# Patient Record
Sex: FEMALE | Race: BLACK OR AFRICAN AMERICAN | Employment: OTHER | URBAN - METROPOLITAN AREA
[De-identification: names, ages, dates, MRNs, and addresses within clinical notes are randomized per-mention and may not be internally consistent; named-entity substitution may affect disease eponyms.]

---

## 2017-06-16 ENCOUNTER — APPOINTMENT (OUTPATIENT)
Dept: GENERAL RADIOLOGY | Age: 77
End: 2017-06-16
Attending: EMERGENCY MEDICINE
Payer: MEDICARE

## 2017-06-16 ENCOUNTER — HOSPITAL ENCOUNTER (EMERGENCY)
Age: 77
Discharge: HOME | End: 2017-06-16
Attending: EMERGENCY MEDICINE
Payer: MEDICARE

## 2017-06-16 VITALS
HEART RATE: 90 BPM | SYSTOLIC BLOOD PRESSURE: 158 MMHG | DIASTOLIC BLOOD PRESSURE: 93 MMHG | RESPIRATION RATE: 20 BRPM | WEIGHT: 174 LBS | OXYGEN SATURATION: 99 % | BODY MASS INDEX: 34.16 KG/M2 | TEMPERATURE: 97.9 F | HEIGHT: 60 IN

## 2017-06-16 DIAGNOSIS — M25.561 CHRONIC PAIN OF RIGHT KNEE: ICD-10-CM

## 2017-06-16 DIAGNOSIS — G89.29 CHRONIC PAIN OF RIGHT KNEE: ICD-10-CM

## 2017-06-16 DIAGNOSIS — R07.9 ACUTE CHEST PAIN: Primary | ICD-10-CM

## 2017-06-16 LAB
ALBUMIN SERPL BCP-MCNC: 3.2 G/DL (ref 3.5–5)
ALBUMIN/GLOB SERPL: 0.9 {RATIO} (ref 1.1–2.2)
ALP SERPL-CCNC: 135 U/L (ref 45–117)
ALT SERPL-CCNC: 61 U/L (ref 12–78)
ANION GAP BLD CALC-SCNC: 6 MMOL/L (ref 5–15)
AST SERPL W P-5'-P-CCNC: 13 U/L (ref 15–37)
BASOPHILS # BLD AUTO: 0 K/UL (ref 0–0.1)
BASOPHILS # BLD: 0 % (ref 0–1)
BILIRUB SERPL-MCNC: 0.2 MG/DL (ref 0.2–1)
BNP SERPL-MCNC: 45 PG/ML (ref 0–450)
BUN SERPL-MCNC: 10 MG/DL (ref 6–20)
BUN/CREAT SERPL: 11 (ref 12–20)
CALCIUM SERPL-MCNC: 8.3 MG/DL (ref 8.5–10.1)
CHLORIDE SERPL-SCNC: 107 MMOL/L (ref 97–108)
CK MB CFR SERPL CALC: 1.3 % (ref 0–2.5)
CK MB SERPL-MCNC: 1.5 NG/ML (ref 5–25)
CK SERPL-CCNC: 118 U/L (ref 26–192)
CO2 SERPL-SCNC: 29 MMOL/L (ref 21–32)
CREAT SERPL-MCNC: 0.87 MG/DL (ref 0.55–1.02)
D DIMER PPP FEU-MCNC: 0.48 MG/L FEU (ref 0–0.65)
EOSINOPHIL # BLD: 0.1 K/UL (ref 0–0.4)
EOSINOPHIL NFR BLD: 1 % (ref 0–7)
ERYTHROCYTE [DISTWIDTH] IN BLOOD BY AUTOMATED COUNT: 15.7 % (ref 11.5–14.5)
GLOBULIN SER CALC-MCNC: 3.5 G/DL (ref 2–4)
GLUCOSE SERPL-MCNC: 142 MG/DL (ref 65–100)
HCT VFR BLD AUTO: 37.7 % (ref 35–47)
HGB BLD-MCNC: 12.2 G/DL (ref 11.5–16)
LYMPHOCYTES # BLD AUTO: 27 % (ref 12–49)
LYMPHOCYTES # BLD: 3.9 K/UL (ref 0.8–3.5)
MCH RBC QN AUTO: 27.9 PG (ref 26–34)
MCHC RBC AUTO-ENTMCNC: 32.4 G/DL (ref 30–36.5)
MCV RBC AUTO: 86.3 FL (ref 80–99)
MONOCYTES # BLD: 1 K/UL (ref 0–1)
MONOCYTES NFR BLD AUTO: 7 % (ref 5–13)
NEUTS SEG # BLD: 9.7 K/UL (ref 1.8–8)
NEUTS SEG NFR BLD AUTO: 65 % (ref 32–75)
PLATELET # BLD AUTO: 345 K/UL (ref 150–400)
POTASSIUM SERPL-SCNC: 3.9 MMOL/L (ref 3.5–5.1)
PROT SERPL-MCNC: 6.7 G/DL (ref 6.4–8.2)
RBC # BLD AUTO: 4.37 M/UL (ref 3.8–5.2)
SODIUM SERPL-SCNC: 142 MMOL/L (ref 136–145)
TROPONIN I SERPL-MCNC: <0.04 NG/ML
WBC # BLD AUTO: 14.7 K/UL (ref 3.6–11)

## 2017-06-16 PROCEDURE — 71010 XR CHEST PORT: CPT

## 2017-06-16 PROCEDURE — 93005 ELECTROCARDIOGRAM TRACING: CPT

## 2017-06-16 PROCEDURE — 85379 FIBRIN DEGRADATION QUANT: CPT | Performed by: EMERGENCY MEDICINE

## 2017-06-16 PROCEDURE — 85025 COMPLETE CBC W/AUTO DIFF WBC: CPT | Performed by: EMERGENCY MEDICINE

## 2017-06-16 PROCEDURE — 84484 ASSAY OF TROPONIN QUANT: CPT | Performed by: EMERGENCY MEDICINE

## 2017-06-16 PROCEDURE — 99285 EMERGENCY DEPT VISIT HI MDM: CPT

## 2017-06-16 PROCEDURE — 80053 COMPREHEN METABOLIC PANEL: CPT | Performed by: EMERGENCY MEDICINE

## 2017-06-16 PROCEDURE — 36415 COLL VENOUS BLD VENIPUNCTURE: CPT | Performed by: EMERGENCY MEDICINE

## 2017-06-16 PROCEDURE — 83880 ASSAY OF NATRIURETIC PEPTIDE: CPT | Performed by: EMERGENCY MEDICINE

## 2017-06-16 PROCEDURE — 82550 ASSAY OF CK (CPK): CPT | Performed by: EMERGENCY MEDICINE

## 2017-06-16 RX ORDER — LIDOCAINE 4 G/100G
PATCH TOPICAL
Qty: 10 PATCH | Refills: 0 | Status: ON HOLD | OUTPATIENT
Start: 2017-06-16 | End: 2017-09-08

## 2017-06-16 RX ORDER — SODIUM CHLORIDE 0.9 % (FLUSH) 0.9 %
5-10 SYRINGE (ML) INJECTION EVERY 8 HOURS
Status: DISCONTINUED | OUTPATIENT
Start: 2017-06-16 | End: 2017-06-16 | Stop reason: HOSPADM

## 2017-06-16 RX ORDER — SODIUM CHLORIDE 0.9 % (FLUSH) 0.9 %
5-10 SYRINGE (ML) INJECTION AS NEEDED
Status: DISCONTINUED | OUTPATIENT
Start: 2017-06-16 | End: 2017-06-16 | Stop reason: HOSPADM

## 2017-06-16 RX ORDER — CAPSAICIN 0.03 G/100G
CREAM TOPICAL 3 TIMES DAILY
Qty: 60 G | Refills: 0 | Status: ON HOLD | OUTPATIENT
Start: 2017-06-16 | End: 2017-09-08

## 2017-06-16 NOTE — DISCHARGE INSTRUCTIONS
Chest Pain: Care Instructions  Your Care Instructions  There are many things that can cause chest pain. Some are not serious and will get better on their own in a few days. But some kinds of chest pain need more testing and treatment. Your doctor may have recommended a follow-up visit in the next 8 to 12 hours. If you are not getting better, you may need more tests or treatment. Even though your doctor has released you, you still need to watch for any problems. The doctor carefully checked you, but sometimes problems can develop later. If you have new symptoms or if your symptoms do not get better, get medical care right away. If you have worse or different chest pain or pressure that lasts more than 5 minutes or you passed out (lost consciousness), call 911 or seek other emergency help right away. A medical visit is only one step in your treatment. Even if you feel better, you still need to do what your doctor recommends, such as going to all suggested follow-up appointments and taking medicines exactly as directed. This will help you recover and help prevent future problems. How can you care for yourself at home? · Rest until you feel better. · Take your medicine exactly as prescribed. Call your doctor if you think you are having a problem with your medicine. · Do not drive after taking a prescription pain medicine. When should you call for help? Call 911 if:  · You passed out (lost consciousness). · You have severe difficulty breathing. · You have symptoms of a heart attack. These may include:  ¨ Chest pain or pressure, or a strange feeling in your chest.  ¨ Sweating. ¨ Shortness of breath. ¨ Nausea or vomiting. ¨ Pain, pressure, or a strange feeling in your back, neck, jaw, or upper belly or in one or both shoulders or arms. ¨ Lightheadedness or sudden weakness. ¨ A fast or irregular heartbeat.   After you call 911, the  may tell you to chew 1 adult-strength or 2 to 4 low-dose aspirin. Wait for an ambulance. Do not try to drive yourself. Call your doctor today if:  · You have any trouble breathing. · Your chest pain gets worse. · You are dizzy or lightheaded, or you feel like you may faint. · You are not getting better as expected. · You are having new or different chest pain. Where can you learn more? Go to http://arnaldo-mariano.info/. Enter A120 in the search box to learn more about \"Chest Pain: Care Instructions. \"  Current as of: May 27, 2016  Content Version: 11.2  © 1494-0027 [employer]. Care instructions adapted under license by [employer] (which disclaims liability or warranty for this information). If you have questions about a medical condition or this instruction, always ask your healthcare professional. [address] any warranty or liability for your use of this information. Joint Pain: Care Instructions  Your Care Instructions  Many people have small aches and pains from overuse or injury to muscles and joints. Joint injuries often happen during sports or recreation, work tasks, or projects around the home. An overuse injury can happen when you put too much stress on a joint or when you do an activity that stresses the joint over and over, such as using the computer or rowing a boat. You can take action at home to help your muscles and joints get better. You should feel better in 1 to 2 weeks, but it can take 3 months or more to heal completely. Follow-up care is a key part of your treatment and safety. Be sure to make and go to all appointments, and call your doctor if you are having problems. It's also a good idea to know your test results and keep a list of the medicines you take. How can you care for yourself at home? · Do not put weight on the injured joint for at least a day or two. · For the first day or two after an injury, do not take hot showers or baths, and do not use hot packs. The heat could make swelling worse. · Put ice or a cold pack on the sore joint for 10 to 20 minutes at a time. Try to do this every 1 to 2 hours for the next 3 days (when you are awake) or until the swelling goes down. Put a thin cloth between the ice and your skin. · Wrap the injury in an elastic bandage. Do not wrap it too tightly because this can cause more swelling. · Prop up the sore joint on a pillow when you ice it or anytime you sit or lie down during the next 3 days. Try to keep it above the level of your heart. This will help reduce swelling. · Take an over-the-counter pain medicine, such as acetaminophen (Tylenol), ibuprofen (Advil, Motrin), or naproxen (Aleve). Read and follow all instructions on the label. · After 1 or 2 days of rest, begin moving the joint gently. While the joint is still healing, you can begin to exercise using activities that do not strain or hurt the painful joint. When should you call for help? Call your doctor now or seek immediate medical care if:  · You have signs of infection, such as:  ¨ Increased pain, swelling, warmth, and redness. ¨ Red streaks leading from the joint. ¨ A fever. Watch closely for changes in your health, and be sure to contact your doctor if:  · Your movement or symptoms are not getting better after 1 to 2 weeks of home treatment. Where can you learn more? Go to http://arnaldo-mariano.info/. Enter P205 in the search box to learn more about \"Joint Pain: Care Instructions. \"  Current as of: May 23, 2016  Content Version: 11.2  © 2060-8025 [employer]. Care instructions adapted under license by [employer] (which disclaims liability or warranty for this information). If you have questions about a medical condition or this instruction, always ask your healthcare professional. [place] [address] any warranty or liability for your use of this information.

## 2017-06-16 NOTE — ED PROVIDER NOTES
Patient is a 77 y.o. female presenting with chest pain and shortness of breath. The history is provided by the patient and medical records. No  was used. Chest Pain (Angina)    This is a new problem. The current episode started more than 1 week ago. The problem has not changed since onset. The problem occurs daily. The pain is associated with exertion. The pain is present in the substernal region. The pain is mild. The quality of the pain is described as pressure-like. The symptoms are aggravated by exertion. Associated symptoms include exertional chest pressure, lower extremity edema and shortness of breath. Pertinent negatives include no palpitations. She has tried nothing for the symptoms. Risk factors include cardiac disease, obesity, diabetes mellitus and hypertension. Her past medical history is significant for DM and HTN. Her past medical history does not include DVT or PE. Procedural history includes stress echo. Shortness of Breath   Associated symptoms include chest pain and leg swelling. Associated medical issues do not include PE or DVT. Pt with intermittent CP/SOB for the past week.      Past Medical History:   Diagnosis Date    Anal polyp 6/13/2013    Arthritis     Asthma     Cataract     Chronic pain     knee - right/back    Diabetes ([address]     GERD (gastroesophageal reflux disease)     Hemorrhoids 6/13/2013    History of kidney stones     Hypertension     Ill-defined condition     abdominal pain and burning    Other ill-defined conditions     high cholesterol       Past Surgical History:   Procedure Laterality Date    COLONOSCOPY  2/28/2013         EGD  2/28/2013         HX CATARACT REMOVAL Bilateral     HX CHOLECYSTECTOMY  6-2015    HX GI  6/27/13    anal polyps removed    HX HEENT      laser surgery for blood clot in right eye    HX KNEE REPLACEMENT      right    HX OTHER SURGICAL  4-2-14    lap gastrotomy and removal of polyp -  - not cancerous per pt    HX ALAN AND BSO      HX TONSILLECTOMY      HX UROLOGICAL      \"laser\" surgery for kidney stone    NEUROLOGICAL PROCEDURE UNLISTED  1990    tumor at back of neck, benign         Family History:   Problem Relation Age of Onset    Cancer Mother      colon    Diabetes Brother     Other Sister      GI BLEED    Heart Disease Brother     Heart Attack Brother     Heart Disease Brother     Heart Disease Brother     Anesth Problems Neg Hx        Social History     Social History    Marital status:      Spouse name: N/A    Number of children: N/A    Years of education: N/A     Occupational History    Not on file. Social History Main Topics    Smoking status: Former Smoker    Smokeless tobacco: Never Used      Comment: age 13    Alcohol use No    Drug use: No    Sexual activity: No     Other Topics Concern    Not on file     Social History Narrative         ALLERGIES: Seafood [shellfish containing products]    Review of Systems   Respiratory: Positive for shortness of breath. Cardiovascular: Positive for chest pain and leg swelling. Negative for palpitations. All other systems reviewed and are negative. Vitals:    06/16/17 1738   BP: 158/78   Pulse: 83   Resp: 19   Temp: 98.7 °F (37.1 °C)   SpO2: 99%   Weight: 78.9 kg (174 lb)   Height: 5' (1.524 m)            Physical Exam   Constitutional: She is oriented to person, place, and time. She appears well-developed and well-nourished. No distress. Obese Black female NAD   HENT:   Head: Normocephalic and atraumatic. Nose: Nose normal.   Mouth/Throat: Oropharynx is clear and moist. No oropharyngeal exudate. Eyes: Conjunctivae and EOM are normal. Pupils are equal, round, and reactive to light. Right eye exhibits no discharge. Left eye exhibits no discharge. No scleral icterus. Neck: Normal range of motion. Neck supple. Cardiovascular: Normal rate, regular rhythm and normal heart sounds.     No murmur heard.  Pulmonary/Chest: Effort normal and breath sounds normal. No respiratory distress. She has no wheezes. She has no rales. Abdominal: Soft. She exhibits no distension. There is no tenderness. Musculoskeletal: Normal range of motion. She exhibits edema. She exhibits no tenderness. Trace pitting edema b/l LE   Neurological: She is alert and oriented to person, place, and time. Skin: Skin is warm and dry. No rash noted. She is not diaphoretic. No erythema. No pallor. Psychiatric: She has a normal mood and affect. Her behavior is normal.   Nursing note and vitals reviewed. [facility]  ED Course       Procedures      7:46 PM    Reviewed evaluation with pt. She states SOB/chest tightness seems worse 2nd to knee pain. Only took 1 percocet today. States her pain management has talked with her about \"pain patches\" for her knee but hasn't tried them yet. Has appt with cardiology f/u end of July.   Trinity Lamas MD

## 2017-06-18 LAB
ATRIAL RATE: 81 BPM
CALCULATED P AXIS, ECG09: 60 DEGREES
CALCULATED R AXIS, ECG10: 33 DEGREES
CALCULATED T AXIS, ECG11: 37 DEGREES
DIAGNOSIS, 93000: NORMAL
P-R INTERVAL, ECG05: 118 MS
Q-T INTERVAL, ECG07: 388 MS
QRS DURATION, ECG06: 72 MS
QTC CALCULATION (BEZET), ECG08: 450 MS
VENTRICULAR RATE, ECG03: 81 BPM

## 2017-08-21 ENCOUNTER — HOSPITAL ENCOUNTER (OUTPATIENT)
Dept: CT IMAGING | Age: 77
Discharge: HOME | End: 2017-08-21
Attending: ORTHOPAEDIC SURGERY
Payer: MEDICARE

## 2017-08-21 DIAGNOSIS — Z96.659 PAIN DUE TO TOTAL KNEE REPLACEMENT, SUBSEQUENT ENCOUNTER: ICD-10-CM

## 2017-08-21 DIAGNOSIS — T84.84XD PAIN DUE TO TOTAL KNEE REPLACEMENT, SUBSEQUENT ENCOUNTER: ICD-10-CM

## 2017-08-21 PROCEDURE — 73700 CT LOWER EXTREMITY W/O DYE: CPT

## 2017-09-07 ENCOUNTER — HOSPITAL ENCOUNTER (INPATIENT)
Age: 77
LOS: 2 days | Discharge: HOME | DRG: 383 | End: 2017-09-10
Attending: EMERGENCY MEDICINE | Admitting: INTERNAL MEDICINE
Payer: MEDICARE

## 2017-09-07 ENCOUNTER — APPOINTMENT (OUTPATIENT)
Dept: CT IMAGING | Age: 77
DRG: 383 | End: 2017-09-07
Attending: EMERGENCY MEDICINE
Payer: MEDICARE

## 2017-09-07 DIAGNOSIS — K85.00 IDIOPATHIC ACUTE PANCREATITIS WITHOUT INFECTION OR NECROSIS: Primary | ICD-10-CM

## 2017-09-07 LAB
ALBUMIN SERPL-MCNC: 3.3 G/DL (ref 3.5–5)
ALBUMIN/GLOB SERPL: 0.9 {RATIO} (ref 1.1–2.2)
ALP SERPL-CCNC: 99 U/L (ref 45–117)
ALT SERPL-CCNC: 20 U/L (ref 12–78)
ANION GAP SERPL CALC-SCNC: 9 MMOL/L (ref 5–15)
AST SERPL-CCNC: 8 U/L (ref 15–37)
BASOPHILS # BLD: 0 K/UL (ref 0–0.1)
BASOPHILS NFR BLD: 0 % (ref 0–1)
BILIRUB SERPL-MCNC: 0.1 MG/DL (ref 0.2–1)
BUN SERPL-MCNC: 12 MG/DL (ref 6–20)
BUN/CREAT SERPL: 13 (ref 12–20)
CALCIUM SERPL-MCNC: 8.4 MG/DL (ref 8.5–10.1)
CHLORIDE SERPL-SCNC: 109 MMOL/L (ref 97–108)
CO2 SERPL-SCNC: 28 MMOL/L (ref 21–32)
CREAT SERPL-MCNC: 0.9 MG/DL (ref 0.55–1.02)
EOSINOPHIL # BLD: 0.1 K/UL (ref 0–0.4)
EOSINOPHIL NFR BLD: 1 % (ref 0–7)
ERYTHROCYTE [DISTWIDTH] IN BLOOD BY AUTOMATED COUNT: 15.2 % (ref 11.5–14.5)
GLOBULIN SER CALC-MCNC: 3.5 G/DL (ref 2–4)
GLUCOSE SERPL-MCNC: 165 MG/DL (ref 65–100)
HCT VFR BLD AUTO: 37 % (ref 35–47)
HGB BLD-MCNC: 12 G/DL (ref 11.5–16)
LIPASE SERPL-CCNC: 538 U/L (ref 73–393)
LYMPHOCYTES # BLD: 4 K/UL (ref 0.8–3.5)
LYMPHOCYTES NFR BLD: 25 % (ref 12–49)
MCH RBC QN AUTO: 28.1 PG (ref 26–34)
MCHC RBC AUTO-ENTMCNC: 32.4 G/DL (ref 30–36.5)
MCV RBC AUTO: 86.7 FL (ref 80–99)
MONOCYTES # BLD: 1 K/UL (ref 0–1)
MONOCYTES NFR BLD: 6 % (ref 5–13)
NEUTS SEG # BLD: 11.2 K/UL (ref 1.8–8)
NEUTS SEG NFR BLD: 68 % (ref 32–75)
PLATELET # BLD AUTO: 343 K/UL (ref 150–400)
POTASSIUM SERPL-SCNC: 4 MMOL/L (ref 3.5–5.1)
PROT SERPL-MCNC: 6.8 G/DL (ref 6.4–8.2)
RBC # BLD AUTO: 4.27 M/UL (ref 3.8–5.2)
SODIUM SERPL-SCNC: 146 MMOL/L (ref 136–145)
WBC # BLD AUTO: 16.4 K/UL (ref 3.6–11)

## 2017-09-07 PROCEDURE — 74011250637 HC RX REV CODE- 250/637: Performed by: EMERGENCY MEDICINE

## 2017-09-07 PROCEDURE — 99283 EMERGENCY DEPT VISIT LOW MDM: CPT

## 2017-09-07 PROCEDURE — 80053 COMPREHEN METABOLIC PANEL: CPT | Performed by: EMERGENCY MEDICINE

## 2017-09-07 PROCEDURE — 74011250636 HC RX REV CODE- 250/636: Performed by: EMERGENCY MEDICINE

## 2017-09-07 PROCEDURE — 96374 THER/PROPH/DIAG INJ IV PUSH: CPT

## 2017-09-07 PROCEDURE — 85025 COMPLETE CBC W/AUTO DIFF WBC: CPT | Performed by: EMERGENCY MEDICINE

## 2017-09-07 PROCEDURE — 96361 HYDRATE IV INFUSION ADD-ON: CPT

## 2017-09-07 PROCEDURE — 74150 CT ABDOMEN W/O CONTRAST: CPT

## 2017-09-07 PROCEDURE — 36415 COLL VENOUS BLD VENIPUNCTURE: CPT | Performed by: EMERGENCY MEDICINE

## 2017-09-07 PROCEDURE — 83690 ASSAY OF LIPASE: CPT | Performed by: EMERGENCY MEDICINE

## 2017-09-07 RX ORDER — HYDROCODONE BITARTRATE AND ACETAMINOPHEN 5; 325 MG/1; MG/1
1 TABLET ORAL
Status: COMPLETED | OUTPATIENT
Start: 2017-09-07 | End: 2017-09-07

## 2017-09-07 RX ORDER — MORPHINE SULFATE 4 MG/ML
2 INJECTION, SOLUTION INTRAMUSCULAR; INTRAVENOUS
Status: COMPLETED | OUTPATIENT
Start: 2017-09-07 | End: 2017-09-07

## 2017-09-07 RX ORDER — FAMOTIDINE 20 MG/1
20 TABLET, FILM COATED ORAL
Status: COMPLETED | OUTPATIENT
Start: 2017-09-07 | End: 2017-09-07

## 2017-09-07 RX ADMIN — FAMOTIDINE 20 MG: 20 TABLET, FILM COATED ORAL at 20:57

## 2017-09-07 RX ADMIN — SODIUM CHLORIDE 1000 ML: 900 INJECTION, SOLUTION INTRAVENOUS at 21:58

## 2017-09-07 RX ADMIN — ALUMINUM HYDROXIDE AND MAGNESIUM HYDROXIDE 30 ML: 200; 200 SUSPENSION ORAL at 20:57

## 2017-09-07 RX ADMIN — MORPHINE SULFATE 2 MG: 4 INJECTION, SOLUTION INTRAMUSCULAR; INTRAVENOUS at 22:09

## 2017-09-07 RX ADMIN — HYDROCODONE BITARTRATE AND ACETAMINOPHEN 1 TABLET: 5; 325 TABLET ORAL at 20:57

## 2017-09-07 NOTE — IP AVS SNAPSHOT
[facility] 
 
 
 [address] Patient: Donaldo West MRN: [id number] :1940 You are allergic to the following Allergen Reactions Seafood (Shellfish Containing Products) Swelling Recent Documentation Height Weight BMI OB Status Smoking Status 1.524 m 83.5 kg 35.94 kg/m2 Hysterectomy Former Smoker Emergency Contacts Name Discharge Info Relation Home Work Mobile Ellen Rice DISCHARGE CAREGIVER [3] Other Relative [6] [phone] About your hospitalization You were admitted on:  2017 You last received care in the:  [address] You were discharged on:  September 10, 2017 Unit phone number:  [phone] Why you were hospitalized Your primary diagnosis was:  Pancreatitis, Acute Your diagnoses also included:  Epigastric Abdominal Pain Providers Seen During Your Hospitalizations Provider Role Specialty Primary office phone Juma Carias MD Attending Provider Emergency Medicine [phone] Iraida Morejon MD Attending Provider Internal Medicine [phone] Deshawn Graham MD Attending Provider Internal Medicine [phone] Your Primary Care Physician (PCP) Primary Care Physician Office Phone Office Fax Colleen DUDLEY [phone] Follow-up Information Follow up With Details Comments Contact Info Shantelle Keith MD Go on 2017 Your appointment is scheduled for 17 at 2:45pm. Beka ONEILL. 97. 
 
[facility] [id number] 
[phone] Hiral Odom MD Go on 10/4/2017 Your outpatient EGD is scheduled for 10/4/17. [address] [facility] [address] 
[phone] [facility] Office on [address] [facility]. will be contacted by a health  to schedule a home visit. [address] [phone] Current Discharge Medication List  
  
CONTINUE these medications which have NOT CHANGED Dose & Instructions Dispensing Information Comments Morning Noon Evening Bedtime  
 acetaminophen 500 mg tablet Commonly known as:  TYLENOL Your last dose was: Your next dose is:    
   
   
 Dose:  1000 mg Take 1,000 mg by mouth every six (6) hours as needed for Pain. Refills:  0 ADVAIR DISKUS 100-50 mcg/dose diskus inhaler Generic drug:  fluticasone-salmeterol Your last dose was: Your next dose is:    
   
   
 Dose:  1 Puff 1 puff two (2) times a day. Refills:  0  
     
   
   
   
  
 albuterol 90 mcg/actuation inhaler Commonly known as:  PROVENTIL HFA, VENTOLIN HFA, PROAIR HFA Your last dose was: Your next dose is:    
   
   
 Dose:  2 Puff Take 2 Puffs by inhalation every six (6) hours as needed for Wheezing. Refills:  0  
     
   
   
   
  
 amLODIPine 10 mg tablet Commonly known as:  [place] Shield Your last dose was: Your next dose is:    
   
   
 Dose:  10 mg Take 10 mg by mouth daily. Refills:  0  
     
   
   
   
  
 COMBIGAN 0.2-0.5 % Drop ophthalmic solution Generic drug:  brimonidine-timolol Your last dose was: Your next dose is:    
   
   
 Dose:  1 Drop Administer 1 Drop to right eye nightly. Refills:  0 DEXILANT 60 mg Cpdb Generic drug:  Dexlansoprazole Your last dose was: Your next dose is:    
   
   
 Dose:  60 mg Take 60 mg by mouth Daily (before breakfast). Refills:  0  
     
   
   
   
  
 famotidine 20 mg tablet Commonly known as:  PEPCID Your last dose was: Your next dose is:    
   
   
 Dose:  20 mg Take 20 mg by mouth two (2) times a day. Refills:  0  
     
   
   
   
  
 fosinopril 20 mg tablet Commonly known as:  MONOPRIL Your last dose was: Your next dose is:    
   
   
 Dose:  20 mg Take 20 mg by mouth daily. Refills:  0  
     
   
   
   
  
 LANTUS SOLOSTAR 100 unit/mL (3 mL) Inpn Generic drug:  insulin glargine Your last dose was: Your next dose is:    
   
   
 Dose:  20 Units 20 Units by SubCUTAneous route two (2) times a day. Indications: DIABETES MELLITUS Refills:  0  
     
   
   
   
  
 linaclotide 145 mcg Cap capsule Commonly known as:  Purvi Smith Your last dose was: Your next dose is:    
   
   
 Dose:  145 mcg Take 145 mcg by mouth Daily (before breakfast). Refills:  0 NARCAN 4 mg/actuation nasal spray Generic drug:  naloxone Your last dose was: Your next dose is:    
   
   
 Dose:  1 Spray 1 [place] by IntraNASal route once as needed. Use 1 spray intranasally into 1 nostril. Use a new Narcan nasal spray for subsequent doses and administer into alternating nostrils. May repeat every 2 to 3 minutes as needed. Refills:  0  
     
   
   
   
  
 nitroglycerin 0.4 mg SL tablet Commonly known as:  NITROSTAT Your last dose was: Your next dose is:    
   
   
 Dose:  0.4 mg  
0.4 mg by SubLINGual route every five (5) minutes as needed for Chest Pain. Refills:  0  
     
   
   
   
  
 oxyCODONE IR 20 mg immediate release tablet Commonly known as:  Buzz Sirena Your last dose was: Your next dose is:    
   
   
 Dose:  20 mg Take 20 mg by mouth every six (6) hours as needed. Refills:  0  
     
   
   
   
  
 sucralfate 100 mg/mL suspension Commonly known as:  Reidenisa Patrickch Your last dose was: Your next dose is:    
   
   
 Dose:  1 g Take 1 g by mouth three (3) times daily. Refills:  0  
     
   
   
   
  
 TRAVATAN Z OP Your last dose was: Your next dose is:    
   
   
 Dose:  1 Drop Administer 1 Drop to both eyes nightly. Refills:  0 Discharge Instructions Upper GI Endoscopy: Before Your Procedure What is an upper GI endoscopy? An upper gastrointestinal (or GI) endoscopy is a test that allows your doctor to look at the inside of your esophagus, stomach, and the first part of your small intestine, called the duodenum. The esophagus is the tube that carries food to your stomach. The doctor uses a thin, lighted tube that bends. It is called an endoscope, or scope. The doctor puts the tip of the scope in your mouth and gently moves it down your throat. The scope is a flexible video camera. The doctor looks at a monitor (like a TV set or a computer screen) as he or she moves the scope. A doctor may do this test, which is also called a procedure, to look for ulcers, tumors, infection, or bleeding. It also can be used to look for signs of acid backing up into your esophagus. This is called gastroesophageal reflux disease, or GERD. The doctor can use the scope to take a sample of tissue for study (a biopsy). The doctor also can use the scope to take out growths or stop bleeding. Follow-up care is a key part of your treatment and safety. Be sure to make and go to all appointments, and call your doctor if you are having problems. It's also a good idea to know your test results and keep a list of the medicines you take. What happens before the procedure? Procedures can be stressful. This information will help you understand what you can expect. And it will help you safely prepare for your procedure. Preparing for the procedure · Understand exactly what procedure is planned, along with the risks, benefits, and other options. · Tell your doctors ALL the medicines, vitamins, supplements, and herbal remedies you take. Some of these can increase the risk of bleeding or interact with anesthesia. · If you take blood thinners, such as warfarin (Coumadin), clopidogrel (Plavix), or aspirin, be sure to talk to your doctor. He or she will tell you if you should stop taking these medicines before your procedure. Make sure that you understand exactly what your doctor wants you to do. · Your doctor will tell you which medicines to take or stop before your procedure. You may need to stop taking certain medicines a week or more before the procedure. So talk to your doctor as soon as you can. · If you have an advance directive, let your doctor know. It may include a living will and a durable power of  for health care. Bring a copy to the hospital. If you don't have one, you may want to prepare one. It lets your doctor and loved ones know your health care wishes. Doctors advise that everyone prepare these papers before any type of surgery or procedure. What happens on the day of the procedure? · Follow the instructions exactly about when to stop eating and drinking. If you don't, your procedure may be canceled. If your doctor told you to take your medicines on the day of the procedure, take them with only a sip of water. · Take a bath or shower before you come in for your procedure. Do not apply lotions, perfumes, deodorants, or nail polish. · Take off all jewelry and piercings. And take out contact lenses, if you wear them. At the hospital or surgery center · Bring a picture ID. · The test may take 15 to 30 minutes. · The doctor may spray medicine on the back of your throat to numb it. You also will get medicine to prevent pain and to relax you. · You will lie on your left side. The doctor will put the scope in your mouth and toward the back of your throat. The doctor will tell you when to swallow. This helps the scope move down your throat. You will be able to breathe normally. The doctor will move the scope down your esophagus into your stomach. The doctor also may look at the duodenum.  
· If your doctor wants to take a sample of tissue for a biopsy, he or she may use small surgical tools, which are put into the scope, to cut off some tissue. You will not feel a biopsy, if one is taken. The doctor also can use the tools to stop bleeding or to do other treatments, if needed. · You will stay at the hospital or surgery center for 1 to 2 hours until the medicine you were given wears off. Going home · Be sure you have someone to drive you home. Anesthesia and pain medicine make it unsafe for you to drive. · You will be given more specific instructions about recovering from your procedure. They will cover things like diet, wound care, follow-up care, driving, and getting back to your normal routine. When should you call your doctor? · You have questions or concerns. · You don't understand how to prepare for your procedure. · You become ill before the procedure (such as fever, flu, or a cold). · You need to reschedule or have changed your mind about having the procedure. Where can you learn more? Go to http://arnaldo-mariano.info/. Enter P790 in the search box to learn more about \"Upper GI Endoscopy: Before Your Procedure. \" Current as of: August 9, 2016 Content Version: 11.3 © 6287-1028 [employer]. Care instructions adapted under license by [employer] (which disclaims liability or warranty for this information). If you have questions about a medical condition or this instruction, always ask your healthcare professional. [place] [address] any warranty or liability for your use of this information. Upper GI Endoscopy: What to Expect at [facility] Your Recovery After you have an endoscopy, you will stay at the hospital or clinic for 1 to 2 hours. This will allow the medicine to wear off. You will be able to go home after your doctor or nurse checks to make sure you are not having any problems.  
You may have to stay overnight if you had treatment during the test. You may have a sore throat for a day or two after the test. 
This care sheet gives you a general idea about what to expect after the test. 
How can you care for yourself at home? Activity · Rest as much as you need to after you go home. · You should be able to go back to your usual activities the day after the test. 
Diet · Follow your doctor's directions for eating after the test. 
· Drink plenty of fluids (unless your doctor has told you not to). Medications · If you have a sore throat the day after the test, use an over-the-counter spray to numb your throat. Follow-up care is a key part of your treatment and safety. Be sure to make and go to all appointments, and call your doctor if you are having problems. It's also a good idea to know your test results and keep a list of the medicines you take. When should you call for help? Call 911 anytime you think you may need emergency care. For example, call if: 
· You passed out (lost consciousness). · You cough up blood. · You vomit blood or what looks like coffee grounds. · You pass maroon or very bloody stools. Call your doctor now or seek immediate medical care if: 
· You have trouble swallowing. · You have belly pain. · Your stools are black and tarlike or have streaks of blood. · You are sick to your stomach or cannot keep fluids down. Watch closely for changes in your health, and be sure to contact your doctor if: 
· Your throat still hurts after a day or two. · You do not get better as expected. Where can you learn more? Go to http://arnaldo-mariano.info/. Enter [phone] [phone] in the search box to learn more about \"Upper GI Endoscopy: What to Expect at Home. \" Current as of: August 9, 2016 Content Version: 11.3 © 4711-8696 [employer], [employer]. Care instructions adapted under license by [employer] (which disclaims liability or warranty for this information).  If you have questions about a medical condition or this instruction, always ask your healthcare professional. [place] [address] any warranty or liability for your use of this information. Discharge Orders None Sparkbrowser Announcement We are excited to announce that we are making your provider's discharge notes available to you in Sparkbrowser. You will see these notes when they are completed and signed by the physician that discharged you from your recent hospital stay. If you have any questions or concerns about any information you see in [employer], please call the Health Information Department where you were seen or reach out to your Primary Care Provider for more information about your plan of care. Introducing [facility] HE[facility]! [facility] introduces Sparkbrowser patient portal. Now you can access parts of your medical record, email your doctor's office, and request medication refills online. 1. In your internet browser, go to [email or web address] 2. Click on the First Time User? Click Here link in the Sign In box. You will see the New Member Sign Up page. 3. Enter your Sparkbrowser Access Code exactly as it appears below. You will not need to use this code after youve completed the sign-up process. If you do not sign up before the expiration date, you must request a new code. · Sparkbrowser Access Code: [email or web address] Expires: 9/14/2017  7:52 PM 
 
4. Enter the last four digits of your Social Security Number (xxxx) and Date of Birth (mm/dd/yyyy) as indicated and click Submit. You will be taken to the next sign-up page. 5. Create a Sparkbrowser ID. This will be your Sparkbrowser login ID and cannot be changed, so think of one that is secure and easy to remember. 6. Create a Sparkbrowser password. You can change your password at any time. 7. Enter your Password Reset Question and Answer. This can be used at a later time if you forget your password. 8. Enter your e-mail address.  You will receive e-mail notification when new information is available in Securust. 9. Click Sign Up. You can now view and download portions of your medical record. 10. Click the Download Summary menu link to download a portable copy of your medical information. If you have questions, please visit the Frequently Asked Questions section of the [employer] website. Remember, [employer] is NOT to be used for urgent needs. For medical emergencies, dial 911. Now available from your iPhone and Android! General Information Please provide this summary of care documentation to your next provider. Patient Signature:  ____________________________________________________________ Date:  ____________________________________________________________  
  
[facility] Provider Signature:  ____________________________________________________________ Date:  ____________________________________________________________

## 2017-09-07 NOTE — IP AVS SNAPSHOT
Filomena L[facility]pez 
 
 
 [address] Patient: Yovana Smiley MRN: [id number] :1940 Current Discharge Medication List  
  
CONTINUE these medications which have NOT CHANGED Dose & Instructions Dispensing Information Comments Morning Noon Evening Bedtime  
 acetaminophen 500 mg tablet Commonly known as:  TYLENOL Your last dose was: Your next dose is:    
   
   
 Dose:  1000 mg Take 1,000 mg by mouth every six (6) hours as needed for Pain. Refills:  0 ADVAIR DISKUS 100-50 mcg/dose diskus inhaler Generic drug:  fluticasone-salmeterol Your last dose was: Your next dose is:    
   
   
 Dose:  1 Puff 1 puff two (2) times a day. Refills:  0  
     
   
   
   
  
 albuterol 90 mcg/actuation inhaler Commonly known as:  PROVENTIL HFA, VENTOLIN HFA, PROAIR HFA Your last dose was: Your next dose is:    
   
   
 Dose:  2 Puff Take 2 Puffs by inhalation every six (6) hours as needed for Wheezing. Refills:  0  
     
   
   
   
  
 amLODIPine 10 mg tablet Commonly known as:  Jose Ramon Kaushik Your last dose was: Your next dose is:    
   
   
 Dose:  10 mg Take 10 mg by mouth daily. Refills:  0  
     
   
   
   
  
 COMBIGAN 0.2-0.5 % Drop ophthalmic solution Generic drug:  brimonidine-timolol Your last dose was: Your next dose is:    
   
   
 Dose:  1 Drop Administer 1 Drop to right eye nightly. Refills:  0 DEXILANT 60 mg Cpdb Generic drug:  Dexlansoprazole Your last dose was: Your next dose is:    
   
   
 Dose:  60 mg Take 60 mg by mouth Daily (before breakfast). Refills:  0  
     
   
   
   
  
 famotidine 20 mg tablet Commonly known as:  PEPCID Your last dose was: Your next dose is:    
   
   
 Dose:  20 mg Take 20 mg by mouth two (2) times a day. Refills:  0  
     
   
   
   
  
 fosinopril 20 mg tablet Commonly known as:  MONOPRIL Your last dose was: Your next dose is:    
   
   
 Dose:  20 mg Take 20 mg by mouth daily. Refills:  0  
     
   
   
   
  
 LANTUS SOLOSTAR 100 unit/mL (3 mL) Inpn Generic drug:  insulin glargine Your last dose was: Your next dose is:    
   
   
 Dose:  20 Units 20 Units by SubCUTAneous route two (2) times a day. Indications: DIABETES MELLITUS Refills:  0  
     
   
   
   
  
 linaclotide 145 mcg Cap capsule Commonly known as:  [place] Your last dose was: Your next dose is:    
   
   
 Dose:  145 mcg Take 145 mcg by mouth Daily (before breakfast). Refills:  0 NARCAN 4 mg/actuation nasal spray Generic drug:  naloxone Your last dose was: Your next dose is:    
   
   
 Dose:  1 Spray 1 [place] by IntraNASal route once as needed. Use 1 spray intranasally into 1 nostril. Use a new Narcan nasal spray for subsequent doses and administer into alternating nostrils. May repeat every 2 to 3 minutes as needed. Refills:  0  
     
   
   
   
  
 nitroglycerin 0.4 mg SL tablet Commonly known as:  NITROSTAT Your last dose was: Your next dose is:    
   
   
 Dose:  0.4 mg  
0.4 mg by SubLINGual route every five (5) minutes as needed for Chest Pain. Refills:  0  
     
   
   
   
  
 oxyCODONE IR 20 mg immediate release tablet Commonly known as:  [place] Laser Your last dose was: Your next dose is:    
   
   
 Dose:  20 mg Take 20 mg by mouth every six (6) hours as needed. Refills:  0  
     
   
   
   
  
 sucralfate 100 mg/mL suspension Commonly known as:  [place] Your last dose was: Your next dose is:    
   
   
 Dose:  1 g Take 1 g by mouth three (3) times daily. Refills:  0  
     
   
   
   
  
 TRAVATAN Z OP Your last dose was: Your next dose is:    
   
   
 Dose:  1 Drop Administer 1 Drop to both eyes nightly. Refills:  0

## 2017-09-08 ENCOUNTER — APPOINTMENT (OUTPATIENT)
Dept: ULTRASOUND IMAGING | Age: 77
DRG: 383 | End: 2017-09-08
Attending: INTERNAL MEDICINE
Payer: MEDICARE

## 2017-09-08 ENCOUNTER — APPOINTMENT (OUTPATIENT)
Dept: MRI IMAGING | Age: 77
DRG: 383 | End: 2017-09-08
Attending: INTERNAL MEDICINE
Payer: MEDICARE

## 2017-09-08 PROBLEM — K85.90 PANCREATITIS, ACUTE: Status: ACTIVE | Noted: 2017-09-08

## 2017-09-08 PROBLEM — R10.13 EPIGASTRIC ABDOMINAL PAIN: Status: ACTIVE | Noted: 2017-09-08

## 2017-09-08 LAB
AMPHET UR QL SCN: NEGATIVE
BARBITURATES UR QL SCN: NEGATIVE
BASOPHILS # BLD: 0.1 K/UL (ref 0–0.1)
BASOPHILS NFR BLD: 0 % (ref 0–1)
BENZODIAZ UR QL: POSITIVE
CANNABINOIDS UR QL SCN: NEGATIVE
CHOLEST SERPL-MCNC: 149 MG/DL
COCAINE UR QL SCN: NEGATIVE
DRUG SCRN COMMENT,DRGCM: ABNORMAL
EOSINOPHIL # BLD: 0.2 K/UL (ref 0–0.4)
EOSINOPHIL NFR BLD: 1 % (ref 0–7)
ERYTHROCYTE [DISTWIDTH] IN BLOOD BY AUTOMATED COUNT: 15.1 % (ref 11.5–14.5)
GLUCOSE BLD STRIP.AUTO-MCNC: 149 MG/DL (ref 65–100)
GLUCOSE BLD STRIP.AUTO-MCNC: 154 MG/DL (ref 65–100)
GLUCOSE BLD STRIP.AUTO-MCNC: 78 MG/DL (ref 65–100)
GLUCOSE BLD STRIP.AUTO-MCNC: 87 MG/DL (ref 65–100)
GLUCOSE BLD STRIP.AUTO-MCNC: 93 MG/DL (ref 65–100)
HCT VFR BLD AUTO: 36.8 % (ref 35–47)
HDLC SERPL-MCNC: 70 MG/DL
HDLC SERPL: 2.1 {RATIO} (ref 0–5)
HGB BLD-MCNC: 12 G/DL (ref 11.5–16)
LDLC SERPL CALC-MCNC: 57 MG/DL (ref 0–100)
LIPASE SERPL-CCNC: 343 U/L (ref 73–393)
LIPID PROFILE,FLP: NORMAL
LYMPHOCYTES # BLD: 4.1 K/UL (ref 0.8–3.5)
LYMPHOCYTES NFR BLD: 28 % (ref 12–49)
MCH RBC QN AUTO: 28.1 PG (ref 26–34)
MCHC RBC AUTO-ENTMCNC: 32.6 G/DL (ref 30–36.5)
MCV RBC AUTO: 86.2 FL (ref 80–99)
METHADONE UR QL: NEGATIVE
MONOCYTES # BLD: 1.1 K/UL (ref 0–1)
MONOCYTES NFR BLD: 7 % (ref 5–13)
NEUTS SEG # BLD: 9.2 K/UL (ref 1.8–8)
NEUTS SEG NFR BLD: 64 % (ref 32–75)
OPIATES UR QL: POSITIVE
PCP UR QL: NEGATIVE
PLATELET # BLD AUTO: 325 K/UL (ref 150–400)
RBC # BLD AUTO: 4.27 M/UL (ref 3.8–5.2)
SERVICE CMNT-IMP: ABNORMAL
SERVICE CMNT-IMP: ABNORMAL
SERVICE CMNT-IMP: NORMAL
TRIGL SERPL-MCNC: 110 MG/DL (ref ?–150)
VLDLC SERPL CALC-MCNC: 22 MG/DL
WBC # BLD AUTO: 14.6 K/UL (ref 3.6–11)

## 2017-09-08 PROCEDURE — 74011250637 HC RX REV CODE- 250/637: Performed by: HOSPITALIST

## 2017-09-08 PROCEDURE — 74011000258 HC RX REV CODE- 258: Performed by: HOSPITALIST

## 2017-09-08 PROCEDURE — 74011000250 HC RX REV CODE- 250: Performed by: HOSPITALIST

## 2017-09-08 PROCEDURE — 74011000258 HC RX REV CODE- 258: Performed by: RADIOLOGY

## 2017-09-08 PROCEDURE — 74011250636 HC RX REV CODE- 250/636: Performed by: INTERNAL MEDICINE

## 2017-09-08 PROCEDURE — 80061 LIPID PANEL: CPT | Performed by: INTERNAL MEDICINE

## 2017-09-08 PROCEDURE — 80307 DRUG TEST PRSMV CHEM ANLYZR: CPT | Performed by: HOSPITALIST

## 2017-09-08 PROCEDURE — A9585 GADOBUTROL INJECTION: HCPCS | Performed by: RADIOLOGY

## 2017-09-08 PROCEDURE — 85025 COMPLETE CBC W/AUTO DIFF WBC: CPT | Performed by: INTERNAL MEDICINE

## 2017-09-08 PROCEDURE — 36415 COLL VENOUS BLD VENIPUNCTURE: CPT | Performed by: INTERNAL MEDICINE

## 2017-09-08 PROCEDURE — 74011250636 HC RX REV CODE- 250/636: Performed by: RADIOLOGY

## 2017-09-08 PROCEDURE — 65270000029 HC RM PRIVATE

## 2017-09-08 PROCEDURE — 82962 GLUCOSE BLOOD TEST: CPT

## 2017-09-08 PROCEDURE — 74011000250 HC RX REV CODE- 250: Performed by: INTERNAL MEDICINE

## 2017-09-08 PROCEDURE — 76700 US EXAM ABDOM COMPLETE: CPT

## 2017-09-08 PROCEDURE — 74011250636 HC RX REV CODE- 250/636: Performed by: HOSPITALIST

## 2017-09-08 PROCEDURE — 83690 ASSAY OF LIPASE: CPT | Performed by: INTERNAL MEDICINE

## 2017-09-08 PROCEDURE — 74183 MRI ABD W/O CNTR FLWD CNTR: CPT

## 2017-09-08 PROCEDURE — 99218 HC RM OBSERVATION: CPT

## 2017-09-08 RX ORDER — NITROGLYCERIN 0.4 MG/1
0.4 TABLET SUBLINGUAL
COMMUNITY
End: 2019-03-08

## 2017-09-08 RX ORDER — OXYCODONE AND ACETAMINOPHEN 5; 325 MG/1; MG/1
1 TABLET ORAL
Status: ON HOLD | COMMUNITY
End: 2017-09-08

## 2017-09-08 RX ORDER — SODIUM CHLORIDE 0.9 % (FLUSH) 0.9 %
5-10 SYRINGE (ML) INJECTION AS NEEDED
Status: DISCONTINUED | OUTPATIENT
Start: 2017-09-08 | End: 2017-09-10 | Stop reason: HOSPADM

## 2017-09-08 RX ORDER — DEXLANSOPRAZOLE 60 MG/1
60 CAPSULE, DELAYED RELEASE ORAL
COMMUNITY

## 2017-09-08 RX ORDER — MAGNESIUM SULFATE 100 %
4 CRYSTALS MISCELLANEOUS AS NEEDED
Status: DISCONTINUED | OUTPATIENT
Start: 2017-09-08 | End: 2017-09-10 | Stop reason: HOSPADM

## 2017-09-08 RX ORDER — SODIUM CHLORIDE 9 MG/ML
100 INJECTION, SOLUTION INTRAVENOUS CONTINUOUS
Status: DISCONTINUED | OUTPATIENT
Start: 2017-09-08 | End: 2017-09-10 | Stop reason: HOSPADM

## 2017-09-08 RX ORDER — FOSINOPIRL SODIUM 10 MG/1
20 TABLET ORAL DAILY
Status: DISCONTINUED | OUTPATIENT
Start: 2017-09-08 | End: 2017-09-10 | Stop reason: HOSPADM

## 2017-09-08 RX ORDER — DICLOFENAC SODIUM 10 MG/G
4 GEL TOPICAL 4 TIMES DAILY
Status: ON HOLD | COMMUNITY
End: 2017-09-08

## 2017-09-08 RX ORDER — AMLODIPINE BESYLATE 5 MG/1
10 TABLET ORAL DAILY
Status: DISCONTINUED | OUTPATIENT
Start: 2017-09-08 | End: 2017-09-10 | Stop reason: HOSPADM

## 2017-09-08 RX ORDER — MAGNESIUM SULFATE 100 %
4 CRYSTALS MISCELLANEOUS AS NEEDED
Status: DISCONTINUED | OUTPATIENT
Start: 2017-09-08 | End: 2017-09-08 | Stop reason: SDUPTHER

## 2017-09-08 RX ORDER — PROMETHAZINE HYDROCHLORIDE 25 MG/1
25 TABLET ORAL
Status: ON HOLD | COMMUNITY
End: 2017-09-08

## 2017-09-08 RX ORDER — ACETAMINOPHEN 500 MG
1000 TABLET ORAL
COMMUNITY
End: 2017-11-17

## 2017-09-08 RX ORDER — DEXTROSE MONOHYDRATE AND SODIUM CHLORIDE 5; .45 G/100ML; G/100ML
75 INJECTION, SOLUTION INTRAVENOUS CONTINUOUS
Status: DISCONTINUED | OUTPATIENT
Start: 2017-09-08 | End: 2017-09-09

## 2017-09-08 RX ORDER — HYDRALAZINE HYDROCHLORIDE 20 MG/ML
10 INJECTION INTRAMUSCULAR; INTRAVENOUS
Status: DISCONTINUED | OUTPATIENT
Start: 2017-09-08 | End: 2017-09-10 | Stop reason: HOSPADM

## 2017-09-08 RX ORDER — ASPIRIN 81 MG/1
81 TABLET ORAL DAILY
Status: ON HOLD | COMMUNITY
End: 2017-09-08

## 2017-09-08 RX ORDER — NALOXONE HYDROCHLORIDE 4 MG/.1ML
1 SPRAY NASAL
COMMUNITY
End: 2019-03-08

## 2017-09-08 RX ORDER — FAMOTIDINE 20 MG/1
20 TABLET, FILM COATED ORAL 2 TIMES DAILY
COMMUNITY
Start: 2017-09-02 | End: 2017-09-16

## 2017-09-08 RX ORDER — OXYCODONE HYDROCHLORIDE 20 MG/1
20 TABLET ORAL
COMMUNITY
End: 2017-11-17

## 2017-09-08 RX ORDER — CODEINE PHOSPHATE AND GUAIFENESIN 10; 100 MG/5ML; MG/5ML
5 SOLUTION ORAL
Status: ON HOLD | COMMUNITY
End: 2017-09-08

## 2017-09-08 RX ORDER — ZOLPIDEM TARTRATE 5 MG/1
5 TABLET ORAL ONCE
Status: COMPLETED | OUTPATIENT
Start: 2017-09-08 | End: 2017-09-08

## 2017-09-08 RX ORDER — ONDANSETRON 2 MG/ML
4 INJECTION INTRAMUSCULAR; INTRAVENOUS
Status: DISCONTINUED | OUTPATIENT
Start: 2017-09-08 | End: 2017-09-10 | Stop reason: HOSPADM

## 2017-09-08 RX ORDER — HYDROCODONE BITARTRATE AND ACETAMINOPHEN 5; 325 MG/1; MG/1
1 TABLET ORAL
Status: ON HOLD | COMMUNITY
End: 2017-09-08

## 2017-09-08 RX ORDER — ENOXAPARIN SODIUM 100 MG/ML
40 INJECTION SUBCUTANEOUS EVERY 24 HOURS
Status: DISCONTINUED | OUTPATIENT
Start: 2017-09-08 | End: 2017-09-10 | Stop reason: HOSPADM

## 2017-09-08 RX ORDER — SODIUM CHLORIDE 9 MG/ML
50 INJECTION, SOLUTION INTRAVENOUS
Status: COMPLETED | OUTPATIENT
Start: 2017-09-08 | End: 2017-09-08

## 2017-09-08 RX ORDER — DIPHENHYDRAMINE HYDROCHLORIDE 50 MG/ML
12.5 INJECTION, SOLUTION INTRAMUSCULAR; INTRAVENOUS
Status: DISCONTINUED | OUTPATIENT
Start: 2017-09-08 | End: 2017-09-10 | Stop reason: HOSPADM

## 2017-09-08 RX ORDER — LATANOPROST 50 UG/ML
1 SOLUTION/ DROPS OPHTHALMIC
Status: DISCONTINUED | OUTPATIENT
Start: 2017-09-08 | End: 2017-09-10 | Stop reason: HOSPADM

## 2017-09-08 RX ORDER — BRIMONIDINE TARTRATE 2 MG/ML
1 SOLUTION/ DROPS OPHTHALMIC
Status: DISCONTINUED | OUTPATIENT
Start: 2017-09-08 | End: 2017-09-10 | Stop reason: HOSPADM

## 2017-09-08 RX ORDER — INSULIN LISPRO 100 [IU]/ML
INJECTION, SOLUTION INTRAVENOUS; SUBCUTANEOUS
Status: DISCONTINUED | OUTPATIENT
Start: 2017-09-08 | End: 2017-09-10 | Stop reason: HOSPADM

## 2017-09-08 RX ORDER — DEXTROSE 50 % IN WATER (D50W) INTRAVENOUS SYRINGE
12.5-25 AS NEEDED
Status: DISCONTINUED | OUTPATIENT
Start: 2017-09-08 | End: 2017-09-10 | Stop reason: HOSPADM

## 2017-09-08 RX ORDER — MORPHINE SULFATE 2 MG/ML
1 INJECTION, SOLUTION INTRAMUSCULAR; INTRAVENOUS
Status: DISCONTINUED | OUTPATIENT
Start: 2017-09-08 | End: 2017-09-10 | Stop reason: HOSPADM

## 2017-09-08 RX ORDER — SUCRALFATE 1 G/10ML
1 SUSPENSION ORAL 3 TIMES DAILY
COMMUNITY
Start: 2017-09-02 | End: 2017-09-16

## 2017-09-08 RX ORDER — DEXTROSE 50 % IN WATER (D50W) INTRAVENOUS SYRINGE
12.5-25 AS NEEDED
Status: DISCONTINUED | OUTPATIENT
Start: 2017-09-08 | End: 2017-09-08 | Stop reason: SDUPTHER

## 2017-09-08 RX ORDER — BRIMONIDINE TARTRATE, TIMOLOL MALEATE 2; 5 MG/ML; MG/ML
1 SOLUTION/ DROPS OPHTHALMIC
COMMUNITY
End: 2019-03-08

## 2017-09-08 RX ORDER — KETOROLAC TROMETHAMINE 10 MG/1
10 TABLET, FILM COATED ORAL
Status: ON HOLD | COMMUNITY
End: 2017-09-08

## 2017-09-08 RX ORDER — ACETAMINOPHEN 325 MG/1
650 TABLET ORAL
Status: DISCONTINUED | OUTPATIENT
Start: 2017-09-08 | End: 2017-09-10 | Stop reason: HOSPADM

## 2017-09-08 RX ORDER — ALBUTEROL SULFATE 90 UG/1
2 AEROSOL, METERED RESPIRATORY (INHALATION)
COMMUNITY
End: 2018-09-29

## 2017-09-08 RX ORDER — SODIUM CHLORIDE 0.9 % (FLUSH) 0.9 %
5-10 SYRINGE (ML) INJECTION EVERY 8 HOURS
Status: DISCONTINUED | OUTPATIENT
Start: 2017-09-08 | End: 2017-09-10 | Stop reason: HOSPADM

## 2017-09-08 RX ORDER — SUCRALFATE 1 G/10ML
1 SUSPENSION ORAL 3 TIMES DAILY
Status: DISCONTINUED | OUTPATIENT
Start: 2017-09-08 | End: 2017-09-10 | Stop reason: HOSPADM

## 2017-09-08 RX ORDER — TIMOLOL MALEATE 5 MG/ML
1 SOLUTION/ DROPS OPHTHALMIC
Status: DISCONTINUED | OUTPATIENT
Start: 2017-09-08 | End: 2017-09-10 | Stop reason: HOSPADM

## 2017-09-08 RX ORDER — FLUCONAZOLE 100 MG/1
200 TABLET ORAL DAILY
Status: DISCONTINUED | OUTPATIENT
Start: 2017-09-09 | End: 2017-09-08

## 2017-09-08 RX ADMIN — TIMOLOL MALEATE 1 DROP: 5 SOLUTION OPHTHALMIC at 22:08

## 2017-09-08 RX ADMIN — SUCRALFATE 1 G: 1 SUSPENSION ORAL at 22:10

## 2017-09-08 RX ADMIN — DIPHENHYDRAMINE HYDROCHLORIDE 12.5 MG: 50 INJECTION INTRAMUSCULAR; INTRAVENOUS at 02:26

## 2017-09-08 RX ADMIN — GADOBUTROL 10 ML: 604.72 INJECTION INTRAVENOUS at 14:14

## 2017-09-08 RX ADMIN — DEXTROSE MONOHYDRATE 12.5 G: 25 INJECTION, SOLUTION INTRAVENOUS at 16:39

## 2017-09-08 RX ADMIN — ACETAMINOPHEN 650 MG: 325 TABLET, FILM COATED ORAL at 19:27

## 2017-09-08 RX ADMIN — LATANOPROST 1 DROP: 50 SOLUTION OPHTHALMIC at 22:08

## 2017-09-08 RX ADMIN — Medication 1 MG: at 11:39

## 2017-09-08 RX ADMIN — Medication 1 MG: at 02:27

## 2017-09-08 RX ADMIN — BRIMONIDINE TARTRATE OPHTHALMIC SOLUTION, 0.2% 1 DROP: 2 SOLUTION/ DROPS OPHTHALMIC at 22:08

## 2017-09-08 RX ADMIN — ENOXAPARIN SODIUM 40 MG: 100 INJECTION SUBCUTANEOUS at 07:34

## 2017-09-08 RX ADMIN — FAMOTIDINE 20 MG: 10 INJECTION INTRAVENOUS at 19:08

## 2017-09-08 RX ADMIN — DEXTROSE MONOHYDRATE AND SODIUM CHLORIDE 75 ML/HR: 5; .45 INJECTION, SOLUTION INTRAVENOUS at 19:28

## 2017-09-08 RX ADMIN — FAMOTIDINE 20 MG: 10 INJECTION INTRAVENOUS at 11:41

## 2017-09-08 RX ADMIN — Medication 10 ML: at 22:09

## 2017-09-08 RX ADMIN — SODIUM CHLORIDE 125 ML/HR: 900 INJECTION, SOLUTION INTRAVENOUS at 02:28

## 2017-09-08 RX ADMIN — Medication 1 MG: at 16:32

## 2017-09-08 RX ADMIN — SUCRALFATE 1 G: 1 SUSPENSION ORAL at 19:30

## 2017-09-08 RX ADMIN — Medication 10 ML: at 16:34

## 2017-09-08 RX ADMIN — HYDRALAZINE HYDROCHLORIDE 10 MG: 20 INJECTION, SOLUTION INTRAMUSCULAR; INTRAVENOUS at 02:27

## 2017-09-08 RX ADMIN — HYDRALAZINE HYDROCHLORIDE 10 MG: 20 INJECTION, SOLUTION INTRAMUSCULAR; INTRAVENOUS at 07:28

## 2017-09-08 RX ADMIN — Medication 1 MG: at 07:17

## 2017-09-08 RX ADMIN — ZOLPIDEM TARTRATE 5 MG: 5 TABLET, FILM COATED ORAL at 22:09

## 2017-09-08 RX ADMIN — HYDRALAZINE HYDROCHLORIDE 10 MG: 20 INJECTION, SOLUTION INTRAMUSCULAR; INTRAVENOUS at 19:28

## 2017-09-08 RX ADMIN — ONDANSETRON 4 MG: 2 SOLUTION INTRAMUSCULAR; INTRAVENOUS at 21:00

## 2017-09-08 RX ADMIN — SODIUM CHLORIDE 50 ML: 900 INJECTION, SOLUTION INTRAVENOUS at 13:00

## 2017-09-08 NOTE — INTERDISCIPLINARY ROUNDS
IDR with Dr. Mary Coffman (MD), Aicha Mtz (), Clearance Scale (pharmacy), [facility] (pharmacy intern), Donnita Frankel (nurse manager), and Maria Fernanda Cunha (RN) to discuss plan of care including consult to Dr. Klaus Heart, [facility]

## 2017-09-08 NOTE — H&P
History & Physical Dictation Template      NAME: Johnna Villanueva   Date of Service  9/8/2017   MRN  [id number]   Date of Birth 1940   AGE: 77 y.o. ROOM: 208/01     [unfilled]    [unfilled]     [unfilled]     [facility]  The patient Johnna Villanueva is a 77 y.o. female that is admitted with acute on chronic epigastric pain, diaphoresis. 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 She was supposed to undergo an outpatient EGD on Oct 4th with dr Saarh Cooper, yet she felt like the pain intensified and couldn't wait til then to be seen. Her lipase level is 538 and wbc ct 16k in ER . CT abd was unremarkable. She was given 2mg morphine, IVF and made NPO in ER. Since then she is comfortable and without any current abdominal pains, no N/V, no chest pains no SOA. THE PATIENT WAS SEEN VIA REMOTE PRESENCE WITH RN PRESENT. ASSESSMENT/ PLAN   1. Acute on chronic pancreatitis of unclear etiology. No hx of alcoholism. No gallstones noted on CT this evening. Continue IVF hydration, NPO status. Consult GI in am although it appears dr Sarah Cooper may be out of town by report of nurse. PRN morphine. Check FLP in am.  Recheck wbc ct in am.  Holding patient's home medications  2. Hypernatremia. Likely hypovolemia. Continue IVF overnight. 3. Hx DM. Holding lantus given that she is npo. Continue SSI and accuchecks. 4. Hx GERD. 5. Hx HTN. Patient is ambulatory for dvt prophylaxis. Principal Problem:    Pancreatitis, acute (9/8/2017)           Chart reviewed   Pt seen and examined   Please see full dictation for additional        BMI: Body mass index is 33.98 kg/(m^2). Visit Vitals    /57 (BP 1 Location: Left arm, BP Patient Position: Lying right side; At rest)    Pulse 84    Temp 98.1 °F (36.7 °C)    Resp 18    Ht 5' (1.524 m)    Wt 78.9 kg (174 lb)    SpO2 100%    BMI 33.98 kg/m2       Pertinent Physical Exam Findings: none  ________________________________________________________________________  family history includes Cancer in her mother; Diabetes in her brother; Heart Attack in her brother; Heart Disease in her brother, brother, and brother; Other in her sister. There is no history of Anesth Problems. ________________________________________________________________________  Present on Admission:   Pancreatitis, acute    ________________________________________________________________________  ________________________________________________________________________  Recent Results (from the past 24 hour(s))   CBC WITH AUTOMATED DIFF    Collection Time: 09/07/17  8:59 PM   Result Value Ref Range    WBC 16.4 (H) 3.6 - 11.0 K/uL    RBC 4.27 3.80 - 5.20 M/uL    HGB 12.0 11.5 - 16.0 g/dL    HCT 37.0 35.0 - 47.0 %    MCV 86.7 80.0 - 99.0 FL    MCH 28.1 26.0 - 34.0 PG    MCHC 32.4 30.0 - 36.5 g/dL    RDW 15.2 (H) 11.5 - 14.5 %    PLATELET 615 170 - 003 K/uL    NEUTROPHILS 68 32 - 75 %    LYMPHOCYTES 25 12 - 49 %    MONOCYTES 6 5 - 13 %    EOSINOPHILS 1 0 - 7 %    BASOPHILS 0 0 - 1 %    ABS. NEUTROPHILS 11.2 (H) 1.8 - 8.0 K/UL    ABS. LYMPHOCYTES 4.0 (H) 0.8 - 3.5 K/UL    ABS. MONOCYTES 1.0 0.0 - 1.0 K/UL    ABS. EOSINOPHILS 0.1 0.0 - 0.4 K/UL    ABS. BASOPHILS 0.0 0.0 - 0.1 K/UL   METABOLIC PANEL, COMPREHENSIVE    Collection Time: 09/07/17  8:59 PM   Result Value Ref Range    Sodium 146 (H) 136 - 145 mmol/L    Potassium 4.0 3.5 - 5.1 mmol/L    Chloride 109 (H) 97 - 108 mmol/L    CO2 28 21 - 32 mmol/L    Anion gap 9 5 - 15 mmol/L    Glucose 165 (H) 65 - 100 mg/dL    BUN 12 6 - 20 MG/DL    Creatinine 0.90 0.55 - 1.02 MG/DL    BUN/Creatinine ratio 13 12 - 20      GFR est AA >60 >60 ml/min/1.73m2    GFR est non-AA >60 >60 ml/min/1.73m2    Calcium 8.4 (L) 8.5 - 10.1 MG/DL    Bilirubin, total 0.1 (L) 0.2 - 1.0 MG/DL    ALT (SGPT) 20 12 - 78 U/L    AST (SGOT) 8 (L) 15 - 37 U/L    Alk.  phosphatase 99 45 - 117 U/L    Protein, total 6.8 6.4 - 8.2 g/dL    Albumin 3.3 (L) 3.5 - 5.0 g/dL    Globulin 3.5 2.0 - 4.0 g/dL    A-G Ratio 0.9 (L) 1.1 - 2.2     LIPASE    Collection Time: 09/07/17  8:59 PM   Result Value Ref Range    Lipase 538 (H) 73 - 393 U/L     ________________________________________________________________________  Medications reviewed  Current Facility-Administered Medications   Medication Dose Route Frequency    sodium chloride (NS) flush 5-10 mL  5-10 mL IntraVENous Q8H    sodium chloride (NS) flush 5-10 mL  5-10 mL IntraVENous PRN    morphine injection 1 mg  1 mg IntraVENous Q4H PRN    enoxaparin (LOVENOX) injection 40 mg  40 mg SubCUTAneous Q24H    0.9% sodium chloride infusion  125 mL/hr IntraVENous CONTINUOUS       Jareth Reynolds MD  9/8/2017  1:17 AM

## 2017-09-08 NOTE — ED NOTES
Pt arrived to ED with c/o abdominal pain X 3 months. Pt states she was diagnosed with acid reflux, but has not been taking her Nexium because insurance would not cover the Rx. Pt denies v/d, but c/o nausea. Pt is alert and orientated X 4; skin is intact; lungs are clear; pt breaths well on room air; Pt is in no acute distress. Will continue to monitor. See nursing assessment. Safety precautions in place; call light within reach. Emergency Department Nursing Plan of Care       The Nursing Plan of Care is developed from the Nursing assessment and Emergency Department Attending provider initial evaluation. The plan of care may be reviewed in the ED Provider note.     The Plan of Care was developed with the following considerations:   Patient / Family readiness to learn indicated by:verbalized understanding  Persons(s) to be included in education: patient  Barriers to Learning/Limitations:Concetta Jeffrey, RN    9/7/2017   9:06 PM

## 2017-09-08 NOTE — ED NOTES
Patient has been instructed that they have been given Verlee Shack which contains opioids, benzodiazepines, or other sedating drugs. Patient is aware that they  will need to refrain from driving or operating heavy machinery after taking this medication. Patient also instructed that they need to avoid drinking alcohol and using other products containing opioids, benzodiazepines, or other sedating drugs. Patient verbalized understanding.

## 2017-09-08 NOTE — PROGRESS NOTES
RRAT Score: 17  Initial Assessment: CM reviewed chart and met with patient for discharge planning. CM verified patients address and contact number as correct on the facesheet. Pt presented to ED with acute pancreatitis. Patient is currently living alone at home. She has three adult children. Patient reported that she does not have anyone at home to assist her. She reported having had personal care services in the past but did not feel that it was beneficial to her and discontinued the service. Patient is not interested in resuming personal care services but is interested in home health if she is eligible. Patient is retired. Patient consented for CM to make appointment arrangements. Patients PCP is Dr. Chayo Boone. Patient's appointment is scheduled for 9/14/17 at 2:45pm. She is also seen by Dr. Bill Coyne for GI. Patient has an EGD scheduled for 10/4/17 with Dr. Yair Dye, however, it will likely be completed during her current hospitalization. Patient will not need assistance with obtaining medications. Patient voiced that she does have medications at home. Medications refills will likely not be needed on discharge. Patient uses [facility] [place] to obtain medications. CM reviewed [facility] letter with patient and she provided verbal consent due to nursing staff attempting to insert an IV. CM has completed a [facility] referral for patient. Emergency Contact:   Sandra Andrew [phone]  Pertinent Medical Hx: see H&P     Transition Plan: Home with outpatient services. Involve patient/caregiver in assessment, planning, education and implement of intervention. Yes. CM will continue to follow case for discharge planning. CM daily patient care huddles/interdisciplinary rounds. Rounded with IDT. CM will handoff to [address] [place] [address] or PCP practice. CM evaluated for [facility] or [address] care coordination of resources. CM will further assess if needed.      Care Management Interventions  PCP Verified by CM: Yes  Palliative Care Consult (Criteria: CHF and RRAT>21): No  Mode of Transport at Discharge: Other (see comment) (Patient will arrange)  Transition of Care Consult (CM Consult): Discharge Planning  Discharge Durable Medical Equipment: No  Physical Therapy Consult: No  Occupational Therapy Consult: No  Speech Therapy Consult: No  Current Support Network: Lives Alone  Confirm Follow Up Transport: Self  Discharge Location  Discharge Placement: Home with outpatient services    James MCDONALD  [facility]

## 2017-09-08 NOTE — PROGRESS NOTES
Problem: Falls - Risk of  Goal: *Absence of Falls  Document Glo Fall Risk and appropriate interventions in the flowsheet.    Outcome: Progressing Towards Goal  Fall Risk Interventions:

## 2017-09-08 NOTE — ED NOTES
TRANSFER - OUT REPORT:    Verbal report given to Sandra Calabrese RN (name) on Lavinia Hoffman  being transferred to med-surg (unit) for routine progression of care       Report consisted of patients Situation, Background, Assessment and   Recommendations(SBAR). Information from the following report(s) SBAR, Kardex, ED Summary, [facility] and Recent Results was reviewed with the receiving nurse. Lines:   Peripheral IV 09/07/17 Right Antecubital (Active)   Site Assessment Clean, dry, & intact 9/7/2017 10:03 PM   Phlebitis Assessment 0 9/7/2017 10:03 PM   Infiltration Assessment 0 9/7/2017 10:03 PM   Dressing Status Clean, dry, & intact 9/7/2017 10:03 PM   Dressing Type Transparent 9/7/2017 10:03 PM   Hub Color/Line Status Pink 9/7/2017 10:03 PM        Opportunity for questions and clarification was provided.       Patient transported with:  Pt mitchell

## 2017-09-08 NOTE — PROGRESS NOTES
Spiritual Care Partner Volunteer visited patient in Med Surg/Tele unit on 9/8/17.   Documented by:  [facility]her [id number] [id number]

## 2017-09-08 NOTE — ED PROVIDER NOTES
HPI Comments: Sofia Aleman, 77 y.o. female with pmhx of DM, HTN, HLD, GERD, chronic pain, presents ambulatory to [facility] ED with cc of waxing and waning epigastric \"burning\" pain x 2-3 months c/w GERD. The patient describes her pain as moderate to severe, worse at night, and not improved with home use of Esomeprazole. She also c/o associated generalized headache and anxiety. Patient states her last BM occurred today and was reported nml. She specifically denies any stool changes or fevers. Patient notes that she is scheduled for an endoscopy on 10/4/2017. PCP: Steve Calixto MD    PMHx significant for: DM, HTN, HLD, GERD, chronic pain  PSHx significant for: EGD, colonoscopy, cholecystectomy, removal of anal polyps  Social history significant for: - Tobacco, - EtOH, - Illicit Drug Use    There are no other complaints, changes, or physical findings at this time. Written by Judith Miller ED Scribe, as dictated by Phil Yoo MD.      The history is provided by the patient. No  was used.         Past Medical History:   Diagnosis Date    Anal polyp 6/13/2013    Arthritis     Asthma     Cataract     Chronic pain     knee - right/back    Diabetes ([address]     GERD (gastroesophageal reflux disease)     Hemorrhoids 6/13/2013    History of kidney stones     Hypertension     Ill-defined condition     abdominal pain and burning    Other ill-defined conditions     high cholesterol       Past Surgical History:   Procedure Laterality Date    COLONOSCOPY  2/28/2013         EGD  2/28/2013         HX CATARACT REMOVAL Bilateral     HX CHOLECYSTECTOMY  6-2015    HX GI  6/27/13    anal polyps removed    HX HEENT      laser surgery for blood clot in right eye    HX KNEE REPLACEMENT      right    HX OTHER SURGICAL  4-2-14    lap gastrotomy and removal of polyp -  - not cancerous per pt    HX ALAN AND BSO      HX TONSILLECTOMY      HX UROLOGICAL      \"laser\" surgery for kidney stone    NEUROLOGICAL PROCEDURE UNLISTED  1990    tumor at back of neck, benign         Family History:   Problem Relation Age of Onset    Cancer Mother      colon    Diabetes Brother     Other Sister      GI BLEED    Heart Disease Brother     Heart Attack Brother     Heart Disease Brother     Heart Disease Brother     Anesth Problems Neg Hx        Social History     Social History    Marital status:      Spouse name: N/A    Number of children: N/A    Years of education: N/A     Occupational History    Not on file. Social History Main Topics    Smoking status: Former Smoker    Smokeless tobacco: Never Used      Comment: age 13    Alcohol use No    Drug use: No    Sexual activity: No     Other Topics Concern    Not on file     Social History Narrative         ALLERGIES: Seafood [shellfish containing products]    Review of Systems   Constitutional: Negative for chills and fever. HENT: Negative for congestion and rhinorrhea. Eyes: Negative for photophobia and discharge. Respiratory: Negative for cough and shortness of breath. Cardiovascular: Negative for chest pain and palpitations. Gastrointestinal: Positive for abdominal pain. Negative for blood in stool, constipation, diarrhea, nausea and vomiting. Genitourinary: Negative for dysuria and hematuria. Musculoskeletal: Negative for back pain and neck pain. Skin: Negative for rash and wound. Allergic/Immunologic: Negative for immunocompromised state. Neurological: Negative for dizziness and light-headedness. Psychiatric/Behavioral: Negative for suicidal ideas. Vitals:    09/07/17 2034   BP: 184/78   Pulse: 82   Resp: 18   Temp: 98.4 °F (36.9 °C)   SpO2: 96%   Weight: 78.9 kg (174 lb)   Height: 5' (1.524 m)            Physical Exam   General: WDWN, no diaphoresis  Neurological: A O x 3, NAD, 5/5 strength all extremities  HEENT: PERRL. EOMI. Posterior pharynx clear. No buccal lesions.    Cardiovasculary: RRR, no M/R/G   Respiratory: Lungs CTAB, no W/R/R  Abdomen: soft, NT, non-distended, bowel sounds nml. epigastric pain. Back: No point tenderness or bony tenderness. No discoloration or swelling. No CVA tenderness. Extremities: Warm. No edema or atrophy  Skin: No rash or notable lesions  Heme: No bruises or petechia   Written by Freda Allison ED Scribe, as dictated by Yoshi Watson MD.       MDM  Number of Diagnoses or Management Options  Diagnosis management comments:   DDx: GERD, gastritis, duodenal ulcer, gastric ulcer, hiatal hernia       Amount and/or Complexity of Data Reviewed  Clinical lab tests: ordered and reviewed  Tests in the radiology section of CPT®: ordered and reviewed  Review and summarize past medical records: yes  Discuss the patient with other providers: yes (hospitalist)    Patient Progress  Patient progress: stable    ED Course       Procedures    Progress Note:  9:44 PM  Updated and counseled on results, discussed need for CT A/P, addressed questions. Patient expresses understanding and agrees with plan. Written by Freda Allison ED Scribe, as dictated by Yoshi Watson MD.     Progress Note:  9:47 PM  Patient ambulating in department without any difficulty. Written by Freda Allison ED Scribe, as dictated by Yoshi Watson MD.     Progress Note:  10:35 PM  Patient ambulating in department without any difficulty. Written by Freda Allison ED Scribe, as dictated by Yoshi Watson MD.     Consult Note:  11:40 PM  Yoshi Watson MD spoke with Dr. Peggy Lea,  Specialty: hospitalist  Discussed pt's hx, disposition, and available diagnostic and imaging results. Reviewed care plans. Consultant agrees with plans as outlined. Dr. Radha Esquivel accepts the patient for admission.   Written by Freda Allison ED Scribe, as dictated by Yoshi Watson MD.     LABORATORY TESTS:  Recent Results (from the past 12 hour(s))   CBC WITH AUTOMATED DIFF    Collection Time: 09/07/17  8:59 PM Result Value Ref Range    WBC 16.4 (H) 3.6 - 11.0 K/uL    RBC 4.27 3.80 - 5.20 M/uL    HGB 12.0 11.5 - 16.0 g/dL    HCT 37.0 35.0 - 47.0 %    MCV 86.7 80.0 - 99.0 FL    MCH 28.1 26.0 - 34.0 PG    MCHC 32.4 30.0 - 36.5 g/dL    RDW 15.2 (H) 11.5 - 14.5 %    PLATELET 782 503 - 893 K/uL    NEUTROPHILS 68 32 - 75 %    LYMPHOCYTES 25 12 - 49 %    MONOCYTES 6 5 - 13 %    EOSINOPHILS 1 0 - 7 %    BASOPHILS 0 0 - 1 %    ABS. NEUTROPHILS 11.2 (H) 1.8 - 8.0 K/UL    ABS. LYMPHOCYTES 4.0 (H) 0.8 - 3.5 K/UL    ABS. MONOCYTES 1.0 0.0 - 1.0 K/UL    ABS. EOSINOPHILS 0.1 0.0 - 0.4 K/UL    ABS. BASOPHILS 0.0 0.0 - 0.1 K/UL   METABOLIC PANEL, COMPREHENSIVE    Collection Time: 09/07/17  8:59 PM   Result Value Ref Range    Sodium 146 (H) 136 - 145 mmol/L    Potassium 4.0 3.5 - 5.1 mmol/L    Chloride 109 (H) 97 - 108 mmol/L    CO2 28 21 - 32 mmol/L    Anion gap 9 5 - 15 mmol/L    Glucose 165 (H) 65 - 100 mg/dL    BUN 12 6 - 20 MG/DL    Creatinine 0.90 0.55 - 1.02 MG/DL    BUN/Creatinine ratio 13 12 - 20      GFR est AA >60 >60 ml/min/1.73m2    GFR est non-AA >60 >60 ml/min/1.73m2    Calcium 8.4 (L) 8.5 - 10.1 MG/DL    Bilirubin, total 0.1 (L) 0.2 - 1.0 MG/DL    ALT (SGPT) 20 12 - 78 U/L    AST (SGOT) 8 (L) 15 - 37 U/L    Alk. phosphatase 99 45 - 117 U/L    Protein, total 6.8 6.4 - 8.2 g/dL    Albumin 3.3 (L) 3.5 - 5.0 g/dL    Globulin 3.5 2.0 - 4.0 g/dL    A-G Ratio 0.9 (L) 1.1 - 2.2     LIPASE    Collection Time: 09/07/17  8:59 PM   Result Value Ref Range    Lipase 538 (H) 73 - 393 U/L       IMAGING RESULTS:  CT Results  (Last 48 hours)               09/07/17 2236  CT ABD WO CONT Final result    Impression:  IMPRESSION:  No acute findings or complications of pancreatitis evident. Incidental findings as above. Narrative:  INDICATION: Pancreatitis       COMPARISON: CT a thorax 5/7/2016 and CT abdomen pelvis 6/10/2014. TECHNIQUE:  5 mm axial images were obtained through the abdomen and pelvis.  Oral   and IV contrast were not administered. Coronal and sagittal reconstructions   were generated. CT dose reduction was achieved through use of a standardized   protocol tailored for this examination and automatic exposure control for dose   modulation. FINDINGS:       LOWER LUNGS: No nodule, mass, or airspace disease. VISUALIZED HEART AND MEDIASTINUM: Heart is normal in size without pericardial   effusion. Coronary artery calcifications are noted. LIVER: Subcentimeter subcapsular hypodensities within segment 7 and 8 appears   stable. No new focal lesions or other abnormalities. GALLBLADDER: Surgically absent. SPLEEN: Normal.   PANCREAS: No mass or ductal dilation. ADRENALS: Unremarkable. KIDNEYS: No mass, calculus, or hydronephrosis. STOMACH:   SMALL BOWEL: No dilatation or wall thickening. COLON: Noninflamed appearing left and sigmoid colon diverticula. No dilation or   wall thickening. APPENDIX: Not definitively seen. No inflammatory change evident. PERITONEUM: No ascites or pneumoperitoneum. RETROPERITONEUM: Others chronic calcination without aneurysm. No enlarged   lymphadenopathy. REPRODUCTIVE ORGANS: Uterus and ovaries appear surgically absent. URINARY BLADDER: No mass or calculus. BONES: Degenerative spine change with no acute fracture or aggressive lesion. ADDITIONAL COMMENTS: Calcified injection granulomata overlying the gluteus   musculature. MEDICATIONS GIVEN:  Medications   sodium chloride 0.9 % bolus infusion 1,000 mL (0 mL IntraVENous IV Completed 9/7/17 2329)   famotidine (PEPCID) tablet 20 mg (20 mg Oral Given 9/7/17 2057)   HYDROcodone-acetaminophen (NORCO) 5-325 mg per tablet 1 Tab (1 Tab Oral Given 9/7/17 2057)   aluminum-magnesium hydroxide (MAALOX) oral suspension 30 mL (30 mL Oral Given 9/7/17 2057)   morphine injection 2 mg (2 mg IntraVENous Given 9/7/17 2209)       IMPRESSION:  1. Idiopathic acute pancreatitis without infection or necrosis        PLAN:  1. Admit to hospitalist.    Admit Note:  11:38 PM  Pt is being admitted by Dr. Melia Morrell, hospitalist. The results of their tests and reason(s) for their admission have been discussed with pt and/or available family. They convey agreement and understanding for the need to be admitted and for admission diagnosis. This note is prepared by Ximena Witt, acting as a Scribe for Nuha Lisa MD.    Nuha Lisa MD: The scribe's documentation has been prepared under my direction and personally reviewed by me in its entirety. I confirm that the notes above accurately reflects all work, treatment, procedures, and medical decision making performed by me.

## 2017-09-08 NOTE — H&P
Hospitalist Admission Note    NAME: Lena Chi   :  1940   MRN:  [id number]     Date/Time:  2017 6:36 AM    Patient PCP: Elias Morley MD  ________________________________________________________________________    My assessment of this patient's clinical condition and my plan of care is as follows. Assessment / Plan:    Abdominal pain, epigastric: POA  Differential idiopathic Pancreatitis, acute on chronic vs. PUD  Leukocytosis, ? Reactive, no obvious source of infection  -npo, IV fluids, prn pain medications  -IV protonix  -GI consult    HTN, accelerated: POA  -will resume home medications    Diabetes mellitus  -Hba1c, iss, poc, diabetic diet    H/O Asthma  -PRN duo nebs    Chronic pain syndrome  -on chronic narcotics, benzodiazepine  - reviewed, narcotics prescriptions from multiple providers in last month     Code Status: full  Surrogate Decision Maker:    DVT Prophylaxis: lovenox  GI Prophylaxis: not indicated    Baseline: independent        Subjective:   CHIEF COMPLAINT: epigastric pain    HISTORY OF PRESENT ILLNESS:  Pt admitted by tele hospitalist, seen by me this morning. Mana Doss is a 77 y.o. female with h/o HTN, DM, asthma, idiopathic chronic pancreatitis follows with Dr. Fracisco Lewis presented to ER with c/o epigastric pain. Pt has chronic pain from idiopathic pancreatitis for approx 8 yrs ago and is followed by dr Rigoberto Dodd, she is planned to undergo an outpatient EGD on Oct 4th with Dr Fracisco Lewis, but pain has become progressively worse so she decided to come to ER. In ER on initial eval she is afebrile  lipase  538 and wbc 16k. CT abd was unremarkable. We were asked to admit for work up and evaluation of the above problems.      Past Medical History:   Diagnosis Date    Anal polyp 2013    Arthritis     Asthma     Cataract     Chronic pain     knee - right/back    Diabetes ([address]     GERD (gastroesophageal reflux disease)     Hemorrhoids 6/13/2013    History of kidney stones     Hypertension     Ill-defined condition     abdominal pain and burning    Other ill-defined conditions     high cholesterol        Past Surgical History:   Procedure Laterality Date    COLONOSCOPY  2/28/2013         EGD  2/28/2013         HX CATARACT REMOVAL Bilateral     HX CHOLECYSTECTOMY  6-2015    HX GI  6/27/13    anal polyps removed    HX HEENT      laser surgery for blood clot in right eye    HX KNEE REPLACEMENT      right    HX OTHER SURGICAL  4-2-14    lap gastrotomy and removal of polyp -  - not cancerous per pt    HX ALAN AND BSO      HX TONSILLECTOMY      HX UROLOGICAL      \"laser\" surgery for kidney stone    NEUROLOGICAL PROCEDURE UNLISTED  1990    tumor at back of neck, benign       Social History   Substance Use Topics    Smoking status: Former Smoker    Smokeless tobacco: Never Used      Comment: age 13    Alcohol use No        Family History   Problem Relation Age of Onset    Cancer Mother      colon    Diabetes Brother     Other Sister      GI BLEED    Heart Disease Brother     Heart Attack Brother     Heart Disease Brother     Heart Disease Brother     Anesth Problems Neg Hx      Allergies   Allergen Reactions    Seafood [Shellfish Containing Products] Swelling        Prior to Admission medications    Medication Sig Start Date End Date Taking? Authorizing Provider   OXYCODONE HCL (OXYCODONE PO) Take 20 mg by mouth every six (6) hours as needed. Yes Historical Provider   sodium chloride (SALINE NASAL) 0.65 % nasal spray 1 [place] by Both Nostrils route as needed for Congestion. 8/4/16  Yes Siobhan Fuller MD   amLODIPine (NORVASC) 10 mg tablet Take 10 mg by mouth daily. 2/12/15  Yes Historical Provider   fosinopril (MONOPRIL) 20 mg tablet Take 20 mg by mouth daily. 2/12/15  Yes Historical Provider   ADVAIR DISKUS 100-50 mcg/dose diskus inhaler 1 puff two (2) times a day.  10/31/13  Yes Historical Provider   Insulin Glargine (LANTUS SOLOSTAR) 100 unit/mL (3 mL) flexpen 20 Units by SubCUTAneous route two (2) times a day. Indications: DIABETES MELLITUS   Yes Historical Provider   lidocaine (ASPERCREME, LIDOCAINE,) 4 % ptmd Apply to knee every day prn 6/16/17   Jeff Leo MD   capsicum oleoresin 0.025 % topical cream Apply  to affected area three (3) times daily. 6/16/17   Jeff Leo MD   lidocaine (LIDOCAINE VISCOUS) 2 % solution Take 15 mL by mouth as needed for Pain. 10/9/16   Vanessa Velasco MD   linaclotide Haleigh Seth) 290 mcg cap capsule Take 290 mcg by mouth daily. Historical Provider   TRAVOPROST (TRAVATAN Z OP) Administer 1 Drop to right eye nightly. Historical Provider   ASPIRIN PO Take 81 mg by mouth daily. Historical Provider   metFORMIN (GLUCOPHAGE) 500 mg tablet Take 500 mg by mouth as needed. Historical Provider   OTHER Uses an inhaler as needed for wheezing. Will bring in. Unable to read name. Historical Provider   [place] TEST strip  2/23/15   Historical Provider   diazepam (VALIUM) 5 mg tablet 5 mg three (3) times daily as needed. 3/26/15   Historical Provider   NOVOFINE 30 30 x 1/3 \"  3/23/15   Historical Provider   zolpidem (AMBIEN) 10 mg tablet Take 10 mg by mouth nightly as needed. 3/10/15   Historical Provider   esomeprazole (NEXIUM) 40 mg capsule Take 40 mg by mouth daily. Historical Provider       REVIEW OF SYSTEMS:     I am not able to complete the review of systems because:    The patient is intubated and sedated    The patient has altered mental status due to his acute medical problems    The patient has baseline aphasia from prior stroke(s)    The patient has baseline dementia and is not reliable historian    The patient is in acute medical distress and unable to provide information           Total of 12 systems reviewed as follows:       POSITIVE= underlined text  Negative = text not underlined  General:  fever, chills, sweats, generalized weakness, weight loss/gain,      loss of appetite   Eyes:    blurred vision, eye pain, loss of vision, double vision  ENT:    rhinorrhea, pharyngitis   Respiratory:   cough, sputum production, SOB, HOWARD, wheezing, pleuritic pain   Cardiology:   chest pain, palpitations, orthopnea, PND, edema, syncope   Gastrointestinal:  abdominal pain , N/V, diarrhea, dysphagia, constipation, bleeding   Genitourinary:  frequency, urgency, dysuria, hematuria, incontinence   Muskuloskeletal :  arthralgia, myalgia, back pain  Hematology:  easy bruising, nose or gum bleeding, lymphadenopathy   Dermatological: rash, ulceration, pruritis, color change / jaundice  Endocrine:   hot flashes or polydipsia   Neurological:  headache, dizziness, confusion, focal weakness, paresthesia,     Speech difficulties, memory loss, gait difficulty  Psychological: Feelings of anxiety, depression, agitation    Objective:   VITALS:    Visit Vitals    BP (!) 172/91 (BP 1 Location: Left arm)    Pulse 96    Temp 97.8 °F (36.6 °C)    Resp 18    Ht 5' (1.524 m)    Wt 83.4 kg (183 lb 12.8 oz)    SpO2 100%    BMI 35.9 kg/m2       PHYSICAL EXAM:    General:    Alert, cooperative, no distress, appears stated age. HEENT: Atraumatic, anicteric sclerae, pink conjunctivae     No oral ulcers, mucosa moist, throat clear, dentition fair  Neck:  Supple, symmetrical,  thyroid: non tender  Lungs:   Clear to auscultation bilaterally. No Wheezing or Rhonchi. No rales. Chest wall:  No tenderness  No Accessory muscle use. Heart:   Regular  rhythm,  No  murmur   No edema  Abdomen:   Epigastric tenderness  Extremities: No cyanosis. No clubbing,      Skin turgor normal, Capillary refill normal, Radial dial pulse 2+  Skin:     Not pale. Not Jaundiced  No rashes   Psych:  Good insight. Not depressed. Not anxious or agitated. Neurologic: EOMs intact. No facial asymmetry. No aphasia or slurred speech. Symmetrical strength, Sensation grossly intact. Alert and oriented X 4. _______________________________________________________________________  Care Plan discussed with:    Comments   Patient x    Family      RN     Care Manager                    Consultant:      _______________________________________________________________________  Expected  Disposition:   Home with Family x   HH/PT/OT/RN    SNF/LTC    CHERIE    ________________________________________________________________________  TOTAL TIME:  61 Minutes    Critical Care Provided     Minutes non procedure based      Comments     Reviewed previous records   >50% of visit spent in counseling and coordination of care  Discussion with patient and/or family and questions answered       ________________________________________________________________________  Signed: Paloma Chavez MD    Procedures: see electronic medical records for all procedures/Xrays and details which were not copied into this note but were reviewed prior to creation of Plan. LAB DATA REVIEWED:    Recent Results (from the past 24 hour(s))   CBC WITH AUTOMATED DIFF    Collection Time: 09/07/17  8:59 PM   Result Value Ref Range    WBC 16.4 (H) 3.6 - 11.0 K/uL    RBC 4.27 3.80 - 5.20 M/uL    HGB 12.0 11.5 - 16.0 g/dL    HCT 37.0 35.0 - 47.0 %    MCV 86.7 80.0 - 99.0 FL    MCH 28.1 26.0 - 34.0 PG    MCHC 32.4 30.0 - 36.5 g/dL    RDW 15.2 (H) 11.5 - 14.5 %    PLATELET 636 638 - 756 K/uL    NEUTROPHILS 68 32 - 75 %    LYMPHOCYTES 25 12 - 49 %    MONOCYTES 6 5 - 13 %    EOSINOPHILS 1 0 - 7 %    BASOPHILS 0 0 - 1 %    ABS. NEUTROPHILS 11.2 (H) 1.8 - 8.0 K/UL    ABS. LYMPHOCYTES 4.0 (H) 0.8 - 3.5 K/UL    ABS. MONOCYTES 1.0 0.0 - 1.0 K/UL    ABS. EOSINOPHILS 0.1 0.0 - 0.4 K/UL    ABS.  BASOPHILS 0.0 0.0 - 0.1 K/UL   METABOLIC PANEL, COMPREHENSIVE    Collection Time: 09/07/17  8:59 PM   Result Value Ref Range    Sodium 146 (H) 136 - 145 mmol/L    Potassium 4.0 3.5 - 5.1 mmol/L    Chloride 109 (H) 97 - 108 mmol/L    CO2 28 21 - 32 mmol/L    Anion gap 9 5 - 15 mmol/L Glucose 165 (H) 65 - 100 mg/dL    BUN 12 6 - 20 MG/DL    Creatinine 0.90 0.55 - 1.02 MG/DL    BUN/Creatinine ratio 13 12 - 20      GFR est AA >60 >60 ml/min/1.73m2    GFR est non-AA >60 >60 ml/min/1.73m2    Calcium 8.4 (L) 8.5 - 10.1 MG/DL    Bilirubin, total 0.1 (L) 0.2 - 1.0 MG/DL    ALT (SGPT) 20 12 - 78 U/L    AST (SGOT) 8 (L) 15 - 37 U/L    Alk. phosphatase 99 45 - 117 U/L    Protein, total 6.8 6.4 - 8.2 g/dL    Albumin 3.3 (L) 3.5 - 5.0 g/dL    Globulin 3.5 2.0 - 4.0 g/dL    A-G Ratio 0.9 (L) 1.1 - 2.2     LIPASE    Collection Time: 09/07/17  8:59 PM   Result Value Ref Range    Lipase 538 (H) 73 - 393 U/L   LIPASE    Collection Time: 09/08/17  1:31 AM   Result Value Ref Range    Lipase 343 73 - 393 U/L   CBC WITH AUTOMATED DIFF    Collection Time: 09/08/17  1:31 AM   Result Value Ref Range    WBC 14.6 (H) 3.6 - 11.0 K/uL    RBC 4.27 3.80 - 5.20 M/uL    HGB 12.0 11.5 - 16.0 g/dL    HCT 36.8 35.0 - 47.0 %    MCV 86.2 80.0 - 99.0 FL    MCH 28.1 26.0 - 34.0 PG    MCHC 32.6 30.0 - 36.5 g/dL    RDW 15.1 (H) 11.5 - 14.5 %    PLATELET 138 119 - 312 K/uL    NEUTROPHILS 64 32 - 75 %    LYMPHOCYTES 28 12 - 49 %    MONOCYTES 7 5 - 13 %    EOSINOPHILS 1 0 - 7 %    BASOPHILS 0 0 - 1 %    ABS. NEUTROPHILS 9.2 (H) 1.8 - 8.0 K/UL    ABS. LYMPHOCYTES 4.1 (H) 0.8 - 3.5 K/UL    ABS. MONOCYTES 1.1 (H) 0.0 - 1.0 K/UL    ABS. EOSINOPHILS 0.2 0.0 - 0.4 K/UL    ABS.  BASOPHILS 0.1 0.0 - 0.1 K/UL

## 2017-09-08 NOTE — PROGRESS NOTES
[facility] Admission Pharmacy Medication Reconciliation     Recommendations/Findings:   1) Discussed medications with patient. She states that as a result of pain management she no longer takes benzodiazepines, zolpidem, or opiates besides oxycodone. 2) Patient states that last month she presented to [facility] with the same symptoms but that she was prescribed azithromycin and cough syrup. 3) Removed ASA (2/2 nose bleeds), capsicum cream, diazepam, esomeprazole (dexlansoprazole was filled more recently), lidocaine, metformin, saline nasal spray, & zolpidem. 4) Changed linaclotide & travoprost.  5) Added Combigan, dexlansoprazole, famotidine, Narcan, nitroglycerin, sucralfate, & APAP. 6) Discussed concern with patient that she does not take her eye drop medications as directed (likely should be travoprost 1 drop in right eye at bedtime & Combigan 1 drop in both eyes BID). Total Time Spent: 40 minutes    Information obtained from:patient & RxQuery    Past Medical History/Disease States:  Past Medical History:   Diagnosis Date    Anal polyp 2013    Arthritis     Asthma     Cataract     Chronic pain     knee - right/back    Diabetes ([address]     GERD (gastroesophageal reflux disease)     Hemorrhoids 2013    History of kidney stones     Hypertension     Ill-defined condition     abdominal pain and burning    Other ill-defined conditions     high cholesterol         Patient allergies: Allergies as of 2017 - Review Complete 2017   Allergen Reaction Noted    Seafood [shellfish containing products] Swelling 2010         Prior to Admission Medications   Prescriptions Last Dose Informant Patient Reported? Taking? ADVAIR DISKUS 100-50 mcg/dose diskus inhaler  Self Yes Yes   Si puff two (2) times a day. Dexlansoprazole (DEXILANT) 60 mg CpDB  Self Yes Yes   Sig: Take 60 mg by mouth Daily (before breakfast).    Insulin Glargine (LANTUS SOLOSTAR) 100 unit/mL (3 mL) flexpen Self Yes Yes   Si Units by SubCUTAneous route two (2) times a day. Indications: DIABETES MELLITUS   TRAVOPROST (TRAVATAN Z OP)  Self Yes Yes   Sig: Administer 1 Drop to both eyes nightly. acetaminophen (TYLENOL) 500 mg tablet  Self Yes Yes   Sig: Take 1,000 mg by mouth every six (6) hours as needed for Pain. albuterol (PROVENTIL HFA, VENTOLIN HFA, PROAIR HFA) 90 mcg/actuation inhaler  Self Yes Yes   Sig: Take 2 Puffs by inhalation every six (6) hours as needed for Wheezing. amLODIPine (NORVASC) 10 mg tablet  Self Yes Yes   Sig: Take 10 mg by mouth daily. brimonidine-timolol (COMBIGAN) 0.2-0.5 % drop ophthalmic solution  Self Yes Yes   Sig: Administer 1 Drop to right eye nightly. famotidine (PEPCID) 20 mg tablet  Self Yes Yes   Sig: Take 20 mg by mouth two (2) times a day. fosinopril (MONOPRIL) 20 mg tablet  Self Yes Yes   Sig: Take 20 mg by mouth daily. linaclotide (LINZESS) 145 mcg cap capsule  Self Yes Yes   Sig: Take 145 mcg by mouth Daily (before breakfast). naloxone (NARCAN) 4 mg/actuation nasal spray  Self Yes Yes   Si [place] by IntraNASal route once as needed. Use 1 spray intranasally into 1 nostril. Use a new Narcan nasal spray for subsequent doses and administer into alternating nostrils. May repeat every 2 to 3 minutes as needed. nitroglycerin (NITROSTAT) 0.4 mg SL tablet  Self Yes Yes   Si.4 mg by SubLINGual route every five (5) minutes as needed for Chest Pain. oxyCODONE IR (ROXICODONE) 20 mg immediate release tablet  Self Yes Yes   Sig: Take 20 mg by mouth every six (6) hours as needed. sucralfate (CARAFATE) 100 mg/mL suspension  Self Yes Yes   Sig: Take 1 g by mouth three (3) times daily.       Facility-Administered Medications: None            Thank you,  Santo Davis, PHARMD, BCPS  Contact: [phone]

## 2017-09-08 NOTE — PROGRESS NOTES
0700) Bedside shift change report given to Trinity Ch RN (oncoming nurse) by Nan Arzola RN (offgoing nurse). Report included the following information SBAR, Kardex, MAR, Accordion and Recent Results. 0737) 10 mg hydralazine given for /91  0810) /82  0930) Pt allow 1 attempt at IV start. 80) Consult Dr. Rubin Ramirez if pt able to have pills or ice with NPO status. Prefer strict NPO until after MRI.  1330) Pt downstairs for MRI and ULT.  1636) BG 78.  12.5 g  D50 given IV  1657) Repeat   1726) Consult Dr. Shanique Tam BG 78, order for D51/2NS @75  1918) consult Dr. Chris Larios for headache. Order for tylenol. 2000) Bedside shift change report given to Allison Cooper RN (oncoming nurse) by Trinity Ch RN (offgoing nurse). Report included the following information SBAR, Kardex, MAR, Accordion and Recent Results.

## 2017-09-09 LAB
ALBUMIN SERPL-MCNC: 3.2 G/DL (ref 3.5–5)
ALBUMIN/GLOB SERPL: 0.9 {RATIO} (ref 1.1–2.2)
ALP SERPL-CCNC: 93 U/L (ref 45–117)
ALT SERPL-CCNC: 19 U/L (ref 12–78)
ANION GAP SERPL CALC-SCNC: 9 MMOL/L (ref 5–15)
AST SERPL-CCNC: 9 U/L (ref 15–37)
BASOPHILS # BLD: 0 K/UL (ref 0–0.1)
BASOPHILS NFR BLD: 0 % (ref 0–1)
BILIRUB SERPL-MCNC: 0.5 MG/DL (ref 0.2–1)
BUN SERPL-MCNC: 8 MG/DL (ref 6–20)
BUN/CREAT SERPL: 10 (ref 12–20)
CALCIUM SERPL-MCNC: 8.5 MG/DL (ref 8.5–10.1)
CHLORIDE SERPL-SCNC: 105 MMOL/L (ref 97–108)
CO2 SERPL-SCNC: 27 MMOL/L (ref 21–32)
CREAT SERPL-MCNC: 0.78 MG/DL (ref 0.55–1.02)
EOSINOPHIL # BLD: 0.1 K/UL (ref 0–0.4)
EOSINOPHIL NFR BLD: 1 % (ref 0–7)
ERYTHROCYTE [DISTWIDTH] IN BLOOD BY AUTOMATED COUNT: 15.3 % (ref 11.5–14.5)
GLOBULIN SER CALC-MCNC: 3.5 G/DL (ref 2–4)
GLUCOSE BLD STRIP.AUTO-MCNC: 112 MG/DL (ref 65–100)
GLUCOSE BLD STRIP.AUTO-MCNC: 123 MG/DL (ref 65–100)
GLUCOSE BLD STRIP.AUTO-MCNC: 162 MG/DL (ref 65–100)
GLUCOSE BLD STRIP.AUTO-MCNC: 184 MG/DL (ref 65–100)
GLUCOSE SERPL-MCNC: 147 MG/DL (ref 65–100)
HCT VFR BLD AUTO: 39.5 % (ref 35–47)
HGB BLD-MCNC: 13 G/DL (ref 11.5–16)
LYMPHOCYTES # BLD: 3.4 K/UL (ref 0.8–3.5)
LYMPHOCYTES NFR BLD: 24 % (ref 12–49)
MCH RBC QN AUTO: 28 PG (ref 26–34)
MCHC RBC AUTO-ENTMCNC: 32.9 G/DL (ref 30–36.5)
MCV RBC AUTO: 85.1 FL (ref 80–99)
MONOCYTES # BLD: 1.2 K/UL (ref 0–1)
MONOCYTES NFR BLD: 9 % (ref 5–13)
NEUTS SEG # BLD: 9.5 K/UL (ref 1.8–8)
NEUTS SEG NFR BLD: 66 % (ref 32–75)
PLATELET # BLD AUTO: 347 K/UL (ref 150–400)
POTASSIUM SERPL-SCNC: 3.3 MMOL/L (ref 3.5–5.1)
PROT SERPL-MCNC: 6.7 G/DL (ref 6.4–8.2)
RBC # BLD AUTO: 4.64 M/UL (ref 3.8–5.2)
SERVICE CMNT-IMP: ABNORMAL
SODIUM SERPL-SCNC: 141 MMOL/L (ref 136–145)
WBC # BLD AUTO: 14.2 K/UL (ref 3.6–11)

## 2017-09-09 PROCEDURE — 36415 COLL VENOUS BLD VENIPUNCTURE: CPT | Performed by: HOSPITALIST

## 2017-09-09 PROCEDURE — 74011636637 HC RX REV CODE- 636/637: Performed by: INTERNAL MEDICINE

## 2017-09-09 PROCEDURE — 74011250637 HC RX REV CODE- 250/637: Performed by: HOSPITALIST

## 2017-09-09 PROCEDURE — 80053 COMPREHEN METABOLIC PANEL: CPT | Performed by: HOSPITALIST

## 2017-09-09 PROCEDURE — 74011250636 HC RX REV CODE- 250/636: Performed by: HOSPITALIST

## 2017-09-09 PROCEDURE — 65270000029 HC RM PRIVATE

## 2017-09-09 PROCEDURE — 74011000250 HC RX REV CODE- 250: Performed by: HOSPITALIST

## 2017-09-09 PROCEDURE — 82962 GLUCOSE BLOOD TEST: CPT

## 2017-09-09 PROCEDURE — 85025 COMPLETE CBC W/AUTO DIFF WBC: CPT | Performed by: HOSPITALIST

## 2017-09-09 PROCEDURE — 74011250636 HC RX REV CODE- 250/636: Performed by: INTERNAL MEDICINE

## 2017-09-09 PROCEDURE — 74011250637 HC RX REV CODE- 250/637: Performed by: INTERNAL MEDICINE

## 2017-09-09 RX ORDER — POTASSIUM CHLORIDE 7.45 MG/ML
10 INJECTION INTRAVENOUS
Status: COMPLETED | OUTPATIENT
Start: 2017-09-09 | End: 2017-09-09

## 2017-09-09 RX ORDER — ZOLPIDEM TARTRATE 5 MG/1
5 TABLET ORAL
Status: DISCONTINUED | OUTPATIENT
Start: 2017-09-09 | End: 2017-09-10 | Stop reason: HOSPADM

## 2017-09-09 RX ADMIN — ONDANSETRON 4 MG: 2 SOLUTION INTRAMUSCULAR; INTRAVENOUS at 06:25

## 2017-09-09 RX ADMIN — POTASSIUM CHLORIDE 10 MEQ: 10 INJECTION, SOLUTION INTRAVENOUS at 07:20

## 2017-09-09 RX ADMIN — AMLODIPINE BESYLATE 10 MG: 5 TABLET ORAL at 08:44

## 2017-09-09 RX ADMIN — SODIUM CHLORIDE 100 ML/HR: 900 INJECTION, SOLUTION INTRAVENOUS at 19:26

## 2017-09-09 RX ADMIN — SUCRALFATE 1 G: 1 SUSPENSION ORAL at 22:42

## 2017-09-09 RX ADMIN — Medication 10 ML: at 17:09

## 2017-09-09 RX ADMIN — ACETAMINOPHEN 650 MG: 325 TABLET, FILM COATED ORAL at 18:36

## 2017-09-09 RX ADMIN — FOSINOPRIL 20 MG: 10 TABLET ORAL at 08:44

## 2017-09-09 RX ADMIN — POTASSIUM CHLORIDE 10 MEQ: 10 INJECTION, SOLUTION INTRAVENOUS at 05:28

## 2017-09-09 RX ADMIN — SODIUM CHLORIDE 100 ML/HR: 900 INJECTION, SOLUTION INTRAVENOUS at 08:56

## 2017-09-09 RX ADMIN — SUCRALFATE 1 G: 1 SUSPENSION ORAL at 08:43

## 2017-09-09 RX ADMIN — LATANOPROST 1 DROP: 50 SOLUTION OPHTHALMIC at 23:23

## 2017-09-09 RX ADMIN — TIMOLOL MALEATE 1 DROP: 5 SOLUTION OPHTHALMIC at 23:23

## 2017-09-09 RX ADMIN — Medication 10 ML: at 06:25

## 2017-09-09 RX ADMIN — Medication 10 ML: at 22:43

## 2017-09-09 RX ADMIN — ZOLPIDEM TARTRATE 5 MG: 5 TABLET, FILM COATED ORAL at 23:23

## 2017-09-09 RX ADMIN — ENOXAPARIN SODIUM 40 MG: 100 INJECTION SUBCUTANEOUS at 08:43

## 2017-09-09 RX ADMIN — BRIMONIDINE TARTRATE OPHTHALMIC SOLUTION, 0.2% 1 DROP: 2 SOLUTION/ DROPS OPHTHALMIC at 23:23

## 2017-09-09 RX ADMIN — Medication 1 MG: at 10:28

## 2017-09-09 RX ADMIN — INSULIN LISPRO 2 UNITS: 100 INJECTION, SOLUTION INTRAVENOUS; SUBCUTANEOUS at 12:30

## 2017-09-09 RX ADMIN — SUCRALFATE 1 G: 1 SUSPENSION ORAL at 17:10

## 2017-09-09 RX ADMIN — ACETAMINOPHEN 650 MG: 325 TABLET, FILM COATED ORAL at 23:22

## 2017-09-09 RX ADMIN — INSULIN LISPRO 2 UNITS: 100 INJECTION, SOLUTION INTRAVENOUS; SUBCUTANEOUS at 08:43

## 2017-09-09 RX ADMIN — FAMOTIDINE 20 MG: 10 INJECTION INTRAVENOUS at 08:44

## 2017-09-09 RX ADMIN — FAMOTIDINE 20 MG: 10 INJECTION INTRAVENOUS at 17:09

## 2017-09-09 NOTE — PROGRESS NOTES
Pt tolerate full liquid well. no nausea/vomiting noticed. 1915. Bedside and Verbal shift change report given to Allison Cooper rn (oncoming nurse) by Lauren Al rn (offgoing nurse). Report included the following information SBAR, Kardex, Intake/Output, MAR, Recent Results and Med Rec Status.

## 2017-09-09 NOTE — CONSULTS
GI Consult    Daniela Jarvis [id number]                         [id number]       1940  Date/Time:                    9/8/2017 9:07 PM   female  77 y.o. Assessment: complex medical problem set with severity index requiring assessment of all medical problems with discussion with consultants and attending physician :    Patient Active Problem List    Diagnosis Date Noted    Pancreatitis, acute  mild pancreatitis idiopathic neg us and mrcp  Will advance diet am feels better no n/v     Epigastric abdominal pain     Leukocytosis, unspecified Chronic workup negative in past     DM (diabetes mellitus) ([facility] [address]     HTN (hypertension)     Arthritis     Gastric polyp     Chest pain, unspecified     Hemorrhoids     Anal polyp                                                          Plan:  1.advance diet am  2.                                      Risk of deterioration:   [x]       Low     [x]      Moderate     []     High                                               Time spent with patient:  []     15 mins                       []         25 mins                                                [x]      60 minutes                             []      Critical Care Provided                                       []      >50% of visit spent in                                          counseling                             and coordination of  care        Communication with   [x]        Patient                     []         Family                                           []   Care Manager         [x]        Nursing                   []   Specialist /PCP                                                       ___________________________________________________    Physician: Shaina Arnold MD               Subjective:      INFORMANT  [x]  Patient   []  Family    CHIEF COMPLAINT:       HISTORY OF PRESENT ILLNESS:     Abdominal  pain      The patient is  a 77 y.o. [address] female who was admitted on 9/7/2017 with Pancreatitis, acute  Epigastric abdominal pain. The patient presented and I have been consulted for the evaluation and management of possible pancreatitis. Onset of symptoms was gradual with gradually worsening course since that time. The pain is located in the epigastrium and in the lower abdomen with radiation to R back and L back. Patient describes the pain as aching and burning, intermittently severe but seems to be there all the time. She related it to gerd as she seems to get relief with ppi  rated as severe. Aggravating factors include movement and eating. Alleviating factors include none. Associated symptoms include constipation, nausea and vomiting. Additional biliary/liver/pancreatic  symptoms denied include pruritis, RUQ Pain, jaundice, diarrhea    pancreatic trauma. She has always had burning despite having no endoscopic evidence. She had a large gastric flat polyp which was surgically removed . Her most recent egd was by dr morejon at [facility] 2016 with gastric nodularity. Biopsy    Chronic superficial gastritis with reactive foveolar hyperplasia   She is pain sensitive and has used opiates for joint and abd pain is the past . No recent use. She has persistent leukocytosis the workup was negative for cml.                    Allergies   Allergen Reactions    Seafood [Shellfish Containing Products] Swelling      Past Medical History:   Diagnosis Date    Anal polyp 6/13/2013    Arthritis     Asthma     Cataract     Chronic pain     knee - right/back    Diabetes ([address]     GERD (gastroesophageal reflux disease)     Hemorrhoids 6/13/2013    History of kidney stones     Hypertension     Ill-defined condition     abdominal pain and burning    Other ill-defined conditions     high cholesterol       Past Surgical History:   Procedure Laterality Date    COLONOSCOPY  2/28/2013         EGD  2/28/2013         HX CATARACT REMOVAL Bilateral     HX CHOLECYSTECTOMY  6-2015    HX GI  6/27/13    anal polyps removed    HX HEENT      laser surgery for blood clot in right eye    HX KNEE REPLACEMENT      right    HX OTHER SURGICAL  4-2-14    lap gastrotomy and removal of polyp -  - not cancerous per pt    HX ALAN AND BSO      HX TONSILLECTOMY      HX UROLOGICAL      \"laser\" surgery for kidney stone    NEUROLOGICAL PROCEDURE UNLISTED  1990    tumor at back of neck, benign       Social History   Substance Use Topics    Smoking status: Former Smoker    Smokeless tobacco: Never Used      Comment: age 13    Alcohol use No       Family History   Problem Relation Age of Onset    Cancer Mother      colon    Diabetes Brother     Other Sister      GI BLEED    Heart Disease Brother     Heart Attack Brother     Heart Disease Brother     Heart Disease Brother     Anesth Problems Neg Hx        Prior to Admission medications    Medication Sig Start Date End Date Taking? Authorizing Provider   linaclotide denisa Garcia) 145 mcg cap capsule Take 145 mcg by mouth Daily (before breakfast). Yes Historical Provider   albuterol (PROVENTIL HFA, VENTOLIN HFA, PROAIR HFA) 90 mcg/actuation inhaler Take 2 Puffs by inhalation every six (6) hours as needed for Wheezing. Yes Historical Provider   oxyCODONE IR (ROXICODONE) 20 mg immediate release tablet Take 20 mg by mouth every six (6) hours as needed. Yes Historical Provider   brimonidine-timolol (COMBIGAN) 0.2-0.5 % drop ophthalmic solution Administer 1 Drop to right eye nightly. Yes Historical Provider   Dexlansoprazole (DEXILANT) 60 mg CpDB Take 60 mg by mouth Daily (before breakfast). Yes Historical Provider   famotidine (PEPCID) 20 mg tablet Take 20 mg by mouth two (2) times a day. 9/2/17 9/16/17 Yes Historical Provider   naloxone [facility]) 4 mg/actuation nasal spray 1 [place] by IntraNASal route once as needed.  Use 1 spray intranasally into 1 nostril. Use a new Narcan nasal spray for subsequent doses and administer into alternating nostrils. May repeat every 2 to 3 minutes as needed. Yes Historical Provider   nitroglycerin (NITROSTAT) 0.4 mg SL tablet 0.4 mg by SubLINGual route every five (5) minutes as needed for Chest Pain. Yes Historical Provider   sucralfate (CARAFATE) 100 mg/mL suspension Take 1 g by mouth three (3) times daily. 9/2/17 9/16/17 Yes Historical Provider   acetaminophen (TYLENOL) 500 mg tablet Take 1,000 mg by mouth every six (6) hours as needed for Pain. Yes Historical Provider   TRAVOPROST (TRAVATAN Z OP) Administer 1 Drop to both eyes nightly. Yes Historical Provider   amLODIPine (NORVASC) 10 mg tablet Take 10 mg by mouth daily. 2/12/15  Yes Historical Provider   fosinopril (MONOPRIL) 20 mg tablet Take 20 mg by mouth daily. 2/12/15  Yes Historical Provider   ADVAIR DISKUS 100-50 mcg/dose diskus inhaler 1 puff two (2) times a day. 10/31/13  Yes Historical Provider   Insulin Glargine (LANTUS SOLOSTAR) 100 unit/mL (3 mL) flexpen 20 Units by SubCUTAneous route two (2) times a day.  Indications: DIABETES MELLITUS   Yes Historical Provider                                       REVIEW OF SYSTEMS                                           Total of 13 systems reviewed as follows                                                                                        SYSTEM RESPONSE ADDITIONAL COMMENTS             Constitutional: feels ill, fatigued and generally weak                                      Eyes:  denies, blurry vision, eye pain, spots before eyes, blind areas  []  GLUCOMA   Nose: Denies:   nasal congestion, nasal discharge    EARS:  Denies:   Discharge, tinnitus,  Decrease hearing     Throat Denies: sore throat, history of soreness, discoloration of the tongue    Respiratory:   no cough, shortness of breath, or wheezing  [x]  ASTHMA   []  COPD       Cardiovascular:    positive for fatigue, negative for chest pain, chest pressure/discomfort  [x]   HYPERTENSION   []  CAD   GI:   See h&p     Genitourinary:   Denies: dysuria,  frequency/urgency,  hematuria    []  RENAL FAILURE     []  CHRONIC KIDNEY DISEASE        Integument:   Denies: rash, itching, hives    Hematologic:   Denies: swollen lymph  nodes, bleeding, bruising    Musculoskel:   joint pain [x]    OSTEOARTHRITIS  []    RHEUMATOID ARTHRITIS [x]  Back pain   Neurological:  Denies: focal weakness, speech problems, loss of consciousness [x]   Neuropathy  []    CVA     Endocrine:            Denies: palpitations, skin changes, temperature intolerance                                                       [x]  DM type 2       [x]  dyslipidemia      Psychiatric:      Negative except for     [x]  Depression    [x]  Anxiety   []  Bipolar                                                     Objective:     VITALS:    Visit Vitals    /64    Pulse (!) 112    Temp 98.1 °F (36.7 °C)    Resp 24    Ht 5' (1.524 m)    Wt 83.4 kg (183 lb 12.8 oz)    SpO2 100%    BMI 35.9 kg/m2      Temp (24hrs), Av.6 °F (36.4 °C), Min:96.1 °F (35.6 °C), Max:98.1 °F (36.7 °C)       O2 Device: Room air                                                  PHYSICAL EXAM:      General:  Alert, cooperative, no distress, appears stated age. Head:  Normocephalic, without obvious abnormality, atraumatic. Ears:  Canals clear TM normal appearence   Eyes:  Conjunctivae           Normal []  Pallor [x]     corneas clear and pupils equal,   Nose:   Nares normal. No drainage or sinus tenderness.    Mouth:    Throat:   Mouth:-   [x]  No Loose teeth   [x] missing teeth        [] Loose teeth   Finger opening: []1  []1.5 [] 2 [] 2.5 [x] 3 [] 3.5   [] 4    Mallampati:[] Class 1  [] Class 2 [x] Class 3   [] Class 4      Neck - supple,      [] Full ROM [] Decreased ROM  [] Short Neck no significant adenopathy    Lips and tongue normal.  No Thrush  mucus membranes  Normal []     Dry [x]    Neck: Supple, symmetrical,  no adenopathy, thyroid: non tender no carotid bruit and no JVD. Lymph nodes: Cervical, supraclavicular normal.   Lungs:   Clear to auscultation and percussion  Normal [x]     Rales             []      Rhonchi    []     Wheezing    [] . Chest wall:  No tenderness or deformity. No Accessory muscle use. Heart:  Regular rate and rhythm,  no murmur, rub or gallop. Pulses equal 3+ carotids,brachials,radials,femorals,dp    Abdomen: Bowel sounds are normal, liver is not enlarged, spleen is not enlargedabnormal findings:  tenderness mild and moderate in the epigastrium   Extremities:  Extremities normal, atraumatic, No cyanosis. No edema. No clubbing   Skin:  Texture, turgor normal. No rashes or lesions. Not Jaundiced    Neurologic: EOMs intact. No facial asymmetry. No aphasia or slurred speech. Normal strength, alert and oriented ,time, place, person    Psych: Adequate  insight. Not depressed  anxious or agitated. Lab Data Reviewed:                                                                      CBC:      Recent Labs      09/08/17 0131  09/07/17 2059   WBC  14.6*  16.4*   RBC  4.27  4.27   HGB  12.0  12.0   HCT  36.8  37.0   PLT  325  343   GRANS  64  68   LYMPH  28  25   EOS  1  1                                                                    CHEMISTRY:    Recent Labs      09/07/17 2059   GLU  165*   NA  146*   K  4.0   CL  109*   CO2  28   BUN  12   CREA  0.90   CA  8.4*   AGAP  9   BUCR  13   AP  99   TP  6.8   ALB  3.3*   GLOB  3.5   AGRAT  0.9*                                                            LIVER ENZYMES:                                 Recent Labs      09/07/17 2059   TP  6.8   ALB  3.3*   AP  99   SGOT  8*                                                           Coagulation Profile                                               No results for input(s): INR in the last 72 hours.     No lab exists for component: PT, PTT, INREXT                                                    INFLAMMATION STUDIES:                                                  Lab Results   Component Value Date/Time    Sed rate (ESR) 20 06/15/2015 12:00 AM    Sed rate (ESR) 10 05/20/2009 03:00 AM    C-Reactive protein 0.89 04/06/2015 05:34 PM    C-Reactive protein 1.11 05/20/2009 03:00 AM                                                                                                       Physician: Precious Cornejo MD

## 2017-09-09 NOTE — ROUTINE PROCESS
Bedside shift change report given to WENDY Taylor (oncoming nurse) by Silvina Arce (offgoing nurse). Report included the following information SBAR, Kardex, Intake/Output, MAR and Recent Results.

## 2017-09-09 NOTE — PROGRESS NOTES
Problem: Falls - Risk of  Goal: *Absence of Falls  Document Glo Fall Risk and appropriate interventions in the flowsheet.    Outcome: Progressing Towards Goal  Fall Risk Interventions:              Medication Interventions: Teach patient to arise slowly           History of Falls Interventions: Door open when patient unattended

## 2017-09-09 NOTE — PROGRESS NOTES
Hospitalist Progress Note    NAME: Danilo Sow   :  1940   MRN:  [id number]       Assessment / Plan:    Abdominal pain, epigastric: POA  Differential idiopathic Pancreatitis, acute on chronic vs. PUD  Leukocytosis, ? Reactive, no obvious source of infection  Lipase trended down  -appreciate GI consult, will advance diet to full liquids diet  -IV fluids, prn pain medications  -cont carafate and IV famotidine     Hypokalemia:   -Replete    Hypernatremia: POA  Resolved    HTN, accelerated: POA  -esumed home medications     Diabetes mellitus  -Hba1c, iss, poc, diabetic diet     H/O Asthma  -PRN duo nebs     Chronic pain syndrome  -on chronic narcotics, benzodiazepine  - reviewed, narcotics prescriptions from multiple providers in last month      Obesity  Body mass index is 35.94 kg/(m^2). Code Status: full  Surrogate Decision Maker:     DVT Prophylaxis: lovenox  GI Prophylaxis: not indicated     Baseline: independent         Subjective:     Chief Complaint / Reason for Physician Visit  \"abd pain better, wants to eat\". Discussed with RN events overnight. Review of Systems:  Symptom Y/N Comments  Symptom Y/N Comments   Fever/Chills n   Chest Pain n    Poor Appetite y   Edema n    Cough n   Abdominal Pain y    Sputum n   Joint Pain n    SOB/HOWARD n   Pruritis/Rash n    Nausea/vomit n   Tolerating PT/OT y    Diarrhea n   Tolerating Diet     Constipation    Other       Could NOT obtain due to:      Objective:     VITALS:   Last 24hrs VS reviewed since prior progress note.  Most recent are:  Patient Vitals for the past 24 hrs:   Temp Pulse Resp BP SpO2   17 0730 98.3 °F (36.8 °C) 93 20 166/80 100 %   17 0246 98.3 °F (36.8 °C) 88 20 156/63 100 %   17 2101 - (!) 112 - 158/64 -   17 1911 98.1 °F (36.7 °C) 96 24 (!) 182/98 100 %   17 1506 97.3 °F (36.3 °C) 83 24 165/82 100 %       Intake/Output Summary (Last 24 hours) at 17 1109  Last data filed at 17 0810   Gross per 24 hour   Intake             1090 ml   Output                0 ml   Net             1090 ml        PHYSICAL EXAM:  General: WD, WN. Alert, cooperative, no acute distress    EENT:  EOMI. Anicteric sclerae. MMM  Resp:  CTA bilaterally, no wheezing or rales. No accessory muscle use  CV:  Regular  rhythm,  No edema  GI:  Epigastric pain.  +Bowel sounds  Neurologic:  Alert and oriented X 3, normal speech,   Psych:   Good insight. Not anxious nor agitated  Skin:  No rashes. No jaundice    Reviewed most current lab test results and cultures  YES  Reviewed most current radiology test results   YES  Review and summation of old records today    NO  Reviewed patient's current orders and MAR    YES  PMH/SH reviewed - no change compared to H&P  ________________________________________________________________________  Care Plan discussed with:    Comments   Patient x    Family      RN     Care Manager     Consultant                        Multidiciplinary team rounds were held today with , nursing, pharmacist and clinical coordinator. Patient's plan of care was discussed; medications were reviewed and discharge planning was addressed. ________________________________________________________________________  Total NON critical care TIME:  35   Minutes    Total CRITICAL CARE TIME Spent:   Minutes non procedure based      Comments   >50% of visit spent in counseling and coordination of care     ________________________________________________________________________  Endy Noland MD     Procedures: see electronic medical records for all procedures/Xrays and details which were not copied into this note but were reviewed prior to creation of Plan. LABS:  I reviewed today's most current labs and imaging studies.   Pertinent labs include:  Recent Labs      09/09/17   0255  09/08/17   0131  09/07/17   2059   WBC  14.2*  14.6*  16.4*   HGB  13.0  12.0  12.0   HCT  39.5  36.8  37.0   PLT  347  325  343 Recent Labs      09/09/17   0255  09/07/17 2059   NA  141  146*   K  3.3*  4.0   CL  105  109*   CO2  27  28   GLU  147*  165*   BUN  8  12   CREA  0.78  0.90   CA  8.5  8.4*   ALB  3.2*  3.3*   TBILI  0.5  0.1*   SGOT  9*  8*   ALT  19  20       Signed: Stanford Rose MD

## 2017-09-10 VITALS
TEMPERATURE: 98.4 F | WEIGHT: 184 LBS | RESPIRATION RATE: 16 BRPM | OXYGEN SATURATION: 96 % | BODY MASS INDEX: 36.12 KG/M2 | HEART RATE: 93 BPM | DIASTOLIC BLOOD PRESSURE: 100 MMHG | SYSTOLIC BLOOD PRESSURE: 196 MMHG | HEIGHT: 60 IN

## 2017-09-10 LAB
ANION GAP SERPL CALC-SCNC: 6 MMOL/L (ref 5–15)
BASOPHILS # BLD: 0 K/UL (ref 0–0.1)
BASOPHILS NFR BLD: 0 % (ref 0–1)
BUN SERPL-MCNC: 7 MG/DL (ref 6–20)
BUN/CREAT SERPL: 9 (ref 12–20)
CALCIUM SERPL-MCNC: 8.4 MG/DL (ref 8.5–10.1)
CHLORIDE SERPL-SCNC: 110 MMOL/L (ref 97–108)
CO2 SERPL-SCNC: 28 MMOL/L (ref 21–32)
CREAT SERPL-MCNC: 0.76 MG/DL (ref 0.55–1.02)
EOSINOPHIL # BLD: 0.1 K/UL (ref 0–0.4)
EOSINOPHIL NFR BLD: 1 % (ref 0–7)
ERYTHROCYTE [DISTWIDTH] IN BLOOD BY AUTOMATED COUNT: 15.2 % (ref 11.5–14.5)
EST. AVERAGE GLUCOSE BLD GHB EST-MCNC: 169 MG/DL
GLUCOSE BLD STRIP.AUTO-MCNC: 138 MG/DL (ref 65–100)
GLUCOSE BLD STRIP.AUTO-MCNC: 174 MG/DL (ref 65–100)
GLUCOSE SERPL-MCNC: 164 MG/DL (ref 65–100)
HBA1C MFR BLD: 7.5 % (ref 4.2–6.3)
HCT VFR BLD AUTO: 36.6 % (ref 35–47)
HGB BLD-MCNC: 11.7 G/DL (ref 11.5–16)
LYMPHOCYTES # BLD: 3.5 K/UL (ref 0.8–3.5)
LYMPHOCYTES NFR BLD: 28 % (ref 12–49)
MCH RBC QN AUTO: 27.7 PG (ref 26–34)
MCHC RBC AUTO-ENTMCNC: 32 G/DL (ref 30–36.5)
MCV RBC AUTO: 86.5 FL (ref 80–99)
MONOCYTES # BLD: 1.1 K/UL (ref 0–1)
MONOCYTES NFR BLD: 9 % (ref 5–13)
NEUTS SEG # BLD: 7.8 K/UL (ref 1.8–8)
NEUTS SEG NFR BLD: 62 % (ref 32–75)
PLATELET # BLD AUTO: 305 K/UL (ref 150–400)
POTASSIUM SERPL-SCNC: 3.7 MMOL/L (ref 3.5–5.1)
RBC # BLD AUTO: 4.23 M/UL (ref 3.8–5.2)
SERVICE CMNT-IMP: ABNORMAL
SERVICE CMNT-IMP: ABNORMAL
SODIUM SERPL-SCNC: 144 MMOL/L (ref 136–145)
WBC # BLD AUTO: 12.5 K/UL (ref 3.6–11)

## 2017-09-10 PROCEDURE — 74011250636 HC RX REV CODE- 250/636: Performed by: HOSPITALIST

## 2017-09-10 PROCEDURE — 85025 COMPLETE CBC W/AUTO DIFF WBC: CPT | Performed by: HOSPITALIST

## 2017-09-10 PROCEDURE — 83036 HEMOGLOBIN GLYCOSYLATED A1C: CPT | Performed by: HOSPITALIST

## 2017-09-10 PROCEDURE — 74011000250 HC RX REV CODE- 250: Performed by: HOSPITALIST

## 2017-09-10 PROCEDURE — 74011250637 HC RX REV CODE- 250/637: Performed by: HOSPITALIST

## 2017-09-10 PROCEDURE — 74011250636 HC RX REV CODE- 250/636: Performed by: INTERNAL MEDICINE

## 2017-09-10 PROCEDURE — 80048 BASIC METABOLIC PNL TOTAL CA: CPT | Performed by: HOSPITALIST

## 2017-09-10 PROCEDURE — 74011636637 HC RX REV CODE- 636/637: Performed by: INTERNAL MEDICINE

## 2017-09-10 PROCEDURE — 82962 GLUCOSE BLOOD TEST: CPT

## 2017-09-10 PROCEDURE — 36415 COLL VENOUS BLD VENIPUNCTURE: CPT | Performed by: HOSPITALIST

## 2017-09-10 RX ADMIN — ENOXAPARIN SODIUM 40 MG: 100 INJECTION SUBCUTANEOUS at 08:48

## 2017-09-10 RX ADMIN — FAMOTIDINE 20 MG: 10 INJECTION INTRAVENOUS at 08:49

## 2017-09-10 RX ADMIN — AMLODIPINE BESYLATE 10 MG: 5 TABLET ORAL at 08:49

## 2017-09-10 RX ADMIN — SODIUM CHLORIDE 100 ML/HR: 900 INJECTION, SOLUTION INTRAVENOUS at 05:25

## 2017-09-10 RX ADMIN — INSULIN LISPRO 2 UNITS: 100 INJECTION, SOLUTION INTRAVENOUS; SUBCUTANEOUS at 11:56

## 2017-09-10 RX ADMIN — ONDANSETRON 4 MG: 2 SOLUTION INTRAMUSCULAR; INTRAVENOUS at 08:49

## 2017-09-10 RX ADMIN — Medication 1 MG: at 08:48

## 2017-09-10 RX ADMIN — Medication 10 ML: at 05:24

## 2017-09-10 RX ADMIN — FOSINOPRIL 20 MG: 10 TABLET ORAL at 08:48

## 2017-09-10 RX ADMIN — HYDRALAZINE HYDROCHLORIDE 10 MG: 20 INJECTION, SOLUTION INTRAMUSCULAR; INTRAVENOUS at 08:49

## 2017-09-10 RX ADMIN — SUCRALFATE 1 G: 1 SUSPENSION ORAL at 08:48

## 2017-09-10 NOTE — PROGRESS NOTES
Received bedside/verbal report from off going nurse Wen Guzmán. 0900- Administered to pt 2 mg IV morphine pain. MD, nursing supervisor, and nursing manager notified. No adverse effects to pt; pt stable with respiratory rate of 16.     1400- Removed pt's IV catheter with tip intact and reviewed discharge instructions while providing opportunity for questions. Pt being discharged on no new medications or prescriptions. Pt waiting on daughter to pick her up and requested a wheelchair.

## 2017-09-10 NOTE — DISCHARGE INSTRUCTIONS
Upper GI Endoscopy: Before Your Procedure  What is an upper GI endoscopy? An upper gastrointestinal (or GI) endoscopy is a test that allows your doctor to look at the inside of your esophagus, stomach, and the first part of your small intestine, called the duodenum. The esophagus is the tube that carries food to your stomach. The doctor uses a thin, lighted tube that bends. It is called an endoscope, or scope. The doctor puts the tip of the scope in your mouth and gently moves it down your throat. The scope is a flexible video camera. The doctor looks at a monitor (like a TV set or a computer screen) as he or she moves the scope. A doctor may do this test, which is also called a procedure, to look for ulcers, tumors, infection, or bleeding. It also can be used to look for signs of acid backing up into your esophagus. This is called gastroesophageal reflux disease, or GERD. The doctor can use the scope to take a sample of tissue for study (a biopsy). The doctor also can use the scope to take out growths or stop bleeding. Follow-up care is a key part of your treatment and safety. Be sure to make and go to all appointments, and call your doctor if you are having problems. It's also a good idea to know your test results and keep a list of the medicines you take. What happens before the procedure? Procedures can be stressful. This information will help you understand what you can expect. And it will help you safely prepare for your procedure. Preparing for the procedure  · Understand exactly what procedure is planned, along with the risks, benefits, and other options. · Tell your doctors ALL the medicines, vitamins, supplements, and herbal remedies you take. Some of these can increase the risk of bleeding or interact with anesthesia. · If you take blood thinners, such as warfarin (Coumadin), clopidogrel (Plavix), or aspirin, be sure to talk to your doctor.  He or she will tell you if you should stop taking these medicines before your procedure. Make sure that you understand exactly what your doctor wants you to do. · Your doctor will tell you which medicines to take or stop before your procedure. You may need to stop taking certain medicines a week or more before the procedure. So talk to your doctor as soon as you can. · If you have an advance directive, let your doctor know. It may include a living will and a durable power of  for health care. Bring a copy to the hospital. If you don't have one, you may want to prepare one. It lets your doctor and loved ones know your health care wishes. Doctors advise that everyone prepare these papers before any type of surgery or procedure. What happens on the day of the procedure? · Follow the instructions exactly about when to stop eating and drinking. If you don't, your procedure may be canceled. If your doctor told you to take your medicines on the day of the procedure, take them with only a sip of water. · Take a bath or shower before you come in for your procedure. Do not apply lotions, perfumes, deodorants, or nail polish. · Take off all jewelry and piercings. And take out contact lenses, if you wear them. At the hospital or surgery center  · Bring a picture ID. · The test may take 15 to 30 minutes. · The doctor may spray medicine on the back of your throat to numb it. You also will get medicine to prevent pain and to relax you. · You will lie on your left side. The doctor will put the scope in your mouth and toward the back of your throat. The doctor will tell you when to swallow. This helps the scope move down your throat. You will be able to breathe normally. The doctor will move the scope down your esophagus into your stomach. The doctor also may look at the duodenum. · If your doctor wants to take a sample of tissue for a biopsy, he or she may use small surgical tools, which are put into the scope, to cut off some tissue.  You will not feel a biopsy, if one is taken. The doctor also can use the tools to stop bleeding or to do other treatments, if needed. · You will stay at the hospital or surgery center for 1 to 2 hours until the medicine you were given wears off. Going home  · Be sure you have someone to drive you home. Anesthesia and pain medicine make it unsafe for you to drive. · You will be given more specific instructions about recovering from your procedure. They will cover things like diet, wound care, follow-up care, driving, and getting back to your normal routine. When should you call your doctor? · You have questions or concerns. · You don't understand how to prepare for your procedure. · You become ill before the procedure (such as fever, flu, or a cold). · You need to reschedule or have changed your mind about having the procedure. Where can you learn more? Go to http://arnaldoStockbet.commariano.info/. Enter P790 in the search box to learn more about \"Upper GI Endoscopy: Before Your Procedure. \"  Current as of: August 9, 2016  Content Version: 11.3  © 9310-3045 [employer]. Care instructions adapted under license by [employer] (which disclaims liability or warranty for this information). If you have questions about a medical condition or this instruction, always ask your healthcare professional. [address] any warranty or liability for your use of this information. Upper GI Endoscopy: What to Expect at [address] [place] [address]  After you have an endoscopy, you will stay at the hospital or clinic for 1 to 2 hours. This will allow the medicine to wear off. You will be able to go home after your doctor or nurse checks to make sure you are not having any problems.   You may have to stay overnight if you had treatment during the test. You may have a sore throat for a day or two after the test.  This care sheet gives you a general idea about what to expect after the test.  How can you care for yourself at home? Activity  · Rest as much as you need to after you go home. · You should be able to go back to your usual activities the day after the test.  Diet  · Follow your doctor's directions for eating after the test.  · Drink plenty of fluids (unless your doctor has told you not to). Medications  · If you have a sore throat the day after the test, use an over-the-counter spray to numb your throat. Follow-up care is a key part of your treatment and safety. Be sure to make and go to all appointments, and call your doctor if you are having problems. It's also a good idea to know your test results and keep a list of the medicines you take. When should you call for help? Call 911 anytime you think you may need emergency care. For example, call if:  · You passed out (lost consciousness). · You cough up blood. · You vomit blood or what looks like coffee grounds. · You pass maroon or very bloody stools. Call your doctor now or seek immediate medical care if:  · You have trouble swallowing. · You have belly pain. · Your stools are black and tarlike or have streaks of blood. · You are sick to your stomach or cannot keep fluids down. Watch closely for changes in your health, and be sure to contact your doctor if:  · Your throat still hurts after a day or two. · You do not get better as expected. Where can you learn more? Go to http://arnaldo-mariano.info/. Enter (75) 455-967 in the search box to learn more about \"Upper GI Endoscopy: What to Expect at Home. \"  Current as of: August 9, 2016  Content Version: 11.3  © 3685-7646 [employer]. Care instructions adapted under license by [employer] (which disclaims liability or warranty for this information). If you have questions about a medical condition or this instruction, always ask your healthcare professional. [address] any warranty or liability for your use of this information.

## 2017-09-10 NOTE — ROUTINE PROCESS
Bedside shift change report given to WENDY Styles (oncoming nurse) by George Mendez (offgoing nurse). Report included the following information SBAR, Kardex, Intake/Output, MAR and Recent Results.

## 2017-09-10 NOTE — DISCHARGE SUMMARY
Hospitalist Discharge Summary     Patient ID:  Albert De La Torre  [id number]  54 y.o.  1940    PCP on record: Claudia Castro MD    Admit date: 9/7/2017  Discharge date and time: 9/10/2017      DISCHARGE DIAGNOSIS:    Abdominal pain, epigastric  Leukocytosis, ? Reactive, no obvious source of infection  Hypokalemia  Hypernatremia  HTN, accelerated  Diabetes mellitus  H/O Asthma  Chronic pain syndrome  Obesity  Body mass index is 35.94 kg/(m^2). CONSULTATIONS:  IP CONSULT TO HOSPITALIST  IP CONSULT TO GASTROENTEROLOGY    Excerpted HPI from H&P of Owen Guevara MD:  Pt admitted by tele hospitalist, seen by me this morning. Michael Griffith is a 77 y.o. female with h/o HTN, DM, asthma, idiopathic chronic pancreatitis follows with Dr. Chioma Frost presented to ER with c/o epigastric pain. Pt has chronic pain from idiopathic pancreatitis for approx 8 yrs ago and is followed by dr Montserrat Costa, she is planned to undergo an outpatient EGD on Oct 4th with Dr Chioma Frost, but pain has become progressively worse so she decided to come to ER. In ER on initial eval she is afebrile  lipase  538 and wbc 16k.  CT abd was unremarkable.      ______________________________________________________________________  DISCHARGE SUMMARY/HOSPITAL COURSE:  for full details see H&P, daily progress notes, labs, consult notes. Abdominal pain, epigastric: POA  Differential idiopathic Pancreatitis, acute on chronic vs. PUD  Leukocytosis, ? Reactive, no obvious source of infection, trended down  Lipase trended down  -appreciate GI consult, advance diet, tolerating GI lite diet  -treated by making NPO, IV fluids, prn pain medications  -cont carafate and famotidine  -case discussed with GI on phone, Dr. Chioma Frost is not planning EGD during this hospitalization and will be followed outpt.  Pt has scheduled EGD in first week of October.       Hypokalemia:   -Repleted     Hypernatremia: POA  -Resolved     HTN, accelerated: POA  -resume home medications      Diabetes mellitus  -resume home medications      H/O Asthma  -PRN duo nebs      Chronic pain syndrome  -on chronic narcotics, benzodiazepine  - reviewed, narcotics prescriptions from multiple providers in last month       Obesity  Body mass index is 35.94 kg/(m^2). Baseline: independent      _______________________________________________________________________  Patient seen and examined by me on discharge day. Pertinent Findings:  Gen:    Not in distress  Chest: Clear lungs  CVS:   Regular rhythm. No edema  Abd:  Soft, not distended, not tender  Neuro:  Alert, cn 2-12 grossly intact  _______________________________________________________________________  DISCHARGE MEDICATIONS:   Current Discharge Medication List      CONTINUE these medications which have NOT CHANGED    Details   linaclotide (LINZESS) 145 mcg cap capsule Take 145 mcg by mouth Daily (before breakfast). albuterol (PROVENTIL HFA, VENTOLIN HFA, PROAIR HFA) 90 mcg/actuation inhaler Take 2 Puffs by inhalation every six (6) hours as needed for Wheezing. oxyCODONE IR (ROXICODONE) 20 mg immediate release tablet Take 20 mg by mouth every six (6) hours as needed. brimonidine-timolol (COMBIGAN) 0.2-0.5 % drop ophthalmic solution Administer 1 Drop to right eye nightly. Dexlansoprazole (DEXILANT) 60 mg CpDB Take 60 mg by mouth Daily (before breakfast). famotidine (PEPCID) 20 mg tablet Take 20 mg by mouth two (2) times a day.      naloxone (NARCAN) 4 mg/actuation nasal spray 1 [place] by IntraNASal route once as needed. Use 1 spray intranasally into 1 nostril. Use a new Narcan nasal spray for subsequent doses and administer into alternating nostrils. May repeat every 2 to 3 minutes as needed. nitroglycerin (NITROSTAT) 0.4 mg SL tablet 0.4 mg by SubLINGual route every five (5) minutes as needed for Chest Pain.       sucralfate (CARAFATE) 100 mg/mL suspension Take 1 g by mouth three (3) times daily.      acetaminophen (TYLENOL) 500 mg tablet Take 1,000 mg by mouth every six (6) hours as needed for Pain. TRAVOPROST (TRAVATAN Z OP) Administer 1 Drop to both eyes nightly. amLODIPine (NORVASC) 10 mg tablet Take 10 mg by mouth daily. Refills: 0      fosinopril (MONOPRIL) 20 mg tablet Take 20 mg by mouth daily. Refills: 0      ADVAIR DISKUS 100-50 mcg/dose diskus inhaler 1 puff two (2) times a day. Associated Diagnoses: Gastric polyp      Insulin Glargine (LANTUS SOLOSTAR) 100 unit/mL (3 mL) flexpen 20 Units by SubCUTAneous route two (2) times a day. Indications: DIABETES MELLITUS             My Recommended Diet, Activity, Wound Care, and follow-up labs are listed in the patient's Discharge Insturctions which I have personally completed and reviewed. _______________________________________________________________________  DISPOSITION:    Home with Family: x   Home with HH/PT/OT/RN:    SNF/LTC:    CHERIE:    OTHER:        Condition at Discharge:  Stable  _______________________________________________________________________  Follow up with:   PCP : Jagjit Mcneal MD  Follow-up Information     Follow up With Details Comments Melia Smith MD Go on 9/14/2017 Your appointment is scheduled for 9/14/17 at 2:45pm. [address]    [address] [facility]  [phone]      Chayo Blackburn MD Go on 10/4/2017 Your outpatient EGD is scheduled for 10/4/17. [address] [facility]  [address]  [place] [id number]      Senior [address] [place] [address] on [address] will be contacted by a health  to schedule a home visit.  [address] [facility]  [phone]              Total time in minutes spent coordinating this discharge (includes going over instructions, follow-up, prescriptions, and preparing report for sign off to her PCP) :  35 minutes    Signed:  Antonieta Goldberg MD

## 2017-09-11 ENCOUNTER — TELEPHONE (OUTPATIENT)
Dept: CASE MANAGEMENT | Age: 77
End: 2017-09-11

## 2017-09-11 NOTE — TELEPHONE ENCOUNTER
CM contacted patient and completed follow-up phone call. Patient reported that she was currently at her pain management appointment and would follow up with her PCP on Thursday. James MCDONALD  [address] [place] [address]

## 2017-09-12 ENCOUNTER — HOSPITAL ENCOUNTER (EMERGENCY)
Age: 77
Discharge: HOME | End: 2017-09-12
Attending: EMERGENCY MEDICINE
Payer: MEDICARE

## 2017-09-12 VITALS
HEART RATE: 104 BPM | TEMPERATURE: 98.3 F | DIASTOLIC BLOOD PRESSURE: 70 MMHG | RESPIRATION RATE: 24 BRPM | OXYGEN SATURATION: 98 % | SYSTOLIC BLOOD PRESSURE: 145 MMHG

## 2017-09-12 DIAGNOSIS — K29.90 GASTRITIS AND DUODENITIS: Primary | ICD-10-CM

## 2017-09-12 DIAGNOSIS — R10.13 EPIGASTRIC BURNING SENSATION: ICD-10-CM

## 2017-09-12 LAB
ALBUMIN SERPL-MCNC: 2.8 G/DL (ref 3.5–5)
ALBUMIN/GLOB SERPL: 0.9 {RATIO} (ref 1.1–2.2)
ALP SERPL-CCNC: 78 U/L (ref 45–117)
ALT SERPL-CCNC: 26 U/L (ref 12–78)
ANION GAP SERPL CALC-SCNC: 8 MMOL/L (ref 5–15)
AST SERPL-CCNC: 21 U/L (ref 15–37)
BASOPHILS # BLD: 0 K/UL (ref 0–0.1)
BASOPHILS NFR BLD: 0 % (ref 0–1)
BILIRUB SERPL-MCNC: 0.1 MG/DL (ref 0.2–1)
BUN SERPL-MCNC: 11 MG/DL (ref 6–20)
BUN/CREAT SERPL: 17 (ref 12–20)
CALCIUM SERPL-MCNC: 7.7 MG/DL (ref 8.5–10.1)
CHLORIDE SERPL-SCNC: 109 MMOL/L (ref 97–108)
CO2 SERPL-SCNC: 28 MMOL/L (ref 21–32)
CREAT SERPL-MCNC: 0.66 MG/DL (ref 0.55–1.02)
EOSINOPHIL # BLD: 0.2 K/UL (ref 0–0.4)
EOSINOPHIL NFR BLD: 1 % (ref 0–7)
ERYTHROCYTE [DISTWIDTH] IN BLOOD BY AUTOMATED COUNT: 15 % (ref 11.5–14.5)
GLOBULIN SER CALC-MCNC: 3.1 G/DL (ref 2–4)
GLUCOSE SERPL-MCNC: 108 MG/DL (ref 65–100)
HCT VFR BLD AUTO: 36.6 % (ref 35–47)
HGB BLD-MCNC: 11.9 G/DL (ref 11.5–16)
LIPASE SERPL-CCNC: 245 U/L (ref 73–393)
LYMPHOCYTES # BLD: 4 K/UL (ref 0.8–3.5)
LYMPHOCYTES NFR BLD: 24 % (ref 12–49)
MCH RBC QN AUTO: 27.8 PG (ref 26–34)
MCHC RBC AUTO-ENTMCNC: 32.5 G/DL (ref 30–36.5)
MCV RBC AUTO: 85.5 FL (ref 80–99)
MONOCYTES # BLD: 1.4 K/UL (ref 0–1)
MONOCYTES NFR BLD: 8 % (ref 5–13)
NEUTS SEG # BLD: 11 K/UL (ref 1.8–8)
NEUTS SEG NFR BLD: 67 % (ref 32–75)
PLATELET # BLD AUTO: 343 K/UL (ref 150–400)
POTASSIUM SERPL-SCNC: 3.1 MMOL/L (ref 3.5–5.1)
PROT SERPL-MCNC: 5.9 G/DL (ref 6.4–8.2)
RBC # BLD AUTO: 4.28 M/UL (ref 3.8–5.2)
SODIUM SERPL-SCNC: 145 MMOL/L (ref 136–145)
WBC # BLD AUTO: 16.6 K/UL (ref 3.6–11)

## 2017-09-12 PROCEDURE — 99284 EMERGENCY DEPT VISIT MOD MDM: CPT

## 2017-09-12 PROCEDURE — 83690 ASSAY OF LIPASE: CPT | Performed by: EMERGENCY MEDICINE

## 2017-09-12 PROCEDURE — 74011000250 HC RX REV CODE- 250: Performed by: EMERGENCY MEDICINE

## 2017-09-12 PROCEDURE — 96361 HYDRATE IV INFUSION ADD-ON: CPT

## 2017-09-12 PROCEDURE — 96375 TX/PRO/DX INJ NEW DRUG ADDON: CPT

## 2017-09-12 PROCEDURE — 85025 COMPLETE CBC W/AUTO DIFF WBC: CPT | Performed by: EMERGENCY MEDICINE

## 2017-09-12 PROCEDURE — 80053 COMPREHEN METABOLIC PANEL: CPT | Performed by: EMERGENCY MEDICINE

## 2017-09-12 PROCEDURE — 74011250637 HC RX REV CODE- 250/637: Performed by: EMERGENCY MEDICINE

## 2017-09-12 PROCEDURE — 74011250636 HC RX REV CODE- 250/636: Performed by: EMERGENCY MEDICINE

## 2017-09-12 PROCEDURE — 36415 COLL VENOUS BLD VENIPUNCTURE: CPT | Performed by: EMERGENCY MEDICINE

## 2017-09-12 PROCEDURE — 96374 THER/PROPH/DIAG INJ IV PUSH: CPT

## 2017-09-12 PROCEDURE — C9113 INJ PANTOPRAZOLE SODIUM, VIA: HCPCS | Performed by: EMERGENCY MEDICINE

## 2017-09-12 RX ORDER — PHENOL/SODIUM PHENOLATE
20 AEROSOL, SPRAY (ML) MUCOUS MEMBRANE DAILY
Qty: 90 TAB | Refills: 0 | Status: SHIPPED | OUTPATIENT
Start: 2017-09-12 | End: 2017-10-27

## 2017-09-12 RX ORDER — POTASSIUM CHLORIDE 750 MG/1
40 TABLET, FILM COATED, EXTENDED RELEASE ORAL
Status: COMPLETED | OUTPATIENT
Start: 2017-09-12 | End: 2017-09-12

## 2017-09-12 RX ORDER — SUCRALFATE 1 G/1
1 TABLET ORAL 4 TIMES DAILY
Qty: 30 TAB | Refills: 0 | Status: SHIPPED | OUTPATIENT
Start: 2017-09-12 | End: 2019-03-08

## 2017-09-12 RX ORDER — PANTOPRAZOLE SODIUM 40 MG/10ML
40 INJECTION, POWDER, LYOPHILIZED, FOR SOLUTION INTRAVENOUS
Status: COMPLETED | OUTPATIENT
Start: 2017-09-12 | End: 2017-09-12

## 2017-09-12 RX ORDER — FENTANYL CITRATE 50 UG/ML
50 INJECTION, SOLUTION INTRAMUSCULAR; INTRAVENOUS
Status: COMPLETED | OUTPATIENT
Start: 2017-09-12 | End: 2017-09-12

## 2017-09-12 RX ORDER — ONDANSETRON 2 MG/ML
4 INJECTION INTRAMUSCULAR; INTRAVENOUS
Status: COMPLETED | OUTPATIENT
Start: 2017-09-12 | End: 2017-09-12

## 2017-09-12 RX ADMIN — FENTANYL CITRATE 50 MCG: 50 INJECTION, SOLUTION INTRAMUSCULAR; INTRAVENOUS at 03:03

## 2017-09-12 RX ADMIN — ONDANSETRON 4 MG: 2 INJECTION, SOLUTION INTRAMUSCULAR; INTRAVENOUS at 03:03

## 2017-09-12 RX ADMIN — PHENOBARBITAL ELIXIR 50 ML: 16.2; .1037; .0065; .0194 ELIXIR ORAL at 03:29

## 2017-09-12 RX ADMIN — PANTOPRAZOLE SODIUM 40 MG: 40 INJECTION, POWDER, FOR SOLUTION INTRAVENOUS at 03:29

## 2017-09-12 RX ADMIN — POTASSIUM CHLORIDE 40 MEQ: 750 TABLET, FILM COATED, EXTENDED RELEASE ORAL at 04:28

## 2017-09-12 RX ADMIN — SODIUM CHLORIDE 1000 ML: 900 INJECTION, SOLUTION INTRAVENOUS at 03:02

## 2017-09-12 NOTE — ED NOTES
Patient given copy of dc instructions and script(s). Patient verbalized understanding of instructions and script (s). Patient given a current medication reconciliation form and verbalized understanding of their medications. Patient verbalized understanding of the importance of discussing medications with  his or her physician or clinic when they follow up. Patient alert and oriented and in no acute distress. Pt verbalizes pain scale of 4 out of 10. Patient discharged home ambulatory without assistance. Wheelchair declined.

## 2017-09-12 NOTE — ED PROVIDER NOTES
HPI Comments: Shemar Hammer is a 77 y.o. female with PMhx significant for pancreatitis, DM, HTN, asthma, GERD, HLD, kidney stones who presents ambulatory to the ED with cc of sudden onset of burning abdominal pain that worse in the epigastric region that radiates up and down that woke her out of her sleep today. Pt reports associated nausea and coffee ground like vomiting. She denies use of medication to modify her symptoms. Per chart review pt was admitted to [facility] on 09/07/2017 for Idiopathic acute pancreatitis. She denies any constipation, diarrhea, fevers, or chills. Social history significant for: - Tobacco, - EtOH, - Illicit drug use    PCP: Crissy Goodman MD    There are no other complaints, changes or physical findings at this time. Written by AINTA Mcfarlaneibsandy, as dictated by Orion David M.D. The history is provided by the patient. No  was used.         Past Medical History:   Diagnosis Date    Anal polyp 6/13/2013    Arthritis     Asthma     Cataract     Chronic pain     knee - right/back    Diabetes ([address]     GERD (gastroesophageal reflux disease)     Hemorrhoids 6/13/2013    History of kidney stones     Hypertension     Ill-defined condition     abdominal pain and burning    Other ill-defined conditions     high cholesterol       Past Surgical History:   Procedure Laterality Date    COLONOSCOPY  2/28/2013         EGD  2/28/2013         HX CATARACT REMOVAL Bilateral     HX CHOLECYSTECTOMY  6-2015    HX GI  6/27/13    anal polyps removed    HX HEENT      laser surgery for blood clot in right eye    HX KNEE REPLACEMENT      right    HX OTHER SURGICAL  4-2-14    lap gastrotomy and removal of polyp -  - not cancerous per pt    HX ALAN AND BSO      HX TONSILLECTOMY      HX UROLOGICAL      \"laser\" surgery for kidney stone    NEUROLOGICAL PROCEDURE UNLISTED  1990    tumor at back of neck, benign         Family History:   Problem Relation Age of Onset    Cancer Mother      colon    Diabetes Brother     Other Sister      GI BLEED    Heart Disease Brother     Heart Attack Brother     Heart Disease Brother     Heart Disease Brother     Anesth Problems Neg Hx        Social History     Social History    Marital status:      Spouse name: N/A    Number of children: N/A    Years of education: N/A     Occupational History    Not on file. Social History Main Topics    Smoking status: Former Smoker    Smokeless tobacco: Never Used      Comment: age 13    Alcohol use No    Drug use: No    Sexual activity: No     Other Topics Concern    Not on file     Social History Narrative         ALLERGIES: Seafood [shellfish containing products]    Review of Systems   Constitutional: Negative for chills and fever. Respiratory: Negative for cough and shortness of breath. Cardiovascular: Negative for chest pain. Gastrointestinal: Positive for abdominal pain (worse in epigastric) and nausea. Negative for constipation, diarrhea and vomiting. Neurological: Negative for weakness and numbness. All other systems reviewed and are negative. Patient Vitals for the past 12 hrs:   Temp Pulse Resp BP SpO2   09/12/17 0400 - - - 145/55 -   09/12/17 0334 - - - 166/81 -   09/12/17 0247 98.3 °F (36.8 °C) (!) 104 24 (!) 143/92 98 %     Physical Exam   Constitutional: She is oriented to person, place, and time. She appears well-developed and well-nourished. Very tearful and uncomfortable. Riving in bed. HENT:   Head: Normocephalic and atraumatic. Eyes: Conjunctivae and EOM are normal.   Neck: Normal range of motion. Neck supple. Cardiovascular: Normal rate and regular rhythm. Pulmonary/Chest: Effort normal and breath sounds normal. No respiratory distress. Abdominal: Soft. She exhibits no distension. There is no tenderness.    Exquisitely tender and the epigastric area, moderate diffuse abdominal tenderness   Musculoskeletal: Normal range of motion. Neurological: She is alert and oriented to person, place, and time. Skin: Skin is warm and dry. Psychiatric: She has a normal mood and affect. Nursing note and vitals reviewed. MDM  Number of Diagnoses or Management Options  Epigastric burning sensation:   Gastritis and duodenitis:   Diagnosis management comments:   Patient presents with epigastric abdominal pain. Differential includes gastritis, pancreatitis cholelithiasis, cholecystitis, hepatitis, muscular strain, renal pathology, gastroenteritis, opioid withdrawal.  Less likely ACS. Will obtain labs and possibly US. Will give fluids, analgesics and antiemetics PRN. Amount and/or Complexity of Data Reviewed  Clinical lab tests: ordered and reviewed  Review and summarize past medical records: yes    Patient Progress  Patient progress: stable       Procedures    PROGRESS NOTE:  4:24 AM  Pt has been re-evaluated. Pt states that she is feeling better. She denies any vomiting while in the ED. Pt reports that one of her insurances is not filling her anti-acids. Will rx'd carafate and omeprazole. Written by Elsa Tobar ED scribe, as dictated by Vinayak Felton M.D    LABORATORY TESTS:  Recent Results (from the past 12 hour(s))   CBC WITH AUTOMATED DIFF    Collection Time: 09/12/17  2:58 AM   Result Value Ref Range    WBC 16.6 (H) 3.6 - 11.0 K/uL    RBC 4.28 3.80 - 5.20 M/uL    HGB 11.9 11.5 - 16.0 g/dL    HCT 36.6 35.0 - 47.0 %    MCV 85.5 80.0 - 99.0 FL    MCH 27.8 26.0 - 34.0 PG    MCHC 32.5 30.0 - 36.5 g/dL    RDW 15.0 (H) 11.5 - 14.5 %    PLATELET 405 796 - 914 K/uL    NEUTROPHILS 67 32 - 75 %    LYMPHOCYTES 24 12 - 49 %    MONOCYTES 8 5 - 13 %    EOSINOPHILS 1 0 - 7 %    BASOPHILS 0 0 - 1 %    ABS. NEUTROPHILS 11.0 (H) 1.8 - 8.0 K/UL    ABS. LYMPHOCYTES 4.0 (H) 0.8 - 3.5 K/UL    ABS. MONOCYTES 1.4 (H) 0.0 - 1.0 K/UL    ABS. EOSINOPHILS 0.2 0.0 - 0.4 K/UL    ABS.  BASOPHILS 0.0 0.0 - 0.1 K/UL   METABOLIC PANEL, COMPREHENSIVE Collection Time: 09/12/17  3:42 AM   Result Value Ref Range    Sodium 145 136 - 145 mmol/L    Potassium 3.1 (L) 3.5 - 5.1 mmol/L    Chloride 109 (H) 97 - 108 mmol/L    CO2 28 21 - 32 mmol/L    Anion gap 8 5 - 15 mmol/L    Glucose 108 (H) 65 - 100 mg/dL    BUN 11 6 - 20 MG/DL    Creatinine 0.66 0.55 - 1.02 MG/DL    BUN/Creatinine ratio 17 12 - 20      GFR est AA >60 >60 ml/min/1.73m2    GFR est non-AA >60 >60 ml/min/1.73m2    Calcium 7.7 (L) 8.5 - 10.1 MG/DL    Bilirubin, total 0.1 (L) 0.2 - 1.0 MG/DL    ALT (SGPT) 26 12 - 78 U/L    AST (SGOT) 21 15 - 37 U/L    Alk. phosphatase 78 45 - 117 U/L    Protein, total 5.9 (L) 6.4 - 8.2 g/dL    Albumin 2.8 (L) 3.5 - 5.0 g/dL    Globulin 3.1 2.0 - 4.0 g/dL    A-G Ratio 0.9 (L) 1.1 - 2.2     LIPASE    Collection Time: 09/12/17  3:42 AM   Result Value Ref Range    Lipase 245 73 - 393 U/L       IMAGING RESULTS:  No orders to display       MEDICATIONS GIVEN:  Medications   potassium chloride SR (KLOR-CON 10) tablet 40 mEq (not administered)   fentaNYL citrate (PF) injection 50 mcg (50 mcg IntraVENous Given 9/12/17 0303)   sodium chloride 0.9 % bolus infusion 1,000 mL (0 mL IntraVENous IV Completed 9/12/17 0355)   ondansetron (ZOFRAN) injection 4 mg (4 mg IntraVENous Given 9/12/17 0303)   MAALOX/DONNATAL/viscous lidocaine (GI COCKTAIL) (50 mL Oral Given 9/12/17 0329)   pantoprazole (PROTONIX) injection 40 mg (40 mg IntraVENous Given 9/12/17 0329)       IMPRESSION:  1. Gastritis and duodenitis    2. Epigastric burning sensation        PLAN:  1. Current Discharge Medication List      START taking these medications    Details   Omeprazole delayed release (PRILOSEC D/R) 20 mg tablet Take 1 Tab by mouth daily. Qty: 90 Tab, Refills: 0      sucralfate (CARAFATE) 1 gram tablet Take 1 Tab by mouth four (4) times daily.   Qty: 30 Tab, Refills: 0         CONTINUE these medications which have NOT CHANGED    Details   sucralfate (CARAFATE) 100 mg/mL suspension Take 1 g by mouth three (3) times daily. 2.   Follow-up Information     Follow up With Details Comments Contact Info    Your GI doctor  Schedule an appointment as soon as possible for a visit          Return to ED if worse     DISCHARGE NOTE  4:25 AM  The patient has been re-evaluated and is ready for discharge. Reviewed available results with patient. Counseled patient on diagnosis and care plan. Patient has expressed understanding, and all questions have been answered. Patient agrees with plan and agrees to follow up as recommended, or return to the ED if their symptoms worsen. Discharge instructions have been provided and explained to the patient, along with reasons to return to the ED. This note is prepared by Payam Tyler, acting as Scribe for Edilma Love M.D. Edilma Love M.D: The scribe's documentation has been prepared under my direction and personally reviewed by me in its entirety. I confirm that the note above accurately reflects all work, treatment, procedures, and medical decision making performed by me.

## 2017-09-12 NOTE — DISCHARGE INSTRUCTIONS
Indigestion (Dyspepsia or Heartburn): Care Instructions  Your Care Instructions  Sometimes it can be hard to pinpoint the cause of indigestion (dyspepsia or heartburn). Most cases of an upset stomach with bloating, burning, burping, and nausea are minor and go away within several hours. Home treatment and over-the-counter medicine often are able to control symptoms. But if you take medicine to relieve your indigestion without making diet and lifestyle changes, your symptoms are likely to return again and again. If you get indigestion often, it may be a sign of a more serious medical problem. Be sure to follow up with your doctor, who may want to do tests to be sure of the cause of your indigestion. Follow-up care is a key part of your treatment and safety. Be sure to make and go to all appointments, and call your doctor if you are having problems. It's also a good idea to know your test results and keep a list of the medicines you take. How can you care for yourself at home? · Your doctor may recommend over-the-counter medicine. For mild or occasional indigestion, antacids such as Tums, Gaviscon, Mylanta, or Maalox may help. Be careful when you take over-the-counter antacid medicines. Many of these medicines have aspirin in them. Read the label to make sure that you are not taking more than the recommended dose. Too much aspirin can be harmful. · Your doctor also may recommend over-the-counter acid reducers, such as Pepcid AC, Tagamet HB, Zantac 75, or Prilosec. Read and follow all instructions on the label. If you use these medicines often, talk with your doctor. · Change your eating habits. ¨ It's best to eat several small meals instead of two or three large meals. ¨ After you eat, wait 2 to 3 hours before you lie down. ¨ Chocolate, mint, and alcohol can make GERD worse. ¨ Spicy foods, foods that have a lot of acid (like tomatoes and oranges), and coffee can make GERD symptoms worse in some people. If your symptoms are worse after you eat a certain food, you may want to stop eating that food to see if your symptoms get better. · Do not smoke or chew tobacco. Smoking can make GERD worse. If you need help quitting, talk to your doctor about stop-smoking programs and medicines. These can increase your chances of quitting for good. · If you have GERD symptoms at night, raise the head of your bed 6 to 8 inches by putting the frame on blocks or placing a foam wedge under the head of your mattress. (Adding extra pillows does not work.)  · Do not wear tight clothing around your middle. · Lose weight if you need to. Losing just 5 to 10 pounds can help. · Do not take anti-inflammatory medicines, such as aspirin, ibuprofen (Advil, Motrin), or naproxen (Aleve). These can irritate the stomach. If you need a pain medicine, try acetaminophen (Tylenol), which does not cause stomach upset. When should you call for help? Call 911 anytime you think you may need emergency care. For example, call if:  · You passed out (lost consciousness). · You vomit blood or what looks like coffee grounds. · You pass maroon or very bloody stools. · You have chest pain or pressure. This may occur with:  ¨ Sweating. ¨ Shortness of breath. ¨ Nausea or vomiting. ¨ Pain that spreads from the chest to the neck, jaw, or one or both shoulders or arms. ¨ Feeling dizzy or lightheaded. ¨ A fast or uneven pulse. After calling 911, chew 1 adult-strength aspirin. Wait for an ambulance. Do not try to drive yourself. Call your doctor now or seek immediate medical care if:  · You have severe belly pain. · Your stools are black and tarlike or have streaks of blood. · You have trouble swallowing. · You are losing weight and do not know why. Watch closely for changes in your health, and be sure to contact your doctor if:  · You do not get better as expected. Where can you learn more? Go to http://suzette.info/.   Enter S788 in the search box to learn more about \"Indigestion (Dyspepsia or Heartburn): Care Instructions. \"  Current as of: November 16, 2016  Content Version: 11.3  © 4284-8182 [employer]. Care instructions adapted under license by [employer] (which disclaims liability or warranty for this information). If you have questions about a medical condition or this instruction, always ask your healthcare professional. [place] [address] any warranty or liability for your use of this information.

## 2017-09-24 ENCOUNTER — ANESTHESIA EVENT (OUTPATIENT)
Dept: ENDOSCOPY | Age: 77
End: 2017-09-24
Payer: MEDICARE

## 2017-09-24 NOTE — ANESTHESIA PREPROCEDURE EVALUATION
Anesthetic History   No history of anesthetic complications            Review of Systems / Medical History  Patient summary reviewed, nursing notes reviewed and pertinent labs reviewed    Pulmonary            Asthma     Comments: Former smoker   Neuro/Psych   Within defined limits           Cardiovascular    Hypertension          Hyperlipidemia         GI/Hepatic/Renal     GERD          Comments: Abdominal Pain  Pancreatitis Endo/Other    Diabetes: poorly controlled, type 2, using insulin    Obesity and arthritis     Other Findings                   Anesthetic Plan    ASA: 3  Anesthesia type: general and total IV anesthesia          Induction: Intravenous  Anesthetic plan and risks discussed with: Patient

## 2017-09-25 ENCOUNTER — ANESTHESIA (OUTPATIENT)
Dept: ENDOSCOPY | Age: 77
End: 2017-09-25
Payer: MEDICARE

## 2017-09-25 ENCOUNTER — HOSPITAL ENCOUNTER (OUTPATIENT)
Age: 77
Setting detail: OUTPATIENT SURGERY
Discharge: HOME | End: 2017-09-25
Attending: INTERNAL MEDICINE | Admitting: INTERNAL MEDICINE
Payer: MEDICARE

## 2017-09-25 VITALS
WEIGHT: 182 LBS | TEMPERATURE: 97.8 F | RESPIRATION RATE: 8 BRPM | BODY MASS INDEX: 35.73 KG/M2 | HEIGHT: 60 IN | DIASTOLIC BLOOD PRESSURE: 74 MMHG | SYSTOLIC BLOOD PRESSURE: 156 MMHG | OXYGEN SATURATION: 97 % | HEART RATE: 80 BPM

## 2017-09-25 LAB
GLUCOSE BLD STRIP.AUTO-MCNC: 118 MG/DL (ref 65–100)
H PYLORI FROM TISSUE: POSITIVE
KIT LOT NO., HCLOLOT: ABNORMAL
NEGATIVE CONTROL: NEGATIVE
POSITIVE CONTROL: POSITIVE
SERVICE CMNT-IMP: ABNORMAL

## 2017-09-25 PROCEDURE — 76060000031 HC ANESTHESIA FIRST 0.5 HR: Performed by: INTERNAL MEDICINE

## 2017-09-25 PROCEDURE — 76040000019: Performed by: INTERNAL MEDICINE

## 2017-09-25 PROCEDURE — 74011250636 HC RX REV CODE- 250/636: Performed by: INTERNAL MEDICINE

## 2017-09-25 PROCEDURE — 77030019988 HC FCPS ENDOSC DISP BSC -B: Performed by: INTERNAL MEDICINE

## 2017-09-25 PROCEDURE — 88342 IMHCHEM/IMCYTCHM 1ST ANTB: CPT | Performed by: INTERNAL MEDICINE

## 2017-09-25 PROCEDURE — 88305 TISSUE EXAM BY PATHOLOGIST: CPT | Performed by: INTERNAL MEDICINE

## 2017-09-25 PROCEDURE — 87077 CULTURE AEROBIC IDENTIFY: CPT | Performed by: INTERNAL MEDICINE

## 2017-09-25 PROCEDURE — 74011000250 HC RX REV CODE- 250

## 2017-09-25 PROCEDURE — 82962 GLUCOSE BLOOD TEST: CPT

## 2017-09-25 PROCEDURE — 74011250636 HC RX REV CODE- 250/636

## 2017-09-25 RX ORDER — PROPOFOL 10 MG/ML
INJECTION, EMULSION INTRAVENOUS AS NEEDED
Status: DISCONTINUED | OUTPATIENT
Start: 2017-09-25 | End: 2017-09-25 | Stop reason: HOSPADM

## 2017-09-25 RX ORDER — MIDAZOLAM HYDROCHLORIDE 1 MG/ML
5 INJECTION, SOLUTION INTRAMUSCULAR; INTRAVENOUS
Status: DISCONTINUED | OUTPATIENT
Start: 2017-09-25 | End: 2017-09-25 | Stop reason: HOSPADM

## 2017-09-25 RX ORDER — NALOXONE HYDROCHLORIDE 0.4 MG/ML
0.4 INJECTION, SOLUTION INTRAMUSCULAR; INTRAVENOUS; SUBCUTANEOUS
Status: DISCONTINUED | OUTPATIENT
Start: 2017-09-25 | End: 2017-09-25 | Stop reason: HOSPADM

## 2017-09-25 RX ORDER — SODIUM CHLORIDE 0.9 % (FLUSH) 0.9 %
5-10 SYRINGE (ML) INJECTION AS NEEDED
Status: DISCONTINUED | OUTPATIENT
Start: 2017-09-25 | End: 2017-09-25 | Stop reason: HOSPADM

## 2017-09-25 RX ORDER — DEXTROSE MONOHYDRATE AND SODIUM CHLORIDE 5; .9 G/100ML; G/100ML
100 INJECTION, SOLUTION INTRAVENOUS CONTINUOUS
Status: DISCONTINUED | OUTPATIENT
Start: 2017-09-25 | End: 2017-09-25 | Stop reason: HOSPADM

## 2017-09-25 RX ORDER — SODIUM CHLORIDE 0.9 % (FLUSH) 0.9 %
5-10 SYRINGE (ML) INJECTION EVERY 8 HOURS
Status: DISCONTINUED | OUTPATIENT
Start: 2017-09-25 | End: 2017-09-25 | Stop reason: HOSPADM

## 2017-09-25 RX ORDER — MIDAZOLAM HYDROCHLORIDE 1 MG/ML
1-3 INJECTION, SOLUTION INTRAMUSCULAR; INTRAVENOUS
Status: DISCONTINUED | OUTPATIENT
Start: 2017-09-25 | End: 2017-09-25 | Stop reason: HOSPADM

## 2017-09-25 RX ORDER — LIDOCAINE HYDROCHLORIDE 20 MG/ML
INJECTION, SOLUTION EPIDURAL; INFILTRATION; INTRACAUDAL; PERINEURAL AS NEEDED
Status: DISCONTINUED | OUTPATIENT
Start: 2017-09-25 | End: 2017-09-25 | Stop reason: HOSPADM

## 2017-09-25 RX ORDER — LIDOCAINE HYDROCHLORIDE 20 MG/ML
5 SOLUTION OROPHARYNGEAL AS NEEDED
Status: DISCONTINUED | OUTPATIENT
Start: 2017-09-25 | End: 2017-09-25 | Stop reason: HOSPADM

## 2017-09-25 RX ORDER — DEXTROMETHORPHAN/PSEUDOEPHED 2.5-7.5/.8
1.2 DROPS ORAL
Status: DISCONTINUED | OUTPATIENT
Start: 2017-09-25 | End: 2017-09-25 | Stop reason: HOSPADM

## 2017-09-25 RX ORDER — ATROPINE SULFATE 0.1 MG/ML
0.5 INJECTION INTRAVENOUS
Status: DISCONTINUED | OUTPATIENT
Start: 2017-09-25 | End: 2017-09-25 | Stop reason: HOSPADM

## 2017-09-25 RX ORDER — FENTANYL CITRATE 50 UG/ML
100 INJECTION, SOLUTION INTRAMUSCULAR; INTRAVENOUS ONCE
Status: DISCONTINUED | OUTPATIENT
Start: 2017-09-25 | End: 2017-09-25 | Stop reason: HOSPADM

## 2017-09-25 RX ORDER — SODIUM CHLORIDE 9 MG/ML
100 INJECTION, SOLUTION INTRAVENOUS CONTINUOUS
Status: DISCONTINUED | OUTPATIENT
Start: 2017-09-25 | End: 2017-09-25 | Stop reason: HOSPADM

## 2017-09-25 RX ORDER — DIPHENHYDRAMINE HYDROCHLORIDE 50 MG/ML
50 INJECTION, SOLUTION INTRAMUSCULAR; INTRAVENOUS ONCE
Status: DISCONTINUED | OUTPATIENT
Start: 2017-09-25 | End: 2017-09-25 | Stop reason: HOSPADM

## 2017-09-25 RX ORDER — LORAZEPAM 2 MG/ML
2 INJECTION INTRAMUSCULAR AS NEEDED
Status: DISCONTINUED | OUTPATIENT
Start: 2017-09-25 | End: 2017-09-25 | Stop reason: HOSPADM

## 2017-09-25 RX ORDER — FLUTICASONE PROPIONATE AND SALMETEROL 100; 50 UG/1; UG/1
1 POWDER RESPIRATORY (INHALATION) EVERY 12 HOURS
COMMUNITY
End: 2019-03-08

## 2017-09-25 RX ORDER — FLUMAZENIL 0.1 MG/ML
0.2 INJECTION INTRAVENOUS
Status: DISCONTINUED | OUTPATIENT
Start: 2017-09-25 | End: 2017-09-25 | Stop reason: HOSPADM

## 2017-09-25 RX ORDER — SUCRALFATE 1 G/10ML
2 SUSPENSION ORAL 4 TIMES DAILY
Qty: 1200 ML | Refills: 3 | Status: SHIPPED | OUTPATIENT
Start: 2017-09-25 | End: 2017-10-03

## 2017-09-25 RX ORDER — EPINEPHRINE 0.1 MG/ML
1 INJECTION INTRACARDIAC; INTRAVENOUS
Status: DISCONTINUED | OUTPATIENT
Start: 2017-09-25 | End: 2017-09-25 | Stop reason: HOSPADM

## 2017-09-25 RX ADMIN — PROPOFOL 160 MG: 10 INJECTION, EMULSION INTRAVENOUS at 10:36

## 2017-09-25 RX ADMIN — LIDOCAINE HYDROCHLORIDE 100 MG: 20 INJECTION, SOLUTION EPIDURAL; INFILTRATION; INTRACAUDAL; PERINEURAL at 10:22

## 2017-09-25 RX ADMIN — SODIUM CHLORIDE 100 ML/HR: 900 INJECTION, SOLUTION INTRAVENOUS at 09:29

## 2017-09-25 NOTE — PERIOP NOTES
Anesthesia reports 160 mg Propofol, 100 mg Lidocaine and 350 mL NS given during procedure. Received report from anesthesia staff on vital signs and status of patient. Glasses returned to pt.

## 2017-09-25 NOTE — IP AVS SNAPSHOT
[address] [facility] 
[phone] Patient: Yovana Smiley MRN: [id number] :1940 You are allergic to the following Allergen Reactions Seafood (Shellfish Containing Products) Swelling Recent Documentation Height Weight Breastfeeding? BMI OB Status Smoking Status 1.524 m 82.6 kg No 35.54 kg/m2 Hysterectomy Former Smoker Emergency Contacts Name Discharge Info Relation Home Work Mobile Ellen Rice DISCHARGE CAREGIVER [3] Other Relative [6] [phone] About your hospitalization You were admitted on:  2017 You last received care in the:  [facility] ENDOSCOPY You were discharged on:  2017 Unit phone number:  [phone] Why you were hospitalized Your primary diagnosis was:  Not on File Providers Seen During Your Hospitalizations Provider Role Specialty Primary office phone Kit Witt MD Attending Provider Internal Medicine [phone] Your Primary Care Physician (PCP) Primary Care Physician Office Phone Office Fax [place] Lance OCTAVIA [phone] Follow-up Information Follow up With Details Comments Contact Info MD [address] 
 
[facility] [address] [phone] 
[phone] Current Discharge Medication List  
  
CONTINUE these medications which have CHANGED Dose & Instructions Dispensing Information Comments Morning Noon Evening Bedtime * sucralfate 1 gram tablet Commonly known as:  Luke Hill What changed:  Another medication with the same name was added. Make sure you understand how and when to take each. Your last dose was: Your next dose is:    
   
   
 Dose:  1 g Take 1 Tab by mouth four (4) times daily. Quantity:  30 Tab Refills:  0 * sucralfate 100 mg/mL suspension Commonly known as:  Olesya Hughes What changed: You were already taking a medication with the same name, and this prescription was added. Make sure you understand how and when to take each. Your last dose was: Your next dose is:    
   
   
 Dose:  2 tsp Take 10 mL by mouth four (4) times daily. Quantity:  1200 mL Refills:  3  
     
   
   
   
  
 * Notice: This list has 2 medication(s) that are the same as other medications prescribed for you. Read the directions carefully, and ask your doctor or other care provider to review them with you. CONTINUE these medications which have NOT CHANGED Dose & Instructions Dispensing Information Comments Morning Noon Evening Bedtime  
 acetaminophen 500 mg tablet Commonly known as:  TYLENOL Your last dose was: Your next dose is:    
   
   
 Dose:  1000 mg Take 1,000 mg by mouth every six (6) hours as needed for Pain. Refills:  0 ADVAIR DISKUS 100-50 mcg/dose diskus inhaler Generic drug:  fluticasone-salmeterol Your last dose was: Your next dose is:    
   
   
 Dose:  1 Puff Take 1 Puff by inhalation every twelve (12) hours. Refills:  0  
     
   
   
   
  
 albuterol 90 mcg/actuation inhaler Commonly known as:  PROVENTIL HFA, VENTOLIN HFA, PROAIR HFA Your last dose was: Your next dose is:    
   
   
 Dose:  2 Puff Take 2 Puffs by inhalation every six (6) hours as needed for Wheezing. Refills:  0  
     
   
   
   
  
 amLODIPine 10 mg tablet Commonly known as:  Marissa Schuster Your last dose was: Your next dose is:    
   
   
 Dose:  10 mg Take 10 mg by mouth daily. Refills:  0  
     
   
   
   
  
 COMBIGAN 0.2-0.5 % Drop ophthalmic solution Generic drug:  brimonidine-timolol Your last dose was: Your next dose is:    
   
   
 Dose:  1 Drop Administer 1 Drop to right eye nightly. Refills:  0 DEXILANT 60 mg Cpdb Generic drug:  Dexlansoprazole Your last dose was: Your next dose is:    
   
   
 Dose:  60 mg Take 60 mg by mouth Daily (before breakfast). Refills:  0  
     
   
   
   
  
 fosinopril 20 mg tablet Commonly known as:  MONOPRIL Your last dose was: Your next dose is:    
   
   
 Dose:  20 mg Take 20 mg by mouth daily. Refills:  0  
     
   
   
   
  
 LANTUS SOLOSTAR 100 unit/mL (3 mL) Inpn Generic drug:  insulin glargine Your last dose was: Your next dose is:    
   
   
 Dose:  20 Units 20 Units by SubCUTAneous route two (2) times a day. Indications: DIABETES MELLITUS Refills:  0  
     
   
   
   
  
 linaclotide 145 mcg Cap capsule Commonly known as:  Yesikakeyla Sones Your last dose was: Your next dose is:    
   
   
 Dose:  145 mcg Take 145 mcg by mouth Daily (before breakfast). Refills:  0 NARCAN 4 mg/actuation nasal spray Generic drug:  naloxone Your last dose was: Your next dose is:    
   
   
 Dose:  1 Spray 1 [place] by IntraNASal route once as needed. Use 1 spray intranasally into 1 nostril. Use a new Narcan nasal spray for subsequent doses and administer into alternating nostrils. May repeat every 2 to 3 minutes as needed. Refills:  0  
     
   
   
   
  
 nitroglycerin 0.4 mg SL tablet Commonly known as:  NITROSTAT Your last dose was: Your next dose is:    
   
   
 Dose:  0.4 mg  
0.4 mg by SubLINGual route every five (5) minutes as needed for Chest Pain. Refills:  0 Omeprazole delayed release 20 mg tablet Commonly known as:  PRILOSEC D/R Your last dose was: Your next dose is:    
   
   
 Dose:  20 mg Take 1 Tab by mouth daily. Quantity:  90 Tab Refills:  0 oxyCODONE IR 20 mg immediate release tablet Commonly known as:  Nobie Ferdinand Your last dose was: Your next dose is:    
   
   
 Dose:  20 mg Take 20 mg by mouth every six (6) hours as needed. Refills:  0  
     
   
   
   
  
 TRAVATAN Z OP Your last dose was: Your next dose is:    
   
   
 Dose:  1 Drop Administer 1 Drop to both eyes nightly. Refills:  0 Where to Get Your Medications These medications were sent to [facility]/pharmacy #[id number] Raul ValarieLester, [address] [place] [address] Phone:  [phone]  
  sucralfate 100 mg/mL suspension Discharge Instructions Endoscopy Discharge Instructions Dr. Batsheva Velazco [place] office [phone] [address] Office    [phone] NAME: Blake [place] RECORD OPBRLE:[id number] PATIENT SS#[id number] YOB: 1940 SEX:  female AGE:  77 y.o. FINAL Discharge Procedure and Diagnosis:   
  
Procedure(s): ESOPHAGOGASTRODUODENOSCOPY (EGD) ESOPHAGOGASTRODUODENAL (EGD) BIOPSY FINDINGS:    
Gastric polyp biopsy pending followup in 2 weeks MEDICATIONS CONTINUE CURRENT MEDICATIONS 
 
  NEW MEDICATIONS 1. Change of carafate to liquid 2.  
 3.  
 
 
 
  
  
 
  
 
Testing Schedule Colonoscopy Screening                                   Recommendations Repeat colonoscopy in 3 years Repeat colonoscopy in 5 years Repeat colonoscopy in 10 years New additional 
Tests Call the office  
([phone]) for the appointment time YOUR NEXT APPOINTMENT WITH  [address] [place] [address]: in  2 week(s).                Erin Jj MD  
 If you had a colonoscopy the \"C\" indicates specific instructions    
  
x  Ordinarily you may resume your previous diet but your initial diet should be   light. Large meals can cause abdominal discomfort after these procedures. Specific Diet Recommendations:  
    
      High fiber diet.,  
 
     GERD diet: avoid fried and fatty foods, peppermint, chocolate, alcohol,    
          coffee, citrus fruits and juices, and tomato products. Avoid lying down for 2           to 3  hours after eating.       
            
__x__  You may feel quite tired and need to rest and recuperate for several hours    following these procedures. __x__  Due to the fact that sedation was administered for this procedure, do not drive,   operate machinery or sign legal documents for the next 24 hours. __x__  Mild abdominal pain may be experienced after your procedure, but is should   disappear after several hours. Notify your physician if you have persistent pain,   tenderness or abdominal distension. __x__  C Many patients for the first few hours following the exam may experience        belching or passing gas through the rectum. Walking may help to relieve      
 distention and gas pains. A warm bath or shower will often help with abdominal  cramping.                                                                                        
  
__x__   Alma Holstein may return to your normal routine tomorrow, according to how you feel        and depending on your doctors instructions. Be sure to call your doctor to make  an appointment for a post-surgery check-up on the date your doctor has   requested. __x__ C  Rectal bleeding or spotting in small amounts may occur with the first bowel   movement following a colonoscopy or sigmoidoscopy. __x__  Orlena Catching may experience a numbness or lack of sensation in throat. If present, do not   
 eat or drink. Before eating, test your ability to drink with small sips of water. Y     You may try clear liquids or soups. If you tolerate these, you may then eat solid     food which is not greasy or spicy. __x__ C   
 IF POLYPS REMOVED: Avoid any blood thinning medication such as plavix,   aspirin or coumadin  NSAIDS (like advil or alleve) for 7 days. __x__  Notify your physician if you cough or vomit blood or experience chest pain. Your biopsy or testing result should be available in 7-10 days Prescription will be electronically sent to your pharmacy you must  
  let your nurse know your pharmacy:  
                                                                                                                               
 
 
     [address] TO HELP ENSURE A SMOOTH RECOVERY,  
    IT IS IMPORTANT TO FOLLOW THEM. _x___Pamphlet /Educational Information provided for diagnostic findings Additional education information can assessed at the sites below: 
 Destiny.kerry 
 http://www.digestive. niddk.nih.gov/ddiseases/a-z.asp Web MD patient information Signature of individual giving instruction : 
 Date: 9/25/2017 [employer] Activation Thank you for requesting access to [employer].  Please follow the instructions below to securely access and download your online medical record. [employer] allows you to send messages to your doctor, view your test results, renew your prescriptions, schedule appointments, and more. How Do I Sign Up? 1. In your internet browser, go to [email or web address] 
2. Click on the First Time User? Click Here link in the Sign In box. You will be redirect to the New Member Sign Up page. 3. Enter your [employer] Access Code exactly as it appears below. You will not need to use this code after youve completed the sign-up process. If you do not sign up before the expiration date, you must request a new code. [employer] Access Code: [email or web address] Expires: 2017  8:36 AM (This is the date your [employer] access code will ) 4. Enter the last four digits of your Social Security Number (xxxx) and Date of Birth (mm/dd/yyyy) as indicated and click Submit. You will be taken to the next sign-up page. 5. Create a [employer] ID. This will be your [employer] login ID and cannot be changed, so think of one that is secure and easy to remember. 6. Create a [employer] password. You can change your password at any time. 7. Enter your Password Reset Question and Answer. This can be used at a later time if you forget your password. 8. Enter your e-mail address. You will receive e-mail notification when new information is available in [address] 9. Click Sign Up. You can now view and download portions of your medical record. 10. Click the Download Summary menu link to download a portable copy of your medical information. Additional Information If you have questions, please visit the Frequently Asked Questions section of the [employer] website at [email or web address] Remember, [employer] is NOT to be used for urgent needs. For medical emergencies, dial 911. Discharge Orders None Introducing [facility]! [facility] introduces [employer]ho patient portal. Now you can access parts of your medical record, email your doctor's office, and request medication refills online. 1. In your internet browser, go to [email or web address] 2. Click on the First Time User? Click Here link in the Sign In box. You will see the New Member Sign Up page. 3. Enter your [employer] Access Code exactly as it appears below. You will not need to use this code after youve completed the sign-up process. If you do not sign up before the expiration date, you must request a new code. · [employer] Access Code: [email or web address] Expires: 12/24/2017  8:36 AM 
 
4. Enter the last four digits of your Social Security Number (xxxx) and Date of Birth (mm/dd/yyyy) as indicated and click Submit. You will be taken to the next sign-up page. 5. Create a D[employer]ho ID. This will be your [employer] login ID and cannot be changed, so think of one that is secure and easy to remember. 6. Create a [employer] password. You can change your password at any time. 7. Enter your Password Reset Question and Answer. This can be used at a later time if you forget your password. 8. Enter your e-mail address. You will receive e-mail notification when new information is available in [address] 9. Click Sign Up. You can now view and download portions of your medical record. 10. Click the Download Summary menu link to download a portable copy of your medical information. If you have questions, please visit the Frequently Asked Questions section of the [employer] website. Remember, [employer] is NOT to be used for urgent needs. For medical emergencies, dial 911. Now available from your iPhone and Android! General Information Please provide this summary of care documentation to your next provider. Patient Signature:  ____________________________________________________________ Date:  ____________________________________________________________  
  
Rosa Javed Provider Signature:  ____________________________________________________________ Date:  ____________________________________________________________

## 2017-09-25 NOTE — PROGRESS NOTES
Rajat Krystina  1940  [id number]    Situation:  Verbal report received from:  MARLI Curtis, RN  Procedure: Procedure(s):  ESOPHAGOGASTRODUODENOSCOPY (EGD)  ESOPHAGOGASTRODUODENAL (EGD) BIOPSY    Background:    Preoperative diagnosis: ABD PAIN  Postoperative diagnosis: gastric polyps, polypoid lesion     prepylorus,    :  Dr. Racquel Barton  Assistant(s): Endoscopy Technician-1: Natalie Haskins  Endoscopy RN-1: Neli Wick    Specimens:   ID Type Source Tests Collected by Time Destination   1 : bx Preservative Gastric  Primus MD Deep 9/25/2017 1032 Pathology     H. Pylori  yes    Assessment:  Intra-procedure medications   Anesthesia gave intra-procedure sedation and medications, see anesthesia flow sheet   yes    Intravenous fluids: NS@ KVO     Vital signs stable   yes    Abdominal assessment: round and soft   yes    Recommendation:  Discharge patient per MD order  yes  Return to floor  NA  Family or Friend  yes  Permission to share finding with family or friend  yes

## 2017-09-25 NOTE — PROCEDURES
G I Procedure Note                         EGD    Dr. Sakina Swann office   [facility] - [address]                              [id number]                                 [id number]   1940                       77 y.o.                female        Procedure Date: 9/25/2017   Procedure  EGD  with biopsy. Pre Op Diagnosis:                     1. ABD PAIN                                                                                                                                                                          Post Op Diagnosis:                    1.   gastric polyps,          polypoid lesion prepyloric, 3cm                                                        2.  Hiatal hernia    3. H&p completed  Yes    Anesthesia Assessment Performed prior to procedure:No change   Medications Medication Record            Description of Procedure:  Parul Dennis  was seen in the endoscopy suite and the pre procedure evaluation was completed. The patient was identified as Parul Dennis  and the procedure verified as EGD with biopsy. A Time Out was held and the above information confirmed. The risks, benefits, complications, treatment options and expected outcomes were discussed with the patient. The possibilities of reaction to medication, pulmonary aspiration, perforation of a viscus, bleeding, failure to diagnose a condition and creating a complication requiring transfusion or operation were discussed with the patient who  Permit obtained and risks explained ( [address])     Procedure Note:  With the patient in the left lateral position and after appropriate conscious anesthesia the Olympus Video endoscope was passed under direct vision into the oropharynx.   The oropharynx appeared Normal   The instrument was advanced into the esophagus and the findings were 4 cm hiatal hernia otherwise a normal appearing esophageal mucosa without other anatomic abnormalities. The instrument was advanced into the stomach through the esophagogastric junction. The gastroscope was advanced progressively through the stomach visualizing the body and antral areas. The findings were a gastric polyps x 2-3 and polypoid mass 2-3 cm from the pylorus on the lesser curvature . A biopsy was obtained. The instrument was further advanced through the pylorus into the duodenum. The bulb of the duodenum and the second portion of the duodenum were examined and the findings were a smooth appearing duodenal mucosa with mild erythema. A biopsy was obtained   The endoscope was withdrawn back into the stomach and retroflexed with examination of the fundus and cardia and the findings were no additional abnormalities . The instrument was withdrawn back into the esophagus and the the finding were no additional abnormalities    Biopsies were obtained for helicobacter testing and pathologic analysis from the representative areas. The endoscope was completely withdrawn and the patient tolerated the procedure well. 1.  Blood loss was nominal.  2.  For biopsy  Specimen verification by physician and nurse two sources name,           social security numbers     Suggestions  Plan 1. - Acid suppression with a proton pump inhibitor.  - Await pathology. - Await HELIO test result and treat for Helicobacter pylori if positive. - burning chest /abd pain not acid related trial of carafate       Migdalia Castro MD

## 2017-09-25 NOTE — DISCHARGE INSTRUCTIONS
Endoscopy Discharge Instructions     Dr. Elliott Anguiano office   [facility]    [phone]                                        NAME: Parul Dennis RECORD CRNYAM:[id number]   PATIENT SS#[id number] YOB: 1940   SEX:  female AGE:  77 y.o. FINAL Discharge Procedure and Diagnosis:       Procedure(s):  ESOPHAGOGASTRODUODENOSCOPY (EGD)  ESOPHAGOGASTRODUODENAL (EGD) BIOPSY       FINDINGS:     Gastric polyp biopsy pending followup in 2 weeks                                            MEDICATIONS    [x] CONTINUE CURRENT MEDICATIONS     [x] NEW MEDICATIONS           1. Change of carafate to liquid    2.    3.                      Testing   Schedule              Colonoscopy Screening                                   Recommendations   []  Repeat colonoscopy in 3 years   []  Repeat colonoscopy in 5 years   []  Repeat colonoscopy in 10 years      New additional  Tests  Call the office   [phone]) for the appointment time      []      []      []                [address]: in  2 week(s). Db Teran MD                                                                                        If you had a colonoscopy the \"C\" indicates specific instructions        x  Ordinarily you may resume your previous diet but your initial diet should be   light. Large meals can cause abdominal discomfort after these procedures. Specific Diet Recommendations:             [x] High fiber diet.,         [x] GERD diet: avoid fried and fatty foods, peppermint, chocolate, alcohol,               coffee, citrus fruits and juices, and tomato products.  Avoid lying down for 2           to 3  hours after eating.                     __x__  You may feel quite tired and need to rest and recuperate for several hours    following these procedures. __x__  Due to the fact that sedation was administered for this procedure, do not drive,   operate machinery or sign legal documents for the next 24 hours. __x__  Mild abdominal pain may be experienced after your procedure, but is should   disappear after several hours. Notify your physician if you have persistent pain,   tenderness or abdominal distension. __x__  C    Many patients for the first few hours following the exam may experience        belching or passing gas through the rectum. Walking may help to relieve        distention and gas pains. A warm bath or shower will often help with abdominal  cramping.                                                                                            __x__   Shara Han may return to your normal routine tomorrow, according to how you feel        and depending on your doctors instructions. Be sure to call your doctor to make  an appointment for a post-surgery check-up on the date your doctor has   requested. __x__ C     Rectal bleeding or spotting in small amounts may occur with the first bowel   movement following a colonoscopy or sigmoidoscopy. __x__  Shara Han may experience a numbness or lack of sensation in throat. If present, do not     eat or drink. Before eating, test your ability to drink with small sips of water. Y     You may try clear liquids or soups. If you tolerate these, you may then eat solid     food which is not greasy or spicy. __x__ C     IF POLYPS REMOVED: Avoid any blood thinning medication such as plavix,   aspirin or coumadin  NSAIDS (like advil or alleve) for 7 days. __x__  Notify your physician if you cough or vomit blood or experience chest pain.            Your biopsy or testing result should be available in 7-10 days Prescription will be electronically sent to your pharmacy you must     let your nurse know your pharmacy:                                                                                                                                            [id number] [place] [address]. TO HELP ENSURE A SMOOTH RECOVERY,       IT IS IMPORTANT TO FOLLOW THEM. _x___Pamphlet /Educational Information provided for diagnostic findings     Additional education information can assessed at the sites below:   [employer]   http://www.digestive. niddk.nih.gov/ddiseases/a-z.asp      Web MD patient information                                                                                                Signature of individual giving instruction :   Date: 2017                                                                                                                                [employer] Activation    Thank you for requesting access to [employer]. Please follow the instructions below to securely access and download your online medical record. [employer] allows you to send messages to your doctor, view your test results, renew your prescriptions, schedule appointments, and more. How Do I Sign Up? 1. In your internet browser, go to [email or web address]  2. Click on the First Time User? Click Here link in the Sign In box. You will be redirect to the New Member Sign Up page. 3. Enter your [employer] Access Code exactly as it appears below. You will not need to use this code after youve completed the sign-up process. If you do not sign up before the expiration date, you must request a new code. [employer] Access Code: [email or web address]  Expires: 2017  8:36 AM (This is the date your [employer] access code will )    4.  Enter the last four digits of your Social Security Number (xxxx) and Date of Birth (mm/dd/yyyy) as indicated and click Submit. You will be taken to the next sign-up page. 5. Create a [employer] ID. This will be your [employer] login ID and cannot be changed, so think of one that is secure and easy to remember. 6. Create a [employer] password. You can change your password at any time. 7. Enter your Password Reset Question and Answer. This can be used at a later time if you forget your password. 8. Enter your e-mail address. You will receive e-mail notification when new information is available in [address] 9. Click Sign Up. You can now view and download portions of your medical record. 10. Click the Download Summary menu link to download a portable copy of your medical information. Additional Information    If you have questions, please visit the Frequently Asked Questions section of the [employer] website at [email or web address] Remember, [employer] is NOT to be used for urgent needs. For medical emergencies, dial 911.

## 2017-09-25 NOTE — ANESTHESIA POSTPROCEDURE EVALUATION
Post-Anesthesia Evaluation and Assessment    Patient: Aneesh Abel MRN: [id number]  SSN: [id number]    YOB: 1940  Age: 77 y.o. Sex: female       Cardiovascular Function/Vital Signs  Visit Vitals    /74    Pulse 80    Temp 36.6 °C (97.8 °F)    Resp 8    Ht 5' (1.524 m)    Wt 82.6 kg (182 lb)    SpO2 97%    Breastfeeding No    BMI 35.54 kg/m2       Patient is status post general, total IV anesthesia anesthesia for Procedure(s):  ESOPHAGOGASTRODUODENOSCOPY (EGD)  ESOPHAGOGASTRODUODENAL (EGD) BIOPSY. Nausea/Vomiting: None    Postoperative hydration reviewed and adequate. Pain:  Pain Scale 1: Numeric (0 - 10) (09/25/17 1110)  Pain Intensity 1: 0 (09/25/17 1110)   Managed    Neurological Status: At baseline    Mental Status and Level of Consciousness: Arousable    Pulmonary Status:   O2 Device: Room air (09/25/17 1110)   Adequate oxygenation and airway patent    Complications related to anesthesia: None    Post-anesthesia assessment completed.  No concerns    Signed By: Sailaja Caldwell MD     September 25, 2017

## 2017-09-25 NOTE — H&P
G I Procedure Note           Endoscopy History and Physical               Dr. Good [address]  [facility] [phone]  [id number]    1940  77 y.o.  female      Date of Procedure:   Preoperative Diagnosis:       Procedure:    9/25/2017           ABD PAIN                              Procedure(s):  ESOPHAGOGASTRODUODENOSCOPY (EGD)      Gastroenterologist:  Anesthesia:           Lakeshia Jasso MD                               MAC            History and procedure indication:  Shemar Hammer is a 77 y.o. BLACK OR  female who presents with: ABD PAIN   including the additional history of Altered Bowel Habits and Abdominal Pain ,,Abdominal pain, epigastric,  Abdominal pain, generalized      Past Medical History:   Diagnosis Date    Anal polyp 6/13/2013    Arthritis     Asthma     Cataract     Chronic pain     knee - right/back    Diabetes ([address]     GERD (gastroesophageal reflux disease)     Hemorrhoids 6/13/2013    History of kidney stones     Hypertension     Ill-defined condition     abdominal pain and burning    Other ill-defined conditions     high cholesterol      Prior to Admission medications    Medication Sig Start Date End Date Taking? Authorizing Provider   Omeprazole delayed release (PRILOSEC D/R) 20 mg tablet Take 1 Tab by mouth daily. 9/12/17   Orion David MD   sucralfate (CARAFATE) 1 gram tablet Take 1 Tab by mouth four (4) times daily. 9/12/17   Orion David MD   linaclotide Leane Drummer) 145 mcg cap capsule Take 145 mcg by mouth Daily (before breakfast). Historical Provider   albuterol (PROVENTIL HFA, VENTOLIN HFA, PROAIR HFA) 90 mcg/actuation inhaler Take 2 Puffs by inhalation every six (6) hours as needed for Wheezing.     Historical Provider   oxyCODONE IR (ROXICODONE) 20 mg immediate release tablet Take 20 mg by mouth every six (6) hours as needed. Historical Provider   brimonidine-timolol (COMBIGAN) 0.2-0.5 % drop ophthalmic solution Administer 1 Drop to right eye nightly. Historical Provider   Dexlansoprazole (DEXILANT) 60 mg CpDB Take 60 mg by mouth Daily (before breakfast). Historical Provider   naloxone [facility]) 4 mg/actuation nasal spray 1 [place] by IntraNASal route once as needed. Use 1 spray intranasally into 1 nostril. Use a new Narcan nasal spray for subsequent doses and administer into alternating nostrils. May repeat every 2 to 3 minutes as needed. Historical Provider   nitroglycerin (NITROSTAT) 0.4 mg SL tablet 0.4 mg by SubLINGual route every five (5) minutes as needed for Chest Pain. Historical Provider   acetaminophen (TYLENOL) 500 mg tablet Take 1,000 mg by mouth every six (6) hours as needed for Pain. Historical Provider   TRAVOPROST (TRAVATAN Z OP) Administer 1 Drop to both eyes nightly. Historical Provider   amLODIPine (NORVASC) 10 mg tablet Take 10 mg by mouth daily. 2/12/15   Historical Provider   fosinopril (MONOPRIL) 20 mg tablet Take 20 mg by mouth daily. 2/12/15   Historical Provider   ADVAIR DISKUS 100-50 mcg/dose diskus inhaler 1 puff two (2) times a day. 10/31/13   Historical Provider   Insulin Glargine (LANTUS SOLOSTAR) 100 unit/mL (3 mL) flexpen 20 Units by SubCUTAneous route two (2) times a day.  Indications: DIABETES MELLITUS    Historical Provider     Allergies   Allergen Reactions    Seafood [Shellfish Containing Products] Swelling       Past Surgical History:   Procedure Laterality Date    COLONOSCOPY  2/28/2013         EGD  2/28/2013         HX CATARACT REMOVAL Bilateral     HX CHOLECYSTECTOMY  6-2015    HX GI  6/27/13    anal polyps removed    HX HEENT      laser surgery for blood clot in right eye    HX KNEE REPLACEMENT      right    HX OTHER SURGICAL  4-2-14    lap gastrotomy and removal of polyp -  - not cancerous per pt    HX ALAN AND BSO      HX TONSILLECTOMY  HX UROLOGICAL      \"laser\" surgery for kidney stone    NEUROLOGICAL PROCEDURE UNLISTED  1990    tumor at back of neck, benign     Family History   Problem Relation Age of Onset    Cancer Mother      colon    Diabetes Brother     Other Sister      GI BLEED    Heart Disease Brother     Heart Attack Brother     Heart Disease Brother     Heart Disease Brother     Anesth Problems Neg Hx       Social History   Substance Use Topics    Smoking status: Former Smoker    Smokeless tobacco: Never Used      Comment: age 13    Alcohol use No                                                      PHYSICAL EXAM   There were no vitals taken for this visit. General appearance:  alert, well appearing, and in no distress  Mental status:  normal mood, behavior, speech, dress, motor activity and thought processes  Nose:      normal and patent, no erythema, discharge or polyps  Mouth:- mucous membranes moist, pharynx normal without lesions                  [x]  No Loose teeth      []    Loose teeth  Finger opening:  []1     []1.5    [] 2     [] 2.5     [x] 3      [] 3.5     [] 4   Mallampati:         [] Class 1     [x] Class 2    [] Class 3      [] Class 4      Neck - supple,      [x] Full ROM [] Decreased ROM  [] Short Neck no significant adenopathy    Chest - clear to auscultation, no wheezes, rales or rhonchi, symmetric air entry  Heart: normal rate, regular rhythm, normal S1, S2, no murmurs, rubs, clicks or gallops  Abdomen: abdomen soft, bowel sounds  [x] normal  [] increased  [] hypoactive                       [] no tenderness  [] epigastric tenderness  [] LLQ tenderness   [] RLQ tenderness                      No masses, organomegaly or guarding. Rectal exam: negative without mass, lesions or tenderness  Extremities: peripheral pulses normal, no pedal edema, no clubbing or cyanosis  Neurologic: Alert and oriented to person, place, and time; normal strength and tone.                          Normal symmetric reflexes  Normal gait:                                      Assessement:                                 Pre op dx:  ABD PAIN   Additional medical problems list below   Patient Active Problem List   Diagnosis Code    Hemorrhoids K64.9    Anal polyp K62.0    Chest pain, unspecified R07.9    Gastric polyp K31.7    Leukocytosis, unspecified D72.829    DM (diabetes mellitus) ([facility] E11.9    HTN (hypertension) I10    Arthritis M19.90    Pancreatitis, acute K85.90    Epigastric abdominal pain R10.13                                                                                         This note documentation was performed prior to this planned procedure       after a history and physical was performed in the office. Date: 5 5 17                    Pre Procedure Evaluation (per anesthesia or per h&p)                                                Sedation/Assessment:                                                                                               Mallampati Classification                            []Class 1                    []Class 2                    [] Class 3                  [] Class 4                                              ASA classfication         []     Class I: Normally healthy         []     Class II: Patient with mild systemic disease (e.g. hypertension)         []     Class III: Patient with severe systemic disease (e.g. CHF), non-decompensated         []     Class IV: Patient with severe systemic disease, decompensated         []     Class V: Moribund patient, survival unlikely                     Plan:  [x]  Egd                                 [] Colonoscopy                               [] with Moderate Sedation /Conscious Sedation                                 [x] MAC          Patient stable for planned procedure. See orders.      Angela Espinoza MD

## 2017-10-03 ENCOUNTER — HOSPITAL ENCOUNTER (OUTPATIENT)
Dept: CARDIAC CATH/INVASIVE PROCEDURES | Age: 77
Discharge: HOME | End: 2017-10-03
Attending: INTERNAL MEDICINE | Admitting: INTERNAL MEDICINE
Payer: MEDICARE

## 2017-10-03 VITALS
TEMPERATURE: 98.7 F | BODY MASS INDEX: 32.07 KG/M2 | DIASTOLIC BLOOD PRESSURE: 68 MMHG | HEIGHT: 63 IN | OXYGEN SATURATION: 100 % | HEART RATE: 98 BPM | RESPIRATION RATE: 20 BRPM | WEIGHT: 181 LBS | SYSTOLIC BLOOD PRESSURE: 142 MMHG

## 2017-10-03 LAB
GLUCOSE BLD STRIP.AUTO-MCNC: 225 MG/DL (ref 65–100)
SERVICE CMNT-IMP: ABNORMAL

## 2017-10-03 PROCEDURE — 82962 GLUCOSE BLOOD TEST: CPT

## 2017-10-03 PROCEDURE — 74011000250 HC RX REV CODE- 250: Performed by: INTERNAL MEDICINE

## 2017-10-03 PROCEDURE — 77030004533 HC CATH ANGI DX IMP BSC -B

## 2017-10-03 PROCEDURE — 74011250636 HC RX REV CODE- 250/636: Performed by: INTERNAL MEDICINE

## 2017-10-03 PROCEDURE — 93458 L HRT ARTERY/VENTRICLE ANGIO: CPT

## 2017-10-03 PROCEDURE — 74011636320 HC RX REV CODE- 636/320: Performed by: INTERNAL MEDICINE

## 2017-10-03 PROCEDURE — C1887 CATHETER, GUIDING: HCPCS

## 2017-10-03 PROCEDURE — 77030013744

## 2017-10-03 PROCEDURE — C1894 INTRO/SHEATH, NON-LASER: HCPCS

## 2017-10-03 PROCEDURE — C1769 GUIDE WIRE: HCPCS

## 2017-10-03 PROCEDURE — C1760 CLOSURE DEV, VASC: HCPCS

## 2017-10-03 PROCEDURE — 77030013715 HC INFL SYS MRTM -B

## 2017-10-03 RX ORDER — SODIUM CHLORIDE 0.9 % (FLUSH) 0.9 %
5-10 SYRINGE (ML) INJECTION AS NEEDED
Status: DISCONTINUED | OUTPATIENT
Start: 2017-10-03 | End: 2017-10-03

## 2017-10-03 RX ORDER — HEPARIN SODIUM 200 [USP'U]/100ML
1000 INJECTION, SOLUTION INTRAVENOUS AS NEEDED
Status: DISCONTINUED | OUTPATIENT
Start: 2017-10-03 | End: 2017-10-03

## 2017-10-03 RX ORDER — HEPARIN SODIUM 1000 [USP'U]/ML
1000-5000 INJECTION, SOLUTION INTRAVENOUS; SUBCUTANEOUS AS NEEDED
Status: DISCONTINUED | OUTPATIENT
Start: 2017-10-03 | End: 2017-10-03

## 2017-10-03 RX ORDER — SODIUM CHLORIDE 9 MG/ML
1.5 INJECTION, SOLUTION INTRAVENOUS CONTINUOUS
Status: DISPENSED | OUTPATIENT
Start: 2017-10-03 | End: 2017-10-03

## 2017-10-03 RX ORDER — HYDRALAZINE HYDROCHLORIDE 20 MG/ML
20 INJECTION INTRAMUSCULAR; INTRAVENOUS ONCE
Status: COMPLETED | OUTPATIENT
Start: 2017-10-03 | End: 2017-10-03

## 2017-10-03 RX ORDER — VERAPAMIL HYDROCHLORIDE 2.5 MG/ML
2.5-5 INJECTION, SOLUTION INTRAVENOUS
Status: DISCONTINUED | OUTPATIENT
Start: 2017-10-03 | End: 2017-10-03

## 2017-10-03 RX ORDER — SODIUM CHLORIDE 0.9 % (FLUSH) 0.9 %
10 SYRINGE (ML) INJECTION AS NEEDED
Status: DISCONTINUED | OUTPATIENT
Start: 2017-10-03 | End: 2017-10-03

## 2017-10-03 RX ORDER — HEPARIN SODIUM 1000 [USP'U]/ML
10000 INJECTION, SOLUTION INTRAVENOUS; SUBCUTANEOUS
Status: DISCONTINUED | OUTPATIENT
Start: 2017-10-03 | End: 2017-10-03

## 2017-10-03 RX ORDER — METOPROLOL TARTRATE 25 MG/1
12.5 TABLET, FILM COATED ORAL 2 TIMES DAILY
COMMUNITY
End: 2017-10-27

## 2017-10-03 RX ORDER — MIDAZOLAM HYDROCHLORIDE 1 MG/ML
.5-1 INJECTION, SOLUTION INTRAMUSCULAR; INTRAVENOUS
Status: DISCONTINUED | OUTPATIENT
Start: 2017-10-03 | End: 2017-10-03

## 2017-10-03 RX ORDER — PRASUGREL 10 MG/1
10-60 TABLET, FILM COATED ORAL AS NEEDED
Status: DISCONTINUED | OUTPATIENT
Start: 2017-10-03 | End: 2017-10-03

## 2017-10-03 RX ORDER — HYDROCORTISONE SODIUM SUCCINATE 100 MG/2ML
100 INJECTION, POWDER, FOR SOLUTION INTRAMUSCULAR; INTRAVENOUS ONCE
Status: COMPLETED | OUTPATIENT
Start: 2017-10-03 | End: 2017-10-03

## 2017-10-03 RX ORDER — CLOPIDOGREL 300 MG/1
600 TABLET, FILM COATED ORAL AS NEEDED
Status: DISCONTINUED | OUTPATIENT
Start: 2017-10-03 | End: 2017-10-03

## 2017-10-03 RX ORDER — SODIUM CHLORIDE 9 MG/ML
3 INJECTION, SOLUTION INTRAVENOUS CONTINUOUS
Status: DISPENSED | OUTPATIENT
Start: 2017-10-03 | End: 2017-10-03

## 2017-10-03 RX ORDER — DIPHENHYDRAMINE HYDROCHLORIDE 50 MG/ML
25-50 INJECTION, SOLUTION INTRAMUSCULAR; INTRAVENOUS ONCE
Status: COMPLETED | OUTPATIENT
Start: 2017-10-03 | End: 2017-10-03

## 2017-10-03 RX ORDER — VERAPAMIL HYDROCHLORIDE 2.5 MG/ML
2.5 INJECTION, SOLUTION INTRAVENOUS
Status: DISCONTINUED | OUTPATIENT
Start: 2017-10-03 | End: 2017-10-03

## 2017-10-03 RX ORDER — ATROPINE SULFATE 0.1 MG/ML
1 INJECTION INTRAVENOUS AS NEEDED
Status: DISCONTINUED | OUTPATIENT
Start: 2017-10-03 | End: 2017-10-03

## 2017-10-03 RX ORDER — SODIUM CHLORIDE 9 MG/ML
1.5 INJECTION, SOLUTION INTRAVENOUS CONTINUOUS
Status: DISCONTINUED | OUTPATIENT
Start: 2017-10-03 | End: 2017-10-03

## 2017-10-03 RX ORDER — FENTANYL CITRATE 50 UG/ML
25-200 INJECTION, SOLUTION INTRAMUSCULAR; INTRAVENOUS
Status: DISCONTINUED | OUTPATIENT
Start: 2017-10-03 | End: 2017-10-03

## 2017-10-03 RX ORDER — ACETAMINOPHEN 325 MG/1
650 TABLET ORAL
Status: DISCONTINUED | OUTPATIENT
Start: 2017-10-03 | End: 2017-10-03 | Stop reason: HOSPADM

## 2017-10-03 RX ORDER — FENTANYL CITRATE 50 UG/ML
25-100 INJECTION, SOLUTION INTRAMUSCULAR; INTRAVENOUS
Status: DISCONTINUED | OUTPATIENT
Start: 2017-10-03 | End: 2017-10-03

## 2017-10-03 RX ORDER — ROSUVASTATIN CALCIUM 10 MG/1
10 TABLET, COATED ORAL
COMMUNITY

## 2017-10-03 RX ORDER — LIDOCAINE HYDROCHLORIDE 10 MG/ML
10-30 INJECTION INFILTRATION; PERINEURAL ONCE
Status: COMPLETED | OUTPATIENT
Start: 2017-10-03 | End: 2017-10-03

## 2017-10-03 RX ORDER — LIDOCAINE HYDROCHLORIDE 10 MG/ML
10 INJECTION INFILTRATION; PERINEURAL
Status: DISCONTINUED | OUTPATIENT
Start: 2017-10-03 | End: 2017-10-03

## 2017-10-03 RX ORDER — MIDAZOLAM HYDROCHLORIDE 1 MG/ML
.5-5 INJECTION, SOLUTION INTRAMUSCULAR; INTRAVENOUS
Status: DISCONTINUED | OUTPATIENT
Start: 2017-10-03 | End: 2017-10-03

## 2017-10-03 RX ORDER — NITROGLYCERIN 0.4 MG/1
0.4 TABLET SUBLINGUAL AS NEEDED
Status: DISCONTINUED | OUTPATIENT
Start: 2017-10-03 | End: 2017-10-03

## 2017-10-03 RX ADMIN — HYDROCORTISONE SODIUM SUCCINATE 100 MG: 100 INJECTION, POWDER, FOR SOLUTION INTRAMUSCULAR; INTRAVENOUS at 13:07

## 2017-10-03 RX ADMIN — MIDAZOLAM HYDROCHLORIDE 2 MG: 1 INJECTION, SOLUTION INTRAMUSCULAR; INTRAVENOUS at 13:35

## 2017-10-03 RX ADMIN — DIPHENHYDRAMINE HYDROCHLORIDE 25 MG: 50 INJECTION, SOLUTION INTRAMUSCULAR; INTRAVENOUS at 13:07

## 2017-10-03 RX ADMIN — IOPAMIDOL 90 ML: 755 INJECTION, SOLUTION INTRAVENOUS at 13:54

## 2017-10-03 RX ADMIN — MIDAZOLAM HYDROCHLORIDE 2 MG: 1 INJECTION, SOLUTION INTRAMUSCULAR; INTRAVENOUS at 13:56

## 2017-10-03 RX ADMIN — SODIUM CHLORIDE 3 ML/KG/HR: 900 INJECTION, SOLUTION INTRAVENOUS at 13:03

## 2017-10-03 RX ADMIN — FENTANYL CITRATE 50 MCG: 50 INJECTION, SOLUTION INTRAMUSCULAR; INTRAVENOUS at 13:25

## 2017-10-03 RX ADMIN — HEPARIN SODIUM 5000 UNITS: 1000 INJECTION, SOLUTION INTRAVENOUS; SUBCUTANEOUS at 13:50

## 2017-10-03 RX ADMIN — FENTANYL CITRATE 25 MCG: 50 INJECTION, SOLUTION INTRAMUSCULAR; INTRAVENOUS at 13:35

## 2017-10-03 RX ADMIN — HEPARIN SODIUM 2000 UNITS: 200 INJECTION, SOLUTION INTRAVENOUS at 13:25

## 2017-10-03 RX ADMIN — MIDAZOLAM HYDROCHLORIDE 1 MG: 1 INJECTION, SOLUTION INTRAMUSCULAR; INTRAVENOUS at 13:30

## 2017-10-03 RX ADMIN — SODIUM CHLORIDE 1.5 ML/KG/HR: 900 INJECTION, SOLUTION INTRAVENOUS at 14:00

## 2017-10-03 RX ADMIN — MIDAZOLAM HYDROCHLORIDE 2 MG: 1 INJECTION, SOLUTION INTRAMUSCULAR; INTRAVENOUS at 13:25

## 2017-10-03 RX ADMIN — FENTANYL CITRATE 25 MCG: 50 INJECTION, SOLUTION INTRAMUSCULAR; INTRAVENOUS at 13:30

## 2017-10-03 RX ADMIN — HYDRALAZINE HYDROCHLORIDE 20 MG: 20 INJECTION INTRAMUSCULAR; INTRAVENOUS at 14:26

## 2017-10-03 RX ADMIN — LIDOCAINE HYDROCHLORIDE 10 ML: 10 INJECTION, SOLUTION INFILTRATION; PERINEURAL at 13:34

## 2017-10-03 RX ADMIN — FENTANYL CITRATE 50 MCG: 50 INJECTION, SOLUTION INTRAMUSCULAR; INTRAVENOUS at 13:56

## 2017-10-03 NOTE — PROCEDURES
Cardiac Catheterization Procedure Note   Patient: Coleman November  MRN: [id number]  SSN: [id number]   YOB: 1940 Age: 77 y.o.   Sex: female    Date of Procedure: 10/3/2017   Pre-procedure Diagnosis: Chest pain CCS Class III and Shortness of Breath  Post-procedure Diagnosis: Coronary Artery Disease  Procedure: Left Heart Cath and iFR of mid RCA  :  Dr. Manfred Severs, MD    Assistant(s):  None  Anesthesia: Moderate Sedation   Estimated Blood Loss: Less than 10 mL   Specimens Removed: None  Findings: normal LCA, 50-60% prox/mid RCA not significant by iFR, mid-distal PDA stenosis, LV EF 70%, mild to mod increase in LV filling pressure  Complications: None   Implants:  None  Signed by:  Manfred Severs, MD  10/3/2017  2:04 PM

## 2017-10-03 NOTE — PROGRESS NOTES
Cardiac Cath Lab Recovery Arrival Note:      Ilan Serrano arrived to Cardiac Cath Lab, Recovery Area. Staff introduced to patient. Patient identifiers verified with NAME and DATE OF BIRTH. Procedure verified with patient. Consent forms reviewed and signed by patient or authorized representative and verified. Allergies verified. Patient and family oriented to department. Patient and family informed of procedure and plan of care. Questions answered with review. Patient prepped for procedure, per orders from physician, prior to arrival.    Patient on cardiac monitor, non-invasive blood pressure, SPO2 monitor. On room air. Patient is A&Ox 4. Patient reports right knee pain, denies chest pain. Patient in stretcher, in low position, with side rails up, call bell within reach, patient instructed to call if assistance as needed. Patient prep in: [address], [place].    Patient family has pager # [phone]  Family in: outside hospital.   Prep by: Stephanie Hernandez RN

## 2017-10-03 NOTE — PROGRESS NOTES
TRANSFER - IN REPORT:    Verbal report received from [place] [id number] on Wicho Urena  being received from procedure for routine progression of care. Report consisted of patients Situation, Background, Assessment and Recommendations(SBAR). Information from the following report(s) Procedure Summary, MAR, Recent Results and Med Rec Status was reviewed with the receiving clinician. Opportunity for questions and clarification was provided. Assessment completed upon patients arrival to [address] [place] [address] and care assumed. Cardiac Cath Lab Recovery Arrival Note:    Wicho Urena arrived to [facility] recovery area. Patient procedure= LHC. Patient on cardiac monitor, non-invasive blood pressure, SPO2 monitor. On  O2 @ 2 lpm via n/c. IV  of nacl on pump at 123 ml/hr. Patient status doing well without problems. Patient is A&Ox 4, but sleepy. Patient reports no complaints. PROCEDURE SITE CHECK:    Procedure site:without any bleeding and or hematoma, no pain/discomfort reported at procedure site. No change in patient status. Continue to monitor patient and status. Dr Tone Tavares talked with cameron and pt.

## 2017-10-03 NOTE — IP AVS SNAPSHOT
[address] 
[phone] Patient: Fadi Phoenix MRN: [id number] :1940 You are allergic to the following Allergen Reactions Seafood (Shellfish Containing Products) Swelling Recent Documentation Height Weight Breastfeeding? BMI OB Status Smoking Status 1.6 m 82.1 kg No 32.06 kg/m2 Hysterectomy Former Smoker Emergency Contacts Name Discharge Info Relation Home Work Mobile Ellen Rice DISCHARGE CAREGIVER [3] Other Relative [6] [phone] About your hospitalization You were admitted on:  October 3, 2017 You last received care in the:  [address] CATH LAB You were discharged on:  October 3, 2017 Unit phone number:  [phone] Why you were hospitalized Your primary diagnosis was:  Not on File Providers Seen During Your Hospitalizations Provider Role Specialty Primary office phone Dayna Bernardo MD Attending Provider Cardiology [phone] Your Primary Care Physician (PCP) Primary Care Physician Office Phone Office Fax Joseel Record A [phone] Follow-up Information Follow up With Details Comments Contact Info Flossie Goldberg, MD   [address] 
 
[facility] [address] [id number] 
[phone] Dayna Bernardo MD Schedule an appointment as soon as possible for a visit in 3 months  [address] [facility] [address] [place] [address] 
[phone] Current Discharge Medication List  
  
CONTINUE these medications which have CHANGED Dose & Instructions Dispensing Information Comments Morning Noon Evening Bedtime  
 sucralfate 1 gram tablet Commonly known as:  Steve Ruiz What changed:  Another medication with the same name was removed. Continue taking this medication, and follow the directions you see here. Your last dose was: Your next dose is:    
   
   
 Dose:  1 g Take 1 Tab by mouth four (4) times daily. Quantity:  30 Tab Refills:  0 CONTINUE these medications which have NOT CHANGED Dose & Instructions Dispensing Information Comments Morning Noon Evening Bedtime  
 acetaminophen 500 mg tablet Commonly known as:  TYLENOL Your last dose was: Your next dose is:    
   
   
 Dose:  1000 mg Take 1,000 mg by mouth every six (6) hours as needed for Pain. Refills:  0 ADVAIR DISKUS 100-50 mcg/dose diskus inhaler Generic drug:  fluticasone-salmeterol Your last dose was: Your next dose is:    
   
   
 Dose:  1 Puff Take 1 Puff by inhalation every twelve (12) hours. Refills:  0  
     
   
   
   
  
 albuterol 90 mcg/actuation inhaler Commonly known as:  PROVENTIL HFA, VENTOLIN HFA, PROAIR HFA Your last dose was: Your next dose is:    
   
   
 Dose:  2 Puff Take 2 Puffs by inhalation every six (6) hours as needed for Wheezing. Refills:  0  
     
   
   
   
  
 amLODIPine 10 mg tablet Commonly known as:  Shireen Verma Your last dose was: Your next dose is:    
   
   
 Dose:  10 mg Take 10 mg by mouth daily. Refills:  0  
     
   
   
   
  
 COMBIGAN 0.2-0.5 % Drop ophthalmic solution Generic drug:  brimonidine-timolol Your last dose was: Your next dose is:    
   
   
 Dose:  1 Drop Administer 1 Drop to right eye nightly. Refills:  0 DEXILANT 60 mg Cpdb Generic drug:  Dexlansoprazole Your last dose was: Your next dose is:    
   
   
 Dose:  60 mg Take 60 mg by mouth Daily (before breakfast). Refills:  0  
     
   
   
   
  
 fosinopril 20 mg tablet Commonly known as:  MONOPRIL Your last dose was: Your next dose is:    
   
   
 Dose:  20 mg Take 20 mg by mouth daily. Refills:  0 LANTUS SOLOSTAR 100 unit/mL (3 mL) Inpn Generic drug:  insulin glargine Your last dose was: Your next dose is:    
   
   
 Dose:  20 Units 20 Units by SubCUTAneous route two (2) times a day. Pt only took 10 units this am with a b/s of 200 this AM  Indications: Diabetes Mellitus Refills:  0  
     
   
   
   
  
 linaclotide 145 mcg Cap capsule Commonly known as:  Figueroa Garrison Your last dose was: Your next dose is:    
   
   
 Dose:  145 mcg Take 145 mcg by mouth Daily (before breakfast). Refills:  0  
     
   
   
   
  
 metoprolol tartrate 25 mg tablet Commonly known as:  LOPRESSOR Your last dose was: Your next dose is:    
   
   
 Dose:  12.5 mg Take 12.5 mg by mouth two (2) times a day. Refills:  0 NARCAN 4 mg/actuation nasal spray Generic drug:  naloxone Your last dose was: Your next dose is:    
   
   
 Dose:  1 Spray 1 [place] by IntraNASal route once as needed. Use 1 spray intranasally into 1 nostril. Use a new Narcan nasal spray for subsequent doses and administer into alternating nostrils. May repeat every 2 to 3 minutes as needed. Refills:  0  
     
   
   
   
  
 nitroglycerin 0.4 mg SL tablet Commonly known as:  NITROSTAT Your last dose was: Your next dose is:    
   
   
 Dose:  0.4 mg  
0.4 mg by SubLINGual route every five (5) minutes as needed for Chest Pain. Refills:  0 Omeprazole delayed release 20 mg tablet Commonly known as:  PRILOSEC D/R Your last dose was: Your next dose is:    
   
   
 Dose:  20 mg Take 1 Tab by mouth daily. Quantity:  90 Tab Refills:  0  
     
   
   
   
  
 oxyCODONE IR 20 mg immediate release tablet Commonly known as:  [place] Gun Your last dose was: Your next dose is:    
   
   
 Dose:  20 mg Take 20 mg by mouth every six (6) hours as needed. Refills:  0 rosuvastatin 10 mg tablet Commonly known as:  CRESTOR Your last dose was: Your next dose is:    
   
   
 Dose:  10 mg Take 10 mg by mouth nightly. Refills:  0 STOP taking these medications TRAVATAN Z OP Discharge Instructions None Discharge Orders None Introducing [facility]! [facility] introduces [employer] patient portal. Now you can access parts of your medical record, email your doctor's office, and request medication refills online. 1. In your internet browser, go to [email or web address] 2. Click on the First Time User? Click Here link in the Sign In box. You will see the New Member Sign Up page. 3. Enter your [employer] Access Code exactly as it appears below. You will not need to use this code after youve completed the sign-up process. If you do not sign up before the expiration date, you must request a new code. · [employer] Access Code: [email or web address] Expires: 12/24/2017  8:36 AM 
 
4. Enter the last four digits of your Social Security Number (xxxx) and Date of Birth (mm/dd/yyyy) as indicated and click Submit. You will be taken to the next sign-up page. 5. Create a [employer] ID. This will be your [employer] login ID and cannot be changed, so think of one that is secure and easy to remember. 6. Create a [employer] password. You can change your password at any time. 7. Enter your Password Reset Question and Answer. This can be used at a later time if you forget your password. 8. Enter your e-mail address. You will receive e-mail notification when new information is available in [address] 9. Click Sign Up. You can now view and download portions of your medical record. 10. Click the Download Summary menu link to download a portable copy of your medical information.  
 
If you have questions, please visit the Frequently Asked Questions section of the [employer]. Remember, MyChart is NOT to be used for urgent needs. For medical emergencies, dial 911. Now available from your iPhone and Android! General Information Please provide this summary of care documentation to your next provider. Patient Signature:  ____________________________________________________________ Date:  ____________________________________________________________  
  
Joelene Batman Provider Signature:  ____________________________________________________________ Date:  ____________________________________________________________

## 2017-10-03 NOTE — PROGRESS NOTES
Cardiac Cath Lab Procedure Area Arrival Note:    Marcella Swan arrived to Cardiac Cath Lab, Procedure Area. Patient identifiers verified with NAME and DATE OF BIRTH. Procedure verified with patient. Consent forms verified. Allergies verified. Patient informed of procedure and plan of care. Questions answered with review. Patient voiced understanding of procedure and plan of care. Patient on cardiac monitor, non-invasive blood pressure, SPO2 monitor. On room air then placed on O2 @ 2 lpm via NC.  IV of normal saline on pump at 246 ml/hr. Patient status doing well without problems. Patient is A&Ox 4. Patient reports 9/10 right knee pain. Patient medicated during procedure with orders obtained and verified by Dr. Remy Rodarte. Refer to patients Cardiac Cath Lab PROCEDURE REPORT for vital signs, assessment, status, and response during procedure, printed at end of case. Printed report on chart or scanned into chart.

## 2017-10-03 NOTE — PROGRESS NOTES
Discharge instructions reviewed with pt and and her niece. Niece will come back around 5 pm to take her home.

## 2017-10-03 NOTE — PROGRESS NOTES
Tolerated food and drink. Ambulated 100 ft, gait steady. Voided. Back to stretcher  Right groin dsg D&I  Assisted with dressing, reviewed d/c instructions. 1520 discharged via w/c with niece, hard copy of instructions and belongings with pt.

## 2017-10-26 ENCOUNTER — HOSPITAL ENCOUNTER (EMERGENCY)
Age: 77
Discharge: HOME | End: 2017-10-26
Attending: EMERGENCY MEDICINE
Payer: MEDICARE

## 2017-10-26 VITALS
SYSTOLIC BLOOD PRESSURE: 167 MMHG | TEMPERATURE: 98.4 F | WEIGHT: 174 LBS | HEIGHT: 60 IN | OXYGEN SATURATION: 98 % | BODY MASS INDEX: 34.16 KG/M2 | DIASTOLIC BLOOD PRESSURE: 69 MMHG | HEART RATE: 103 BPM | RESPIRATION RATE: 22 BRPM

## 2017-10-26 DIAGNOSIS — R52 BURNING PAIN: Primary | ICD-10-CM

## 2017-10-26 DIAGNOSIS — R10.13 ABDOMINAL PAIN, EPIGASTRIC: ICD-10-CM

## 2017-10-26 LAB
ALBUMIN SERPL-MCNC: 3.2 G/DL (ref 3.5–5)
ALBUMIN/GLOB SERPL: 0.8 {RATIO} (ref 1.1–2.2)
ALP SERPL-CCNC: 131 U/L (ref 45–117)
ALT SERPL-CCNC: 20 U/L (ref 12–78)
ANION GAP SERPL CALC-SCNC: 6 MMOL/L (ref 5–15)
AST SERPL-CCNC: 7 U/L (ref 15–37)
ATRIAL RATE: 103 BPM
BASOPHILS # BLD: 0 K/UL (ref 0–0.1)
BASOPHILS NFR BLD: 0 % (ref 0–1)
BILIRUB SERPL-MCNC: 0.2 MG/DL (ref 0.2–1)
BUN SERPL-MCNC: 14 MG/DL (ref 6–20)
BUN/CREAT SERPL: 14 (ref 12–20)
CALCIUM SERPL-MCNC: 9 MG/DL (ref 8.5–10.1)
CALCULATED P AXIS, ECG09: 74 DEGREES
CALCULATED R AXIS, ECG10: 63 DEGREES
CALCULATED T AXIS, ECG11: 59 DEGREES
CHLORIDE SERPL-SCNC: 102 MMOL/L (ref 97–108)
CK MB CFR SERPL CALC: 0.7 % (ref 0–2.5)
CK MB SERPL-MCNC: 1.3 NG/ML (ref 5–25)
CK SERPL-CCNC: 180 U/L (ref 26–192)
CO2 SERPL-SCNC: 30 MMOL/L (ref 21–32)
CREAT SERPL-MCNC: 0.98 MG/DL (ref 0.55–1.02)
DIAGNOSIS, 93000: NORMAL
EOSINOPHIL # BLD: 0.1 K/UL (ref 0–0.4)
EOSINOPHIL NFR BLD: 1 % (ref 0–7)
ERYTHROCYTE [DISTWIDTH] IN BLOOD BY AUTOMATED COUNT: 15.5 % (ref 11.5–14.5)
GLOBULIN SER CALC-MCNC: 4 G/DL (ref 2–4)
GLUCOSE SERPL-MCNC: 240 MG/DL (ref 65–100)
HCT VFR BLD AUTO: 40.4 % (ref 35–47)
HGB BLD-MCNC: 12.5 G/DL (ref 11.5–16)
LIPASE SERPL-CCNC: 337 U/L (ref 73–393)
LYMPHOCYTES # BLD: 3.7 K/UL (ref 0.8–3.5)
LYMPHOCYTES NFR BLD: 24 % (ref 12–49)
MCH RBC QN AUTO: 26.5 PG (ref 26–34)
MCHC RBC AUTO-ENTMCNC: 30.9 G/DL (ref 30–36.5)
MCV RBC AUTO: 85.8 FL (ref 80–99)
MONOCYTES # BLD: 1.2 K/UL (ref 0–1)
MONOCYTES NFR BLD: 8 % (ref 5–13)
NEUTS SEG # BLD: 10.3 K/UL (ref 1.8–8)
NEUTS SEG NFR BLD: 67 % (ref 32–75)
P-R INTERVAL, ECG05: 120 MS
PLATELET # BLD AUTO: 333 K/UL (ref 150–400)
POTASSIUM SERPL-SCNC: 4.2 MMOL/L (ref 3.5–5.1)
PROT SERPL-MCNC: 7.2 G/DL (ref 6.4–8.2)
Q-T INTERVAL, ECG07: 348 MS
QRS DURATION, ECG06: 70 MS
QTC CALCULATION (BEZET), ECG08: 455 MS
RBC # BLD AUTO: 4.71 M/UL (ref 3.8–5.2)
SODIUM SERPL-SCNC: 138 MMOL/L (ref 136–145)
TROPONIN I SERPL-MCNC: <0.04 NG/ML
VENTRICULAR RATE, ECG03: 103 BPM
WBC # BLD AUTO: 15.4 K/UL (ref 3.6–11)

## 2017-10-26 PROCEDURE — 85025 COMPLETE CBC W/AUTO DIFF WBC: CPT | Performed by: EMERGENCY MEDICINE

## 2017-10-26 PROCEDURE — 74011250637 HC RX REV CODE- 250/637: Performed by: PHYSICIAN ASSISTANT

## 2017-10-26 PROCEDURE — 82550 ASSAY OF CK (CPK): CPT | Performed by: EMERGENCY MEDICINE

## 2017-10-26 PROCEDURE — 84484 ASSAY OF TROPONIN QUANT: CPT | Performed by: EMERGENCY MEDICINE

## 2017-10-26 PROCEDURE — 36415 COLL VENOUS BLD VENIPUNCTURE: CPT | Performed by: EMERGENCY MEDICINE

## 2017-10-26 PROCEDURE — 99282 EMERGENCY DEPT VISIT SF MDM: CPT

## 2017-10-26 PROCEDURE — 74011000250 HC RX REV CODE- 250: Performed by: PHYSICIAN ASSISTANT

## 2017-10-26 PROCEDURE — 80053 COMPREHEN METABOLIC PANEL: CPT | Performed by: EMERGENCY MEDICINE

## 2017-10-26 PROCEDURE — 93005 ELECTROCARDIOGRAM TRACING: CPT

## 2017-10-26 PROCEDURE — 96374 THER/PROPH/DIAG INJ IV PUSH: CPT

## 2017-10-26 PROCEDURE — 83690 ASSAY OF LIPASE: CPT | Performed by: EMERGENCY MEDICINE

## 2017-10-26 RX ORDER — FAMOTIDINE 10 MG/ML
20 INJECTION INTRAVENOUS
Status: COMPLETED | OUTPATIENT
Start: 2017-10-26 | End: 2017-10-26

## 2017-10-26 RX ORDER — SODIUM CHLORIDE 0.9 % (FLUSH) 0.9 %
5-10 SYRINGE (ML) INJECTION AS NEEDED
Status: DISCONTINUED | OUTPATIENT
Start: 2017-10-26 | End: 2017-10-26 | Stop reason: HOSPADM

## 2017-10-26 RX ORDER — SODIUM CHLORIDE 0.9 % (FLUSH) 0.9 %
5-10 SYRINGE (ML) INJECTION EVERY 8 HOURS
Status: DISCONTINUED | OUTPATIENT
Start: 2017-10-26 | End: 2017-10-26 | Stop reason: HOSPADM

## 2017-10-26 RX ADMIN — PHENOBARBITAL ELIXIR 50 ML: 16.2; .1037; .0065; .0194 ELIXIR ORAL at 18:07

## 2017-10-26 RX ADMIN — FAMOTIDINE 20 MG: 10 INJECTION INTRAVENOUS at 18:08

## 2017-10-26 NOTE — DISCHARGE INSTRUCTIONS

## 2017-10-26 NOTE — ED PROVIDER NOTES
HPI     To ED with complaints of burning pain. She states it is over her entire body from top of head to toes B. It comes and goes every day, but is present more than not This has been ongoing for three months. Pt not able to identify any precipitating nor alleviating factors except \"maybe a little better with the oxycodone\". sts has come off of Ambien and Valium few months ago. She notes that the pain does seem to start in her abdomen. Unable to identify where in abdomen pain originates, continually rubbing from epigastric to mid abdomen. sts eating does seem to make burning worse and she thinks she may be loosing weight. Notes that she does feel a bit anxious.      Past Medical History:   Diagnosis Date    Anal polyp 6/13/2013    Arthritis     Asthma     Cataract     Chronic pain     knee - right/back    Diabetes ([address]     GERD (gastroesophageal reflux disease)     Hemorrhoids 6/13/2013    History of kidney stones     Hypertension     Ill-defined condition     abdominal pain and burning    Other ill-defined conditions(799.89)     high cholesterol       Past Surgical History:   Procedure Laterality Date    COLONOSCOPY  2/28/2013         EGD  2/28/2013         HX CATARACT REMOVAL Bilateral     HX CHOLECYSTECTOMY  6-2015    HX GI  6/27/13    anal polyps removed    HX HEENT      laser surgery for blood clot in right eye    HX KNEE REPLACEMENT      right    HX OTHER SURGICAL  4-2-14    lap gastrotomy and removal of polyp -  - not cancerous per pt    HX ALAN AND BSO      HX TONSILLECTOMY      HX UROLOGICAL      \"laser\" surgery for kidney stone    NEUROLOGICAL PROCEDURE UNLISTED  1990    tumor at back of neck, benign         Family History:   Problem Relation Age of Onset    Cancer Mother      colon    Diabetes Brother     Other Sister      GI BLEED    Heart Disease Brother     Heart Attack Brother     Heart Disease Brother     Heart Disease Brother     Anesth Problems Neg Hx Social History     Social History    Marital status:      Spouse name: N/A    Number of children: N/A    Years of education: N/A     Occupational History    Not on file. Social History Main Topics    Smoking status: Former Smoker    Smokeless tobacco: Never Used      Comment: age 13    Alcohol use No    Drug use: No    Sexual activity: No     Other Topics Concern    Not on file     Social History Narrative         ALLERGIES: Seafood [shellfish containing products]    Review of Systems   Constitutional: Negative for chills and fever. HENT: Negative for congestion, rhinorrhea and sore throat. Eyes: Negative for pain and discharge. Respiratory: Negative for cough and shortness of breath. Cardiovascular: Negative for chest pain. Gastrointestinal: Positive for abdominal pain. Negative for nausea and vomiting. Genitourinary: Negative for dysuria, frequency and urgency. Musculoskeletal:        Denies specific joint complaints, but notes that everything hurts. Skin: Negative for rash and wound. Neurological: Negative for seizures, syncope and headaches. Psychiatric/Behavioral: Negative for confusion. The patient is not nervous/anxious. All other systems reviewed and are negative. Vitals:    10/26/17 1719   BP: 167/69   Pulse: (!) 103   Resp: 22   Temp: 98.4 °F (36.9 °C)   SpO2: 98%   Weight: 78.9 kg (174 lb)   Height: 5' (1.524 m)            Physical Exam   Constitutional: She appears well-developed. No distress. Tearful, anxious affect. HENT:   Head: Normocephalic. Right Ear: External ear normal.   Left Ear: External ear normal.   Nose: Nose normal.   Mouth/Throat: Oropharynx is clear and moist. No oropharyngeal exudate. Eyes: Conjunctivae are normal. Pupils are equal, round, and reactive to light. Right eye exhibits no discharge. Left eye exhibits no discharge. Neck: Normal range of motion. No thyromegaly present. Cardiovascular: Normal rate.   Exam reveals no gallop and no friction rub. No murmur heard. Pulmonary/Chest: Effort normal. She has no wheezes. She has no rales. Abdominal: Soft. There is no rebound and no guarding. Lymphadenopathy:     She has no cervical adenopathy. Neurological: She is alert. No cranial nerve deficit. Coordination normal.   Up ambulating in ED without assistance, without limp. Skin: She is not diaphoretic. Nursing note and vitals reviewed. MDM  Number of Diagnoses or Management Options  Abdominal pain, epigastric:   Burning pain:   Diagnosis management comments: DDX: gastritis, pancreatitis, neuritis, anxiety, dermatitis,     WBC slightly elevated. Reviewed chart, all wbcs have been slightly elevated on past 5 cbcs done in past two months. She has visited ED and PCP several times for various pains including this unusual \"head to toe\" burning. Lipase normal, EKG without ischemia, troponin negative. No evidence of any emergent process. Skin exam normal.   Hesitant to give any sedating agents as she is on Oxycodone 20mg every 6 hours. Amount and/or Complexity of Data Reviewed  Clinical lab tests: ordered and reviewed  Tests in the radiology section of CPT®: ordered and reviewed      ED Course       Procedures      6:45 PM  Less tearful now  Discussed pain and anxiety. Offered to treat with one time dose for either anxiety or pain. She prefers to see how the GI cocktail works first. Encouraged her to let us know if she would like to try anything else. 7:49 PM  Rechecked patient. Sleeping. On waking, reviewed labs with her and plan of care. Encourage follow up with PCP for continued management of her all over burning sensation.           LABORATORY TESTS:  Recent Results (from the past 12 hour(s))   EKG, 12 LEAD, INITIAL    Collection Time: 10/26/17  5:47 PM   Result Value Ref Range    Ventricular Rate 103 BPM    Atrial Rate 103 BPM    P-R Interval 120 ms    QRS Duration 70 ms Q-T Interval 348 ms    QTC Calculation (Bezet) 455 ms    Calculated P Axis 74 degrees    Calculated R Axis 63 degrees    Calculated T Axis 59 degrees    Diagnosis       Sinus tachycardia  Otherwise normal ECG  When compared with ECG of 16-JUN-2017 17:39,  No significant change was found     CBC WITH AUTOMATED DIFF    Collection Time: 10/26/17  6:20 PM   Result Value Ref Range    WBC 15.4 (H) 3.6 - 11.0 K/uL    RBC 4.71 3.80 - 5.20 M/uL    HGB 12.5 11.5 - 16.0 g/dL    HCT 40.4 35.0 - 47.0 %    MCV 85.8 80.0 - 99.0 FL    MCH 26.5 26.0 - 34.0 PG    MCHC 30.9 30.0 - 36.5 g/dL    RDW 15.5 (H) 11.5 - 14.5 %    PLATELET 836 760 - 086 K/uL    NEUTROPHILS 67 32 - 75 %    LYMPHOCYTES 24 12 - 49 %    MONOCYTES 8 5 - 13 %    EOSINOPHILS 1 0 - 7 %    BASOPHILS 0 0 - 1 %    ABS. NEUTROPHILS 10.3 (H) 1.8 - 8.0 K/UL    ABS. LYMPHOCYTES 3.7 (H) 0.8 - 3.5 K/UL    ABS. MONOCYTES 1.2 (H) 0.0 - 1.0 K/UL    ABS. EOSINOPHILS 0.1 0.0 - 0.4 K/UL    ABS. BASOPHILS 0.0 0.0 - 0.1 K/UL   CK W/ CKMB & INDEX    Collection Time: 10/26/17  6:20 PM   Result Value Ref Range     26 - 192 U/L    CK - MB 1.3 <3.6 NG/ML    CK-MB Index 0.7 0 - 2.5     LIPASE    Collection Time: 10/26/17  6:20 PM   Result Value Ref Range    Lipase 337 73 - 769 U/L   METABOLIC PANEL, COMPREHENSIVE    Collection Time: 10/26/17  6:20 PM   Result Value Ref Range    Sodium 138 136 - 145 mmol/L    Potassium 4.2 3.5 - 5.1 mmol/L    Chloride 102 97 - 108 mmol/L    CO2 30 21 - 32 mmol/L    Anion gap 6 5 - 15 mmol/L    Glucose 240 (H) 65 - 100 mg/dL    BUN 14 6 - 20 MG/DL    Creatinine 0.98 0.55 - 1.02 MG/DL    BUN/Creatinine ratio 14 12 - 20      GFR est AA >60 >60 ml/min/1.73m2    GFR est non-AA 55 (L) >60 ml/min/1.73m2    Calcium 9.0 8.5 - 10.1 MG/DL    Bilirubin, total 0.2 0.2 - 1.0 MG/DL    ALT (SGPT) 20 12 - 78 U/L    AST (SGOT) 7 (L) 15 - 37 U/L    Alk.  phosphatase 131 (H) 45 - 117 U/L    Protein, total 7.2 6.4 - 8.2 g/dL    Albumin 3.2 (L) 3.5 - 5.0 g/dL    Globulin 4.0 2.0 - 4.0 g/dL    A-G Ratio 0.8 (L) 1.1 - 2.2     TROPONIN I    Collection Time: 10/26/17  6:20 PM   Result Value Ref Range    Troponin-I, Qt. <0.04 <0.05 ng/mL       IMAGING RESULTS:  No orders to display       MEDICATIONS GIVEN:  Medications   sodium chloride (NS) flush 5-10 mL (not administered)   sodium chloride (NS) flush 5-10 mL (not administered)   MAALOX/DONNATAL/viscous lidocaine (GI COCKTAIL) (50 mL Oral Given 10/26/17 1807)   famotidine (PF) (PEPCID) injection 20 mg (20 mg IntraVENous Given 10/26/17 1808)       IMPRESSION:  1. Burning pain    2. Abdominal pain, epigastric        PLAN:  1. Current Discharge Medication List        2.    Follow-up Information     Follow up With Details Comments Contact Gabby Feldman MD Schedule an appointment as soon as possible for a visit in 2 days  [address] [facility] [address] - [place] EMERGENCY DEPT  If symptoms worsen [place]  [phone]        Return to ED if worse

## 2017-10-27 ENCOUNTER — HOSPITAL ENCOUNTER (OUTPATIENT)
Dept: PREADMISSION TESTING | Age: 77
Discharge: HOME | End: 2017-10-27
Payer: MEDICARE

## 2017-10-27 VITALS
DIASTOLIC BLOOD PRESSURE: 73 MMHG | SYSTOLIC BLOOD PRESSURE: 147 MMHG | HEART RATE: 92 BPM | HEIGHT: 60 IN | BODY MASS INDEX: 34.36 KG/M2 | WEIGHT: 175 LBS | OXYGEN SATURATION: 96 % | TEMPERATURE: 97.9 F

## 2017-10-27 DIAGNOSIS — E11.9 TYPE 2 DIABETES MELLITUS WITHOUT COMPLICATION, UNSPECIFIED LONG TERM INSULIN USE STATUS: Primary | ICD-10-CM

## 2017-10-27 PROBLEM — Z96.651 STATUS POST RIGHT KNEE REPLACEMENT: Status: ACTIVE | Noted: 2017-08-09

## 2017-10-27 PROBLEM — Z96.659 PAINFUL TOTAL KNEE REPLACEMENT (HCC): Status: ACTIVE | Noted: 2017-08-09

## 2017-10-27 PROBLEM — I25.10 CAD (CORONARY ARTERY DISEASE): Status: ACTIVE | Noted: 2017-10-27

## 2017-10-27 PROBLEM — T84.84XA PAINFUL TOTAL KNEE REPLACEMENT (HCC): Status: ACTIVE | Noted: 2017-08-09

## 2017-10-27 LAB
ABO + RH BLD: NORMAL
APPEARANCE UR: ABNORMAL
BACTERIA URNS QL MICRO: NEGATIVE /HPF
BILIRUB UR QL: NEGATIVE
BLOOD GROUP ANTIBODIES SERPL: NORMAL
COLOR UR: ABNORMAL
CRP SERPL-MCNC: 1.45 MG/DL (ref 0–0.6)
EPITH CASTS URNS QL MICRO: ABNORMAL /LPF
ERYTHROCYTE [SEDIMENTATION RATE] IN BLOOD: 18 MM/HR (ref 0–30)
EST. AVERAGE GLUCOSE BLD GHB EST-MCNC: 186 MG/DL
GLUCOSE UR STRIP.AUTO-MCNC: NEGATIVE MG/DL
HBA1C MFR BLD: 8.1 % (ref 4.2–6.3)
HGB UR QL STRIP: ABNORMAL
HYALINE CASTS URNS QL MICRO: ABNORMAL /LPF (ref 0–5)
INR PPP: 1.1 (ref 0.9–1.1)
KETONES UR QL STRIP.AUTO: NEGATIVE MG/DL
LEUKOCYTE ESTERASE UR QL STRIP.AUTO: NEGATIVE
NITRITE UR QL STRIP.AUTO: NEGATIVE
PH UR STRIP: 6 [PH] (ref 5–8)
PROT UR STRIP-MCNC: NEGATIVE MG/DL
PROTHROMBIN TIME: 10.9 SEC (ref 9–11.1)
RBC #/AREA URNS HPF: ABNORMAL /HPF (ref 0–5)
SP GR UR REFRACTOMETRY: 1.03 (ref 1–1.03)
SPECIMEN EXP DATE BLD: NORMAL
UA: UC IF INDICATED,UAUC: ABNORMAL
UROBILINOGEN UR QL STRIP.AUTO: 0.2 EU/DL (ref 0.2–1)
WBC URNS QL MICRO: ABNORMAL /HPF (ref 0–4)

## 2017-10-27 PROCEDURE — 85652 RBC SED RATE AUTOMATED: CPT | Performed by: ORTHOPAEDIC SURGERY

## 2017-10-27 PROCEDURE — 86900 BLOOD TYPING SEROLOGIC ABO: CPT | Performed by: ORTHOPAEDIC SURGERY

## 2017-10-27 PROCEDURE — 36415 COLL VENOUS BLD VENIPUNCTURE: CPT | Performed by: ORTHOPAEDIC SURGERY

## 2017-10-27 PROCEDURE — 83036 HEMOGLOBIN GLYCOSYLATED A1C: CPT | Performed by: ORTHOPAEDIC SURGERY

## 2017-10-27 PROCEDURE — 81001 URINALYSIS AUTO W/SCOPE: CPT | Performed by: ORTHOPAEDIC SURGERY

## 2017-10-27 PROCEDURE — 85610 PROTHROMBIN TIME: CPT | Performed by: ORTHOPAEDIC SURGERY

## 2017-10-27 PROCEDURE — 86140 C-REACTIVE PROTEIN: CPT | Performed by: ORTHOPAEDIC SURGERY

## 2017-10-27 RX ORDER — DICLOFENAC SODIUM 10 MG/G
GEL TOPICAL
COMMUNITY
Start: 2017-08-03 | End: 2017-11-17

## 2017-10-27 RX ORDER — METOPROLOL TARTRATE 25 MG/1
TABLET, FILM COATED ORAL
Refills: 3 | COMMUNITY
Start: 2017-09-26 | End: 2019-03-08

## 2017-10-27 RX ORDER — QUETIAPINE FUMARATE 25 MG/1
25 TABLET, FILM COATED ORAL
COMMUNITY
Start: 2017-09-12 | End: 2017-10-27

## 2017-10-27 RX ORDER — PROMETHAZINE HYDROCHLORIDE 25 MG/1
TABLET ORAL
Refills: 0 | COMMUNITY
Start: 2017-07-30 | End: 2019-03-08

## 2017-10-27 RX ORDER — TRAVOPROST OPHTHALMIC SOLUTION 0.04 MG/ML
1 SOLUTION OPHTHALMIC DAILY
COMMUNITY
Start: 2017-06-12 | End: 2019-03-08

## 2017-10-27 RX ORDER — BUSPIRONE HYDROCHLORIDE 15 MG/1
TABLET ORAL
COMMUNITY
Start: 2017-09-12 | End: 2019-03-08 | Stop reason: DRUGHIGH

## 2017-10-27 RX ORDER — GABAPENTIN 100 MG/1
CAPSULE ORAL
Refills: 3 | COMMUNITY
Start: 2017-10-16 | End: 2019-03-08

## 2017-10-28 LAB
BACTERIA SPEC CULT: NORMAL
BACTERIA SPEC CULT: NORMAL
SERVICE CMNT-IMP: NORMAL

## 2017-10-30 NOTE — ADVANCED PRACTICE NURSE
Faxed PAT testing reports (and fax confirmation received) to Dr. Beot Rhodes office. Called at 1030 on 10/30/17 (left message on Stephani's voice mail) RE: abnormal CRP, A1C 8.1, DTC consult complete.

## 2017-11-12 RX ORDER — CEFAZOLIN SODIUM/WATER 2 G/20 ML
2 SYRINGE (ML) INTRAVENOUS ONCE
Status: CANCELLED | OUTPATIENT
Start: 2017-11-12 | End: 2017-11-12

## 2017-11-12 RX ORDER — ACETAMINOPHEN 500 MG
1000 TABLET ORAL ONCE
Status: CANCELLED | OUTPATIENT
Start: 2017-11-12 | End: 2017-11-12

## 2017-11-12 RX ORDER — DEXAMETHASONE SODIUM PHOSPHATE 100 MG/10ML
10 INJECTION INTRAMUSCULAR; INTRAVENOUS ONCE
Status: CANCELLED | OUTPATIENT
Start: 2017-11-12 | End: 2017-11-12

## 2017-11-12 RX ORDER — PREGABALIN 75 MG/1
75 CAPSULE ORAL ONCE
Status: CANCELLED | OUTPATIENT
Start: 2017-11-12 | End: 2017-11-12

## 2017-11-12 RX ORDER — CELECOXIB 200 MG/1
200 CAPSULE ORAL ONCE
Status: CANCELLED | OUTPATIENT
Start: 2017-11-12 | End: 2017-11-12

## 2017-11-13 NOTE — H&P
Ms. Padmini Mora presents for revision right total knee replacement.  Right knee was replaced by Dr. Elly Potter in 2015.  Relates difficulty with the right knee ever since.  Relates constant diffuse aching pain, worst over the anterior knee.  Much worse with activities. Elfrieda Patel her daily.  Difficulty with simple ADLs.  Denies subjective instability.  She has difficulty with ADLs and phyiscal activity.  She takes Oxycodone for pain, managed by Dr. Edel Elliott. She uses a cane for mobility. ESR normal, CRP mildly [facility].  Findings discussed below. Past Medical History:   Diagnosis Date    Anal polyp 6/13/2013    Arthritis     Asthma     CAD (coronary artery disease) 10/27/2017    Cataract     Chronic pain     knee - right/back    Diabetes ([facility] [address]     GERD (gastroesophageal reflux disease)     Hemorrhoids 6/13/2013    History of kidney stones     Hypertension     Ill-defined condition     abdominal pain and burning    Other ill-defined conditions(799.89)     high cholesterol     Past Surgical History:   Procedure Laterality Date    COLONOSCOPY  2/28/2013         EGD  2/28/2013         HX CATARACT REMOVAL Bilateral     HX CHOLECYSTECTOMY  6-2015    HX GI  6/27/13    anal polyps removed    HX HEENT      laser surgery for blood clot in right eye    HX KNEE REPLACEMENT      right    HX OTHER SURGICAL  4-2-14    lap gastrotomy and removal of polyp -  - not cancerous per pt    HX ALAN AND BSO      HX TONSILLECTOMY      HX UROLOGICAL      \"laser\" surgery for kidney stone    NEUROLOGICAL PROCEDURE UNLISTED  1990    tumor at back of neck, benign     No current facility-administered medications on file prior to encounter. Current Outpatient Prescriptions on File Prior to Encounter   Medication Sig Dispense Refill    rosuvastatin (CRESTOR) 10 mg tablet Take 10 mg by mouth nightly.       fluticasone-salmeterol (ADVAIR DISKUS) 100-50 mcg/dose diskus inhaler Take 1 Puff by inhalation every twelve (12) hours.  sucralfate (CARAFATE) 1 gram tablet Take 1 Tab by mouth four (4) times daily. 30 Tab 0    linaclotide (LINZESS) 145 mcg cap capsule Take 145 mcg by mouth Daily (before breakfast).  albuterol (PROVENTIL HFA, VENTOLIN HFA, PROAIR HFA) 90 mcg/actuation inhaler Take 2 Puffs by inhalation every six (6) hours as needed for Wheezing.  oxyCODONE IR (ROXICODONE) 20 mg immediate release tablet Take 20 mg by mouth every six (6) hours as needed.  brimonidine-timolol (COMBIGAN) 0.2-0.5 % drop ophthalmic solution Administer 1 Drop to right eye nightly.  Dexlansoprazole (DEXILANT) 60 mg CpDB Take 60 mg by mouth Daily (before breakfast).  naloxone (NARCAN) 4 mg/actuation nasal spray 1 [place] by IntraNASal route once as needed. Use 1 spray intranasally into 1 nostril. Use a new Narcan nasal spray for subsequent doses and administer into alternating nostrils. May repeat every 2 to 3 minutes as needed.  nitroglycerin (NITROSTAT) 0.4 mg SL tablet 0.4 mg by SubLINGual route every five (5) minutes as needed for Chest Pain.  acetaminophen (TYLENOL) 500 mg tablet Take 1,000 mg by mouth daily as needed for Pain.  amLODIPine (NORVASC) 10 mg tablet Take 10 mg by mouth nightly. 0    fosinopril (MONOPRIL) 20 mg tablet Take 20 mg by mouth daily. 0    Insulin Glargine (LANTUS SOLOSTAR) 100 unit/mL (3 mL) flexpen 20 Units by SubCUTAneous route two (2) times a day.  Indications: Diabetes Mellitus       Allergies   Allergen Reactions    Seafood [Shellfish Containing Products] Swelling     Family History   Problem Relation Age of Onset    Cancer Mother      colon    Diabetes Brother     Other Sister      GI BLEED    Heart Disease Brother     Heart Attack Brother     Heart Disease Brother     Heart Disease Brother     Schizophrenia Son     Anesth Problems Neg Hx      Social History     Social History    Marital status:      Spouse name: N/A    Number of children: N/A    Years of education: N/A     Occupational History    Not on file. Social History Main Topics    Smoking status: Former Smoker    Smokeless tobacco: Never Used      Comment: age 13    Alcohol use No    Drug use: No    Sexual activity: No     Other Topics Concern    Not on file     Social History Narrative       ROS:  General Present- Appetite Loss. Not Present- Chills, Fatigue, Fever, HIV Exposure, Night Sweats, Persistent Infections, Seasonal Allergies, Weight Gain and Weight Loss. Skin Not Present- Itching, Nail Changes, Poor Wound Healing, Rash, Skin Color Changes, Suspicious Lesions and Yellowish Skin Color. HEENT Not Present- Decreased Hearing, Double Vision, Earache, Hoarseness, Jaundice/Yellow Eyes, Loose Teeth, Nose Bleed, Ringing in the Ears and Sore Throat. Respiratory Not Present- Bloody sputum, Chronic Cough, Difficulty Breathing, Snoring, Wakes up from Sleep Wheezing or Short of Breath and Wheezing. Cardiovascular Present- Difficulty Breathing On Exertion. Not Present- Bluish Discoloration Of Lips Or Nails, Chest Pain, Difficulty Breathing Lying Down, Leg Cramps With Exertion, Palpitations and Swelling of Extremities. Gastrointestinal Not Present- Abdominal Pain, Black, Tarry Stool, Change in Bowel Habits, Cirrhosis, Constipation, Diarrhea, Difficulty Swallowing, Nausea and Vomiting. Female Genitourinary Not Present- Blood in Urine, Frequency, Painful Urination, Pelvic Pain, Trouble Starting Urinary Stream and Urgency. Musculoskeletal Not Present- Back Pain, Joint Pain, Joint Stiffness and Joint Swelling. Neurological Not Present- Fainting, Headaches, Memory Loss, Numbness, Seizures, Tingling, Tremor, Unsteadiness and Weakness. Psychiatric Present- Anxiety. Not Present- Bipolar and Depression.   Endocrine Not Present- Cold Intolerance, Excessive Hunger, Excessive Thirst, Excessive Urination and Heat Intolerance. Hematology Not Present- Abnormal Bruising , Enlarged Lymph Nodes, Excessive bleeding and Skin Discoloration. Exam:  Moderately obese but otherwise appears well. Heart RRR no m/r/g  Chest CTA  Mild limp on the right side. Negative Stinchfield at the right hip. Mild posterolateral hip pain at extremes of hip motion which is well-preserved. Denies groin pain. Right knee shows healed midline scar. No warmth. No effusion. Diffuse tenderness out of proportion, worst around patella. Full extension of knee. 85 degrees flexion. Stable in extension. Mild flexion instability, posterior cruciate appears competent. Trace symmetrical distal pulses. No motor or sensory deficits distally. No distal edema. Xrays of right total knee show satisfactory axial alignment. Tibial component appears to be internally rotated. Femoral component shows mild flexion. No gross evidence of loosening. Patella tracks centrally. Patella is not resurfaced. Recent CT scan at [facility] shows moderate internal rotation of tibial component. Imp/Plan:  Painful right total knee, likely from combination of flexion instability and non-resurfaced patella.  She is miserable and would like to proceed with revision.  Discussed risks, benefits, and alternatives in detail, as well as anticipated hospital stay and course of rehabilitation.  She understands there is a risk that she has persistent pain after recovery.  All questions answered. Based on physical exam, x-ray and CT findings, I believe revision of tibial and femoral components is warranted in addition to the patellar resurfacing.     Taina Ritchie MD

## 2017-11-14 ENCOUNTER — ANESTHESIA EVENT (OUTPATIENT)
Dept: SURGERY | Age: 77
DRG: 468 | End: 2017-11-14
Payer: MEDICARE

## 2017-11-14 ENCOUNTER — HOSPITAL ENCOUNTER (INPATIENT)
Age: 77
LOS: 3 days | Discharge: NURSING, REHAB OR LONG-TERM CARE | DRG: 468 | End: 2017-11-17
Attending: ORTHOPAEDIC SURGERY | Admitting: ORTHOPAEDIC SURGERY
Payer: MEDICARE

## 2017-11-14 ENCOUNTER — ANESTHESIA (OUTPATIENT)
Dept: SURGERY | Age: 77
DRG: 468 | End: 2017-11-14
Payer: MEDICARE

## 2017-11-14 PROBLEM — T84.84XA PAIN DUE TO KNEE JOINT PROSTHESIS (HCC): Status: ACTIVE | Noted: 2017-11-14

## 2017-11-14 PROBLEM — Z96.659 PAIN DUE TO KNEE JOINT PROSTHESIS (HCC): Status: ACTIVE | Noted: 2017-11-14

## 2017-11-14 LAB
ABO + RH BLD: NORMAL
BLOOD GROUP ANTIBODIES SERPL: NORMAL
GLUCOSE BLD STRIP.AUTO-MCNC: 124 MG/DL (ref 65–100)
GLUCOSE BLD STRIP.AUTO-MCNC: 145 MG/DL (ref 65–100)
GLUCOSE BLD STRIP.AUTO-MCNC: 269 MG/DL (ref 65–100)
SERVICE CMNT-IMP: ABNORMAL
SPECIMEN EXP DATE BLD: NORMAL

## 2017-11-14 PROCEDURE — 88331 PATH CONSLTJ SURG 1 BLK 1SPC: CPT | Performed by: ORTHOPAEDIC SURGERY

## 2017-11-14 PROCEDURE — 77030003601 HC NDL NRV BLK BBMI -A

## 2017-11-14 PROCEDURE — 77030008467 HC STPLR SKN COVD -B: Performed by: ORTHOPAEDIC SURGERY

## 2017-11-14 PROCEDURE — 77030014077 HC TOWER MX CEM J&J -C: Performed by: ORTHOPAEDIC SURGERY

## 2017-11-14 PROCEDURE — 82962 GLUCOSE BLOOD TEST: CPT

## 2017-11-14 PROCEDURE — 0SPC0JZ REMOVAL OF SYNTHETIC SUBSTITUTE FROM RIGHT KNEE JOINT, OPEN APPROACH: ICD-10-PCS | Performed by: ORTHOPAEDIC SURGERY

## 2017-11-14 PROCEDURE — 74011250636 HC RX REV CODE- 250/636

## 2017-11-14 PROCEDURE — 77030002991 HC SUT QUILL SSPC -B: Performed by: ORTHOPAEDIC SURGERY

## 2017-11-14 PROCEDURE — 74011250636 HC RX REV CODE- 250/636: Performed by: ORTHOPAEDIC SURGERY

## 2017-11-14 PROCEDURE — C9290 INJ, BUPIVACAINE LIPOSOME: HCPCS | Performed by: ORTHOPAEDIC SURGERY

## 2017-11-14 PROCEDURE — 77030007866 HC KT SPN ANES BBMI -B: Performed by: NURSE ANESTHETIST, CERTIFIED REGISTERED

## 2017-11-14 PROCEDURE — 77030011640 HC PAD GRND REM COVD -A: Performed by: ORTHOPAEDIC SURGERY

## 2017-11-14 PROCEDURE — 74011000250 HC RX REV CODE- 250: Performed by: ANESTHESIOLOGY

## 2017-11-14 PROCEDURE — 36415 COLL VENOUS BLD VENIPUNCTURE: CPT | Performed by: NURSE PRACTITIONER

## 2017-11-14 PROCEDURE — 76060000038 HC ANESTHESIA 3.5 TO 4 HR: Performed by: ORTHOPAEDIC SURGERY

## 2017-11-14 PROCEDURE — C1776 JOINT DEVICE (IMPLANTABLE): HCPCS | Performed by: ORTHOPAEDIC SURGERY

## 2017-11-14 PROCEDURE — 77030012935 HC DRSG AQUACEL BMS -B: Performed by: ORTHOPAEDIC SURGERY

## 2017-11-14 PROCEDURE — 77030000032 HC CUF TRNQT ZIMM -B: Performed by: ORTHOPAEDIC SURGERY

## 2017-11-14 PROCEDURE — 94640 AIRWAY INHALATION TREATMENT: CPT

## 2017-11-14 PROCEDURE — 77030028224 HC PDNG CST BSNM -A: Performed by: ORTHOPAEDIC SURGERY

## 2017-11-14 PROCEDURE — 77030013079 HC BLNKT BAIR HGGR 3M -A: Performed by: NURSE ANESTHETIST, CERTIFIED REGISTERED

## 2017-11-14 PROCEDURE — 76010000174 HC OR TIME 3.5 TO 4 HR INTENSV-TIER 1: Performed by: ORTHOPAEDIC SURGERY

## 2017-11-14 PROCEDURE — 88305 TISSUE EXAM BY PATHOLOGIST: CPT | Performed by: ORTHOPAEDIC SURGERY

## 2017-11-14 PROCEDURE — 64450 NJX AA&/STRD OTHER PN/BRANCH: CPT

## 2017-11-14 PROCEDURE — 77030018547 HC SUT ETHBND1 J&J -B: Performed by: ORTHOPAEDIC SURGERY

## 2017-11-14 PROCEDURE — 77030018836 HC SOL IRR NACL ICUM -A: Performed by: ORTHOPAEDIC SURGERY

## 2017-11-14 PROCEDURE — 74011250636 HC RX REV CODE- 250/636: Performed by: ANESTHESIOLOGY

## 2017-11-14 PROCEDURE — 0SRC0J9 REPLACEMENT OF RIGHT KNEE JOINT WITH SYNTHETIC SUBSTITUTE, CEMENTED, OPEN APPROACH: ICD-10-PCS | Performed by: ORTHOPAEDIC SURGERY

## 2017-11-14 PROCEDURE — 77030010507 HC ADH SKN DERMBND J&J -B: Performed by: ORTHOPAEDIC SURGERY

## 2017-11-14 PROCEDURE — 77030031139 HC SUT VCRL2 J&J -A: Performed by: ORTHOPAEDIC SURGERY

## 2017-11-14 PROCEDURE — 77030016547 HC BLD SAW SAG1 STRY -B: Performed by: ORTHOPAEDIC SURGERY

## 2017-11-14 PROCEDURE — 77030018846 HC SOL IRR STRL H20 ICUM -A: Performed by: ORTHOPAEDIC SURGERY

## 2017-11-14 PROCEDURE — 74011000250 HC RX REV CODE- 250: Performed by: ORTHOPAEDIC SURGERY

## 2017-11-14 PROCEDURE — 76210000017 HC OR PH I REC 1.5 TO 2 HR: Performed by: ORTHOPAEDIC SURGERY

## 2017-11-14 PROCEDURE — 86900 BLOOD TYPING SEROLOGIC ABO: CPT | Performed by: NURSE PRACTITIONER

## 2017-11-14 PROCEDURE — C1713 ANCHOR/SCREW BN/BN,TIS/BN: HCPCS | Performed by: ORTHOPAEDIC SURGERY

## 2017-11-14 PROCEDURE — 74011250637 HC RX REV CODE- 250/637: Performed by: ORTHOPAEDIC SURGERY

## 2017-11-14 PROCEDURE — 3E0T3BZ INTRODUCTION OF ANESTHETIC AGENT INTO PERIPHERAL NERVES AND PLEXI, PERCUTANEOUS APPROACH: ICD-10-PCS | Performed by: ANESTHESIOLOGY

## 2017-11-14 PROCEDURE — 74011000258 HC RX REV CODE- 258: Performed by: ORTHOPAEDIC SURGERY

## 2017-11-14 PROCEDURE — 77030020788: Performed by: ORTHOPAEDIC SURGERY

## 2017-11-14 PROCEDURE — 87205 SMEAR GRAM STAIN: CPT | Performed by: ORTHOPAEDIC SURGERY

## 2017-11-14 PROCEDURE — 74011636637 HC RX REV CODE- 636/637: Performed by: ORTHOPAEDIC SURGERY

## 2017-11-14 PROCEDURE — 74011000250 HC RX REV CODE- 250

## 2017-11-14 PROCEDURE — 77030034850: Performed by: ORTHOPAEDIC SURGERY

## 2017-11-14 PROCEDURE — 65270000029 HC RM PRIVATE

## 2017-11-14 PROCEDURE — 77030020782 HC GWN BAIR PAWS FLX 3M -B

## 2017-11-14 DEVICE — PLATE TIB WDG PC SZ 3 TIV -- NEXGEN: Type: IMPLANTABLE DEVICE | Site: KNEE | Status: FUNCTIONAL

## 2017-11-14 DEVICE — IMPLANTABLE DEVICE: Type: IMPLANTABLE DEVICE | Site: KNEE | Status: FUNCTIONAL

## 2017-11-14 DEVICE — CONE TIB M H25MM AP31MM ML31MM TRABECULAR KNEE MTL: Type: IMPLANTABLE DEVICE | Site: KNEE | Status: FUNCTIONAL

## 2017-11-14 DEVICE — CEMENT BNE GENTAMICIN 40 GM SMARTSET GMV: Type: IMPLANTABLE DEVICE | Site: KNEE | Status: FUNCTIONAL

## 2017-11-14 DEVICE — PAT STD POLYETH NXGN 8X29MM --: Type: IMPLANTABLE DEVICE | Site: KNEE | Status: FUNCTIONAL

## 2017-11-14 RX ORDER — SODIUM CHLORIDE 9 MG/ML
50 INJECTION, SOLUTION INTRAVENOUS CONTINUOUS
Status: DISCONTINUED | OUTPATIENT
Start: 2017-11-14 | End: 2017-11-14 | Stop reason: HOSPADM

## 2017-11-14 RX ORDER — DEXAMETHASONE SODIUM PHOSPHATE 4 MG/ML
10 INJECTION, SOLUTION INTRA-ARTICULAR; INTRALESIONAL; INTRAMUSCULAR; INTRAVENOUS; SOFT TISSUE ONCE
Status: COMPLETED | OUTPATIENT
Start: 2017-11-15 | End: 2017-11-15

## 2017-11-14 RX ORDER — DEXAMETHASONE SODIUM PHOSPHATE 4 MG/ML
INJECTION, SOLUTION INTRA-ARTICULAR; INTRALESIONAL; INTRAMUSCULAR; INTRAVENOUS; SOFT TISSUE AS NEEDED
Status: DISCONTINUED | OUTPATIENT
Start: 2017-11-14 | End: 2017-11-14 | Stop reason: HOSPADM

## 2017-11-14 RX ORDER — MORPHINE SULFATE 10 MG/ML
2 INJECTION, SOLUTION INTRAMUSCULAR; INTRAVENOUS
Status: DISCONTINUED | OUTPATIENT
Start: 2017-11-14 | End: 2017-11-14 | Stop reason: HOSPADM

## 2017-11-14 RX ORDER — SODIUM CHLORIDE 0.9 % (FLUSH) 0.9 %
5-10 SYRINGE (ML) INJECTION AS NEEDED
Status: DISCONTINUED | OUTPATIENT
Start: 2017-11-14 | End: 2017-11-17 | Stop reason: HOSPADM

## 2017-11-14 RX ORDER — FENTANYL CITRATE 50 UG/ML
50 INJECTION, SOLUTION INTRAMUSCULAR; INTRAVENOUS AS NEEDED
Status: DISCONTINUED | OUTPATIENT
Start: 2017-11-14 | End: 2017-11-14 | Stop reason: HOSPADM

## 2017-11-14 RX ORDER — AMLODIPINE BESYLATE 5 MG/1
10 TABLET ORAL
Status: DISCONTINUED | OUTPATIENT
Start: 2017-11-14 | End: 2017-11-17 | Stop reason: HOSPADM

## 2017-11-14 RX ORDER — SUCRALFATE 1 G/1
1 TABLET ORAL
Status: DISCONTINUED | OUTPATIENT
Start: 2017-11-14 | End: 2017-11-17 | Stop reason: HOSPADM

## 2017-11-14 RX ORDER — MIDAZOLAM HYDROCHLORIDE 1 MG/ML
INJECTION, SOLUTION INTRAMUSCULAR; INTRAVENOUS AS NEEDED
Status: DISCONTINUED | OUTPATIENT
Start: 2017-11-14 | End: 2017-11-14 | Stop reason: HOSPADM

## 2017-11-14 RX ORDER — METOPROLOL TARTRATE 25 MG/1
12.5 TABLET, FILM COATED ORAL 2 TIMES DAILY
Status: DISCONTINUED | OUTPATIENT
Start: 2017-11-14 | End: 2017-11-17 | Stop reason: HOSPADM

## 2017-11-14 RX ORDER — SODIUM CHLORIDE 0.9 % (FLUSH) 0.9 %
5-10 SYRINGE (ML) INJECTION AS NEEDED
Status: DISCONTINUED | OUTPATIENT
Start: 2017-11-14 | End: 2017-11-14 | Stop reason: HOSPADM

## 2017-11-14 RX ORDER — ONDANSETRON 2 MG/ML
4 INJECTION INTRAMUSCULAR; INTRAVENOUS AS NEEDED
Status: DISCONTINUED | OUTPATIENT
Start: 2017-11-14 | End: 2017-11-14 | Stop reason: HOSPADM

## 2017-11-14 RX ORDER — LISINOPRIL 20 MG/1
20 TABLET ORAL DAILY
Status: DISCONTINUED | OUTPATIENT
Start: 2017-11-15 | End: 2017-11-17 | Stop reason: HOSPADM

## 2017-11-14 RX ORDER — FENTANYL CITRATE 50 UG/ML
INJECTION, SOLUTION INTRAMUSCULAR; INTRAVENOUS AS NEEDED
Status: DISCONTINUED | OUTPATIENT
Start: 2017-11-14 | End: 2017-11-14 | Stop reason: HOSPADM

## 2017-11-14 RX ORDER — BUDESONIDE 0.5 MG/2ML
500 INHALANT ORAL
Status: DISCONTINUED | OUTPATIENT
Start: 2017-11-14 | End: 2017-11-17 | Stop reason: HOSPADM

## 2017-11-14 RX ORDER — ACETAMINOPHEN 500 MG
1000 TABLET ORAL ONCE
Status: COMPLETED | OUTPATIENT
Start: 2017-11-14 | End: 2017-11-14

## 2017-11-14 RX ORDER — ALBUTEROL SULFATE 90 UG/1
2 AEROSOL, METERED RESPIRATORY (INHALATION)
Status: DISCONTINUED | OUTPATIENT
Start: 2017-11-14 | End: 2017-11-14 | Stop reason: CLARIF

## 2017-11-14 RX ORDER — ARFORMOTEROL TARTRATE 15 UG/2ML
15 SOLUTION RESPIRATORY (INHALATION)
Status: DISCONTINUED | OUTPATIENT
Start: 2017-11-14 | End: 2017-11-17 | Stop reason: HOSPADM

## 2017-11-14 RX ORDER — SODIUM CHLORIDE 0.9 % (FLUSH) 0.9 %
5-10 SYRINGE (ML) INJECTION EVERY 8 HOURS
Status: DISCONTINUED | OUTPATIENT
Start: 2017-11-15 | End: 2017-11-17 | Stop reason: HOSPADM

## 2017-11-14 RX ORDER — FACIAL-BODY WIPES
10 EACH TOPICAL DAILY PRN
Status: DISCONTINUED | OUTPATIENT
Start: 2017-11-16 | End: 2017-11-17 | Stop reason: HOSPADM

## 2017-11-14 RX ORDER — HYDROXYZINE HYDROCHLORIDE 10 MG/1
10 TABLET, FILM COATED ORAL
Status: DISCONTINUED | OUTPATIENT
Start: 2017-11-14 | End: 2017-11-17 | Stop reason: HOSPADM

## 2017-11-14 RX ORDER — MIDAZOLAM HYDROCHLORIDE 1 MG/ML
0.5 INJECTION, SOLUTION INTRAMUSCULAR; INTRAVENOUS
Status: DISCONTINUED | OUTPATIENT
Start: 2017-11-14 | End: 2017-11-14 | Stop reason: HOSPADM

## 2017-11-14 RX ORDER — ALBUTEROL SULFATE 0.83 MG/ML
2.5 SOLUTION RESPIRATORY (INHALATION)
Status: DISCONTINUED | OUTPATIENT
Start: 2017-11-14 | End: 2017-11-17 | Stop reason: HOSPADM

## 2017-11-14 RX ORDER — DEXAMETHASONE SODIUM PHOSPHATE 10 MG/ML
10 INJECTION INTRAMUSCULAR; INTRAVENOUS ONCE
Status: DISCONTINUED | OUTPATIENT
Start: 2017-11-14 | End: 2017-11-14 | Stop reason: HOSPADM

## 2017-11-14 RX ORDER — ROSUVASTATIN CALCIUM 10 MG/1
10 TABLET, COATED ORAL
Status: DISCONTINUED | OUTPATIENT
Start: 2017-11-14 | End: 2017-11-16

## 2017-11-14 RX ORDER — OXYCODONE HYDROCHLORIDE 5 MG/1
10 TABLET ORAL
Status: DISCONTINUED | OUTPATIENT
Start: 2017-11-14 | End: 2017-11-17 | Stop reason: HOSPADM

## 2017-11-14 RX ORDER — DEXLANSOPRAZOLE 60 MG/1
60 CAPSULE, DELAYED RELEASE ORAL
Status: DISCONTINUED | OUTPATIENT
Start: 2017-11-15 | End: 2017-11-14 | Stop reason: CLARIF

## 2017-11-14 RX ORDER — MAGNESIUM SULFATE 100 %
4 CRYSTALS MISCELLANEOUS AS NEEDED
Status: DISCONTINUED | OUTPATIENT
Start: 2017-11-14 | End: 2017-11-17 | Stop reason: HOSPADM

## 2017-11-14 RX ORDER — SODIUM CHLORIDE, SODIUM LACTATE, POTASSIUM CHLORIDE, CALCIUM CHLORIDE 600; 310; 30; 20 MG/100ML; MG/100ML; MG/100ML; MG/100ML
125 INJECTION, SOLUTION INTRAVENOUS CONTINUOUS
Status: DISCONTINUED | OUTPATIENT
Start: 2017-11-14 | End: 2017-11-14 | Stop reason: HOSPADM

## 2017-11-14 RX ORDER — OXYCODONE HYDROCHLORIDE 5 MG/1
20 TABLET ORAL
Status: DISCONTINUED | OUTPATIENT
Start: 2017-11-14 | End: 2017-11-14 | Stop reason: SDUPTHER

## 2017-11-14 RX ORDER — OXYCODONE HYDROCHLORIDE 5 MG/1
20 TABLET ORAL
Status: DISCONTINUED | OUTPATIENT
Start: 2017-11-14 | End: 2017-11-15 | Stop reason: SDUPTHER

## 2017-11-14 RX ORDER — SODIUM CHLORIDE, SODIUM LACTATE, POTASSIUM CHLORIDE, CALCIUM CHLORIDE 600; 310; 30; 20 MG/100ML; MG/100ML; MG/100ML; MG/100ML
INJECTION, SOLUTION INTRAVENOUS
Status: DISCONTINUED | OUTPATIENT
Start: 2017-11-14 | End: 2017-11-14 | Stop reason: HOSPADM

## 2017-11-14 RX ORDER — OXYCODONE AND ACETAMINOPHEN 5; 325 MG/1; MG/1
1 TABLET ORAL AS NEEDED
Status: DISCONTINUED | OUTPATIENT
Start: 2017-11-14 | End: 2017-11-14 | Stop reason: HOSPADM

## 2017-11-14 RX ORDER — ONDANSETRON 2 MG/ML
4 INJECTION INTRAMUSCULAR; INTRAVENOUS
Status: ACTIVE | OUTPATIENT
Start: 2017-11-14 | End: 2017-11-15

## 2017-11-14 RX ORDER — CEFAZOLIN SODIUM/WATER 2 G/20 ML
2 SYRINGE (ML) INTRAVENOUS ONCE
Status: COMPLETED | OUTPATIENT
Start: 2017-11-14 | End: 2017-11-14

## 2017-11-14 RX ORDER — LIDOCAINE HYDROCHLORIDE 10 MG/ML
0.1 INJECTION, SOLUTION EPIDURAL; INFILTRATION; INTRACAUDAL; PERINEURAL AS NEEDED
Status: DISCONTINUED | OUTPATIENT
Start: 2017-11-14 | End: 2017-11-14 | Stop reason: HOSPADM

## 2017-11-14 RX ORDER — MIDAZOLAM HYDROCHLORIDE 1 MG/ML
1 INJECTION, SOLUTION INTRAMUSCULAR; INTRAVENOUS AS NEEDED
Status: DISCONTINUED | OUTPATIENT
Start: 2017-11-14 | End: 2017-11-14 | Stop reason: HOSPADM

## 2017-11-14 RX ORDER — AMOXICILLIN 250 MG
1 CAPSULE ORAL 2 TIMES DAILY
Status: DISCONTINUED | OUTPATIENT
Start: 2017-11-14 | End: 2017-11-16

## 2017-11-14 RX ORDER — CEFAZOLIN SODIUM/WATER 2 G/20 ML
2 SYRINGE (ML) INTRAVENOUS EVERY 8 HOURS
Status: COMPLETED | OUTPATIENT
Start: 2017-11-14 | End: 2017-11-15

## 2017-11-14 RX ORDER — PREGABALIN 75 MG/1
75 CAPSULE ORAL ONCE
Status: COMPLETED | OUTPATIENT
Start: 2017-11-14 | End: 2017-11-14

## 2017-11-14 RX ORDER — POLYETHYLENE GLYCOL 3350 17 G/17G
17 POWDER, FOR SOLUTION ORAL DAILY
Status: DISCONTINUED | OUTPATIENT
Start: 2017-11-15 | End: 2017-11-16

## 2017-11-14 RX ORDER — TRANEXAMIC ACID 100 MG/ML
INJECTION, SOLUTION INTRAVENOUS AS NEEDED
Status: DISCONTINUED | OUTPATIENT
Start: 2017-11-14 | End: 2017-11-14 | Stop reason: HOSPADM

## 2017-11-14 RX ORDER — PROPOFOL 10 MG/ML
INJECTION, EMULSION INTRAVENOUS
Status: DISCONTINUED | OUTPATIENT
Start: 2017-11-14 | End: 2017-11-14 | Stop reason: HOSPADM

## 2017-11-14 RX ORDER — KETOROLAC TROMETHAMINE 30 MG/ML
15 INJECTION, SOLUTION INTRAMUSCULAR; INTRAVENOUS EVERY 6 HOURS
Status: COMPLETED | OUTPATIENT
Start: 2017-11-14 | End: 2017-11-15

## 2017-11-14 RX ORDER — SODIUM CHLORIDE 0.9 % (FLUSH) 0.9 %
5-10 SYRINGE (ML) INJECTION EVERY 8 HOURS
Status: DISCONTINUED | OUTPATIENT
Start: 2017-11-14 | End: 2017-11-14 | Stop reason: HOSPADM

## 2017-11-14 RX ORDER — GABAPENTIN 100 MG/1
200 CAPSULE ORAL 3 TIMES DAILY
Status: DISCONTINUED | OUTPATIENT
Start: 2017-11-14 | End: 2017-11-17 | Stop reason: HOSPADM

## 2017-11-14 RX ORDER — PHENYLEPHRINE HCL IN 0.9% NACL 0.4MG/10ML
SYRINGE (ML) INTRAVENOUS AS NEEDED
Status: DISCONTINUED | OUTPATIENT
Start: 2017-11-14 | End: 2017-11-14 | Stop reason: HOSPADM

## 2017-11-14 RX ORDER — SODIUM CHLORIDE 9 MG/ML
1000 INJECTION, SOLUTION INTRAVENOUS CONTINUOUS
Status: DISCONTINUED | OUTPATIENT
Start: 2017-11-14 | End: 2017-11-14 | Stop reason: HOSPADM

## 2017-11-14 RX ORDER — FLUTICASONE PROPIONATE AND SALMETEROL 100; 50 UG/1; UG/1
1 POWDER RESPIRATORY (INHALATION) EVERY 12 HOURS
Status: DISCONTINUED | OUTPATIENT
Start: 2017-11-14 | End: 2017-11-14 | Stop reason: CLARIF

## 2017-11-14 RX ORDER — INSULIN LISPRO 100 [IU]/ML
INJECTION, SOLUTION INTRAVENOUS; SUBCUTANEOUS
Status: DISCONTINUED | OUTPATIENT
Start: 2017-11-14 | End: 2017-11-15 | Stop reason: SDUPTHER

## 2017-11-14 RX ORDER — DIPHENHYDRAMINE HYDROCHLORIDE 50 MG/ML
12.5 INJECTION, SOLUTION INTRAMUSCULAR; INTRAVENOUS AS NEEDED
Status: DISCONTINUED | OUTPATIENT
Start: 2017-11-14 | End: 2017-11-14 | Stop reason: HOSPADM

## 2017-11-14 RX ORDER — DEXTROSE 50 % IN WATER (D50W) INTRAVENOUS SYRINGE
12.5-25 AS NEEDED
Status: DISCONTINUED | OUTPATIENT
Start: 2017-11-14 | End: 2017-11-17 | Stop reason: HOSPADM

## 2017-11-14 RX ORDER — TIMOLOL MALEATE 5 MG/ML
1 SOLUTION/ DROPS OPHTHALMIC
Status: DISCONTINUED | OUTPATIENT
Start: 2017-11-14 | End: 2017-11-17 | Stop reason: HOSPADM

## 2017-11-14 RX ORDER — BRIMONIDINE TARTRATE 2 MG/ML
1 SOLUTION/ DROPS OPHTHALMIC
Status: DISCONTINUED | OUTPATIENT
Start: 2017-11-14 | End: 2017-11-17 | Stop reason: HOSPADM

## 2017-11-14 RX ORDER — NALOXONE HYDROCHLORIDE 0.4 MG/ML
0.4 INJECTION, SOLUTION INTRAMUSCULAR; INTRAVENOUS; SUBCUTANEOUS AS NEEDED
Status: DISCONTINUED | OUTPATIENT
Start: 2017-11-14 | End: 2017-11-17 | Stop reason: HOSPADM

## 2017-11-14 RX ORDER — PANTOPRAZOLE SODIUM 40 MG/1
40 TABLET, DELAYED RELEASE ORAL
Status: DISCONTINUED | OUTPATIENT
Start: 2017-11-15 | End: 2017-11-17 | Stop reason: HOSPADM

## 2017-11-14 RX ORDER — FENTANYL CITRATE 50 UG/ML
25 INJECTION, SOLUTION INTRAMUSCULAR; INTRAVENOUS
Status: DISCONTINUED | OUTPATIENT
Start: 2017-11-14 | End: 2017-11-14 | Stop reason: HOSPADM

## 2017-11-14 RX ORDER — LATANOPROST 50 UG/ML
1 SOLUTION/ DROPS OPHTHALMIC
Status: DISCONTINUED | OUTPATIENT
Start: 2017-11-14 | End: 2017-11-17 | Stop reason: HOSPADM

## 2017-11-14 RX ORDER — HYDROMORPHONE HYDROCHLORIDE 1 MG/ML
0.2 INJECTION, SOLUTION INTRAMUSCULAR; INTRAVENOUS; SUBCUTANEOUS
Status: DISCONTINUED | OUTPATIENT
Start: 2017-11-14 | End: 2017-11-14 | Stop reason: HOSPADM

## 2017-11-14 RX ORDER — ACETAMINOPHEN 500 MG
500 TABLET ORAL
Status: DISCONTINUED | OUTPATIENT
Start: 2017-11-14 | End: 2017-11-17 | Stop reason: HOSPADM

## 2017-11-14 RX ORDER — BUPIVACAINE HYDROCHLORIDE 5 MG/ML
INJECTION, SOLUTION EPIDURAL; INTRACAUDAL AS NEEDED
Status: DISCONTINUED | OUTPATIENT
Start: 2017-11-14 | End: 2017-11-14 | Stop reason: HOSPADM

## 2017-11-14 RX ORDER — OXYCODONE HYDROCHLORIDE 5 MG/1
20 TABLET ORAL
Status: COMPLETED | OUTPATIENT
Start: 2017-11-14 | End: 2017-11-14

## 2017-11-14 RX ORDER — FOSINOPRIL SODIUM 20 MG/1
20 TABLET ORAL DAILY
Status: DISCONTINUED | OUTPATIENT
Start: 2017-11-15 | End: 2017-11-14 | Stop reason: CLARIF

## 2017-11-14 RX ORDER — SODIUM CHLORIDE 9 MG/ML
125 INJECTION, SOLUTION INTRAVENOUS CONTINUOUS
Status: DISPENSED | OUTPATIENT
Start: 2017-11-14 | End: 2017-11-15

## 2017-11-14 RX ORDER — ONDANSETRON 2 MG/ML
INJECTION INTRAMUSCULAR; INTRAVENOUS AS NEEDED
Status: DISCONTINUED | OUTPATIENT
Start: 2017-11-14 | End: 2017-11-14 | Stop reason: HOSPADM

## 2017-11-14 RX ORDER — HYDROMORPHONE HYDROCHLORIDE 1 MG/ML
0.5 INJECTION, SOLUTION INTRAMUSCULAR; INTRAVENOUS; SUBCUTANEOUS
Status: DISPENSED | OUTPATIENT
Start: 2017-11-14 | End: 2017-11-15

## 2017-11-14 RX ORDER — CELECOXIB 200 MG/1
200 CAPSULE ORAL ONCE
Status: COMPLETED | OUTPATIENT
Start: 2017-11-14 | End: 2017-11-14

## 2017-11-14 RX ADMIN — MIDAZOLAM HYDROCHLORIDE 1 MG: 1 INJECTION, SOLUTION INTRAMUSCULAR; INTRAVENOUS at 13:57

## 2017-11-14 RX ADMIN — OXYCODONE HYDROCHLORIDE 20 MG: 5 TABLET ORAL at 10:22

## 2017-11-14 RX ADMIN — HYDROMORPHONE HYDROCHLORIDE 0.2 MG: 1 INJECTION, SOLUTION INTRAMUSCULAR; INTRAVENOUS; SUBCUTANEOUS at 16:57

## 2017-11-14 RX ADMIN — ACETAMINOPHEN 1000 MG: 500 TABLET, FILM COATED ORAL at 10:22

## 2017-11-14 RX ADMIN — GABAPENTIN 200 MG: 100 CAPSULE ORAL at 18:45

## 2017-11-14 RX ADMIN — LATANOPROST 1 DROP: 50 SOLUTION OPHTHALMIC at 22:00

## 2017-11-14 RX ADMIN — FENTANYL CITRATE 50 MCG: 50 INJECTION, SOLUTION INTRAMUSCULAR; INTRAVENOUS at 11:30

## 2017-11-14 RX ADMIN — BUSPIRONE HYDROCHLORIDE 15 MG: 10 TABLET ORAL at 21:24

## 2017-11-14 RX ADMIN — PROPOFOL 25 MCG/KG/MIN: 10 INJECTION, EMULSION INTRAVENOUS at 11:45

## 2017-11-14 RX ADMIN — MORPHINE SULFATE 2 MG: 10 INJECTION, SOLUTION INTRAMUSCULAR; INTRAVENOUS at 16:16

## 2017-11-14 RX ADMIN — MORPHINE SULFATE 2 MG: 10 INJECTION, SOLUTION INTRAMUSCULAR; INTRAVENOUS at 16:11

## 2017-11-14 RX ADMIN — FENTANYL CITRATE 25 MCG: 50 INJECTION, SOLUTION INTRAMUSCULAR; INTRAVENOUS at 15:57

## 2017-11-14 RX ADMIN — FENTANYL CITRATE 50 MCG: 50 INJECTION, SOLUTION INTRAMUSCULAR; INTRAVENOUS at 11:08

## 2017-11-14 RX ADMIN — ACETAMINOPHEN 500 MG: 500 TABLET, FILM COATED ORAL at 21:24

## 2017-11-14 RX ADMIN — FENTANYL CITRATE 25 MCG: 50 INJECTION, SOLUTION INTRAMUSCULAR; INTRAVENOUS at 15:52

## 2017-11-14 RX ADMIN — KETOROLAC TROMETHAMINE 15 MG: 30 INJECTION, SOLUTION INTRAMUSCULAR at 21:24

## 2017-11-14 RX ADMIN — FENTANYL CITRATE 25 MCG: 50 INJECTION, SOLUTION INTRAMUSCULAR; INTRAVENOUS at 16:02

## 2017-11-14 RX ADMIN — Medication 2 G: at 18:44

## 2017-11-14 RX ADMIN — Medication 2 G: at 11:40

## 2017-11-14 RX ADMIN — HYDROMORPHONE HYDROCHLORIDE 0.2 MG: 1 INJECTION, SOLUTION INTRAMUSCULAR; INTRAVENOUS; SUBCUTANEOUS at 17:15

## 2017-11-14 RX ADMIN — ROSUVASTATIN CALCIUM 10 MG: 10 TABLET, FILM COATED ORAL at 21:24

## 2017-11-14 RX ADMIN — SODIUM CHLORIDE, SODIUM LACTATE, POTASSIUM CHLORIDE, CALCIUM CHLORIDE: 600; 310; 30; 20 INJECTION, SOLUTION INTRAVENOUS at 11:02

## 2017-11-14 RX ADMIN — OXYCODONE HYDROCHLORIDE 10 MG: 5 TABLET ORAL at 18:44

## 2017-11-14 RX ADMIN — MIDAZOLAM HYDROCHLORIDE 1 MG: 1 INJECTION, SOLUTION INTRAMUSCULAR; INTRAVENOUS at 11:30

## 2017-11-14 RX ADMIN — HYDROMORPHONE HYDROCHLORIDE 0.2 MG: 1 INJECTION, SOLUTION INTRAMUSCULAR; INTRAVENOUS; SUBCUTANEOUS at 16:42

## 2017-11-14 RX ADMIN — DEXAMETHASONE SODIUM PHOSPHATE 4 MG: 4 INJECTION, SOLUTION INTRA-ARTICULAR; INTRALESIONAL; INTRAMUSCULAR; INTRAVENOUS; SOFT TISSUE at 12:15

## 2017-11-14 RX ADMIN — SODIUM CHLORIDE, SODIUM LACTATE, POTASSIUM CHLORIDE, CALCIUM CHLORIDE: 600; 310; 30; 20 INJECTION, SOLUTION INTRAVENOUS at 12:40

## 2017-11-14 RX ADMIN — BUSPIRONE HYDROCHLORIDE 15 MG: 10 TABLET ORAL at 18:45

## 2017-11-14 RX ADMIN — BUPIVACAINE HYDROCHLORIDE 12 MG: 5 INJECTION, SOLUTION EPIDURAL; INTRACAUDAL at 11:34

## 2017-11-14 RX ADMIN — Medication 40 MCG: at 11:54

## 2017-11-14 RX ADMIN — AMLODIPINE BESYLATE 10 MG: 5 TABLET ORAL at 21:24

## 2017-11-14 RX ADMIN — SODIUM CHLORIDE, SODIUM LACTATE, POTASSIUM CHLORIDE, AND CALCIUM CHLORIDE 125 ML/HR: 600; 310; 30; 20 INJECTION, SOLUTION INTRAVENOUS at 10:50

## 2017-11-14 RX ADMIN — BRIMONIDINE TARTRATE 1 DROP: 2 SOLUTION OPHTHALMIC at 22:06

## 2017-11-14 RX ADMIN — SUCRALFATE 1 G: 1 TABLET ORAL at 21:24

## 2017-11-14 RX ADMIN — Medication 80 MCG: at 12:33

## 2017-11-14 RX ADMIN — BUDESONIDE 500 MCG: 0.5 INHALANT RESPIRATORY (INHALATION) at 22:21

## 2017-11-14 RX ADMIN — Medication 40 MCG: at 11:48

## 2017-11-14 RX ADMIN — MIDAZOLAM HYDROCHLORIDE 3 MG: 1 INJECTION, SOLUTION INTRAMUSCULAR; INTRAVENOUS at 11:08

## 2017-11-14 RX ADMIN — WARFARIN SODIUM 3 MG: 1 TABLET ORAL at 18:46

## 2017-11-14 RX ADMIN — PREGABALIN 75 MG: 75 CAPSULE ORAL at 10:22

## 2017-11-14 RX ADMIN — GABAPENTIN 200 MG: 100 CAPSULE ORAL at 21:24

## 2017-11-14 RX ADMIN — ONDANSETRON 4 MG: 2 INJECTION INTRAMUSCULAR; INTRAVENOUS at 14:47

## 2017-11-14 RX ADMIN — METOPROLOL TARTRATE 12.5 MG: 25 TABLET ORAL at 18:45

## 2017-11-14 RX ADMIN — MORPHINE SULFATE 2 MG: 10 INJECTION, SOLUTION INTRAMUSCULAR; INTRAVENOUS at 16:25

## 2017-11-14 RX ADMIN — SODIUM CHLORIDE 125 ML/HR: 900 INJECTION, SOLUTION INTRAVENOUS at 16:11

## 2017-11-14 RX ADMIN — CELECOXIB 200 MG: 200 CAPSULE ORAL at 10:22

## 2017-11-14 RX ADMIN — ACETAMINOPHEN 500 MG: 500 TABLET, FILM COATED ORAL at 18:46

## 2017-11-14 RX ADMIN — HYDROMORPHONE HYDROCHLORIDE 0.2 MG: 1 INJECTION, SOLUTION INTRAMUSCULAR; INTRAVENOUS; SUBCUTANEOUS at 16:27

## 2017-11-14 RX ADMIN — DOCUSATE SODIUM AND SENNOSIDES 1 TABLET: 8.6; 5 TABLET, FILM COATED ORAL at 18:46

## 2017-11-14 RX ADMIN — ARFORMOTEROL TARTRATE 15 MCG: 15 SOLUTION RESPIRATORY (INHALATION) at 22:21

## 2017-11-14 RX ADMIN — FENTANYL CITRATE 50 MCG: 50 INJECTION, SOLUTION INTRAMUSCULAR; INTRAVENOUS at 15:12

## 2017-11-14 RX ADMIN — Medication 80 MCG: at 12:00

## 2017-11-14 RX ADMIN — MORPHINE SULFATE 2 MG: 10 INJECTION, SOLUTION INTRAMUSCULAR; INTRAVENOUS at 16:06

## 2017-11-14 RX ADMIN — INSULIN LISPRO 3 UNITS: 100 INJECTION, SOLUTION INTRAVENOUS; SUBCUTANEOUS at 22:06

## 2017-11-14 RX ADMIN — LIDOCAINE HYDROCHLORIDE 0.1 ML: 10 INJECTION, SOLUTION EPIDURAL; INFILTRATION; INTRACAUDAL; PERINEURAL at 10:50

## 2017-11-14 NOTE — BRIEF OP NOTE
BRIEF OPERATIVE NOTE    Date of Procedure: 11/14/2017   Preoperative Diagnosis: PAIN DUE TO TOTAL RIGHT KNEE REPLACEMENT, STATUS POST RIGHT TOTAL KNEE REPLACEMENT   Postoperative Diagnosis: PAIN DUE TO TOTAL RIGHT KNEE REPLACEMENT, STATUS POST RIGHT TOTAL KNEE REPLACEMENT     Procedure(s):  REVISION OF RIGHT TOTAL KNEE REPLACEMENT (SPINAL W/ IV SEDATION)  Surgeon(s) and Role:     * Anita Chavez MD - Primary         Assistant Staff:       Surgical Staff:  Circ-1: Duncan Richardson RN  Circ-Relief: Talia Ryder RN; Lyric Marcos RN  Scrub RN-1: Glenn Rojo RN  Surg Asst-1: Ivelisse Null  Surg Asst-Relief: Kaitlynn Hunter  Event Time In   Incision Start 1204   Incision Close      Anesthesia: Spinal   Estimated Blood Loss: 200  Specimens:   ID Type Source Tests Collected by Time Destination   2 : Right Femoral Interface Membrane Frozen Section Femoral, right  Anita Chavez MD 11/14/2017 1232 Pathology   1 : Right Femoral Interface Membrane Tissue Femoral, right CULTURE, ANAEROBIC AND AEROBIC Anita Chavez MD 11/14/2017 1236 Microbiology   2 : Right Tibial Interface Tissue Tibia CULTURE, ANAEROBIC AND AEROBIC Anita Chavez MD 11/14/2017 1245 Microbiology   3 : right knee synovium Tissue Knee, right CULTURE, ANAEROBIC, CULTURE, TISSUE W GRAM STAIN Anita Chavez MD 11/14/2017 1252 Microbiology      Findings: malrotated tibia; golbal instability   Complications: none  Implants:   Implant Name Type Inv.  Item Serial No.  Lot No. LRB No. Used Action   CEMENT BNE GENTAMC MV [id number] -- SMARTSET ENDURANCE - SN/A  CEMENT BNE GENTAMC MV 40GM -- SMARTSET ENDURANCE N/A [facility] [employer] [id number] Right 2 Implanted   NexGen Stem Extension, Sharp Fluted, 14mm latrell x 75mm Length Joint Component  NA [employer] [id number] Right 1 Implanted   CONE TIB AUG MED [id number] -- TRABECULAR METAL - SNA  CONE TIB AUG MED [id number] -- TRABECULAR METAL NA [employer] [id number] Right 1 Implanted   PLATE TIB WDG PC SZ 3 TIV -- NEXGEN - SNA PLATE TIB WDG PC SZ 3 TIV -- NEXGEN NA [employer] [id number] Right 1 Implanted   EXT STEM FEM FLUT NXGN [id number] --  - SNA  EXT STEM FEM FLUT NXGN [id number] --  NA [employer] [id number] Right 1 Implanted   PAT STD POLYETH NXGN 8X29MM --  - SNA  PAT STD POLYETH NXGN 8X29MM --  NA [employer] [id number] Right 1 Implanted   COMPNT KNEE FEM LCCK SZ E RT --  - SNA  COMPNT KNEE FEM LCCK SZ E RT --  NA [employer] [id number] Right 1 Implanted   SPACER FEM POST PC NXGN SZ E --  - SNA  SPACER FEM POST PC NXGN SZ E --  NA [employer] [id number] Right 1 Implanted   SPACER FEM POST PC NXGN SZ E --  - SNA  SPACER FEM POST PC NXGN SZ E --  NA [employer] [id number] Right 1 Implanted   INSERT TIB CCK LPS EF 3-4 12MM -- NEXGEN ARTC SURF YEL STRIPE - SNA   INSERT TIB CCK LPS EF 3-4 12MM -- NEXGEN ARTC SURF YEL STRIPE NA [employer] [id number] Right 1 Implanted

## 2017-11-14 NOTE — IP AVS SNAPSHOT
[address] 
[phone] Patient: Coleman November MRN: [id number] :1940 My Medications STOP taking these medications   
 diclofenac 1 % Gel Commonly known as:  VOLTAREN  
   
  
  
TAKE these medications as instructed Instructions Each Dose to Equal  
 Morning Noon Evening Bedtime  
 acetaminophen 500 mg tablet Commonly known as:  TYLENOL Your last dose was: Your next dose is: Take 1 Tab by mouth every four (4) hours (while awake). Schedule for pain control over next 7-10 day. Do not exceed 2,500 mg in 24 hours. Indications: Postoperative Incisional Knee Pain 500 mg ADVAIR DISKUS 100-50 mcg/dose diskus inhaler Generic drug:  fluticasone-salmeterol Your last dose was: Your next dose is: Take 1 Puff by inhalation every twelve (12) hours. 1 Puff  
    
   
   
   
  
 albuterol 90 mcg/actuation inhaler Commonly known as:  PROVENTIL HFA, VENTOLIN HFA, PROAIR HFA Your last dose was: Your next dose is: Take 2 Puffs by inhalation every six (6) hours as needed for Wheezing. 2 Puff  
    
   
   
   
  
 amLODIPine 10 mg tablet Commonly known as:  Love Breach Your last dose was: Your next dose is: Take 10 mg by mouth nightly. 10 mg  
    
   
   
   
  
 busPIRone 15 mg tablet Commonly known as:  BUSPAR Your last dose was: Your next dose is: TAKE 1 (ONE) TABLET BY MOUTH THREE TIMES DAILY, AS NEEDED  
     
   
   
   
  
 COMBIGAN 0.2-0.5 % Drop ophthalmic solution Generic drug:  brimonidine-timolol Your last dose was: Your next dose is:    
   
   
 Administer 1 Drop to right eye nightly. 1 Drop DEXILANT 60 mg Cpdb Generic drug:  Dexlansoprazole Your last dose was: Your next dose is: Take 60 mg by mouth Daily (before breakfast). 60 mg  
    
   
   
   
  
 fosinopril 20 mg tablet Commonly known as:  MONOPRIL Your last dose was: Your next dose is: Take 20 mg by mouth daily. 20 mg  
    
   
   
   
  
 gabapentin 100 mg capsule Commonly known as:  NEURONTIN Your last dose was: Your next dose is:    
   
   
 2 CAPSULES(200MG) BY MOUTH 3 TIMES A DAY FOR 30 DAYS  
     
   
   
   
  
 LANTUS SOLOSTAR 100 unit/mL (3 mL) Inpn Generic drug:  insulin glargine Your last dose was: Your next dose is:    
   
   
 20 Units by SubCUTAneous route two (2) times a day. Indications: Diabetes Mellitus 20 Units  
    
   
   
   
  
 linaclotide 145 mcg Cap capsule Commonly known as:  Negrita Ra Your last dose was: Your next dose is: Take 145 mcg by mouth Daily (before breakfast). 145 mcg  
    
   
   
   
  
 metoprolol tartrate 25 mg tablet Commonly known as:  LOPRESSOR Your last dose was: Your next dose is: TAKE 0.5 TABLET BY ORAL ROUTE 2 TIMES EVERY DAY  
     
   
   
   
  
 NARCAN 4 mg/actuation nasal spray Generic drug:  naloxone Your last dose was: Your next dose is:    
   
   
 1 [place] by IntraNASal route once as needed. Use 1 spray intranasally into 1 nostril. Use a new Narcan nasal spray for subsequent doses and administer into alternating nostrils. May repeat every 2 to 3 minutes as needed. 1 Spray  
    
   
   
   
  
 nitroglycerin 0.4 mg SL tablet Commonly known as:  NITROSTAT Your last dose was: Your next dose is: 0.4 mg by SubLINGual route every five (5) minutes as needed for Chest Pain. 0.4 mg  
    
   
   
   
  
 oxyCODONE IR 10 mg Tab immediate release tablet Commonly known as:  Chai Cantrell Your last dose was: Your next dose is: Take 1-2 Tabs by mouth every four (4) hours as needed. Max Daily Amount: 120 mg. Indications: Severe Incisional Knee Pain 10-20 mg  
    
   
   
   
  
 promethazine 25 mg tablet Commonly known as:  PHENERGAN Your last dose was: Your next dose is: TAKE 1 TABLET BY MOUTH EVERY 6 HOURS AS NEEDED FOR NAUSEA/VOMITING  
     
   
   
   
  
 rosuvastatin 10 mg tablet Commonly known as:  CRESTOR Your last dose was: Your next dose is: Take 10 mg by mouth nightly. 10 mg  
    
   
   
   
  
 sucralfate 1 gram tablet Commonly known as:  Adalgisa Isjacek Your last dose was: Your next dose is: Take 1 Tab by mouth four (4) times daily. 1 g  
    
   
   
   
  
 TRAVATAN Z 0.004 % ophthalmic solution Generic drug:  travoprost  
   
Your last dose was: Your next dose is:    
   
   
 Administer 1 Drop to both eyes daily. 1 Drop  
    
   
   
   
  
 warfarin 2 mg tablet Commonly known as:  COUMADIN Start taking on:  11/18/2017 Your last dose was: Your next dose is:    
   
   
 Starting tomorrow:  Take 1 Tab (2 mg) by mouth daily at 5 pm.  Take as directed, dose adjustment for goal lab test INR 1.7-2.0  Indications: [address]  Where to Get Your Medications Information on where to get these meds will be given to you by the nurse or doctor. ! Ask your nurse or doctor about these medications  
  acetaminophen 500 mg tablet  
 oxyCODONE IR 10 mg Tab immediate release tablet  
 warfarin 2 mg tablet

## 2017-11-14 NOTE — ANESTHESIA POSTPROCEDURE EVALUATION
Post-Anesthesia Evaluation and Assessment    Patient: Artemio Kaplan MRN: [id number]  SSN: [id number]    YOB: 1940  Age: 69 y.o. Sex: female       Cardiovascular Function/Vital Signs  Visit Vitals    /77 (BP 1 Location: Left arm, BP Patient Position: At rest)    Pulse 86    Temp 37.1 °C (98.8 °F)    Resp 16    Ht 5' (1.524 m)    Wt 79.4 kg (175 lb)    SpO2 100%    BMI 34.18 kg/m2       Patient is status post spinal anesthesia for Procedure(s):  REVISION OF RIGHT TOTAL KNEE REPLACEMENT (SPINAL W/ IV SEDATION). Nausea/Vomiting: None    Postoperative hydration reviewed and adequate. Pain:  Pain Scale 1: Numeric (0 - 10) (11/14/17 1115)  Pain Intensity 1: 4 (11/14/17 1115)   Managed    Neurological Status:   Neuro (WDL): Within Defined Limits (11/14/17 1045)   At baseline    Mental Status and Level of Consciousness: Arousable    Pulmonary Status:   O2 Device: Nasal cannula (11/14/17 1115)   Adequate oxygenation and airway patent    Complications related to anesthesia: None    Post-anesthesia assessment completed.  No concerns    Signed By: Lydia Post MD     November 14, 2017

## 2017-11-14 NOTE — PROGRESS NOTES
Pharmacist Note  Warfarin Dosing  Consult provided for this 69 y.o. female to manage warfarin for VTE prophylaxis s/p revision of  right TKR    INR Goal: 1.7- 2.2    Therapy Day: 1    Preop Dose: Guillermo- 4mg    Drugs that may increase INR: None  Drugs that may decrease INR: None  Other current anticoagulants/ drugs that may increase bleeding risk: NSAID  Risk factors: Age > 65  Daily INR ordered: YES    No results for input(s): HGB, INR, HGBEXT in the last 72 hours. No lab exists for component: INREXT    Date               INR                 Dose  10/27  1.1    11/13                                          4 mg  11/14                                          3 mg    Assessment/ Plan: Will order warfarin 3 mg PO x 1 dose. Pharmacy will continue to monitor daily and adjust therapy as indicated.

## 2017-11-14 NOTE — PERIOP NOTES
TRANSFER - OUT REPORT:    Verbal report given to Husam Cochran, RNs on Roberto Burden  being transferred to [address] [place] [address] for routine post - op       Report consisted of patients Situation, Background, Assessment and   Recommendations(SBAR). Time Pre op antibiotic given:1140  Anesthesia Stop time: 2227  Barclay Present on Transfer to floor:yes  Order for Barclay on Chart:yes    Information from the following report(s) SBAR, Kardex, OR Summary, Procedure Summary, Intake/Output and MAR was reviewed with the receiving nurse. Opportunity for questions and clarification was provided. Is the patient on 02? NO       L/Min        Other     Is the patient on a monitor? NO    Is the nurse transporting with the patient? NO    Surgical Waiting Area notified of patient's transfer from PACU? YES      The following personal items collected during your admission accompanied patient upon transfer:   Dental Appliance: Dental Appliances: None  Vision: Visual Aid: Glasses (with family)  Hearing Aid:    Jewelry: Jewelry: None  Clothing: Clothing:  (bag of clothing returned to patient at bedside)  Other Valuables:  Other Valuables: None  Valuables sent to safe:

## 2017-11-14 NOTE — PROGRESS NOTES
Primary Nurse Radha Melendez RN and Carey Turner., RN performed a dual skin assessment on this patient No impairment noted  Yovani score is 21

## 2017-11-14 NOTE — IP AVS SNAPSHOT
[address] [facility] [address] 
[phone] Patient: Yolanda Rodarte MRN: [id number] :1940 About your hospitalization You were admitted on:  2017 You last received care in the:  [id number] [place] [address] You were discharged on:  2017 Why you were hospitalized Your primary diagnosis was:  Pain Due To Knee Joint Prosthesis (Hcc) Things You Need To Do (next 8 weeks) Follow up with April MD Mauro  
  
Phone:  [phone] Where:  [facility] ARREDONDO [address], , [address] [place] [address] [id number] [id number] Discharge Orders None A check gary indicates which time of day the medication should be taken. My Medications STOP taking these medications   
 diclofenac 1 % Gel Commonly known as:  VOLTAREN  
   
  
  
TAKE these medications as instructed Instructions Each Dose to Equal  
 Morning Noon Evening Bedtime  
 acetaminophen 500 mg tablet Commonly known as:  TYLENOL Your last dose was: Your next dose is: Take 1 Tab by mouth every four (4) hours (while awake). Schedule for pain control over next 7-10 day. Do not exceed 2,500 mg in 24 hours. Indications: Postoperative Incisional Knee Pain 500 mg ADVAIR DISKUS 100-50 mcg/dose diskus inhaler Generic drug:  fluticasone-salmeterol Your last dose was: Your next dose is: Take 1 Puff by inhalation every twelve (12) hours. 1 Puff  
    
   
   
   
  
 albuterol 90 mcg/actuation inhaler Commonly known as:  PROVENTIL HFA, VENTOLIN HFA, PROAIR HFA Your last dose was: Your next dose is: Take 2 Puffs by inhalation every six (6) hours as needed for Wheezing. 2 Puff  
    
   
   
   
  
 amLODIPine 10 mg tablet Commonly known as:  [place] Blade Your last dose was: Your next dose is: Take 10 mg by mouth nightly. 10 mg  
    
   
   
   
  
 busPIRone 15 mg tablet Commonly known as:  BUSPAR Your last dose was: Your next dose is: TAKE 1 (ONE) TABLET BY MOUTH THREE TIMES DAILY, AS NEEDED  
     
   
   
   
  
 COMBIGAN 0.2-0.5 % Drop ophthalmic solution Generic drug:  brimonidine-timolol Your last dose was: Your next dose is:    
   
   
 Administer 1 Drop to right eye nightly. 1 Drop DEXILANT 60 mg Cpdb Generic drug:  Dexlansoprazole Your last dose was: Your next dose is: Take 60 mg by mouth Daily (before breakfast). 60 mg  
    
   
   
   
  
 fosinopril 20 mg tablet Commonly known as:  MONOPRIL Your last dose was: Your next dose is: Take 20 mg by mouth daily. 20 mg  
    
   
   
   
  
 gabapentin 100 mg capsule Commonly known as:  NEURONTIN Your last dose was: Your next dose is:    
   
   
 2 CAPSULES(200MG) BY MOUTH 3 TIMES A DAY FOR 30 DAYS  
     
   
   
   
  
 LANTUS SOLOSTAR 100 unit/mL (3 mL) Inpn Generic drug:  insulin glargine Your last dose was: Your next dose is:    
   
   
 20 Units by SubCUTAneous route two (2) times a day. Indications: Diabetes Mellitus 20 Units  
    
   
   
   
  
 linaclotide 145 mcg Cap capsule Commonly known as:  Nila Cola Your last dose was: Your next dose is: Take 145 mcg by mouth Daily (before breakfast). 145 mcg  
    
   
   
   
  
 metoprolol tartrate 25 mg tablet Commonly known as:  LOPRESSOR Your last dose was: Your next dose is: TAKE 0.5 TABLET BY ORAL ROUTE 2 TIMES EVERY DAY  
     
   
   
   
  
 NARCAN 4 mg/actuation nasal spray Generic drug:  naloxone Your last dose was: Your next dose is:    
   
   
 1 Lena by IntraNASal route once as needed.  Use 1 spray intranasally into 1 nostril. Use a new Narcan nasal spray for subsequent doses and administer into alternating nostrils. May repeat every 2 to 3 minutes as needed. 1 Spray  
    
   
   
   
  
 nitroglycerin 0.4 mg SL tablet Commonly known as:  NITROSTAT Your last dose was: Your next dose is: 0.4 mg by SubLINGual route every five (5) minutes as needed for Chest Pain. 0.4 mg  
    
   
   
   
  
 oxyCODONE IR 10 mg Tab immediate release tablet Commonly known as:  Vivian Olivia Your last dose was: Your next dose is: Take 1-2 Tabs by mouth every four (4) hours as needed. Max Daily Amount: 120 mg. Indications: Severe Incisional Knee Pain 10-20 mg  
    
   
   
   
  
 promethazine 25 mg tablet Commonly known as:  PHENERGAN Your last dose was: Your next dose is: TAKE 1 TABLET BY MOUTH EVERY 6 HOURS AS NEEDED FOR NAUSEA/VOMITING  
     
   
   
   
  
 rosuvastatin 10 mg tablet Commonly known as:  CRESTOR Your last dose was: Your next dose is: Take 10 mg by mouth nightly. 10 mg  
    
   
   
   
  
 sucralfate 1 gram tablet Commonly known as:  Irwin Pickerel Your last dose was: Your next dose is: Take 1 Tab by mouth four (4) times daily. 1 g  
    
   
   
   
  
 TRAVATAN Z 0.004 % ophthalmic solution Generic drug:  travoprost  
   
Your last dose was: Your next dose is:    
   
   
 Administer 1 Drop to both eyes daily. 1 Drop  
    
   
   
   
  
 warfarin 2 mg tablet Commonly known as:  COUMADIN Start taking on:  11/18/2017 Your last dose was: Your next dose is:    
   
   
 Starting tomorrow:  Take 1 Tab (2 mg) by mouth daily at 5 pm.  Take as directed, dose adjustment for goal lab test INR 1.7-2.0  Indications: [address] Where to Get Your Medications Information on where to get these meds will be given to you by the nurse or doctor. ! Ask your nurse or doctor about these medications  
  acetaminophen 500 mg tablet  
 oxyCODONE IR 10 mg Tab immediate release tablet  
 warfarin 2 mg tablet Discharge Instructions After [address]  for SNF/Rehab Discharge Instructions Hip Replacement-Dr. Laura Morrell Patient Name: Coleman November Date of procedure: 11/14/2017 Procedure: Procedure(s): REVISION OF RIGHT TOTAL KNEE REPLACEMENT (SPINAL W/ IV SEDATION) Surgeon: Surgeon(s) and Role: Jolene Russell MD - Primary PCP: Tawnya Templeton MD 
Date of discharge: No discharge date for patient encounter. Follow up appointments Follow up with Dr. Laura Morrell in 3 weeks. Call [phone] to make an appointment. Activity ? Weight bearing as tolerated with walker or crutches  Refer to pages 23-33 of your handbook for instructions and pictures ? Complete you Home Exercise Program daily as instructed by your therapist. Refer to pages 36-42 of your handbook for instructions and pictures ? Get up every one hour and walk (except at night when sleeping) ? AVOID sudden and extreme movement of your hip (surgical leg) ? Do not drive or operate heavy machinery Incision Care ? The Aquacel (brown, waterproof) surgical dressing is to remain on the knee for 7 days. On the 7th day gently peel the dressing off by carefully lifting the edge and stretching it slightly to break the adhesive seal 
? If you have steri-strips (small, white pieces of tape) on the incision, they may come off when you remove the Aquacel surgical dressing. This is okay. You may now leave your incision open to air ? If your Aquacel dressing comes loose/off before the 7th day, you may replace it with a dry sterile gauze dressing; change it daily. Once the incision is not draining, leave it open to air ? May take a shower with the Aquacel dressing in place. Once the Aquacel is removed,  may shower and get your incision wet but do not submerge your incision under water in a bath tub, hot tub or swimming pool for 6 weeks after surgery. Preventing blood clots ? Give Warfarin daily. INR goal 1.7-2.0. Continue for one month following surgery Pain management ? Continue pain medication as prescribed in the hospital 
? Continue home medications per medication reconciliation ? Place an ice bag on the hip for 15-20 minutes after exercising and as needed throughout the day and night Diet ? Resume usual diet; encourage fluids; provide foods high in fiber ? Provide stool softeners/laxatives as needed Rehab/SNF Protocol (to be followed by facility) Anticipated length of stay is 7-10 days. Nursing ? Draw a PT/INR per physicians orders and call results to Dr. Sienna Kingsley at [phone] ? Complete head to toe assessment, vital signs ? Medication reconciliation ? Review pain management ? Manage chronic medical conditions Physical Therapy Weight bearing status: 
Precautions at Admission: Fall, WBAT Left Side Weight Bearing: Full Right Side Weight Bearing: As tolerated Mobility Status: 
Supine to Sit: Stand-by asssistance (LE hooked) Sit to Stand: Contact guard assistance Sit to Supine: Minimum assistance Gait: 
Distance (ft): 300 Feet (ft) Ambulation - Level of Assistance: Stand-by asssistance, Contact guard assistance Assistive Device: Gait belt, Walker, rolling Gait Abnormalities: Antalgic, Decreased step clearance ADL status overall composite: Toilet Transfer : Contact guard assistance, Adaptive equipment Physical Therapy-anticipate 7-10 day length of stay ? Assessment and evaluation-bed mobility; functional transfers (bed, chair, bathroom, stairs); ambulation with equipment, car transfers, safety and ability to get out of house in the event of an emergency ? AVOID sudden and extreme movement of the hip (surgical leg) ? Discuss pain management ? Review how to do ADLs. Refer to pages 43-47 of handbook Exercise Program-refer to pages 36-42 of handbook Introducing [facility] SERVICES! [employer] introduces [employer] patient portal. Now you can access parts of your medical record, email your doctor's office, and request medication refills online. 1. In your internet browser, go to [email or web address] 2. Click on the First Time User? Click Here link in the Sign In box. You will see the New Member Sign Up page. 3. Enter your [employer] Access Code exactly as it appears below. You will not need to use this code after youve completed the sign-up process. If you do not sign up before the expiration date, you must request a new code. · [employer] Access Code: [email or web address] Expires: 12/24/2017  7:36 AM 
 
4. Enter the last four digits of your Social Security Number (xxxx) and Date of Birth (mm/dd/yyyy) as indicated and click Submit. You will be taken to the next sign-up page. 5. Create a [employer] ID. This will be your [employer] login ID and cannot be changed, so think of one that is secure and easy to remember. 6. Create a [employer] password. You can change your password at any time. 7. Enter your Password Reset Question and Answer. This can be used at a later time if you forget your password. 8. Enter your e-mail address. You will receive e-mail notification when new information is available in [address] 9. Click Sign Up. You can now view and download portions of your medical record. 10. Click the Download Summary menu link to download a portable copy of your medical information. If you have questions, please visit the Frequently Asked Questions section of the [employer] website. Remember, [employer] is NOT to be used for urgent needs. For medical emergencies, dial 911. Now available from your iPhone and Android! Unresulted Labs-Please follow up with your PCP about these lab tests Order Current Status CULTURE, TISSUE W GRAM STAIN Preliminary result CULTURE, TISSUE W GRAM STAIN Preliminary result CULTURE, TISSUE W GRAM STAIN Preliminary result Providers Seen During Your Hospitalization Provider Specialty Primary office phone Josephine Roberts MD Orthopedic Surgery [phone] Immunizations Administered for This Admission Name Date Influenza Vaccine (Quad) PF 11/17/2017 Your Primary Care Physician (PCP) Primary Care Physician Office Phone Office Fax Beka DUDLEY [phone] You are allergic to the following Allergen Reactions Seafood (Shellfish Containing Products) Swelling Recent Documentation Height Weight BMI OB Status Smoking Status 1.524 m 79.4 kg 34.18 kg/m2 Hysterectomy Former Smoker Emergency Contacts Name Discharge Info Relation Home Work Mobile Ellen Rice DISCHARGE CAREGIVER [3] Other Relative [6] [phone] Zena Hurt  Other Relative [6] [phone] Patient Belongings The following personal items are in your possession at time of discharge: 
  Dental Appliances: None  Visual Aid: Glasses, With patient   Hearing Aids/Status: Does not own  Home Medications: None   Jewelry: None  Clothing:  (bag of clothing returned to patient at bedside)    Other Valuables: None Please provide this summary of care documentation to your next provider. Signatures-by signing, you are acknowledging that this After Visit Summary has been reviewed with you and you have received a copy. Patient Signature:  ____________________________________________________________ Date:  ____________________________________________________________  
  
Gwen Cielo Provider Signature:  ____________________________________________________________ Date:  ____________________________________________________________

## 2017-11-14 NOTE — ANESTHESIA PREPROCEDURE EVALUATION
Anesthetic History   No history of anesthetic complications            Review of Systems / Medical History  Patient summary reviewed, nursing notes reviewed and pertinent labs reviewed    Pulmonary  Within defined limits          Asthma        Neuro/Psych   Within defined limits           Cardiovascular  Within defined limits  Hypertension          CAD         GI/Hepatic/Renal  Within defined limits   GERD           Endo/Other  Within defined limits  Diabetes    Arthritis     Other Findings              Physical Exam    Airway  Mallampati: II  TM Distance: > 6 cm  Neck ROM: normal range of motion   Mouth opening: Normal     Cardiovascular  Regular rate and rhythm,  S1 and S2 normal,  no murmur, click, rub, or gallop             Dental  No notable dental hx       Pulmonary  Breath sounds clear to auscultation               Abdominal  GI exam deferred       Other Findings            Anesthetic Plan    ASA: 3  Anesthesia type: spinal      Post-op pain plan if not by surgeon: peripheral nerve block single    Induction: Intravenous  Anesthetic plan and risks discussed with: Patient

## 2017-11-14 NOTE — ANESTHESIA PROCEDURE NOTES
Peripheral Block    Start time: 11/14/2017 11:02 AM  End time: 11/14/2017 11:16 AM  Performed by: KRISH Michelle  Authorized by: KRISH Michelle       Pre-procedure: Indications: at surgeon's request and post-op pain management    Preanesthetic Checklist: patient identified, risks and benefits discussed, site marked, timeout performed, anesthesia consent given and patient being monitored      Block Type:   Block Type:   Adductor canal  Laterality:  Right  Monitoring:  Standard ASA monitoring, continuous pulse ox, frequent vital sign checks, heart rate, responsive to questions and oxygen  Injection Technique:  Single shot  Procedures: ultrasound guided    Patient Position: supine  Prep: chlorhexidine    Needle Type:  Stimuplex  Needle Gauge:  22 G  Needle Localization:  Ultrasound guidance  Medication Injected:  0.5%  ropivacaine  Volume (mL):  20    Assessment:  Number of attempts:  1  Injection Assessment:  Incremental injection every 5 mL, local visualized surrounding nerve on ultrasound, negative aspiration for blood, no paresthesia and no intravascular symptoms  Patient tolerance:  Patient tolerated the procedure well with no immediate complications

## 2017-11-15 LAB
ANION GAP SERPL CALC-SCNC: 8 MMOL/L (ref 5–15)
BUN SERPL-MCNC: 16 MG/DL (ref 6–20)
BUN/CREAT SERPL: 17 (ref 12–20)
CALCIUM SERPL-MCNC: 8.5 MG/DL (ref 8.5–10.1)
CHLORIDE SERPL-SCNC: 104 MMOL/L (ref 97–108)
CO2 SERPL-SCNC: 26 MMOL/L (ref 21–32)
CREAT SERPL-MCNC: 0.93 MG/DL (ref 0.55–1.02)
GLUCOSE BLD STRIP.AUTO-MCNC: 179 MG/DL (ref 65–100)
GLUCOSE BLD STRIP.AUTO-MCNC: 316 MG/DL (ref 65–100)
GLUCOSE BLD STRIP.AUTO-MCNC: 351 MG/DL (ref 65–100)
GLUCOSE BLD STRIP.AUTO-MCNC: 428 MG/DL (ref 65–100)
GLUCOSE BLD STRIP.AUTO-MCNC: 437 MG/DL (ref 65–100)
GLUCOSE BLD STRIP.AUTO-MCNC: 461 MG/DL (ref 65–100)
GLUCOSE SERPL-MCNC: 250 MG/DL (ref 65–100)
HGB BLD-MCNC: 10.9 G/DL (ref 11.5–16)
INR PPP: 1.1 (ref 0.9–1.1)
POTASSIUM SERPL-SCNC: 4.4 MMOL/L (ref 3.5–5.1)
PROTHROMBIN TIME: 11.4 SEC (ref 9–11.1)
SERVICE CMNT-IMP: ABNORMAL
SODIUM SERPL-SCNC: 138 MMOL/L (ref 136–145)

## 2017-11-15 PROCEDURE — 74011250636 HC RX REV CODE- 250/636: Performed by: ORTHOPAEDIC SURGERY

## 2017-11-15 PROCEDURE — 74011000250 HC RX REV CODE- 250: Performed by: ORTHOPAEDIC SURGERY

## 2017-11-15 PROCEDURE — 77010033678 HC OXYGEN DAILY

## 2017-11-15 PROCEDURE — 85610 PROTHROMBIN TIME: CPT | Performed by: ORTHOPAEDIC SURGERY

## 2017-11-15 PROCEDURE — 65270000029 HC RM PRIVATE

## 2017-11-15 PROCEDURE — 97161 PT EVAL LOW COMPLEX 20 MIN: CPT

## 2017-11-15 PROCEDURE — 94640 AIRWAY INHALATION TREATMENT: CPT

## 2017-11-15 PROCEDURE — 97116 GAIT TRAINING THERAPY: CPT

## 2017-11-15 PROCEDURE — 74011636637 HC RX REV CODE- 636/637: Performed by: ORTHOPAEDIC SURGERY

## 2017-11-15 PROCEDURE — G8978 MOBILITY CURRENT STATUS: HCPCS

## 2017-11-15 PROCEDURE — G8979 MOBILITY GOAL STATUS: HCPCS

## 2017-11-15 PROCEDURE — 74011636637 HC RX REV CODE- 636/637: Performed by: NURSE PRACTITIONER

## 2017-11-15 PROCEDURE — 36415 COLL VENOUS BLD VENIPUNCTURE: CPT | Performed by: ORTHOPAEDIC SURGERY

## 2017-11-15 PROCEDURE — 80048 BASIC METABOLIC PNL TOTAL CA: CPT | Performed by: ORTHOPAEDIC SURGERY

## 2017-11-15 PROCEDURE — 82962 GLUCOSE BLOOD TEST: CPT

## 2017-11-15 PROCEDURE — 85018 HEMOGLOBIN: CPT | Performed by: ORTHOPAEDIC SURGERY

## 2017-11-15 PROCEDURE — 74011250637 HC RX REV CODE- 250/637: Performed by: ORTHOPAEDIC SURGERY

## 2017-11-15 RX ORDER — INSULIN GLARGINE 100 [IU]/ML
20 INJECTION, SOLUTION SUBCUTANEOUS EVERY 12 HOURS
Status: DISCONTINUED | OUTPATIENT
Start: 2017-11-15 | End: 2017-11-16

## 2017-11-15 RX ORDER — WARFARIN 4 MG/1
4 TABLET ORAL ONCE
Status: COMPLETED | OUTPATIENT
Start: 2017-11-15 | End: 2017-11-15

## 2017-11-15 RX ORDER — INSULIN LISPRO 100 [IU]/ML
INJECTION, SOLUTION INTRAVENOUS; SUBCUTANEOUS
Status: DISCONTINUED | OUTPATIENT
Start: 2017-11-15 | End: 2017-11-16

## 2017-11-15 RX ORDER — HYDROMORPHONE HYDROCHLORIDE 2 MG/1
2 TABLET ORAL
Status: DISCONTINUED | OUTPATIENT
Start: 2017-11-15 | End: 2017-11-16

## 2017-11-15 RX ORDER — INSULIN LISPRO 100 [IU]/ML
10 INJECTION, SOLUTION INTRAVENOUS; SUBCUTANEOUS ONCE
Status: COMPLETED | OUTPATIENT
Start: 2017-11-15 | End: 2017-11-15

## 2017-11-15 RX ADMIN — DOCUSATE SODIUM AND SENNOSIDES 1 TABLET: 8.6; 5 TABLET, FILM COATED ORAL at 10:05

## 2017-11-15 RX ADMIN — SUCRALFATE 1 G: 1 TABLET ORAL at 06:52

## 2017-11-15 RX ADMIN — KETOROLAC TROMETHAMINE 15 MG: 30 INJECTION, SOLUTION INTRAMUSCULAR at 10:06

## 2017-11-15 RX ADMIN — BUDESONIDE 500 MCG: 0.5 INHALANT RESPIRATORY (INHALATION) at 10:52

## 2017-11-15 RX ADMIN — BUSPIRONE HYDROCHLORIDE 15 MG: 10 TABLET ORAL at 22:07

## 2017-11-15 RX ADMIN — GABAPENTIN 200 MG: 100 CAPSULE ORAL at 22:06

## 2017-11-15 RX ADMIN — HYDROMORPHONE HYDROCHLORIDE 0.5 MG: 1 INJECTION, SOLUTION INTRAMUSCULAR; INTRAVENOUS; SUBCUTANEOUS at 17:38

## 2017-11-15 RX ADMIN — ACETAMINOPHEN 500 MG: 500 TABLET, FILM COATED ORAL at 22:07

## 2017-11-15 RX ADMIN — GABAPENTIN 200 MG: 100 CAPSULE ORAL at 10:05

## 2017-11-15 RX ADMIN — GABAPENTIN 200 MG: 100 CAPSULE ORAL at 15:29

## 2017-11-15 RX ADMIN — OXYCODONE HYDROCHLORIDE 10 MG: 5 TABLET ORAL at 00:38

## 2017-11-15 RX ADMIN — BUSPIRONE HYDROCHLORIDE 15 MG: 10 TABLET ORAL at 10:05

## 2017-11-15 RX ADMIN — TIMOLOL MALEATE 1 DROP: 5 SOLUTION OPHTHALMIC at 22:06

## 2017-11-15 RX ADMIN — DEXAMETHASONE SODIUM PHOSPHATE 10 MG: 4 INJECTION INTRA-ARTICULAR; INTRALESIONAL; INTRAMUSCULAR; INTRAVENOUS; SOFT TISSUE at 11:57

## 2017-11-15 RX ADMIN — PANTOPRAZOLE SODIUM 40 MG: 40 TABLET, DELAYED RELEASE ORAL at 06:52

## 2017-11-15 RX ADMIN — KETOROLAC TROMETHAMINE 15 MG: 30 INJECTION, SOLUTION INTRAMUSCULAR at 15:30

## 2017-11-15 RX ADMIN — POLYETHYLENE GLYCOL 3350 17 G: 17 POWDER, FOR SOLUTION ORAL at 10:07

## 2017-11-15 RX ADMIN — HYDROMORPHONE HYDROCHLORIDE 0.5 MG: 1 INJECTION, SOLUTION INTRAMUSCULAR; INTRAVENOUS; SUBCUTANEOUS at 12:21

## 2017-11-15 RX ADMIN — LISINOPRIL 20 MG: 20 TABLET ORAL at 10:05

## 2017-11-15 RX ADMIN — ARFORMOTEROL TARTRATE 15 MCG: 15 SOLUTION RESPIRATORY (INHALATION) at 21:55

## 2017-11-15 RX ADMIN — Medication 10 ML: at 15:30

## 2017-11-15 RX ADMIN — ROSUVASTATIN CALCIUM 10 MG: 10 TABLET, FILM COATED ORAL at 22:07

## 2017-11-15 RX ADMIN — WARFARIN SODIUM 4 MG: 4 TABLET ORAL at 11:57

## 2017-11-15 RX ADMIN — INSULIN GLARGINE 20 UNITS: 100 INJECTION, SOLUTION SUBCUTANEOUS at 22:07

## 2017-11-15 RX ADMIN — Medication 10 ML: at 22:07

## 2017-11-15 RX ADMIN — BUDESONIDE 500 MCG: 0.5 INHALANT RESPIRATORY (INHALATION) at 21:56

## 2017-11-15 RX ADMIN — OXYCODONE HYDROCHLORIDE 10 MG: 5 TABLET ORAL at 06:52

## 2017-11-15 RX ADMIN — METOPROLOL TARTRATE 12.5 MG: 25 TABLET ORAL at 10:06

## 2017-11-15 RX ADMIN — ARFORMOTEROL TARTRATE 15 MCG: 15 SOLUTION RESPIRATORY (INHALATION) at 10:52

## 2017-11-15 RX ADMIN — OXYCODONE HYDROCHLORIDE 10 MG: 5 TABLET ORAL at 00:39

## 2017-11-15 RX ADMIN — INSULIN LISPRO 7 UNITS: 100 INJECTION, SOLUTION INTRAVENOUS; SUBCUTANEOUS at 06:51

## 2017-11-15 RX ADMIN — LATANOPROST 1 DROP: 50 SOLUTION OPHTHALMIC at 22:06

## 2017-11-15 RX ADMIN — KETOROLAC TROMETHAMINE 15 MG: 30 INJECTION, SOLUTION INTRAMUSCULAR at 03:13

## 2017-11-15 RX ADMIN — INSULIN LISPRO 2 UNITS: 100 INJECTION, SOLUTION INTRAVENOUS; SUBCUTANEOUS at 11:56

## 2017-11-15 RX ADMIN — SUCRALFATE 1 G: 1 TABLET ORAL at 16:59

## 2017-11-15 RX ADMIN — ACETAMINOPHEN 500 MG: 500 TABLET, FILM COATED ORAL at 10:05

## 2017-11-15 RX ADMIN — SUCRALFATE 1 G: 1 TABLET ORAL at 11:57

## 2017-11-15 RX ADMIN — INSULIN LISPRO 4 UNITS: 100 INJECTION, SOLUTION INTRAVENOUS; SUBCUTANEOUS at 22:00

## 2017-11-15 RX ADMIN — AMLODIPINE BESYLATE 10 MG: 5 TABLET ORAL at 22:06

## 2017-11-15 RX ADMIN — DOCUSATE SODIUM AND SENNOSIDES 1 TABLET: 8.6; 5 TABLET, FILM COATED ORAL at 17:36

## 2017-11-15 RX ADMIN — ACETAMINOPHEN 500 MG: 500 TABLET, FILM COATED ORAL at 15:29

## 2017-11-15 RX ADMIN — SUCRALFATE 1 G: 1 TABLET ORAL at 22:07

## 2017-11-15 RX ADMIN — METOPROLOL TARTRATE 12.5 MG: 25 TABLET ORAL at 17:38

## 2017-11-15 RX ADMIN — BRIMONIDINE TARTRATE 1 DROP: 2 SOLUTION OPHTHALMIC at 22:09

## 2017-11-15 RX ADMIN — ACETAMINOPHEN 500 MG: 500 TABLET, FILM COATED ORAL at 17:36

## 2017-11-15 RX ADMIN — SODIUM CHLORIDE 125 ML/HR: 900 INJECTION, SOLUTION INTRAVENOUS at 10:06

## 2017-11-15 RX ADMIN — OXYCODONE HYDROCHLORIDE 10 MG: 5 TABLET ORAL at 22:17

## 2017-11-15 RX ADMIN — ACETAMINOPHEN 500 MG: 500 TABLET, FILM COATED ORAL at 06:52

## 2017-11-15 RX ADMIN — Medication 2 G: at 03:13

## 2017-11-15 RX ADMIN — Medication 10 ML: at 14:00

## 2017-11-15 RX ADMIN — BUSPIRONE HYDROCHLORIDE 15 MG: 10 TABLET ORAL at 17:36

## 2017-11-15 RX ADMIN — INSULIN LISPRO 10 UNITS: 100 INJECTION, SOLUTION INTRAVENOUS; SUBCUTANEOUS at 16:59

## 2017-11-15 NOTE — PROGRESS NOTES
Problem: Mobility Impaired (Adult and Pediatric)  Goal: *Acute Goals and Plan of Care (Insert Text)  Physical Therapy Goals  Initiated 11/15/2017    1. Patient will move from supine to sit and sit to supine , scoot up and down and roll side to side in bed with supervision/set-up within 4 days. 2. Patient will perform sit to stand with supervision/set-up within 4 days. 3. Patient will ambulate with supervision/set-up for 150 feet with the least restrictive device within 4 days. 4. Patient will perform home exercise program per protocol with independence within 4 days. 5. Patient will demonstrate AROM 0-90 degrees in operative joint within 4 days. physical Therapy knee EVALUATION  Patient: Alondra De La Rosa (89 y.o. female)  Date: 11/15/2017  Primary Diagnosis: PAIN DUE TO TOTAL RIGHT KNEE REPLACEMENT, STATUS POST RIGHT TOTAL KNEE REPLACEMENT   Pain due to knee joint prosthesis (HCC)  Procedure(s) (LRB):  REVISION OF RIGHT TOTAL KNEE REPLACEMENT (SPINAL W/ IV SEDATION) (Right) 1 Day Post-Op   Precautions:   Fall, WBAT    ASSESSMENT :  Based on the objective data described below, the patient presents with high levels of pain, decreased ROM and strength on R LE, impaired balance, and overall decline from baseline following R TKR revision POD 1. Received supine in bed, on the phone, and then tearful when expressing her pain. She was agreeable to transfer to chair in hopes of a change in position assisting with pain management. Unable to initiate further gait or exercises during this session as patient was very tearful and crying out in pain during transfer. Able to stabilize emotions and pain seemed to decrease once sitting in chair. RN present administering medications. Plan to progress gait and exercises as tolerated next session. Patient with plan to go to SNF at d/c, as she lives alone. Patient will benefit from skilled intervention to address the above impairments.   Patients rehabilitation potential is considered to be Good  Factors which may influence rehabilitation potential include:   []         None noted  []         Mental ability/status  [x]         Medical condition  []         Home/family situation and support systems  []         Safety awareness  [x]         Pain tolerance/management  []         Other:      PLAN :  Recommendations and Planned Interventions:  [x]           Bed Mobility Training             [x]    Neuromuscular Re-Education  [x]           Transfer Training                   []    Orthotic/Prosthetic Training  [x]           Gait Training                         []    Modalities  [x]           Therapeutic Exercises           [x]    Edema Management/Control  [x]           Therapeutic Activities            [x]    Patient and Family Training/Education  []           Other (comment):    Frequency/Duration: Patient will be followed by physical therapy twice daily to address goals. Discharge Recommendations: Skilled Nursing Facility  Further Equipment Recommendations for Discharge: TBD at SNF     SUBJECTIVE:   Patient stated It hurts so bad.     OBJECTIVE DATA SUMMARY:   HISTORY:    Past Medical History:   Diagnosis Date    Anal polyp 6/13/2013    Arthritis     Asthma     CAD (coronary artery disease) 10/27/2017    Cataract     Chronic pain     knee - right/back    Diabetes ([facility] [address]     GERD (gastroesophageal reflux disease)     Hemorrhoids 6/13/2013    History of kidney stones     Hypertension     Ill-defined condition     abdominal pain and burning    Other ill-defined conditions(799.89)     high cholesterol     Past Surgical History:   Procedure Laterality Date    COLONOSCOPY  2/28/2013         EGD  2/28/2013         HX CATARACT REMOVAL Bilateral     HX CHOLECYSTECTOMY  6-2015    HX GI  6/27/13    anal polyps removed    HX HEENT      laser surgery for blood clot in right eye    HX KNEE REPLACEMENT      right    HX OTHER SURGICAL  4-2-14    lap gastrotomy and removal of polyp -  - not cancerous per pt    HX ALAN AND BSO      HX TONSILLECTOMY      HX UROLOGICAL      \"laser\" surgery for kidney stone    NEUROLOGICAL PROCEDURE UNLISTED  1990    tumor at back of neck, benign     Prior Level of Function/Home Situation: Independent PTA  Personal factors and/or comorbidities impacting plan of care: PMH    Home Situation  Home Environment: Private residence  # Steps to Enter: 1  One/Two Story Residence: Two story  # of Interior Steps: 15  Interior Rails: Right  Lift Chair Available: No  Living Alone: Yes  Support Systems: Family member(s)  Patient Expects to be Discharged to[de-identified] Rehabilitation facility  Current DME Used/Available at Home: Nan[employer] Likes, straight, Walker, rolling    EXAMINATION/PRESENTATION/DECISION MAKING:   Critical Behavior:  Neurologic State: Alert  Orientation Level: Oriented X4  Cognition: Follows commands  Hearing: Auditory  Auditory Impairment: None  Range Of Motion:  AROM: Within functional limits  Strength:    Strength: Generally decreased, functional  Tone & Sensation:   Tone: Normal  Sensation: Intact  Coordination:  Coordination: Within functional limits  Functional Mobility:  Bed Mobility:  Supine to Sit: Moderate assistance;Assist x1;Additional time  Scooting: Supervision  Transfers:  Sit to Stand: Contact guard assistance  Stand to Sit: Contact guard assistance  Stand Pivot Transfers: Contact guard assistance     Balance:   Sitting: Intact  Standing: Impaired; With support  Standing - Static: Good  Standing - Dynamic : Fair  Ambulation/Gait Training:  Distance (ft): 3 Feet (ft)  Assistive Device: Walker, rolling;Gait belt  Ambulation - Level of Assistance: Contact guard assistance  Gait Abnormalities: Antalgic;Decreased step clearance;Shuffling gait  Right Side Weight Bearing: As tolerated  Left Side Weight Bearing: Full  Base of Support: Widened;Shift to left  Stance: Right decreased  Speed/Teagan: Slow;Shuffled  Step Length: Right shortened;Left shortened  Swing Pattern: Right asymmetrical    Functional Measure:  Tinetti test:    Sitting Balance: 1  Arises: 1  Attempts to Rise: 1  Immediate Standing Balance: 1  Standing Balance: 1  Nudged: 0  Eyes Closed: 0  Turn 360 Degrees - Continuous/Discontinuous: 0  Turn 360 Degrees - Steady/Unsteady: 0  Sitting Down: 1  Balance Score: 6  Indication of Gait: 0  R Step Length/Height: 0  L Step Length/Height: 0  R Foot Clearance: 0  L Foot Clearance: 0  Step Symmetry: 0  Step Continuity: 0  Path: 0  Trunk: 0  Walking Time: 0  Gait Score: 0  Total Score: 6       Tinetti Test and G-code impairment scale:  Percentage of Impairment CH    0%   CI    1-19% CJ    20-39% CK    40-59% CL    60-79% CM    80-99% CN     100%   Tinetti  Score 0-28 28 23-27 17-22 12-16 6-11 1-5 0       Tinetti Tool Score Risk of Falls  <19 = High Fall Risk  19-24 = Moderate Fall Risk  25-28 = Low Fall Risk  Tinetti ME. Performance-Oriented Assessment of Mobility Problems in Elderly Patients. Watson 66; [id number]. (Scoring Description: PT Bulletin Feb. 10, 1993)    Older adults: Martha Case et al, 2009; n = 1000 [facility] elderly evaluated with ABC, MIKAYLA, ADL, and IADL)  · Mean MIKAYLA score for males aged 69-68 years = 26.21(3.40)  · Mean MIKAYLA score for females age 69-68 years = 25.16(4.30)  · Mean MIKAYLA score for males over 80 years = 23.29(6.02)  · Mean MIKAYLA score for females over 80 years = 17.20(8.32)         G codes: In compliance with CMSs Claims Based Outcome Reporting, the following G-code set was chosen for this patient based on their primary functional limitation being treated: The outcome measure chosen to determine the severity of the functional limitation was the Tinetti with a score of 6/28 which was correlated with the impairment scale.     ? Mobility - Walking and Moving Around:     - CURRENT STATUS: CL - 60%-79% impaired, limited or restricted    - GOAL STATUS: CK - 40%-59% impaired, limited or restricted    - D/C STATUS:  ---------------To be determined---------------        Physical Therapy Evaluation Charge Determination   History Examination Presentation Decision-Making   HIGH Complexity :3+ comorbidities / personal factors will impact the outcome/ POC  MEDIUM Complexity : 3 Standardized tests and measures addressing body structure, function, activity limitation and / or participation in recreation  LOW Complexity : Stable, uncomplicated  Other outcome measures Tinetti 6/28  HIGH       Based on the above components, the patient evaluation is determined to be of the following complexity level: LOW     Pain:  Pain Scale 1: Numeric (0 - 10)  Pain Intensity 1: 10  Pain Location 1: Knee  Pain Orientation 1: Right  Pain Description 1: Aching; Throbbing  Pain Intervention(s) 1: Medication (see MAR); Cold pack; Emotional support; Food  Activity Tolerance:   Limited by pain, RN aware  Please refer to the flowsheet for vital signs taken during this treatment. After treatment:   [x]         Patient left in no apparent distress sitting up in chair  []         Patient left in no apparent distress in bed  [x]         Call bell left within reach  [x]         Nursing notified  []         Caregiver present  []         Bed alarm activated    COMMUNICATION/EDUCATION:   The patients plan of care was discussed with: Registered Nurse. [x]         Fall prevention education was provided and the patient/caregiver indicated understanding. [x]         Patient/family have participated as able in goal setting and plan of care. [x]         Patient/family agree to work toward stated goals and plan of care. []         Patient understands intent and goals of therapy, but is neutral about his/her participation. []         Patient is unable to participate in goal setting and plan of care.     Thank you for this referral.  Tyra Adam, PT, DPT   Time Calculation: 11 mins

## 2017-11-15 NOTE — DIABETES MGMT
DTC Progress Note    Recommendations/ Comments: Chart review for hyperglycemia, BG's > 200. S/P knee replacement yesterday      If appropriate, please consider   Addition of home Lantus 20 units BID  Change diet to carb consistent  Please document po intake    Chart reviewed on [facility]. Patient is a 69 y.o. female with known DM on Lantus 20 units twice a day at home. A1c:   Lab Results   Component Value Date/Time    Hemoglobin A1c 8.1 10/27/2017 09:21 AM    Hemoglobin A1c 7.5 09/10/2017 01:47 AM       Recent Glucose Results:   Lab Results   Component Value Date/Time     (H) 11/15/2017 06:54 AM    GLUCPOC 316 (H) 11/15/2017 06:15 AM    GLUCPOC 269 (H) 11/14/2017 09:39 PM    GLUCPOC 145 (H) 11/14/2017 03:44 PM        Lab Results   Component Value Date/Time    Creatinine 0.93 11/15/2017 06:54 AM     Estimated Creatinine Clearance: 48 mL/min (based on Cr of 0.93). Active Orders   Diet    DIET REGULAR        PO intake: No data found. Current hospital DM medication: lispro correction scale, normal sensitivity    Will continue to follow as needed.     Thank you  Oleg Hoyos RN, CDE

## 2017-11-15 NOTE — PROGRESS NOTES
Bedside and Verbal shift change report given to Siobhan (oncoming nurse) by Sanjuana Brunson (offgoing nurse). Report included the following information Kardex, OR Summary, Intake/Output, MAR and Accordion.

## 2017-11-15 NOTE — PROGRESS NOTES
Spiritual Care Partner Volunteer visited patient in room 561/01 on 11.15.17. Documented by: : Rev. Benito Lucas; [facility], to contact [phone] call: [phone]

## 2017-11-15 NOTE — PROGRESS NOTES
Care Management Interventions  PCP Verified by CM: Yes (Dr. Sunny Lopez)  Palliative Care Criteria Met (RRAT>21 & CHF Dx)?: No  Mode of Transport at Discharge: Other (see comment) (TBD)  Transition of Care Consult (CM Consult): SNF ([facility])  Partner SNF: Yes  MyChart Signup: No  Discharge Durable Medical Equipment: No  Physical Therapy Consult: Yes  Occupational Therapy Consult: Yes  Speech Therapy Consult: No  Current Support Network: Own Home, Lives Alone  Confirm Follow Up Transport: Family  Plan discussed with Pt/Family/Caregiver: Yes  Freedom of Choice Offered: Yes  Discharge Location  Discharge Placement: [address] care orders noted. Chart reviewed. Therapy is recommending SNF. CRM met with the patient. She lives alone and has stairs. She prefers to go to [facility]. She has been then there before. CRM sent referral via [employer] and will follow for bed availability and discharge on Friday.  JEAN

## 2017-11-15 NOTE — OP NOTES
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 POSTOPERATIVE DIAGNOSIS: Failed right total knee arthroplasty   due to global instability and painful resurfaced patellar component. PROCEDURES PERFORMED: Revision right total knee arthroplasty,   femoral and tibial component with patellar resurfacing. SURGEON: Indira Gomes MD    FIRST ASSISTANT: Dewey Benavides    COMPONENTS IMPLANTED: Nagi size 3 A/P wedged tibial tray   with 14-mm x 75-mm cementless stem and 31-mm trabecular metal   metaphyseal cone size E CCK femoral component with a 14-mm x   130-mm cementless stem and 5-mm posterior medial and   posterolateral augments, 12-mm CCK polyethylene insert, 29-mm   patella. ANESTHESIA: Spinal with sedation as well as adductor canal block. COMPLICATIONS: None. ESTIMATED BLOOD LOSS: 200 mL. SPECIMENS REMOVED: Frozen section x1, tissue cultures x3. INDICATIONS: The patient is a 69-year-old female who underwent   right total knee replacement last year, without resurfacing of the   patella. She has had progressive pain, both from the patellofemoral   compartment, but also due to malrotation of tibial component and   global instability despite secondary arthrofibrosis. Infection workup was   negative. She presents for revision of her right total knee. Risks,   benefits and alternatives of the procedure were reviewed with her in   detail and she desires to proceed. PROCEDURE IN DETAIL: The anesthesia team placed an adductor   canal before taking the patient to the operating room where they also   placed a spinal. Preoperative IV antibiotics were administered.  Barclay   catheter was inserted and a padded pneumatic tourniquet was placed   around the right upper thigh. Right lower extremity was prepped and   draped in the usual sterile fashion. Tourniquet was inflated to 275. Through her existing midline anterior knee incision, I performed a   medial parapatellar arthrotomy. There was significant fibrosis of the   synovium. Progressive medial release was performed to facilitate   exposure and soft tissue balance throughout the procedure. The   gutters were reestablished. Polyethylene insert was removed. Flexible   osteotomes were used to work around the femoral component, which   was removed in retrograde fashion with no bone loss. The rotation of   the femoral component was satisfactory. To gain full exposure of the   tibia, quadriceps snip was required. I worked under the surfaces of the   tray and removed and the tray in retrograde fashion. A significant   portion of the posterolateral plateau came off with the component. The   canal was reamed for a 14-mm x 75-mm cementless stem and the   proximal metaphysis was prepared for a 31 mm trabecular metal   metaphyseal cone. Fresh neutral proximal tibial resection was   performed using an extramedullary alignment guide. Metaphyseal cone   trial was inserted. Gaps were assessed. Elevation of the joint line at 4   to 5 mm was going to be required, even upsizing the femoral   component relative to the preexisting femoral component. Size E was   slightly larger than the component we removed. Femoral canal was   reamed for a 14-mm x 130-mm cementless stem. The distal femoral   cutting block was placed over the reamer and a fresh cut was made,   elevating the joint line about 4 mm. I went ahead and cut the box for a   CCK femoral component, matching the existing rotation of the femoral   component that we removed. The trial femur was placed.  They were   tight in extension, even with a 10-mm insert in place so significant   subperiosteal posterior release was performed up the back of the   femur. I was able to get 12 mm insert in place with the knee having full   extension to gravity. Flexion gap was about a 1 mm larger, but I chose   to accept this. We will plan to use a CCK insert due to significant mid   flexion instability. I prepared the posterior condyles for 5-mm augments   both medially and laterally by cutting through the guides on the trial.   The augments were placed, a 12-mm trial insert provided full extension   to gravity. The patella was prepared for a 29-mm component. With the   quad snip clipped closed, the patella tracked centrally using no thumbs   technique. Trials were removed and bony surfaces were copiously   irrigated by pulse lavage and dried before the components were   cemented into place using antibiotic-impregnated cement. The   metaphyseal cone was placed in the tibia for cementing. Surface and   metaphyseal cement technique was utilized for both the femoral and   tibial components. Excess cement was removed and the knee was   reduced in full extension with the real insert in place. Periarticular soft   tissues were injected with a solution containing Exparel, 0.5%   Marcaine with epinephrine as well as morphine and Toradol. Tourniquet was released and hemostasis obtained with the Bovie. The   wound was copiously irrigated. The knee arthrotomy was closed with a   combination of heavy Vicryl sutures and a running #2 Stratafix suture   and a few heavy Ethibond sutures were also utilized for the arthrotomy,   as well as to repair the quadriceps snip. Skin and subcutaneous layers   were closed in layered fashion with Vicryl and a running Monocryl   subcuticular stitch. The wound was dressed with Dermabond and an   Aquacel occlusive dressing as well as a sterile compressive dressing. The patient was transported to the postanesthesia care unit in stable   condition. All counts were correct at the end of the procedure.         Kait Soto Moni Cintron MD      [facility]   D:  11/14/2017   23:20   T:  11/15/2017   10:01   Job #:  [id number]

## 2017-11-15 NOTE — PROGRESS NOTES
Pain controlled. No cp/sob.  No n/v.    Visit Vitals    /74 (BP 1 Location: Left arm, BP Patient Position: At rest)    Pulse 83    Temp 98.4 °F (36.9 °C)    Resp 16    Ht 5' (1.524 m)    Wt 79.4 kg (175 lb)    SpO2 94%    BMI 34.18 kg/m2       Alert  abd soft NT  R knee dressing dry  Calves soft NT  Motor 5/5  Sens intact  Pulses symmetrical    Recent Results (from the past 12 hour(s))   GLUCOSE, POC    Collection Time: 11/14/17  9:39 PM   Result Value Ref Range    Glucose (POC) 269 (H) 65 - 100 mg/dL    Performed by Joanna Bucio    GLUCOSE, POC    Collection Time: 11/15/17  6:15 AM   Result Value Ref Range    Glucose (POC) 316 (H) 65 - 100 mg/dL    Performed by BETSY SHAFFER        POD 1 Rev R TKA; acute postop blood loss anemia (expected)  -PT; teach aggressive knee ROM  -pain control; will need much higher dose than usual due to chronic pain meds preop  -coumadin  -will likely need skilled rehab    Adina Leal MD

## 2017-11-15 NOTE — PROGRESS NOTES
Problem: Discharge Planning  Goal: *Discharge to safe environment  Outcome: Progressing Towards Goal  SNF

## 2017-11-15 NOTE — PROGRESS NOTES
Bedside and Verbal shift change report given to Barbara Meehan (oncoming nurse) by Barbara Meehan (offgoing nurse). Report included the following information SBAR, OR Summary and Procedure Summary.

## 2017-11-15 NOTE — PROGRESS NOTES
Pharmacist Note  Warfarin Dosing  Consult provided for this 69 y.o. female to manage warfarin for VTE prophylaxis s/p revision of  right TKR    INR Goal: 1.7- 2.2    Therapy Day: 2    Preop Dose: Guillermo- 4mg    Drugs that may increase INR: None  Drugs that may decrease INR: None  Other current anticoagulants/ drugs that may increase bleeding risk: NSAID  Risk factors: Age > 65  Daily INR ordered: YES    Recent Labs      11/15/17   0654   HGB  10.9*   INR  1.1       Date               INR                 Dose  10/27  1.1    11/13                                          4 mg  11/14                                          3 mg  11/15               1.1                      4 mg    Assessment/ Plan: Will order warfarin 4 mg PO x 1 dose. Pharmacy will continue to monitor daily and adjust therapy as indicated.

## 2017-11-15 NOTE — PROGRESS NOTES
Problem: Mobility Impaired (Adult and Pediatric)  Goal: *Acute Goals and Plan of Care (Insert Text)  Physical Therapy Goals  Initiated 11/15/2017    1. Patient will move from supine to sit and sit to supine , scoot up and down and roll side to side in bed with supervision/set-up within 4 days. 2. Patient will perform sit to stand with supervision/set-up within 4 days. 3. Patient will ambulate with supervision/set-up for 150 feet with the least restrictive device within 4 days. 4. Patient will perform home exercise program per protocol with independence within 4 days. 5. Patient will demonstrate AROM 0-90 degrees in operative joint within 4 days. physical Therapy TREATMENT  Patient: Farhad Toney (76 y.o. female)  Date: 11/15/2017  Diagnosis: PAIN DUE TO TOTAL RIGHT KNEE REPLACEMENT, STATUS POST RIGHT TOTAL KNEE REPLACEMENT   Pain due to knee joint prosthesis ([address] <principal problem not specified>  Procedure(s) (LRB):  REVISION OF RIGHT TOTAL KNEE REPLACEMENT (SPINAL W/ IV SEDATION) (Right) 1 Day Post-Op  Precautions: Fall, WBAT    ASSESSMENT:  Patient received supine in bed, again on the phone and having conversation with ease. Immediately upon PT introducing self and role, patient tearful and c/o not being able to complete PT. With max encouragement from PT and RN, patient agreeable to participation. She required CGA and verbal cues for safe sit<>stand transfer and ambulated to bathroom with unsteady, antalgic gait. Ambulated in hallway with increased trunk sway and had one major LOB, when she took her hand off the walker to reach and get RN's attention (mod A to steady + max education about safety). With this episode, patient easily transitioned from joking and happy to crying out in pain. Returned to sitting in chair and performed heel slides with active assist. Encouraged to sit in chair for 30 minutes and then get back up to chair for dinner. Recommend SNF. Patient appears self limiting. Progression toward goals:  []      Improving appropriately and progressing toward goals  [x]      Improving slowly and progressing toward goals  []      Not making progress toward goals and plan of care will be adjusted     PLAN:  Patient continues to benefit from skilled intervention to address the above impairments. Continue treatment per established plan of care. Discharge Recommendations:  [facility]  Further Equipment Recommendations for Discharge:  TBD at [facility]     SUBJECTIVE:   Patient stated I can't do that today!?    OBJECTIVE DATA SUMMARY:   Critical Behavior:  Neurologic State: Alert  Orientation Level: Oriented X4  Cognition: Follows commands  Range of Motion:  AROM: Within functional limits  RLE AROM  R Knee Flexion: 75  RLE PROM  R Knee Flexion: 80  Functional Mobility Training:  Bed Mobility:  Supine to Sit: Moderate assistance  Scooting: Modified independent  Transfers:  Sit to Stand: Contact guard assistance; Additional time  Stand to Sit: Contact guard assistance (verbal cues)  Stand Pivot Transfers: Contact guard assistance     Balance:  Sitting: Intact  Standing: Impaired; With support  Standing - Static: Good  Standing - Dynamic : Fair  Ambulation/Gait Training:  Distance (ft): 70 Feet (ft)  Assistive Device: Walker, rolling;Gait belt  Ambulation - Level of Assistance: Contact guard assistance;Minimal assistance  Gait Abnormalities: Antalgic;Decreased step clearance;Trunk sway increased  Right Side Weight Bearing: As tolerated  Left Side Weight Bearing: Full  Base of Support: Widened;Shift to left  Stance: Right decreased  Speed/Teagan: Slow;Pace decreased (<100 feet/min)  Step Length: Right shortened;Left shortened  Swing Pattern: Right asymmetrical  Therapeutic Exercises:     EXERCISE   Sets   Reps   Active Active Assist   Passive Self ROM   Comments   Heel Slides 2 10 []                                        [x]                                        [] []                                             Pain:  Pain Scale 1: Numeric (0 - 10)  Pain Intensity 1: 10  Pain Location 1: Knee  Pain Orientation 1: Right  Pain Description 1: Aching; Throbbing  Pain Intervention(s) 1: Medication (see MAR); Cold pack; Emotional support; Food  Activity Tolerance:   Patient self limiting  Please refer to the flowsheet for vital signs taken during this treatment.   After treatment:   [x] Patient left in no apparent distress sitting up in chair  [] Patient left in no apparent distress in bed  [x] Call bell left within reach  [x] Nursing notified  [] Caregiver present  [] Bed alarm activated    COMMUNICATION/COLLABORATION:   The patients plan of care was discussed with: Registered Nurse    Rohini Murdock PT, DPT   Time Calculation: 20 mins

## 2017-11-16 LAB
EST. AVERAGE GLUCOSE BLD GHB EST-MCNC: 206 MG/DL
GLUCOSE BLD STRIP.AUTO-MCNC: 230 MG/DL (ref 65–100)
GLUCOSE BLD STRIP.AUTO-MCNC: 235 MG/DL (ref 65–100)
GLUCOSE BLD STRIP.AUTO-MCNC: 242 MG/DL (ref 65–100)
GLUCOSE BLD STRIP.AUTO-MCNC: 358 MG/DL (ref 65–100)
HBA1C MFR BLD: 8.8 % (ref 4.2–6.3)
HGB BLD-MCNC: 9.8 G/DL (ref 11.5–16)
INR PPP: 1.6 (ref 0.9–1.1)
PROTHROMBIN TIME: 16 SEC (ref 9–11.1)
SERVICE CMNT-IMP: ABNORMAL

## 2017-11-16 PROCEDURE — 74011250637 HC RX REV CODE- 250/637: Performed by: NURSE PRACTITIONER

## 2017-11-16 PROCEDURE — G8988 SELF CARE GOAL STATUS: HCPCS

## 2017-11-16 PROCEDURE — 65270000029 HC RM PRIVATE

## 2017-11-16 PROCEDURE — 74011250637 HC RX REV CODE- 250/637: Performed by: ORTHOPAEDIC SURGERY

## 2017-11-16 PROCEDURE — 97165 OT EVAL LOW COMPLEX 30 MIN: CPT

## 2017-11-16 PROCEDURE — 74011636637 HC RX REV CODE- 636/637: Performed by: NURSE PRACTITIONER

## 2017-11-16 PROCEDURE — G8987 SELF CARE CURRENT STATUS: HCPCS

## 2017-11-16 PROCEDURE — 85610 PROTHROMBIN TIME: CPT | Performed by: ORTHOPAEDIC SURGERY

## 2017-11-16 PROCEDURE — 82962 GLUCOSE BLOOD TEST: CPT

## 2017-11-16 PROCEDURE — 36415 COLL VENOUS BLD VENIPUNCTURE: CPT | Performed by: ORTHOPAEDIC SURGERY

## 2017-11-16 PROCEDURE — 85018 HEMOGLOBIN: CPT | Performed by: ORTHOPAEDIC SURGERY

## 2017-11-16 PROCEDURE — 97535 SELF CARE MNGMENT TRAINING: CPT

## 2017-11-16 PROCEDURE — 83036 HEMOGLOBIN GLYCOSYLATED A1C: CPT | Performed by: NURSE PRACTITIONER

## 2017-11-16 PROCEDURE — 97110 THERAPEUTIC EXERCISES: CPT

## 2017-11-16 PROCEDURE — 97116 GAIT TRAINING THERAPY: CPT

## 2017-11-16 RX ORDER — INSULIN LISPRO 100 [IU]/ML
INJECTION, SOLUTION INTRAVENOUS; SUBCUTANEOUS
Status: DISCONTINUED | OUTPATIENT
Start: 2017-11-16 | End: 2017-11-17 | Stop reason: HOSPADM

## 2017-11-16 RX ORDER — INSULIN GLARGINE 100 [IU]/ML
25 INJECTION, SOLUTION SUBCUTANEOUS EVERY 12 HOURS
Status: DISCONTINUED | OUTPATIENT
Start: 2017-11-16 | End: 2017-11-17 | Stop reason: HOSPADM

## 2017-11-16 RX ORDER — WARFARIN 2 MG/1
2 TABLET ORAL ONCE
Status: COMPLETED | OUTPATIENT
Start: 2017-11-16 | End: 2017-11-16

## 2017-11-16 RX ORDER — OXYCODONE HYDROCHLORIDE 5 MG/1
20 TABLET ORAL
Status: DISCONTINUED | OUTPATIENT
Start: 2017-11-16 | End: 2017-11-17 | Stop reason: HOSPADM

## 2017-11-16 RX ORDER — POLYETHYLENE GLYCOL 3350 17 G/17G
17 POWDER, FOR SOLUTION ORAL DAILY PRN
Status: DISCONTINUED | OUTPATIENT
Start: 2017-11-17 | End: 2017-11-17 | Stop reason: HOSPADM

## 2017-11-16 RX ORDER — INSULIN LISPRO 100 [IU]/ML
9 INJECTION, SOLUTION INTRAVENOUS; SUBCUTANEOUS ONCE
Status: COMPLETED | OUTPATIENT
Start: 2017-11-16 | End: 2017-11-16

## 2017-11-16 RX ORDER — AMOXICILLIN 250 MG
1 CAPSULE ORAL DAILY
Status: DISCONTINUED | OUTPATIENT
Start: 2017-11-17 | End: 2017-11-17 | Stop reason: HOSPADM

## 2017-11-16 RX ADMIN — INSULIN LISPRO 9 UNITS: 100 INJECTION, SOLUTION INTRAVENOUS; SUBCUTANEOUS at 08:02

## 2017-11-16 RX ADMIN — BUSPIRONE HYDROCHLORIDE 15 MG: 10 TABLET ORAL at 22:25

## 2017-11-16 RX ADMIN — ACETAMINOPHEN 500 MG: 500 TABLET, FILM COATED ORAL at 18:25

## 2017-11-16 RX ADMIN — INSULIN GLARGINE 20 UNITS: 100 INJECTION, SOLUTION SUBCUTANEOUS at 09:21

## 2017-11-16 RX ADMIN — SUCRALFATE 1 G: 1 TABLET ORAL at 22:25

## 2017-11-16 RX ADMIN — GABAPENTIN 200 MG: 100 CAPSULE ORAL at 09:20

## 2017-11-16 RX ADMIN — ACETAMINOPHEN 500 MG: 500 TABLET, FILM COATED ORAL at 13:14

## 2017-11-16 RX ADMIN — SUCRALFATE 1 G: 1 TABLET ORAL at 08:03

## 2017-11-16 RX ADMIN — BUSPIRONE HYDROCHLORIDE 15 MG: 10 TABLET ORAL at 09:20

## 2017-11-16 RX ADMIN — METOPROLOL TARTRATE 12.5 MG: 25 TABLET ORAL at 09:20

## 2017-11-16 RX ADMIN — GABAPENTIN 200 MG: 100 CAPSULE ORAL at 22:24

## 2017-11-16 RX ADMIN — LISINOPRIL 20 MG: 20 TABLET ORAL at 09:20

## 2017-11-16 RX ADMIN — ACETAMINOPHEN 500 MG: 500 TABLET, FILM COATED ORAL at 05:08

## 2017-11-16 RX ADMIN — INSULIN GLARGINE 25 UNITS: 100 INJECTION, SOLUTION SUBCUTANEOUS at 22:23

## 2017-11-16 RX ADMIN — Medication 10 ML: at 13:14

## 2017-11-16 RX ADMIN — PANTOPRAZOLE SODIUM 40 MG: 40 TABLET, DELAYED RELEASE ORAL at 08:03

## 2017-11-16 RX ADMIN — BUSPIRONE HYDROCHLORIDE 15 MG: 10 TABLET ORAL at 16:58

## 2017-11-16 RX ADMIN — SUCRALFATE 1 G: 1 TABLET ORAL at 11:48

## 2017-11-16 RX ADMIN — Medication 10 ML: at 05:09

## 2017-11-16 RX ADMIN — OXYCODONE HYDROCHLORIDE 10 MG: 5 TABLET ORAL at 18:25

## 2017-11-16 RX ADMIN — OXYCODONE HYDROCHLORIDE 20 MG: 5 TABLET ORAL at 09:19

## 2017-11-16 RX ADMIN — ACETAMINOPHEN 500 MG: 500 TABLET, FILM COATED ORAL at 09:20

## 2017-11-16 RX ADMIN — METOPROLOL TARTRATE 12.5 MG: 25 TABLET ORAL at 18:25

## 2017-11-16 RX ADMIN — INSULIN LISPRO 2 UNITS: 100 INJECTION, SOLUTION INTRAVENOUS; SUBCUTANEOUS at 22:23

## 2017-11-16 RX ADMIN — WARFARIN SODIUM 2 MG: 2 TABLET ORAL at 11:48

## 2017-11-16 RX ADMIN — GABAPENTIN 200 MG: 100 CAPSULE ORAL at 16:58

## 2017-11-16 RX ADMIN — OXYCODONE HYDROCHLORIDE 10 MG: 5 TABLET ORAL at 22:25

## 2017-11-16 RX ADMIN — Medication 10 ML: at 22:27

## 2017-11-16 RX ADMIN — INSULIN LISPRO 4 UNITS: 100 INJECTION, SOLUTION INTRAVENOUS; SUBCUTANEOUS at 11:48

## 2017-11-16 RX ADMIN — ACETAMINOPHEN 500 MG: 500 TABLET, FILM COATED ORAL at 22:25

## 2017-11-16 RX ADMIN — INSULIN LISPRO 4 UNITS: 100 INJECTION, SOLUTION INTRAVENOUS; SUBCUTANEOUS at 16:30

## 2017-11-16 RX ADMIN — OXYCODONE HYDROCHLORIDE 10 MG: 5 TABLET ORAL at 05:08

## 2017-11-16 RX ADMIN — SUCRALFATE 1 G: 1 TABLET ORAL at 16:58

## 2017-11-16 RX ADMIN — OXYCODONE HYDROCHLORIDE 20 MG: 5 TABLET ORAL at 14:04

## 2017-11-16 NOTE — PROGRESS NOTES
Pharmacist Note  Warfarin Dosing  Consult provided for this 69 y.o. 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6                      2 mg    Assessment/ Plan: Will order warfarin 2 mg (0.5 x initial dose) PO x 1 dose given rapid rise in INR. Pharmacy will continue to monitor daily and adjust therapy as indicated.

## 2017-11-16 NOTE — DIABETES MGMT
DTC Post Surgical Education Note    Chart reviewed and initial evaluation complete on [facility]. Recommendations/ Comments: BG's 300 - 400 mg/dL. Yesterday BG spiked to 461 mg/dL after receiving one time dose of Dexamethasone. In addition, she \"was starving and ordered everything\". She was on a Regular diet so there were no restrictions. She had ice cream and chocolate chip cookies. Her pain level has been high. She is under stress worried about her adult age son who has schizophrenia and relies on her for care. S/P knee replacement 11/14/2017  A1c 8.8% indicating poor control PTA     Noted home Lantus 20 units BID started yesterday evening. However, FBG remains high today - 358 mg/dL       If appropriate, please consider   Increase Lantus to 30 units BID  Add lispro 5 units AC  Change correction scale to resistant  Please continue to document po intake     Chart reviewed on [facility].     Patient is a 69 y.o. female with known DM on Lantus 20 units twice a day at home. Assessed and instructed patient on the following  interpretation of lab results - A1c 8.8 %. Gaol < 7  blood sugar goals post op  risk of infection/ delayed healing and importance of excellent BG control post op to decrease t his risk. complications of diabetes mellitus,   hypoglycemia prevention and treatment,   medication - she takes meds routinely at home   Effect of illness, pain, stress on BG explained and  management,   SMBG - she has a meter and tests 2x/day  nutrition - she drinks regular Coke, fruit juice, regular Ginger Ale. She likes some sweets. Guidelines given using plate method, appropriate food choices, carb list and portion control. referred to Diabetes Educator - offered to schedule her for a DTC class. She will be discharged to [facility] and then home. She would like to attend class after the holidays.      Encouraged the following:  Continue to take medication as prescribed  Continue to test BG 2x/day  Report BG's > 180 to PCP, Dr. Una Bee  Avoid all sweetened beverages    Provided patient with the following:        [x]        Survival skills education materials       [x]       Post surgery BG management guidelines                                                               [x]        Outpatient DTC contact number                  A1c:   POC Glucose last 24hrs: Lab Results   Component Value Date/Time    Glucose (POC) 358 11/16/2017 06:25 AM    Glucose (POC) 351 11/15/2017 09:23 PM    Glucose (POC) 437 11/15/2017 04:27 PM    Glucose (POC) 428 11/15/2017 04:25 PM    Glucose (POC) 461 11/15/2017 04:22 PM       Recent Glucose Results: Lab Results   Component Value Date/Time    GLUCPOC 358 (H) 11/16/2017 06:25 AM    GLUCPOC 351 (H) 11/15/2017 09:23 PM    GLUCPOC 437 (H) 11/15/2017 04:27 PM        Lab Results   Component Value Date/Time    Creatinine 0.93 11/15/2017 06:54 AM     Estimated Creatinine Clearance: 48 mL/min (based on Cr of 0.93). Active Orders   Diet    DIET DIABETIC CONSISTENT CARB Regular        PO intake: Patient Vitals for the past 72 hrs:   % Diet Eaten   11/15/17 1002 75 %       Current hospital DM medication: Lantus 20 units BID, lispro normal correction scale    Will continue to follow as needed. Thank you.   Jessica Soto RN, CDE

## 2017-11-16 NOTE — PROGRESS NOTES
8:33 PM  Bedside and Verbal shift change report given to Florencio Herr RN (oncoming nurse) by Ashly Noland RN (offgoing nurse). Report included the following information SBAR, Kardex, Procedure Summary, Intake/Output, MAR and Recent Results.

## 2017-11-16 NOTE — DIABETES MGMT
DTC Consult Note  Recommendations/ Comments: BG's 300 - 400 mg/dL. Yesterday BG spiked to 461 mg/dL after receiving one time dose of Dexamethasone. S/P knee replacement 11/14/2017  A1c 8.8% indicating poor control PTA    Noted home Lantus 20 units BID started yesterday evening. However, FBG remains high today - 358 mg/dL      If appropriate, please consider   Increase Lantus to 30 units BID  Add lispro 5 units AC  Change correction scale to resistant  Please continue to document po intake    Chart reviewed on [facility]. Patient is a 69 y.o. female with known DM on Lantus 20 units twice a day at home. DTC will continue to follow patient and provide post-op diabetes education. ____________________________    Consult received for:   []           Hospital Medication Recommendations                 [x]           Hospital Blood Glucose Management      A1c:   Lab Results   Component Value Date/Time    Hemoglobin A1c 8.8 11/16/2017 05:40 AM       Recent Glucose Results: Lab Results   Component Value Date/Time    GLUCPOC 358 (H) 11/16/2017 06:25 AM    GLUCPOC 351 (H) 11/15/2017 09:23 PM    GLUCPOC 437 (H) 11/15/2017 04:27 PM        Lab Results   Component Value Date/Time    Creatinine 0.93 11/15/2017 06:54 AM       Active Orders   Diet    DIET DIABETIC CONSISTENT CARB Regular        PO intake: Patient Vitals for the past 72 hrs:   % Diet Eaten   11/15/17 1002 75 %       Current hospital DM medication: Lantus 20 units BID and lispro correction scale, normal sensitivity    Thank you.   Isra Alexander RN, CDE

## 2017-11-16 NOTE — PROGRESS NOTES
Problem: Mobility Impaired (Adult and Pediatric)  Goal: *Acute Goals and Plan of Care (Insert Text)  Physical Therapy Goals  Initiated 11/15/2017    1. Patient will move from supine to sit and sit to supine , scoot up and down and roll side to side in bed with supervision/set-up within 4 days. 2. Patient will perform sit to stand with supervision/set-up within 4 days. 3. Patient will ambulate with supervision/set-up for 150 feet with the least restrictive device within 4 days. 4. Patient will perform home exercise program per protocol with independence within 4 days. 5. Patient will demonstrate AROM 0-90 degrees in operative joint within 4 days. physical Therapy TREATMENT  Patient: Daphney Dean (85 y.o. female)  Date: 11/16/2017  Diagnosis: PAIN DUE TO TOTAL RIGHT KNEE REPLACEMENT, STATUS POST RIGHT TOTAL KNEE REPLACEMENT   Pain due to knee joint prosthesis (HCC) Pain due to knee joint prosthesis (HCC)  Procedure(s) (LRB):  REVISION OF RIGHT TOTAL KNEE REPLACEMENT (SPINAL W/ IV SEDATION) (Right) 2 Days Post-Op  Precautions: Fall, WBAT  Chart, physical therapy assessment, plan of care and goals were reviewed. ASSESSMENT:  Pt was able to increase gait tolerance. Pt continues to report pain but tolerable. Pt required SBA to CGA to mobilize with rolling walker. Pt lives alone and has a flight of steps. At this time pt would benefit from SNF to improve mobility and independence prior to returning home. Progression toward goals:  [x]      Improving appropriately and progressing toward goals  []      Improving slowly and progressing toward goals  []      Not making progress toward goals and plan of care will be adjusted     PLAN:  Patient continues to benefit from skilled intervention to address the above impairments. Continue treatment per established plan of care.   Discharge Recommendations:  [facility]  Further Equipment Recommendations for Discharge:  TBD     SUBJECTIVE:   Patient stated I just laid down.     OBJECTIVE DATA SUMMARY:   Critical Behavior:  Neurologic State: Alert  Orientation Level: Oriented X4  Cognition: Follows commands     Range of Motion:           RLE AROM  R Knee Flexion: 65  R Knee Extension: 0              Functional Mobility Training:  Bed Mobility:     Supine to Sit: Stand-by asssistance               Transfers:  Sit to Stand: Contact guard assistance  Stand to Sit: Contact guard assistance                             Balance:  Sitting: Intact  Standing: Impaired  Standing - Static: Good  Standing - Dynamic : Good  Ambulation/Gait Training:  Distance (ft): 120 Feet (ft)  Assistive Device: Gait belt;Walker, rolling  Ambulation - Level of Assistance: Stand-by asssistance;Contact guard assistance        Gait Abnormalities: Antalgic;Decreased step clearance  Right Side Weight Bearing: As tolerated     Base of Support: Widened  Stance: Right decreased  Speed/Teagan: Pace decreased (<100 feet/min); Slow                      Stairs:            Therapeutic Exercises:     EXERCISE   Sets   Reps   Active Active Assist   Passive Self ROM   Comments   Ankle Pumps  10 [x]                                []                                []                                []                                   Quad Sets  10 [x]                                []                                []                                []                                   Hamstring Sets   []                                []                                []                                []                                   Short Arc Quads   []                                []                                []                                []                                   Knee Extension Stretch  3min   [x]                                  []                                  []                                  []                                   Heel Slides  10 [] [x]                                []                                []                                   Long Arc Quads   []                                []                                []                                []                                   Knee Flexion Stretch  10 []                                [x]                                []                                []                                   Straight Leg Raises   []                                []                                []                                []                                       Pain:  Pain Scale 1: Numeric (0 - 10)  Pain Intensity 1: 8  Pain Location 1: Knee  Pain Orientation 1: Right  Pain Description 1: Aching  Pain Intervention(s) 1: Medication (see MAR)  Activity Tolerance:   Limited   Please refer to the flowsheet for vital signs taken during this treatment.   After treatment:   [] Patient left in no apparent distress sitting up in chair  [x] Patient left in no apparent distress in bed  [x] Call bell left within reach  [x] Nursing notified  [] Caregiver present  [] Bed alarm activated    COMMUNICATION/COLLABORATION:   The patients plan of care was discussed with: Registered Nurse    Alesia De La Paz PTA   Time Calculation: 26 mins

## 2017-11-16 NOTE — PROGRESS NOTES
Bedside and Verbal shift change report given to Lena Rubin (oncoming nurse) by Author Aleman (offgoing nurse). Report included the following information Kardex, Intake/Output, MAR and Accordion.

## 2017-11-16 NOTE — PROGRESS NOTES
Problem: Mobility Impaired (Adult and Pediatric)  Goal: *Acute Goals and Plan of Care (Insert Text)  Physical Therapy Goals  Initiated 11/15/2017    1. Patient will move from supine to sit and sit to supine , scoot up and down and roll side to side in bed with supervision/set-up within 4 days. 2. Patient will perform sit to stand with supervision/set-up within 4 days. 3. Patient will ambulate with supervision/set-up for 150 feet with the least restrictive device within 4 days. 4. Patient will perform home exercise program per protocol with independence within 4 days. 5. Patient will demonstrate AROM 0-90 degrees in operative joint within 4 days. physical Therapy TREATMENT  Patient: Corrinne Douse (5 y.o. female)  Date: 11/16/2017  Diagnosis: PAIN DUE TO TOTAL RIGHT KNEE REPLACEMENT, STATUS POST RIGHT TOTAL KNEE REPLACEMENT   Pain due to knee joint prosthesis (HCC) Pain due to knee joint prosthesis (HCC)  Procedure(s) (LRB):  REVISION OF RIGHT TOTAL KNEE REPLACEMENT (SPINAL W/ IV SEDATION) (Right) 2 Days Post-Op  Precautions: Fall, WBAT  Chart, physical therapy assessment, plan of care and goals were reviewed. ASSESSMENT:  Pt was able to increase gait tolerance. Pt needed reminders to hold on to the walker when talking. Pt continues to require assistance to mobilize safely. Pt lives alone and could benefit from SNF at discharge. Progression toward goals:  [x]      Improving appropriately and progressing toward goals  []      Improving slowly and progressing toward goals  []      Not making progress toward goals and plan of care will be adjusted     PLAN:  Patient continues to benefit from skilled intervention to address the above impairments. Continue treatment per established plan of care. Discharge Recommendations:  Skilled Nursing Facility  Further Equipment Recommendations for Discharge:  TBD     SUBJECTIVE:   Patient stated I am tired now.     OBJECTIVE DATA SUMMARY:   Critical Behavior:  Neurologic State: Alert  Orientation Level: Oriented X4  Cognition: Appropriate decision making, Appropriate for age attention/concentration, Appropriate safety awareness, Follows commands  Safety/Judgement: Awareness of environment, Good awareness of safety precautions, Home safety, Insight into deficits  Range of Motion:  AROM: Within functional limits        RLE AROM  R Knee Flexion: 65  R Knee Extension: 0              Functional Mobility Training:  Bed Mobility:     Sit to Supine: Minimum assistance  Scooting: Minimum assistance         Transfers:  Sit to Stand: Contact guard assistance; Adaptive equipment  Stand to Sit: Contact guard assistance; Adaptive equipment                             Balance:  Sitting: Intact  Standing: Impaired; With support  Standing - Static: Good  Standing - Dynamic : Fair  Ambulation/Gait Training:  Distance (ft): 300 Feet (ft)  Assistive Device: Gait belt;Walker, rolling  Ambulation - Level of Assistance: Stand-by asssistance;Contact guard assistance        Gait Abnormalities: Antalgic;Decreased step clearance  Right Side Weight Bearing: As tolerated     Base of Support: Widened  Stance: Right decreased  Speed/Teagan: Pace decreased (<100 feet/min); Slow                      Stairs:            Therapeutic Exercises:     EXERCISE   Sets   Reps   Active Active Assist   Passive Self ROM   Comments   Ankle Pumps  10 [x]                                []                                []                                []                                   Quad Sets  10 [x]                                []                                []                                []                                   Hamstring Sets   []                                []                                []                                []                                   Short Arc Quads   []                                []                                [] []                                   Knee Extension Stretch     []                                  []                                  []                                  []                                   Heel Slides  10 [x]                                []                                []                                []                                   Long Arc Quads   []                                []                                []                                []                                   Knee Flexion Stretch   []                                []                                []                                []                                   Straight Leg Raises   []                                []                                []                                []                                       Pain:  Pain Scale 1: Numeric (0 - 10)  Pain Intensity 1: 4  Pain Location 1: Knee  Pain Orientation 1: Right  Pain Description 1: Aching  Pain Intervention(s) 1: Repositioned; Rest  Activity Tolerance:   Limited   Please refer to the flowsheet for vital signs taken during this treatment.   After treatment:   [] Patient left in no apparent distress sitting up in chair  [x] Patient left in no apparent distress in bed  [x] Call bell left within reach  [x] Nursing notified  [] Caregiver present  [] Bed alarm activated    COMMUNICATION/COLLABORATION:   The patients plan of care was discussed with: Registered Nurse    Jc Walker PTA   Time Calculation: 19 mins

## 2017-11-16 NOTE — PROGRESS NOTES
Problem: Self Care Deficits Care Plan (Adult)  Goal: *Acute Goals and Plan of Care (Insert Text)  Occupational Therapy Goals initiated 11/16/17  1. Patient will complete LB dressing with setup/supervision and PRN AE within 7 days. 2. Patient will complete standing grooming task with supervision within 7 days. 3. Patient will complete toilet transfer with supervision within 7 days. 4. Patient will complete light standing IADL activity within 7 days. 5. Patient will complete bathing activity with setup/supervision and PRN AE within 7 days. Occupational Therapy EVALUATION  Patient: Lesia Suresh (89 y.o. female)  Date: 11/16/2017  Primary Diagnosis: PAIN DUE TO TOTAL RIGHT KNEE REPLACEMENT, STATUS POST RIGHT TOTAL KNEE REPLACEMENT   Pain due to knee joint prosthesis (HCC)  Procedure(s) (LRB):  REVISION OF RIGHT TOTAL KNEE REPLACEMENT (SPINAL W/ IV SEDATION) (Right) 2 Days Post-Op   Precautions:  Fall, WBAT    ASSESSMENT :  Based on the objective data described below, the patient presents with decreased activity tolerance, decreased standing balance, increased pain in RLE, and decreased mobility of RLE following revision of R TKA limiting safety and independence with ADLs and functional mobility tasks. Pt was calm and cooperative during session. Pt required additional encouragement and Min A to support RLE while transitioning to sitting, with pt requiring additional cues to breathe while completing painful motions. Pt did not experience any LOB episodes during session and demonstrated safe technique with transfers by proper hand placement and use of RW. Pt would benefit from additional skilled acute OT services to promote return to PLOF and increase safety in home environment. Pt stated that she would prefer to discharge to [facility], as pt lives alone. Pt reported that she would have consistent help from daughter, but that there are a lot of stairs at home that she does not feel safe with.  Next session to address dressing and bathing tasks and provide education on use of AE/DME to increase independence and reduce pain while completing tasks. Discharge recommendation for SNF to build upon progress made with acute therapy services. Patient will benefit from skilled intervention to address the above impairments. Patients rehabilitation potential is considered to be Good  Factors which may influence rehabilitation potential include:   []             None noted  []             Mental ability/status  []             Medical condition  [x]             Home/family situation and support systems  []             Safety awareness  [x]             Pain tolerance/management  []             Other:      PLAN :  Recommendations and Planned Interventions:  [x]               Self Care Training                  [x]        Therapeutic Activities  [x]               Functional Mobility Training    []        Cognitive Retraining  [x]               Therapeutic Exercises           [x]        Endurance Activities  []               Balance Training                   []        Neuromuscular Re-Education  []               Visual/Perceptual Training     [x]   Home Safety Training  [x]               Patient Education                 [x]        Family Training/Education  []               Other (comment):    Frequency/Duration: Patient will be followed by occupational therapy 5 times a week to address goals. Discharge Recommendations: [facility]  Further Equipment Recommendations for Discharge: To be determined     SUBJECTIVE:   Patient stated I feel alright.     OBJECTIVE DATA SUMMARY:   HISTORY:   Past Medical History:   Diagnosis Date    Anal polyp 6/13/2013    Arthritis     Asthma     CAD (coronary artery disease) 10/27/2017    Cataract     Chronic pain     knee - right/back    Diabetes ([facility] [address]     GERD (gastroesophageal reflux disease)     Hemorrhoids 6/13/2013    History of kidney stones     Hypertension     Ill-defined condition     abdominal pain and burning    Other ill-defined conditions(799.89)     high cholesterol     Past Surgical History:   Procedure Laterality Date    COLONOSCOPY  2/28/2013         EGD  2/28/2013         HX CATARACT REMOVAL Bilateral     HX CHOLECYSTECTOMY  6-2015    HX GI  6/27/13    anal polyps removed    HX HEENT      laser surgery for blood clot in right eye    HX KNEE REPLACEMENT      right    HX OTHER SURGICAL  4-2-14    lap gastrotomy and removal of polyp -  - not cancerous per pt    HX ALAN AND BSO      HX TONSILLECTOMY      HX UROLOGICAL      \"laser\" surgery for kidney stone    NEUROLOGICAL PROCEDURE UNLISTED  1990    tumor at back of neck, benign       Prior Level of Function/Environment/Context: Independent with all ADLs and IADLs at baseline. Pt had difficulty with some IADLs (e.g. Laundry) but would get support from daughter. Expanded or extensive additional review of patient history:     Home Situation  Home Environment: Private residence  # Steps to Enter: 1  One/Two Story Residence: Two story  # of Interior Steps: 14  Interior Rails: Right  Lift Chair Available: No  Living Alone: Yes  Support Systems: Family member(s)  Patient Expects to be Discharged to[de-identified] Rehabilitation facility  Current DME Used/Available at Home: Shower chair, [address], straight, Commode, bedside  Tub or Shower Type: Tub/Shower combination  [x]  Right hand dominant   []  Left hand dominant    EXAMINATION OF PERFORMANCE DEFICITS:  Cognitive/Behavioral Status:  Neurologic State: Alert  Orientation Level: Oriented X4  Cognition: Appropriate decision making; Appropriate for age attention/concentration; Appropriate safety awareness; Follows commands  Perception: Appears intact  Perseveration: No perseveration noted  Safety/Judgement: Awareness of environment;Good awareness of safety precautions; Home safety; Insight into deficits    Skin: Visible skin intact.     Edema: No significant edema noted.     Hearing: Auditory  Auditory Impairment: None  Hearing Aids/Status: Does not own    Vision/Perceptual:       Acuity: Within Defined Limits    Corrective Lenses: Glasses    Range of Motion:  AROM: Within functional limits      Strength:  Strength: Generally decreased, functional     Coordination:  Coordination: Within functional limits  Fine Motor Skills-Upper: Left Intact; Right Intact    Gross Motor Skills-Upper: Left Intact; Right Intact    Tone & Sensation:  Tone: Normal  Sensation: Intact        Balance:  Sitting: Intact  Standing: Impaired; With support  Standing - Static: Good  Standing - Dynamic : Fair    Functional Mobility and Transfers for ADLs:  Bed Mobility:  Supine to Sit: Minimum assistance  Scooting: Minimum assistance    Transfers:  Sit to Stand: Contact guard assistance; Adaptive equipment  Stand to Sit: Contact guard assistance; Adaptive equipment  Toilet Transfer : Contact guard assistance; Adaptive equipment    ADL Assessment:  Feeding: Independent    Oral Facial Hygiene/Grooming: Setup    Bathing: Maximum assistance    Upper Body Dressing: Setup    Lower Body Dressing: Maximum assistance    Toileting: Supervision           ADL Intervention and task modifications:  Pt encountered supine in bed with HOB slightly elevated. Pt participated in education on safe mobilization of RLE when transitioning to EOB. Pt transitioned to sitting at EOB with Min A to assist with mobilizing RLE. Pt completed sit to stand with CGA and RW in preparation for functional transfer to chair. Pt sat in chair with CGA and RW. Pt completed eating task independently while seated in chair. Pt left in no apparent distress seated in chair with call bell within reach. Feeding  Feeding Assistance: Independent  Container Management: Independent  Utensil Management: Independent  Food to Mouth: Independent  Drink to Mouth:  Independent       Cognitive Retraining  Safety/Judgement: Awareness of environment;Good awareness of safety precautions; Home safety; Insight into deficits    Functional Measure:  Barthel Index:    Bathin  Bladder: 10  Bowels: 10  Groomin  Dressin  Feeding: 10  Mobility: 10  Stairs: 5  Toilet Use: 5  Transfer (Bed to Chair and Back): 10  Total: 70       Barthel and G-code impairment scale:  Percentage of impairment CH  0% CI  1-19% CJ  20-39% CK  40-59% CL  60-79% CM  80-99% CN  100%   Barthel Score 0-100 100 99-80 79-60 59-40 20-39 1-19   0   Barthel Score 0-20 20 17-19 13-16 9-12 5-8 1-4 0      The Barthel ADL Index: Guidelines  1. The index should be used as a record of what a patient does, not as a record of what a patient could do. 2. The main aim is to establish degree of independence from any help, physical or verbal, however minor and for whatever reason. 3. The need for supervision renders the patient not independent. 4. A patient's performance should be established using the best available evidence. Asking the patient, friends/relatives and nurses are the usual sources, but direct observation and common sense are also important. However direct testing is not needed. 5. Usually the patient's performance over the preceding 24-48 hours is important, but occasionally longer periods will be relevant. 6. Middle categories imply that the patient supplies over 50 per cent of the effort. 7. Use of aids to be independent is allowed. Joelle Bernal., Barthel, D.W. (6734). Functional evaluation: the Barthel Index. [facility] (14)2. RAMYA Granados, Canales Garland., Ange Piresow., [place], [address] [place] [address] (). Measuring the change indisability after inpatient rehabilitation; comparison of the responsiveness of the Barthel Index and Functional Deuel Measure. Journal of Neurology, Neurosurgery, and Psychiatry, 66(4), 817-678. Tyler Stevens N.J.A, BRANNON Trujillo, & Arlet Franz M.A. (2004.) Assessment of post-stroke quality of life in cost-effectiveness studies:  The usefulness of the Barthel Index and the EuroQoL-5D. Quality of Life Research, 13, 860-93       G codes: In compliance with CMSs Claims Based Outcome Reporting, the following G-code set was chosen for this patient based on their primary functional limitation being treated: The outcome measure chosen to determine the severity of the functional limitation was the Barthel Index with a score of 70/100 which was correlated with the impairment scale. ? Self Care:     - CURRENT STATUS: CJ - 20%-39% impaired, limited or restricted    - GOAL STATUS: CI - 1%-19% impaired, limited or restricted    - D/C STATUS:  ---------------To be determined---------------     Occupational Therapy Evaluation Charge Determination   History Examination Decision-Making   LOW Complexity : Brief history review  MEDIUM Complexity : 3-5 performance deficits relating to physical, cognitive , or psychosocial skils that result in activity limitations and / or participation restrictions MEDIUM Complexity : Patient may present with comorbidities that affect occupational performnce. Miniml to moderate modification of tasks or assistance (eg, physical or verbal ) with assesment(s) is necessary to enable patient to complete evaluation       Based on the above components, the patient evaluation is determined to be of the following complexity level: LOW   Pain:  Pain Scale 1: Numeric (0 - 10)  Pain Intensity 1: 4  Pain Location 1: Knee  Pain Orientation 1: Right  Pain Description 1: Aching  Pain Intervention(s) 1: Repositioned; Rest  Activity Tolerance:   Pt completed all session activities with VSS. Pt c/o lightheadedness and increased pain following transition to sitting at EOB, but pt did not display additional signs or symptoms of orthostatic hypotension or distress.      After treatment:   [x] Patient left in no apparent distress sitting up in chair  [] Patient left in no apparent distress in bed  [x] Call bell left within reach  [] Nursing notified  [] Caregiver present  [] Bed alarm activated    COMMUNICATION/EDUCATION:   The patients plan of care was discussed with: Physical Therapist and Registered Nurse. [x] Home safety education was provided and the patient/caregiver indicated understanding. [x] Patient/family have participated as able in goal setting and plan of care. [x] Patient/family agree to work toward stated goals and plan of care. [] Patient understands intent and goals of therapy, but is neutral about his/her participation. [] Patient is unable to participate in goal setting and plan of care. This patients plan of care is appropriate for delegation to [facility]. Thank you for this referral.  Layton Martinez, ADEEL    Regarding student involvement in patient care:  A student participated in this treatment session. Per CMS Medicare statements and AOTA guidelines I certify that the following was true:  1. I was present and directly observed the entire session. 2. I made all skilled judgments and clinical decisions regarding care. 3. I am the practitioner responsible for assessment, treatment, and documentation.     Time: One [facility], OT

## 2017-11-16 NOTE — PROGRESS NOTES
NP ORTHO PROGRESS NOTE  Post Op day: 2 Days Post-Op    November 16, 2017 10:35 AM     Marcella Swan  [id number]  female  69 y.o.   1940    Admit date: 11/14/2017  Date of Surgery: 11/14/2017   Procedures: Procedure(s):  REVISION OF RIGHT TOTAL KNEE REPLACEMENT (SPINAL W/ IV SEDATION)  Admitting Physician: Michele Valentino MD   Surgeon: Ashlie Shepherd) and Role:     * Michele Valentino MD - Primary    Chart/Meds/Labs Reviewed  Current Facility-Administered Medications   Medication Dose Route Frequency    [START ON 11/17/2017] polyethylene glycol (MIRALAX) packet 17 g  17 g Oral DAILY PRN    [START ON 11/17/2017] senna-docusate (PERICOLACE) 8.6-50 mg per tablet 1 Tab  1 Tab Oral DAILY    oxyCODONE IR (ROXICODONE) tablet 20 mg  20 mg Oral Q4H PRN    warfarin (COUMADIN) tablet 2 mg  2 mg Oral ONCE    insulin lispro (HUMALOG) injection   SubCUTAneous AC&HS    linaclotide (LINZESS) capsule 145 mcg (Patient Supplied)  145 mcg Oral DAILY    insulin glargine (LANTUS) injection 20 Units  20 Units SubCUTAneous Q12H    amLODIPine (NORVASC) tablet 10 mg  10 mg Oral QHS    busPIRone (BUSPAR) tablet 15 mg  15 mg Oral TID    gabapentin (NEURONTIN) capsule 200 mg  200 mg Oral TID    metoprolol tartrate (LOPRESSOR) tablet 12.5 mg  12.5 mg Oral BID    rosuvastatin (CRESTOR) tablet 10 mg  10 mg Oral QHS    sucralfate (CARAFATE) tablet 1 g  1 g Oral AC&HS    latanoprost (XALATAN) 0.005 % ophthalmic solution 1 Drop  1 Drop Both Eyes QHS    sodium chloride (NS) flush 5-10 mL  5-10 mL IntraVENous Q8H    sodium chloride (NS) flush 5-10 mL  5-10 mL IntraVENous PRN    acetaminophen (TYLENOL) tablet 500 mg  500 mg Oral Q4HWA    oxyCODONE IR (ROXICODONE) tablet 10 mg  10 mg Oral Q4H PRN    naloxone (NARCAN) injection 0.4 mg  0.4 mg IntraVENous PRN    hydrOXYzine HCl (ATARAX) tablet 10 mg  10 mg Oral Q8H PRN    bisacodyl (DULCOLAX) suppository 10 mg  10 mg Rectal DAILY PRN    glucose chewable tablet 16 g  4 Tab Oral PRN    dextrose (D50W) injection syrg 12.5-25 g  12.5-25 g IntraVENous PRN    glucagon (GLUCAGEN) injection 1 mg  1 mg IntraMUSCular PRN    albuterol (PROVENTIL VENTOLIN) nebulizer solution 2.5 mg  2.5 mg Nebulization Q6H PRN    pantoprazole (PROTONIX) tablet 40 mg  40 mg Oral ACB    Warfarin pharmacy to dose   Other Rx Dosing/Monitoring    arformoterol (BROVANA) neb solution 15 mcg  15 mcg Nebulization BID RT    And    budesonide (PULMICORT) 500 mcg/2 ml nebulizer suspension  500 mcg Nebulization BID RT    timolol (TIMOPTIC) 0.5 % ophthalmic solution 1 Drop  1 Drop Right Eye QHS    brimonidine (ALPHAGAN) 0.2 % ophthalmic solution 1 Drop  1 Drop Right Eye QHS    lisinopril (PRINIVIL, ZESTRIL) tablet 20 mg  20 mg Oral DAILY    influenza vaccine 2017-18 (3 yrs+)(PF) (FLUZONE QUAD/FLUARIX QUAD) injection 0.5 mL  0.5 mL IntraMUSCular PRIOR TO DISCHARGE       Subjective:    Complaints: A lot of pain yesterday. Denies Dizziness, CP, SOB, N/V, Abdominal pain, Constipation, numbness or tingling of extremities. Able to perform ankle pumps easily. Progressing with mobility, but poor performance with PT  Incisional pain control: Better today  Pain Control:   Pain Assessment  Pain Scale 1: Numeric (0 - 10)  Pain Intensity 1: 8  Pain Onset 1: post op  Pain Location 1: Knee  Pain Orientation 1: Right  Pain Description 1: Aching  Pain Intervention(s) 1: Medication (see MAR)    Oral diet: Tolerating diabetic diet well. Has had several loose BMs    Objective:  General: Alert,Ox4, cooperative, NAD  HEENT: Atraumatic, PERRL, anicteric sclerae  Lungs: Bilateral expansion. Equal excursion. No accessory muscle use. Gastrointestinal:  Soft, non-tender, non-distended  Extremities:  Neurovasc exam WDL. + DP pulses. Sensation intact to light touch. Motor: + DF/PF          Calves non-tender upon palpation or with passive stretch. No significant erythema or swelling.    Dressing:only small contained sanginous, dry and intact at borders (expected)    Vital Signs:   Visit Vitals    /59    Pulse 91    Temp 98.1 °F (36.7 °C)    Resp 16    Ht 5' (1.524 m)    Wt 79.4 kg (175 lb)    SpO2 94%    BMI 34.18 kg/m2    O2 Flow Rate (L/min): 2 l/min O2 Device: Room air   Patient Vitals for the past 24 hrs:   BP Temp Pulse Resp SpO2   17 0814 126/59 98.1 °F (36.7 °C) 91 16 94 %   17 0222 152/84 98.5 °F (36.9 °C) 98 18 95 %   11/15/17 2156 - - - - 100 %   11/15/17 2038 156/84 98.1 °F (36.7 °C) 92 16 100 %   11/15/17 1439 157/78 98.5 °F (36.9 °C) 97 16 94 %   11/15/17 1052 - - - - 95 %     Temp (24hrs), Av.3 °F (36.8 °C), Min:98.1 °F (36.7 °C), Max:98.5 °F (36.9 °C)      LAB:   Recent Results (from the past 24 hour(s))   GLUCOSE, POC    Collection Time: 11/15/17 11:19 AM   Result Value Ref Range    Glucose (POC) 179 (H) 65 - 100 mg/dL    Performed by [place], POC    Collection Time: 11/15/17  4:22 PM   Result Value Ref Range    Glucose (POC) 461 (H) 65 - 100 mg/dL    Performed by Moise Bryan    GLUCOSE, POC    Collection Time: 11/15/17  4:25 PM   Result Value Ref Range    Glucose (POC) 428 (H) 65 - 100 mg/dL    Performed by Nick, POC    Collection Time: 11/15/17  4:27 PM   Result Value Ref Range    Glucose (POC) 437 (H) 65 - 100 mg/dL    Performed by Moise Bryan    GLUCOSE, POC    Collection Time: 11/15/17  9:23 PM   Result Value Ref Range    Glucose (POC) 351 (H) 65 - 100 mg/dL    Performed by Frieda Fothergill    HEMOGLOBIN    Collection Time: 17  5:40 AM   Result Value Ref Range    HGB 9.8 (L) 11.5 - 16.0 g/dL   PROTHROMBIN TIME + INR    Collection Time: 17  5:40 AM   Result Value Ref Range    INR 1.6 (H) 0.9 - 1.1      Prothrombin time 16.0 (H) 9.0 - 11.1 sec   HEMOGLOBIN A1C WITH EAG    Collection Time: 17  5:40 AM   Result Value Ref Range    Hemoglobin A1c 8.8 (H) 4.2 - 6.3 %    Est. average glucose 206 mg/dL   GLUCOSE, POC    Collection Time: 17 6:25 AM   Result Value Ref Range    Glucose (POC) 358 (H) 65 - 100 mg/dL    Performed by Joon Cantu       Lab Results   Component Value Date/Time    INR 1.6 11/16/2017 05:40 AM    INR 1.1 11/15/2017 06:54 AM    INR 1.1 10/27/2017 09:21 AM    INR 1.1 10/21/2010 02:25 PM     Lab Results   Component Value Date/Time    HGB 9.8 11/16/2017 05:40 AM    HGB 10.9 11/15/2017 06:54 AM    HGB 12.5 10/26/2017 06:20 PM    HGB 11.9 09/12/2017 02:58 AM     Recent Labs      11/15/17   0654   NA  138   K  4.4   CL  104   BUN  16   CREA  0.93   GLU  250*   CA  8.5       PT/OT:   Gait:  Gait  Base of Support: Widened, Shift to left  Speed/Teagan: Slow, Pace decreased (<100 feet/min)  Step Length: Right shortened, Left shortened  Swing Pattern: Right asymmetrical  Stance: Right decreased  Gait Abnormalities: Antalgic, Decreased step clearance, Trunk sway increased  Ambulation - Level of Assistance: Contact guard assistance, Minimal assistance  Distance (ft): 70 Feet (ft)  Assistive Device: Walker, rolling, Gait belt                 PATIENT MOBILITY  Bed Mobility Training  Supine to Sit: Moderate assistance  Scooting: Modified independent  Transfer Training  Sit to Stand: Contact guard assistance, Additional time  Stand to Sit: Contact guard assistance (verbal cues)      Gait Training  Assistive Device: Walker, rolling, Gait belt  Ambulation - Level of Assistance: Contact guard assistance, Minimal assistance  Distance (ft): 70 Feet (ft)   Weight Bearing Status  Right Side Weight Bearing: As tolerated  Left Side Weight Bearing: Full        Assessment and Plan    Principal Problem:    Pain due to knee joint prosthesis ([address] (11/14/2017)          POD#2 Procedure(s):  REVISION OF RIGHT TOTAL KNEE REPLACEMENT (SPINAL W/ IV SEDATION)  Expected acute blood loss anemia related to surgery. No indications of bleeding. VSSAF  Hyperglycemia since surgery. D[facility] & [facility] patient.  Resumed Lantus last night &  Insulins adjusted, see medication above. Continue to monitor--consider IM consult if doesn't improve. Obesity  VTE prophylaxis: Warfarin (pharmacy to dose), Mobilization, Ankle pumping exercises, SCDs per order if not able to exercise or mobilize well. Weight bearing:  WBAT  Pain management: Chronic opioid use PTA. Better controlled today. Multi-modal pain plan, see above for medication,  Ice packs & elevation of extremity per orders, active gentle ROM & mobilize frequently for short periods of time. Already having frequent loose BMs on bowel regimine--stop laxatives today. PT & OT poor progress yesterday related to pain  Wound benign. Neurovascularly intact. . Continue present plans per Dr. Prerna Baum & interdisciplinary team for joint replacement. Discharge Planning: [facility] SNF tomorrow if stable.   Plans per Dr. Prerna Baum    Signed By: Wolfgang Marroquin NP    RN, MSN, MA, Adult NP-BC

## 2017-11-17 VITALS
DIASTOLIC BLOOD PRESSURE: 66 MMHG | RESPIRATION RATE: 14 BRPM | HEIGHT: 60 IN | SYSTOLIC BLOOD PRESSURE: 121 MMHG | TEMPERATURE: 98.6 F | WEIGHT: 175 LBS | BODY MASS INDEX: 34.36 KG/M2 | OXYGEN SATURATION: 96 % | HEART RATE: 86 BPM

## 2017-11-17 LAB
GLUCOSE BLD STRIP.AUTO-MCNC: 153 MG/DL (ref 65–100)
GLUCOSE BLD STRIP.AUTO-MCNC: 173 MG/DL (ref 65–100)
INR PPP: 1.5 (ref 0.9–1.1)
PROTHROMBIN TIME: 15.4 SEC (ref 9–11.1)
SERVICE CMNT-IMP: ABNORMAL
SERVICE CMNT-IMP: ABNORMAL

## 2017-11-17 PROCEDURE — 97116 GAIT TRAINING THERAPY: CPT

## 2017-11-17 PROCEDURE — 74011250636 HC RX REV CODE- 250/636: Performed by: ORTHOPAEDIC SURGERY

## 2017-11-17 PROCEDURE — 74011250637 HC RX REV CODE- 250/637: Performed by: ORTHOPAEDIC SURGERY

## 2017-11-17 PROCEDURE — 74011000250 HC RX REV CODE- 250: Performed by: ORTHOPAEDIC SURGERY

## 2017-11-17 PROCEDURE — 97110 THERAPEUTIC EXERCISES: CPT

## 2017-11-17 PROCEDURE — 77030012935 HC DRSG AQUACEL BMS -B

## 2017-11-17 PROCEDURE — 74011636637 HC RX REV CODE- 636/637: Performed by: NURSE PRACTITIONER

## 2017-11-17 PROCEDURE — 94640 AIRWAY INHALATION TREATMENT: CPT

## 2017-11-17 PROCEDURE — 36415 COLL VENOUS BLD VENIPUNCTURE: CPT | Performed by: ORTHOPAEDIC SURGERY

## 2017-11-17 PROCEDURE — 90471 IMMUNIZATION ADMIN: CPT

## 2017-11-17 PROCEDURE — 90686 IIV4 VACC NO PRSV 0.5 ML IM: CPT | Performed by: ORTHOPAEDIC SURGERY

## 2017-11-17 PROCEDURE — 82962 GLUCOSE BLOOD TEST: CPT

## 2017-11-17 PROCEDURE — 3E0234Z INTRODUCTION OF SERUM, TOXOID AND VACCINE INTO MUSCLE, PERCUTANEOUS APPROACH: ICD-10-PCS | Performed by: ORTHOPAEDIC SURGERY

## 2017-11-17 PROCEDURE — 97535 SELF CARE MNGMENT TRAINING: CPT

## 2017-11-17 PROCEDURE — 74011250637 HC RX REV CODE- 250/637: Performed by: NURSE PRACTITIONER

## 2017-11-17 PROCEDURE — 85610 PROTHROMBIN TIME: CPT | Performed by: ORTHOPAEDIC SURGERY

## 2017-11-17 RX ORDER — SODIUM CHLORIDE 0.9 % (FLUSH) 0.9 %
SYRINGE (ML) INJECTION
Status: DISCONTINUED
Start: 2017-11-17 | End: 2017-11-17 | Stop reason: HOSPADM

## 2017-11-17 RX ORDER — ACETAMINOPHEN 500 MG
500 TABLET ORAL
Qty: 60 TAB | Refills: 0 | Status: SHIPPED
Start: 2017-11-17 | End: 2019-03-08

## 2017-11-17 RX ORDER — OXYCODONE HYDROCHLORIDE 10 MG/1
10-20 TABLET ORAL
Qty: 24 TAB | Refills: 0 | Status: SHIPPED | OUTPATIENT
Start: 2017-11-17 | End: 2019-03-08

## 2017-11-17 RX ORDER — WARFARIN 2 MG/1
TABLET ORAL
Qty: 90 TAB | Refills: 0 | Status: SHIPPED
Start: 2017-11-18 | End: 2019-03-08

## 2017-11-17 RX ADMIN — WARFARIN SODIUM 3 MG: 2 TABLET ORAL at 14:35

## 2017-11-17 RX ADMIN — INSULIN GLARGINE 25 UNITS: 100 INJECTION, SOLUTION SUBCUTANEOUS at 11:10

## 2017-11-17 RX ADMIN — PANTOPRAZOLE SODIUM 40 MG: 40 TABLET, DELAYED RELEASE ORAL at 07:25

## 2017-11-17 RX ADMIN — OXYCODONE HYDROCHLORIDE 10 MG: 5 TABLET ORAL at 07:25

## 2017-11-17 RX ADMIN — BUSPIRONE HYDROCHLORIDE 15 MG: 10 TABLET ORAL at 10:43

## 2017-11-17 RX ADMIN — TIMOLOL MALEATE 1 DROP: 5 SOLUTION OPHTHALMIC at 00:12

## 2017-11-17 RX ADMIN — OXYCODONE HYDROCHLORIDE 10 MG: 5 TABLET ORAL at 03:10

## 2017-11-17 RX ADMIN — DOCUSATE SODIUM AND SENNOSIDES 1 TABLET: 8.6; 5 TABLET, FILM COATED ORAL at 10:44

## 2017-11-17 RX ADMIN — INFLUENZA VIRUS VACCINE 0.5 ML: 15; 15; 15; 15 SUSPENSION INTRAMUSCULAR at 14:36

## 2017-11-17 RX ADMIN — LISINOPRIL 20 MG: 20 TABLET ORAL at 10:43

## 2017-11-17 RX ADMIN — ACETAMINOPHEN 500 MG: 500 TABLET, FILM COATED ORAL at 14:35

## 2017-11-17 RX ADMIN — BUDESONIDE 500 MCG: 0.5 INHALANT RESPIRATORY (INHALATION) at 10:33

## 2017-11-17 RX ADMIN — ARFORMOTEROL TARTRATE 15 MCG: 15 SOLUTION RESPIRATORY (INHALATION) at 10:33

## 2017-11-17 RX ADMIN — OXYCODONE HYDROCHLORIDE 10 MG: 5 TABLET ORAL at 14:44

## 2017-11-17 RX ADMIN — SUCRALFATE 1 G: 1 TABLET ORAL at 07:25

## 2017-11-17 RX ADMIN — SUCRALFATE 1 G: 1 TABLET ORAL at 14:34

## 2017-11-17 RX ADMIN — METOPROLOL TARTRATE 12.5 MG: 25 TABLET ORAL at 10:44

## 2017-11-17 RX ADMIN — ACETAMINOPHEN 500 MG: 500 TABLET, FILM COATED ORAL at 07:32

## 2017-11-17 RX ADMIN — GABAPENTIN 200 MG: 100 CAPSULE ORAL at 10:44

## 2017-11-17 RX ADMIN — BRIMONIDINE TARTRATE 1 DROP: 2 SOLUTION OPHTHALMIC at 00:12

## 2017-11-17 RX ADMIN — ACETAMINOPHEN 500 MG: 500 TABLET, FILM COATED ORAL at 10:44

## 2017-11-17 RX ADMIN — INSULIN LISPRO 3 UNITS: 100 INJECTION, SOLUTION INTRAVENOUS; SUBCUTANEOUS at 07:26

## 2017-11-17 RX ADMIN — OXYCODONE HYDROCHLORIDE 10 MG: 5 TABLET ORAL at 10:43

## 2017-11-17 RX ADMIN — Medication 10 ML: at 06:00

## 2017-11-17 RX ADMIN — LATANOPROST 1 DROP: 50 SOLUTION OPHTHALMIC at 00:12

## 2017-11-17 NOTE — ROUTINE PROCESS
Bedside and Verbal shift change report given to Siobhan (oncoming nurse) by Felecia Borrego (offgoing nurse). Report included the following information SBAR, Kardex, OR Summary, Procedure Summary, Intake/Output, MAR and Recent Results.

## 2017-11-17 NOTE — PROGRESS NOTES
Hospital to SNF SBAR Handoff - Nima Santana                                                                        69 y.o.   female    [address]   Room: [address] [place], [place] [address]  Unit Phone# :  [phone]      [facility]  [address] [address]  Dept: [address] [facility] [address]: [phone]                    SITUATION     Admitted:  11/14/2017         Attending Provider:  Bernadette Carnes MD       Consultations:  None    PCP:  Yvonne Garrido MD   [phone]    Treatment Team: Attending Provider: Bernadette Carnes MD; Utilization Review: Angie Leggett RN; Care Manager: YUDITH Rasmussen    Admitting Dx:  PAIN DUE TO TOTAL RIGHT KNEE REPLACEMENT, STATUS POST RIGHT TOTAL KNEE REPLACEMENT   Pain due to knee joint prosthesis ([address]       Principal Problem: Pain due to knee joint prosthesis ([address]    3 Days Post-Op of   Procedure(s):  REVISION OF RIGHT TOTAL KNEE REPLACEMENT (SPINAL W/ IV SEDATION)   BY: Bernadette Carnes MD             ON: 11/14/2017                  Code Status: Full Code                Advance Directives:   Advance Care Planning 11/14/2017   Patient's Healthcare Decision Maker is: Legal Next of Bailee 70   Primary Decision Maker Name -   Primary Decision Maker Phone Number -   Primary Decision Maker Relationship to Patient -   Confirm Advance Directive Yes, not on file   Patient Would Like to Complete Advance Directive -    (Send w/patient)   No Doesnt Have       Isolation:  There are currently no Active Isolations       MDRO: No current active infections    Pain Medications given:  Oxycodone IR 10mg    Last dose: 11/17/2017 at  [address] needed: no  Type of equipment:      (Not currently on dialysis)  (Not currently on dialysis)  (Not currently on dialysis)     BACKGROUND     Allergies:   Allergies   Allergen Reactions    Seafood [Shellfish Containing Products] Swelling       Past Medical History:   Diagnosis Date    Anal polyp 6/13/2013    Arthritis     Asthma     CAD (coronary artery disease) 10/27/2017    Cataract     Chronic pain     knee - right/back    Diabetes ([facility]     GERD (gastroesophageal reflux disease)     Hemorrhoids 6/13/2013    History of kidney stones     Hypertension     Ill-defined condition     abdominal pain and burning    Other ill-defined conditions(799.89)     high cholesterol       Past Surgical History:   Procedure Laterality Date    COLONOSCOPY  2/28/2013         EGD  2/28/2013         HX CATARACT REMOVAL Bilateral     HX CHOLECYSTECTOMY  6-2015    HX GI  6/27/13    anal polyps removed    HX HEENT      laser surgery for blood clot in right eye    HX KNEE REPLACEMENT      right    HX OTHER SURGICAL  4-2-14    lap gastrotomy and removal of polyp -  - not cancerous per pt    HX ALAN AND BSO      HX TONSILLECTOMY      HX UROLOGICAL      \"laser\" surgery for kidney stone    NEUROLOGICAL PROCEDURE UNLISTED  1990    tumor at back of neck, benign       Prescriptions Prior to Admission   Medication Sig    busPIRone (BUSPAR) 15 mg tablet TAKE 1 (ONE) TABLET BY MOUTH THREE TIMES DAILY, AS NEEDED    travoprost (TRAVATAN Z) 0.004 % ophthalmic solution Administer 1 Drop to both eyes daily.  rosuvastatin (CRESTOR) 10 mg tablet Take 10 mg by mouth nightly.  fluticasone-salmeterol (ADVAIR DISKUS) 100-50 mcg/dose diskus inhaler Take 1 Puff by inhalation every twelve (12) hours.  sucralfate (CARAFATE) 1 gram tablet Take 1 Tab by mouth four (4) times daily.  albuterol (PROVENTIL HFA, VENTOLIN HFA, PROAIR HFA) 90 mcg/actuation inhaler Take 2 Puffs by inhalation every six (6) hours as needed for Wheezing.  oxyCODONE IR (ROXICODONE) 20 mg immediate release tablet Take 20 mg by mouth every six (6) hours as needed.  brimonidine-timolol (COMBIGAN) 0.2-0.5 % drop ophthalmic solution Administer 1 Drop to right eye nightly.     Dexlansoprazole (DEXILANT) 60 mg CpDB Take 60 mg by mouth Daily (before breakfast).  acetaminophen (TYLENOL) 500 mg tablet Take 1,000 mg by mouth daily as needed for Pain.  amLODIPine (NORVASC) 10 mg tablet Take 10 mg by mouth nightly.  fosinopril (MONOPRIL) 20 mg tablet Take 20 mg by mouth daily.  Insulin Glargine (LANTUS SOLOSTAR) 100 unit/mL (3 mL) flexpen 20 Units by SubCUTAneous route two (2) times a day. Indications: Diabetes Mellitus    diclofenac (VOLTAREN) 1 % gel     gabapentin (NEURONTIN) 100 mg capsule 2 CAPSULES(200MG) BY MOUTH 3 TIMES A DAY FOR 30 DAYS    metoprolol tartrate (LOPRESSOR) 25 mg tablet TAKE 0.5 TABLET BY ORAL ROUTE 2 TIMES EVERY DAY    promethazine (PHENERGAN) 25 mg tablet TAKE 1 TABLET BY MOUTH EVERY 6 HOURS AS NEEDED FOR NAUSEA/VOMITING    linaclotide (LINZESS) 145 mcg cap capsule Take 145 mcg by mouth Daily (before breakfast).  naloxone (NARCAN) 4 mg/actuation nasal spray 1 [place] by IntraNASal route once as needed. Use 1 spray intranasally into 1 nostril. Use a new Narcan nasal spray for subsequent doses and administer into alternating nostrils. May repeat every 2 to 3 minutes as needed.  nitroglycerin (NITROSTAT) 0.4 mg SL tablet 0.4 mg by SubLINGual route every five (5) minutes as needed for Chest Pain. Hard scripts included in transfer packet yes    Vaccinations:    Immunization History   Administered Date(s) Administered    Influenza Vaccine 10/12/2012    Influenza Vaccine (Quad) PF 11/17/2017    Pneumococcal Vaccine (Unspecified Type) 09/12/2006       Readmission Risks:    Known Risks: age, comorbidities - DM, HTN, CAD, OA, and difficulty controlling pain         The Charlson CoMorbitiy Index tool is an evidenced based tool that has more automatic generated information. The tool looks at many different items such as the age of the patient, how many times they were admitted in the last calendar year, current length of stay in the hospital and their diagnosis.  All of these items are pulled automatically from information documented in the chart from various places and will generate a score that predicts whether a patient is at low (less than 13), medium (13-20) or high (21 or greater) risk of being readmitted. ASSESSMENT                Temp: 98.6 °F (37 °C) (11/17/17 1424) Pulse (Heart Rate): 86 (11/17/17 1424)     Resp Rate: 14 (11/17/17 1424)           BP: 121/66 (11/17/17 1424)     O2 Sat (%): 96 % (11/17/17 1424)     Weight: 79.4 kg (175 lb)    Height: 5' (152.4 cm) (11/14/17 1031)       If above not within 1 hour of discharge:    BP:_____  P:____  R:____ T:_____ O2 Sat: ___%  O2: ______    Active Orders   Diet    DIET DIABETIC CONSISTENT CARB Regular; No Conc.  Sweets         Orientation: oriented to time, place, person and situation     Active Behaviors: None                                   Active Lines/Drains:  (Peg Tube / [place] / CL or S/L?): no    Urinary Status: Voiding     Last BM: Last Bowel Movement Date: 11/15/17     Skin Integrity: Incision (comment)   Wound Knee Right-DRESSING STATUS: Removed, Clean, dry, and intact (Pt's dressing fell off half way)    Wound Knee Right-DRESSING TYPE: ABD pad    Mobility: Slightly limited   Weight Bearing Status: WBAT (Weight Bearing as Tolerated)      Gait Training  Assistive Device: Gait belt, Walker, rolling  Ambulation - Level of Assistance: Stand-by asssistance, Contact guard assistance  Distance (ft): 300 Feet (ft)         Lab Results   Component Value Date/Time    Glucose 250 11/15/2017 06:54 AM    Hemoglobin A1c 8.8 11/16/2017 05:40 AM    INR 1.5 11/17/2017 05:00 AM    INR 1.6 11/16/2017 05:40 AM    HGB 9.8 11/16/2017 05:40 AM    HGB 10.9 11/15/2017 06:54 AM        RECOMMENDATION     See After Visit Summary (AVS) for:  · Discharge instructions  · After [address]   · Special equipment needed (entered pre-discharge by Care Management)  · Medication Reconciliation    · Follow up Appointment(s)         Report given/sent by:  Kuldeep Muhammad Verbal report given to: Gaby Evans at [facility]  FAXED to:  n/a         Estimated discharge time:  11/17/2017 at 1500

## 2017-11-17 NOTE — PROGRESS NOTES
Problem: Mobility Impaired (Adult and Pediatric)  Goal: *Acute Goals and Plan of Care (Insert Text)  Physical Therapy Goals  Initiated 11/15/2017    1. Patient will move from supine to sit and sit to supine , scoot up and down and roll side to side in bed with supervision/set-up within 4 days. 2. Patient will perform sit to stand with supervision/set-up within 4 days. 3. Patient will ambulate with supervision/set-up for 150 feet with the least restrictive device within 4 days. 4. Patient will perform home exercise program per protocol with independence within 4 days. 5. Patient will demonstrate AROM 0-90 degrees in operative joint within 4 days. physical Therapy TREATMENT  Patient: Jacky Adams (33 y.o. female)  Date: 11/17/2017  Diagnosis: PAIN DUE TO TOTAL RIGHT KNEE REPLACEMENT, STATUS POST RIGHT TOTAL KNEE REPLACEMENT   Pain due to knee joint prosthesis (HCC) Pain due to knee joint prosthesis (HCC)  Procedure(s) (LRB):  REVISION OF RIGHT TOTAL KNEE REPLACEMENT (SPINAL W/ IV SEDATION) (Right) 3 Days Post-Op  Precautions: Fall, WBAT  Chart, physical therapy assessment, plan of care and goals were reviewed. ASSESSMENT:  Pt was able to maintain gait tolerance. Pt is mobilizing at a CGA to SBA to mobilize. Pt report improved pain control. Pt is scheduled to discharge to SNF today. Progression toward goals:  [x]      Improving appropriately and progressing toward goals  []      Improving slowly and progressing toward goals  []      Not making progress toward goals and plan of care will be adjusted     PLAN:  Patient continues to benefit from skilled intervention to address the above impairments. Continue treatment per established plan of care. Discharge Recommendations:  Skilled Nursing Facility  Further Equipment Recommendations for Discharge:  TBD     SUBJECTIVE:   Patient stated Marianela Karimi far are we going.     OBJECTIVE DATA SUMMARY:   Critical Behavior:  Neurologic State: Alert  Orientation Level: Oriented X4  Cognition: Appropriate decision making, Appropriate for age attention/concentration, Follows commands  Safety/Judgement: Awareness of environment, Fall prevention, Insight into deficits  Range of Motion:           RLE AROM  R Knee Flexion: 75  R Knee Extension: 0              Functional Mobility Training:  Bed Mobility:     Supine to Sit: Stand-by asssistance (LE hooked)               Transfers:  Sit to Stand: Contact guard assistance  Stand to Sit: Contact guard assistance                             Balance:  Sitting: Intact  Standing: Impaired; With support  Standing - Static: Good  Standing - Dynamic : Fair  Ambulation/Gait Training:  Distance (ft): 300 Feet (ft)              Gait Abnormalities: Antalgic;Decreased step clearance           Stance: Right decreased  Speed/Teagan: Pace decreased (<100 feet/min); Slow                      Stairs:            Therapeutic Exercises:     EXERCISE   Sets   Reps   Active Active Assist   Passive Self ROM   Comments   Ankle Pumps  10 [x]                                []                                []                                []                                   Quad Sets  10 [x]                                []                                []                                []                                   Hamstring Sets   []                                []                                []                                []                                   Short Arc Quads   []                                []                                []                                []                                   Knee Extension Stretch  2min   []                                  []                                  []                                  []                                   Heel Slides  10 []                                [x]                                []                                []                                   Long Arc Quads   []                                []                                []                                []                                   Knee Flexion Stretch  10 []                                []                                []                                []                                   Straight Leg Raises   []                                []                                []                                []                                       Pain:  Pain Scale 1: Numeric (0 - 10)  Pain Intensity 1: 7  Pain Location 1: Knee  Pain Orientation 1: Right  Pain Description 1: Aching  Pain Intervention(s) 1: Medication (see MAR); Cold pack; Distraction; Emotional support  Activity Tolerance:   Limited   Please refer to the flowsheet for vital signs taken during this treatment.   After treatment:   [x] Patient left in no apparent distress sitting up in chair  [] Patient left in no apparent distress in bed  [x] Call bell left within reach  [x] Nursing notified  [] Caregiver present  [] Bed alarm activated    COMMUNICATION/COLLABORATION:   The patients plan of care was discussed with: Registered Nurse    Jany Meza PTA   Time Calculation: 27 mins

## 2017-11-17 NOTE — PROGRESS NOTES
Pharmacist Note  Warfarin Dosing  Consult provided for this 69 y.o. female to manage warfarin for VTE prophylaxis s/p revision of right TKR    INR Goal: 1.7- 2.2    Therapy Day: 5    Preop Dose: Guillermo- 4mg    Drugs that may increase INR: None  Drugs that may decrease INR: None  Other current anticoagulants/ drugs that may increase bleeding risk: None  Risk factors: Age > 65  Daily INR ordered: YES    Recent Labs      11/17/17   0500  11/16/17   0540  11/15/17   0654   HGB   --   9.8*  10.9*   INR  1.5*  1.6*  1.1       Date               INR                 Dose  10/27  1.1    11/13                                          4 mg  11/14                                          3 mg  11/15               1.1                      4 mg  11/16               1.6                      2 mg  11/15               1.5                      3 mg    Assessment/ Plan: Will order warfarin 3 mg PO x 1 dose. Pharmacy will continue to monitor daily and adjust therapy as indicated.

## 2017-11-17 NOTE — PROGRESS NOTES
Bedside shift change report given to saundra (oncoming nurse) by Santana Wooten (offgoing nurse). Report included the following information SBAR, Kardex, Procedure Summary, Intake/Output, MAR and Recent Results.

## 2017-11-17 NOTE — PROGRESS NOTES
The patient will discharge today to [facility] at 3pm via [facility]. Kardex, mars, discharge orders, and ambulance PCS will follow. CRM explained to the patient that medicare may not pay for the entire cost of the ambulance. The patient is aware and requested ambulance transport. Discharge folder located on the hard chart with report. #. The facility was sent an in basket regarding dc date and time. They can view the discharge order in [address]  JEAN

## 2017-11-17 NOTE — PROGRESS NOTES
Moderate pain. No cp/sob.     R knee dressing dry  Calves soft NT  Motor 5/5  Pulses symmetrical    POD 3 Rev R TKA; acute postop blood loss anemia (expected)  -PT  -pain control  -coumadin  -skilled rehab today    Roman Burnett MD

## 2017-11-17 NOTE — PROGRESS NOTES
Problem: Self Care Deficits Care Plan (Adult)  Goal: *Acute Goals and Plan of Care (Insert Text)  Occupational Therapy Goals initiated 11/16/17  1. Patient will complete LB dressing with setup/supervision and PRN AE within 7 days. 2. Patient will complete standing grooming task with supervision within 7 days. 3. Patient will complete toilet transfer with supervision within 7 days. 4. Patient will complete light standing IADL activity within 7 days. 5. Patient will complete bathing activity with setup/supervision and PRN AE within 7 days. Occupational Therapy TREATMENT  Patient: Coleman November (29 y.o. female)  Date: 11/17/2017  Diagnosis: PAIN DUE TO TOTAL RIGHT KNEE REPLACEMENT, STATUS POST RIGHT TOTAL KNEE REPLACEMENT   Pain due to knee joint prosthesis (HCC) Pain due to knee joint prosthesis (HCC)  Procedure(s) (LRB):  REVISION OF RIGHT TOTAL KNEE REPLACEMENT (SPINAL W/ IV SEDATION) (Right) 3 Days Post-Op  Precautions: Fall, WBAT    ASSESSMENT:  Pt is making steady progress toward therapy goals. Pt demonstrated increased activity tolerance and increased stability while standing while completing functional mobility and ADL tasks. Pt was tearful during session when pain increased, but was able to recover with additional encouragement. Pt was able to complete entire grooming task while standing without needing a rest break or physical assistance to maintain balance. No LOB episodes took place during session, though occasional cues were needed for proper management of RW while completing functional mobility tasks. Pt was not impulsive and showed overall good safety awareness and corrected with verbal cues. Pt would benefit from additional skilled acute OT services to promote greater activity tolerance and provide education on compensatory strategies for dressing and other self care tasks to reduce pain and increase independence. Discharge recommendation for SNF.    Progression toward goals:  [x] Improving appropriately and progressing toward goals  []       Improving slowly and progressing toward goals  []       Not making progress toward goals and plan of care will be adjusted     PLAN:  Patient continues to benefit from skilled intervention to address the above impairments. Continue treatment per established plan of care. Discharge Recommendations:  [facility]  Further Equipment Recommendations for Discharge: To be determined by rehab     SUBJECTIVE:   Patient stated [facility]     OBJECTIVE DATA SUMMARY:   Cognitive/Behavioral Status:  Neurologic State: Alert  Orientation Level: Oriented X4  Cognition: Appropriate decision making; Appropriate for age attention/concentration; Follows commands  Perception: Appears intact  Perseveration: No perseveration noted  Safety/Judgement: Awareness of environment; Fall prevention; Insight into deficits    Functional Mobility and Transfers for ADLs:  Transfers:  Sit to Stand: Minimum assistance;Assist x1;Adaptive equipment; Additional time       Balance:  Sitting: Intact  Standing: Impaired; With support  Standing - Static: Good  Standing - Dynamic : Fair    ADL Intervention:   Pt encountered seated in chair. Pt completed sit to stand with Min A x 1 and RW. Pt performed functional mobility task with CGA x 1 and RW on way to bathroom. Pt completed standing grooming task with SBA and RW. Pt returned to room and sat in chair with CGA and RW. Pt left in no apparent distress seated in chair with call bell within reach. Grooming  Grooming Assistance: Stand-by assistance  Washing Hands: Stand-by assistance  Brushing Teeth: Stand-by assistance         Cognitive Retraining  Safety/Judgement: Awareness of environment; Fall prevention; Insight into deficits    Pain:  Pain Scale 1: Numeric (0 - 10)  Pain Intensity 1: 7  Pain Location 1: Knee  Pain Orientation 1: Right  Pain Description 1: Aching  Pain Intervention(s) 1: Medication (see MAR); Cold pack;Distraction; Emotional support  Activity Tolerance:   Pt tolerated all session activities with vital signs stable, but pt c/o increased pain after completing functional mobility tasks during session. Nursing notified for assistance with pain management. After treatment:   [x] Patient left in no apparent distress sitting up in chair  [] Patient left in no apparent distress in bed  [x] Call bell left within reach  [x] Nursing notified  [] Caregiver present  [] Bed alarm activated    COMMUNICATION/COLLABORATION:   The patients plan of care was discussed with: Physical Therapist and Registered Nurse    ADEEL Rm  Time Calculation: 16 mins     Regarding student involvement in patient care:  A student participated in this treatment session. Per CMS Medicare statements and AOTA guidelines I certify that the following was true:  1. I was present and directly observed the entire session. 2. I made all skilled judgments and clinical decisions regarding care.   3. I am the practitioner responsible for assessment, treatment, and documentation.  '  Mark Diego, OT

## 2017-11-17 NOTE — PROGRESS NOTES
2245: Pt's aquacel dressing on the right knee came off half way while she was on the bedside commode voiding. 2300: Removed the aquacel dressing and covered it with ABD pads and tape. Pt started to have pain.

## 2017-11-17 NOTE — DISCHARGE INSTRUCTIONS
After [facility] for SNF/Rehab    Discharge Instructions Hip Replacement-Dr. Andrew Martin    Patient Name: Ameya Johnson  Date of procedure: 11/14/2017   Procedure: Procedure(s):  REVISION OF RIGHT TOTAL KNEE REPLACEMENT (SPINAL W/ IV SEDATION)  Surgeon: Abdiel Lang) and Role:     * Ashely Alarcon MD - Primary   PCP: Evan Ferraro MD  Date of discharge: No discharge date for patient encounter. Follow up appointments  Follow up with Dr. Andrew Martin in 3 weeks. Call [phone] to make an appointment. Activity   Weight bearing as tolerated with walker or crutches  Refer to pages 23-33 of your handbook for instructions and pictures   Complete you Home Exercise Program daily as instructed by your therapist. Refer to pages 36-42 of your handbook for instructions and pictures   Get up every one hour and walk (except at night when sleeping)   AVOID sudden and extreme movement of your hip (surgical leg)   Do not drive or operate heavy machinery    Incision Care   The Aquacel (brown, waterproof) surgical dressing is to remain on the knee for 7 days. On the 7th day gently peel the dressing off by carefully lifting the edge and stretching it slightly to break the adhesive seal   If you have steri-strips (small, white pieces of tape) on the incision, they may come off when you remove the Aquacel surgical dressing. This is okay. You may now leave your incision open to air   If your Aquacel dressing comes loose/off before the 7th day, you may replace it with a dry sterile gauze dressing; change it daily. Once the incision is not draining, leave it open to air   May take a shower with the Aquacel dressing in place. Once the Aquacel is removed,  may shower and get your incision wet but do not submerge your incision under water in a bath tub, hot tub or swimming pool for 6 weeks after surgery. Preventing blood clots   Give Warfarin daily. INR goal 1.7-2.0.  Continue for one month following surgery      Pain management   Continue pain medication as prescribed in the hospital   Continue home medications per medication reconciliation   Place an ice bag on the hip for 15-20 minutes after exercising and as needed throughout the day and night    Diet   Resume usual diet; encourage fluids; provide foods high in fiber   Provide stool softeners/laxatives as needed    Rehab/SNF Protocol (to be followed by facility)    Anticipated length of stay is 7-10 days. Nursing   Draw a PT/INR per physicians orders and call results to Dr. Bhavesh Garcia at [phone]  [facility] Complete head to toe assessment, vital signs   Medication reconciliation   Review pain management   Manage chronic medical conditions     Physical Therapy  Weight bearing status:  Precautions at Admission: Fall, WBAT  Left Side Weight Bearing: Full  Right Side Weight Bearing: As tolerated    Mobility Status:  Supine to Sit: Stand-by asssistance (LE hooked)  Sit to Stand: Contact guard assistance  Sit to Supine: Minimum assistance       Gait:  Distance (ft): 300 Feet (ft)  Ambulation - Level of Assistance: Stand-by asssistance, Contact guard assistance  Assistive Device: Gait belt, Walker, rolling  Gait Abnormalities: Antalgic, Decreased step clearance    ADL status overall composite: Toilet Transfer : Contact guard assistance, Adaptive equipment    Physical Therapy-anticipate 7-10 day length of stay   Assessment and evaluation-bed mobility; functional transfers (bed, chair, bathroom, stairs); ambulation with equipment, car transfers, safety and ability to get out of house in the event of an emergency   AVOID sudden and extreme movement of the hip (surgical leg)   Discuss pain management   Review how to do ADLs.   Refer to pages 43-47 of handbook    Exercise Program-refer to pages 36-42 of handbook

## 2017-11-24 ENCOUNTER — PATIENT OUTREACH (OUTPATIENT)
Dept: CASE MANAGEMENT | Age: 77
End: 2017-11-24

## 2017-11-24 NOTE — PROGRESS NOTES
Community Care Team Documentation for Patient in [facility]  Initial Follow Up       Patient was admitted to [address] [place] [address] from 11/14 to 11/17. Patient was discharged to [facility], [facility], on 11/17 (date). Community Care Team followed up to [address] [place] [address] during this transition. RRAT score: 24    Advance Medical Directive on file in EMR? Not on file    Total Hospitalizations/ED visits last 6 months? IP - 4; ED - 3    PCP : Leander Castano MD    Per Bandar Green at [facility], Reviewed [place] [facility] questions with SNF to ensure patient arrived with admission packet in order. Pt progressing with PT/OT. No d/c date set. PTA pt lived alone. Community Care Team will follow up with [facility] when patient is discharged. Medications were not reconciled and general patient assessment was not completed during this skilled nursing facility outreach.

## 2017-11-28 LAB
BACTERIA SPEC CULT: NORMAL
GRAM STN SPEC: NORMAL
SERVICE CMNT-IMP: NORMAL

## 2017-12-09 NOTE — DISCHARGE SUMMARY
@[email or web address]@ [address] [place] [address]    DISCHARGE SUMMARY     Patient: Ilan Serrano                             Medical Record Number: [id number]                : 1940  Age: 69 y.o. Admit Date: 2017  Discharge Date: 2017  Admission Diagnosis: PAIN DUE TO TOTAL RIGHT KNEE REPLACEMENT, STATUS POST RIGHT TOTAL KNEE REPLACEMENT   Pain due to knee joint prosthesis (HCC)  Discharge Diagnosis: PAIN DUE TO TOTAL RIGHT KNEE REPLACEMENT, STATUS POST RIGHT TOTAL KNEE REPLACEMENT   Procedures: Procedure(s):  REVISION OF RIGHT TOTAL KNEE REPLACEMENT (SPINAL W/ IV SEDATION)  Surgeon: Roman Burnett MD  Anesthesia: spinal  Complications: None     History of Present Illness:  Ilan Serrano is a 69 y.o. female who underwent   right total knee replacement last year, without resurfacing of the   patella. She has had progressive pain, both from the patellofemoral   compartment, but also due to malrotation of tibial component and   global instability despite secondary arthrofibrosis. Infection workup was   negative. She presents for revision of her right total knee. Hospital Course:  Ilan Serrano tolerated the procedure well. She was transferred  to the recovery room in stable condition. After a brief stay the patient was then transferred to the Joint Replacement Unit at [address] [place] [address].  On postoperative day #1, the dressing was clean and dry, she was neurovascularly intact. The patient was afebrile and vital signs were stable. Calves were soft and non-tender bilaterally. On postoperative day  # 2, the patient was tolerating a regular diet and making satisfactory progress with physical therapy. She was discharged to SNF in stable condition on postoperative day 3.   She was provided with routine postoperative instructions and advised to follow up in my office in 3 weeks following discharge from the hospital.  She was prescribed Coumadin for DVT prophylaxis and oxycodone for post-operative pain. Discharge Medications:  Medication List at Discharge       ACETAMINOPHEN 500 mg Oral Q4HWA, Schedule for pain control over next 7-10 day.  Do not exceed 2,500 mg in 24 hours. ALBUTEROL SULFATE 90 mcg/actuation 2 Puffs Inhalation Q6H PRN        AMLODIPINE BESYLATE 10 mg Oral QHS        BRIMONIDINE TARTRATE/TIMOLOL 0.2-0.5 % 1 Drop Right Eye QHS        BUSPIRONE HCL 15 mg TAKE 1 (ONE) TABLET BY MOUTH THREE TIMES DAILY, AS NEEDED        DEXLANSOPRAZOLE 60 mg Oral ACB        FLUTICASONE/SALMETEROL 100-50 mcg/dose 1 Puff Inhalation Q12H        FOSINOPRIL SODIUM 20 mg Oral DAILY        GABAPENTIN 100 mg 2 CAPSULES(200MG) BY MOUTH 3 TIMES A DAY FOR 30 DAYS        INSULIN GLARGINE,HUM. REC. ANLOG 20 Units SubCUTAneous BID        LINACLOTIDE 145 mcg Oral ACB        METOPROLOL TARTRATE 25 mg TAKE 0.5 TABLET BY ORAL ROUTE 2 TIMES EVERY DAY        NALOXONE HCL 4 mg/actuation 1 Spray IntraNASal ONCE PRN, Use 1 spray intranasally into 1 nostril. Use a new Narcan nasal spray for subsequent doses and administer into alternating nostrils. May repeat every 2 to 3 minutes as needed.         NITROGLYCERIN 0.4 mg SubLINGual Q5MIN PRN       OXYCODONE HCL 10-20 mg Oral Q4H PRN        PROMETHAZINE HCL 25 mg TAKE 1 TABLET BY MOUTH EVERY 6 HOURS AS NEEDED FOR NAUSEA/VOMITING        ROSUVASTATIN CALCIUM 10 mg Oral QHS       SUCRALFATE 1 g Oral QID        TRAVOPROST 0.004 % 1 Drop Both Eyes DAILY       WARFARIN SODIUM 2 mg Starting tomorrow:  Take 1 Tab (2 mg) by mouth daily at 5 pm.  Take as directed, dose adjustment for goal lab test INR 1.7-2.0         Signed by: Michael Caicedo MD  12/9/2017

## 2017-12-20 ENCOUNTER — PATIENT OUTREACH (OUTPATIENT)
Dept: CASE MANAGEMENT | Age: 77
End: 2017-12-20

## 2017-12-20 NOTE — PROGRESS NOTES
Community Care Team Documentation for Patient in [facility]  Discharge Note    Per SNF staff, patient discharged from [facility] ([facility]). See previous [facility] Team notes. PCP : Flossie Goldberg, MD    Per Jermaine Mann at SNF, Pt discharged yesterday 12/19 home with daughter unknown [facility] agency. Community Care Team will sign off at this time. Medications were not reconciled and general patient assessment was not completed during this skilled nursing facility outreach.      Jalen Roy, BSW

## 2018-09-29 ENCOUNTER — APPOINTMENT (OUTPATIENT)
Dept: GENERAL RADIOLOGY | Age: 78
End: 2018-09-29
Attending: EMERGENCY MEDICINE
Payer: MEDICARE

## 2018-09-29 ENCOUNTER — HOSPITAL ENCOUNTER (EMERGENCY)
Age: 78
Discharge: HOME | End: 2018-09-29
Attending: EMERGENCY MEDICINE
Payer: MEDICARE

## 2018-09-29 VITALS
DIASTOLIC BLOOD PRESSURE: 56 MMHG | RESPIRATION RATE: 16 BRPM | SYSTOLIC BLOOD PRESSURE: 137 MMHG | TEMPERATURE: 98.1 F | HEART RATE: 97 BPM | OXYGEN SATURATION: 93 %

## 2018-09-29 DIAGNOSIS — I10 ESSENTIAL HYPERTENSION: ICD-10-CM

## 2018-09-29 DIAGNOSIS — F41.1 ANXIETY STATE: ICD-10-CM

## 2018-09-29 DIAGNOSIS — J45.40 MODERATE PERSISTENT ASTHMA WITHOUT COMPLICATION: Primary | ICD-10-CM

## 2018-09-29 PROCEDURE — 74011636637 HC RX REV CODE- 636/637: Performed by: EMERGENCY MEDICINE

## 2018-09-29 PROCEDURE — 94640 AIRWAY INHALATION TREATMENT: CPT

## 2018-09-29 PROCEDURE — 74011250637 HC RX REV CODE- 250/637: Performed by: EMERGENCY MEDICINE

## 2018-09-29 PROCEDURE — 77030029684 HC NEB SM VOL KT MONA -A

## 2018-09-29 PROCEDURE — 71046 X-RAY EXAM CHEST 2 VIEWS: CPT

## 2018-09-29 PROCEDURE — A9270 NON-COVERED ITEM OR SERVICE: HCPCS | Performed by: EMERGENCY MEDICINE

## 2018-09-29 PROCEDURE — 74011000250 HC RX REV CODE- 250: Performed by: EMERGENCY MEDICINE

## 2018-09-29 PROCEDURE — 77030018744 HC FLOMTR PEAK FLO -A

## 2018-09-29 PROCEDURE — 99285 EMERGENCY DEPT VISIT HI MDM: CPT

## 2018-09-29 RX ORDER — CODEINE PHOSPHATE AND GUAIFENESIN 10; 100 MG/5ML; MG/5ML
10 SOLUTION ORAL
Qty: 120 ML | Refills: 0 | Status: SHIPPED | OUTPATIENT
Start: 2018-09-29 | End: 2019-01-19

## 2018-09-29 RX ORDER — ALBUTEROL SULFATE 90 UG/1
2 AEROSOL, METERED RESPIRATORY (INHALATION)
Qty: 1 INHALER | Refills: 0 | Status: SHIPPED | OUTPATIENT
Start: 2018-09-29 | End: 2018-10-16

## 2018-09-29 RX ORDER — CODEINE PHOSPHATE AND GUAIFENESIN 10; 100 MG/5ML; MG/5ML
10 SOLUTION ORAL
Status: DISCONTINUED | OUTPATIENT
Start: 2018-09-29 | End: 2018-09-29 | Stop reason: HOSPADM

## 2018-09-29 RX ORDER — HYDROXYZINE 50 MG/1
50 TABLET, FILM COATED ORAL
Qty: 30 TAB | Refills: 0 | Status: SHIPPED | OUTPATIENT
Start: 2018-09-29 | End: 2019-03-08

## 2018-09-29 RX ORDER — PREDNISONE 50 MG/1
50 TABLET ORAL DAILY
Qty: 5 TAB | Refills: 0 | Status: SHIPPED | OUTPATIENT
Start: 2018-09-29 | End: 2018-10-04

## 2018-09-29 RX ORDER — PREDNISONE 20 MG/1
60 TABLET ORAL
Status: COMPLETED | OUTPATIENT
Start: 2018-09-29 | End: 2018-09-29

## 2018-09-29 RX ADMIN — ALBUTEROL SULFATE 1 DOSE: 2.5 SOLUTION RESPIRATORY (INHALATION) at 05:24

## 2018-09-29 RX ADMIN — GUAIFENESIN AND CODEINE PHOSPHATE 10 ML: 10; 100 LIQUID ORAL at 05:10

## 2018-09-29 RX ADMIN — PREDNISONE 60 MG: 20 TABLET ORAL at 05:10

## 2018-09-29 NOTE — DISCHARGE INSTRUCTIONS
Anxiety Disorder: Care Instructions  Your Care Instructions    Anxiety is a normal reaction to stress. Difficult situations can cause you to have symptoms such as sweaty palms and a nervous feeling. In an anxiety disorder, the symptoms are far more severe. Constant worry, muscle tension, trouble sleeping, nausea and diarrhea, and other symptoms can make normal daily activities difficult or impossible. These symptoms may occur for no reason, and they can affect your work, school, or social life. Medicines, counseling, and self-care can all help. Follow-up care is a key part of your treatment and safety. Be sure to make and go to all appointments, and call your doctor if you are having problems. It's also a good idea to know your test results and keep a list of the medicines you take. How can you care for yourself at home? · Take medicines exactly as directed. Call your doctor if you think you are having a problem with your medicine. · Go to your counseling sessions and follow-up appointments. · Recognize and accept your anxiety. Then, when you are in a situation that makes you anxious, say to yourself, \"This is not an emergency. I feel uncomfortable, but I am not in danger. I can keep going even if I feel anxious. \"  · Be kind to your body:  ¨ Relieve tension with exercise or a massage. ¨ Get enough rest.  ¨ Avoid alcohol, caffeine, nicotine, and illegal drugs. They can increase your anxiety level and cause sleep problems. ¨ Learn and do relaxation techniques. See below for more about these techniques. · Engage your mind. Get out and do something you enjoy. Go to a funny movie, or take a walk or hike. Plan your day. Having too much or too little to do can make you anxious. · Keep a record of your symptoms. Discuss your fears with a good friend or family member, or join a support group for people with similar problems. Talking to others sometimes relieves stress.   · Get involved in social groups, or volunteer to help others. Being alone sometimes makes things seem worse than they are. · Get at least 30 minutes of exercise on most days of the week to relieve stress. Walking is a good choice. You also may want to do other activities, such as running, swimming, cycling, or playing tennis or team sports. Relaxation techniques  Do relaxation exercises 10 to 20 minutes a day. You can play soothing, relaxing music while you do them, if you wish. · Tell others in your house that you are going to do your relaxation exercises. Ask them not to disturb you. · Find a comfortable place, away from all distractions and noise. · Lie down on your back, or sit with your back straight. · Focus on your breathing. Make it slow and steady. · Breathe in through your nose. Breathe out through either your nose or mouth. · Breathe deeply, filling up the area between your navel and your rib cage. Breathe so that your belly goes up and down. · Do not hold your breath. · Breathe like this for 5 to 10 minutes. Notice the feeling of calmness throughout your whole body. As you continue to breathe slowly and deeply, relax by doing the following for another 5 to 10 minutes:  · Tighten and relax each muscle group in your body. You can begin at your toes and work your way up to your head. · Imagine your muscle groups relaxing and becoming heavy. · Empty your mind of all thoughts. · Let yourself relax more and more deeply. · Become aware of the state of calmness that surrounds you. · When your relaxation time is over, you can bring yourself back to alertness by moving your fingers and toes and then your hands and feet and then stretching and moving your entire body. Sometimes people fall asleep during relaxation, but they usually wake up shortly afterward. · Always give yourself time to return to full alertness before you drive a car or do anything that might cause an accident if you are not fully alert.  Never play a relaxation tape while you drive a car. When should you call for help? Call 911 anytime you think you may need emergency care. For example, call if:    · You feel you cannot stop from hurting yourself or someone else.   Kizzy Vance the numbers for these national suicide hotlines: 6-347-538-TALK (1-755.436.2388) and 0-915-TLWHQMW (5-735.318.5230). If you or someone you know talks about suicide or feeling hopeless, get help right away.   Watch closely for changes in your health, and be sure to contact your doctor if:    · You have anxiety or fear that affects your life.     · You have symptoms of anxiety that are new or different from those you had before. Where can you learn more? Go to http://[employer]mariano.info/. Enter P754 in the search box to learn more about \"Anxiety Disorder: Care Instructions. \"  Current as of: December 7, 2017  Content Version: 11.7  © 0795-5228 [employer]. Care instructions adapted under license by [employer] (which disclaims liability or warranty for this information). If you have questions about a medical condition or this instruction, always ask your healthcare professional. [address] any warranty or liability for your use of this information. Asthma in Adults: Care Instructions  Your Care Instructions    During an asthma attack, your airways swell and narrow as a reaction to certain things (triggers). This makes it hard to breathe. You may be able to prevent asthma attacks if you avoid the things that set off your asthma symptoms. Keeping your asthma under control and treating symptoms before they get bad can help you avoid severe attacks. If you can control your asthma, you may be able to do all of your normal daily activities. You may also avoid asthma attacks and trips to the hospital.  Follow-up care is a key part of your treatment and safety.  Be sure to make and go to all appointments, and call your doctor if you are having problems. It's also a good idea to know your test results and keep a list of the medicines you take. How can you care for yourself at home? · Follow your asthma action plan so you can manage your symptoms at home. An asthma action plan will help you prevent and control airway reactions and will tell you what to do during an asthma attack. If you do not have an asthma action plan, work with your doctor to build one. · Take your asthma medicine exactly as prescribed. Medicine plays an important role in controlling asthma. Talk to your doctor right away if you have any questions about what to take and how to take it. ¨ Use your quick-relief medicine when you have symptoms of an attack. Quick-relief medicine often is an albuterol inhaler. Some people need to use quick-relief medicine before they exercise. ¨ Take your controller medicine every day, not just when you have symptoms. Controller medicine is usually an inhaled corticosteroid. The goal is to prevent problems before they occur. Do not use your controller medicine to try to treat an attack that has already started. It does not work fast enough to help. ¨ If your doctor prescribed corticosteroid pills to use during an attack, take them as directed. They may take hours to work, but they may shorten the attack and help you breathe better. ¨ Keep your quick-relief medicine with you at all times. · Talk to your doctor before using other medicines. Some medicines, such as aspirin, can cause asthma attacks in some people. · Check yourself for asthma symptoms to know which step to follow in your action plan. Watch for things like being short of breath, having chest tightness, coughing, and wheezing. Also notice if symptoms wake you up at night or if you get tired quickly when you exercise. · If you have a peak flow meter, use it to check how well you are breathing. This can help you predict when an asthma attack is going to occur.  Then you can take medicine to prevent the asthma attack or make it less severe. · See your doctor regularly. These visits will help you learn more about asthma and what you can do to control it. Your doctor will monitor your treatment to make sure the medicine is helping you. · Keep track of your asthma attacks and your treatment. After you have had an attack, write down what triggered it, what helped end it, and any concerns you have about your asthma action plan. Take your diary when you see your doctor. You can then review your asthma action plan and decide if it is working. · Do not smoke or allow others to smoke around you. Avoid smoky places. Smoking makes asthma worse. If you need help quitting, talk to your doctor about stop-smoking programs and medicines. These can increase your chances of quitting for good. · Learn what triggers an asthma attack for you, and avoid the triggers when you can. Common triggers include colds, smoke, air pollution, dust, pollen, mold, pets, cockroaches, stress, and cold air. · Avoid colds and the flu. Get a pneumococcal vaccine shot. If you have had one before, ask your doctor whether you need a second dose. Get a flu vaccine every fall. If you must be around people with colds or the flu, wash your hands often. When should you call for help? Call 911 anytime you think you may need emergency care. For example, call if:    · You have severe trouble breathing.    Call your doctor now or seek immediate medical care if:    · Your symptoms do not get better after you have followed your asthma action plan.     · You cough up yellow, dark brown, or bloody mucus (sputum).    Watch closely for changes in your health, and be sure to contact your doctor if:    · Your coughing and wheezing get worse.     · You need to use quick-relief medicine on more than 2 days a week (unless it is just for exercise).     · You need help figuring out what is triggering your asthma attacks.    Where can you learn more?  Go to http://arnaldo-mariano.info/. Enter P597 in the search box to learn more about \"Asthma in Adults: Care Instructions. \"  Current as of: December 6, 2017  Content Version: 11.7  © 4796-4861 [employer]. Care instructions adapted under license by [employer] (which disclaims liability or warranty for this information). If you have questions about a medical condition or this instruction, always ask your healthcare professional. [address] any warranty or liability for your use of this information.

## 2018-09-29 NOTE — ED PROVIDER NOTES
HPI Comments: The patient is a 69year old female past medical history significant for diabetes, hypertension, asthma, hemorrhoids, GERD, arthritis, kidney stones, cataract, chronic pain, coronary artery disease, status post cholecystectomy, laparoscopic gastrostomy, right knee replacement, total abdominal hysterectomy and bilateral salpingo-oophorectomy, who presents to  by EMS with the complaint of persistent cough for a week accompanied by wheezing, shortness of breath, and difficulty breathing. The patient stated that since the symptoms began over a week ago, she has been seen at [facility] at least 2-3 times this week for the same symptoms. She was evaluated for same symptoms last night. She had terminated a 2 day course of steroids and has been using her inhaler without any significant improvement. She lives at home by herself. She denies any fever, headache, sore throat, nausea, vomiting, diarrhea, constipation, dysuria, hematuria, vaginal discharge or bleeding, dizziness, weakness, and numbness, sick contact at home, suture smoke exposure, prior history of same. Patient is a 69 y.o. female presenting with cough. Cough Past Medical History:  
Diagnosis Date  Anal polyp 6/13/2013  Arthritis  Asthma  CAD (coronary artery disease) 10/27/2017  Cataract  Chronic pain   
 knee - right/back  Diabetes ([facility] [address]  GERD (gastroesophageal reflux disease)  Hemorrhoids 6/13/2013  History of kidney stones  Hypertension  Ill-defined condition   
 abdominal pain and burning  Other ill-defined conditions(799.89)   
 high cholesterol Past Surgical History:  
Procedure Laterality Date  COLONOSCOPY  2/28/2013  EGD  2/28/2013  HX CATARACT REMOVAL Bilateral   
 HX CHOLECYSTECTOMY  6-2015  HX GI  6/27/13  
 anal polyps removed  HX HEENT    
 laser surgery for blood clot in right eye  HX KNEE REPLACEMENT    
 right  HX OTHER SURGICAL  4-2-14  
 lap gastrotomy and removal of polyp -  - not cancerous per pt  HX ALAN AND BSO  HX TONSILLECTOMY  HX UROLOGICAL \"laser\" surgery for kidney stone  NEUROLOGICAL PROCEDURE UNLISTED  1990  
 tumor at back of neck, benign Family History:  
Problem Relation Age of Onset  Cancer Mother   
  colon  Diabetes Brother  Other Sister GI BLEED  Heart Disease Brother  Heart Attack Brother  Heart Disease Brother  Heart Disease Brother  Schizophrenia Son  Anesth Problems Neg Hx Social History Social History  Marital status:  Spouse name: N/A  
 Number of children: N/A  
 Years of education: N/A Occupational History  Not on file. Social History Main Topics  Smoking status: Former Smoker  Smokeless tobacco: Never Used Comment: age 13  Alcohol use No  
 Drug use: No  
 Sexual activity: No  
 
Other Topics Concern  Not on file Social History Narrative ALLERGIES: Seafood [shellfish containing products] Review of Systems Respiratory: Positive for cough. All other systems reviewed and are negative. Vitals:  
 09/29/18 0440 BP: (!) 134/109 Pulse: 100 Resp: 19 Temp: 97.7 °F (36.5 °C) SpO2: 96% Physical Exam  
Nursing note and vitals reviewed. CONSTITUTIONAL: Well-appearing; well-nourished; in mild distress, very anxious. HEAD: Normocephalic; atraumatic EYES: PERRL; EOM intact; conjunctiva and sclera are clear bilaterally. ENT: No rhinorrhea; normal pharynx with no tonsillar hypertrophy; mucous membranes pink/moist, no erythema, no exudate. NECK: Supple; non-tender; no cervical lymphadenopathy CARD: Normal S1, S2; no murmurs, rubs, or gallops. Regular rate and rhythm. RESP: Normal respiratory effort; coarse  breath sounds equal bilaterally with I/e wheezes but no rhonchi, or rales. ABD: Normal bowel sounds; non-distended; non-tender; no palpable organomegaly, no masses, no bruits. Back Exam: Normal inspection; no vertebral point tenderness, no CVA tenderness. Normal range of motion. EXT: Normal ROM in all four extremities; non-tender to palpation; no swelling or deformity; distal pulses are normal, no edema. SKIN: Warm; dry; no rash. NEURO:Alert and oriented x 3, coherent, RAMO-XII grossly intact, sensory and motor are non-focal. 
 
 
 
MDM Number of Diagnoses or Management Options Diagnosis management comments: Assessment: asthmatic bronchitis-rule out pneumonia. Suspect laryngitis. The patient has increased voice hoarseness. The patient denies any fever, but appears hemodynamically stable and well. Plan: chest x-ray/ DuoNeb/ prednisone/ Robitussin-AC/ serial exam/ Monitor and Reevaluate. Amount and/or Complexity of Data Reviewed Clinical lab tests: ordered and reviewed Tests in the radiology section of CPT®: ordered and reviewed Tests in the medicine section of CPT®: reviewed and ordered Discussion of test results with the performing providers: yes Decide to obtain previous medical records or to obtain history from someone other than the patient: yes Obtain history from someone other than the patient: yes Review and summarize past medical records: yes Discuss the patient with other providers: yes Independent visualization of images, tracings, or specimens: yes Risk of Complications, Morbidity, and/or Mortality Presenting problems: moderate Diagnostic procedures: moderate Management options: moderate ED Course Procedures XRAY INTERPRETATION (ED MD) Chest Xray No acute process seen. Normal heart size. No bony abnormalities. No infiltrate. Jennifer Cárdenas MD 4:56 AM 
 
 
Progress Note:  
Pt has been reexamined by Jennifer Cárdenas MD. Pt is feeling much better. Symptoms have improved.  All available results have been reviewed with pt and any available family. Pt understands sx, dx, and tx in ED. Care plan has been outlined and questions have been answered. Pt is ready to go home. Will send home on asthmatic bronchitis instruction. Prescription of Prednisone, albuterol inhaler, and Robitussin-AC. Outpatient referral with PCP as needed. Written by Joshua Santos MD,4:56 AM 
 
. Taylor Bolden

## 2018-09-29 NOTE — ED TRIAGE NOTES
Triage: Pt comes in from home via EMS with CC of cough that started Monday. Per pt the cough is bad enough that she cannot sleep. [facility] and per pt doctor told her \"her lung was almost closing\". Pt says she is out of her advair and her albuterol at home.

## 2018-09-29 NOTE — ED NOTES
MD reviewed discharge instructions and options with patient and patient verbalized understanding. RN reviewed discharge instructions using teachback method. Pt ambulated to exit without difficulty and in no signs of acute distress. Ambulatory out of department, and medicaid cab  will drive home. No complaints or needs expressed at this time. Patient was counseled on medications prescribed at discharge. VSS, verbalized relief from most intense pain. Patient to call pcp in the morning for appointment.  
 
 
[id number] Transport arranged via [employer]. Confirmation [id number]. They will call with cabs ETA.

## 2018-10-01 NOTE — CALL BACK NOTE
[facility] Emergency Department Follow Up Call Record Discharged to : Home/Family Home/Home Health/Skilled Facility/Rehab/Assisted Living/Other__Home_____ 1) Did you receive your discharge instructions? Yes This patient reports continues with Asthma symptoms. Iss taking prescribed medications. Family at home with her at this time. She denies any further needs. Continues with mild SOB, but was able to make conversation  and tolerates ambulating in her home. This patient was able to verify . 2) Do you understand them? Yes        
3) Are you able to follow them? Yes If NO, what can I clarify for you? 4) Do you understand your diagnosis? Yes        
5) Do you know which symptoms should prompt you to call the doctor? Yes    
6) Were you able to fill and  any medications that were prescribed? Yes  
 
7) You were prescribed _Robitussin, Vistaril, prednisone, albuterol inhaler__________for __Asthma__________________. Common side effects of this medication are_rash, nervousness, headache___________________. This is not a complete list so please review the forms given from the pharmacy for a complete list. 8) Are there any questions about your medications? No     
 
  
 Have you scheduled any recommended doctors appointments (specialty, PCP) YES.  with PCP. If NO, what barriers are you encountering (transportation/lost contact info/cost/ 
didnt think necessary/no PCP 
9) If discharged with Home Health, has the agency contacted you to schedule visit? Not applicable 10) Is there anyone available to help you at home (meals, errands, transportation   
monitoring) (adult children, neighbors, private duty companions) Yes Family 11) Are you on a special diet? No        
If YES, do you understand the requirements for this diet? Education provided?  
12) If presented with cough, bronchitis, COPD, asthma, is it ok to ask that the 
 respiratory disease management educator call you? Not applicable    (This patient does have Asthma) 13)  A) If presented with fall, were you issued an assistive device in the ED Are you using? Not applicable B) If given RX for device, have you obtained? Not applicable If NO, barriers? C) Therapist recommended: 
 Are you able to implement the suggestions? Not applicable If NO, barriers to implementation? D) Are you having any difficulties with mobility inside your home?   
 (steps, bed, tub)No 
 If YES, ask if the [facility] PT can contact patient and good time and number? 
14)  At the end of your discharge instructions, there is information about accessing [facility], have you had a chance to review those? Yes Do you have any questions about signing up for this service? NO We encourage our patients to be active participants in their healthcare and this site is one of the ways to do that. It will allow you to access parts of your medical record, email your doctors office, schedule appointments, and request medications refills . 15) Are there any other questions that I can answer for you regarding  
 your Emergency department visit? NO Estimated Call Time:_ 
now 
_________________ Date/Time:_______________

## 2018-10-16 ENCOUNTER — APPOINTMENT (OUTPATIENT)
Dept: GENERAL RADIOLOGY | Age: 78
End: 2018-10-16
Attending: EMERGENCY MEDICINE
Payer: MEDICARE

## 2018-10-16 ENCOUNTER — HOSPITAL ENCOUNTER (EMERGENCY)
Age: 78
Discharge: HOME | End: 2018-10-16
Attending: EMERGENCY MEDICINE | Admitting: EMERGENCY MEDICINE
Payer: MEDICARE

## 2018-10-16 ENCOUNTER — APPOINTMENT (OUTPATIENT)
Dept: CT IMAGING | Age: 78
End: 2018-10-16
Attending: EMERGENCY MEDICINE
Payer: MEDICARE

## 2018-10-16 VITALS
BODY MASS INDEX: 43.42 KG/M2 | TEMPERATURE: 98.2 F | RESPIRATION RATE: 18 BRPM | HEART RATE: 95 BPM | DIASTOLIC BLOOD PRESSURE: 73 MMHG | OXYGEN SATURATION: 94 % | SYSTOLIC BLOOD PRESSURE: 208 MMHG | WEIGHT: 222.31 LBS

## 2018-10-16 DIAGNOSIS — R55 NEAR SYNCOPE: ICD-10-CM

## 2018-10-16 DIAGNOSIS — J20.9 ACUTE BRONCHITIS, UNSPECIFIED ORGANISM: Primary | ICD-10-CM

## 2018-10-16 LAB
ALBUMIN SERPL-MCNC: 3.1 G/DL (ref 3.5–5)
ALBUMIN/GLOB SERPL: 0.9 {RATIO} (ref 1.1–2.2)
ALP SERPL-CCNC: 97 U/L (ref 45–117)
ALT SERPL-CCNC: 18 U/L (ref 12–78)
ANION GAP SERPL CALC-SCNC: 6 MMOL/L (ref 5–15)
AST SERPL-CCNC: 8 U/L (ref 15–37)
BASOPHILS # BLD: 0.1 K/UL (ref 0–0.1)
BASOPHILS NFR BLD: 1 % (ref 0–1)
BILIRUB DIRECT SERPL-MCNC: 0.1 MG/DL (ref 0–0.2)
BILIRUB SERPL-MCNC: 0.3 MG/DL (ref 0.2–1)
BUN SERPL-MCNC: 11 MG/DL (ref 6–20)
BUN/CREAT SERPL: 16 (ref 12–20)
CALCIUM SERPL-MCNC: 8.1 MG/DL (ref 8.5–10.1)
CHLORIDE SERPL-SCNC: 112 MMOL/L (ref 97–108)
CO2 SERPL-SCNC: 26 MMOL/L (ref 21–32)
COMMENT, HOLDF: NORMAL
CREAT SERPL-MCNC: 0.7 MG/DL (ref 0.55–1.02)
DIFFERENTIAL METHOD BLD: ABNORMAL
EOSINOPHIL # BLD: 0.2 K/UL (ref 0–0.4)
EOSINOPHIL NFR BLD: 1 % (ref 0–7)
ERYTHROCYTE [DISTWIDTH] IN BLOOD BY AUTOMATED COUNT: 17.2 % (ref 11.5–14.5)
GLOBULIN SER CALC-MCNC: 3.3 G/DL (ref 2–4)
GLUCOSE SERPL-MCNC: 110 MG/DL (ref 65–100)
HCT VFR BLD AUTO: 35.1 % (ref 35–47)
HGB BLD-MCNC: 11.2 G/DL (ref 11.5–16)
IMM GRANULOCYTES # BLD: 0.1 K/UL (ref 0–0.04)
IMM GRANULOCYTES NFR BLD AUTO: 1 % (ref 0–0.5)
LYMPHOCYTES # BLD: 3.3 K/UL (ref 0.8–3.5)
LYMPHOCYTES NFR BLD: 25 % (ref 12–49)
MAGNESIUM SERPL-MCNC: 1.9 MG/DL (ref 1.6–2.4)
MCH RBC QN AUTO: 27.4 PG (ref 26–34)
MCHC RBC AUTO-ENTMCNC: 31.9 G/DL (ref 30–36.5)
MCV RBC AUTO: 85.8 FL (ref 80–99)
MONOCYTES # BLD: 0.9 K/UL (ref 0–1)
MONOCYTES NFR BLD: 7 % (ref 5–13)
NEUTS SEG # BLD: 8.8 K/UL (ref 1.8–8)
NEUTS SEG NFR BLD: 66 % (ref 32–75)
NRBC # BLD: 0 K/UL (ref 0–0.01)
NRBC BLD-RTO: 0 PER 100 WBC
PLATELET # BLD AUTO: 324 K/UL (ref 150–400)
PMV BLD AUTO: 10.2 FL (ref 8.9–12.9)
POTASSIUM SERPL-SCNC: 4.1 MMOL/L (ref 3.5–5.1)
PROT SERPL-MCNC: 6.4 G/DL (ref 6.4–8.2)
RBC # BLD AUTO: 4.09 M/UL (ref 3.8–5.2)
SAMPLES BEING HELD,HOLD: NORMAL
SODIUM SERPL-SCNC: 144 MMOL/L (ref 136–145)
WBC # BLD AUTO: 13.4 K/UL (ref 3.6–11)

## 2018-10-16 PROCEDURE — 96374 THER/PROPH/DIAG INJ IV PUSH: CPT

## 2018-10-16 PROCEDURE — 85025 COMPLETE CBC W/AUTO DIFF WBC: CPT | Performed by: EMERGENCY MEDICINE

## 2018-10-16 PROCEDURE — 74011000258 HC RX REV CODE- 258: Performed by: EMERGENCY MEDICINE

## 2018-10-16 PROCEDURE — 74011636637 HC RX REV CODE- 636/637: Performed by: EMERGENCY MEDICINE

## 2018-10-16 PROCEDURE — 74177 CT ABD & PELVIS W/CONTRAST: CPT

## 2018-10-16 PROCEDURE — 83735 ASSAY OF MAGNESIUM: CPT | Performed by: EMERGENCY MEDICINE

## 2018-10-16 PROCEDURE — 74011636320 HC RX REV CODE- 636/320: Performed by: EMERGENCY MEDICINE

## 2018-10-16 PROCEDURE — 80076 HEPATIC FUNCTION PANEL: CPT | Performed by: EMERGENCY MEDICINE

## 2018-10-16 PROCEDURE — 94664 DEMO&/EVAL PT USE INHALER: CPT

## 2018-10-16 PROCEDURE — 99285 EMERGENCY DEPT VISIT HI MDM: CPT

## 2018-10-16 PROCEDURE — A9270 NON-COVERED ITEM OR SERVICE: HCPCS | Performed by: EMERGENCY MEDICINE

## 2018-10-16 PROCEDURE — 94640 AIRWAY INHALATION TREATMENT: CPT

## 2018-10-16 PROCEDURE — 96361 HYDRATE IV INFUSION ADD-ON: CPT

## 2018-10-16 PROCEDURE — 74011250637 HC RX REV CODE- 250/637: Performed by: EMERGENCY MEDICINE

## 2018-10-16 PROCEDURE — 74011000250 HC RX REV CODE- 250: Performed by: EMERGENCY MEDICINE

## 2018-10-16 PROCEDURE — 80048 BASIC METABOLIC PNL TOTAL CA: CPT | Performed by: EMERGENCY MEDICINE

## 2018-10-16 PROCEDURE — 74011250636 HC RX REV CODE- 250/636: Performed by: EMERGENCY MEDICINE

## 2018-10-16 PROCEDURE — 77030029684 HC NEB SM VOL KT MONA -A

## 2018-10-16 PROCEDURE — 93005 ELECTROCARDIOGRAM TRACING: CPT

## 2018-10-16 PROCEDURE — 71046 X-RAY EXAM CHEST 2 VIEWS: CPT

## 2018-10-16 RX ORDER — SODIUM CHLORIDE 0.9 % (FLUSH) 0.9 %
10 SYRINGE (ML) INJECTION
Status: COMPLETED | OUTPATIENT
Start: 2018-10-16 | End: 2018-10-16

## 2018-10-16 RX ORDER — ALBUTEROL SULFATE 90 UG/1
2 AEROSOL, METERED RESPIRATORY (INHALATION)
Qty: 1 INHALER | Refills: 0 | Status: SHIPPED | OUTPATIENT
Start: 2018-10-16 | End: 2019-01-19

## 2018-10-16 RX ORDER — PREDNISONE 20 MG/1
60 TABLET ORAL
Status: COMPLETED | OUTPATIENT
Start: 2018-10-16 | End: 2018-10-16

## 2018-10-16 RX ORDER — ONDANSETRON 2 MG/ML
4 INJECTION INTRAMUSCULAR; INTRAVENOUS
Status: COMPLETED | OUTPATIENT
Start: 2018-10-16 | End: 2018-10-16

## 2018-10-16 RX ORDER — IPRATROPIUM BROMIDE AND ALBUTEROL SULFATE 2.5; .5 MG/3ML; MG/3ML
3 SOLUTION RESPIRATORY (INHALATION)
Status: COMPLETED | OUTPATIENT
Start: 2018-10-16 | End: 2018-10-16

## 2018-10-16 RX ORDER — LORAZEPAM 1 MG/1
1 TABLET ORAL
Status: COMPLETED | OUTPATIENT
Start: 2018-10-16 | End: 2018-10-16

## 2018-10-16 RX ORDER — METHYLPREDNISOLONE 4 MG/1
TABLET ORAL
Qty: 1 DOSE PACK | Refills: 0 | Status: SHIPPED | OUTPATIENT
Start: 2018-10-16 | End: 2019-01-19

## 2018-10-16 RX ORDER — ALBUTEROL SULFATE 0.83 MG/ML
5 SOLUTION RESPIRATORY (INHALATION)
Status: COMPLETED | OUTPATIENT
Start: 2018-10-16 | End: 2018-10-16

## 2018-10-16 RX ADMIN — SODIUM CHLORIDE 100 ML: 900 INJECTION, SOLUTION INTRAVENOUS at 15:24

## 2018-10-16 RX ADMIN — IPRATROPIUM BROMIDE AND ALBUTEROL SULFATE 3 ML: .5; 3 SOLUTION RESPIRATORY (INHALATION) at 14:33

## 2018-10-16 RX ADMIN — Medication 10 ML: at 15:23

## 2018-10-16 RX ADMIN — LORAZEPAM 1 MG: 1 TABLET ORAL at 16:33

## 2018-10-16 RX ADMIN — ALBUTEROL SULFATE 5 MG: 2.5 SOLUTION RESPIRATORY (INHALATION) at 16:47

## 2018-10-16 RX ADMIN — PREDNISONE 60 MG: 20 TABLET ORAL at 16:33

## 2018-10-16 RX ADMIN — ONDANSETRON 4 MG: 2 INJECTION INTRAMUSCULAR; INTRAVENOUS at 14:33

## 2018-10-16 RX ADMIN — SODIUM CHLORIDE 1000 ML: 900 INJECTION, SOLUTION INTRAVENOUS at 14:33

## 2018-10-16 RX ADMIN — IOPAMIDOL 100 ML: 755 INJECTION, SOLUTION INTRAVENOUS at 15:23

## 2018-10-16 NOTE — ED TRIAGE NOTES
Patient presents from home via EMS with complaints of feeling like she \"might fall out\", dizziness, Bilateral leg and arm numbness as well as R underarm pain

## 2018-10-16 NOTE — ED PROVIDER NOTES
HPI Comments: 69 y.o. female with past medical history significant for DM, HTN, asthma, pancreatitis and CAD who presents from home with chief complaint of SOB. Pt reports she awakened from sleep this morning \"gasping for air\" d/t SOB  and she felt \"numb all over\" at that time. She also c/o dizziness, generalized weakness, cough, 8/10 pain in her R arm, and urinary frequency. Pt states her SOB is relieved by using her inhaler. She has been having dizziness \"every morning\" for the last 3-4 weeks. Pt notes she was recently ill with a cold and she believes she may be dehydrated. She recently finished a 5-day course of prednisone and a Z-pack. In addition, Pt states her blood glucose was higher than usual at 155 today. Pt denies N/V. There are no other acute medical concerns at this time. Social hx: denies smoking, drinks EtOH 
 
PCP: Duke Benito MD 
 
Note written by Jeimy Tejeda, as dictated by Marline Gutierrez MD 1:33 PM 
 
The history is provided by the patient. Past Medical History:  
Diagnosis Date  Anal polyp 6/13/2013  Arthritis  Asthma  CAD (coronary artery disease) 10/27/2017  Cataract  Chronic pain   
 knee - right/back  Diabetes ([address]  GERD (gastroesophageal reflux disease)  Hemorrhoids 6/13/2013  History of kidney stones  Hypertension  Ill-defined condition   
 abdominal pain and burning  Other ill-defined conditions(799.89)   
 high cholesterol Past Surgical History:  
Procedure Laterality Date  COLONOSCOPY  2/28/2013  EGD  2/28/2013  HX CATARACT REMOVAL Bilateral   
 HX CHOLECYSTECTOMY  6-2015  HX GI  6/27/13  
 anal polyps removed  HX HEENT    
 laser surgery for blood clot in right eye  HX KNEE REPLACEMENT    
 right  HX OTHER SURGICAL  4-2-14  
 lap gastrotomy and removal of polyp -  - not cancerous per pt  HX ALAN AND BSO  HX TONSILLECTOMY  HX UROLOGICAL \"laser\" surgery for kidney stone  NEUROLOGICAL PROCEDURE UNLISTED  1990  
 tumor at back of neck, benign Family History:  
Problem Relation Age of Onset  Cancer Mother   
  colon  Diabetes Brother  Other Sister GI BLEED  Heart Disease Brother  Heart Attack Brother  Heart Disease Brother  Heart Disease Brother  Schizophrenia Son  Anesth Problems Neg Hx Social History Social History  Marital status:  Spouse name: N/A  
 Number of children: N/A  
 Years of education: N/A Occupational History  Not on file. Social History Main Topics  Smoking status: Former Smoker  Smokeless tobacco: Never Used Comment: age 13  Alcohol use No  
 Drug use: No  
 Sexual activity: No  
 
Other Topics Concern  Not on file Social History Narrative ALLERGIES: Seafood [shellfish containing products] Review of Systems Constitutional: Negative for chills and fever. Respiratory: Positive for cough and shortness of breath. Cardiovascular: Negative for chest pain. Gastrointestinal: Negative for abdominal pain, constipation, nausea and vomiting. Genitourinary: Positive for frequency. Musculoskeletal: Positive for myalgias. Neurological: Positive for dizziness, weakness and numbness. Negative for light-headedness. All other systems reviewed and are negative. Vitals:  
 10/16/18 1303 BP: 158/83 Pulse: 95 Resp: 14 Temp: 98.1 °F (36.7 °C) SpO2: 98% Weight: 100.8 kg (222 lb 5 oz) Physical Exam  
Constitutional: She is oriented to person, place, and time. She appears well-developed. No distress. HENT:  
Head: Normocephalic and atraumatic. Eyes: Pupils are equal, round, and reactive to light. No scleral icterus. Neck: Normal range of motion. Neck supple. Cardiovascular: Normal rate, regular rhythm and normal heart sounds. Pulmonary/Chest: She has no wheezes. Poor air movement throughout. No wheezing. Abdominal: Soft. She exhibits no distension. There is no tenderness. There is no rebound and no guarding. Musculoskeletal: Normal range of motion. Neurological: She is alert and oriented to person, place, and time. Skin: Skin is warm and dry. She is not diaphoretic. Psychiatric: She has a normal mood and affect. Her behavior is normal. Thought content normal.  
Nursing note and vitals reviewed. Note written by Jeimy Jaimes, as dictated by Ely Lara MD 1:33 PM 
 
MDM Number of Diagnoses or Management Options Acute bronchitis, unspecified organism:  
Near syncope:  
Diagnosis management comments: The patient is resting comfortably and feels better, is alert, talkative, interactive and in no distress. The repeat examination is unremarkable and benign. The patient is neurologically intact, has a normal mental status and is ambulatory in the ED. The history, exam, diagnostic testing (if any) and the patient's current condition do not suggest arrhythmia, STEMI, seizure, meningitis, stroke, sepsis, subarachnoid hemorrhage, intracranial bleeding, encephalitis or other significant pathology that would warrant further testing, continued ED treatment, admission, neurological consultation, or other specialist evaluation at this point. The vital signs have been stable. The patient's condition is stable and appropriate for discharge. The patient will pursue further outpatient evaluation with the primary care physician or other designated or consulting physician as indicated in the discharge instructions. ED Course Procedures PROGRESS NOTE: 
1:55 PM 
Pt has also been having diarrhea. PROGRESS NOTE: 
4:07 PM 
Pt states she is now feeling better, but she is still not moving good air. Plan to give Pt steroids and another breathing treatment. Pt was tearful which she attributes to increased anxiety; will give Pt Ativan.   
 
PROGRESS NOTE: 
 4:31 PM 
CT shows Pt's lung bases are clear. CXR likely showed artifact. PROGRESS NOTE: 
5:22 PM 
Pt is now tachycardic d/t nebulizer treatments, but her lungs sound much better. Chance Ochoa MD reviewed CT results including thickening of her bladder wall and dilation of her pancreatic and biliary ducts. Pt understands and agrees to follow up w/ GI and urology. She states her diarrhea in the ED was caused by 2 Dulcolax she took before coming to the ED. Her lightheadedness, dizziness, and SOB have improved w/ IVF and nebulizers. Plan to discharge Pt home w/ a steroid taper to manage her sx.

## 2018-10-17 LAB
ATRIAL RATE: 92 BPM
CALCULATED P AXIS, ECG09: 66 DEGREES
CALCULATED R AXIS, ECG10: 44 DEGREES
CALCULATED T AXIS, ECG11: 54 DEGREES
DIAGNOSIS, 93000: NORMAL
P-R INTERVAL, ECG05: 136 MS
Q-T INTERVAL, ECG07: 378 MS
QRS DURATION, ECG06: 66 MS
QTC CALCULATION (BEZET), ECG08: 467 MS
VENTRICULAR RATE, ECG03: 92 BPM

## 2019-01-19 ENCOUNTER — HOSPITAL ENCOUNTER (EMERGENCY)
Age: 79
Discharge: HOME | End: 2019-01-19
Attending: EMERGENCY MEDICINE
Payer: MEDICARE

## 2019-01-19 ENCOUNTER — APPOINTMENT (OUTPATIENT)
Dept: GENERAL RADIOLOGY | Age: 79
End: 2019-01-19
Attending: EMERGENCY MEDICINE
Payer: MEDICARE

## 2019-01-19 VITALS
HEART RATE: 95 BPM | HEIGHT: 60 IN | RESPIRATION RATE: 17 BRPM | BODY MASS INDEX: 39.34 KG/M2 | OXYGEN SATURATION: 93 % | SYSTOLIC BLOOD PRESSURE: 125 MMHG | TEMPERATURE: 98.2 F | DIASTOLIC BLOOD PRESSURE: 52 MMHG | WEIGHT: 200.4 LBS

## 2019-01-19 DIAGNOSIS — J06.9 VIRAL URI WITH COUGH: ICD-10-CM

## 2019-01-19 DIAGNOSIS — J45.901 MILD ASTHMA WITH ACUTE EXACERBATION, UNSPECIFIED WHETHER PERSISTENT: Primary | ICD-10-CM

## 2019-01-19 LAB
ANION GAP SERPL CALC-SCNC: 6 MMOL/L (ref 5–15)
ATRIAL RATE: 103 BPM
BASOPHILS # BLD: 0.1 K/UL (ref 0–0.1)
BASOPHILS NFR BLD: 1 % (ref 0–1)
BUN SERPL-MCNC: 6 MG/DL (ref 6–20)
BUN/CREAT SERPL: 7 (ref 12–20)
CALCIUM SERPL-MCNC: 8.9 MG/DL (ref 8.5–10.1)
CALCULATED P AXIS, ECG09: 66 DEGREES
CALCULATED R AXIS, ECG10: 50 DEGREES
CALCULATED T AXIS, ECG11: 59 DEGREES
CHLORIDE SERPL-SCNC: 106 MMOL/L (ref 97–108)
CO2 SERPL-SCNC: 29 MMOL/L (ref 21–32)
COMMENT, HOLDF: NORMAL
CREAT SERPL-MCNC: 0.82 MG/DL (ref 0.55–1.02)
DIAGNOSIS, 93000: NORMAL
DIFFERENTIAL METHOD BLD: ABNORMAL
EOSINOPHIL # BLD: 0.1 K/UL (ref 0–0.4)
EOSINOPHIL NFR BLD: 1 % (ref 0–7)
ERYTHROCYTE [DISTWIDTH] IN BLOOD BY AUTOMATED COUNT: 14.7 % (ref 11.5–14.5)
GLUCOSE SERPL-MCNC: 224 MG/DL (ref 65–100)
HCT VFR BLD AUTO: 39.5 % (ref 35–47)
HGB BLD-MCNC: 12.4 G/DL (ref 11.5–16)
IMM GRANULOCYTES # BLD AUTO: 0.1 K/UL (ref 0–0.04)
IMM GRANULOCYTES NFR BLD AUTO: 1 % (ref 0–0.5)
LACTATE BLD-SCNC: 0.63 MMOL/L (ref 0.4–2)
LYMPHOCYTES # BLD: 3.8 K/UL (ref 0.8–3.5)
LYMPHOCYTES NFR BLD: 31 % (ref 12–49)
MCH RBC QN AUTO: 27.3 PG (ref 26–34)
MCHC RBC AUTO-ENTMCNC: 31.4 G/DL (ref 30–36.5)
MCV RBC AUTO: 86.8 FL (ref 80–99)
MONOCYTES # BLD: 0.9 K/UL (ref 0–1)
MONOCYTES NFR BLD: 7 % (ref 5–13)
NEUTS SEG # BLD: 7.4 K/UL (ref 1.8–8)
NEUTS SEG NFR BLD: 59 % (ref 32–75)
NRBC # BLD: 0 K/UL (ref 0–0.01)
NRBC BLD-RTO: 0 PER 100 WBC
P-R INTERVAL, ECG05: 120 MS
PLATELET # BLD AUTO: 344 K/UL (ref 150–400)
PMV BLD AUTO: 10.1 FL (ref 8.9–12.9)
POTASSIUM SERPL-SCNC: 4 MMOL/L (ref 3.5–5.1)
Q-T INTERVAL, ECG07: 364 MS
QRS DURATION, ECG06: 66 MS
QTC CALCULATION (BEZET), ECG08: 476 MS
RBC # BLD AUTO: 4.55 M/UL (ref 3.8–5.2)
SAMPLES BEING HELD,HOLD: NORMAL
SODIUM SERPL-SCNC: 141 MMOL/L (ref 136–145)
VENTRICULAR RATE, ECG03: 103 BPM
WBC # BLD AUTO: 12.4 K/UL (ref 3.6–11)

## 2019-01-19 PROCEDURE — 96361 HYDRATE IV INFUSION ADD-ON: CPT

## 2019-01-19 PROCEDURE — 94640 AIRWAY INHALATION TREATMENT: CPT

## 2019-01-19 PROCEDURE — 77030029684 HC NEB SM VOL KT MONA -A

## 2019-01-19 PROCEDURE — 71045 X-RAY EXAM CHEST 1 VIEW: CPT

## 2019-01-19 PROCEDURE — 36415 COLL VENOUS BLD VENIPUNCTURE: CPT

## 2019-01-19 PROCEDURE — 83605 ASSAY OF LACTIC ACID: CPT

## 2019-01-19 PROCEDURE — 80048 BASIC METABOLIC PNL TOTAL CA: CPT

## 2019-01-19 PROCEDURE — 74011000250 HC RX REV CODE- 250: Performed by: EMERGENCY MEDICINE

## 2019-01-19 PROCEDURE — 96374 THER/PROPH/DIAG INJ IV PUSH: CPT

## 2019-01-19 PROCEDURE — 85025 COMPLETE CBC W/AUTO DIFF WBC: CPT

## 2019-01-19 PROCEDURE — 99285 EMERGENCY DEPT VISIT HI MDM: CPT

## 2019-01-19 PROCEDURE — 93005 ELECTROCARDIOGRAM TRACING: CPT

## 2019-01-19 PROCEDURE — 74011250636 HC RX REV CODE- 250/636: Performed by: EMERGENCY MEDICINE

## 2019-01-19 RX ORDER — BENZONATATE 100 MG/1
100 CAPSULE ORAL
Qty: 30 CAP | Refills: 0 | Status: SHIPPED | OUTPATIENT
Start: 2019-01-19 | End: 2019-01-26

## 2019-01-19 RX ORDER — ALBUTEROL SULFATE 90 UG/1
2 AEROSOL, METERED RESPIRATORY (INHALATION)
Qty: 1 INHALER | Refills: 0 | Status: SHIPPED | OUTPATIENT
Start: 2019-01-19

## 2019-01-19 RX ORDER — IPRATROPIUM BROMIDE AND ALBUTEROL SULFATE 2.5; .5 MG/3ML; MG/3ML
3 SOLUTION RESPIRATORY (INHALATION)
Status: COMPLETED | OUTPATIENT
Start: 2019-01-19 | End: 2019-01-19

## 2019-01-19 RX ORDER — ALBUTEROL SULFATE 0.83 MG/ML
5 SOLUTION RESPIRATORY (INHALATION)
Status: COMPLETED | OUTPATIENT
Start: 2019-01-19 | End: 2019-01-19

## 2019-01-19 RX ORDER — KETOROLAC TROMETHAMINE 30 MG/ML
15 INJECTION, SOLUTION INTRAMUSCULAR; INTRAVENOUS ONCE
Status: COMPLETED | OUTPATIENT
Start: 2019-01-19 | End: 2019-01-19

## 2019-01-19 RX ADMIN — SODIUM CHLORIDE 1000 ML: 900 INJECTION, SOLUTION INTRAVENOUS at 14:31

## 2019-01-19 RX ADMIN — KETOROLAC TROMETHAMINE 15 MG: 30 INJECTION, SOLUTION INTRAMUSCULAR at 14:34

## 2019-01-19 RX ADMIN — ALBUTEROL SULFATE 5 MG: 2.5 SOLUTION RESPIRATORY (INHALATION) at 15:35

## 2019-01-19 RX ADMIN — IPRATROPIUM BROMIDE AND ALBUTEROL SULFATE 3 ML: .5; 3 SOLUTION RESPIRATORY (INHALATION) at 14:37

## 2019-01-19 NOTE — ED NOTES
Discharge instructions and prescriptions given to patient, all questions answered and patient verbalized understanding. Patient to ED lobby via wheelchair.

## 2019-01-19 NOTE — ED PROVIDER NOTES
69 y.o. female with past medical history significant for diabetes, HTN, asthma, high cholesterol, hemorrhoids, anal polyp, arthritis, kidney stones, cataract, chronic pain, and CAD who presents from home via EMS with chief complaint of SOB. Pt presents with multiple symptoms of SOB, chest pain, fever, and body aches with onset 1 week ago. Pt denies symptoms of nausea, vomiting, and diarrhea. Pt reports running out of breathing treatment at home. There are no other acute medical concerns at this time. Social hx: Former smoker. PCP: Jd Melchor MD 
 
Note written by ronald Kahn, as dictated by Elena Blas MD 2:20 PM 
 
 
 
The history is provided by the patient and the EMS personnel. No  was used. Past Medical History:  
Diagnosis Date  Anal polyp 6/13/2013  Arthritis  Asthma  CAD (coronary artery disease) 10/27/2017  Cataract  Chronic pain   
 knee - right/back  Diabetes ([facility] [address]  GERD (gastroesophageal reflux disease)  Hemorrhoids 6/13/2013  History of kidney stones  Hypertension  Ill-defined condition   
 abdominal pain and burning  Other ill-defined conditions(799.89)   
 high cholesterol Past Surgical History:  
Procedure Laterality Date  COLONOSCOPY  2/28/2013  EGD  2/28/2013  HX CATARACT REMOVAL Bilateral   
 HX CHOLECYSTECTOMY  6-2015  HX GI  6/27/13  
 anal polyps removed  HX HEENT    
 laser surgery for blood clot in right eye  HX KNEE REPLACEMENT    
 right  HX OTHER SURGICAL  4-2-14  
 lap gastrotomy and removal of polyp -  - not cancerous per pt  HX ALAN AND BSO  HX TONSILLECTOMY  HX UROLOGICAL \"laser\" surgery for kidney stone  NEUROLOGICAL PROCEDURE UNLISTED  1990  
 tumor at back of neck, benign Family History:  
Problem Relation Age of Onset  Cancer Mother   
     colon  Diabetes Brother  Other Sister      GI BLEED  
  Heart Disease Brother  Heart Attack Brother  Heart Disease Brother  Heart Disease Brother  Schizophrenia Son  Anesth Problems Neg Hx Social History Socioeconomic History  Marital status:  Spouse name: Not on file  Number of children: Not on file  Years of education: Not on file  Highest education level: Not on file Social Needs  Financial resource strain: Not on file  Food insecurity - worry: Not on file  Food insecurity - inability: Not on file  Transportation needs - medical: Not on file  Transportation needs - non-medical: Not on file Occupational History  Not on file Tobacco Use  Smoking status: Former Smoker  Smokeless tobacco: Never Used  Tobacco comment: age 13 Substance and Sexual Activity  Alcohol use: No  
 Drug use: No  
 Sexual activity: No  
Other Topics Concern  Not on file Social History Narrative  Not on file ALLERGIES: Seafood [shellfish containing products] Review of Systems Constitutional: Positive for fever. Negative for chills. Respiratory: Positive for shortness of breath. Cardiovascular: Positive for chest pain. Gastrointestinal: Negative for abdominal pain, constipation, diarrhea, nausea and vomiting. Musculoskeletal: Positive for myalgias (generalized). Neurological: Negative for dizziness and light-headedness. All other systems reviewed and are negative. Vitals:  
 01/19/19 1421 BP: 141/71 Pulse: (!) 109 Resp: 25 Temp: 99.7 °F (37.6 °C) SpO2: 96% Weight: 90.9 kg (200 lb 6.4 oz) Height: 5' (1.524 m) Physical Exam  
Constitutional: She appears well-developed. No distress. HENT:  
Head: Normocephalic and atraumatic. Eyes: Pupils are equal, round, and reactive to light. No scleral icterus. Neck: Normal range of motion. Neck supple. Cardiovascular:  
Tachycardic. Pulmonary/Chest:  
Poor air movement to the bases. Abdominal: Soft. She exhibits no distension. There is no tenderness. There is no rebound and no guarding. Musculoskeletal: Normal range of motion. Neurological: She is alert. Skin: Skin is warm and dry. She is not diaphoretic. Psychiatric: She has a normal mood and affect. Her behavior is normal. Thought content normal.  
Nursing note and vitals reviewed. Note written by ronald Pollack, as dictated by Mary Kay Evans MD 2:20 PM 
MDM Number of Diagnoses or Management Options Mild asthma with acute exacerbation, unspecified whether persistent: new and requires workup Viral URI with cough: new and requires workup Diagnosis management comments: Pt w hx of asthma presenting with shortness of breath and wheezing. Symptoms improved with albuterol - no evidence of PNA, PTX, sepsis. Procedures ED EKG interpretation: 
Sinus Tachycardia. Rate of 103. Non-specific T wave flattening and inversion. No Ectopy. Note written by ronald Pollack, as dictated by Mary Kay Evans MD 2:21 PM 
 
PROGRESS NOTE: 
3:41 PM 
Pt is feeling much better with the nebulizer. Pt is more comfortable and is speaking in full sentences. PROGRESS NOTE: 
4:08 PM 
Reevaluated patient. Lungs sound clear, no wheezing, and  have good air movement. Pt states he feels completely back to normal and is asking for discharge. The patient's results have been reviewed with them and/or available family. Patient and/or family verbally conveyed their understanding and agreement of the patient's signs, symptoms, diagnosis, treatment and prognosis and additionally agree to follow up as recommended in the discharge instructions or to return to the Emergency Room should their condition change prior to their follow-up appointment. The patient/family verbally agrees with the care-plan and verbally conveys that all of their questions have been answered.  The discharge instructions have also been provided to the patient and/or family with some educational information regarding the patient's diagnosis as well a list of reasons why the patient would want to return to the ER prior to their follow-up appointment, should their condition change.

## 2019-01-19 NOTE — ED TRIAGE NOTES
Arrived from home via EMS c/o chest pain and SOB for a week. Dr. Dalila Leslie at bedside. Patient stated she has not used inhaler because she has nor refills.

## 2019-03-08 ENCOUNTER — APPOINTMENT (OUTPATIENT)
Dept: CT IMAGING | Age: 79
DRG: 637 | End: 2019-03-08
Attending: EMERGENCY MEDICINE
Payer: MEDICARE

## 2019-03-08 ENCOUNTER — APPOINTMENT (OUTPATIENT)
Dept: GENERAL RADIOLOGY | Age: 79
DRG: 637 | End: 2019-03-08
Attending: EMERGENCY MEDICINE
Payer: MEDICARE

## 2019-03-08 ENCOUNTER — HOSPITAL ENCOUNTER (INPATIENT)
Age: 79
LOS: 2 days | Discharge: HOME | DRG: 637 | End: 2019-03-11
Attending: EMERGENCY MEDICINE | Admitting: INTERNAL MEDICINE
Payer: MEDICARE

## 2019-03-08 DIAGNOSIS — R94.31 T WAVE INVERSION IN EKG: ICD-10-CM

## 2019-03-08 DIAGNOSIS — R73.9 HYPERGLYCEMIA: ICD-10-CM

## 2019-03-08 DIAGNOSIS — R53.1 WEAKNESS: Primary | ICD-10-CM

## 2019-03-08 LAB
ALBUMIN SERPL-MCNC: 3.2 G/DL (ref 3.5–5)
ALBUMIN/GLOB SERPL: 1 {RATIO} (ref 1.1–2.2)
ALP SERPL-CCNC: 112 U/L (ref 45–117)
ALT SERPL-CCNC: 16 U/L (ref 12–78)
ANION GAP SERPL CALC-SCNC: 10 MMOL/L (ref 5–15)
APPEARANCE UR: CLEAR
AST SERPL-CCNC: 4 U/L (ref 15–37)
ATRIAL RATE: 90 BPM
ATRIAL RATE: 93 BPM
BASOPHILS # BLD: 0 K/UL (ref 0–0.1)
BASOPHILS NFR BLD: 0 % (ref 0–1)
BILIRUB SERPL-MCNC: 0.2 MG/DL (ref 0.2–1)
BILIRUB UR QL CFM: NEGATIVE
BNP SERPL-MCNC: 13 PG/ML (ref 0–450)
BUN SERPL-MCNC: 17 MG/DL (ref 6–20)
BUN/CREAT SERPL: 16 (ref 12–20)
CALCIUM SERPL-MCNC: 8.8 MG/DL (ref 8.5–10.1)
CALCULATED P AXIS, ECG09: 52 DEGREES
CALCULATED P AXIS, ECG09: 70 DEGREES
CALCULATED R AXIS, ECG10: 39 DEGREES
CALCULATED R AXIS, ECG10: 40 DEGREES
CALCULATED T AXIS, ECG11: 61 DEGREES
CALCULATED T AXIS, ECG11: 63 DEGREES
CHLORIDE SERPL-SCNC: 108 MMOL/L (ref 97–108)
CHOLEST SERPL-MCNC: 122 MG/DL
CO2 SERPL-SCNC: 23 MMOL/L (ref 21–32)
COLOR UR: ABNORMAL
COMMENT, HOLDF: NORMAL
CREAT SERPL-MCNC: 1.08 MG/DL (ref 0.55–1.02)
DIAGNOSIS, 93000: NORMAL
DIAGNOSIS, 93000: NORMAL
DIFFERENTIAL METHOD BLD: ABNORMAL
EOSINOPHIL # BLD: 0.1 K/UL (ref 0–0.4)
EOSINOPHIL NFR BLD: 1 % (ref 0–7)
ERYTHROCYTE [DISTWIDTH] IN BLOOD BY AUTOMATED COUNT: 14.6 % (ref 11.5–14.5)
GLOBULIN SER CALC-MCNC: 3.3 G/DL (ref 2–4)
GLUCOSE BLD STRIP.AUTO-MCNC: 179 MG/DL (ref 65–100)
GLUCOSE BLD STRIP.AUTO-MCNC: 214 MG/DL (ref 65–100)
GLUCOSE BLD STRIP.AUTO-MCNC: 228 MG/DL (ref 65–100)
GLUCOSE SERPL-MCNC: 235 MG/DL (ref 65–100)
GLUCOSE UR STRIP.AUTO-MCNC: >1000 MG/DL
HCT VFR BLD AUTO: 35.9 % (ref 35–47)
HDLC SERPL-MCNC: 86 MG/DL
HDLC SERPL: 1.4 {RATIO} (ref 0–5)
HGB BLD-MCNC: 11.7 G/DL (ref 11.5–16)
HGB UR QL STRIP: NEGATIVE
IMM GRANULOCYTES # BLD AUTO: 0.1 K/UL (ref 0–0.04)
IMM GRANULOCYTES NFR BLD AUTO: 1 % (ref 0–0.5)
KETONES UR QL STRIP.AUTO: NEGATIVE MG/DL
LDLC SERPL CALC-MCNC: 21 MG/DL (ref 0–100)
LEUKOCYTE ESTERASE UR QL STRIP.AUTO: NEGATIVE
LIPID PROFILE,FLP: NORMAL
LYMPHOCYTES # BLD: 3.2 K/UL (ref 0.8–3.5)
LYMPHOCYTES NFR BLD: 26 % (ref 12–49)
MCH RBC QN AUTO: 28.3 PG (ref 26–34)
MCHC RBC AUTO-ENTMCNC: 32.6 G/DL (ref 30–36.5)
MCV RBC AUTO: 86.7 FL (ref 80–99)
MONOCYTES # BLD: 1.1 K/UL (ref 0–1)
MONOCYTES NFR BLD: 9 % (ref 5–13)
NEUTS SEG # BLD: 7.8 K/UL (ref 1.8–8)
NEUTS SEG NFR BLD: 63 % (ref 32–75)
NITRITE UR QL STRIP.AUTO: NEGATIVE
NRBC # BLD: 0 K/UL (ref 0–0.01)
NRBC BLD-RTO: 0 PER 100 WBC
P-R INTERVAL, ECG05: 124 MS
P-R INTERVAL, ECG05: 132 MS
PH UR STRIP: 5 [PH] (ref 5–8)
PLATELET # BLD AUTO: 282 K/UL (ref 150–400)
PMV BLD AUTO: 10.8 FL (ref 8.9–12.9)
POTASSIUM SERPL-SCNC: 3.9 MMOL/L (ref 3.5–5.1)
PROT SERPL-MCNC: 6.5 G/DL (ref 6.4–8.2)
PROT UR STRIP-MCNC: NEGATIVE MG/DL
Q-T INTERVAL, ECG07: 396 MS
Q-T INTERVAL, ECG07: 424 MS
QRS DURATION, ECG06: 64 MS
QRS DURATION, ECG06: 66 MS
QTC CALCULATION (BEZET), ECG08: 492 MS
QTC CALCULATION (BEZET), ECG08: 518 MS
RBC # BLD AUTO: 4.14 M/UL (ref 3.8–5.2)
SAMPLES BEING HELD,HOLD: NORMAL
SERVICE CMNT-IMP: ABNORMAL
SODIUM SERPL-SCNC: 141 MMOL/L (ref 136–145)
SP GR UR REFRACTOMETRY: 1.03 (ref 1–1.03)
TRIGL SERPL-MCNC: 75 MG/DL (ref ?–150)
TROPONIN I SERPL-MCNC: <0.05 NG/ML
TROPONIN I SERPL-MCNC: <0.05 NG/ML
UROBILINOGEN UR QL STRIP.AUTO: 0.2 EU/DL (ref 0.2–1)
VENTRICULAR RATE, ECG03: 90 BPM
VENTRICULAR RATE, ECG03: 93 BPM
VLDLC SERPL CALC-MCNC: 15 MG/DL
WBC # BLD AUTO: 12.3 K/UL (ref 3.6–11)

## 2019-03-08 PROCEDURE — 74011636637 HC RX REV CODE- 636/637: Performed by: NEUROMUSCULOSKELETAL MEDICINE & OMM

## 2019-03-08 PROCEDURE — 99285 EMERGENCY DEPT VISIT HI MDM: CPT

## 2019-03-08 PROCEDURE — 83880 ASSAY OF NATRIURETIC PEPTIDE: CPT

## 2019-03-08 PROCEDURE — 94640 AIRWAY INHALATION TREATMENT: CPT

## 2019-03-08 PROCEDURE — 81003 URINALYSIS AUTO W/O SCOPE: CPT

## 2019-03-08 PROCEDURE — 80061 LIPID PANEL: CPT

## 2019-03-08 PROCEDURE — 85025 COMPLETE CBC W/AUTO DIFF WBC: CPT

## 2019-03-08 PROCEDURE — 74011000250 HC RX REV CODE- 250: Performed by: NEUROMUSCULOSKELETAL MEDICINE & OMM

## 2019-03-08 PROCEDURE — 74176 CT ABD & PELVIS W/O CONTRAST: CPT

## 2019-03-08 PROCEDURE — 94760 N-INVAS EAR/PLS OXIMETRY 1: CPT

## 2019-03-08 PROCEDURE — 93005 ELECTROCARDIOGRAM TRACING: CPT

## 2019-03-08 PROCEDURE — 80053 COMPREHEN METABOLIC PANEL: CPT

## 2019-03-08 PROCEDURE — 82962 GLUCOSE BLOOD TEST: CPT

## 2019-03-08 PROCEDURE — 74011250636 HC RX REV CODE- 250/636: Performed by: EMERGENCY MEDICINE

## 2019-03-08 PROCEDURE — 84484 ASSAY OF TROPONIN QUANT: CPT

## 2019-03-08 PROCEDURE — 65660000000 HC RM CCU STEPDOWN

## 2019-03-08 PROCEDURE — 96360 HYDRATION IV INFUSION INIT: CPT

## 2019-03-08 PROCEDURE — 74011250637 HC RX REV CODE- 250/637: Performed by: NEUROMUSCULOSKELETAL MEDICINE & OMM

## 2019-03-08 PROCEDURE — 71045 X-RAY EXAM CHEST 1 VIEW: CPT

## 2019-03-08 PROCEDURE — 36415 COLL VENOUS BLD VENIPUNCTURE: CPT

## 2019-03-08 RX ORDER — LISINOPRIL 20 MG/1
20 TABLET ORAL DAILY
Status: DISCONTINUED | OUTPATIENT
Start: 2019-03-09 | End: 2019-03-11 | Stop reason: HOSPADM

## 2019-03-08 RX ORDER — IPRATROPIUM BROMIDE AND ALBUTEROL SULFATE 2.5; .5 MG/3ML; MG/3ML
3 SOLUTION RESPIRATORY (INHALATION)
Status: DISCONTINUED | OUTPATIENT
Start: 2019-03-08 | End: 2019-03-10

## 2019-03-08 RX ORDER — ZOLPIDEM TARTRATE 5 MG/1
5 TABLET ORAL
Status: DISCONTINUED | OUTPATIENT
Start: 2019-03-08 | End: 2019-03-11 | Stop reason: HOSPADM

## 2019-03-08 RX ORDER — RANOLAZINE 500 MG/1
500 TABLET, EXTENDED RELEASE ORAL 2 TIMES DAILY
COMMUNITY
End: 2020-04-27

## 2019-03-08 RX ORDER — BUSPIRONE HYDROCHLORIDE 10 MG/1
20 TABLET ORAL 2 TIMES DAILY
Status: DISCONTINUED | OUTPATIENT
Start: 2019-03-08 | End: 2019-03-11 | Stop reason: HOSPADM

## 2019-03-08 RX ORDER — ADHESIVE BANDAGE
30 BANDAGE TOPICAL DAILY PRN
Status: DISCONTINUED | OUTPATIENT
Start: 2019-03-08 | End: 2019-03-11 | Stop reason: HOSPADM

## 2019-03-08 RX ORDER — MAGNESIUM SULFATE 100 %
4 CRYSTALS MISCELLANEOUS AS NEEDED
Status: DISCONTINUED | OUTPATIENT
Start: 2019-03-08 | End: 2019-03-11 | Stop reason: HOSPADM

## 2019-03-08 RX ORDER — SODIUM CHLORIDE 0.9 % (FLUSH) 0.9 %
5-40 SYRINGE (ML) INJECTION AS NEEDED
Status: DISCONTINUED | OUTPATIENT
Start: 2019-03-08 | End: 2019-03-11 | Stop reason: HOSPADM

## 2019-03-08 RX ORDER — DULOXETIN HYDROCHLORIDE 60 MG/1
60 CAPSULE, DELAYED RELEASE ORAL DAILY
Status: DISCONTINUED | OUTPATIENT
Start: 2019-03-09 | End: 2019-03-11 | Stop reason: HOSPADM

## 2019-03-08 RX ORDER — QUETIAPINE FUMARATE 25 MG/1
25 TABLET, FILM COATED ORAL
COMMUNITY

## 2019-03-08 RX ORDER — INSULIN GLARGINE 100 [IU]/ML
20 INJECTION, SOLUTION SUBCUTANEOUS 2 TIMES DAILY
Status: DISCONTINUED | OUTPATIENT
Start: 2019-03-08 | End: 2019-03-10

## 2019-03-08 RX ORDER — MIRTAZAPINE 15 MG/1
15 TABLET, FILM COATED ORAL
COMMUNITY

## 2019-03-08 RX ORDER — SODIUM CHLORIDE 0.9 % (FLUSH) 0.9 %
5-40 SYRINGE (ML) INJECTION EVERY 8 HOURS
Status: DISCONTINUED | OUTPATIENT
Start: 2019-03-08 | End: 2019-03-11 | Stop reason: HOSPADM

## 2019-03-08 RX ORDER — DEXTROSE 50 % IN WATER (D50W) INTRAVENOUS SYRINGE
12.5-25 AS NEEDED
Status: DISCONTINUED | OUTPATIENT
Start: 2019-03-08 | End: 2019-03-11 | Stop reason: HOSPADM

## 2019-03-08 RX ORDER — ZOLPIDEM TARTRATE 10 MG/1
10 TABLET ORAL
COMMUNITY
End: 2019-03-11

## 2019-03-08 RX ORDER — MIRTAZAPINE 15 MG/1
15 TABLET, FILM COATED ORAL
Status: DISCONTINUED | OUTPATIENT
Start: 2019-03-08 | End: 2019-03-11 | Stop reason: HOSPADM

## 2019-03-08 RX ORDER — ALBUTEROL SULFATE 0.83 MG/ML
2.5 SOLUTION RESPIRATORY (INHALATION)
Status: DISCONTINUED | OUTPATIENT
Start: 2019-03-08 | End: 2019-03-11 | Stop reason: HOSPADM

## 2019-03-08 RX ORDER — RANOLAZINE 500 MG/1
500 TABLET, EXTENDED RELEASE ORAL 2 TIMES DAILY
Status: DISCONTINUED | OUTPATIENT
Start: 2019-03-08 | End: 2019-03-11 | Stop reason: HOSPADM

## 2019-03-08 RX ORDER — ACETAMINOPHEN 325 MG/1
650 TABLET ORAL
Status: DISCONTINUED | OUTPATIENT
Start: 2019-03-08 | End: 2019-03-11 | Stop reason: HOSPADM

## 2019-03-08 RX ORDER — PANTOPRAZOLE SODIUM 40 MG/1
40 TABLET, DELAYED RELEASE ORAL
Status: DISCONTINUED | OUTPATIENT
Start: 2019-03-09 | End: 2019-03-11 | Stop reason: HOSPADM

## 2019-03-08 RX ORDER — ONDANSETRON 2 MG/ML
4 INJECTION INTRAMUSCULAR; INTRAVENOUS
Status: DISCONTINUED | OUTPATIENT
Start: 2019-03-08 | End: 2019-03-11 | Stop reason: HOSPADM

## 2019-03-08 RX ORDER — QUETIAPINE FUMARATE 25 MG/1
25 TABLET, FILM COATED ORAL
Status: DISCONTINUED | OUTPATIENT
Start: 2019-03-08 | End: 2019-03-11 | Stop reason: HOSPADM

## 2019-03-08 RX ORDER — GUAIFENESIN 100 MG/5ML
81 LIQUID (ML) ORAL DAILY
Status: DISCONTINUED | OUTPATIENT
Start: 2019-03-09 | End: 2019-03-11 | Stop reason: HOSPADM

## 2019-03-08 RX ORDER — MORPHINE SULFATE 10 MG/ML
4 INJECTION, SOLUTION INTRAMUSCULAR; INTRAVENOUS
Status: DISCONTINUED | OUTPATIENT
Start: 2019-03-08 | End: 2019-03-11 | Stop reason: HOSPADM

## 2019-03-08 RX ORDER — AMLODIPINE BESYLATE 5 MG/1
10 TABLET ORAL DAILY
Status: DISCONTINUED | OUTPATIENT
Start: 2019-03-09 | End: 2019-03-11 | Stop reason: HOSPADM

## 2019-03-08 RX ORDER — ALBUTEROL SULFATE 90 UG/1
2 AEROSOL, METERED RESPIRATORY (INHALATION)
Status: DISCONTINUED | OUTPATIENT
Start: 2019-03-08 | End: 2019-03-08 | Stop reason: CLARIF

## 2019-03-08 RX ORDER — INSULIN LISPRO 100 [IU]/ML
INJECTION, SOLUTION INTRAVENOUS; SUBCUTANEOUS
Status: DISCONTINUED | OUTPATIENT
Start: 2019-03-08 | End: 2019-03-11 | Stop reason: HOSPADM

## 2019-03-08 RX ORDER — BUSPIRONE HYDROCHLORIDE 5 MG/1
20 TABLET ORAL 2 TIMES DAILY
COMMUNITY

## 2019-03-08 RX ORDER — ROSUVASTATIN CALCIUM 10 MG/1
10 TABLET, COATED ORAL
Status: DISCONTINUED | OUTPATIENT
Start: 2019-03-08 | End: 2019-03-11 | Stop reason: HOSPADM

## 2019-03-08 RX ORDER — DEXLANSOPRAZOLE 60 MG/1
60 CAPSULE, DELAYED RELEASE ORAL
Status: DISCONTINUED | OUTPATIENT
Start: 2019-03-09 | End: 2019-03-08 | Stop reason: CLARIF

## 2019-03-08 RX ORDER — DULOXETIN HYDROCHLORIDE 60 MG/1
60 CAPSULE, DELAYED RELEASE ORAL DAILY
COMMUNITY

## 2019-03-08 RX ADMIN — INSULIN LISPRO 2 UNITS: 100 INJECTION, SOLUTION INTRAVENOUS; SUBCUTANEOUS at 18:10

## 2019-03-08 RX ADMIN — BUSPIRONE HYDROCHLORIDE 20 MG: 10 TABLET ORAL at 19:45

## 2019-03-08 RX ADMIN — IPRATROPIUM BROMIDE AND ALBUTEROL SULFATE 3 ML: .5; 3 SOLUTION RESPIRATORY (INHALATION) at 20:14

## 2019-03-08 RX ADMIN — ZOLPIDEM TARTRATE 5 MG: 5 TABLET ORAL at 21:14

## 2019-03-08 RX ADMIN — INSULIN LISPRO 2 UNITS: 100 INJECTION, SOLUTION INTRAVENOUS; SUBCUTANEOUS at 21:14

## 2019-03-08 RX ADMIN — INSULIN GLARGINE 20 UNITS: 100 INJECTION, SOLUTION SUBCUTANEOUS at 19:45

## 2019-03-08 RX ADMIN — ACETAMINOPHEN 650 MG: 325 TABLET ORAL at 15:42

## 2019-03-08 RX ADMIN — RANOLAZINE 500 MG: 500 TABLET, FILM COATED, EXTENDED RELEASE ORAL at 19:45

## 2019-03-08 RX ADMIN — SODIUM CHLORIDE 500 ML: 9 INJECTION, SOLUTION INTRAVENOUS at 09:28

## 2019-03-08 RX ADMIN — MIRTAZAPINE 15 MG: 15 TABLET, FILM COATED ORAL at 21:14

## 2019-03-08 RX ADMIN — ROSUVASTATIN CALCIUM 10 MG: 10 TABLET, FILM COATED ORAL at 21:14

## 2019-03-08 RX ADMIN — QUETIAPINE FUMARATE 25 MG: 25 TABLET ORAL at 21:14

## 2019-03-08 RX ADMIN — Medication 10 ML: at 21:15

## 2019-03-08 NOTE — ED TRIAGE NOTES
EMS NOTE: Patient is coming in via EMS at this morning not feeling.  and has taken insulin and last . Patient started with blurred vision and headache that started at today. Patient has numbness and tingling to bilateral hands and feet and not able to ambulate as well this morning as usual. Patient states feeling worse last night but has not been feeling well all week. Patient states had some fevers this week also.

## 2019-03-08 NOTE — PROGRESS NOTES
Admission Medication Reconciliation:    Information obtained from: [facility], [facility] pharmacy, Rx query    Significant PMH/Disease States:   Past Medical History:   Diagnosis Date    Anal polyp 2013    Arthritis     Asthma     CAD (coronary artery disease) 10/27/2017    Cataract     Chronic pain     knee - right/back    Diabetes ([address]     GERD (gastroesophageal reflux disease)     Hemorrhoids 2013    History of kidney stones     Hypertension     Ill-defined condition     abdominal pain and burning    Other ill-defined conditions(799.89)     high cholesterol       Chief Complaint for this Admission:    Chief Complaint   Patient presents with    High Blood Sugar         Allergies:  Seafood [shellfish containing products]    Prior to Admission Medications:   Prior to Admission Medications   Prescriptions Last Dose Informant Patient Reported? Taking? DULoxetine (CYMBALTA) 60 mg capsule   Yes Yes   Sig: Take 60 mg by mouth daily. Dexlansoprazole (DEXILANT) 60 mg CpDB  Self Yes Yes   Sig: Take 60 mg by mouth Daily (before breakfast). Insulin Glargine (LANTUS SOLOSTAR) 100 unit/mL (3 mL) flexpen 3/7/2019 at PM Self Yes Yes   Si Units by SubCUTAneous route two (2) times a day. Indications: Diabetes Mellitus   QUEtiapine (SEROQUEL) 25 mg tablet   Yes Yes   Sig: Take 25 mg by mouth nightly. albuterol (PROVENTIL HFA, VENTOLIN HFA, PROAIR HFA) 90 mcg/actuation inhaler   No Yes   Sig: Take 2 Puffs by inhalation every four (4) hours as needed for Wheezing. amLODIPine (NORVASC) 10 mg tablet  Self Yes Yes   Sig: Take 10 mg by mouth daily. busPIRone (BUSPAR) 5 mg tablet   Yes Yes   Sig: Take 20 mg by mouth two (2) times a day. fosinopril (MONOPRIL) 20 mg tablet  Self Yes Yes   Sig: Take 20 mg by mouth daily. mirtazapine (REMERON) 15 mg tablet   Yes Yes   Sig: Take 15 mg by mouth nightly.    ranolazine ER (RANEXA) 500 mg SR tablet   Yes Yes   Sig: Take 500 mg by mouth two (2) times a day.   rosuvastatin (CRESTOR) 10 mg tablet   Yes Yes   Sig: Take 10 mg by mouth nightly. zolpidem (AMBIEN) 10 mg tablet   Yes Yes   Sig: Take 10 mg by mouth nightly. Facility-Administered Medications: None         Comments/Recommendations: Patient unable to provide any medication history. Contacted both pharmacies that patient utilizes. Medication reconciliation performed to best of my ability with limited resources. Changes made include:    Removed: acetaminophen, Advair, Combigan, gabapentin, hydroxizine, Linzess, metoprolol tartrate, naloxone, nitroglycerin, oxycodone, promethazine, sucralfate, Travatan Z, warfarin    Added: Duloxetine, Ranexa ER, mirtazapine, quetiapine, zolpidem    Changed: Buspar dose ( from 15 mg TID to 20 mg BID)    Of note, many of the patient's previous PTA medications on file that were removed appear to be post ortho surgery (2017).       Kriss Plascencia, LauraD

## 2019-03-08 NOTE — PROGRESS NOTES
Physical Therapy Screening:  A referral to physical therapy order is indicated when the patient is medically stable. A screening was performed on this patient's chart based on their entrance into the emergency department and potential need for physical therapy identified based on patient report re: having difficulty walking today and trst 3. The patients chart was reviewed and the patient interviewed/screened and found to be appropriate for a skilled therapy evaluation. Please consult for physical therapy if you would like an evaluation to be completed. Thank you. 1157 - Met with patient who states she was having difficulty walking d/t weakness and chronic right knee pain. She notes having a TKR November 2018 followed by home health physical therapy with [facility]. She sustained one fall after her knee replacement (tripped over the threshold going the front door). She has not had any falls since. Therapy will check back for referral if patient is discharged.

## 2019-03-08 NOTE — PROGRESS NOTES
TRANSFER - IN REPORT:    Verbal report received from Po Ziegler RN (name) on Yunior Curtis  being received from ED (unit) for routine progression of care      Report consisted of patients Situation, Background, Assessment and   Recommendations(SBAR). Information from the following report(s) SBAR, Kardex, Intake/Output, MAR, Recent Results and Cardiac Rhythm SR  was reviewed with the receiving nurse. Opportunity for questions and clarification was provided. Assessment completed upon patients arrival to unit and care assumed. [facility] and [place] WENDY Cantu performed a dual skin assessment on this patient No impairment noted. Yovani score is as charted.

## 2019-03-08 NOTE — ED PROVIDER NOTES
69 y.o. female with past medical history significant for Diabetes, Hypertension, Asthma, High cholesterol, GERD, Kidney Stones, and CAD who presents via EMS with chief complaint of high blood sugar. Patient states upon waking this morning having blurred vision, headache, dizziness, and generalized weakness, after which she checked her blood sugar which was 400. Patient presents to [address] [place] [address] ED today with improving blurred vision, headache, dizziness, and generalized weakness, and upon examination has blood sugar 310. Patient reports having a cold over the past week with productive cough with yellow sputum, fever (103F) last taken three days ago, and intermittent LLQ abdominal pain that onset 5-6 months ago. Patient reports aggravation of LLQ abdominal pain with palpation. Patient notes taking Insulin for diabetes management. Patient endorses history of total hysterectomy with bilateral salpingo-oophorectomy. Patient lives alone at a private residence. Patient denies history of low blood sugar. Patient denies history of MI. Pt denies fever, chills, congestion, shortness of breath, chest pain, nausea, vomiting, diarrhea, difficulty with urination or dysuria. There are no other acute medical concerns at this time. PCP: Anand Vega MD    Note written by Jeimy Tatum, as dictated by Kelli Milan MD 9:26 AM        The history is provided by the patient, the EMS personnel and medical records.         Past Medical History:   Diagnosis Date    Anal polyp 6/13/2013    Arthritis     Asthma     CAD (coronary artery disease) 10/27/2017    Cataract     Chronic pain     knee - right/back    Diabetes [address] [address]     GERD (gastroesophageal reflux disease)     Hemorrhoids 6/13/2013    History of kidney stones     Hypertension     Ill-defined condition     abdominal pain and burning    Other ill-defined conditions(799.89)     high cholesterol       Past Surgical History:   Procedure Laterality Date    COLONOSCOPY  2/28/2013         EGD  2/28/2013         HX CATARACT REMOVAL Bilateral     HX CHOLECYSTECTOMY  6-2015    HX GI  6/27/13    anal polyps removed    HX HEENT      laser surgery for blood clot in right eye    HX KNEE REPLACEMENT      right    HX OTHER SURGICAL  4-2-14    lap gastrotomy and removal of polyp -  - not cancerous per pt    HX ALAN AND BSO      HX TONSILLECTOMY      HX UROLOGICAL      \"laser\" surgery for kidney stone    NEUROLOGICAL PROCEDURE UNLISTED  1990    tumor at back of neck, benign         Family History:   Problem Relation Age of Onset    Cancer Mother         colon    Diabetes Brother     Other Sister         GI BLEED    Heart Disease Brother     Heart Attack Brother     Heart Disease Brother     Heart Disease Brother     Schizophrenia Son     Anesth Problems Neg Hx        Social History     Socioeconomic History    Marital status:      Spouse name: Not on file    Number of children: Not on file    Years of education: Not on file    Highest education level: Not on file   Social Needs    Financial resource strain: Not on file    Food insecurity - worry: Not on file    Food insecurity - inability: Not on file   [employer] [facility] needs - medical: Not on file   [employer] needs - non-medical: Not on file   Occupational History    Not on file   Tobacco Use    Smoking status: Former Smoker    Smokeless tobacco: Never Used    Tobacco comment: age 13   Substance and Sexual Activity    Alcohol use: No    Drug use: No    Sexual activity: No   Other Topics Concern    Not on file   Social History Narrative    Not on file         ALLERGIES: Seafood [shellfish containing products]    Review of Systems   Constitutional: Negative for chills, diaphoresis and fever. HENT: Negative for congestion, postnasal drip, rhinorrhea and sore throat. Eyes: Negative for photophobia, discharge, redness and visual disturbance.    Respiratory: Positive for cough. Negative for chest tightness, shortness of breath and wheezing. Cardiovascular: Negative for chest pain, palpitations and leg swelling. Gastrointestinal: Positive for abdominal pain. Negative for abdominal distention, blood in stool, constipation, diarrhea, nausea and vomiting. Genitourinary: Negative for difficulty urinating, dysuria, frequency, hematuria and urgency. Musculoskeletal: Negative for arthralgias, back pain, joint swelling and myalgias. Skin: Negative for color change and rash. Neurological: Positive for dizziness, weakness and headaches. Negative for speech difficulty, light-headedness and numbness. Psychiatric/Behavioral: Negative for confusion. The patient is not nervous/anxious. All other systems reviewed and are negative. Vitals:    03/08/19 0848   BP: 120/59   Pulse: 94   Resp: 16   Temp: 98 °F (36.7 °C)   SpO2: 97%   Weight: 85.4 kg (188 lb 4.4 oz)   Height: 5' (1.524 m)            Physical Exam   Constitutional: She is oriented to person, place, and time. She appears well-developed and well-nourished. No distress. HENT:   Head: Normocephalic and atraumatic. Right Ear: External ear normal.   Left Ear: External ear normal.   Nose: Nose normal.   Mouth/Throat: Oropharynx is clear and moist.   Eyes: Conjunctivae and EOM are normal. Pupils are equal, round, and reactive to light. No scleral icterus. Neck: Normal range of motion. Neck supple. No JVD present. No tracheal deviation present. No thyromegaly present. Cardiovascular: Normal rate, regular rhythm and normal heart sounds. Exam reveals no gallop and no friction rub. No murmur heard. Pulmonary/Chest: Effort normal and breath sounds normal. No respiratory distress. She has no wheezes. She has no rales. She exhibits no tenderness. Abdominal: Soft. Bowel sounds are normal. She exhibits no distension and no mass. There is no rebound and no guarding.    LLQ tenderness   Musculoskeletal: Normal range of motion. She exhibits no edema or tenderness. Lymphadenopathy:     She has no cervical adenopathy. Neurological: She is alert and oriented to person, place, and time. She has normal strength. She displays no atrophy and no tremor. No cranial nerve deficit. She exhibits normal muscle tone. Coordination and gait normal.   Skin: Skin is warm and dry. No rash noted. She is not diaphoretic. No erythema. Psychiatric: She has a normal mood and affect. Her behavior is normal. Judgment and thought content normal.   Nursing note and vitals reviewed. Note written by Jeimy Burgos, as dictated by Jalen Sneed MD 9:26 AM       MDM  Number of Diagnoses or Management Options  Hyperglycemia:   T wave inversion in EKG:   Weakness:   Diagnosis management comments: Abelino Farrarnn:  Impression: 70-year-old female presents to the emergency department with acute hyperglycemia. Patient states she's not been feeling well for last several days. In addition she was noted to have marked left lower quadrant abdominal tenderness. Differential includes hyperglycemia likely to poor control. The abdominal pain will be worked up considerations are diverticular disease, consider mass, doubt colitis as she's had no diarrhea still a possibility. In addition her EKG shows new T-wave inversions from her prior EKG so based on that the patient will likely be admitted to the hospital for further evaluation. Plan therapy Baseline labs, CT scan of the abdomen will treat accordingly. Procedures  ED EKG interpretation:  Rhythm: normal sinus rhythm; and regular . Rate (approx.): 93 bpm; ST/T wave: T wave changes since 01/19/2019. Note written by Jeimy Burgos, as dictated by Jalen Sneed MD 9:00 AM    ED EKG interpretation #2:  Rhythm: normal sinus rhythm; and regular . Rate (approx.): 90 bpm; ST/T wave: T wave changes.   Note written by Jeimy Burgos, as dictated by Jalen Sneed MD 55:24 PM      Hospitalist [facility] for Admission  12:12 PM    ED Room Number: ER09/09  Patient Name and age:  Shemar Hammer 69 y.o.  female  Working Diagnosis:   1. Weakness    2. T wave inversion in EKG    3. Hyperglycemia      Readmission: no  Isolation Requirements:  no  Recommended Level of Care:  telemetry  Code Status:  Full  Other:      CONSULT NOTE:  12:13 PM Chavo Mcguire MD communicated with Dr. Silvia Dinh, Consult for Hospitalist via [facility] Text. Discussed available diagnostic tests and clinical findings. Dr. Silvia Dinh will evaluate patient for possible admission. Dr. Silvia Dinh will admit patient.

## 2019-03-08 NOTE — ROUTINE PROCESS
TRANSFER - OUT REPORT:    Verbal report given to RN (name) on Heidi Draper  being transferred to [facility] (unit) for routine progression of care       Report consisted of patients Situation, Background, Assessment and   Recommendations(SBAR). Information from the following report(s) SBAR and ED Summary was reviewed with the receiving nurse. Lines:   Peripheral IV 03/08/19 Right Antecubital (Active)   Site Assessment Clean, dry, & intact 3/8/2019  8:45 AM   Phlebitis Assessment 0 3/8/2019  8:45 AM   Infiltration Assessment 0 3/8/2019  8:45 AM   Dressing Status Clean, dry, & intact 3/8/2019  8:45 AM   Dressing Type Transparent 3/8/2019  8:45 AM   Hub Color/Line Status Pink 3/8/2019  8:45 AM        Opportunity for questions and clarification was provided.       Patient transported with:   [employer]

## 2019-03-08 NOTE — H&P
Hospitalist Admission Note    NAME: Gerry Clinton   :  1940   MRN:  [id number]     Date/Time:  3/8/2019 3:08 PM    Patient PCP: Chidi Ventura MD  ______________________________________________________________________  Given the patient's current clinical presentation, I have a high level of concern for decompensation if discharged from the emergency department. Complex decision making was performed, which includes reviewing the patient's available past medical records, laboratory results, and x-ray films. My assessment of this patient's clinical condition and my plan of care is as follows. Assessment / Plan:  1. dm2 with hyperglycemia - resume lantus 20 uits bid and ssi and diabetic diet. 2. Abnormal EKG - inverted t waves in anterior leads. Cardiology consult. Pt with 3 vessel disease on cath from 2017 Yajaira Parks). Place on aspriin and check lipids    3. Weakness - consult ptot    4. HLD - resume crestor    5. Hx of htn - resume norvasc    Code Status: full  Surrogate Decision Maker: Alexa Pascal [phone]    DVT Prophylaxis: lovenox  GI Prophylaxis: not indicated    Baseline: cane walker at home      Subjective:   CHIEF COMPLAINT: blurred vision / weakness    HISTORY OF PRESENT ILLNESS:     Corinna Eaton is a 69 y.o.  female who presents with weakness and blurred vision since this morning. The pt was certain that her bs was elevated and can to the er where her bs was elevated at > 300 the pt underwent an ekg which showed inverted T waves in the anterior leads felt to be new. The pt had a heart cath in 2017 with 3 vessel disease. The pt denies any cp or sob at this time. We were asked to admit for work up and evaluation of the above problems.      Past Medical History:   Diagnosis Date    Anal polyp 2013    Arthritis     Asthma     CAD (coronary artery disease) 10/27/2017    Cataract     Chronic pain     knee - right/back    Diabetes ([facility] [address]     GERD (gastroesophageal reflux disease)     Hemorrhoids 6/13/2013    History of kidney stones     Hypertension     Ill-defined condition     abdominal pain and burning    Other ill-defined conditions(799.89)     high cholesterol        Past Surgical History:   Procedure Laterality Date    COLONOSCOPY  2/28/2013         EGD  2/28/2013         HX CATARACT REMOVAL Bilateral     HX CHOLECYSTECTOMY  6-2015    HX GI  6/27/13    anal polyps removed    HX HEENT      laser surgery for blood clot in right eye    HX KNEE REPLACEMENT      right    HX OTHER SURGICAL  4-2-14    lap gastrotomy and removal of polyp -  - not cancerous per pt    HX ALAN AND BSO      HX TONSILLECTOMY      HX UROLOGICAL      \"laser\" surgery for kidney stone    NEUROLOGICAL PROCEDURE UNLISTED  1990    tumor at back of neck, benign       Social History     Tobacco Use    Smoking status: Former Smoker    Smokeless tobacco: Never Used    Tobacco comment: age 13   Substance Use Topics    Alcohol use: No        Family History   Problem Relation Age of Onset    Cancer Mother         colon    Diabetes Brother     Other Sister         GI BLEED    Heart Disease Brother     Heart Attack Brother     Heart Disease Brother     Heart Disease Brother     Schizophrenia Son     Anesth Problems Neg Hx      Allergies   Allergen Reactions    Seafood [Shellfish Containing Products] Swelling        Prior to Admission medications    Medication Sig Start Date End Date Taking? Authorizing Provider   busPIRone (BUSPAR) 5 mg tablet Take 20 mg by mouth two (2) times a day. Yes Provider, Historical   DULoxetine (CYMBALTA) 60 mg capsule Take 60 mg by mouth daily. Yes Provider, Historical   ranolazine ER (RANEXA) 500 mg SR tablet Take 500 mg by mouth two (2) times a day. Yes Provider, Historical   mirtazapine (REMERON) 15 mg tablet Take 15 mg by mouth nightly.    Yes Provider, Historical   QUEtiapine (SEROQUEL) 25 mg tablet Take 25 mg by mouth nightly. Yes Provider, Historical   zolpidem (AMBIEN) 10 mg tablet Take 10 mg by mouth nightly. Yes Provider, Historical   albuterol (PROVENTIL HFA, VENTOLIN HFA, PROAIR HFA) 90 mcg/actuation inhaler Take 2 Puffs by inhalation every four (4) hours as needed for Wheezing. 1/19/19  Yes Vicki DUDLEY MD   rosuvastatin (CRESTOR) 10 mg tablet Take 10 mg by mouth nightly. Yes Provider, Historical   Dexlansoprazole (DEXILANT) 60 mg CpDB Take 60 mg by mouth Daily (before breakfast). Yes Provider, Historical   amLODIPine (NORVASC) 10 mg tablet Take 10 mg by mouth daily. 2/12/15  Yes Provider, Historical   fosinopril (MONOPRIL) 20 mg tablet Take 20 mg by mouth daily. 2/12/15  Yes Provider, Historical   Insulin Glargine (LANTUS SOLOSTAR) 100 unit/mL (3 mL) flexpen 20 Units by SubCUTAneous route two (2) times a day. Indications: Diabetes Mellitus   Yes Provider, Historical       REVIEW OF SYSTEMS:     I am not able to complete the review of systems because:    The patient is intubated and sedated    The patient has altered mental status due to his acute medical problems    The patient has baseline aphasia from prior stroke(s)    The patient has baseline dementia and is not reliable historian    The patient is in acute medical distress and unable to provide information           Total of 12 systems reviewed as follows:       POSITIVE= underlined text  Negative = text not underlined  General:  fever, chills, sweats, generalized weakness, weight loss/gain,      loss of appetite   Eyes:    blurred vision, eye pain, loss of vision, double vision  ENT:    rhinorrhea, pharyngitis   Respiratory:   cough, sputum production, SOB, HOWARD, wheezing, pleuritic pain   Cardiology:   chest pain, palpitations, orthopnea, PND, edema, syncope   Gastrointestinal:  abdominal pain , N/V, diarrhea, dysphagia, constipation, bleeding   Genitourinary:  frequency, urgency, dysuria, hematuria, incontinence   Muskuloskeletal : arthralgia, myalgia, back pain  Hematology:  easy bruising, nose or gum bleeding, lymphadenopathy   Dermatological: rash, ulceration, pruritis, color change / jaundice  Endocrine:   hot flashes or polydipsia   Neurological:  headache, dizziness, confusion, focal weakness, paresthesia,     Speech difficulties, memory loss, gait difficulty  Psychological: Feelings of anxiety, depression, agitation    Objective:   VITALS:    Visit Vitals  /59   Pulse 92   Temp 98 °F (36.7 °C)   Resp 16   Ht 5' (1.524 m)   Wt 85.4 kg (188 lb 4.4 oz)   SpO2 98%   BMI 36.77 kg/m²       PHYSICAL EXAM:    General:    Alert, cooperative, no distress, appears stated age. HEENT: Atraumatic, anicteric sclerae, pink conjunctivae     No oral ulcers, mucosa moist, throat clear, dentition fair  Neck:  Supple, symmetrical,  thyroid: non tender  Lungs:   Clear to auscultation bilaterally. No Wheezing or Rhonchi. No rales. Chest wall:  No tenderness  No Accessory muscle use. Heart:   Regular  rhythm,  No  murmur   No edema  Abdomen:   Soft, non-tender. Not distended. Bowel sounds normal  Extremities: No cyanosis. No clubbing,      Skin turgor normal, Capillary refill normal, Radial dial pulse 2+  Skin:     Not pale. Not Jaundiced  No rashes   Psych:  Good insight. Not depressed. Not anxious or agitated. Neurologic: EOMs intact. No facial asymmetry. No aphasia or slurred speech. Symmetrical strength, Sensation grossly intact.  Alert and oriented X 4.     _______________________________________________________________________  Care Plan discussed with:    Comments   Patient     Family      RN     Care Manager                    Consultant:      _______________________________________________________________________  Expected  Disposition:   Home with Family    HH/PT/OT/RN    SNF/LTC    CHERIE    ________________________________________________________________________  TOTAL TIME:  30 Minutes    Critical Care Provided     Minutes non procedure based      Comments     Reviewed previous records   >50% of visit spent in counseling and coordination of care  Discussion with patient and/or family and questions answered       ________________________________________________________________________  Signed: Fritz Parnell, DO    Procedures: see electronic medical records for all procedures/Xrays and details which were not copied into this note but were reviewed prior to creation of Plan. LAB DATA REVIEWED:    Recent Results (from the past 24 hour(s))   EKG, 12 LEAD, INITIAL    Collection Time: 03/08/19  9:00 AM   Result Value Ref Range    Ventricular Rate 93 BPM    Atrial Rate 93 BPM    P-R Interval 132 ms    QRS Duration 64 ms    Q-T Interval 396 ms    QTC Calculation (Bezet) 492 ms    Calculated P Axis 70 degrees    Calculated R Axis 40 degrees    Calculated T Axis 61 degrees    Diagnosis       Normal sinus rhythm  T wave abnormality, consider anterior ischemia  When compared with ECG of 19-JAN-2019 14:21,  Inverted T waves have replaced nonspecific T wave abnormality in Anterior   leads  Confirmed by Sam Oakley MD, Rayma Grade ([id number]) on 3/8/2019 12:33:11 PM     SAMPLES BEING HELD    Collection Time: 03/08/19  9:02 AM   Result Value Ref Range    SAMPLES BEING HELD 1RED 1BLU 1PST 1LAV     COMMENT        Add-on orders for these samples will be processed based on acceptable specimen integrity and analyte stability, which may vary by analyte.    CBC WITH AUTOMATED DIFF    Collection Time: 03/08/19  9:02 AM   Result Value Ref Range    WBC 12.3 (H) 3.6 - 11.0 K/uL    RBC 4.14 3.80 - 5.20 M/uL    HGB 11.7 11.5 - 16.0 g/dL    HCT 35.9 35.0 - 47.0 %    MCV 86.7 80.0 - 99.0 FL    MCH 28.3 26.0 - 34.0 PG    MCHC 32.6 30.0 - 36.5 g/dL    RDW 14.6 (H) 11.5 - 14.5 %    PLATELET 678 509 - 739 K/uL    MPV 10.8 8.9 - 12.9 FL    NRBC 0.0 0  WBC    ABSOLUTE NRBC 0.00 0.00 - 0.01 K/uL    NEUTROPHILS 63 32 - 75 %    LYMPHOCYTES 26 12 - 49 %    MONOCYTES 9 5 - 13 % EOSINOPHILS 1 0 - 7 %    BASOPHILS 0 0 - 1 %    IMMATURE GRANULOCYTES 1 (H) 0.0 - 0.5 %    ABS. NEUTROPHILS 7.8 1.8 - 8.0 K/UL    ABS. LYMPHOCYTES 3.2 0.8 - 3.5 K/UL    ABS. MONOCYTES 1.1 (H) 0.0 - 1.0 K/UL    ABS. EOSINOPHILS 0.1 0.0 - 0.4 K/UL    ABS. BASOPHILS 0.0 0.0 - 0.1 K/UL    ABS. IMM. GRANS. 0.1 (H) 0.00 - 0.04 K/UL    DF AUTOMATED     METABOLIC PANEL, COMPREHENSIVE    Collection Time: 03/08/19  9:02 AM   Result Value Ref Range    Sodium 141 136 - 145 mmol/L    Potassium 3.9 3.5 - 5.1 mmol/L    Chloride 108 97 - 108 mmol/L    CO2 23 21 - 32 mmol/L    Anion gap 10 5 - 15 mmol/L    Glucose 235 (H) 65 - 100 mg/dL    BUN 17 6 - 20 MG/DL    Creatinine 1.08 (H) 0.55 - 1.02 MG/DL    BUN/Creatinine ratio 16 12 - 20      GFR est AA 60 (L) >60 ml/min/1.73m2    GFR est non-AA 49 (L) >60 ml/min/1.73m2    Calcium 8.8 8.5 - 10.1 MG/DL    Bilirubin, total 0.2 0.2 - 1.0 MG/DL    ALT (SGPT) 16 12 - 78 U/L    AST (SGOT) 4 (L) 15 - 37 U/L    Alk.  phosphatase 112 45 - 117 U/L    Protein, total 6.5 6.4 - 8.2 g/dL    Albumin 3.2 (L) 3.5 - 5.0 g/dL    Globulin 3.3 2.0 - 4.0 g/dL    A-G Ratio 1.0 (L) 1.1 - 2.2     TROPONIN I    Collection Time: 03/08/19  9:02 AM   Result Value Ref Range    Troponin-I, Qt. <0.05 <0.05 ng/mL   GLUCOSE, POC    Collection Time: 03/08/19  9:28 AM   Result Value Ref Range    Glucose (POC) 214 (H) 65 - 100 mg/dL    Performed by Howard Bolden    URINALYSIS W/ RFLX MICROSCOPIC    Collection Time: 03/08/19 11:24 AM   Result Value Ref Range    Color YELLOW/STRAW      Appearance CLEAR CLEAR      Specific gravity 1.029 1.003 - 1.030      pH (UA) 5.0 5.0 - 8.0      Protein NEGATIVE  NEG mg/dL    Glucose >1,000 (A) NEG mg/dL    Ketone NEGATIVE  NEG mg/dL    Blood NEGATIVE  NEG      Urobilinogen 0.2 0.2 - 1.0 EU/dL    Nitrites NEGATIVE  NEG      Leukocyte Esterase NEGATIVE  NEG     BILIRUBIN, CONFIRM    Collection Time: 03/08/19 11:24 AM   Result Value Ref Range    Bilirubin UA, confirm NEGATIVE  NEG     EKG, 12 LEAD, SUBSEQUENT    Collection Time: 03/08/19 12:01 PM   Result Value Ref Range    Ventricular Rate 90 BPM    Atrial Rate 90 BPM    P-R Interval 124 ms    QRS Duration 66 ms    Q-T Interval 424 ms    QTC Calculation (Bezet) 518 ms    Calculated P Axis 52 degrees    Calculated R Axis 39 degrees    Calculated T Axis 63 degrees    Diagnosis       Normal sinus rhythm  T wave abnormality, consider anterior ischemia  When compared with ECG of 08-MAR-2019 09:00,  MANUAL COMPARISON REQUIRED, DATA IS UNCONFIRMED

## 2019-03-09 PROBLEM — R94.31 ABNORMAL ECG: Status: ACTIVE | Noted: 2019-03-09

## 2019-03-09 LAB
ANION GAP SERPL CALC-SCNC: 10 MMOL/L (ref 5–15)
BUN SERPL-MCNC: 14 MG/DL (ref 6–20)
BUN/CREAT SERPL: 18 (ref 12–20)
CALCIUM SERPL-MCNC: 8.4 MG/DL (ref 8.5–10.1)
CHLORIDE SERPL-SCNC: 112 MMOL/L (ref 97–108)
CO2 SERPL-SCNC: 22 MMOL/L (ref 21–32)
CREAT SERPL-MCNC: 0.8 MG/DL (ref 0.55–1.02)
ERYTHROCYTE [DISTWIDTH] IN BLOOD BY AUTOMATED COUNT: 14.9 % (ref 11.5–14.5)
GLUCOSE BLD STRIP.AUTO-MCNC: 226 MG/DL (ref 65–100)
GLUCOSE BLD STRIP.AUTO-MCNC: 330 MG/DL (ref 65–100)
GLUCOSE BLD STRIP.AUTO-MCNC: 343 MG/DL (ref 65–100)
GLUCOSE BLD STRIP.AUTO-MCNC: 81 MG/DL (ref 65–100)
GLUCOSE SERPL-MCNC: 86 MG/DL (ref 65–100)
HCT VFR BLD AUTO: 37.4 % (ref 35–47)
HGB BLD-MCNC: 11.9 G/DL (ref 11.5–16)
MCH RBC QN AUTO: 27.9 PG (ref 26–34)
MCHC RBC AUTO-ENTMCNC: 31.8 G/DL (ref 30–36.5)
MCV RBC AUTO: 87.8 FL (ref 80–99)
NRBC # BLD: 0 K/UL (ref 0–0.01)
NRBC BLD-RTO: 0 PER 100 WBC
PLATELET # BLD AUTO: 272 K/UL (ref 150–400)
PMV BLD AUTO: 10.7 FL (ref 8.9–12.9)
POTASSIUM SERPL-SCNC: 3.8 MMOL/L (ref 3.5–5.1)
RBC # BLD AUTO: 4.26 M/UL (ref 3.8–5.2)
SERVICE CMNT-IMP: ABNORMAL
SERVICE CMNT-IMP: NORMAL
SODIUM SERPL-SCNC: 144 MMOL/L (ref 136–145)
TROPONIN I SERPL-MCNC: <0.05 NG/ML
WBC # BLD AUTO: 10.6 K/UL (ref 3.6–11)

## 2019-03-09 PROCEDURE — 80048 BASIC METABOLIC PNL TOTAL CA: CPT

## 2019-03-09 PROCEDURE — 82962 GLUCOSE BLOOD TEST: CPT

## 2019-03-09 PROCEDURE — 94640 AIRWAY INHALATION TREATMENT: CPT

## 2019-03-09 PROCEDURE — 74011250637 HC RX REV CODE- 250/637: Performed by: NEUROMUSCULOSKELETAL MEDICINE & OMM

## 2019-03-09 PROCEDURE — 97116 GAIT TRAINING THERAPY: CPT

## 2019-03-09 PROCEDURE — 74011636637 HC RX REV CODE- 636/637: Performed by: NEUROMUSCULOSKELETAL MEDICINE & OMM

## 2019-03-09 PROCEDURE — 74011250637 HC RX REV CODE- 250/637: Performed by: INTERNAL MEDICINE

## 2019-03-09 PROCEDURE — 94760 N-INVAS EAR/PLS OXIMETRY 1: CPT

## 2019-03-09 PROCEDURE — 85027 COMPLETE CBC AUTOMATED: CPT

## 2019-03-09 PROCEDURE — 84484 ASSAY OF TROPONIN QUANT: CPT

## 2019-03-09 PROCEDURE — 99218 HC RM OBSERVATION: CPT

## 2019-03-09 PROCEDURE — 74011636637 HC RX REV CODE- 636/637: Performed by: INTERNAL MEDICINE

## 2019-03-09 PROCEDURE — 36415 COLL VENOUS BLD VENIPUNCTURE: CPT

## 2019-03-09 PROCEDURE — 74011000250 HC RX REV CODE- 250: Performed by: NEUROMUSCULOSKELETAL MEDICINE & OMM

## 2019-03-09 PROCEDURE — 97161 PT EVAL LOW COMPLEX 20 MIN: CPT

## 2019-03-09 PROCEDURE — 74011000250 HC RX REV CODE- 250: Performed by: INTERNAL MEDICINE

## 2019-03-09 PROCEDURE — 94664 DEMO&/EVAL PT USE INHALER: CPT

## 2019-03-09 PROCEDURE — 77010033678 HC OXYGEN DAILY

## 2019-03-09 RX ORDER — PREDNISONE 20 MG/1
40 TABLET ORAL
Status: DISCONTINUED | OUTPATIENT
Start: 2019-03-09 | End: 2019-03-11

## 2019-03-09 RX ORDER — ARFORMOTEROL TARTRATE 15 UG/2ML
15 SOLUTION RESPIRATORY (INHALATION)
Status: DISCONTINUED | OUTPATIENT
Start: 2019-03-09 | End: 2019-03-11 | Stop reason: HOSPADM

## 2019-03-09 RX ORDER — LORAZEPAM 0.5 MG/1
0.5 TABLET ORAL
Status: DISCONTINUED | OUTPATIENT
Start: 2019-03-09 | End: 2019-03-11 | Stop reason: HOSPADM

## 2019-03-09 RX ORDER — BUDESONIDE 0.5 MG/2ML
500 INHALANT ORAL 2 TIMES DAILY
Status: DISCONTINUED | OUTPATIENT
Start: 2019-03-09 | End: 2019-03-11 | Stop reason: HOSPADM

## 2019-03-09 RX ADMIN — Medication 10 ML: at 06:00

## 2019-03-09 RX ADMIN — Medication 5 ML: at 12:00

## 2019-03-09 RX ADMIN — ARFORMOTEROL TARTRATE 15 MCG: 15 SOLUTION RESPIRATORY (INHALATION) at 20:12

## 2019-03-09 RX ADMIN — INSULIN LISPRO 3 UNITS: 100 INJECTION, SOLUTION INTRAVENOUS; SUBCUTANEOUS at 11:54

## 2019-03-09 RX ADMIN — AMLODIPINE BESYLATE 10 MG: 5 TABLET ORAL at 08:33

## 2019-03-09 RX ADMIN — LISINOPRIL 20 MG: 20 TABLET ORAL at 08:33

## 2019-03-09 RX ADMIN — LORAZEPAM 0.5 MG: 0.5 TABLET ORAL at 17:00

## 2019-03-09 RX ADMIN — RANOLAZINE 500 MG: 500 TABLET, FILM COATED, EXTENDED RELEASE ORAL at 17:55

## 2019-03-09 RX ADMIN — INSULIN LISPRO 7 UNITS: 100 INJECTION, SOLUTION INTRAVENOUS; SUBCUTANEOUS at 22:29

## 2019-03-09 RX ADMIN — INSULIN LISPRO 7 UNITS: 100 INJECTION, SOLUTION INTRAVENOUS; SUBCUTANEOUS at 17:00

## 2019-03-09 RX ADMIN — BUSPIRONE HYDROCHLORIDE 20 MG: 10 TABLET ORAL at 17:55

## 2019-03-09 RX ADMIN — RANOLAZINE 500 MG: 500 TABLET, FILM COATED, EXTENDED RELEASE ORAL at 08:33

## 2019-03-09 RX ADMIN — PANTOPRAZOLE SODIUM 40 MG: 40 TABLET, DELAYED RELEASE ORAL at 07:09

## 2019-03-09 RX ADMIN — ACETAMINOPHEN 650 MG: 325 TABLET ORAL at 15:44

## 2019-03-09 RX ADMIN — ROSUVASTATIN CALCIUM 10 MG: 10 TABLET, FILM COATED ORAL at 22:29

## 2019-03-09 RX ADMIN — IPRATROPIUM BROMIDE AND ALBUTEROL SULFATE 3 ML: .5; 3 SOLUTION RESPIRATORY (INHALATION) at 08:05

## 2019-03-09 RX ADMIN — INSULIN GLARGINE 20 UNITS: 100 INJECTION, SOLUTION SUBCUTANEOUS at 08:33

## 2019-03-09 RX ADMIN — MIRTAZAPINE 15 MG: 15 TABLET, FILM COATED ORAL at 22:29

## 2019-03-09 RX ADMIN — INSULIN GLARGINE 20 UNITS: 100 INJECTION, SOLUTION SUBCUTANEOUS at 17:55

## 2019-03-09 RX ADMIN — Medication 10 ML: at 22:30

## 2019-03-09 RX ADMIN — QUETIAPINE FUMARATE 25 MG: 25 TABLET ORAL at 22:29

## 2019-03-09 RX ADMIN — BUDESONIDE 500 MCG: 0.5 INHALANT RESPIRATORY (INHALATION) at 20:12

## 2019-03-09 RX ADMIN — ZOLPIDEM TARTRATE 5 MG: 5 TABLET ORAL at 22:29

## 2019-03-09 RX ADMIN — DULOXETINE HYDROCHLORIDE 60 MG: 60 CAPSULE, DELAYED RELEASE ORAL at 08:33

## 2019-03-09 RX ADMIN — IPRATROPIUM BROMIDE AND ALBUTEROL SULFATE 3 ML: .5; 3 SOLUTION RESPIRATORY (INHALATION) at 20:12

## 2019-03-09 RX ADMIN — ASPIRIN 81 MG CHEWABLE TABLET 81 MG: 81 TABLET CHEWABLE at 08:33

## 2019-03-09 RX ADMIN — PREDNISONE 40 MG: 20 TABLET ORAL at 11:45

## 2019-03-09 RX ADMIN — BUSPIRONE HYDROCHLORIDE 20 MG: 10 TABLET ORAL at 08:33

## 2019-03-09 NOTE — PROGRESS NOTES
Spiritual Care Assessment/Progress Note  [facility]      NAME: Leighton Loera      MRN: [id number]  AGE: 69 y.o.  SEX: female  Church Affiliation: Confucianist   Language: English     3/9/2019           Spiritual Assessment begun in [facility] [place] [facility] through conversation with:         [x]Patient        [] Family    [] Friend(s)        Reason for Consult: Request by staff     Spiritual beliefs: (Please include comment if needed)     [x] Identifies with a yadira tradition:         [] Supported by a yadira community:            [] Claims no spiritual orientation:           [] Seeking spiritual identity:                [] Adheres to an individual form of spirituality:           [] Not able to assess:                           Identified resources for coping:      [x] Prayer                               [] Music                  [] Guided Imagery     [] Family/friends                 [] Pet visits     [] Devotional reading                         [] Unknown     [] Other:                                              Interventions offered during this visit: (See comments for more details)    Patient Interventions: Affirmation of emotions/emotional suffering, Catharsis/review of pertinent events in supportive environment, Initial/Spiritual assessment, patient floor, Prayer (assurance of)           Plan of Care:     [x] Support spiritual and/or cultural needs    [] Support AMD and/or advance care planning process      [] Support grieving process   [] Coordinate Rites and/or Rituals    [] Coordination with community clergy   [] No spiritual needs identified at this time   [] Detailed Plan of Care below (See Comments)  [] Make referral to Music Therapy  [] Make referral to Pet Therapy     [] Make referral to Addiction services  [] Make referral to [facility]  [] Make referral to Spiritual Care Partner  [] No future visits requested        [x] Follow up visits as needed     Comments: Visited Ms Nory Justice in room 411/02 as requested by staff. Ms Gregory Milner was sitting on the side of the bed and a nurse was talking with her when  arrived. Provided active listening as patient shared that she was worried about her son. She stated that son was schizophrenic and lived in a group home; he was supposed to come to her house this morning to visit her. The nurse who was present at that time had found phone numbers for the group home and provided them to Ms Gregory Milner, so she was getting ready to call them. Assured her of  availability for support and of prayers on her behalf, for which she expressed appreciation. : RevRosalinda Talavera.  Melinda Corona; [facility], to contact [phone] call: [phone]

## 2019-03-09 NOTE — PROGRESS NOTES
Problem: Falls - Risk of  Goal: *Absence of Falls  Document Glo Fall Risk and appropriate interventions in the flowsheet.   Outcome: Progressing Towards Goal  Fall Risk Interventions:  Mobility Interventions: Communicate number of staff needed for ambulation/transfer, Patient to call before getting OOB         Medication Interventions: Patient to call before getting OOB, Teach patient to arise slowly    Elimination Interventions: Call light in reach, Patient to call for help with toileting needs, Toileting schedule/hourly rounds    History of Falls Interventions: Door open when patient unattended, Room close to nurse's station

## 2019-03-09 NOTE — PROGRESS NOTES
S Cardiology Progress Note                                        Admit Date: 3/8/2019      Assessment/Plan:   Mitchell Aguilar is admitted with elevated bs noted to have ecg changes. # ECG changes - has known cad but reasurring that troponin negative. # COPD exacerbation - med rx initiated by dr. Elly Luna. Will see as needed. Subjective:     Patient denies any complaints. No cp. Trop normal.    Visit Vitals  /75 (BP 1 Location: Left arm, BP Patient Position: Lying right side)   Pulse 96   Temp 97.5 °F (36.4 °C)   Resp 20   Ht 5' (1.524 m)   Wt 82 kg (180 lb 12.4 oz)   SpO2 96%   BMI 35.31 kg/m²     No intake or output data in the 24 hours ending 03/09/19 1002    Objective:      Physical Exam:  HEENT: EOMI  Neck: supple  Resp:  CTA bilaterally;  No wheezes or rales  CV:  RRR s1s2 No murmur no s3  Abd:Soft, Nontender  Ext: No edema  Neuro: Alert and oriented; Nonfocal  Skin: Warm, Dry, Intact      Telemetry: SR    Current Facility-Administered Medications   Medication Dose Route Frequency    arformoterol (BROVANA) neb solution 15 mcg  15 mcg Nebulization BID RT    budesonide (PULMICORT) 500 mcg/2 ml nebulizer suspension  500 mcg Nebulization BID    predniSONE (DELTASONE) tablet 40 mg  40 mg Oral DAILY WITH BREAKFAST    sodium chloride (NS) flush 5-40 mL  5-40 mL IntraVENous Q8H    sodium chloride (NS) flush 5-40 mL  5-40 mL IntraVENous PRN    ondansetron (ZOFRAN) injection 4 mg  4 mg IntraVENous Q4H PRN    acetaminophen (TYLENOL) tablet 650 mg  650 mg Oral Q4H PRN    morphine 10 mg/ml injection 4 mg  4 mg IntraVENous Q4H PRN    magnesium hydroxide (MILK OF MAGNESIA) 400 mg/5 mL oral suspension 30 mL  30 mL Oral DAILY PRN    amLODIPine (NORVASC) tablet 10 mg  10 mg Oral DAILY    busPIRone (BUSPAR) tablet 20 mg  20 mg Oral BID    DULoxetine (CYMBALTA) capsule 60 mg  60 mg Oral DAILY    lisinopril (PRINIVIL, ZESTRIL) tablet 20 mg  20 mg Oral DAILY    insulin glargine (LANTUS) injection 20 Units  20 Units SubCUTAneous BID    mirtazapine (REMERON) tablet 15 mg  15 mg Oral QHS    QUEtiapine (SEROquel) tablet 25 mg  25 mg Oral QHS    rosuvastatin (CRESTOR) tablet 10 mg  10 mg Oral QHS    zolpidem (AMBIEN) tablet 5 mg  5 mg Oral QHS    insulin lispro (HUMALOG) injection   SubCUTAneous AC&HS    glucose chewable tablet 16 g  4 Tab Oral PRN    dextrose (D50W) injection syrg 12.5-25 g  12.5-25 g IntraVENous PRN    glucagon (GLUCAGEN) injection 1 mg  1 mg IntraMUSCular PRN    albuterol (PROVENTIL VENTOLIN) nebulizer solution 2.5 mg  2.5 mg Nebulization Q4H PRN    aspirin chewable tablet 81 mg  81 mg Oral DAILY    pantoprazole (PROTONIX) tablet 40 mg  40 mg Oral ACB    ranolazine ER (RANEXA) tablet 500 mg  500 mg Oral BID    albuterol-ipratropium (DUO-NEB) 2.5 MG-0.5 MG/3 ML  3 mL Nebulization Q6H RT         Data Review:   Labs:    Recent Results (from the past 24 hour(s))   URINALYSIS W/ RFLX MICROSCOPIC    Collection Time: 03/08/19 11:24 AM   Result Value Ref Range    Color YELLOW/STRAW      Appearance CLEAR CLEAR      Specific gravity 1.029 1.003 - 1.030      pH (UA) 5.0 5.0 - 8.0      Protein NEGATIVE  NEG mg/dL    Glucose >1,000 (A) NEG mg/dL    Ketone NEGATIVE  NEG mg/dL    Blood NEGATIVE  NEG      Urobilinogen 0.2 0.2 - 1.0 EU/dL    Nitrites NEGATIVE  NEG      Leukocyte Esterase NEGATIVE  NEG     BILIRUBIN, CONFIRM    Collection Time: 03/08/19 11:24 AM   Result Value Ref Range    Bilirubin UA, confirm NEGATIVE  NEG     EKG, 12 LEAD, SUBSEQUENT    Collection Time: 03/08/19 12:01 PM   Result Value Ref Range    Ventricular Rate 90 BPM    Atrial Rate 90 BPM    P-R Interval 124 ms    QRS Duration 66 ms    Q-T Interval 424 ms    QTC Calculation (Bezet) 518 ms    Calculated P Axis 52 degrees    Calculated R Axis 39 degrees    Calculated T Axis 63 degrees    Diagnosis       Normal sinus rhythm  T wave abnormality, consider anterior ischemia  When compared with ECG of 08-MAR-2019 09:00,  No significant change    Confirmed by Benjamin Laird MD. ([id number]) on 3/8/2019 3:18:52 PM     GLUCOSE, POC    Collection Time: 03/08/19  5:46 PM   Result Value Ref Range    Glucose (POC) 179 (H) 65 - 100 mg/dL    Performed by Jareth Jacques    TROPONIN I    Collection Time: 03/08/19  8:00 PM   Result Value Ref Range    Troponin-I, Qt. <0.05 <0.05 ng/mL   GLUCOSE, POC    Collection Time: 03/08/19  9:04 PM   Result Value Ref Range    Glucose (POC) 228 (H) 65 - 100 mg/dL    Performed by [address] [place] [address], [facility]    Collection Time: 03/09/19  4:48 AM   Result Value Ref Range    Sodium 144 136 - 145 mmol/L    Potassium 3.8 3.5 - 5.1 mmol/L    Chloride 112 (H) 97 - 108 mmol/L    CO2 22 21 - 32 mmol/L    Anion gap 10 5 - 15 mmol/L    Glucose 86 65 - 100 mg/dL    BUN 14 6 - 20 MG/DL    Creatinine 0.80 0.55 - 1.02 MG/DL    BUN/Creatinine ratio 18 12 - 20      GFR est AA >60 >60 ml/min/1.73m2    GFR est non-AA >60 >60 ml/min/1.73m2    Calcium 8.4 (L) 8.5 - 10.1 MG/DL   CBC W/O DIFF    Collection Time: 03/09/19  4:48 AM   Result Value Ref Range    WBC 10.6 3.6 - 11.0 K/uL    RBC 4.26 3.80 - 5.20 M/uL    HGB 11.9 11.5 - 16.0 g/dL    HCT 37.4 35.0 - 47.0 %    MCV 87.8 80.0 - 99.0 FL    MCH 27.9 26.0 - 34.0 PG    MCHC 31.8 30.0 - 36.5 g/dL    RDW 14.9 (H) 11.5 - 14.5 %    PLATELET 992 376 - 608 K/uL    MPV 10.7 8.9 - 12.9 FL    NRBC 0.0 0  WBC    ABSOLUTE NRBC 0.00 0.00 - 0.01 K/uL   TROPONIN I    Collection Time: 03/09/19  4:48 AM   Result Value Ref Range    Troponin-I, Qt. <0.05 <0.05 ng/mL   GLUCOSE, POC    Collection Time: 03/09/19  6:34 AM   Result Value Ref Range    Glucose (POC) 81 65 - 100 mg/dL    Performed by Stanley Garcia

## 2019-03-09 NOTE — CDMP QUERY
Patient is noted to have a BMI:  35.31 kg/m 2 ( Ht: 5' (1.524 m) Wt: 82 kg (180 lb 12.4 oz)). If possible, please document in the progress notes and discharge summary if this patient is:       =>Obese  =>Morbidly obese   =>Overweight (please include evaluation / treatment / management provided)  =>Other explanation of clinical findings  =>Clinically Undetermined (no explanation for clinical findings)    REFERENCE:  The [address] [place] [address] has issued a statement indicating that, \"Individuals who are obese or morbidly obese are at an increased risk for certain medical conditions when compared to persons of normal weight. Therefore, these conditions are always clinically significant and reportable when documented by the provider. \"    Morbidly Obese:  BMI >/=40 or BMI >35 with obesity-related health condition         (e.g. Type 2 DM, HTN, PADILLA, GERD, depression, OA weight bearing joints, urinary                 stress incontinence, etc.)   Obese/Obesity:  BMI 30 - 39.9  Overweight:  BMI 25 - 29.9      Thank you,         [facility], [address] [facility]

## 2019-03-09 NOTE — PROGRESS NOTES
Problem: Mobility Impaired (Adult and Pediatric)  Goal: *Acute Goals and Plan of Care (Insert Text)  Physical Therapy Goals  Initiated 3/9/2019     1. Patient will transfer from bed to chair and chair to bed with independence using the least restrictive device within 7 day(s). 2.  Patient will perform sit to stand with independence within 7 day(s). 3.  Patient will ambulate with independence for 300 feet with the least restrictive device within 7 day(s). 4.  Patient will ascend/descend 7 stairs with 1 handrail(s) with independence within 7 day(s). Comments:   physical Therapy EVALUATION  Patient: Donaldo West (12 y.o. female)  Date: 3/9/2019  Primary Diagnosis: Abnormal EKG [R94.31]  Abnormal ECG [R94.31]       Precautions: falls       ASSESSMENT :  Based on the objective data described below, the patient presents with mildly decreased gait below her baseline and decreased balance (20/26 on the Tinetti). Pt with recent THR on the right and had a GLF at home tripping on the lip of her doorstep. Pt has been using a SPC but also has a RW at home. Pt teary on entering room, asking for name of her doctor because she wants to be discharged. (D?W nursing). Pt did not give a reason except she wanted pull up and there aren't any. Pt walked to BR and assisted with clean up, new pad in underwear. Advised pt to ask family to bring in pull ups. Pt able to ambulate with HHA for 20 feet and assisted to chair at bedside, call bell in reach. Pt is likely close to baseline and advised to use RW when she returns home due to mildly decreased balance and recent falls. She would benefit from HHPT vs home with use of RW. Will follow 3x/week for strengthening and gait/balance training. .    Patient will benefit from skilled intervention to address the above impairments.   Patients rehabilitation potential is considered to be Excellent  Factors which may influence rehabilitation potential include:   [x]         None noted  []         Mental ability/status  []         Medical condition  []         Home/family situation and support systems  []         Safety awareness  []         Pain tolerance/management  []         Other:      PLAN :  Recommendations and Planned Interventions:  [x]           Bed Mobility Training             []    Neuromuscular Re-Education  [x]           Transfer Training                   []    Orthotic/Prosthetic Training  [x]           Gait Training                         []    Modalities  [x]           Therapeutic Exercises           []    Edema Management/Control  [x]           Therapeutic Activities            []    Patient and Family Training/Education  []           Other (comment):    Frequency/Duration: Patient will be followed by physical therapy  3 times a week to address goals. Discharge Recommendations: Home Health and None  Further Equipment Recommendations for Discharge: has cane and RW     SUBJECTIVE:   Patient stated I just wanted a pull up and they said there arent any.     OBJECTIVE DATA SUMMARY:   HISTORY:    Past Medical History:   Diagnosis Date    Anal polyp 6/13/2013    Arthritis     Asthma     CAD (coronary artery disease) 10/27/2017    Cataract     Chronic pain     knee - right/back    Diabetes ([facility] [address]     GERD (gastroesophageal reflux disease)     Hemorrhoids 6/13/2013    History of kidney stones     Hypertension     Ill-defined condition     abdominal pain and burning    Other ill-defined conditions(799.89)     high cholesterol     Past Surgical History:   Procedure Laterality Date    COLONOSCOPY  2/28/2013         EGD  2/28/2013         HX CATARACT REMOVAL Bilateral     HX CHOLECYSTECTOMY  6-2015    HX GI  6/27/13    anal polyps removed    HX HEENT      laser surgery for blood clot in right eye    HX KNEE REPLACEMENT      right    HX OTHER SURGICAL  4-2-14    lap gastrotomy and removal of polyp -  - not cancerous per pt    HX ALAN AND BSO      HX TONSILLECTOMY      HX UROLOGICAL      \"laser\" surgery for kidney stone    NEUROLOGICAL PROCEDURE UNLISTED  1990    tumor at back of neck, benign     Prior Level of Function/Home Situation: use of SPC  Personal factors and/or comorbidities impacting plan of care:     Home Situation  Home Environment: Private residence  One/Two Story Residence: Other (Comment)(3 stories)  Living Alone: Yes  Support Systems: Family member(s), Child(flakita)  Patient Expects to be Discharged to[de-identified] Private residence  Current DME Used/Available at Home: [employer]    EXAMINATION/PRESENTATION/DECISION MAKING:   Critical Behavior:              Hearing:   Auditory  Auditory Impairment: None  Skin:  intact  Edema: none noted  Range Of Motion:  AROM: Generally decreased, functional           PROM: Generally decreased, functional           Strength:    Strength: Generally decreased, functional                   Coordination:   WFL  Vision:    WFL    Functional Mobility:  Bed Mobility:  Rolling: Independent           Transfers:  Sit to Stand: Contact guard assistance  Stand to Sit: Contact guard assistance                       Balance:   Sitting: Intact  Standing: Intact(requires min assist at times/HHA)  Ambulation/Gait Training:  Distance (ft): 20 Feet (ft)  Assistive Device: Gait belt(HHA)  Ambulation - Level of Assistance: Contact guard assistance;Minimal assistance     Gait Description (WDL): Exceptions to WDL  Gait Abnormalities: Antalgic;Trunk sway increased        Base of Support: (WNL)  Stance: Right decreased  Speed/Teagan: Pace decreased (<100 feet/min)              Functional Measure:  Tinetti test:    Sitting Balance: 1  Arises: 1  Attempts to Rise: 2  Immediate Standing Balance: 1  Standing Balance: 1  Nudged: 2  Eyes Closed: 1  Turn 360 Degrees - Continuous/Discontinuous: 0  Turn 360 Degrees - Steady/Unsteady: 1  Sitting Down: 1  Balance Score: 11  Indication of Gait: 1  R Step Length/Height: 1  L Step Length/Height: 1  R Foot Clearance: 1  L Foot Clearance: 1  Step Symmetry: 1  Step Continuity: 1  Path: 1  Trunk: 0  Walking Time: 1  Gait Score: 9  Total Score: 20         Tinetti Tool Score Risk of Falls  <19 = High Fall Risk  19-24 = Moderate Fall Risk  25-28 = Low Fall Risk  Bryanetti ME. Performance-Oriented Assessment of Mobility Problems in Elderly Patients. [facility]; [id number]. (Scoring Description: PT Bulletin Feb. 10, 1993)    Older adults: Laron Albrecht et al, 2009; n = 1000 [facility] elderly evaluated with ABC, MIKAYLA, ADL, and IADL)  · Mean MIKAYLA score for males aged 69-68 years = 26.21(3.40)  · Mean MIKAYLA score for females age 69-68 years = 25.16(4.30)  · Mean MIKAYLA score for males over 80 years = 23.29(6.02)  · Mean MIKAYAL score for females over 80 years = 17.20(8.32)            Based on the above components, the patient evaluation is determined to be of the following complexity level: LOW     Pain:  Pain Scale 1: Numeric (0 - 10)  Pain Intensity 1: 0              Activity Tolerance:   fair  Please refer to the flowsheet for vital signs taken during this treatment. After treatment:   [x]         Patient left in no apparent distress sitting up in chair  []         Patient left in no apparent distress in bed  [x]         Call bell left within reach  [x]         Nursing notified  []         Caregiver present  []         Bed alarm activated    COMMUNICATION/EDUCATION:   The patients plan of care was discussed with: Registered Nurse. [x]         Fall prevention education was provided and the patient/caregiver indicated understanding. [x]         Patient/family have participated as able in goal setting and plan of care. [x]         Patient/family agree to work toward stated goals and plan of care. []         Patient understands intent and goals of therapy, but is neutral about his/her participation. []         Patient is unable to participate in goal setting and plan of care.     Thank you for this referral.  Emani Strange, PT   Time Calculation: 22 mins

## 2019-03-09 NOTE — CONSULTS
[address]    Name:  Senait Escobar  MR#:  [id number]  :  1940  ACCOUNT #:  [de-identified]  DATE OF SERVICE:  2019      REFERRING PHYSICIAN:  Kandis Dixon DO.    HISTORY OF PRESENT ILLNESS:  The patient presented to the emergency room with hyperglycemia, _____ on this basis, she indicates that she takes Lantus 20 mg twice a day. She has not seen her primary care physician in several months. Takes no other diabetic medications. Consult was requested due to the fact that the patient was noted to have inverted T-waves in the anterior leads. This patient is well-known to me and has had coronary angiography and more recently a CTA of coronary arteries, has a very high calcium score, but has never had significant flow-limiting lesions. Review of her angiograms from  indicated a mid RCA stenosis that was non-significant by iFR. The patient's LV function has been preserved. There is no history of myocardial infarction, stroke, or rheumatic fever. MEDICATIONS:  As listed in [facility] prior to admission are as follows: BuSpar 20 mg 2 times a day, Cymbalta 60 mg daily, Ranexa 500 mg 2 times a day, mirtazapine 15 mg nightly, quetiapine 25 mg nightly, zolpidem 10 mg nightly, albuterol inhaler, rosuvastatin 10 mg daily, amlodipine 10 mg daily, lisinopril 20 mg daily, and Lantus insulin as indicated 20 units b.i.d. ALLERGIES:  TO CONTRAST AND SEA FOODS. REVIEW OF SYSTEMS:  Otherwise negative. The patient denies chest pain, orthopnea, syncope, palpitations, diaphoresis, edema, or claudication. She does have expiratory wheezing and does have a history of asthmatic bronchitis and is currently having a flare-up. The patient felt poorly and checked her blood sugars and they were very high, and when they did improve, she came to the emergency room.     PHYSICAL EXAMINATION:  GENERAL:  This is a well-developed, very pleasant elderly woman, in no distress. VITAL SIGNS:  Pulse 100, respirations 20, blood pressure 110/79, saturations 97%. HEENT:  Unremarkable. NECK:  Supple. No adenopathy. Normal carotid pulsations. No bruits on auscultation. Normal thyroid. Trachea midline. CHEST:  Chest wall nontender. LUNGS:  Expiratory wheezes, scattered rhonchi. HEART:  Regular, mild tachycardia, soft S4. No S3. No friction rub, no significant murmur. No thrills, lifts, or heaves. ABDOMEN:  Obese, nontender. No bruits, no ascites, no palpable masses. NEUROLOGIC:  The patient is awake, alert, appropriate. She does seem to be a bit agitated, but is coherent and pleasant and cooperative. There are no focal motor signs on exam.  EXTREMITIES:  No edema. Normal pedal pulses. EKG demonstrates, T-wave inversion in anterior leads, more pronounced in 01/2019 EKGs. Labs reviewed demonstrate normal troponin level, normal BUN and creatinine, normal hemoglobin, hematocrit, platelet count. Chest x-ray demonstrated no active disease, normal cardiac silhouette. IMPRESSION:  This patient has nonspecific EKG changes suggestive for anterior ischemia. Her initial enzymes are negative. She is not having chest pain. I do not suspect acute coronary syndrome here. RECOMMENDATIONS:  1. Trend troponins. If enzymes rise, she will need coronary angiography. 2.  She appears to need better and tighter control of her blood sugars. 3.  We will address her _____ acute asthmatic attack. Further recommendations to follow.     Thank you for this referral.      Amy Horton MD [email or web address]  D:  03/08/2019 18:00  T:  03/09/2019 1:03  JOB #:  [id number]

## 2019-03-09 NOTE — PROGRESS NOTES
0015 Bedside and Verbal shift change report given to Serena Berger RN (oncoming nurse) by Amber Merida RN (offgoing nurse). Report included the following information SBAR, Kardex, Intake/Output, MAR, Recent Results and Cardiac Rhythm SR/ST. Problem: General Medical Care Plan  Goal: *Vital signs within specified parameters  Outcome: Progressing Towards Goal  Patient Vitals for the past 4 hrs:   Temp Pulse Resp BP SpO2   03/08/19 2014     95 %   03/08/19 1846 97.9 °F (36.6 °C) (!) 105 18 137/76 96 %   03/08/19 1747 98 °F (36.7 °C) 98 20 109/40 94 %   03/08/19 1700  100 20 (!) 110/39 97 %       VS WDL. Goal: *Optimal pain control at patient's stated goal  Outcome: Progressing Towards Goal  Patient has no complaints of pain. Will reassess and administer PRN medications if needed. Goal: *Skin integrity maintained  Outcome: Progressing Towards Goal  Patient's skin intact. Problem: Falls - Risk of  Goal: *Absence of Falls  Document Glo Fall Risk and appropriate interventions in the flowsheet. Outcome: Progressing Towards Goal  Fall Risk Interventions:  Mobility Interventions: Communicate number of staff needed for ambulation/transfer, Patient to call before getting OOB, Assess mobility with egress test         Medication Interventions: Assess postural VS orthostatic hypotension, Evaluate medications/consider consulting pharmacy, Patient to call before getting OOB, Teach patient to arise slowly    Elimination Interventions: Call light in reach, Patient to call for help with toileting needs, Toilet paper/wipes in reach, Toileting schedule/hourly rounds    History of Falls Interventions: Door open when patient unattended, Room close to nurse's station    Bed in low position, locked bed wheels. Call bell and personal items within reach. Hourly rounding performed. Instructed patient to call when needing assistance.  Patient demonstrates understanding

## 2019-03-09 NOTE — PROGRESS NOTES
Hospitalist Progress Note  Gaston Meza MD  Answering service: [phone] from in house phone         Date of Service:  3/9/2019  NAME:  Malini Michael  :  1940  MRN:  [id number]      Admission Summary:   Sarjo Haas is a 69 y.o.  female who presents with weakness and blurred vision since this morning. The pt was certain that her bs was elevated and can to the er where her bs was elevated at > 300 the pt underwent an ekg which showed inverted T waves in the anterior leads felt to be new. The pt had a heart cath in  with 3 vessel disease. The pt denies any cp or sob at this time. Interval history / Subjective:     3/9/2019 : new problem today :   Lead concern today is apparent exacerbation of copd,  Neb/laba/pulmicort added and pred 40 mg daily added. Assessment & Plan:     dm2 with hyperglycemia - resume lantus 20 uits bid and ssi and diabetic diet. Lab Results   Component Value Date/Time    Glucose 86 2019 04:48 AM    Glucose (POC) 81 2019 06:34 AM         Abnormal EKG - inverted t waves in anterior leads. Cardiology consult. Pt with 3 vessel disease on cath from 2017 [place]).   Place on aspriin and check lipids     Weakness - consult ptot     HLD - resume crestor  Lab Results   Component Value Date/Time    Cholesterol, total 122 2019 09:02 AM    HDL Cholesterol 86 2019 09:02 AM    LDL, calculated 21 2019 09:02 AM    VLDL, calculated 15 2019 09:02 AM    Triglyceride 75 2019 09:02 AM    CHOL/HDL Ratio 1.4 2019 09:02 AM     Copd w exacerbation 3/9/2019 : adding neb/pulmicort/LABA and pred 3/9/2019      Hx of htn - resume norvasc  BP Readings from Last 1 Encounters:   19 123/73          Code Status: full  Surrogate Decision Maker: Leonides Cardozo [phone]     DVT Prophylaxis: lovenox  GI Prophylaxis: not indicated     Baseline: cane walker at home  *    Care Plan discussed with: Patient/Family and Nurse  Disposition: Home w/Family and TBD     Hospital Problems  Date Reviewed: 9/24/2017          Codes Class Noted POA    Abnormal EKG ICD-10-CM: R94.31  ICD-9-CM: 794.31  3/8/2019 Unknown                Review of Systems:   Pertinent items are noted in HPI. Vital Signs:    Last 24hrs VS reviewed since prior progress note. Most recent are:  Visit Vitals  /73 (BP 1 Location: Left arm, BP Patient Position: At rest)   Pulse 97   Temp 98.1 °F (36.7 °C)   Resp 17   Ht 5' (1.524 m)   Wt 82 kg (180 lb 12.4 oz)   SpO2 99%   BMI 35.31 kg/m²       No intake or output data in the 24 hours ending 03/09/19 0736     Physical Examination:             Constitutional:  No acute distress, cooperative, pleasant    ENT:  Oral mucous moist, oropharynx benign. Neck supple,    Resp:  CTA bilaterally. No wheezing/rhonchi/rales. No accessory muscle use   CV:  Regular rhythm, normal rate, no murmurs, gallops, rubs    GI:  Soft, non distended, non tender. normoactive bowel sounds, no hepatosplenomegaly     Musculoskeletal:  No edema, warm, 2+ pulses throughout    Neurologic:  Moves all extremities. AAOx3, CN II-XII reviewed     Psych:  Good insight, Not anxious nor agitated. Skin:  Good turgor, no rashes or ulcers       Data Review:    Review and/or order of clinical lab test      Labs:     Recent Labs     03/09/19  0448 03/08/19  0902   WBC 10.6 12.3*   HGB 11.9 11.7   HCT 37.4 35.9    282     Recent Labs     03/09/19  0448 03/08/19  0902    141   K 3.8 3.9   * 108   CO2 22 23   BUN 14 17   CREA 0.80 1.08*   GLU 86 235*   CA 8.4* 8.8     Recent Labs     03/08/19  0902   SGOT 4*   ALT 16      TBILI 0.2   TP 6.5   ALB 3.2*   GLOB 3.3     No results for input(s): INR, PTP, APTT in the last 72 hours. No lab exists for component: INREXT   No results for input(s): FE, TIBC, PSAT, FERR in the last 72 hours.    Lab Results   Component Value Date/Time Folate 13.2 05/20/2009 03:00 AM      No results for input(s): PH, PCO2, PO2 in the last 72 hours.   Recent Labs     03/09/19  0448 03/08/19 2000 03/08/19  0902   TROIQ <0.05 <0.05 <0.05     Lab Results   Component Value Date/Time    Cholesterol, total 122 03/08/2019 09:02 AM    HDL Cholesterol 86 03/08/2019 09:02 AM    LDL, calculated 21 03/08/2019 09:02 AM    Triglyceride 75 03/08/2019 09:02 AM    CHOL/HDL Ratio 1.4 03/08/2019 09:02 AM     Lab Results   Component Value Date/Time    Glucose (POC) 81 03/09/2019 06:34 AM    Glucose (POC) 228 (H) 03/08/2019 09:04 PM    Glucose (POC) 179 (H) 03/08/2019 05:46 PM    Glucose (POC) 214 (H) 03/08/2019 09:28 AM    Glucose (POC) 173 (H) 11/17/2017 11:48 AM     Lab Results   Component Value Date/Time    Color YELLOW/STRAW 03/08/2019 11:24 AM    Appearance CLEAR 03/08/2019 11:24 AM    Specific gravity 1.029 03/08/2019 11:24 AM    Specific gravity 1.025 05/20/2011 02:00 PM    pH (UA) 5.0 03/08/2019 11:24 AM    Protein NEGATIVE  03/08/2019 11:24 AM    Glucose >1,000 (A) 03/08/2019 11:24 AM    Ketone NEGATIVE  03/08/2019 11:24 AM    Bilirubin NEGATIVE  10/27/2017 09:21 AM    Urobilinogen 0.2 03/08/2019 11:24 AM    Nitrites NEGATIVE  03/08/2019 11:24 AM    Leukocyte Esterase NEGATIVE  03/08/2019 11:24 AM    Epithelial cells MODERATE (A) 10/27/2017 09:21 AM    Bacteria NEGATIVE  10/27/2017 09:21 AM    WBC 0-4 10/27/2017 09:21 AM    RBC 5-10 10/27/2017 09:21 AM         Medications Reviewed:     Current Facility-Administered Medications   Medication Dose Route Frequency    sodium chloride (NS) flush 5-40 mL  5-40 mL IntraVENous Q8H    sodium chloride (NS) flush 5-40 mL  5-40 mL IntraVENous PRN    ondansetron (ZOFRAN) injection 4 mg  4 mg IntraVENous Q4H PRN    acetaminophen (TYLENOL) tablet 650 mg  650 mg Oral Q4H PRN    morphine 10 mg/ml injection 4 mg  4 mg IntraVENous Q4H PRN    magnesium hydroxide (MILK OF MAGNESIA) 400 mg/5 mL oral suspension 30 mL  30 mL Oral DAILY PRN  amLODIPine (NORVASC) tablet 10 mg  10 mg Oral DAILY    busPIRone (BUSPAR) tablet 20 mg  20 mg Oral BID    DULoxetine (CYMBALTA) capsule 60 mg  60 mg Oral DAILY    lisinopril (PRINIVIL, ZESTRIL) tablet 20 mg  20 mg Oral DAILY    insulin glargine (LANTUS) injection 20 Units  20 Units SubCUTAneous BID    mirtazapine (REMERON) tablet 15 mg  15 mg Oral QHS    QUEtiapine (SEROquel) tablet 25 mg  25 mg Oral QHS    rosuvastatin (CRESTOR) tablet 10 mg  10 mg Oral QHS    zolpidem (AMBIEN) tablet 5 mg  5 mg Oral QHS    insulin lispro (HUMALOG) injection   SubCUTAneous AC&HS    glucose chewable tablet 16 g  4 Tab Oral PRN    dextrose (D50W) injection syrg 12.5-25 g  12.5-25 g IntraVENous PRN    glucagon (GLUCAGEN) injection 1 mg  1 mg IntraMUSCular PRN    albuterol (PROVENTIL VENTOLIN) nebulizer solution 2.5 mg  2.5 mg Nebulization Q4H PRN    aspirin chewable tablet 81 mg  81 mg Oral DAILY    pantoprazole (PROTONIX) tablet 40 mg  40 mg Oral ACB    ranolazine ER (RANEXA) tablet 500 mg  500 mg Oral BID    albuterol-ipratropium (DUO-NEB) 2.5 MG-0.5 MG/3 ML  3 mL Nebulization Q6H RT     ______________________________________________________________________  EXPECTED LENGTH OF STAY: - - -  ACTUAL LENGTH OF STAY:          1                 Gaston Meza MD

## 2019-03-09 NOTE — PROGRESS NOTES
TRANSFER - OUT REPORT:    Verbal report given to Paul Nguyen (name) on Albert Come  being transferred to [facility](unit) for routine progression of care       Report consisted of patients Situation, Background, Assessment and   Recommendations(SBAR). Information from the following report(s) SBAR, Intake/Output, MAR, Recent Results and Cardiac Rhythm NSR was reviewed with the receiving nurse. Lines:   Peripheral IV 03/09/19 Anterior;Right Hand (Active)        Opportunity for questions and clarification was provided.       Patient transported with:   Monitor  Registered Nurse

## 2019-03-10 LAB
ANION GAP SERPL CALC-SCNC: 11 MMOL/L (ref 5–15)
BUN SERPL-MCNC: 17 MG/DL (ref 6–20)
BUN/CREAT SERPL: 17 (ref 12–20)
CALCIUM SERPL-MCNC: 8.6 MG/DL (ref 8.5–10.1)
CHLORIDE SERPL-SCNC: 110 MMOL/L (ref 97–108)
CO2 SERPL-SCNC: 18 MMOL/L (ref 21–32)
CREAT SERPL-MCNC: 1 MG/DL (ref 0.55–1.02)
ERYTHROCYTE [DISTWIDTH] IN BLOOD BY AUTOMATED COUNT: 15.1 % (ref 11.5–14.5)
GLUCOSE BLD STRIP.AUTO-MCNC: 235 MG/DL (ref 65–100)
GLUCOSE BLD STRIP.AUTO-MCNC: 337 MG/DL (ref 65–100)
GLUCOSE BLD STRIP.AUTO-MCNC: 390 MG/DL (ref 65–100)
GLUCOSE BLD STRIP.AUTO-MCNC: 512 MG/DL (ref 65–100)
GLUCOSE SERPL-MCNC: 265 MG/DL (ref 65–100)
HCT VFR BLD AUTO: 34.6 % (ref 35–47)
HGB BLD-MCNC: 11.1 G/DL (ref 11.5–16)
MCH RBC QN AUTO: 27.9 PG (ref 26–34)
MCHC RBC AUTO-ENTMCNC: 32.1 G/DL (ref 30–36.5)
MCV RBC AUTO: 86.9 FL (ref 80–99)
NRBC # BLD: 0 K/UL (ref 0–0.01)
NRBC BLD-RTO: 0 PER 100 WBC
PLATELET # BLD AUTO: 271 K/UL (ref 150–400)
PMV BLD AUTO: 10.4 FL (ref 8.9–12.9)
POTASSIUM SERPL-SCNC: 4.1 MMOL/L (ref 3.5–5.1)
RBC # BLD AUTO: 3.98 M/UL (ref 3.8–5.2)
SERVICE CMNT-IMP: ABNORMAL
SODIUM SERPL-SCNC: 139 MMOL/L (ref 136–145)
WBC # BLD AUTO: 14.8 K/UL (ref 3.6–11)

## 2019-03-10 PROCEDURE — 82962 GLUCOSE BLOOD TEST: CPT

## 2019-03-10 PROCEDURE — 97165 OT EVAL LOW COMPLEX 30 MIN: CPT

## 2019-03-10 PROCEDURE — 85027 COMPLETE CBC AUTOMATED: CPT

## 2019-03-10 PROCEDURE — 94664 DEMO&/EVAL PT USE INHALER: CPT

## 2019-03-10 PROCEDURE — 74011636637 HC RX REV CODE- 636/637: Performed by: INTERNAL MEDICINE

## 2019-03-10 PROCEDURE — 74011250637 HC RX REV CODE- 250/637: Performed by: INTERNAL MEDICINE

## 2019-03-10 PROCEDURE — 94760 N-INVAS EAR/PLS OXIMETRY 1: CPT

## 2019-03-10 PROCEDURE — 74011636637 HC RX REV CODE- 636/637: Performed by: NEUROMUSCULOSKELETAL MEDICINE & OMM

## 2019-03-10 PROCEDURE — 74011000250 HC RX REV CODE- 250: Performed by: INTERNAL MEDICINE

## 2019-03-10 PROCEDURE — 99218 HC RM OBSERVATION: CPT

## 2019-03-10 PROCEDURE — 36415 COLL VENOUS BLD VENIPUNCTURE: CPT

## 2019-03-10 PROCEDURE — A9270 NON-COVERED ITEM OR SERVICE: HCPCS | Performed by: INTERNAL MEDICINE

## 2019-03-10 PROCEDURE — 97535 SELF CARE MNGMENT TRAINING: CPT

## 2019-03-10 PROCEDURE — 80048 BASIC METABOLIC PNL TOTAL CA: CPT

## 2019-03-10 PROCEDURE — 74011250637 HC RX REV CODE- 250/637: Performed by: NEUROMUSCULOSKELETAL MEDICINE & OMM

## 2019-03-10 PROCEDURE — 65270000029 HC RM PRIVATE

## 2019-03-10 PROCEDURE — 94640 AIRWAY INHALATION TREATMENT: CPT

## 2019-03-10 PROCEDURE — 74011000250 HC RX REV CODE- 250: Performed by: NEUROMUSCULOSKELETAL MEDICINE & OMM

## 2019-03-10 RX ORDER — BISACODYL 5 MG
10 TABLET, DELAYED RELEASE (ENTERIC COATED) ORAL DAILY PRN
Status: DISCONTINUED | OUTPATIENT
Start: 2019-03-10 | End: 2019-03-11 | Stop reason: HOSPADM

## 2019-03-10 RX ORDER — MINOXIDIL 2.5 MG/1
2.5 TABLET ORAL DAILY
Status: DISCONTINUED | OUTPATIENT
Start: 2019-03-10 | End: 2019-03-10

## 2019-03-10 RX ORDER — INSULIN GLARGINE 100 [IU]/ML
30 INJECTION, SOLUTION SUBCUTANEOUS DAILY
Status: DISCONTINUED | OUTPATIENT
Start: 2019-03-10 | End: 2019-03-10

## 2019-03-10 RX ORDER — INSULIN LISPRO 100 [IU]/ML
10 INJECTION, SOLUTION INTRAVENOUS; SUBCUTANEOUS ONCE
Status: COMPLETED | OUTPATIENT
Start: 2019-03-10 | End: 2019-03-10

## 2019-03-10 RX ORDER — IPRATROPIUM BROMIDE AND ALBUTEROL SULFATE 2.5; .5 MG/3ML; MG/3ML
3 SOLUTION RESPIRATORY (INHALATION)
Status: DISCONTINUED | OUTPATIENT
Start: 2019-03-10 | End: 2019-03-11 | Stop reason: HOSPADM

## 2019-03-10 RX ORDER — CLONIDINE HYDROCHLORIDE 0.2 MG/1
0.2 TABLET ORAL
Status: DISCONTINUED | OUTPATIENT
Start: 2019-03-10 | End: 2019-03-10

## 2019-03-10 RX ORDER — INSULIN GLARGINE 100 [IU]/ML
30 INJECTION, SOLUTION SUBCUTANEOUS DAILY
Status: DISCONTINUED | OUTPATIENT
Start: 2019-03-11 | End: 2019-03-11 | Stop reason: HOSPADM

## 2019-03-10 RX ORDER — INSULIN GLARGINE 100 [IU]/ML
10 INJECTION, SOLUTION SUBCUTANEOUS ONCE
Status: COMPLETED | OUTPATIENT
Start: 2019-03-10 | End: 2019-03-10

## 2019-03-10 RX ADMIN — Medication 10 ML: at 14:45

## 2019-03-10 RX ADMIN — INSULIN GLARGINE 10 UNITS: 100 INJECTION, SOLUTION SUBCUTANEOUS at 13:22

## 2019-03-10 RX ADMIN — BUSPIRONE HYDROCHLORIDE 20 MG: 10 TABLET ORAL at 08:23

## 2019-03-10 RX ADMIN — DULOXETINE HYDROCHLORIDE 60 MG: 60 CAPSULE, DELAYED RELEASE ORAL at 08:23

## 2019-03-10 RX ADMIN — INSULIN LISPRO 10 UNITS: 100 INJECTION, SOLUTION INTRAVENOUS; SUBCUTANEOUS at 21:33

## 2019-03-10 RX ADMIN — INSULIN LISPRO 3 UNITS: 100 INJECTION, SOLUTION INTRAVENOUS; SUBCUTANEOUS at 07:00

## 2019-03-10 RX ADMIN — IPRATROPIUM BROMIDE AND ALBUTEROL SULFATE 3 ML: .5; 3 SOLUTION RESPIRATORY (INHALATION) at 03:55

## 2019-03-10 RX ADMIN — INSULIN LISPRO 7 UNITS: 100 INJECTION, SOLUTION INTRAVENOUS; SUBCUTANEOUS at 12:01

## 2019-03-10 RX ADMIN — RANOLAZINE 500 MG: 500 TABLET, FILM COATED, EXTENDED RELEASE ORAL at 08:22

## 2019-03-10 RX ADMIN — Medication 10 ML: at 06:00

## 2019-03-10 RX ADMIN — ASPIRIN 81 MG CHEWABLE TABLET 81 MG: 81 TABLET CHEWABLE at 08:23

## 2019-03-10 RX ADMIN — ACETAMINOPHEN 650 MG: 325 TABLET ORAL at 14:48

## 2019-03-10 RX ADMIN — MIRTAZAPINE 15 MG: 15 TABLET, FILM COATED ORAL at 21:32

## 2019-03-10 RX ADMIN — ARFORMOTEROL TARTRATE 15 MCG: 15 SOLUTION RESPIRATORY (INHALATION) at 19:46

## 2019-03-10 RX ADMIN — BISACODYL 10 MG: 5 TABLET, COATED ORAL at 21:32

## 2019-03-10 RX ADMIN — PANTOPRAZOLE SODIUM 40 MG: 40 TABLET, DELAYED RELEASE ORAL at 07:01

## 2019-03-10 RX ADMIN — LISINOPRIL 20 MG: 20 TABLET ORAL at 08:23

## 2019-03-10 RX ADMIN — LORAZEPAM 0.5 MG: 0.5 TABLET ORAL at 16:32

## 2019-03-10 RX ADMIN — IPRATROPIUM BROMIDE AND ALBUTEROL SULFATE 3 ML: .5; 3 SOLUTION RESPIRATORY (INHALATION) at 09:31

## 2019-03-10 RX ADMIN — AMLODIPINE BESYLATE 10 MG: 5 TABLET ORAL at 08:23

## 2019-03-10 RX ADMIN — BUDESONIDE 500 MCG: 0.5 INHALANT RESPIRATORY (INHALATION) at 19:46

## 2019-03-10 RX ADMIN — ACETAMINOPHEN 650 MG: 325 TABLET ORAL at 19:36

## 2019-03-10 RX ADMIN — ROSUVASTATIN CALCIUM 10 MG: 10 TABLET, FILM COATED ORAL at 21:32

## 2019-03-10 RX ADMIN — INSULIN GLARGINE 20 UNITS: 100 INJECTION, SOLUTION SUBCUTANEOUS at 08:22

## 2019-03-10 RX ADMIN — ZOLPIDEM TARTRATE 5 MG: 5 TABLET ORAL at 21:32

## 2019-03-10 RX ADMIN — BUDESONIDE 500 MCG: 0.5 INHALANT RESPIRATORY (INHALATION) at 09:31

## 2019-03-10 RX ADMIN — BUSPIRONE HYDROCHLORIDE 20 MG: 10 TABLET ORAL at 17:51

## 2019-03-10 RX ADMIN — PREDNISONE 40 MG: 20 TABLET ORAL at 08:23

## 2019-03-10 RX ADMIN — RANOLAZINE 500 MG: 500 TABLET, FILM COATED, EXTENDED RELEASE ORAL at 17:51

## 2019-03-10 RX ADMIN — INSULIN LISPRO 10 UNITS: 100 INJECTION, SOLUTION INTRAVENOUS; SUBCUTANEOUS at 16:28

## 2019-03-10 RX ADMIN — QUETIAPINE FUMARATE 25 MG: 25 TABLET ORAL at 21:32

## 2019-03-10 NOTE — PROGRESS NOTES
Received pt via wheelchair to room 440 with tech accompanying. Pt alert, oriented, and appropriate, and on Room Air. No obvious distress noted. No complaints of pain or shortness of breath. Vital signs taken as documented. IVSL intact and site WNL. No pain complaints at site. Pt in bed with HOB up for comfort. Call bell in reach with patient demonstrating use. Oriented to room, bed, Telephone/TV controls. BSR up x2, and call bell in reach.

## 2019-03-10 NOTE — PROGRESS NOTES
Problem: Falls - Risk of  Goal: *Absence of Falls  Document Glo Fall Risk and appropriate interventions in the flowsheet.   Outcome: Progressing Towards Goal  Fall Risk Interventions:  Mobility Interventions: OT consult for ADLs, Patient to call before getting OOB, PT Consult for mobility concerns, PT Consult for assist device competence, Utilize walker, cane, or other assistive device         Medication Interventions: Patient to call before getting OOB, Teach patient to arise slowly    Elimination Interventions: Call light in reach, Patient to call for help with toileting needs    History of Falls Interventions: Door open when patient unattended, Room close to nurse's station

## 2019-03-10 NOTE — PROGRESS NOTES
Reason for Admission: The patient presented with abnormal ECG                  RRAT Score: 20                 Do you (patient/family) have any concerns for transition/discharge? None identified a this time                 Plan for utilizing home health: Patient in the past had been receiving home health services through [facility]- patient stated her PCP Dr. Charlie Stover set this service up, and patient endorses it ended approximately two or three weeks ago. Patient endorsed receiving a home health aide and PT. CM reviewed PT notes- patient may benefit from home health PT/OT, home health aide orders and skilled RN if MD feels appropriate. Patient stated she would like to use [facility] again for home health services if ordered by MD.         Likelihood of readmission? High             Transition of Care Plan: Anticipate home, patient  is in OBS status, would benefit from home health     The CM met with the patient at bedside in order to introduce the role of CM and assess for patient needs. The patient lives at home alone- verified demographics and insurance information. The patient endorses that she has difficulty completing ADLs- states she eats a lot of \"TV dinners\" and her daughter Valeria Petersen comes to check on her during the week. The patient stated emergency contacts are Tee Diamond, sister-in-law ([phone]), and Lakshmi Benitez, also sister-in-law. The patient endorses that up until a few weeks ago, she was getting a personal care aide and PT through [facility]- stated this \"ran out\" and agency told her to contact her PCP Dr. Charlie Stover to resume. PT evaluated patient and recommending home health- anticipate patient would benefit from home health PT/OT and home health aide orders upon discharge. Patient stated she would call her family for transport, if none available patient may need transport through [facility]. CM will continue to follow.  OBS and BUENO letter presented at bedside to patent. Miguelilee Spatz, MSW    12:06 p.m.- CM received home health orders for PT/OT, skilled nursing, and home health aide evaluation. CM sent referral to [facility] via [employer]. Anticipate discharge 3/11. Reji Spatz, MSW    12:45 p.m.- CM spoke with Jacqueline Lai [address] with [facility]- stated they are interested in patient, but having difficulty verifying Medicare- stated that the Medicare verification may be getting maintenance. Unit CM to call [facility] to follow-up with referral in a.m.- anticipate discharge 3/11. Parvesperanza Lai [employer] stating they have not denied patient, just are awaiting verification from Medicare. Unit CM to follow-up with referral. Terrilee Spatz, MSW    Care Management Interventions  PCP Verified by CM: Yes(Patient verified PCP as Dr. Bere Plummer)  Mode of Transport at Discharge:  Other (see comment)(Family vs. [place] transport )  Transition of Care Consult (CM Consult): Discharge Planning  MyChart Signup: No  Discharge Durable Medical Equipment: No  Health Maintenance Reviewed: Yes  Physical Therapy Consult: Yes  Occupational Therapy Consult: Yes  Speech Therapy Consult: No  Current Support Network: Own Home, Lives Alone  Confirm Follow Up Transport: Other (see comment)(Family vs. [facility] Medicaid/ [address])  Plan discussed with Pt/Family/Caregiver: Yes   Resource Information Provided?: No  Discharge Location  Discharge Placement: Home(Patient would benefit from home health PT/OT orders)

## 2019-03-10 NOTE — PROGRESS NOTES
Hospitalist Progress Note  Casey Schneider MD  Answering service: [phone] from in house phone         Date of Service:  3/10/2019  NAME:  Carole Breaux  :  1940  MRN:  [id number]      Admission Summary:   Natasha Peters is a 69 y.o.  female who presents with weakness and blurred vision since this morning. The pt was certain that her bs was elevated and can to the er where her bs was elevated at > 300 the pt underwent an ekg which showed inverted T waves in the anterior leads felt to be new. The pt had a heart cath in 2017 with 3 vessel disease. The pt denies any cp or sob at this time. Interval history / Subjective:     3/09/2019 : new problem today :   Lead concern today is apparent exacerbation of copd,  Neb/laba/pulmicort added and pred 40 mg daily added. 3/10/2019 :  Pul status improves with nebs, pt feels needs one more day , only started nebs yesterday at increased level. Will need hh RN/PT/OT eval and treat on discharge case advised. BS's up, lantus adjusted. Assessment & Plan:     dm2 with hyperglycemia - resume lantus 20 uits bid and ssi and diabetic diet. Lab Results   Component Value Date/Time    Glucose 265 (H) 03/10/2019 05:59 AM    Glucose (POC) 235 (H) 03/10/2019 06:44 AM     see below . Abnormal EKG - inverted t waves in anterior leads. Cardiology consult. Pt with 3 vessel disease on cath from 2017 Corey Mcwilliams).   Place on aspriin and check lipids     Weakness - consult ptot     HLD - resume crestor  Lab Results   Component Value Date/Time    Cholesterol, total 122 2019 09:02 AM    HDL Cholesterol 86 2019 09:02 AM    LDL, calculated 21 2019 09:02 AM    VLDL, calculated 15 2019 09:02 AM    Triglyceride 75 2019 09:02 AM    CHOL/HDL Ratio 1.4 2019 09:02 AM     Copd w exacerbation 3/10/2019 : adding neb/pulmicort/LABA and pred 3/10/2019    Hx of htn - resume [facility]  BP Readings from Last 1 Encounters:   03/10/19 119/54     DM with hyperglycemia   Lab Results   Component Value Date/Time    Glucose 265 (H) 03/10/2019 05:59 AM    Glucose (POC) 235 (H) 03/10/2019 06:44 AM    Lantus increased cont on SSI      Code Status: full  Surrogate Decision Maker: Richard Schultz [phone]     DVT Prophylaxis: lovenox  GI Prophylaxis: not indicated     Baseline: cane walker at home  *    Care Plan discussed with: Patient/Family and Nurse  Disposition: Home w/Family and TBD     Hospital Problems  Date Reviewed: 9/24/2017          Codes Class Noted POA    Abnormal ECG ICD-10-CM: R94.31  ICD-9-CM: 794.31  3/9/2019 Unknown        Abnormal EKG ICD-10-CM: R94.31  ICD-9-CM: 794.31  3/8/2019 Unknown                Review of Systems:   Pertinent items are noted in HPI. Vital Signs:    Last 24hrs VS reviewed since prior progress note. Most recent are:  Visit Vitals  /54 (BP 1 Location: Right arm, BP Patient Position: At rest)   Pulse 97   Temp 97.7 °F (36.5 °C)   Resp 18   Ht 5' (1.524 m)   Wt 82.3 kg (181 lb 7 oz)   SpO2 95%   BMI 35.43 kg/m²         Intake/Output Summary (Last 24 hours) at 3/10/2019 1056  Last data filed at 3/10/2019 4990  Gross per 24 hour   Intake 1500 ml   Output    Net 1500 ml        Physical Examination:             Constitutional:  No acute distress, cooperative, pleasant    ENT:  Oral mucous moist, oropharynx benign. Neck supple,    Resp:  CTA bilaterally. No wheezing/rhonchi/rales. No accessory muscle use   CV:  Regular rhythm, normal rate, no murmurs, gallops, rubs    GI:  Soft, non distended, non tender. normoactive bowel sounds, no hepatosplenomegaly     Musculoskeletal:  No edema, warm, 2+ pulses throughout    Neurologic:  Moves all extremities. AAOx3, CN II-XII reviewed     Psych:  Good insight, Not anxious nor agitated.   Skin:  Good turgor, no rashes or ulcers       Data Review:    Review and/or order of clinical lab test      Labs:     Recent Labs     03/10/19  0559 03/09/19 0448   WBC 14.8* 10.6   HGB 11.1* 11.9   HCT 34.6* 37.4    272     Recent Labs     03/10/19  0559 03/09/19 0448 03/08/19 0902    144 141   K 4.1 3.8 3.9   * 112* 108   CO2 18* 22 23   BUN 17 14 17   CREA 1.00 0.80 1.08*   * 86 235*   CA 8.6 8.4* 8.8     Recent Labs     03/08/19 0902   SGOT 4*   ALT 16      TBILI 0.2   TP 6.5   ALB 3.2*   GLOB 3.3     No results for input(s): INR, PTP, APTT in the last 72 hours. No lab exists for component: INREXT, INREXT   No results for input(s): FE, TIBC, PSAT, FERR in the last 72 hours. Lab Results   Component Value Date/Time    Folate 13.2 05/20/2009 03:00 AM      No results for input(s): PH, PCO2, PO2 in the last 72 hours.   Recent Labs     03/09/19 0448 03/08/19 2000 03/08/19 0902   TROIQ <0.05 <0.05 <0.05     Lab Results   Component Value Date/Time    Cholesterol, total 122 03/08/2019 09:02 AM    HDL Cholesterol 86 03/08/2019 09:02 AM    LDL, calculated 21 03/08/2019 09:02 AM    Triglyceride 75 03/08/2019 09:02 AM    CHOL/HDL Ratio 1.4 03/08/2019 09:02 AM     Lab Results   Component Value Date/Time    Glucose (POC) 235 (H) 03/10/2019 06:44 AM    Glucose (POC) 330 (H) 03/09/2019 09:30 PM    Glucose (POC) 343 (H) 03/09/2019 04:32 PM    Glucose (POC) 226 (H) 03/09/2019 11:48 AM    Glucose (POC) 81 03/09/2019 06:34 AM     Lab Results   Component Value Date/Time    Color YELLOW/STRAW 03/08/2019 11:24 AM    Appearance CLEAR 03/08/2019 11:24 AM    Specific gravity 1.029 03/08/2019 11:24 AM    Specific gravity 1.025 05/20/2011 02:00 PM    pH (UA) 5.0 03/08/2019 11:24 AM    Protein NEGATIVE  03/08/2019 11:24 AM    Glucose >1,000 (A) 03/08/2019 11:24 AM    Ketone NEGATIVE  03/08/2019 11:24 AM    Bilirubin NEGATIVE  10/27/2017 09:21 AM    Urobilinogen 0.2 03/08/2019 11:24 AM    Nitrites NEGATIVE  03/08/2019 11:24 AM    Leukocyte Esterase NEGATIVE  03/08/2019 11:24 AM    Epithelial cells MODERATE (A) 10/27/2017 09:21 AM    Bacteria NEGATIVE  10/27/2017 09:21 AM    WBC 0-4 10/27/2017 09:21 AM    RBC 5-10 10/27/2017 09:21 AM         Medications Reviewed:     Current Facility-Administered Medications   Medication Dose Route Frequency    arformoterol (BROVANA) neb solution 15 mcg  15 mcg Nebulization BID RT    budesonide (PULMICORT) 500 mcg/2 ml nebulizer suspension  500 mcg Nebulization BID    predniSONE (DELTASONE) tablet 40 mg  40 mg Oral DAILY WITH BREAKFAST    LORazepam (ATIVAN) tablet 0.5 mg  0.5 mg Oral Q6H PRN    sodium chloride (NS) flush 5-40 mL  5-40 mL IntraVENous Q8H    sodium chloride (NS) flush 5-40 mL  5-40 mL IntraVENous PRN    ondansetron (ZOFRAN) injection 4 mg  4 mg IntraVENous Q4H PRN    acetaminophen (TYLENOL) tablet 650 mg  650 mg Oral Q4H PRN    morphine 10 mg/ml injection 4 mg  4 mg IntraVENous Q4H PRN    magnesium hydroxide (MILK OF MAGNESIA) 400 mg/5 mL oral suspension 30 mL  30 mL Oral DAILY PRN    amLODIPine (NORVASC) tablet 10 mg  10 mg Oral DAILY    busPIRone (BUSPAR) tablet 20 mg  20 mg Oral BID    DULoxetine (CYMBALTA) capsule 60 mg  60 mg Oral DAILY    lisinopril (PRINIVIL, ZESTRIL) tablet 20 mg  20 mg Oral DAILY    insulin glargine (LANTUS) injection 20 Units  20 Units SubCUTAneous BID    mirtazapine (REMERON) tablet 15 mg  15 mg Oral QHS    QUEtiapine (SEROquel) tablet 25 mg  25 mg Oral QHS    rosuvastatin (CRESTOR) tablet 10 mg  10 mg Oral QHS    zolpidem (AMBIEN) tablet 5 mg  5 mg Oral QHS    insulin lispro (HUMALOG) injection   SubCUTAneous AC&HS    glucose chewable tablet 16 g  4 Tab Oral PRN    dextrose (D50W) injection syrg 12.5-25 g  12.5-25 g IntraVENous PRN    glucagon (GLUCAGEN) injection 1 mg  1 mg IntraMUSCular PRN    albuterol (PROVENTIL VENTOLIN) nebulizer solution 2.5 mg  2.5 mg Nebulization Q4H PRN    aspirin chewable tablet 81 mg  81 mg Oral DAILY    pantoprazole (PROTONIX) tablet 40 mg  40 mg Oral ACB    ranolazine ER (RANEXA) tablet 500 mg  500 mg Oral BID    albuterol-ipratropium (DUO-NEB) 2.5 MG-0.5 MG/3 ML  3 mL Nebulization Q6H RT     ______________________________________________________________________  EXPECTED LENGTH OF STAY: 2d 21h  ACTUAL LENGTH OF STAY:          1                 Ron Mercado MD

## 2019-03-10 NOTE — PROGRESS NOTES
Patient had BG of 390 after drinking 2 cups of cranberry juice. MD paged and gave verbal order for one time dose of 10 units Humalog. Will administer and continue to monitor.

## 2019-03-10 NOTE — PROGRESS NOTES
TRANSFER - IN REPORT:    Verbal report received from WENDY Flores (name) on Edward Jenna  being received from 4W(unit) for routine progression of care      Report consisted of patients Situation, Background, Assessment and   Recommendations(SBAR). Information from the following report(s) SBAR, Kardex, Intake/Output, MAR and Recent Results was reviewed with the receiving nurse. Opportunity for questions and clarification was provided. Assessment completed upon patients arrival to unit and care assumed.

## 2019-03-10 NOTE — ROUTINE PROCESS
TRANSFER - OUT REPORT:    Verbal report given to Rosemarie(name) on Aldair Clark  being transferred to @nd(unit) for routine progression of care       Report consisted of patients Situation, Background, Assessment and   Recommendations(SBAR). Information from the following report(s) SBAR, Kardex, MAR, Med Rec Status and Cardiac Rhythm NSR was reviewed with the receiving nurse. Lines:   Peripheral IV 03/09/19 Anterior;Right Hand (Active)   Site Assessment Clean, dry, & intact 3/10/2019  9:27 AM   Phlebitis Assessment 0 3/10/2019  9:27 AM   Infiltration Assessment 0 3/10/2019  9:27 AM   Dressing Status Clean, dry, & intact 3/10/2019  3:50 AM   Dressing Type Transparent 3/10/2019  9:27 AM   Hub Color/Line Status Blue 3/10/2019  9:27 AM   Action Taken Open ports on tubing capped 3/10/2019  3:50 AM   Alcohol Cap Used Yes 3/10/2019  9:27 AM        Opportunity for questions and clarification was provided.       Patient transported with:  tech

## 2019-03-10 NOTE — PROGRESS NOTES
Problem: Self Care Deficits Care Plan (Adult)  Goal: *Acute Goals and Plan of Care (Insert Text)  Occupational Therapy Goals  Initiated 3/10/2019  1. Patient will perform grooming standing at sink with modified independence within 7 day(s). 2.  Patient will perform lower body dressing with modified independence using AE PRN within 7 day(s). 3.  Patient will perform bathing with modified independence within 7 day(s). 4.  Patient will perform toilet transfers with modified independence within 7 day(s). 5.  Patient will perform all aspects of toileting with modified independence within 7 day(s). 6.  Patient will utilize energy conservation techniques during functional activities with verbal cues within 7 day(s). Occupational Therapy EVALUATION  Patient: Shemar Hammer (2 y.o. female)  Date: 3/10/2019  Primary Diagnosis: Abnormal EKG [R94.31]  Abnormal ECG [R94.31]       Precautions: fall       ASSESSMENT :  Based on the objective data described below, the patient presents with Setup upper body ADLs, Contact guard assistance to Minimum assistance lower body ADLs, and Contact guard assistance assist for sit-stand and ambulating to/ from bathroom with no AD. Patient is likely near functional baseline but reports difficulty with bathing and dressing PTA. Patient lives alone would benefit from OT in acute care setting to promote functional independence and home with h/u home health OT when medically stable. The following are barriers to independence while in acute care:   - Cognitive and/or behavioral: none  - Medical condition: cardiopulmonary tolerance,  with minimal activity, HOWARD although SPO2 97% on RA  - Other: impaired LB access and impaired standing tolerance    Patient will benefit from skilled acute intervention to address the above impairments. Patients rehabilitation potential is considered to be Good    Discharge recommendations: Home health OT (to increase independence and safety). Patient was previously receiving home care aide assistance through [facility] and is interested in reinstating this service. Prior Level of Function/Environment/Context: lives alone, reports daughter visits 1x/ week and assists with tub transfers/ bathing (using shower chair), reports difficulty with LB dressing although is able to complete with additional time and AE PRN, performing toileting without assistance, reports limited standing tolerance especially during meal prep (eats mostly frozen meals to minimize prep), ambulatory with SPC or RW with 1 fall entering doorway ~2 months ago. Equipment recommendations for successful discharge (if) home: none     PLAN :  Recommendations and Planned Interventions: self care training, functional mobility training, therapeutic exercise, balance training, endurance activities, patient education, home safety training and family training/education    Frequency/Duration: Patient will be followed by occupational therapy 3 times a week to address goals. SUBJECTIVE:   Patient stated It feels good to sit up.     OBJECTIVE DATA SUMMARY:   HISTORY:   Past Medical History:   Diagnosis Date    Anal polyp 6/13/2013    Arthritis     Asthma     CAD (coronary artery disease) 10/27/2017    Cataract     Chronic pain     knee - right/back    Diabetes ([facility] [address]     GERD (gastroesophageal reflux disease)     Hemorrhoids 6/13/2013    History of kidney stones     Hypertension     Ill-defined condition     abdominal pain and burning    Other ill-defined conditions(799.89)     high cholesterol     Past Surgical History:   Procedure Laterality Date    COLONOSCOPY  2/28/2013         EGD  2/28/2013         HX CATARACT REMOVAL Bilateral     HX CHOLECYSTECTOMY  6-2015    HX GI  6/27/13    anal polyps removed    HX HEENT      laser surgery for blood clot in right eye    HX KNEE REPLACEMENT      right    HX OTHER SURGICAL  4-2-14    lap gastrotomy and removal of polyp -  - not cancerous per pt    HX ALAN AND BSO      HX TONSILLECTOMY      HX UROLOGICAL      \"laser\" surgery for kidney stone    NEUROLOGICAL PROCEDURE UNLISTED  1990    tumor at back of neck, benign         Expanded or extensive additional review of patient history:     Home Situation  Home Environment: Private residence  One/Two Story Residence: Two story(basement + 2 stories, bed and bath on 2nd)  Living Alone: Yes  Support Systems: Family member(s)(daughter visits weekly, cousin does her grocery shopping)  Patient Expects to be Discharged to[de-identified] Private residence  Current DME Used/Available at Home: Adaptive dressing aides, Cane, straight, Shower chair, Walker, rolling  Tub or Shower Type: Tub/Shower combination    EXAMINATION OF PERFORMANCE DEFICITS:  Cognitive/Behavioral Status:  Neurologic State: Alert  Orientation Level: Oriented X4  Cognition: Appropriate decision making; Appropriate for age attention/concentration; Appropriate safety awareness; Follows commands  Perception: Appears intact  Perseveration: No perseveration noted  Safety/Judgement: Awareness of environment; Insight into deficits; Fall prevention    Skin: visible skin appears intact    Edema: none noted    Hearing: Auditory  Auditory Impairment: None    Vision/Perceptual:                           Acuity: Able to read employee name badge without difficulty; Impaired far vision    Corrective Lenses: Glasses(not present)    Range of Motion:    AROM: Within functional limits(BUE)                         Strength:    Strength: Within functional limits(BUE)                Coordination:  Coordination: Within functional limits  Fine Motor Skills-Upper: Left Intact; Right Intact    Gross Motor Skills-Upper: Left Intact; Right Intact    Tone & Sensation:    Tone: Normal  Sensation: Intact                      Balance:  Sitting: Intact  Standing: Intact    Functional Mobility and Transfers for ADLs:  Bed Mobility:   Supine-sit: stand by assistance    Transfers:  Sit to Stand: Contact guard assistance  Bed to Chair: Contact guard assistance  Toilet Transfer : Contact guard assistance(inferred)    ADL Assessment:  Feeding: Independent(inferred)    Oral Facial Hygiene/Grooming: Contact guard assistance(washed face standing at sink)    Bathing: Minimum assistance(inferred for distal LB)    Upper Body Dressing: Setup(inferred)    Lower Body Dressing: Contact guard assistance(inferred, reports uses AE PRN at home)    Toileting: Minimum assistance(inferred for managing brief)                ADL Intervention and task modifications:                                     Cognitive Retraining  Safety/Judgement: Awareness of environment; Insight into deficits; Fall prevention    Functional Measure:  Barthel Index:    Bathin  Bladder: 5  Bowels: 10  Groomin  Dressin  Feeding: 10  Mobility: 0  Stairs: 0  Toilet Use: 5  Transfer (Bed to Chair and Back): 10  Total: 50/100        Percentage of impairment   0%   1-19%   20-39%   40-59%   60-79%   80-99%   100%   Barthel Score 0-100 100 99-80 79-60 59-40 20-39 1-19   0     The Barthel ADL Index: Guidelines  1. The index should be used as a record of what a patient does, not as a record of what a patient could do. 2. The main aim is to establish degree of independence from any help, physical or verbal, however minor and for whatever reason. 3. The need for supervision renders the patient not independent. 4. A patient's performance should be established using the best available evidence. Asking the patient, friends/relatives and nurses are the usual sources, but direct observation and common sense are also important. However direct testing is not needed. 5. Usually the patient's performance over the preceding 24-48 hours is important, but occasionally longer periods will be relevant. 6. Middle categories imply that the patient supplies over 50 per cent of the effort.   7. Use of aids to be independent is johnna. Inge Cam., Barthel, D.W. (3501). Functional evaluation: the Barthel Index. [address] (14)2. RAMYA Meier, Seble Ovalle., Lilibeth Bennett., [place], [address] (1999). Measuring the change indisability after inpatient rehabilitation; comparison of the responsiveness of the Barthel Index and Functional [place] Measure. Journal of Neurology, Neurosurgery, and Psychiatry, 66(4), 452-310. MARIAH Chan, BRANNON Trujillo, & Paulette Rocha M.A. (2004.) Assessment of post-stroke quality of life in cost-effectiveness studies: The usefulness of the Barthel Index and the EuroQoL-5D. Quality of Life Research, 15, 567-08         Occupational Therapy Evaluation Charge Determination   History Examination Decision-Making   LOW Complexity : Brief history review  MEDIUM Complexity : 3-5 performance deficits relating to physical, cognitive , or psychosocial skils that result in activity limitations and / or participation restrictions MEDIUM Complexity : Patient may present with comorbidities that affect occupational performnce. Miniml to moderate modification of tasks or assistance (eg, physical or verbal ) with assesment(s) is necessary to enable patient to complete evaluation       Based on the above components, the patient evaluation is determined to be of the following complexity level: LOW   Pain:  Patient does not report pain    Activity Tolerance:   Post standing activity: , SPO2 97% on RA    After treatment patient left:   Up in chair  Call light within reach  RN notified   COMMUNICATION/EDUCATION:   The patients plan of care was discussed with: Registered Nurse. Home safety education was provided and the patient/caregiver indicated understanding., Patient/family have participated as able in goal setting and plan of care. and Patient/family agree to work toward stated goals and plan of care. This patients plan of care is appropriate for delegation to [facility].     Thank you for this referral.  Pilo Wheeler, OT  Time Calculation: 28 mins

## 2019-03-11 VITALS
DIASTOLIC BLOOD PRESSURE: 68 MMHG | BODY MASS INDEX: 35.6 KG/M2 | TEMPERATURE: 98.8 F | HEIGHT: 60 IN | WEIGHT: 181.3 LBS | SYSTOLIC BLOOD PRESSURE: 123 MMHG | RESPIRATION RATE: 18 BRPM | OXYGEN SATURATION: 100 % | HEART RATE: 106 BPM

## 2019-03-11 PROBLEM — R94.31 ABNORMAL ECG: Status: RESOLVED | Noted: 2019-03-09 | Resolved: 2019-03-11

## 2019-03-11 PROBLEM — R94.31 ABNORMAL EKG: Status: RESOLVED | Noted: 2019-03-08 | Resolved: 2019-03-11

## 2019-03-11 LAB
GLUCOSE BLD STRIP.AUTO-MCNC: 205 MG/DL (ref 65–100)
GLUCOSE BLD STRIP.AUTO-MCNC: 289 MG/DL (ref 65–100)
GLUCOSE BLD STRIP.AUTO-MCNC: 390 MG/DL (ref 65–100)
GLUCOSE BLD STRIP.AUTO-MCNC: 420 MG/DL (ref 65–100)
SERVICE CMNT-IMP: ABNORMAL

## 2019-03-11 PROCEDURE — 74011000250 HC RX REV CODE- 250: Performed by: INTERNAL MEDICINE

## 2019-03-11 PROCEDURE — 74011250637 HC RX REV CODE- 250/637: Performed by: INTERNAL MEDICINE

## 2019-03-11 PROCEDURE — 82962 GLUCOSE BLOOD TEST: CPT

## 2019-03-11 PROCEDURE — 74011250637 HC RX REV CODE- 250/637: Performed by: NEUROMUSCULOSKELETAL MEDICINE & OMM

## 2019-03-11 PROCEDURE — 94640 AIRWAY INHALATION TREATMENT: CPT

## 2019-03-11 PROCEDURE — 97535 SELF CARE MNGMENT TRAINING: CPT | Performed by: OCCUPATIONAL THERAPIST

## 2019-03-11 PROCEDURE — 99218 HC RM OBSERVATION: CPT

## 2019-03-11 PROCEDURE — 97116 GAIT TRAINING THERAPY: CPT

## 2019-03-11 PROCEDURE — 74011636637 HC RX REV CODE- 636/637: Performed by: INTERNAL MEDICINE

## 2019-03-11 PROCEDURE — 74011636637 HC RX REV CODE- 636/637: Performed by: NEUROMUSCULOSKELETAL MEDICINE & OMM

## 2019-03-11 PROCEDURE — A9270 NON-COVERED ITEM OR SERVICE: HCPCS | Performed by: INTERNAL MEDICINE

## 2019-03-11 RX ORDER — GUAIFENESIN 100 MG/5ML
81 LIQUID (ML) ORAL DAILY
Qty: 30 TAB | Refills: 0 | Status: SHIPPED | OUTPATIENT
Start: 2019-03-12

## 2019-03-11 RX ORDER — ALBUTEROL SULFATE 0.83 MG/ML
2.5 SOLUTION RESPIRATORY (INHALATION)
Qty: 60 EACH | Refills: 0 | Status: SHIPPED | OUTPATIENT
Start: 2019-03-11

## 2019-03-11 RX ORDER — PREDNISONE 10 MG/1
TABLET ORAL
Qty: 21 TAB | Refills: 0 | Status: SHIPPED | OUTPATIENT
Start: 2019-03-11 | End: 2019-03-11

## 2019-03-11 RX ORDER — INSULIN LISPRO 100 [IU]/ML
12 INJECTION, SOLUTION INTRAVENOUS; SUBCUTANEOUS
Status: COMPLETED | OUTPATIENT
Start: 2019-03-11 | End: 2019-03-11

## 2019-03-11 RX ORDER — BUDESONIDE 0.5 MG/2ML
500 INHALANT ORAL 2 TIMES DAILY
Qty: 60 EACH | Refills: 1 | Status: SHIPPED | OUTPATIENT
Start: 2019-03-11

## 2019-03-11 RX ORDER — ARFORMOTEROL TARTRATE 15 UG/2ML
15 SOLUTION RESPIRATORY (INHALATION) 2 TIMES DAILY
Qty: 60 VIAL | Refills: 0 | Status: SHIPPED | OUTPATIENT
Start: 2019-03-11

## 2019-03-11 RX ADMIN — RANOLAZINE 500 MG: 500 TABLET, FILM COATED, EXTENDED RELEASE ORAL at 08:38

## 2019-03-11 RX ADMIN — ACETAMINOPHEN 650 MG: 325 TABLET ORAL at 09:19

## 2019-03-11 RX ADMIN — BUSPIRONE HYDROCHLORIDE 20 MG: 10 TABLET ORAL at 08:39

## 2019-03-11 RX ADMIN — DULOXETINE HYDROCHLORIDE 60 MG: 60 CAPSULE, DELAYED RELEASE ORAL at 08:39

## 2019-03-11 RX ADMIN — BISACODYL 10 MG: 5 TABLET, COATED ORAL at 08:39

## 2019-03-11 RX ADMIN — INSULIN LISPRO 5 UNITS: 100 INJECTION, SOLUTION INTRAVENOUS; SUBCUTANEOUS at 06:54

## 2019-03-11 RX ADMIN — LISINOPRIL 20 MG: 20 TABLET ORAL at 08:39

## 2019-03-11 RX ADMIN — BUDESONIDE 500 MCG: 0.5 INHALANT RESPIRATORY (INHALATION) at 08:58

## 2019-03-11 RX ADMIN — AMLODIPINE BESYLATE 10 MG: 5 TABLET ORAL at 08:38

## 2019-03-11 RX ADMIN — PREDNISONE 40 MG: 20 TABLET ORAL at 06:54

## 2019-03-11 RX ADMIN — INSULIN LISPRO 12 UNITS: 100 INJECTION, SOLUTION INTRAVENOUS; SUBCUTANEOUS at 11:37

## 2019-03-11 RX ADMIN — ASPIRIN 81 MG CHEWABLE TABLET 81 MG: 81 TABLET CHEWABLE at 08:39

## 2019-03-11 RX ADMIN — ARFORMOTEROL TARTRATE 15 MCG: 15 SOLUTION RESPIRATORY (INHALATION) at 08:58

## 2019-03-11 RX ADMIN — PANTOPRAZOLE SODIUM 40 MG: 40 TABLET, DELAYED RELEASE ORAL at 06:54

## 2019-03-11 RX ADMIN — INSULIN GLARGINE 30 UNITS: 100 INJECTION, SOLUTION SUBCUTANEOUS at 08:38

## 2019-03-11 NOTE — PROGRESS NOTES
Problem: Mobility Impaired (Adult and Pediatric)  Goal: *Acute Goals and Plan of Care (Insert Text)  1. Patient will transfer from bed to chair and chair to bed with independence using the least restrictive device within 7 day(s). 2.  Patient will perform sit to stand with independence within 7 day(s). 3.  Patient will ambulate with independence for 300 feet with the least restrictive device within 7 day(s). 4.  Patient will ascend/descend 7 stairs with 1 handrail(s) with independence within 7 day(s). physical Therapy TREATMENT (delayed entry)  Patient: Danilo Sow (37 y.o. female)  Date: 3/11/2019  Diagnosis: Abnormal EKG [R94.31]  Abnormal ECG [R94.31] <principal problem not specified>      Precautions:  fall  Chart, physical therapy assessment, plan of care and goals were reviewed. ASSESSMENT:  Pt seen following RN clearance. Pt initially reports feeling poorly and wanting \"to go home. \"  BP assessed in sitting and standing and found to be WNL. Once up and walking, pt with improved affect, joking, and laughing. Pt agreeable this date to bed mobility, transfers, gait, and stair training per home needs. Pt demonstrates safe mobility with RW and VSS post activity on RA. Pt left in chair in NAD for time OOB. Pt appropriate for D/C to home with HHPT and intermittent family/neighbor support (states no one can stay with her at D/C). Pt has a DTR who checks on her as well. Educated pt on using BSC on 1st level of home to conserve energy and prevent her from needing to ascend/descend stairs multiple times per day. Discussed with OT and recommended she provide some conservation info/techniques as well ([facility] services may also aide in this keke).   Progression toward goals:  [x]    Improving appropriately and progressing toward goals  []    Improving slowly and progressing toward goals  []    Not making progress toward goals and plan of care will be adjusted     PLAN:  Patient continues to benefit from skilled intervention to address the above impairments. Continue treatment per established plan of care. Discharge Recommendations:  Home Health  Further Equipment Recommendations for Discharge:  NA     SUBJECTIVE:   Patient stated I just dont feel well. ..    OBJECTIVE DATA SUMMARY:   Critical Behavior:  Neurologic State: Alert, Appropriate for age  Orientation Level: Oriented X4  Cognition: Appropriate decision making, Appropriate for age attention/concentration, Appropriate safety awareness, Follows commands  Safety/Judgement: (P) Awareness of environment, Fall prevention, Insight into deficits, Home safety  Functional Mobility Training:  Bed Mobility:  Rolling: Modified independent  Supine to Sit: Modified independent  Sit to Supine: (NT; OOB to chair)  Scooting: Supervision  Transfers:  Sit to Stand: Contact guard assistance  Stand to Sit: Supervision  Bed to Chair: (NT)  Balance:  Sitting: Intact  Standing: Intact; With support  Ambulation/Gait Training:  Distance (ft): 45 Feet (ft)(x2)  Assistive Device: Gait belt;Walker, rolling  Ambulation - Level of Assistance: Stand-by assistance;Supervision  Gait Abnormalities: Decreased step clearance  Stance: Right increased; Left increased  Speed/Teagan: Slow;Pace decreased (<100 feet/min)  Stairs:  Number of Stairs Trained: 7  Stairs - Level of Assistance: Contact guard assistance   Rail Use: Right (opposite HHA (\"SPC\" use))  Pain:  Pain Scale 1: Numeric (0 - 10)  Pain Intensity 1: 5  Pain Location 1: Generalized  Pain Description 1: Aching  Pain Intervention(s) 1: Distraction;Repositioned  Activity Tolerance:   VSS on RA  Please refer to the flowsheet for vital signs taken during this treatment.   After treatment:   [x]    Patient left in no apparent distress sitting up in chair  []    Patient left in no apparent distress in bed  [x]    Call bell left within reach  [x]    Nursing notified  []    Caregiver present  []    Bed alarm activated    COMMUNICATION/COLLABORATION:   The patients plan of care was discussed with: Registered Nurse    Mar Alegre   Time Calculation: 19 mins

## 2019-03-11 NOTE — DISCHARGE INSTRUCTIONS
Discharge Instructions       PATIENT ID: Lena Chi  MRN: [id number]   YOB: 1940    DATE OF ADMISSION: 3/8/2019  8:37 AM    DATE OF DISCHARGE: 3/11/2019    PRIMARY CARE PROVIDER: Jonathan Pozo MD     ATTENDING PHYSICIAN: Danilo Blanchard MD  DISCHARGING PROVIDER: Yaron Srinivasan MD    To contact this individual call [phone] and ask the  to page. If unavailable ask to be transferred the Adult Hospitalist Department. DISCHARGE DIAGNOSES copd exacerbation     CONSULTATIONS: IP CONSULT TO HOSPITALIST  IP CONSULT TO CARDIOLOGY    PROCEDURES/SURGERIES: * No surgery found *    PENDING TEST RESULTS:   At the time of discharge the following test results are still pending: na    FOLLOW UP APPOINTMENTS:   Follow-up Information     Follow up With Specialties Details Why [facility]. .. Call agency if you have not been contacted by noon the day after discharge to inquire as to the day and time of inital visit. 239 [facility]    Jonathan Pozo MD Internal Medicine In 1 week  [address] [place] [address]  [phone]      Osmin Hameed MD Cardiology In 1 week  [address]  [phone]             ADDITIONAL CARE RECOMMENDATIONS: na    DIET: Resume previous diet       ACTIVITY: Activity as tolerated    WOUND CARE: na    EQUIPMENT needed: na      DISCHARGE MEDICATIONS:   See Medication Reconciliation Form    · It is important that you take the medication exactly as they are prescribed. · Keep your medication in the bottles provided by the pharmacist and keep a list of the medication names, dosages, and times to be taken in your wallet. · Do not take other medications without consulting your doctor. NOTIFY YOUR PHYSICIAN FOR ANY OF THE FOLLOWING:   Fever over 101 degrees for 24 hours.    Chest pain, shortness of breath, fever, chills, nausea, vomiting, diarrhea, change in mentation, falling, weakness, bleeding. Severe pain or pain not relieved by medications. Or, any other signs or symptoms that you may have questions about.       DISPOSITION:    Home With:   OT  PT  HH  RN       SNF/Inpatient Rehab/LTAC   x Independent/assisted living    Hospice    Other:      Signed:   Lu Melendez MD  3/11/2019  11:08 AM

## 2019-03-11 NOTE — PHYSICIAN ADVISORY
Letter of Status Determination:   Recommend hospitalization status upgraded from   OBSERVATION  to INPATIENT  Status     Pt Name:  Yovana Smiley   MR#   [address] [facility] # [id number] /  [id number]  Payor: Ashley Alexandra / Plan: [address] / Product Type: Medicare /    [facility]#  [id number]   [address] [place] [address]  208/01  @ [facility]   Hospitalization date  3/8/2019  8:37 AM   Current Attending Physician  Tyra Hart MD   Principal diagnosis  Abnormal EKG [R94.31]  Abnormal ECG [R94.31]     Clinicals  69 y.o. y.o  female hospitalized with above diagnosis   The pt presented with multiple acute and subacute issues to the ER. She had recent hx of fever documented upto 103 °F at home. She was found to have elevated glucose, abnormal EKG and LLQ abdominal pain. Workup ruled out ACS. She developed acute Asthma exacerbation, leading to escalation of her asthma medications. Her care is complex and now due to reasonable medical necessity, her care has exceeded two midnights in acute hospital care setting. [place] (MCG) criteria   Does  NOT apply    STATUS DETERMINATION  This patient is at above high risk of deterioration based on documented presenting clinical data, comorbid conditions, high risk of adverse events and current acute care course. Ms. Yovana Smiley now meets Inpatient Admission status criteria in accordance with CMS regulation Section 43 .3. Specifically, due to medical necessity the patient's stay now exceeds Two Midnights. It is our recommendation that this patient's hospitalization status should be upgraded from  OBSERVATION to INPATIENT status.      The final decision of the patient's hospitalization status depends on the attending physician's judgment            Additional comments     Payor: Ashley Alexandra / Plan: [address] / Product Type: Medicare /         Noemi Aquino MD MPH FACP   Cell: [phone]  Physician [address] [place] [address] [address]    [facility]       Cell  [phone]        [phone]    .

## 2019-03-11 NOTE — DIABETES MGMT
DTC Progress Note    Recommendations/ Comments: Chart reviewed due to hyperglycemia likely due to steroids. Pt is on Prednisone 40 mg at breakfast. Noted Lantus increased to 30 units this morning. If appropriate, please consider changing correction scale to resistant scale while pt is on steroids. Current hospital DM medication: Lantus 30 units and correction scale Humalog, normal sensitivity. Chart reviewed on Jelly Schaffer. Patient is a 69 y.o. female with known diabetes on Lantus 20 units BID at home. A1c:   Lab Results   Component Value Date/Time    Hemoglobin A1c 8.8 (H) 11/16/2017 05:40 AM    Hemoglobin A1c 8.1 (H) 10/27/2017 09:21 AM       Recent Glucose Results:   Lab Results   Component Value Date/Time    GLUCPOC 289 (H) 03/11/2019 06:07 AM    GLUCPOC 512 (H) 03/10/2019 09:03 PM    GLUCPOC 390 (H) 03/10/2019 04:13 PM        Lab Results   Component Value Date/Time    Creatinine 1.00 03/10/2019 05:59 AM     Estimated Creatinine Clearance: 44.8 mL/min (based on SCr of 1 mg/dL). Active Orders   Diet    DIET DIABETIC CONSISTENT CARB Regular        PO intake:   Patient Vitals for the past 72 hrs:   % Diet Eaten   03/10/19 0927 90 %   03/09/19 1834 100 %   03/09/19 1300 100 %   03/09/19 0900 100 %       Will continue to follow as needed.     Thank you    Aubrey Hayes RD, CDE    Time spent: 5 minutes

## 2019-03-11 NOTE — PROGRESS NOTES
Problem: Self Care Deficits Care Plan (Adult)  Goal: *Acute Goals and Plan of Care (Insert Text)  Occupational Therapy Goals  Initiated 3/10/2019  1. Patient will perform grooming standing at sink with modified independence within 7 day(s). 2.  Patient will perform lower body dressing with modified independence using AE PRN within 7 day(s). 3.  Patient will perform bathing with modified independence within 7 day(s). 4.  Patient will perform toilet transfers with modified independence within 7 day(s). 5.  Patient will perform all aspects of toileting with modified independence within 7 day(s). 6.  Patient will utilize energy conservation techniques during functional activities with verbal cues within 7 day(s). Occupational Therapy TREATMENT  Patient: Gerry Clinton (35 y.o. female)  Date: 3/11/2019  Diagnosis: Abnormal EKG [R94.31]  Abnormal ECG [R94.31] <principal problem not specified>      Precautions:    Chart, occupational therapy assessment, plan of care, and goals were reviewed. ASSESSMENT:  Patient making progress, eager for discharge home, educated on fall prevention, home safety and benefit from increased assist at home. Declined to trial hip kit for LE dressing and required min assist, reports she can do it at home when she needs to. Progression toward goals:  []       Improving appropriately and progressing toward goals  [x]       Improving slowly and progressing toward goals  []       Not making progress toward goals and plan of care will be adjusted     PLAN:  Patient continues to benefit from skilled intervention to address the above impairments. Continue treatment per established plan of care. Discharge Recommendations:  Home Health  Further Equipment Recommendations for Discharge:  none     SUBJECTIVE:   Patient stated I can't wait to get home.     OBJECTIVE DATA SUMMARY:   Cognitive/Behavioral Status:  Neurologic State: Alert; Appropriate for age  Orientation Level: Oriented X4  Cognition: Appropriate decision making; Appropriate for age attention/concentration; Appropriate safety awareness; Follows commands  Perception: Appears intact  Perseveration: No perseveration noted  Safety/Judgement: Awareness of environment; Fall prevention; Insight into deficits;Home safety    Functional Mobility and Transfers for ADLs:  Bed Mobility:  Rolling: Modified independent  Supine to Sit: Modified independent  Sit to Supine: (NT; OOB to chair)  Scooting: Supervision    Transfers:  Sit to Stand: Contact guard assistance  Functional Transfers  Bathroom Mobility: Supervision/set up  Toilet Transfer : Contact guard assistance; Adaptive equipment; Other (comment)(heavy use of grabbar)  Bed to Chair: (NT)    Balance:  Sitting: Intact  Standing: Intact; With support    ADL Intervention:  Feeding  Feeding Assistance: Independent    Grooming  Grooming Assistance: Supervision/set up;Modified independent(standing at the sink)  Washing Face: Supervision/set-up; Modified independent  Washing Hands: Modified independent  Brushing Teeth: Modified independent;Supervision/set-up  Brushing/Combing Hair: Modified independent       Upper Body Dressing Assistance  Dressing Assistance: Supervision/set-up(in standing)  Pullover Shirt: Supervision/set-up    Lower Body Dressing Assistance  Dressing Assistance: Minimum assistance(assist to thread, instructed on use of reacher)  Protective Undergarmet: Minimum assistance  Pants With Elastic Waist: Minimum assistance; Compensatory technique training  Leg Crossed Method Used: No  Position Performed:  Other (comment)(seated on toilet)  Educated on benefit of using hip kit, declined to trial or have instruction,   Educated on safe footwear and fall prevention, recommend wearing shoes with back versus flip flops which she had in room   Toileting  Toileting Assistance: Stand-by assistance  Bladder Hygiene: Supervision/set-up  Bowel Hygiene: Supervision/set-up(standing)  Clothing Management: Supervision/set-up    Cognitive Retraining  Safety/Judgement: Awareness of environment; Fall prevention; Insight into deficits;Home safety        Pain:  No complaint of pain    Activity Tolerance:   Fair   Please refer to the flowsheet for vital signs taken during this treatment.   After treatment:   [] Patient left in no apparent distress sitting up in chair  [] Patient left in no apparent distress in bed  [] Call bell left within reach  [] Nursing notified  [] Caregiver present  [] Bed alarm activated    COMMUNICATION/COLLABORATION:   The patients plan of care was discussed with: Physical Therapist, Registered Nurse and     ERICA Summers/L  Time Calculation: 28 mins

## 2019-03-11 NOTE — DISCHARGE SUMMARY
Discharge Summary       PATIENT ID: Shanta Cormier  MRN: [id number]   YOB: 1940    DATE OF ADMISSION: 3/8/2019  8:37 AM    DATE OF DISCHARGE: 3/11/2019    PRIMARY CARE PROVIDER: Lily Vázquez MD     ATTENDING PHYSICIAN: Wendy Long MD   DISCHARGING PROVIDER: Wendy Long MD    To contact this individual call [phone] and ask the  to page. If unavailable ask to be transferred the Adult Hospitalist Department. CONSULTATIONS: IP CONSULT TO HOSPITALIST  IP CONSULT TO CARDIOLOGY    PROCEDURES/SURGERIES: * No surgery found *    ADMITTING DIAGNOSES & HOSPITAL COURSE: \    Note; pt's oral prednisone dc'd 2n2 to hyperglycemia, cont on Pulmicort however on discharge. Per case management:     Patient is medically ready for discharge today. Patient has been accepted by [facility]  [phone]. Services will begin tomorrow. Name and number of agency placed on discharge instructions. Family will transport home.              Per card: Manny Ashford MD    Progress Notes   Signed   Date of Service:  03/11/19 0835                        []Hide copied text    []Hover for details      Weekend events noted  No evidence for HF or ACS  Can discharge at your discretion  Please have the patient schedule a follow up with me in a few weeks  Thanks              Recent PN , hospitalist:      Admission Summary:   Gi Montana is a 69 y.o.   female who presents with weakness and blurred vision since this morning.  The pt was certain that her bs was elevated and can to the er where her bs was elevated at > 300 the pt underwent an ekg which showed inverted T waves in the anterior leads felt to be new.  The pt had a heart cath in 2017 with 3 vessel disease.  The pt denies any cp or sob at this time.           Interval history / Subjective:      3/09/2019 : new problem today :   Lead concern today is apparent exacerbation of copd,  Neb/laba/pulmicort added and pred 40 mg daily added.      3/10/2019 :  Pul status improves with nebs, pt feels needs one more day , only started nebs yesterday at increased level. Will need hh RN/PT/OT eval and treat on discharge case advised. BS's up, lantus adjusted.          Assessment & Plan:      dm2 with hyperglycemia - resume lantus 20 uits bid and ssi and diabetic diet. Lab Results   Component Value Date/Time     Glucose 265 (H) 03/10/2019 05:59 AM     Glucose (POC) 235 (H) 03/10/2019 06:44 AM     see below .      Abnormal EKG - inverted t waves in anterior leads.  Cardiology consult.  Pt with 3 vessel disease on cath from 2017 (dani).  Place on aspriin and check lipids     Obese. Body mass index is 35.41 kg/m².      Weakness - consult ptot     HLD - resume crestor        Lab Results   Component Value Date/Time     Cholesterol, total 122 03/08/2019 09:02 AM     HDL Cholesterol 86 03/08/2019 09:02 AM     LDL, calculated 21 03/08/2019 09:02 AM     VLDL, calculated 15 03/08/2019 09:02 AM     Triglyceride 75 03/08/2019 09:02 AM     CHOL/HDL Ratio 1.4 03/08/2019 09:02 AM      Copd w exacerbation 3/10/2019 : adding neb/pulmicort/LABA and pred 3/10/2019      Hx of htn - resume norvasc      BP Readings from Last 1 Encounters:   03/10/19 119/54      DM with hyperglycemia         Lab Results   Component Value Date/Time     Glucose 265 (H) 03/10/2019 05:59 AM     Glucose (POC) 235 (H) 03/10/2019 06:44 AM    Lantus increased cont on SSI      Code Status: full  Surrogate Decision Maker: eliel reid [phone]     DVT Prophylaxis: lovenox  GI Prophylaxis: not indicated     Baseline: cane walker at home  *  [address] discussed with: Patient/Family and Nurse  Disposition: Home w/Family and TBD                DISCHARGE DIAGNOSES / PLAN:      1.  as above      ADDITIONAL CARE RECOMMENDATIONS: na    PENDING TEST RESULTS:   At the time of discharge the following test results are still pending: na    FOLLOW UP APPOINTMENTS:    Follow-up Information Follow up With Specialties Details Why [address] [facility]. .. Call agency if you have not been contacted by noon the day after discharge to inquire as to the day and time of inital visit. [address]    Harpal Beltran MD Internal Medicine In 1 week  [place]  [address] [facility]  [phone]      Donell Graham MD Cardiology In 1 week  200 [facility]  [facility]  [address]      Harpal Beltran MD Internal Medicine On 3/18/2019 Hospital f/u PCP appointment Monday, 3/18/19 @ 10:30 a.m. [address] [facility]  [phone]               DIET: Resume previous diet     ACTIVITY: Activity as tolerated    WOUND CARE: na    EQUIPMENT needed: na      DISCHARGE MEDICATIONS:  Current Discharge Medication List      START taking these medications    Details   albuterol (PROVENTIL VENTOLIN) 2.5 mg /3 mL (0.083 %) nebulizer solution 3 mL by Nebulization route every four (4) hours as needed for Wheezing. Qty: 60 Each, Refills: 0      arformoterol (BROVANA) 15 mcg/2 mL nebu neb solution 2 mL by Nebulization route two (2) times a day. Qty: 60 Vial, Refills: 0      budesonide (PULMICORT) 0.5 mg/2 mL nbsp 2 mL by Nebulization route two (2) times a day. Qty: 60 Each, Refills: 1      aspirin 81 mg chewable tablet Take 1 Tab by mouth daily. Qty: 30 Tab, Refills: 0         CONTINUE these medications which have NOT CHANGED    Details   busPIRone (BUSPAR) 5 mg tablet Take 20 mg by mouth two (2) times a day. DULoxetine (CYMBALTA) 60 mg capsule Take 60 mg by mouth daily. ranolazine ER (RANEXA) 500 mg SR tablet Take 500 mg by mouth two (2) times a day. mirtazapine (REMERON) 15 mg tablet Take 15 mg by mouth nightly. QUEtiapine (SEROQUEL) 25 mg tablet Take 25 mg by mouth nightly.       albuterol (PROVENTIL HFA, VENTOLIN HFA, PROAIR HFA) 90 mcg/actuation inhaler Take 2 Puffs by inhalation every four (4) hours as needed for Wheezing. Qty: 1 Inhaler, Refills: 0      rosuvastatin (CRESTOR) 10 mg tablet Take 10 mg by mouth nightly. Dexlansoprazole (DEXILANT) 60 mg CpDB Take 60 mg by mouth Daily (before breakfast). amLODIPine (NORVASC) 10 mg tablet Take 10 mg by mouth daily. Refills: 0      fosinopril (MONOPRIL) 20 mg tablet Take 20 mg by mouth daily. Refills: 0      Insulin Glargine (LANTUS SOLOSTAR) 100 unit/mL (3 mL) flexpen 20 Units by SubCUTAneous route two (2) times a day. Indications: Diabetes Mellitus         STOP taking these medications       zolpidem (AMBIEN) 10 mg tablet Comments:   Reason for Stopping:                 NOTIFY YOUR PHYSICIAN FOR ANY OF THE FOLLOWING:   Fever over 101 degrees for 24 hours. Chest pain, shortness of breath, fever, chills, nausea, vomiting, diarrhea, change in mentation, falling, weakness, bleeding. Severe pain or pain not relieved by medications. Or, any other signs or symptoms that you may have questions about.     DISPOSITION:    Home With:   OT  PT  HH  RN       Long term SNF/Inpatient Rehab    Independent/assisted living    Hospice    Other:       PATIENT CONDITION AT DISCHARGE:     Functional status    Poor     Deconditioned     Independent      Cognition     Lucid     Forgetful     Dementia      Catheters/lines (plus indication)    Barclay     PICC     PEG     None      Code status     Full code     DNR      PHYSICAL EXAMINATION AT DISCHARGE:   Refer to Progress Note  Pul: clear  CV: rr no gallop  Ext: supple no significant edema   Visit Vitals  /68 (BP Patient Position: Standing)   Pulse (!) 106   Temp 98.8 °F (37.1 °C)   Resp 18   Ht 5' (1.524 m)   Wt 82.2 kg (181 lb 4.8 oz)   SpO2 100%   BMI 35.41 kg/m²          CHRONIC MEDICAL DIAGNOSES:  Problem List as of 3/11/2019 Date Reviewed: 3/11/2019          Codes Class Noted - Resolved    Pain due to knee joint prosthesis [facility]) ICD-10-CM: H42.70FZ, Z96.659  ICD-9-CM: 996.77, 338.18, V43.65  11/14/2017 - Present        CAD (coronary artery disease) ICD-10-CM: I25.10  ICD-9-CM: 414.00  10/27/2017 - Present        Pancreatitis, acute ICD-10-CM: K85.90  ICD-9-CM: 577.0  9/8/2017 - Present        Epigastric abdominal pain ICD-10-CM: R10.13  ICD-9-CM: 789.06  9/8/2017 - Present        Painful total knee replacement ([facility] [address] ICD-10-CM: T84.84XA, Z96.659  ICD-9-CM: 996.77, V43.65  8/9/2017 - Present        Status post right knee replacement ICD-10-CM: H86.454  ICD-9-CM: V43.65  8/9/2017 - Present        Leukocytosis, unspecified ICD-10-CM: D72.829  ICD-9-CM: 288.60  4/6/2015 - Present        DM (diabetes mellitus) ([facility] [address] ICD-10-CM: E11.9  ICD-9-CM: 250.00  4/6/2015 - Present        HTN (hypertension) ICD-10-CM: I10  ICD-9-CM: 401.9  4/6/2015 - Present        Arthritis ICD-10-CM: M19.90  ICD-9-CM: 716.90  4/6/2015 - Present        Chest pain, unspecified ICD-10-CM: R07.9  ICD-9-CM: 786.50  9/12/2013 - Present        RESOLVED: Abnormal ECG ICD-10-CM: R94.31  ICD-9-CM: 794.31  3/9/2019 - 3/11/2019        RESOLVED: Abnormal EKG ICD-10-CM: R94.31  ICD-9-CM: 794.31  3/8/2019 - 3/11/2019        RESOLVED: Gastric polyp ICD-10-CM: K31.7  ICD-9-CM: 211.1  9/13/2013 - 10/27/2017        RESOLVED: Hemorrhoids ICD-10-CM: K64.9  ICD-9-CM: 455.6  6/13/2013 - 10/27/2017        RESOLVED: Anal polyp ICD-10-CM: K62.0  ICD-9-CM: 569.0  6/13/2013 - 10/27/2017              Greater than  30  minutes were spent with the patient on counseling and coordination of care    Signed:   Dionicio Scanlon MD  3/11/2019  11:08 AM

## 2019-03-11 NOTE — PROGRESS NOTES
Bedside shift change report given to Lars Cobb RN (oncoming nurse) by WENDY Mace (offgoing nurse). Report included the following information SBAR, Kardex, Intake/Output and MAR    1145: pt to receive ordered 12u humalog per MD d/t BG at 420    1200: Went over discharge instructions and prescriptions with pt, answered any questions and concerns she had.  Awaiting for pt's family member to pick her up.  1300: pt being discharged via private transportation and volunteer assistance

## 2019-03-11 NOTE — PROGRESS NOTES
Hospital follow-up PCP transitional care appointment has been scheduled with Dr. Foreign Sims for Monday, 3/18/19 at 10:30 a.m. Pending patient discharge.   Cy Vallecillo, Care Management Specialist.

## 2019-03-11 NOTE — PROGRESS NOTES
Patient is medically ready for discharge today. Patient has been accepted by [facility]  [phone]. Services will begin tomorrow. Name and number of agency placed on discharge instructions. Family will transport home.

## 2019-03-11 NOTE — PROGRESS NOTES
Weekend events noted  No evidence for HF or ACS  Can discharge at your discretion  Please have the patient schedule a follow up with me in a few weeks  Thanks

## 2019-04-03 ENCOUNTER — HOSPITAL ENCOUNTER (EMERGENCY)
Age: 79
Discharge: HOME | End: 2019-04-04
Attending: EMERGENCY MEDICINE
Payer: MEDICARE

## 2019-04-03 ENCOUNTER — HOSPITAL ENCOUNTER (EMERGENCY)
Age: 79
Discharge: HOME | End: 2019-04-03
Attending: STUDENT IN AN ORGANIZED HEALTH CARE EDUCATION/TRAINING PROGRAM
Payer: MEDICARE

## 2019-04-03 ENCOUNTER — APPOINTMENT (OUTPATIENT)
Dept: GENERAL RADIOLOGY | Age: 79
End: 2019-04-03
Attending: EMERGENCY MEDICINE
Payer: MEDICARE

## 2019-04-03 ENCOUNTER — APPOINTMENT (OUTPATIENT)
Dept: GENERAL RADIOLOGY | Age: 79
End: 2019-04-03
Attending: STUDENT IN AN ORGANIZED HEALTH CARE EDUCATION/TRAINING PROGRAM
Payer: MEDICARE

## 2019-04-03 VITALS
OXYGEN SATURATION: 94 % | SYSTOLIC BLOOD PRESSURE: 123 MMHG | TEMPERATURE: 98 F | DIASTOLIC BLOOD PRESSURE: 61 MMHG | HEART RATE: 94 BPM | RESPIRATION RATE: 12 BRPM

## 2019-04-03 DIAGNOSIS — R73.9 HYPERGLYCEMIA: ICD-10-CM

## 2019-04-03 DIAGNOSIS — J41.0 SIMPLE CHRONIC BRONCHITIS (HCC): ICD-10-CM

## 2019-04-03 DIAGNOSIS — R07.9 CHEST PAIN WITH LOW RISK OF ACUTE CORONARY SYNDROME: Primary | ICD-10-CM

## 2019-04-03 DIAGNOSIS — R07.89 ATYPICAL CHEST PAIN: Primary | ICD-10-CM

## 2019-04-03 DIAGNOSIS — R05.9 COUGH: ICD-10-CM

## 2019-04-03 LAB
ALBUMIN SERPL-MCNC: 2.9 G/DL (ref 3.5–5)
ALBUMIN/GLOB SERPL: 0.7 {RATIO} (ref 1.1–2.2)
ALP SERPL-CCNC: 105 U/L (ref 45–117)
ALT SERPL-CCNC: 18 U/L (ref 12–78)
ANION GAP SERPL CALC-SCNC: 6 MMOL/L (ref 5–15)
AST SERPL-CCNC: 12 U/L (ref 15–37)
ATRIAL RATE: 96 BPM
BASOPHILS # BLD: 0.1 K/UL (ref 0–0.1)
BASOPHILS # BLD: 0.1 K/UL (ref 0–0.1)
BASOPHILS NFR BLD: 0 % (ref 0–1)
BASOPHILS NFR BLD: 0 % (ref 0–1)
BILIRUB SERPL-MCNC: 0.4 MG/DL (ref 0.2–1)
BNP SERPL-MCNC: 13 PG/ML
BUN SERPL-MCNC: 16 MG/DL (ref 6–20)
BUN/CREAT SERPL: 16 (ref 12–20)
CALCIUM SERPL-MCNC: 9.1 MG/DL (ref 8.5–10.1)
CALCULATED P AXIS, ECG09: 51 DEGREES
CALCULATED R AXIS, ECG10: 50 DEGREES
CALCULATED T AXIS, ECG11: 63 DEGREES
CHLORIDE SERPL-SCNC: 106 MMOL/L (ref 97–108)
CO2 SERPL-SCNC: 28 MMOL/L (ref 21–32)
COMMENT, HOLDF: NORMAL
COMMENT, HOLDF: NORMAL
CREAT SERPL-MCNC: 0.97 MG/DL (ref 0.55–1.02)
DIAGNOSIS, 93000: NORMAL
DIFFERENTIAL METHOD BLD: ABNORMAL
DIFFERENTIAL METHOD BLD: ABNORMAL
EOSINOPHIL # BLD: 0.1 K/UL (ref 0–0.4)
EOSINOPHIL # BLD: 0.2 K/UL (ref 0–0.4)
EOSINOPHIL NFR BLD: 1 % (ref 0–7)
EOSINOPHIL NFR BLD: 1 % (ref 0–7)
ERYTHROCYTE [DISTWIDTH] IN BLOOD BY AUTOMATED COUNT: 15 % (ref 11.5–14.5)
ERYTHROCYTE [DISTWIDTH] IN BLOOD BY AUTOMATED COUNT: 15.1 % (ref 11.5–14.5)
GLOBULIN SER CALC-MCNC: 4.3 G/DL (ref 2–4)
GLUCOSE BLD STRIP.AUTO-MCNC: 287 MG/DL (ref 65–100)
GLUCOSE SERPL-MCNC: 201 MG/DL (ref 65–100)
HCT VFR BLD AUTO: 36.4 % (ref 35–47)
HCT VFR BLD AUTO: 38.5 % (ref 35–47)
HGB BLD-MCNC: 11.3 G/DL (ref 11.5–16)
HGB BLD-MCNC: 11.8 G/DL (ref 11.5–16)
IMM GRANULOCYTES # BLD AUTO: 0.1 K/UL (ref 0–0.04)
IMM GRANULOCYTES # BLD AUTO: 0.1 K/UL (ref 0–0.04)
IMM GRANULOCYTES NFR BLD AUTO: 1 % (ref 0–0.5)
IMM GRANULOCYTES NFR BLD AUTO: 1 % (ref 0–0.5)
LYMPHOCYTES # BLD: 2.7 K/UL (ref 0.8–3.5)
LYMPHOCYTES # BLD: 3 K/UL (ref 0.8–3.5)
LYMPHOCYTES NFR BLD: 22 % (ref 12–49)
LYMPHOCYTES NFR BLD: 23 % (ref 12–49)
MCH RBC QN AUTO: 26.9 PG (ref 26–34)
MCH RBC QN AUTO: 27 PG (ref 26–34)
MCHC RBC AUTO-ENTMCNC: 30.6 G/DL (ref 30–36.5)
MCHC RBC AUTO-ENTMCNC: 31 G/DL (ref 30–36.5)
MCV RBC AUTO: 87.1 FL (ref 80–99)
MCV RBC AUTO: 87.9 FL (ref 80–99)
MONOCYTES # BLD: 0.8 K/UL (ref 0–1)
MONOCYTES # BLD: 1.1 K/UL (ref 0–1)
MONOCYTES NFR BLD: 6 % (ref 5–13)
MONOCYTES NFR BLD: 8 % (ref 5–13)
NEUTS SEG # BLD: 8.5 K/UL (ref 1.8–8)
NEUTS SEG # BLD: 8.6 K/UL (ref 1.8–8)
NEUTS SEG NFR BLD: 67 % (ref 32–75)
NEUTS SEG NFR BLD: 70 % (ref 32–75)
NRBC # BLD: 0 K/UL (ref 0–0.01)
NRBC # BLD: 0 K/UL (ref 0–0.01)
NRBC BLD-RTO: 0 PER 100 WBC
NRBC BLD-RTO: 0 PER 100 WBC
P-R INTERVAL, ECG05: 88 MS
PLATELET # BLD AUTO: 547 K/UL (ref 150–400)
PLATELET # BLD AUTO: 570 K/UL (ref 150–400)
PMV BLD AUTO: 9.6 FL (ref 8.9–12.9)
PMV BLD AUTO: 9.8 FL (ref 8.9–12.9)
POTASSIUM SERPL-SCNC: 4.2 MMOL/L (ref 3.5–5.1)
PROCALCITONIN SERPL-MCNC: 0.1 NG/ML
PROT SERPL-MCNC: 7.2 G/DL (ref 6.4–8.2)
Q-T INTERVAL, ECG07: 382 MS
QRS DURATION, ECG06: 60 MS
QTC CALCULATION (BEZET), ECG08: 482 MS
RBC # BLD AUTO: 4.18 M/UL (ref 3.8–5.2)
RBC # BLD AUTO: 4.38 M/UL (ref 3.8–5.2)
SAMPLES BEING HELD,HOLD: NORMAL
SAMPLES BEING HELD,HOLD: NORMAL
SERVICE CMNT-IMP: ABNORMAL
SODIUM SERPL-SCNC: 140 MMOL/L (ref 136–145)
TROPONIN I BLD-MCNC: <0.04 NG/ML (ref 0–0.08)
TROPONIN I SERPL-MCNC: <0.05 NG/ML
VENTRICULAR RATE, ECG03: 96 BPM
WBC # BLD AUTO: 12.3 K/UL (ref 3.6–11)
WBC # BLD AUTO: 13 K/UL (ref 3.6–11)

## 2019-04-03 PROCEDURE — 96360 HYDRATION IV INFUSION INIT: CPT

## 2019-04-03 PROCEDURE — 84100 ASSAY OF PHOSPHORUS: CPT

## 2019-04-03 PROCEDURE — 85025 COMPLETE CBC W/AUTO DIFF WBC: CPT

## 2019-04-03 PROCEDURE — 82962 GLUCOSE BLOOD TEST: CPT

## 2019-04-03 PROCEDURE — 80053 COMPREHEN METABOLIC PANEL: CPT

## 2019-04-03 PROCEDURE — 94640 AIRWAY INHALATION TREATMENT: CPT

## 2019-04-03 PROCEDURE — 74011000250 HC RX REV CODE- 250: Performed by: EMERGENCY MEDICINE

## 2019-04-03 PROCEDURE — 74011250637 HC RX REV CODE- 250/637: Performed by: STUDENT IN AN ORGANIZED HEALTH CARE EDUCATION/TRAINING PROGRAM

## 2019-04-03 PROCEDURE — 99285 EMERGENCY DEPT VISIT HI MDM: CPT

## 2019-04-03 PROCEDURE — 36415 COLL VENOUS BLD VENIPUNCTURE: CPT

## 2019-04-03 PROCEDURE — 71046 X-RAY EXAM CHEST 2 VIEWS: CPT

## 2019-04-03 PROCEDURE — 93005 ELECTROCARDIOGRAM TRACING: CPT

## 2019-04-03 PROCEDURE — 94664 DEMO&/EVAL PT USE INHALER: CPT

## 2019-04-03 PROCEDURE — 83735 ASSAY OF MAGNESIUM: CPT

## 2019-04-03 PROCEDURE — 82550 ASSAY OF CK (CPK): CPT

## 2019-04-03 PROCEDURE — 83880 ASSAY OF NATRIURETIC PEPTIDE: CPT

## 2019-04-03 PROCEDURE — 77030029684 HC NEB SM VOL KT MONA -A

## 2019-04-03 PROCEDURE — 84484 ASSAY OF TROPONIN QUANT: CPT

## 2019-04-03 PROCEDURE — 71045 X-RAY EXAM CHEST 1 VIEW: CPT

## 2019-04-03 PROCEDURE — 84145 PROCALCITONIN (PCT): CPT

## 2019-04-03 PROCEDURE — 74011250636 HC RX REV CODE- 250/636: Performed by: EMERGENCY MEDICINE

## 2019-04-03 RX ORDER — ACETAMINOPHEN 325 MG/1
650 TABLET ORAL
Status: COMPLETED | OUTPATIENT
Start: 2019-04-03 | End: 2019-04-03

## 2019-04-03 RX ADMIN — ACETAMINOPHEN 650 MG: 325 TABLET ORAL at 12:35

## 2019-04-03 RX ADMIN — SODIUM CHLORIDE 1000 ML: 9 INJECTION, SOLUTION INTRAVENOUS at 23:37

## 2019-04-03 RX ADMIN — ALBUTEROL SULFATE 1 DOSE: 2.5 SOLUTION RESPIRATORY (INHALATION) at 23:57

## 2019-04-03 NOTE — ED PROVIDER NOTES
69 y.o. female with past medical history significant for DM, HTN, asthma, GERD, chronic pain, and CAD who presents from home via EMS with chief complaint of chest pain. Patient reports having a PCP visit yesterday for a \"bad cold\" ongoing for 2 weeks, including a productive cough with yellow sputum, congestion, nausea, and diaphoresis. Patient states she was given a prescription for an abx, which she was supposed to  this morning. However, yesterday afternoon and again last night, the patient reports having episodes of midsternal chest pain, exacerbated by coughing and deep inspiration, with accompanying SOB. Patient reports another episode of pain and SOB this morning, prompting her ED visit. Patient currently rates the pain \"7/10\". Patient states the pain is not exertional. Patient denies taking ASA PTA. Patient denies any leg swelling, vomiting, syncope, dysuria, or abdominal pain. There are no other acute medical concerns at this time. Social hx: Former smoker, No EtOH use PCP: Taya Castaneda MD 
 
Note written by Jeimy Shin, as dictated by Rodriguez Powell MD 10:39 AM 
 
The history is provided by the patient. No  was used. Past Medical History:  
Diagnosis Date  Anal polyp 6/13/2013  Arthritis  Asthma  CAD (coronary artery disease) 10/27/2017  Cataract  Chronic pain   
 knee - right/back  Diabetes ([facility] [address]  GERD (gastroesophageal reflux disease)  Hemorrhoids 6/13/2013  History of kidney stones  Hypertension  Ill-defined condition   
 abdominal pain and burning  Other ill-defined conditions(799.89)   
 high cholesterol Past Surgical History:  
Procedure Laterality Date  COLONOSCOPY  2/28/2013  EGD  2/28/2013  HX CATARACT REMOVAL Bilateral   
 HX CHOLECYSTECTOMY  6-2015  HX GI  6/27/13  
 anal polyps removed  HX HEENT    
 laser surgery for blood clot in right eye  HX KNEE REPLACEMENT    
 right  HX OTHER SURGICAL  4-2-14  
 lap gastrotomy and removal of polyp -  - not cancerous per pt  HX ALAN AND BSO  HX TONSILLECTOMY  HX UROLOGICAL \"laser\" surgery for kidney stone  NEUROLOGICAL PROCEDURE UNLISTED  1990  
 tumor at back of neck, benign Family History:  
Problem Relation Age of Onset  Cancer Mother   
     colon  Diabetes Brother  Other Sister GI BLEED  Heart Disease Brother  Heart Attack Brother  Heart Disease Brother  Heart Disease Brother  Schizophrenia Son  Anesth Problems Neg Hx Social History Socioeconomic History  Marital status:  Spouse name: Not on file  Number of children: Not on file  Years of education: Not on file  Highest education level: Not on file Occupational History  Not on file Social Needs  Financial resource strain: Not on file  Food insecurity:  
  Worry: Not on file Inability: Not on file  Transportation needs:  
  Medical: Not on file Non-medical: Not on file Tobacco Use  Smoking status: Former Smoker  Smokeless tobacco: Never Used  Tobacco comment: age 13 Substance and Sexual Activity  Alcohol use: No  
 Drug use: No  
 Sexual activity: Never Lifestyle  Physical activity:  
  Days per week: Not on file Minutes per session: Not on file  Stress: Not on file Relationships  Social connections:  
  Talks on phone: Not on file Gets together: Not on file Attends Druze service: Not on file Active member of club or organization: Not on file Attends meetings of clubs or organizations: Not on file Relationship status: Not on file  Intimate partner violence:  
  Fear of current or ex partner: Not on file Emotionally abused: Not on file Physically abused: Not on file Forced sexual activity: Not on file Other Topics Concern  Not on file Social History Narrative  Not on file ALLERGIES: Seafood [shellfish containing products] Review of Systems Constitutional: Positive for diaphoresis. HENT: Positive for congestion. Eyes: Negative for photophobia. Respiratory: Positive for cough and shortness of breath. Cardiovascular: Positive for chest pain. Negative for leg swelling. Gastrointestinal: Positive for nausea. Negative for abdominal pain, diarrhea and vomiting. Genitourinary: Negative for dysuria. Musculoskeletal: Negative for back pain. Neurological: Negative for syncope and headaches. Psychiatric/Behavioral: Negative for confusion. All other systems reviewed and are negative. There were no vitals filed for this visit. Physical Exam  
Constitutional: She appears well-nourished. No distress. HENT:  
Head: Normocephalic. Mouth/Throat: Oropharynx is clear and moist.  
Eyes: Pupils are equal, round, and reactive to light. Cardiovascular: Normal rate, regular rhythm and intact distal pulses. Pulmonary/Chest: Effort normal. She has no decreased breath sounds. She has no rhonchi. She has no rales. Transmitted upper airway sounds. Abdominal: She exhibits no distension. There is tenderness in the left upper quadrant. There is no rebound and no guarding. LUQ tenderness without rebound or guarding. Neurological: She is alert. Skin: Skin is warm. Psychiatric: Her behavior is normal.  
Nursing note and vitals reviewed. Note written by Jeimy Nguyễn, as dictated by Shaneka Ambrocio MD 10:39 AM 
 
 
MDM Number of Diagnoses or Management Options Chest pain with low risk of acute coronary syndrome:  
Cough:  
Diagnosis management comments: Arturo Sam is a 69 y.o. female presenting with chest pain in the context of a cough, worse with coughing, concurrent uri symptoms. Differential includes uri, pleurisy, << acs, pe, dissection. Course: improved.  
Dispo:  
9:40 PM 
 Return precautions for worsening symptoms, specifically diaphoresis, nausea, worsening pain, radiation, were explained verbally and in writing. Pt was asked to return to the ED immediately for any new, worsening or concerning symptoms. Pt was in agreement, endorsed understanding, and questions were answered. Farhad Toney was instructed to call Debora Duong MD for an urgent hospital follow up appointment. Ramon Singer M.D. 
. 
 
  
 
Procedures ED EKG interpretation: #1 Rhythm: normal sinus rhythm; and regular . Rate (approx.): 95 bpm; Normal axis; No ST elevations or depressions; Limited by artifact; Nonspecific T wave inversions in V1 and V2, which are less prominent when compared to 3/8/19 tracing, which has noted resolved lateral T wave inversions. Note written by Jeimy Ching, as dictated by Mayco Damon MD 10:28 AM 
  
 
2224 cath --  LAD: There was a tubular 50 % stenosis. --  Mid RCA: There was a discrete 60 % stenosis. There was CHENTE grade 2 
flow through the vessel (partial perfusion). --  Right PDA: There was a 
tubular 70 % stenosis in the middle third of the vessel segment. There was CHENTE grade 3 flow through the vessel (brisk flow). ED EKG interpretation: #2 Rhythm: normal sinus rhythm; and regular . Rate (approx.): 83 bpm; Unchanged T wave inversions; No ST elevations. Note written by Jeimy Ching, as dictated by Mayco Damon MD 1:37 PM 
 
2:13 PM 
Repeat POC troponin negative. Discharged home.

## 2019-04-03 NOTE — ED NOTES
1140: Safety: patient resting on stretcher. Stretcher in low position,  SR up, call bell within reach. Ongoing updates: Updated on plan of care and status of tests pending:  BNP, pro calcitonin. Comfort measurest: Patient c/o of brief saturated. Patient brief changed. Provided two warm blankets to patient. VS: see doc flowsheets. 1215: Bedside and Verbal shift change report given to George Chavarria RN (oncoming nurse) by Annette Cisse RN (offgoing nurse). Report included the following information ED Summary and Recent Results.

## 2019-04-03 NOTE — ED NOTES
Discharge instructions reviewed by provider; pt verbalized understanding of discharge and medication use. Vitals updated, IV DC'ed and pt ambulated to hallway chair to await transport in view of nurses station. No apparent distress noted.

## 2019-04-03 NOTE — PROGRESS NOTES
Date of previous inpatient admission/ ED visit? Inpatient Admission 3/8/19-3/11/19 Abnormal EKG. Noted recent hospitalization <30 days What brought the patient back to ED? Patient presents to the ED w/ chest pain Did patient decline recommended services during last admission/ ED visit (if yes, what)? No  
 
Has patient seen a provider since their last inpatient admission/ED visit (if yes, when)? Yes. Seen by PCP yesterday CM Interventions: 
From previous inpatient admission/ED visit:Assessment/ Referral to [facility] (3/2019) From current inpatient admission/ED visit:Assessment SSED/CM consult received regarding transportation. Discussed w/ Nursing informed of 30 min.-3 hour ETA. Met w/patient and introduced self.  lives alone  in 2 story house w/ basement. Has support of  who drives her car to appointments and grocery shopping.  plans to find alternative housing on 1 level this Summer. Informed purchases meal entrees for convenience.  is able to perform ADLs. Patient placed call to  who is available and will drive to [facility] [place] ED. CM placed call to [facility] spoke w/ Domitila Ferguson. Insurance / [place] Medicare A/B and [facility] Medicaid Reynold Silva is PCP Local pharmacy is [facility] 
 
Care Management Interventions PCP Verified by CM: Yes Last Visit to PCP: 04/02/19 Palliative Care Criteria Met (RRAT>21 & CHF Dx)?: No 
Transition of Care Consult (CM Consult): Other, Discharge Planning MyChart Signup: No 
Discharge Durable Medical Equipment: No 
Health Maintenance Reviewed: Yes Physical Therapy Consult: No 
Occupational Therapy Consult: No 
Speech Therapy Consult: No 
Current Support Network: Lives Alone, Own Home Confirm Follow Up Transport: Friends Plan discussed with Pt/Family/Caregiver: Yes [employer] Information Provided?: No 
Discharge Location Discharge Placement: Home

## 2019-04-04 VITALS
HEART RATE: 98 BPM | WEIGHT: 174 LBS | OXYGEN SATURATION: 94 % | BODY MASS INDEX: 34.16 KG/M2 | HEIGHT: 60 IN | TEMPERATURE: 97.8 F | RESPIRATION RATE: 24 BRPM | DIASTOLIC BLOOD PRESSURE: 40 MMHG | SYSTOLIC BLOOD PRESSURE: 97 MMHG

## 2019-04-04 VITALS
SYSTOLIC BLOOD PRESSURE: 116 MMHG | DIASTOLIC BLOOD PRESSURE: 56 MMHG | TEMPERATURE: 97.9 F | HEART RATE: 96 BPM | RESPIRATION RATE: 16 BRPM | OXYGEN SATURATION: 100 %

## 2019-04-04 DIAGNOSIS — R07.89 ATYPICAL CHEST PAIN: Primary | ICD-10-CM

## 2019-04-04 LAB
ALBUMIN SERPL-MCNC: 2.8 G/DL (ref 3.5–5)
ALBUMIN SERPL-MCNC: 2.8 G/DL (ref 3.5–5)
ALBUMIN/GLOB SERPL: 0.7 {RATIO} (ref 1.1–2.2)
ALBUMIN/GLOB SERPL: 0.7 {RATIO} (ref 1.1–2.2)
ALP SERPL-CCNC: 103 U/L (ref 45–117)
ALP SERPL-CCNC: 105 U/L (ref 45–117)
ALT SERPL-CCNC: 17 U/L (ref 12–78)
ALT SERPL-CCNC: 21 U/L (ref 12–78)
ANION GAP SERPL CALC-SCNC: 11 MMOL/L (ref 5–15)
ANION GAP SERPL CALC-SCNC: 8 MMOL/L (ref 5–15)
APPEARANCE UR: CLEAR
AST SERPL-CCNC: 10 U/L (ref 15–37)
AST SERPL-CCNC: 10 U/L (ref 15–37)
ATRIAL RATE: 104 BPM
ATRIAL RATE: 83 BPM
BACTERIA URNS QL MICRO: NEGATIVE /HPF
BASOPHILS # BLD: 0.1 K/UL (ref 0–0.1)
BASOPHILS NFR BLD: 0 % (ref 0–1)
BILIRUB SERPL-MCNC: 0.2 MG/DL (ref 0.2–1)
BILIRUB SERPL-MCNC: 0.3 MG/DL (ref 0.2–1)
BILIRUB UR QL: NEGATIVE
BUN SERPL-MCNC: 13 MG/DL (ref 6–20)
BUN SERPL-MCNC: 14 MG/DL (ref 6–20)
BUN/CREAT SERPL: 13 (ref 12–20)
BUN/CREAT SERPL: 14 (ref 12–20)
CALCIUM SERPL-MCNC: 8.2 MG/DL (ref 8.5–10.1)
CALCIUM SERPL-MCNC: 8.4 MG/DL (ref 8.5–10.1)
CALCULATED P AXIS, ECG09: -8 DEGREES
CALCULATED P AXIS, ECG09: 67 DEGREES
CALCULATED R AXIS, ECG10: 45 DEGREES
CALCULATED R AXIS, ECG10: 9 DEGREES
CALCULATED T AXIS, ECG11: -3 DEGREES
CALCULATED T AXIS, ECG11: 53 DEGREES
CHLORIDE SERPL-SCNC: 105 MMOL/L (ref 97–108)
CHLORIDE SERPL-SCNC: 107 MMOL/L (ref 97–108)
CK MB CFR SERPL CALC: 1.8 % (ref 0–2.5)
CK MB SERPL-MCNC: 1.3 NG/ML (ref 5–25)
CK SERPL-CCNC: 71 U/L (ref 26–192)
CO2 SERPL-SCNC: 25 MMOL/L (ref 21–32)
CO2 SERPL-SCNC: 26 MMOL/L (ref 21–32)
COLOR UR: ABNORMAL
CREAT SERPL-MCNC: 0.94 MG/DL (ref 0.55–1.02)
CREAT SERPL-MCNC: 1.12 MG/DL (ref 0.55–1.02)
DIAGNOSIS, 93000: NORMAL
DIAGNOSIS, 93000: NORMAL
DIFFERENTIAL METHOD BLD: ABNORMAL
EOSINOPHIL # BLD: 0.1 K/UL (ref 0–0.4)
EOSINOPHIL NFR BLD: 1 % (ref 0–7)
EPITH CASTS URNS QL MICRO: ABNORMAL /LPF
ERYTHROCYTE [DISTWIDTH] IN BLOOD BY AUTOMATED COUNT: 15 % (ref 11.5–14.5)
FLUAV AG NPH QL IA: NEGATIVE
FLUBV AG NOSE QL IA: NEGATIVE
GLOBULIN SER CALC-MCNC: 4.2 G/DL (ref 2–4)
GLOBULIN SER CALC-MCNC: 4.3 G/DL (ref 2–4)
GLUCOSE BLD STRIP.AUTO-MCNC: 136 MG/DL (ref 65–100)
GLUCOSE SERPL-MCNC: 134 MG/DL (ref 65–100)
GLUCOSE SERPL-MCNC: 292 MG/DL (ref 65–100)
GLUCOSE UR STRIP.AUTO-MCNC: NEGATIVE MG/DL
HCT VFR BLD AUTO: 35.6 % (ref 35–47)
HGB BLD-MCNC: 11.4 G/DL (ref 11.5–16)
HGB UR QL STRIP: NEGATIVE
IMM GRANULOCYTES # BLD AUTO: 0.1 K/UL (ref 0–0.04)
IMM GRANULOCYTES NFR BLD AUTO: 1 % (ref 0–0.5)
KETONES UR QL STRIP.AUTO: NEGATIVE MG/DL
LEUKOCYTE ESTERASE UR QL STRIP.AUTO: ABNORMAL
LYMPHOCYTES # BLD: 2.6 K/UL (ref 0.8–3.5)
LYMPHOCYTES NFR BLD: 21 % (ref 12–49)
MAGNESIUM SERPL-MCNC: 1.9 MG/DL (ref 1.6–2.4)
MCH RBC QN AUTO: 27.1 PG (ref 26–34)
MCHC RBC AUTO-ENTMCNC: 32 G/DL (ref 30–36.5)
MCV RBC AUTO: 84.6 FL (ref 80–99)
MONOCYTES # BLD: 0.8 K/UL (ref 0–1)
MONOCYTES NFR BLD: 6 % (ref 5–13)
NEUTS SEG # BLD: 8.9 K/UL (ref 1.8–8)
NEUTS SEG NFR BLD: 71 % (ref 32–75)
NITRITE UR QL STRIP.AUTO: NEGATIVE
NRBC # BLD: 0 K/UL (ref 0–0.01)
NRBC BLD-RTO: 0 PER 100 WBC
P-R INTERVAL, ECG05: 130 MS
P-R INTERVAL, ECG05: 132 MS
PH UR STRIP: 6 [PH] (ref 5–8)
PHOSPHATE SERPL-MCNC: 3.7 MG/DL (ref 2.6–4.7)
PLATELET # BLD AUTO: 558 K/UL (ref 150–400)
PMV BLD AUTO: 9.6 FL (ref 8.9–12.9)
POTASSIUM SERPL-SCNC: 3.9 MMOL/L (ref 3.5–5.1)
POTASSIUM SERPL-SCNC: 3.9 MMOL/L (ref 3.5–5.1)
PROT SERPL-MCNC: 7 G/DL (ref 6.4–8.2)
PROT SERPL-MCNC: 7.1 G/DL (ref 6.4–8.2)
PROT UR STRIP-MCNC: NEGATIVE MG/DL
Q-T INTERVAL, ECG07: 364 MS
Q-T INTERVAL, ECG07: 416 MS
QRS DURATION, ECG06: 66 MS
QRS DURATION, ECG06: 66 MS
QTC CALCULATION (BEZET), ECG08: 478 MS
QTC CALCULATION (BEZET), ECG08: 488 MS
RBC # BLD AUTO: 4.21 M/UL (ref 3.8–5.2)
RBC #/AREA URNS HPF: ABNORMAL /HPF (ref 0–5)
SERVICE CMNT-IMP: ABNORMAL
SODIUM SERPL-SCNC: 140 MMOL/L (ref 136–145)
SODIUM SERPL-SCNC: 142 MMOL/L (ref 136–145)
SP GR UR REFRACTOMETRY: 1.01 (ref 1–1.03)
TROPONIN I SERPL-MCNC: <0.05 NG/ML
TROPONIN I SERPL-MCNC: <0.05 NG/ML
UA: UC IF INDICATED,UAUC: ABNORMAL
UROBILINOGEN UR QL STRIP.AUTO: 1 EU/DL (ref 0.2–1)
VENTRICULAR RATE, ECG03: 104 BPM
VENTRICULAR RATE, ECG03: 83 BPM
WBC # BLD AUTO: 12.5 K/UL (ref 3.6–11)
WBC URNS QL MICRO: ABNORMAL /HPF (ref 0–4)

## 2019-04-04 PROCEDURE — 85025 COMPLETE CBC W/AUTO DIFF WBC: CPT

## 2019-04-04 PROCEDURE — 81001 URINALYSIS AUTO W/SCOPE: CPT

## 2019-04-04 PROCEDURE — 93005 ELECTROCARDIOGRAM TRACING: CPT

## 2019-04-04 PROCEDURE — 82962 GLUCOSE BLOOD TEST: CPT

## 2019-04-04 PROCEDURE — 96361 HYDRATE IV INFUSION ADD-ON: CPT

## 2019-04-04 PROCEDURE — 80053 COMPREHEN METABOLIC PANEL: CPT

## 2019-04-04 PROCEDURE — 99284 EMERGENCY DEPT VISIT MOD MDM: CPT

## 2019-04-04 PROCEDURE — 87086 URINE CULTURE/COLONY COUNT: CPT

## 2019-04-04 PROCEDURE — 87186 SC STD MICRODIL/AGAR DIL: CPT

## 2019-04-04 PROCEDURE — 87077 CULTURE AEROBIC IDENTIFY: CPT

## 2019-04-04 PROCEDURE — 84484 ASSAY OF TROPONIN QUANT: CPT

## 2019-04-04 PROCEDURE — 87804 INFLUENZA ASSAY W/OPTIC: CPT

## 2019-04-04 PROCEDURE — 74011250637 HC RX REV CODE- 250/637: Performed by: EMERGENCY MEDICINE

## 2019-04-04 PROCEDURE — 36415 COLL VENOUS BLD VENIPUNCTURE: CPT

## 2019-04-04 RX ORDER — LORAZEPAM 1 MG/1
0.5 TABLET ORAL
Status: COMPLETED | OUTPATIENT
Start: 2019-04-04 | End: 2019-04-04

## 2019-04-04 RX ADMIN — LORAZEPAM 0.5 MG: 1 TABLET ORAL at 07:00

## 2019-04-04 NOTE — ED PROVIDER NOTES
EMERGENCY DEPARTMENT HISTORY AND PHYSICAL EXAM 
 
 
Date: 4/4/2019 Patient Name: Carlos Alberto Cassidy History of Presenting Illness Chief Complaint Patient presents with  
 High Blood Sugar  Chest Pain History Provided By: Patient HPI: Carlos Alberto Cassidy, 69 y.o. female with PMHx significant for DM, HTN, HLD, CAD, anxiety presents ambulatory to the ED with cc of elevated blood sugar, body aches. Patient was seen at [facility] twice in the last 24 hours for similar symptoms and had normal workups both of those times. She states that she took her blood sugar at home and it was 418. She then took 30 units of her Lantus. She states \"I just do not feel right. \"  She is unable to further characterize her symptoms outside of her body aching all over. She also endorses urinary frequency, however no dysuria. Patient was seen by her primary care provider 6 days ago, at which time he told her her blood sugar was elevated and increased her lantus dose from 20 units BID to 30 units BID 
 
 
PCP: Jayla Harris MD 
 
There are no other complaints, changes, or physical findings at this time. Current Outpatient Medications Medication Sig Dispense Refill  albuterol (PROVENTIL VENTOLIN) 2.5 mg /3 mL (0.083 %) nebulizer solution 3 mL by Nebulization route every four (4) hours as needed for Wheezing. 60 Each 0  
 arformoterol (BROVANA) 15 mcg/2 mL nebu neb solution 2 mL by Nebulization route two (2) times a day. 60 Vial 0  
 budesonide (PULMICORT) 0.5 mg/2 mL nbsp 2 mL by Nebulization route two (2) times a day. 60 Each 1  
 aspirin 81 mg chewable tablet Take 1 Tab by mouth daily. 30 Tab 0  
 busPIRone (BUSPAR) 5 mg tablet Take 20 mg by mouth two (2) times a day.  DULoxetine (CYMBALTA) 60 mg capsule Take 60 mg by mouth daily.  ranolazine ER (RANEXA) 500 mg SR tablet Take 500 mg by mouth two (2) times a day.  mirtazapine (REMERON) 15 mg tablet Take 15 mg by mouth nightly.  QUEtiapine (SEROQUEL) 25 mg tablet Take 25 mg by mouth nightly.  albuterol (PROVENTIL HFA, VENTOLIN HFA, PROAIR HFA) 90 mcg/actuation inhaler Take 2 Puffs by inhalation every four (4) hours as needed for Wheezing. 1 Inhaler 0  
 rosuvastatin (CRESTOR) 10 mg tablet Take 10 mg by mouth nightly.  Dexlansoprazole (DEXILANT) 60 mg CpDB Take 60 mg by mouth Daily (before breakfast).  amLODIPine (NORVASC) 10 mg tablet Take 10 mg by mouth daily. 0  
 fosinopril (MONOPRIL) 20 mg tablet Take 20 mg by mouth daily. 0  
 Insulin Glargine (LANTUS SOLOSTAR) 100 unit/mL (3 mL) flexpen 20 Units by SubCUTAneous route two (2) times a day. Indications: Diabetes Mellitus Past History Past Medical History: 
Past Medical History:  
Diagnosis Date  Anal polyp 6/13/2013  Arthritis  Asthma  CAD (coronary artery disease) 10/27/2017  Cataract  Chronic pain   
 knee - right/back  Diabetes ([address]  GERD (gastroesophageal reflux disease)  Hemorrhoids 6/13/2013  History of kidney stones  Hypertension  Ill-defined condition   
 abdominal pain and burning  Other ill-defined conditions(799.89)   
 high cholesterol Past Surgical History: 
Past Surgical History:  
Procedure Laterality Date  COLONOSCOPY  2/28/2013  EGD  2/28/2013  HX CATARACT REMOVAL Bilateral   
 HX CHOLECYSTECTOMY  6-2015  HX GI  6/27/13  
 anal polyps removed  HX HEENT    
 laser surgery for blood clot in right eye  HX KNEE REPLACEMENT    
 right  HX OTHER SURGICAL  4-2-14  
 lap gastrotomy and removal of polyp -  - not cancerous per pt  HX ALAN AND BSO  HX TONSILLECTOMY  HX UROLOGICAL \"laser\" surgery for kidney stone  NEUROLOGICAL PROCEDURE UNLISTED  1990  
 tumor at back of neck, benign Family History: 
Family History Problem Relation Age of Onset  Cancer Mother   
     colon  Diabetes Brother  Other Sister      GI BLEED  
  Heart Disease Brother  Heart Attack Brother  Heart Disease Brother  Heart Disease Brother  Schizophrenia Son  Anesth Problems Neg Hx Social History: 
Social History Tobacco Use  Smoking status: Former Smoker  Smokeless tobacco: Never Used  Tobacco comment: age 13 Substance Use Topics  Alcohol use: No  
 Drug use: No  
 
Allergies: Allergies Allergen Reactions  Seafood [Shellfish Containing Products] Swelling Review of Systems Review of Systems Constitutional: Negative for chills and fever. HENT: Negative for congestion, rhinorrhea and sore throat. Respiratory: Negative for cough and shortness of breath. Cardiovascular: Negative for chest pain. Gastrointestinal: Negative for abdominal pain, nausea and vomiting. Genitourinary: Positive for frequency. Negative for dysuria and urgency. Musculoskeletal: Positive for myalgias (diffuse). Skin: Negative for rash. Neurological: Negative for dizziness, light-headedness and headaches. All other systems reviewed and are negative. Physical Exam  
Physical Exam  
Constitutional: She is oriented to person, place, and time. She appears well-developed and well-nourished. No distress. HENT:  
Head: Normocephalic and atraumatic. Eyes: Pupils are equal, round, and reactive to light. Conjunctivae and EOM are normal.  
Neck: Normal range of motion. Cardiovascular: Normal rate, regular rhythm and intact distal pulses. Pulmonary/Chest: Effort normal and breath sounds normal. No stridor. Tachypnea noted. No respiratory distress. Abdominal: Soft. She exhibits no distension. There is no tenderness. Musculoskeletal: Normal range of motion. Neurological: She is alert and oriented to person, place, and time. Skin: Skin is warm and dry. Psychiatric: Her mood appears anxious. Very anxious Nursing note and vitals reviewed. Diagnostic Study Results Labs - 
  
 No results found for this or any previous visit (from the past 12 hour(s)). Recent Results (from the past 24 hour(s)) EKG, 12 LEAD, INITIAL Collection Time: 04/03/19 11:21 PM  
Result Value Ref Range Ventricular Rate 104 BPM  
 Atrial Rate 104 BPM  
 P-R Interval 130 ms QRS Duration 66 ms  
 Q-T Interval 364 ms QTC Calculation (Bezet) 478 ms Calculated P Axis -8 degrees Calculated R Axis 9 degrees Calculated T Axis -3 degrees Diagnosis Sinus tachycardia Nonspecific ST-T wave changes When compared with ECG of 03-APR-2019 13:34, No significant change Confirmed by Homero Delong M.D., Vu Flores ([id number]) on 4/4/2019 7:48:54 AM 
  
GLUCOSE, POC Collection Time: 04/03/19 11:22 PM  
Result Value Ref Range Glucose (POC) 287 (H) 65 - 100 mg/dL Performed by Tutu Dietrich Collection Time: 04/03/19 11:26 PM  
Result Value Ref Range SAMPLES BEING HELD LV,GRN,RD,BL   
 COMMENT Add-on orders for these samples will be processed based on acceptable specimen integrity and analyte stability, which may vary by analyte. CBC WITH AUTOMATED DIFF Collection Time: 04/03/19 11:26 PM  
Result Value Ref Range WBC 13.0 (H) 3.6 - 11.0 K/uL  
 RBC 4.18 3.80 - 5.20 M/uL  
 HGB 11.3 (L) 11.5 - 16.0 g/dL HCT 36.4 35.0 - 47.0 % MCV 87.1 80.0 - 99.0 FL  
 MCH 27.0 26.0 - 34.0 PG  
 MCHC 31.0 30.0 - 36.5 g/dL  
 RDW 15.0 (H) 11.5 - 14.5 % PLATELET 374 (H) 778 - 400 K/uL MPV 9.8 8.9 - 12.9 FL  
 NRBC 0.0 0  WBC ABSOLUTE NRBC 0.00 0.00 - 0.01 K/uL NEUTROPHILS 67 32 - 75 % LYMPHOCYTES 23 12 - 49 % MONOCYTES 8 5 - 13 % EOSINOPHILS 1 0 - 7 % BASOPHILS 0 0 - 1 % IMMATURE GRANULOCYTES 1 (H) 0.0 - 0.5 % ABS. NEUTROPHILS 8.6 (H) 1.8 - 8.0 K/UL  
 ABS. LYMPHOCYTES 3.0 0.8 - 3.5 K/UL  
 ABS. MONOCYTES 1.1 (H) 0.0 - 1.0 K/UL  
 ABS. EOSINOPHILS 0.1 0.0 - 0.4 K/UL  
 ABS. BASOPHILS 0.1 0.0 - 0.1 K/UL ABS. IMM. GRANS. 0.1 (H) 0.00 - 0.04 K/UL  
 DF AUTOMATED    
CK W/ CKMB & INDEX Collection Time: 04/03/19 11:26 PM  
Result Value Ref Range CK 71 26 - 192 U/L  
 CK - MB 1.3 <3.6 NG/ML  
 CK-MB Index 1.8 0.0 - 2.5 MAGNESIUM Collection Time: 04/03/19 11:26 PM  
Result Value Ref Range Magnesium 1.9 1.6 - 2.4 mg/dL METABOLIC PANEL, COMPREHENSIVE Collection Time: 04/03/19 11:26 PM  
Result Value Ref Range Sodium 140 136 - 145 mmol/L Potassium 3.9 3.5 - 5.1 mmol/L Chloride 107 97 - 108 mmol/L  
 CO2 25 21 - 32 mmol/L Anion gap 8 5 - 15 mmol/L Glucose 292 (H) 65 - 100 mg/dL BUN 14 6 - 20 MG/DL Creatinine 1.12 (H) 0.55 - 1.02 MG/DL  
 BUN/Creatinine ratio 13 12 - 20 GFR est AA 57 (L) >60 ml/min/1.73m2 GFR est non-AA 47 (L) >60 ml/min/1.73m2 Calcium 8.4 (L) 8.5 - 10.1 MG/DL Bilirubin, total 0.2 0.2 - 1.0 MG/DL  
 ALT (SGPT) 17 12 - 78 U/L  
 AST (SGOT) 10 (L) 15 - 37 U/L Alk. phosphatase 105 45 - 117 U/L Protein, total 7.0 6.4 - 8.2 g/dL Albumin 2.8 (L) 3.5 - 5.0 g/dL Globulin 4.2 (H) 2.0 - 4.0 g/dL A-G Ratio 0.7 (L) 1.1 - 2.2 PHOSPHORUS Collection Time: 04/03/19 11:26 PM  
Result Value Ref Range Phosphorus 3.7 2.6 - 4.7 MG/DL  
TROPONIN I Collection Time: 04/03/19 11:26 PM  
Result Value Ref Range Troponin-I, Qt. <0.05 <0.05 ng/mL GLUCOSE, POC Collection Time: 04/04/19  6:22 AM  
Result Value Ref Range Glucose (POC) 136 (H) 65 - 100 mg/dL Performed by Josephus Crumb EKG, 12 LEAD, INITIAL Collection Time: 04/04/19  6:44 AM  
Result Value Ref Range Ventricular Rate 89 BPM  
 Atrial Rate 89 BPM  
 P-R Interval 136 ms QRS Duration 68 ms Q-T Interval 404 ms QTC Calculation (Bezet) 491 ms Calculated P Axis 70 degrees Calculated R Axis 39 degrees Calculated T Axis 64 degrees Diagnosis Normal sinus rhythm Nonspecific T wave abnormality Prolonged QT Abnormal ECG 
 When compared with ECG of 03-APR-2019 23:21, 
MANUAL COMPARISON REQUIRED, DATA IS UNCONFIRMED URINALYSIS W/ REFLEX CULTURE Collection Time: 04/04/19  7:00 AM  
Result Value Ref Range Color YELLOW/STRAW Appearance CLEAR CLEAR Specific gravity 1.015 1.003 - 1.030    
 pH (UA) 6.0 5.0 - 8.0 Protein NEGATIVE  NEG mg/dL Glucose NEGATIVE  NEG mg/dL Ketone NEGATIVE  NEG mg/dL Bilirubin NEGATIVE  NEG Blood NEGATIVE  NEG Urobilinogen 1.0 0.2 - 1.0 EU/dL Nitrites NEGATIVE  NEG Leukocyte Esterase SMALL (A) NEG    
 WBC 10-20 0 - 4 /hpf  
 RBC 0-5 0 - 5 /hpf Epithelial cells FEW FEW /lpf Bacteria NEGATIVE  NEG /hpf  
 UA:UC IF INDICATED URINE CULTURE ORDERED (A) CNI    
CBC WITH AUTOMATED DIFF Collection Time: 04/04/19  7:00 AM  
Result Value Ref Range WBC 12.5 (H) 3.6 - 11.0 K/uL  
 RBC 4.21 3.80 - 5.20 M/uL  
 HGB 11.4 (L) 11.5 - 16.0 g/dL HCT 35.6 35.0 - 47.0 % MCV 84.6 80.0 - 99.0 FL  
 MCH 27.1 26.0 - 34.0 PG  
 MCHC 32.0 30.0 - 36.5 g/dL  
 RDW 15.0 (H) 11.5 - 14.5 % PLATELET 947 (H) 206 - 400 K/uL MPV 9.6 8.9 - 12.9 FL  
 NRBC 0.0 0  WBC ABSOLUTE NRBC 0.00 0.00 - 0.01 K/uL NEUTROPHILS 71 32 - 75 % LYMPHOCYTES 21 12 - 49 % MONOCYTES 6 5 - 13 % EOSINOPHILS 1 0 - 7 % BASOPHILS 0 0 - 1 % IMMATURE GRANULOCYTES 1 (H) 0.0 - 0.5 % ABS. NEUTROPHILS 8.9 (H) 1.8 - 8.0 K/UL  
 ABS. LYMPHOCYTES 2.6 0.8 - 3.5 K/UL  
 ABS. MONOCYTES 0.8 0.0 - 1.0 K/UL  
 ABS. EOSINOPHILS 0.1 0.0 - 0.4 K/UL  
 ABS. BASOPHILS 0.1 0.0 - 0.1 K/UL  
 ABS. IMM. GRANS. 0.1 (H) 0.00 - 0.04 K/UL  
 DF AUTOMATED METABOLIC PANEL, COMPREHENSIVE Collection Time: 04/04/19  7:00 AM  
Result Value Ref Range Sodium 142 136 - 145 mmol/L Potassium 3.9 3.5 - 5.1 mmol/L Chloride 105 97 - 108 mmol/L  
 CO2 26 21 - 32 mmol/L Anion gap 11 5 - 15 mmol/L Glucose 134 (H) 65 - 100 mg/dL BUN 13 6 - 20 MG/DL  Creatinine 0.94 0.55 - 1.02 MG/DL  
 BUN/Creatinine ratio 14 12 - 20 GFR est AA >60 >60 ml/min/1.73m2 GFR est non-AA 58 (L) >60 ml/min/1.73m2 Calcium 8.2 (L) 8.5 - 10.1 MG/DL Bilirubin, total 0.3 0.2 - 1.0 MG/DL  
 ALT (SGPT) 21 12 - 78 U/L  
 AST (SGOT) 10 (L) 15 - 37 U/L Alk. phosphatase 103 45 - 117 U/L Protein, total 7.1 6.4 - 8.2 g/dL Albumin 2.8 (L) 3.5 - 5.0 g/dL Globulin 4.3 (H) 2.0 - 4.0 g/dL A-G Ratio 0.7 (L) 1.1 - 2.2    
TROPONIN I Collection Time: 04/04/19  7:00 AM  
Result Value Ref Range Troponin-I, Qt. <0.05 <0.05 ng/mL INFLUENZA A & B AG (RAPID TEST) Collection Time: 04/04/19  7:00 AM  
Result Value Ref Range Influenza A Antigen NEGATIVE  NEG Influenza B Antigen NEGATIVE  NEG Radiologic Studies - No orders to display Xr Chest Pa Lat Result Date: 4/3/2019 IMPRESSION: No acute abnormality. Xr Chest [facility] Result Date: 4/3/2019 IMPRESSION: No acute process. Medical Decision Making I am the first provider for this patient. I reviewed the vital signs, available nursing notes, past medical history, past surgical history, family history and social history. Vital Signs-Reviewed the patient's vital signs. Patient Vitals for the past 12 hrs: 
 Temp Pulse Resp BP SpO2  
04/04/19 0621 97.8 °F (36.6 °C) 98 24 141/70 96 % Pulse Oximetry Analysis - 96% on RA Cardiac Monitor:  
Rate: 98 bpm 
Rhythm: Normal Sinus Rhythm ED EKG interpretation: 
Rhythm: normal sinus rhythm; and regular . Rate (approx.): 89; Axis: normal; P wave: normal; QRS interval: normal ; ST/T wave: T wave inverted V2, V3; Other findings: unchanged from previous ekg. This EKG was interpreted by ORLANDO Gomez MD,ED Provider. Records Reviewed: Nursing Notes and Old Medical Records Provider Notes (Medical Decision Making):  
Patient presents with complaints of elevated blood sugar, body aches, chest pain.   Differential includes viral illness, atypical chest pain, UTI, hyperglycemia, DKA, ACS. On arrival patient's blood sugar was 136. Patient has been evaluated twice in the last 24 hours for similar symptoms with normal workups which included basic labs, troponin, chest x-ray, and EKG. She did not have a urinalysis done either of her prior visits, so will check one today. We will repeat her lab work and EKG. Patient does appear significantly anxious at the time of my evaluation. ED Course:  
Initial assessment performed. The patients presenting problems have been discussed, and they are in agreement with the care plan formulated and outlined with them. I have encouraged them to ask questions as they arise throughout their visit. ED Course as of Apr 04 2000 Thu Apr 04, 2019  
[id number] Patient signed out to Dr. Mery Fletcher. Dispo pending labs, imaging, and re-evaluation  
 [[place]] ED Course User Index Debbie Luna MD  
 
 
Disposition: 
Discharge home PLAN: 
1. Discharge Medication List as of 4/4/2019  7:58 AM  
  
 
2. Follow-up Information Follow up With Specialties Details Why Contact Info Tammy Pereira MD Internal Medicine In 1 week  [address] [place] [address] 
[phone] Return to ED if worse Diagnosis Clinical Impression: 1. Atypical chest pain This note will not be viewable in [address]

## 2019-04-04 NOTE — DISCHARGE INSTRUCTIONS
Patient Education        Learning About Chronic Bronchitis  What is chronic bronchitis? Chronic bronchitis is long-term swelling and the buildup of mucus in the airways of your lungs. The airways (bronchial tubes) get inflamed and make a lot of mucus. This can narrow or block the airways, making it hard for you to breathe. It is a form of COPD (chronic obstructive pulmonary disease). Chronic bronchitis is usually caused by smoking. But chemical fumes, dust, or air pollution also can cause it over time. What can you expect when you have chronic bronchitis? Chronic bronchitis gets worse over time. You cannot undo the damage to your lungs. Over time, you may find that:  · You get short of breath even when you do simple things like get dressed or fix a meal.  · It is hard to eat or exercise. · You lose weight and feel weaker. Over many years, the swelling and mucus from chronic bronchitis make it more likely that you will get lung infections. But there are things you can do to prevent more damage and feel better. What are the symptoms? The main symptoms of chronic bronchitis are:  · A cough that will not go away. · Mucus that comes up when you cough. · Shortness of breath that gets worse when you exercise. At times, your symptoms may suddenly flare up and get much worse. This is a called an exacerbation (say \"egg-PEREZ-er-BAY-peter\"). When this happens, your usual symptoms quickly get worse and stay bad. This can be dangerous. You may have to go to the hospital.  How can you keep chronic bronchitis from getting worse? Don't smoke. That is the best way to keep chronic bronchitis from getting worse. If you already smoke, it is never too late to stop. If you need help quitting, talk to your doctor about stop-smoking programs and medicines. These can increase your chances of quitting for good. You can do other things to keep chronic bronchitis from getting worse:  · Avoid bad air.  Air pollution, chemical fumes, and dust also can make chronic bronchitis worse. · Get a flu shot every year. A shot may keep the flu from turning into something more serious, like pneumonia. A flu shot also may lower your chances of having a flare-up. · Get a pneumococcal shot. A shot can prevent some of the serious complications of pneumonia. Ask your doctor how often you should get this shot. How is chronic bronchitis treated? Chronic bronchitis is treated with medicines and oxygen. You also can take steps at home to stay healthy and keep your condition from getting worse. Medicines and oxygen therapy  · You may be taking medicines such as:  ? Bronchodilators. These help open your airways and make breathing easier. Bronchodilators are either short-acting (work for 6 to 9 hours) or long-acting (work for 24 hours). You inhale most bronchodilators, so they start to act quickly. Always carry your quick-relief inhaler with you in case you need it while you are away from home. ? Corticosteroids. These reduce airway inflammation. They come in pill or inhaled form. You must take these medicines every day for them to work well. ? Antibiotics. These medicines are used when you have a bacterial lung infection. · Take your medicines exactly as prescribed. Call your doctor if you think you are having a problem with your medicine. · Oxygen therapy boosts the amount of oxygen in your blood and helps you breathe easier. Use the flow rate your doctor has recommended, and do not change it without talking to your doctor first.  Other care at home  · If your doctor recommends it, get more exercise. Walking is a good choice. Bit by bit, increase the amount you walk every day. Try for at least 30 minutes on most days of the week. · Learn breathing methods--such as breathing through pursed lips--to help you become less short of breath.   · If your doctor has not set you up with a pulmonary rehabilitation program, talk to him or her about whether rehab is right for you. Rehab includes exercise programs, education about your disease and how to manage it, help with diet and other changes, and emotional support. · Eat regular, healthy meals. Use bronchodilators about 1 hour before you eat to make it easier to eat. Eat several small meals instead of three large ones. Drink beverages at the end of the meal. Avoid foods that are hard to chew. Follow-up care is a key part of your treatment and safety. Be sure to make and go to all appointments, and call your doctor if you are having problems. It's also a good idea to know your test results and keep a list of the medicines you take. Where can you learn more? Go to http://arnaldoRC Transportationmariano.info/. Enter A761 in the search box to learn more about \"Learning About Chronic Bronchitis. \"  Current as of: September 5, 2018  Content Version: 11.9  © 8591-3310 [employer]. Care instructions adapted under license by [employer] (which disclaims liability or warranty for this information). If you have questions about a medical condition or this instruction, always ask your healthcare professional. [address] any warranty or liability for your use of this information. Patient Education        Chest Pain: Care Instructions  Your Care Instructions    There are many things that can cause chest pain. Some are not serious and will get better on their own in a few days. But some kinds of chest pain need more testing and treatment. Your doctor may have recommended a follow-up visit in the next 8 to 12 hours. If you are not getting better, you may need more tests or treatment. Even though your doctor has released you, you still need to watch for any problems. The doctor carefully checked you, but sometimes problems can develop later. If you have new symptoms or if your symptoms do not get better, get medical care right away.   If you have worse or different chest pain or pressure that lasts more than 5 minutes or you passed out (lost consciousness), call 911 or seek other emergency help right away. A medical visit is only one step in your treatment. Even if you feel better, you still need to do what your doctor recommends, such as going to all suggested follow-up appointments and taking medicines exactly as directed. This will help you recover and help prevent future problems. How can you care for yourself at home? · Rest until you feel better. · Take your medicine exactly as prescribed. Call your doctor if you think you are having a problem with your medicine. · Do not drive after taking a prescription pain medicine. When should you call for help? Call 911 if:    · You passed out (lost consciousness).     · You have severe difficulty breathing.     · You have symptoms of a heart attack. These may include:  ? Chest pain or pressure, or a strange feeling in your chest.  ? Sweating. ? Shortness of breath. ? Nausea or vomiting. ? Pain, pressure, or a strange feeling in your back, neck, jaw, or upper belly or in one or both shoulders or arms. ? Lightheadedness or sudden weakness. ? A fast or irregular heartbeat. After you call 911, the  may tell you to chew 1 adult-strength or 2 to 4 low-dose aspirin. Wait for an ambulance. Do not try to drive yourself.    Call your doctor today if:    · You have any trouble breathing.     · Your chest pain gets worse.     · You are dizzy or lightheaded, or you feel like you may faint.     · You are not getting better as expected.     · You are having new or different chest pain. Where can you learn more? Go to http://arnaldo-mariano.info/. Enter A120 in the search box to learn more about \"Chest Pain: Care Instructions. \"  Current as of: September 23, 2018  Content Version: 11.9  © 3664-1606 [employer].  Care instructions adapted under license by [employer] (which disclaims liability or warranty for this information). If you have questions about a medical condition or this instruction, always ask your healthcare professional. [address] any warranty or liability for your use of this information. Patient Education        Learning About High Blood Sugar  What is high blood sugar? Your body turns the food you eat into glucose (sugar), which it uses for energy. But if your body isn't able to use the sugar right away, it can build up in your blood and lead to high blood sugar. When the amount of sugar in your blood stays too high for too much of the time, you may have diabetes. Diabetes is a disease that can cause serious health problems. The good news is that lifestyle changes may help you get your blood sugar back to normal and avoid or delay diabetes. What causes high blood sugar? Sugar (glucose) can build up in your blood if you:  · Are overweight. · Have a family history of diabetes. · Take certain medicines, such as steroids. What are the symptoms? Having high blood sugar may not cause any symptoms at all. Or it may make you feel very thirsty or very hungry. You may also urinate more often than usual, have blurry vision, or lose weight without trying. How is high blood sugar treated? You can take steps to lower your blood sugar level if you understand what makes it get higher. Your doctor may want you to learn how to test your blood sugar level at home. Then you can see how illness, stress, or different kinds of food or medicine raise or lower your blood sugar level. Other tests may be needed to see if you have diabetes. How can you prevent high blood sugar? · Watch your weight. If you're overweight, losing just a small amount of weight may help. Reducing fat around your waist is most important. · Limit the amount of calories, sweets, and unhealthy fat you eat. Ask your doctor if a dietitian can help you.  A registered dietitian can help you create meal plans that fit your lifestyle. · Get at least 30 minutes of exercise on most days of the week. Exercise helps control your blood sugar. It also helps you maintain a healthy weight. Walking is a good choice. You also may want to do other activities, such as running, swimming, cycling, or playing tennis or team sports. · If your doctor prescribed medicines, take them exactly as prescribed. Call your doctor if you think you are having a problem with your medicine. You will get more details on the specific medicines your doctor prescribes. Follow-up care is a key part of your treatment and safety. Be sure to make and go to all appointments, and call your doctor if you are having problems. It's also a good idea to know your test results and keep a list of the medicines you take. Where can you learn more? Go to http://arnaldo-mariano.info/. Enter O108 in the search box to learn more about \"Learning About High Blood Sugar. \"  Current as of: July 25, 2018  Content Version: 11.9  © 0951-3401 [employer]. Care instructions adapted under license by [employer] (which disclaims liability or warranty for this information). If you have questions about a medical condition or this instruction, always ask your healthcare professional. [address] any warranty or liability for your use of this information.

## 2019-04-04 NOTE — DISCHARGE INSTRUCTIONS

## 2019-04-04 NOTE — ED TRIAGE NOTES
Pt reports elevated BS X 2 days. Pt states her BS was 418 PTA. Pt also reports mid CP and generalized body aches.

## 2019-04-04 NOTE — ED NOTES
Discharge instructions given to pt by RN in teach back method and verbalizes understanding. Opportunity for questions provided. Pt ambulatory out of unit, in no acute distress and taken home by neighbor.

## 2019-04-04 NOTE — ED TRIAGE NOTES
Pt arrives via EMS from home with c/o of aching, nausea and an overall not feeling well. Was seen in ED for same symptoms earlier today. Was given 350 mLs of NS en route. Pt reports taking 30 units of insulin at dinner time and was unable to eat dinner. +dizziness and sob

## 2019-04-04 NOTE — ED PROVIDER NOTES
The patient is a 71-year-old female with a past medical history significant for arthritis, asthma, coronary artery disease, chronic pain, diabetes, GERD, kidney stone, hypertension, anxiety, who presents to the ED with the complaint of generalized body aches and pain for 24 hours, including chest pain, and elevate her blood sugar. The patient stated that her blood sugar was within of 100 at home and she has been taking her medications as previously instructed. She denies any fever, sore throat, headache, neck, and back pain, abdominal pain, diarrhea, constipation, dysuria, dizziness, extremity weakness or numbness. Past Medical History:  
Diagnosis Date  Anal polyp 6/13/2013  Arthritis  Asthma  CAD (coronary artery disease) 10/27/2017  Cataract  Chronic pain   
 knee - right/back  Diabetes ([address]  GERD (gastroesophageal reflux disease)  Hemorrhoids 6/13/2013  History of kidney stones  Hypertension  Ill-defined condition   
 abdominal pain and burning  Other ill-defined conditions(799.89)   
 high cholesterol Past Surgical History:  
Procedure Laterality Date  COLONOSCOPY  2/28/2013  EGD  2/28/2013  HX CATARACT REMOVAL Bilateral   
 HX CHOLECYSTECTOMY  6-2015  HX GI  6/27/13  
 anal polyps removed  HX HEENT    
 laser surgery for blood clot in right eye  HX KNEE REPLACEMENT    
 right  HX OTHER SURGICAL  4-2-14  
 lap gastrotomy and removal of polyp -  - not cancerous per pt  HX ALAN AND BSO  HX TONSILLECTOMY  HX UROLOGICAL \"laser\" surgery for kidney stone  NEUROLOGICAL PROCEDURE UNLISTED  1990  
 tumor at back of neck, benign Family History:  
Problem Relation Age of Onset  Cancer Mother   
     colon  Diabetes Brother  Other Sister GI BLEED  Heart Disease Brother  Heart Attack Brother  Heart Disease Brother  Heart Disease Brother  Schizophrenia Son   
  Anesth Problems Neg Hx Social History Socioeconomic History  Marital status:  Spouse name: Not on file  Number of children: Not on file  Years of education: Not on file  Highest education level: Not on file Occupational History  Not on file Social Needs  Financial resource strain: Not on file  Food insecurity:  
  Worry: Not on file Inability: Not on file  Transportation needs:  
  Medical: Not on file Non-medical: Not on file Tobacco Use  Smoking status: Former Smoker  Smokeless tobacco: Never Used  Tobacco comment: age 13 Substance and Sexual Activity  Alcohol use: No  
 Drug use: No  
 Sexual activity: Never Lifestyle  Physical activity:  
  Days per week: Not on file Minutes per session: Not on file  Stress: Not on file Relationships  Social connections:  
  Talks on phone: Not on file Gets together: Not on file Attends [employer] service: Not on file Active member of club or organization: Not on file Attends meetings of clubs or organizations: Not on file Relationship status: Not on file  Intimate partner violence:  
  Fear of current or ex partner: Not on file Emotionally abused: Not on file Physically abused: Not on file Forced sexual activity: Not on file Other Topics Concern  Not on file Social History Narrative  Not on file ALLERGIES: Seafood [shellfish containing products] Review of Systems All other systems reviewed and are negative. Vitals:  
 04/03/19 2330 04/03/19 2359 04/04/19 0015 BP: 127/58  125/48 Pulse: 99  92 Resp: 16  14 Temp: 98.1 °F (36.7 °C) SpO2: 99% 100% 99% Physical Exam  
Nursing note and vitals reviewed. CONSTITUTIONAL: Well-appearing; well-nourished; in no apparent distress HEAD: Normocephalic; atraumatic EYES: PERRL; EOM intact; conjunctiva and sclera are clear bilaterally. ENT: No rhinorrhea; normal pharynx with no tonsillar hypertrophy; mucous membranes pink/moist, no erythema, no exudate. NECK: Supple; non-tender; no cervical lymphadenopathy CARD: Normal S1, S2; no murmurs, rubs, or gallops. Regular rate and rhythm. RESP: Normal respiratory effort; coarse breath sounds equal bilaterally with exp wheezes; no rhonchi, or rales. ABD: Normal bowel sounds; non-distended; non-tender; no palpable organomegaly, no masses, no bruits. Back Exam: Normal inspection; no vertebral point tenderness, no CVA tenderness. Normal range of motion. EXT: Normal ROM in all four extremities; non-tender to palpation; no swelling or deformity; distal pulses are normal, no edema. SKIN: Warm; dry; no rash. NEURO:Alert and oriented x 3, coherent, RAMO-XII grossly intact, sensory and motor are non-focal. 
 
 
 
MDM Number of Diagnoses or Management Options Diagnosis management comments: Assessment: 72-year-old female, who presents to the ED with elevated blood sugar, body aches, and chest pain. She is very anxious, restless, but hemodynamically stable and well. Suspect anxiety/ bronchitis, rule out ACS, electrolyte abnormality, and dehydration. Plan: EKG/ lab/ chest x-ray/ IV fluid/ DuoNeb/ Monitor and Reevaluate. Amount and/or Complexity of Data Reviewed Clinical lab tests: ordered and reviewed Tests in the radiology section of CPT®: ordered and reviewed Tests in the medicine section of CPT®: reviewed and ordered Discussion of test results with the performing providers: yes Decide to obtain previous medical records or to obtain history from someone other than the patient: yes Obtain history from someone other than the patient: yes Review and summarize past medical records: yes Discuss the patient with other providers: yes Independent visualization of images, tracings, or specimens: yes Risk of Complications, Morbidity, and/or Mortality Presenting problems: moderate Diagnostic procedures: moderate Management options: moderate Patient Progress Patient progress: stable Procedures ED EKG interpretation: 
Rhythm: sinus tachycardia; and regular . Rate (approx.): 104; Axis: normal; P wave: normal; QRS interval: normal ; ST/T wave: non-specific changes; in  Lead: Diffusely; Other findings: abnormal ekg. This EKG was interpreted by Poli Bell MD,ED Provider. Progress Note:  
Pt has been reexamined by Poli Bell MD. Pt is feeling much better. Symptoms have improved. All available results have been reviewed with pt and any available family. Pt understands sx, dx, and tx in ED. Care plan has been outlined and questions have been answered. Pt is ready to go home. Will send home on hyperglycemia, chest pain, and bronchitis instructions . Outpatient referral with PCP as needed. Written by Poli Bell MD,1:06 AM 
 
. Juvenal Madden

## 2019-04-05 LAB
ATRIAL RATE: 89 BPM
CALCULATED P AXIS, ECG09: 70 DEGREES
CALCULATED R AXIS, ECG10: 39 DEGREES
CALCULATED T AXIS, ECG11: 64 DEGREES
DIAGNOSIS, 93000: NORMAL
P-R INTERVAL, ECG05: 136 MS
Q-T INTERVAL, ECG07: 404 MS
QRS DURATION, ECG06: 68 MS
QTC CALCULATION (BEZET), ECG08: 491 MS
VENTRICULAR RATE, ECG03: 89 BPM

## 2019-04-06 LAB
BACTERIA SPEC CULT: ABNORMAL
CC UR VC: ABNORMAL
SERVICE CMNT-IMP: ABNORMAL

## 2019-04-07 RX ORDER — CEPHALEXIN 500 MG/1
500 CAPSULE ORAL 2 TIMES DAILY
Qty: 14 CAP | Refills: 0 | Status: SHIPPED | OUTPATIENT
Start: 2019-04-07 | End: 2019-04-14

## 2019-04-27 ENCOUNTER — HOSPITAL ENCOUNTER (EMERGENCY)
Age: 79
Discharge: HOME | End: 2019-04-27
Attending: EMERGENCY MEDICINE
Payer: MEDICARE

## 2019-04-27 VITALS
OXYGEN SATURATION: 95 % | TEMPERATURE: 98.6 F | HEART RATE: 88 BPM | SYSTOLIC BLOOD PRESSURE: 106 MMHG | DIASTOLIC BLOOD PRESSURE: 46 MMHG | RESPIRATION RATE: 16 BRPM

## 2019-04-27 DIAGNOSIS — R73.9 HYPERGLYCEMIA: Primary | ICD-10-CM

## 2019-04-27 LAB
ALBUMIN SERPL-MCNC: 3.1 G/DL (ref 3.5–5)
ALBUMIN/GLOB SERPL: 0.7 {RATIO} (ref 1.1–2.2)
ALP SERPL-CCNC: 122 U/L (ref 45–117)
ALT SERPL-CCNC: 16 U/L (ref 12–78)
ANION GAP SERPL CALC-SCNC: 7 MMOL/L (ref 5–15)
APPEARANCE UR: CLEAR
AST SERPL-CCNC: 18 U/L (ref 15–37)
BACTERIA URNS QL MICRO: NEGATIVE /HPF
BASOPHILS # BLD: 0.1 K/UL (ref 0–0.1)
BASOPHILS NFR BLD: 1 % (ref 0–1)
BILIRUB SERPL-MCNC: 0.4 MG/DL (ref 0.2–1)
BILIRUB UR QL: NEGATIVE
BUN SERPL-MCNC: 9 MG/DL (ref 6–20)
BUN/CREAT SERPL: 7 (ref 12–20)
CALCIUM SERPL-MCNC: 8.9 MG/DL (ref 8.5–10.1)
CHLORIDE SERPL-SCNC: 103 MMOL/L (ref 97–108)
CO2 SERPL-SCNC: 26 MMOL/L (ref 21–32)
COLOR UR: ABNORMAL
COMMENT, HOLDF: NORMAL
CREAT SERPL-MCNC: 1.23 MG/DL (ref 0.55–1.02)
DIFFERENTIAL METHOD BLD: ABNORMAL
EOSINOPHIL # BLD: 0.1 K/UL (ref 0–0.4)
EOSINOPHIL NFR BLD: 1 % (ref 0–7)
EPITH CASTS URNS QL MICRO: ABNORMAL /LPF
ERYTHROCYTE [DISTWIDTH] IN BLOOD BY AUTOMATED COUNT: 15.2 % (ref 11.5–14.5)
GLOBULIN SER CALC-MCNC: 4.5 G/DL (ref 2–4)
GLUCOSE BLD STRIP.AUTO-MCNC: 190 MG/DL (ref 65–100)
GLUCOSE BLD STRIP.AUTO-MCNC: 399 MG/DL (ref 65–100)
GLUCOSE SERPL-MCNC: 426 MG/DL (ref 65–100)
GLUCOSE UR STRIP.AUTO-MCNC: >1000 MG/DL
HCT VFR BLD AUTO: 39.4 % (ref 35–47)
HGB BLD-MCNC: 12.4 G/DL (ref 11.5–16)
HGB UR QL STRIP: NEGATIVE
HYALINE CASTS URNS QL MICRO: ABNORMAL /LPF (ref 0–5)
IMM GRANULOCYTES # BLD AUTO: 0.1 K/UL (ref 0–0.04)
IMM GRANULOCYTES NFR BLD AUTO: 1 % (ref 0–0.5)
KETONES UR QL STRIP.AUTO: NEGATIVE MG/DL
LEUKOCYTE ESTERASE UR QL STRIP.AUTO: NEGATIVE
LYMPHOCYTES # BLD: 3.1 K/UL (ref 0.8–3.5)
LYMPHOCYTES NFR BLD: 26 % (ref 12–49)
MCH RBC QN AUTO: 27.4 PG (ref 26–34)
MCHC RBC AUTO-ENTMCNC: 31.5 G/DL (ref 30–36.5)
MCV RBC AUTO: 87 FL (ref 80–99)
MONOCYTES # BLD: 0.9 K/UL (ref 0–1)
MONOCYTES NFR BLD: 7 % (ref 5–13)
NEUTS SEG # BLD: 7.7 K/UL (ref 1.8–8)
NEUTS SEG NFR BLD: 64 % (ref 32–75)
NITRITE UR QL STRIP.AUTO: NEGATIVE
NRBC # BLD: 0 K/UL (ref 0–0.01)
NRBC BLD-RTO: 0 PER 100 WBC
PH UR STRIP: 6 [PH] (ref 5–8)
PLATELET # BLD AUTO: 333 K/UL (ref 150–400)
PMV BLD AUTO: 11.3 FL (ref 8.9–12.9)
POTASSIUM SERPL-SCNC: 4.5 MMOL/L (ref 3.5–5.1)
PROT SERPL-MCNC: 7.6 G/DL (ref 6.4–8.2)
PROT UR STRIP-MCNC: NEGATIVE MG/DL
RBC # BLD AUTO: 4.53 M/UL (ref 3.8–5.2)
RBC #/AREA URNS HPF: ABNORMAL /HPF (ref 0–5)
SAMPLES BEING HELD,HOLD: NORMAL
SERVICE CMNT-IMP: ABNORMAL
SERVICE CMNT-IMP: ABNORMAL
SODIUM SERPL-SCNC: 136 MMOL/L (ref 136–145)
SP GR UR REFRACTOMETRY: 1.02 (ref 1–1.03)
TROPONIN I SERPL-MCNC: <0.05 NG/ML
UR CULT HOLD, URHOLD: NORMAL
UROBILINOGEN UR QL STRIP.AUTO: 0.2 EU/DL (ref 0.2–1)
WBC # BLD AUTO: 12 K/UL (ref 3.6–11)
WBC URNS QL MICRO: ABNORMAL /HPF (ref 0–4)

## 2019-04-27 PROCEDURE — 82962 GLUCOSE BLOOD TEST: CPT

## 2019-04-27 PROCEDURE — 74011250637 HC RX REV CODE- 250/637: Performed by: EMERGENCY MEDICINE

## 2019-04-27 PROCEDURE — 93005 ELECTROCARDIOGRAM TRACING: CPT

## 2019-04-27 PROCEDURE — 74011250636 HC RX REV CODE- 250/636: Performed by: EMERGENCY MEDICINE

## 2019-04-27 PROCEDURE — 80053 COMPREHEN METABOLIC PANEL: CPT

## 2019-04-27 PROCEDURE — 84484 ASSAY OF TROPONIN QUANT: CPT

## 2019-04-27 PROCEDURE — 99285 EMERGENCY DEPT VISIT HI MDM: CPT

## 2019-04-27 PROCEDURE — 96375 TX/PRO/DX INJ NEW DRUG ADDON: CPT

## 2019-04-27 PROCEDURE — 74011636637 HC RX REV CODE- 636/637: Performed by: EMERGENCY MEDICINE

## 2019-04-27 PROCEDURE — 85025 COMPLETE CBC W/AUTO DIFF WBC: CPT

## 2019-04-27 PROCEDURE — 96361 HYDRATE IV INFUSION ADD-ON: CPT

## 2019-04-27 PROCEDURE — 96374 THER/PROPH/DIAG INJ IV PUSH: CPT

## 2019-04-27 PROCEDURE — 81001 URINALYSIS AUTO W/SCOPE: CPT

## 2019-04-27 PROCEDURE — 77030011943

## 2019-04-27 PROCEDURE — 36415 COLL VENOUS BLD VENIPUNCTURE: CPT

## 2019-04-27 RX ORDER — ONDANSETRON 2 MG/ML
4 INJECTION INTRAMUSCULAR; INTRAVENOUS
Status: COMPLETED | OUTPATIENT
Start: 2019-04-27 | End: 2019-04-27

## 2019-04-27 RX ORDER — ACETAMINOPHEN 500 MG
1000 TABLET ORAL ONCE
Status: COMPLETED | OUTPATIENT
Start: 2019-04-27 | End: 2019-04-27

## 2019-04-27 RX ADMIN — ONDANSETRON 4 MG: 2 INJECTION INTRAMUSCULAR; INTRAVENOUS at 18:26

## 2019-04-27 RX ADMIN — SODIUM CHLORIDE 1000 ML: 900 INJECTION, SOLUTION INTRAVENOUS at 16:45

## 2019-04-27 RX ADMIN — ACETAMINOPHEN 1000 MG: 500 TABLET ORAL at 17:28

## 2019-04-27 RX ADMIN — HUMAN INSULIN 10 UNITS: 100 INJECTION, SOLUTION SUBCUTANEOUS at 16:45

## 2019-04-27 RX ADMIN — SODIUM CHLORIDE 1000 ML: 900 INJECTION, SOLUTION INTRAVENOUS at 18:26

## 2019-04-27 NOTE — ED NOTES
Discharge instructions given to pt. All questions answered and pt verbalized understanding. V/S stable @ time of discharge. Pt ambulatory out of unit. Pt calling family for ride home

## 2019-04-27 NOTE — DISCHARGE INSTRUCTIONS
Patient Education        Learning About High Blood Sugar  What is high blood sugar? Your body turns the food you eat into glucose (sugar), which it uses for energy. But if your body isn't able to use the sugar right away, it can build up in your blood and lead to high blood sugar. When the amount of sugar in your blood stays too high for too much of the time, you may have diabetes. Diabetes is a disease that can cause serious health problems. The good news is that lifestyle changes may help you get your blood sugar back to normal and avoid or delay diabetes. What causes high blood sugar? Sugar (glucose) can build up in your blood if you:  · Are overweight. · Have a family history of diabetes. · Take certain medicines, such as steroids. What are the symptoms? Having high blood sugar may not cause any symptoms at all. Or it may make you feel very thirsty or very hungry. You may also urinate more often than usual, have blurry vision, or lose weight without trying. How is high blood sugar treated? You can take steps to lower your blood sugar level if you understand what makes it get higher. Your doctor may want you to learn how to test your blood sugar level at home. Then you can see how illness, stress, or different kinds of food or medicine raise or lower your blood sugar level. Other tests may be needed to see if you have diabetes. How can you prevent high blood sugar? · Watch your weight. If you're overweight, losing just a small amount of weight may help. Reducing fat around your waist is most important. · Limit the amount of calories, sweets, and unhealthy fat you eat. Ask your doctor if a dietitian can help you. A registered dietitian can help you create meal plans that fit your lifestyle. · Get at least 30 minutes of exercise on most days of the week. Exercise helps control your blood sugar. It also helps you maintain a healthy weight. Walking is a good choice.  You also may want to do other activities, such as running, swimming, cycling, or playing tennis or team sports. · If your doctor prescribed medicines, take them exactly as prescribed. Call your doctor if you think you are having a problem with your medicine. You will get more details on the specific medicines your doctor prescribes. Follow-up care is a key part of your treatment and safety. Be sure to make and go to all appointments, and call your doctor if you are having problems. It's also a good idea to know your test results and keep a list of the medicines you take. Where can you learn more? Go to http://arnaldo-mariano.info/. Enter O108 in the search box to learn more about \"Learning About High Blood Sugar. \"  Current as of: July 25, 2018  Content Version: 11.9  © 9446-5158 [employer]. Care instructions adapted under license by [employer] (which disclaims liability or warranty for this information). If you have questions about a medical condition or this instruction, always ask your healthcare professional. [address] any warranty or liability for your use of this information.

## 2019-04-27 NOTE — ED PROVIDER NOTES
69 y.o. female with past medical history significant for DM, HTN, asthma, high cholesterol, hemorrhoids, anal polyp, GERD, arthritis, kidney stones, cataract, and CAD who presents from home with chief complaint of high blood sugar. Pt reports headache through the day today accompanied by blurry vision, nausea, and dysuria. She states she was recently dx w/ UTI and she has been taking abx for 2 weeks, but she has not been taking them daily as directed. Pt's initial blood glucose was ~500 by EMS. Pt states she takes 30 units of Lantus at morning and 20 units of Lantus at night. Her insulin dose was increased from 20 units at morning to 30 units at morning ~1 month ago. She denies missed insulin doses. Pt denies vomiting. There are no other acute medical concerns at this time. PCP: Sultana Seth MD 
 
Note written by Jeimy Yates, as dictated by Mando Jenkins MD 4:28 PM 
 
 
  
 
Past Medical History:  
Diagnosis Date  Anal polyp 6/13/2013  Arthritis  Asthma  CAD (coronary artery disease) 10/27/2017  Cataract  Chronic pain   
 knee - right/back  Diabetes ([facility] [address]  GERD (gastroesophageal reflux disease)  Hemorrhoids 6/13/2013  History of kidney stones  Hypertension  Ill-defined condition   
 abdominal pain and burning  Other ill-defined conditions(799.89)   
 high cholesterol Past Surgical History:  
Procedure Laterality Date  COLONOSCOPY  2/28/2013  EGD  2/28/2013  HX CATARACT REMOVAL Bilateral   
 HX CHOLECYSTECTOMY  6-2015  HX GI  6/27/13  
 anal polyps removed  HX HEENT    
 laser surgery for blood clot in right eye  HX KNEE REPLACEMENT    
 right  HX OTHER SURGICAL  4-2-14  
 lap gastrotomy and removal of polyp -  - not cancerous per pt  HX ALAN AND BSO  HX TONSILLECTOMY  HX UROLOGICAL \"laser\" surgery for kidney stone [facility] tumor at back of neck, benign Family History:  
Problem Relation Age of Onset  Cancer Mother   
     colon  Diabetes Brother  Other Sister GI BLEED  Heart Disease Brother  Heart Attack Brother  Heart Disease Brother  Heart Disease Brother  Schizophrenia Son  Anesth Problems Neg Hx Social History Socioeconomic History  Marital status:  Spouse name: Not on file  Number of children: Not on file  Years of education: Not on file  Highest education level: Not on file Occupational History  Not on file Social Needs  Financial resource strain: Not on file  Food insecurity:  
  Worry: Not on file Inability: Not on file  Transportation needs:  
  Medical: Not on file Non-medical: Not on file Tobacco Use  Smoking status: Former Smoker Years: 0.00  Smokeless tobacco: Never Used  Tobacco comment: age 13 Substance and Sexual Activity  Alcohol use: No  
 Drug use: No  
 Sexual activity: Never Lifestyle  Physical activity:  
  Days per week: Not on file Minutes per session: Not on file  Stress: Not on file Relationships  Social connections:  
  Talks on phone: Not on file Gets together: Not on file Attends Spiritism service: Not on file Active member of club or organization: Not on file Attends meetings of clubs or organizations: Not on file Relationship status: Not on file  Intimate partner violence:  
  Fear of current or ex partner: Not on file Emotionally abused: Not on file Physically abused: Not on file Forced sexual activity: Not on file Other Topics Concern  Not on file Social History Narrative  Not on file ALLERGIES: Seafood [shellfish containing products] Review of Systems Constitutional: Negative for activity change, chills and fever. HENT: Negative for nosebleeds, sore throat, trouble swallowing and voice change. Eyes: Positive for visual disturbance (blurry vision). Respiratory: Negative for shortness of breath. Cardiovascular: Negative for chest pain and palpitations. Gastrointestinal: Positive for nausea. Negative for abdominal pain, constipation, diarrhea and vomiting. Genitourinary: Positive for dysuria. Negative for difficulty urinating, hematuria and urgency. Musculoskeletal: Negative for back pain, neck pain and neck stiffness. Skin: Negative for color change. Allergic/Immunologic: Negative for immunocompromised state. Neurological: Positive for headaches. Negative for dizziness, seizures, syncope, weakness, light-headedness and numbness. Psychiatric/Behavioral: Negative for behavioral problems, confusion, hallucinations, self-injury and suicidal ideas. All other systems reviewed and are negative. Vitals:  
 04/27/19 1630 04/27/19 1700 BP: 163/68 139/56 Pulse: 98 Resp: 16 Temp: 98.7 °F (37.1 °C) SpO2: 96% 95% Physical Exam  
Constitutional: She is oriented to person, place, and time. She appears well-developed and well-nourished. No distress. HENT:  
Head: Normocephalic and atraumatic. Eyes: Pupils are equal, round, and reactive to light. Neck: Normal range of motion. Neck supple. Cardiovascular: Normal rate, regular rhythm and normal heart sounds. Exam reveals no gallop and no friction rub. No murmur heard. Pulmonary/Chest: Effort normal and breath sounds normal. No respiratory distress. She has no wheezes. Abdominal: Soft. Bowel sounds are normal. She exhibits no distension. There is tenderness. There is no rebound and no guarding. LLQ abdominal tenderness which Pt states is chronic. Musculoskeletal: Normal range of motion. Neurological: She is alert and oriented to person, place, and time. Skin: Skin is warm. No rash noted. She is not diaphoretic. Psychiatric: She has a normal mood and affect.  Her behavior is normal. Judgment and thought content normal.  
Nursing note and vitals reviewed. Note written by Jeimy Alvarez, as dictated by Yesy Crouch MD 4:28 PM 
 
MDM This is a 67-year-old female with a past medical history, review of systems, physical exam as above, presenting with complaints of headache, blurred vision, and hyperglycemia. Patient states his symptoms developed today, states she had recent changes to her insulin, changing from 20 b.i.d. to 30 in the morning, 20 the evening, states that she has been intermittently compliant with antibiotics for urinary tract infection. She denies other focal complaints, endorses nausea without vomiting. Physical exam is remarkable for well-appearing elderly female, in no acute distress, with soft abdomen, clear breath sounds, regular rate and rhythm without murmurs gallops rubs, nonfocal neurologic exam. Per EMS, blood sugar approach and 500. Plan to proceed with administering insulin, IV fluids, obtain CMP, CBC, UA. We will reevaluate, and a disposition, however the patient is likely to require admission for further care and evaluation. Procedures ED EKG interpretation: 
Rhythm: normal sinus rhythm; Rate (approx.): 99; ST/T wave: T wave inverted anterolaterally, no ST changes.  
Note written by eJimy Alvarez, as dictated by Yesy Crouch MD 5:28 PM

## 2019-04-27 NOTE — ED TRIAGE NOTES
Patient reports she was seen here 3 weeks ago for the same. Patient complains of dizziness headache and blurred vision.

## 2019-04-27 NOTE — ED NOTES
Pt c/o sudden onset mid sternal non radiating chest pain. EKG performed, trop ordered and apap 1G given

## 2019-04-28 LAB
ATRIAL RATE: 99 BPM
CALCULATED P AXIS, ECG09: 70 DEGREES
CALCULATED R AXIS, ECG10: 55 DEGREES
CALCULATED T AXIS, ECG11: 69 DEGREES
DIAGNOSIS, 93000: NORMAL
P-R INTERVAL, ECG05: 136 MS
Q-T INTERVAL, ECG07: 350 MS
QRS DURATION, ECG06: 66 MS
QTC CALCULATION (BEZET), ECG08: 449 MS
VENTRICULAR RATE, ECG03: 99 BPM

## 2019-05-14 ENCOUNTER — HOSPITAL ENCOUNTER (EMERGENCY)
Age: 79
Discharge: HOME | End: 2019-05-14
Attending: STUDENT IN AN ORGANIZED HEALTH CARE EDUCATION/TRAINING PROGRAM
Payer: MEDICARE

## 2019-05-14 VITALS
DIASTOLIC BLOOD PRESSURE: 59 MMHG | TEMPERATURE: 98 F | RESPIRATION RATE: 18 BRPM | HEART RATE: 90 BPM | OXYGEN SATURATION: 96 % | SYSTOLIC BLOOD PRESSURE: 115 MMHG

## 2019-05-14 DIAGNOSIS — R73.9 HYPERGLYCEMIA: Primary | ICD-10-CM

## 2019-05-14 LAB
ALBUMIN SERPL-MCNC: 3.1 G/DL (ref 3.5–5)
ALBUMIN/GLOB SERPL: 0.8 {RATIO} (ref 1.1–2.2)
ALP SERPL-CCNC: 109 U/L (ref 45–117)
ALT SERPL-CCNC: 18 U/L (ref 12–78)
ANION GAP SERPL CALC-SCNC: 6 MMOL/L (ref 5–15)
APPEARANCE UR: ABNORMAL
AST SERPL-CCNC: 6 U/L (ref 15–37)
BASOPHILS # BLD: 0.1 K/UL (ref 0–0.1)
BASOPHILS NFR BLD: 1 % (ref 0–1)
BILIRUB SERPL-MCNC: 0.3 MG/DL (ref 0.2–1)
BILIRUB UR QL: NEGATIVE
BUN SERPL-MCNC: 15 MG/DL (ref 6–20)
BUN/CREAT SERPL: 14 (ref 12–20)
CALCIUM SERPL-MCNC: 8.4 MG/DL (ref 8.5–10.1)
CHLORIDE SERPL-SCNC: 105 MMOL/L (ref 97–108)
CO2 SERPL-SCNC: 27 MMOL/L (ref 21–32)
COLOR UR: ABNORMAL
COMMENT, HOLDF: NORMAL
CREAT SERPL-MCNC: 1.07 MG/DL (ref 0.55–1.02)
DIFFERENTIAL METHOD BLD: ABNORMAL
EOSINOPHIL # BLD: 0.1 K/UL (ref 0–0.4)
EOSINOPHIL NFR BLD: 1 % (ref 0–7)
ERYTHROCYTE [DISTWIDTH] IN BLOOD BY AUTOMATED COUNT: 15.4 % (ref 11.5–14.5)
GLOBULIN SER CALC-MCNC: 3.9 G/DL (ref 2–4)
GLUCOSE BLD STRIP.AUTO-MCNC: 265 MG/DL (ref 65–100)
GLUCOSE BLD STRIP.AUTO-MCNC: 287 MG/DL (ref 65–100)
GLUCOSE SERPL-MCNC: 311 MG/DL (ref 65–100)
GLUCOSE UR STRIP.AUTO-MCNC: >1000 MG/DL
HCT VFR BLD AUTO: 37.2 % (ref 35–47)
HGB BLD-MCNC: 11.6 G/DL (ref 11.5–16)
HGB UR QL STRIP: NEGATIVE
IMM GRANULOCYTES # BLD AUTO: 0.1 K/UL (ref 0–0.04)
IMM GRANULOCYTES NFR BLD AUTO: 1 % (ref 0–0.5)
KETONES UR QL STRIP.AUTO: NEGATIVE MG/DL
LEUKOCYTE ESTERASE UR QL STRIP.AUTO: NEGATIVE
LYMPHOCYTES # BLD: 4.2 K/UL (ref 0.8–3.5)
LYMPHOCYTES NFR BLD: 30 % (ref 12–49)
MCH RBC QN AUTO: 27.1 PG (ref 26–34)
MCHC RBC AUTO-ENTMCNC: 31.2 G/DL (ref 30–36.5)
MCV RBC AUTO: 86.9 FL (ref 80–99)
MONOCYTES # BLD: 1.1 K/UL (ref 0–1)
MONOCYTES NFR BLD: 8 % (ref 5–13)
NEUTS SEG # BLD: 8.4 K/UL (ref 1.8–8)
NEUTS SEG NFR BLD: 59 % (ref 32–75)
NITRITE UR QL STRIP.AUTO: NEGATIVE
NRBC # BLD: 0 K/UL (ref 0–0.01)
NRBC BLD-RTO: 0 PER 100 WBC
PH UR STRIP: 6 [PH] (ref 5–8)
PLATELET # BLD AUTO: 299 K/UL (ref 150–400)
PMV BLD AUTO: 11 FL (ref 8.9–12.9)
POTASSIUM SERPL-SCNC: 3.9 MMOL/L (ref 3.5–5.1)
PROT SERPL-MCNC: 7 G/DL (ref 6.4–8.2)
PROT UR STRIP-MCNC: NEGATIVE MG/DL
RBC # BLD AUTO: 4.28 M/UL (ref 3.8–5.2)
RBC MORPH BLD: ABNORMAL
SAMPLES BEING HELD,HOLD: NORMAL
SERVICE CMNT-IMP: ABNORMAL
SERVICE CMNT-IMP: ABNORMAL
SODIUM SERPL-SCNC: 138 MMOL/L (ref 136–145)
SP GR UR REFRACTOMETRY: 1.03 (ref 1–1.03)
UROBILINOGEN UR QL STRIP.AUTO: 1 EU/DL (ref 0.2–1)
WBC # BLD AUTO: 14 K/UL (ref 3.6–11)

## 2019-05-14 PROCEDURE — 85025 COMPLETE CBC W/AUTO DIFF WBC: CPT

## 2019-05-14 PROCEDURE — 80053 COMPREHEN METABOLIC PANEL: CPT

## 2019-05-14 PROCEDURE — 99285 EMERGENCY DEPT VISIT HI MDM: CPT

## 2019-05-14 PROCEDURE — 36415 COLL VENOUS BLD VENIPUNCTURE: CPT

## 2019-05-14 PROCEDURE — 82962 GLUCOSE BLOOD TEST: CPT

## 2019-05-14 PROCEDURE — 96360 HYDRATION IV INFUSION INIT: CPT

## 2019-05-14 PROCEDURE — 74011250636 HC RX REV CODE- 250/636: Performed by: STUDENT IN AN ORGANIZED HEALTH CARE EDUCATION/TRAINING PROGRAM

## 2019-05-14 PROCEDURE — 81003 URINALYSIS AUTO W/O SCOPE: CPT

## 2019-05-14 PROCEDURE — 96361 HYDRATE IV INFUSION ADD-ON: CPT

## 2019-05-14 RX ADMIN — SODIUM CHLORIDE 1000 ML: 900 INJECTION, SOLUTION INTRAVENOUS at 16:23

## 2019-05-14 NOTE — ED PROVIDER NOTES
69 y.o. female with past medical history significant for DM, HTN, Asthma, High cholesterol, Hemorrhoids, GERD, Arthritis, h/o kidney stones, Cataract, and CAD who presents from home via EMS for further evaluation of elevated blood sugar. Pt reports her blood sugar has been elevated for the past month stating, \"it has been between 400 and 500\". Pt reports her insulin dosage was increased from \"20 units Lantis\" in the \"morning\" to \"30 units Lantis\" in the morning by her PCP \"last month\". Pt reports in addition to the 30 units of Lantis she takes in the morning, she also takes \"20 units\" at \"night\". Pt reports she checked her blood sugar before breakfast this morning and states, \"it was 340\". Pt reports she took her insulin after breakfast and her blood sugar \"went down\". Pt is also c/o generalized weakness, dizziness, blurred vision, polyuria, and polydipsia. Pt reports history of HTN and Asthma. Pt appears to have poor insight to her medical history and denies ever being seen by an endocrinologist for her blood sugar. There are no other acute medical concerns at this time. Old Chart Review: Pt was last seen in ED on 4/27/19 for hyperglycemia. Her BGL was 399 at time of visit. Pt was given 2L of fluid, tylenol, and zofran. Pt was discharged home and recommneded to f/u with PCP. Social hx: Former smoker; No EtOH use PCP: Emilia Caban MD 
 
Note written by Jeimy Humphrey, as dictated by Missy Irving MD 3:58 PM 
 
 
The history is provided by the patient and medical records. No  was used. Past Medical History:  
Diagnosis Date  Anal polyp 6/13/2013  Arthritis  Asthma  CAD (coronary artery disease) 10/27/2017  Cataract  Chronic pain   
 knee - right/back  Diabetes ([facility] [address]  GERD (gastroesophageal reflux disease)  Hemorrhoids 6/13/2013  History of kidney stones  Hypertension  Ill-defined condition abdominal pain and burning  Other ill-defined conditions(799.89)   
 high cholesterol Past Surgical History:  
Procedure Laterality Date  COLONOSCOPY  2/28/2013  EGD  2/28/2013  HX CATARACT REMOVAL Bilateral   
 HX CHOLECYSTECTOMY  6-2015  HX GI  6/27/13  
 anal polyps removed  HX HEENT    
 laser surgery for blood clot in right eye  HX KNEE REPLACEMENT    
 right  HX OTHER SURGICAL  4-2-14  
 lap gastrotomy and removal of polyp -  - not cancerous per pt  HX ALAN AND BSO  HX TONSILLECTOMY  HX UROLOGICAL \"laser\" surgery for kidney stone  NEUROLOGICAL PROCEDURE UNLISTED  1990  
 tumor at back of neck, benign Family History:  
Problem Relation Age of Onset  Cancer Mother   
     colon  Diabetes Brother  Other Sister GI BLEED  Heart Disease Brother  Heart Attack Brother  Heart Disease Brother  Heart Disease Brother  Schizophrenia Son  Anesth Problems Neg Hx Social History Socioeconomic History  Marital status:  Spouse name: Not on file  Number of children: Not on file  Years of education: Not on file  Highest education level: Not on file Occupational History  Not on file Social Needs  Financial resource strain: Not on file  Food insecurity:  
  Worry: Not on file Inability: Not on file  Transportation needs:  
  Medical: Not on file Non-medical: Not on file Tobacco Use  Smoking status: Former Smoker Years: 0.00  Smokeless tobacco: Never Used  Tobacco comment: age 13 Substance and Sexual Activity  Alcohol use: No  
 Drug use: No  
 Sexual activity: Never Lifestyle  Physical activity:  
  Days per week: Not on file Minutes per session: Not on file  Stress: Not on file Relationships  Social connections:  
  Talks on phone: Not on file Gets together: Not on file Attends Yarsani service: Not on file Active member of club or organization: Not on file Attends meetings of clubs or organizations: Not on file Relationship status: Not on file  Intimate partner violence:  
  Fear of current or ex partner: Not on file Emotionally abused: Not on file Physically abused: Not on file Forced sexual activity: Not on file Other Topics Concern  Not on file Social History Narrative  Not on file ALLERGIES: Seafood [shellfish containing products] Review of Systems Constitutional: Negative for activity change, diaphoresis, fatigue and fever. HENT: Negative for congestion and sore throat. Eyes: Positive for visual disturbance. Negative for photophobia. Respiratory: Negative for chest tightness and shortness of breath. Cardiovascular: Negative for chest pain, palpitations and leg swelling. Gastrointestinal: Negative for abdominal pain, blood in stool, constipation, diarrhea, nausea and vomiting. Endocrine: Positive for polydipsia and polyuria. Genitourinary: Negative for difficulty urinating, dysuria, flank pain, frequency and hematuria. Musculoskeletal: Negative for back pain. Neurological: Positive for dizziness and weakness. Negative for syncope, numbness and headaches. All other systems reviewed and are negative. Vitals:  
 05/14/19 1553 BP: 139/59 Pulse: 94 Resp: 18 Temp: 97.4 °F (36.3 °C) SpO2: 98% Physical Exam  
Constitutional: She is oriented to person, place, and time. She appears well-developed and well-nourished. No distress. HENT:  
Head: Normocephalic and atraumatic. Nose: Nose normal.  
Mouth/Throat: Oropharynx is clear and moist. No oropharyngeal exudate. Eyes: Conjunctivae and EOM are normal. Right eye exhibits no discharge. Left eye exhibits no discharge. No scleral icterus. Neck: Normal range of motion. Neck supple. No JVD present. No tracheal deviation present. No thyromegaly present. Cardiovascular: Normal rate, regular rhythm, normal heart sounds and intact distal pulses. Exam reveals no gallop and no friction rub. No murmur heard. Pulmonary/Chest: Effort normal and breath sounds normal. No stridor. No respiratory distress. She has no wheezes. She has no rales. She exhibits no tenderness. Abdominal: Bowel sounds are normal. She exhibits no distension and no mass. There is no tenderness. There is no rebound. Musculoskeletal: Normal range of motion. She exhibits no edema or tenderness. Lymphadenopathy:  
  She has no cervical adenopathy. Neurological: She is alert and oriented to person, place, and time. No cranial nerve deficit. Coordination normal.  
Skin: Skin is warm and dry. No rash noted. She is not diaphoretic. No erythema. No pallor. Psychiatric: She has a normal mood and affect. Her behavior is normal. Judgment and thought content normal.  
Nursing note and vitals reviewed. Note written by Jeimy Riggins, as dictated by Jese Rubi MD 3:58 PM 
 
MDM Number of Diagnoses or Management Options Hyperglycemia:  
Diagnosis management comments: A/P: Hyperglycemia, DKA, HHS. 10year-old female presenting with elevated blood readings and associated blurry vision intermittent. Patient recently had a change in her insulin regimen she is on 30 units of Lantus in the a.m. and 20 units in the evening. Continues to have elevated blood sugar readings. Patient denies any recent antibiotic use however on chart review patient recently treated for a UTI also patient denies any recent steroid use. CBC, CMP, UA, 1 L normal saline bolus, for recheck blood sugar. Amount and/or Complexity of Data Reviewed Clinical lab tests: ordered and reviewed Review and summarize past medical records: yes Independent visualization of images, tracings, or specimens: yes Risk of Complications, Morbidity, and/or Mortality Presenting problems: moderate Diagnostic procedures: moderate Management options: moderate Patient Progress Patient progress: improved Procedures PROGRESS NOTE: 
6:58 PM 
BGL is now 265, will be discharged home and recommended to f/u with PCP regarding insulin regime. 6:59 PM 
Patient's results have been reviewed with them. Patient and/or family have verbally conveyed their understanding and agreement of the patient's signs, symptoms, diagnosis, treatment and prognosis and additionally agree to follow up as recommended or return to the Emergency Room should their condition change prior to follow-up. Discharge instructions have also been provided to the patient with some educational information regarding their diagnosis as well a list of reasons why they would want to return to the ER prior to their follow-up appointment should their condition change.

## 2019-05-14 NOTE — DISCHARGE INSTRUCTIONS
Patient Education        Learning About High Blood Sugar  What is high blood sugar? Your body turns the food you eat into glucose (sugar), which it uses for energy. But if your body isn't able to use the sugar right away, it can build up in your blood and lead to high blood sugar. When the amount of sugar in your blood stays too high for too much of the time, you may have diabetes. Diabetes is a disease that can cause serious health problems. The good news is that lifestyle changes may help you get your blood sugar back to normal and avoid or delay diabetes. What causes high blood sugar? Sugar (glucose) can build up in your blood if you:  · Are overweight. · Have a family history of diabetes. · Take certain medicines, such as steroids. What are the symptoms? Having high blood sugar may not cause any symptoms at all. Or it may make you feel very thirsty or very hungry. You may also urinate more often than usual, have blurry vision, or lose weight without trying. How is high blood sugar treated? You can take steps to lower your blood sugar level if you understand what makes it get higher. Your doctor may want you to learn how to test your blood sugar level at home. Then you can see how illness, stress, or different kinds of food or medicine raise or lower your blood sugar level. Other tests may be needed to see if you have diabetes. How can you prevent high blood sugar? · Watch your weight. If you're overweight, losing just a small amount of weight may help. Reducing fat around your waist is most important. · Limit the amount of calories, sweets, and unhealthy fat you eat. Ask your doctor if a dietitian can help you. A registered dietitian can help you create meal plans that fit your lifestyle. · Get at least 30 minutes of exercise on most days of the week. Exercise helps control your blood sugar. It also helps you maintain a healthy weight. Walking is a good choice.  You also may want to do other activities, such as running, swimming, cycling, or playing tennis or team sports. · If your doctor prescribed medicines, take them exactly as prescribed. Call your doctor if you think you are having a problem with your medicine. You will get more details on the specific medicines your doctor prescribes. Follow-up care is a key part of your treatment and safety. Be sure to make and go to all appointments, and call your doctor if you are having problems. It's also a good idea to know your test results and keep a list of the medicines you take. Where can you learn more? Go to http://arnaldo-mariano.info/. Enter O108 in the search box to learn more about \"Learning About High Blood Sugar. \"  Current as of: July 25, 2018  Content Version: 11.9  © 5014-8763 [employer]. Care instructions adapted under license by [employer] (which disclaims liability or warranty for this information). If you have questions about a medical condition or this instruction, always ask your healthcare professional. [address] any warranty or liability for your use of this information.

## 2019-05-14 NOTE — ED TRIAGE NOTES
Patient comes to the ER via EMS for hyperglycemia. Pt reports she has been struggling with levels for the past month.

## 2019-06-20 ENCOUNTER — HOSPITAL ENCOUNTER (EMERGENCY)
Age: 79
Discharge: HOME | End: 2019-06-20
Attending: STUDENT IN AN ORGANIZED HEALTH CARE EDUCATION/TRAINING PROGRAM | Admitting: STUDENT IN AN ORGANIZED HEALTH CARE EDUCATION/TRAINING PROGRAM
Payer: MEDICARE

## 2019-06-20 ENCOUNTER — HOSPITAL ENCOUNTER (EMERGENCY)
Age: 79
Discharge: HOME | End: 2019-06-20
Attending: EMERGENCY MEDICINE
Payer: MEDICARE

## 2019-06-20 VITALS
HEART RATE: 99 BPM | SYSTOLIC BLOOD PRESSURE: 165 MMHG | TEMPERATURE: 98.5 F | OXYGEN SATURATION: 99 % | DIASTOLIC BLOOD PRESSURE: 72 MMHG | RESPIRATION RATE: 19 BRPM

## 2019-06-20 VITALS
HEIGHT: 63 IN | RESPIRATION RATE: 16 BRPM | DIASTOLIC BLOOD PRESSURE: 79 MMHG | WEIGHT: 175 LBS | TEMPERATURE: 97.7 F | SYSTOLIC BLOOD PRESSURE: 136 MMHG | HEART RATE: 101 BPM | BODY MASS INDEX: 31.01 KG/M2 | OXYGEN SATURATION: 96 %

## 2019-06-20 DIAGNOSIS — R07.89 CHEST TIGHTNESS: ICD-10-CM

## 2019-06-20 DIAGNOSIS — R73.9 HYPERGLYCEMIA: Primary | ICD-10-CM

## 2019-06-20 DIAGNOSIS — R73.9 BLOOD GLUCOSE ELEVATED: ICD-10-CM

## 2019-06-20 DIAGNOSIS — F41.1 ANXIETY STATE: Primary | ICD-10-CM

## 2019-06-20 DIAGNOSIS — R05.9 COUGH: ICD-10-CM

## 2019-06-20 LAB
ALBUMIN SERPL-MCNC: 3.3 G/DL (ref 3.5–5)
ALBUMIN/GLOB SERPL: 0.9 {RATIO} (ref 1.1–2.2)
ALP SERPL-CCNC: 128 U/L (ref 45–117)
ALT SERPL-CCNC: 21 U/L (ref 12–78)
ANION GAP SERPL CALC-SCNC: 6 MMOL/L (ref 5–15)
APPEARANCE UR: CLEAR
AST SERPL-CCNC: 8 U/L (ref 15–37)
ATRIAL RATE: 104 BPM
ATRIAL RATE: 90 BPM
BACTERIA URNS QL MICRO: NEGATIVE /HPF
BASOPHILS # BLD: 0.1 K/UL (ref 0–0.1)
BASOPHILS NFR BLD: 1 % (ref 0–1)
BILIRUB SERPL-MCNC: 0.2 MG/DL (ref 0.2–1)
BILIRUB UR QL: NEGATIVE
BUN SERPL-MCNC: 12 MG/DL (ref 6–20)
BUN/CREAT SERPL: 11 (ref 12–20)
CALCIUM SERPL-MCNC: 8.7 MG/DL (ref 8.5–10.1)
CALCULATED P AXIS, ECG09: 60 DEGREES
CALCULATED P AXIS, ECG09: 67 DEGREES
CALCULATED R AXIS, ECG10: 42 DEGREES
CALCULATED R AXIS, ECG10: 51 DEGREES
CALCULATED T AXIS, ECG11: 47 DEGREES
CALCULATED T AXIS, ECG11: 63 DEGREES
CHLORIDE SERPL-SCNC: 106 MMOL/L (ref 97–108)
CO2 SERPL-SCNC: 25 MMOL/L (ref 21–32)
COLOR UR: ABNORMAL
COMMENT, HOLDF: NORMAL
CREAT SERPL-MCNC: 1.11 MG/DL (ref 0.55–1.02)
DIAGNOSIS, 93000: NORMAL
DIAGNOSIS, 93000: NORMAL
DIFFERENTIAL METHOD BLD: ABNORMAL
EOSINOPHIL # BLD: 0.2 K/UL (ref 0–0.4)
EOSINOPHIL NFR BLD: 2 % (ref 0–7)
EPITH CASTS URNS QL MICRO: ABNORMAL /LPF
ERYTHROCYTE [DISTWIDTH] IN BLOOD BY AUTOMATED COUNT: 15.7 % (ref 11.5–14.5)
GLOBULIN SER CALC-MCNC: 3.6 G/DL (ref 2–4)
GLUCOSE BLD STRIP.AUTO-MCNC: 366 MG/DL (ref 65–100)
GLUCOSE BLD STRIP.AUTO-MCNC: 393 MG/DL (ref 65–100)
GLUCOSE BLD STRIP.AUTO-MCNC: 406 MG/DL (ref 65–100)
GLUCOSE BLD STRIP.AUTO-MCNC: 431 MG/DL (ref 65–100)
GLUCOSE SERPL-MCNC: 397 MG/DL (ref 65–100)
GLUCOSE UR STRIP.AUTO-MCNC: >1000 MG/DL
HCT VFR BLD AUTO: 35.6 % (ref 35–47)
HGB BLD-MCNC: 11.3 G/DL (ref 11.5–16)
HGB UR QL STRIP: NEGATIVE
HYALINE CASTS URNS QL MICRO: ABNORMAL /LPF (ref 0–5)
IMM GRANULOCYTES # BLD AUTO: 0.1 K/UL (ref 0–0.04)
IMM GRANULOCYTES NFR BLD AUTO: 1 % (ref 0–0.5)
KETONES UR QL STRIP.AUTO: NEGATIVE MG/DL
LEUKOCYTE ESTERASE UR QL STRIP.AUTO: NEGATIVE
LYMPHOCYTES # BLD: 2.8 K/UL (ref 0.8–3.5)
LYMPHOCYTES NFR BLD: 29 % (ref 12–49)
MCH RBC QN AUTO: 27.2 PG (ref 26–34)
MCHC RBC AUTO-ENTMCNC: 31.7 G/DL (ref 30–36.5)
MCV RBC AUTO: 85.6 FL (ref 80–99)
MONOCYTES # BLD: 0.9 K/UL (ref 0–1)
MONOCYTES NFR BLD: 10 % (ref 5–13)
NEUTS SEG # BLD: 5.9 K/UL (ref 1.8–8)
NEUTS SEG NFR BLD: 57 % (ref 32–75)
NITRITE UR QL STRIP.AUTO: NEGATIVE
NRBC # BLD: 0.02 K/UL (ref 0–0.01)
NRBC BLD-RTO: 0.2 PER 100 WBC
P-R INTERVAL, ECG05: 122 MS
P-R INTERVAL, ECG05: 128 MS
PH UR STRIP: 5.5 [PH] (ref 5–8)
PLATELET # BLD AUTO: 319 K/UL (ref 150–400)
PMV BLD AUTO: 10.3 FL (ref 8.9–12.9)
POTASSIUM SERPL-SCNC: 4.3 MMOL/L (ref 3.5–5.1)
PROT SERPL-MCNC: 6.9 G/DL (ref 6.4–8.2)
PROT UR STRIP-MCNC: NEGATIVE MG/DL
Q-T INTERVAL, ECG07: 350 MS
Q-T INTERVAL, ECG07: 390 MS
QRS DURATION, ECG06: 64 MS
QRS DURATION, ECG06: 64 MS
QTC CALCULATION (BEZET), ECG08: 460 MS
QTC CALCULATION (BEZET), ECG08: 477 MS
RBC # BLD AUTO: 4.16 M/UL (ref 3.8–5.2)
RBC #/AREA URNS HPF: ABNORMAL /HPF (ref 0–5)
SAMPLES BEING HELD,HOLD: NORMAL
SERVICE CMNT-IMP: ABNORMAL
SODIUM SERPL-SCNC: 137 MMOL/L (ref 136–145)
SP GR UR REFRACTOMETRY: 1.03 (ref 1–1.03)
TROPONIN I SERPL-MCNC: <0.05 NG/ML
UR CULT HOLD, URHOLD: NORMAL
UROBILINOGEN UR QL STRIP.AUTO: 0.2 EU/DL (ref 0.2–1)
VENTRICULAR RATE, ECG03: 104 BPM
VENTRICULAR RATE, ECG03: 90 BPM
WBC # BLD AUTO: 9.9 K/UL (ref 3.6–11)
WBC URNS QL MICRO: ABNORMAL /HPF (ref 0–4)

## 2019-06-20 PROCEDURE — 74011250637 HC RX REV CODE- 250/637: Performed by: STUDENT IN AN ORGANIZED HEALTH CARE EDUCATION/TRAINING PROGRAM

## 2019-06-20 PROCEDURE — 80053 COMPREHEN METABOLIC PANEL: CPT

## 2019-06-20 PROCEDURE — 81001 URINALYSIS AUTO W/SCOPE: CPT

## 2019-06-20 PROCEDURE — 82962 GLUCOSE BLOOD TEST: CPT

## 2019-06-20 PROCEDURE — 99285 EMERGENCY DEPT VISIT HI MDM: CPT

## 2019-06-20 PROCEDURE — 93005 ELECTROCARDIOGRAM TRACING: CPT

## 2019-06-20 PROCEDURE — 36415 COLL VENOUS BLD VENIPUNCTURE: CPT

## 2019-06-20 PROCEDURE — 84484 ASSAY OF TROPONIN QUANT: CPT

## 2019-06-20 PROCEDURE — 85025 COMPLETE CBC W/AUTO DIFF WBC: CPT

## 2019-06-20 RX ORDER — ACETAMINOPHEN 325 MG/1
TABLET ORAL
Status: DISCONTINUED
Start: 2019-06-20 | End: 2019-06-20 | Stop reason: HOSPADM

## 2019-06-20 RX ORDER — ACETAMINOPHEN 325 MG/1
650 TABLET ORAL ONCE
Status: COMPLETED | OUTPATIENT
Start: 2019-06-20 | End: 2019-06-20

## 2019-06-20 RX ADMIN — ACETAMINOPHEN 650 MG: 325 TABLET ORAL at 02:12

## 2019-06-20 NOTE — ED PROVIDER NOTES
69 y.o. female with past medical history significant for diabetes, HTN, asthma, high cholesterol, hemorrhoids, anal polyp, GERD, arthritis, kidney stones, cataract, chronic knee and back pain, and CAD who presents from home via EMS with chief complaint of high blood sugar. Pt presents in the ED with a BG of 406. Per EMS, pt's BG was 504. Pt states her blood sugar was running the 400's last week. Pt states her \"blood sugar keeps rising. \" Pt states she takes 30 units of insulin in the morning and 20 units at night. Pt denies eating anything other than crackers since yesterday. Pt states she was last seen by her PCP 2 days ago. Pt states her last change in insulin was 1 month ago. Pt also notes some chest tightness and SOB. Pt further states some cold symptoms c/o a nonproductive cough as well as increased urination. Pt denies feverish symptoms. There are no other acute medical concerns at this time. Social hx: Formers smoker. PCP: Dana Taylor MD    Note written by ronald Hendrix, as dictated by Angelika Clarke MD 12:13 AM      The history is provided by the patient. No  was used.         Past Medical History:   Diagnosis Date    Anal polyp 6/13/2013    Arthritis     Asthma     CAD (coronary artery disease) 10/27/2017    Cataract     Chronic pain     knee - right/back    Diabetes ([address]     GERD (gastroesophageal reflux disease)     Hemorrhoids 6/13/2013    History of kidney stones     Hypertension     Ill-defined condition     abdominal pain and burning    Other ill-defined conditions(799.89)     high cholesterol       Past Surgical History:   Procedure Laterality Date    COLONOSCOPY  2/28/2013         EGD  2/28/2013         HX CATARACT REMOVAL Bilateral     HX CHOLECYSTECTOMY  6-2015    HX GI  6/27/13    anal polyps removed    HX HEENT      laser surgery for blood clot in right eye    HX KNEE REPLACEMENT      right    HX OTHER SURGICAL  4-2-14    lap gastrotomy and removal of polyp -  - not cancerous per pt    HX ALAN AND BSO      HX TONSILLECTOMY      HX UROLOGICAL      \"laser\" surgery for kidney stone    NEUROLOGICAL PROCEDURE UNLISTED  1990    tumor at back of neck, benign         Family History:   Problem Relation Age of Onset    Cancer Mother         colon    Diabetes Brother     Other Sister         GI BLEED    Heart Disease Brother     Heart Attack Brother     Heart Disease Brother     Heart Disease Brother     Schizophrenia Son     Anesth Problems Neg Hx        Social History     Socioeconomic History    Marital status:      Spouse name: Not on file    Number of children: Not on file    Years of education: Not on file    Highest education level: Not on file   Occupational History    Not on file   Social Needs    Financial resource strain: Not on file    Food insecurity:     Worry: Not on file     Inability: Not on file    Transportation needs:     Medical: Not on file     Non-medical: Not on file   Tobacco Use    Smoking status: Former Smoker     Years: 0.00    Smokeless tobacco: Never Used    Tobacco comment: age 13   Substance and Sexual Activity    Alcohol use: No    Drug use: No    Sexual activity: Never   Lifestyle    Physical activity:     Days per week: Not on file     Minutes per session: Not on file    Stress: Not on file   Relationships    Social connections:     Talks on phone: Not on file     Gets together: Not on file     Attends Sikh service: Not on file     Active member of club or organization: Not on file     Attends meetings of clubs or organizations: Not on file     Relationship status: Not on file    Intimate partner violence:     Fear of current or ex partner: Not on file     Emotionally abused: Not on file     Physically abused: Not on file     Forced sexual activity: Not on file   Other Topics Concern    Not on file   Social History Narrative    Not on file         ALLERGIES: Seafood [shellfish containing products]    Review of Systems   Constitutional: Negative for chills and fever. + high blood glucose level   HENT:        + dry mouth   Eyes: Negative for photophobia. Respiratory: Positive for cough (nonproductive) and shortness of breath. Cardiovascular: Positive for chest pain (\"tightness\"). Gastrointestinal: Negative for abdominal pain. Genitourinary: Positive for frequency. Negative for dysuria. Musculoskeletal: Negative for back pain. Neurological: Negative for headaches. Psychiatric/Behavioral: Negative for confusion. All other systems reviewed and are negative. Vitals:    06/20/19 0014   BP: 151/67   Pulse: (!) 101   Resp: 16   Temp: 97.8 °F (36.6 °C)   SpO2: 97%   Weight: 79.4 kg (175 lb)   Height: 5' 3\" (1.6 m)            Physical Exam   Constitutional: She appears well-nourished. No distress. HENT:   Head: Normocephalic. Mouth/Throat: Mucous membranes are dry. Eyes: Pupils are equal, round, and reactive to light. Cardiovascular: Normal rate, regular rhythm, normal heart sounds and intact distal pulses. No murmur heard. Pulmonary/Chest: Effort normal and breath sounds normal.   Abdominal: She exhibits no distension. There is no tenderness. Musculoskeletal:   Trace pedal edema. Neurological: She is alert. Skin: Skin is warm. Psychiatric: Her behavior is normal. Her mood appears anxious. Note written by ronald Jara, as dictated by Teresa Sherwood MD 12:15 AM    MDM      69 y.o. F with DM presenting with hyperglycemia. No clear infectious sx. No evidence of cardiac ischemia. Had minimal chest discomfort, doubt acs. Plan to call pcp for urgent titration of insulin regimen  No ev of hhs/dka    Procedures      ED EKG interpretation:  Rhythm: normal sinus rhythm; and regular . Rate (approx.): 90; Axis: normal; ST. Nonspecific changes in T wave in V1 and V2.    Note written by ronald Jara, as dictated by Raul Rudd MD 1:39 AM

## 2019-06-20 NOTE — PROGRESS NOTES
Date of previous inpatient admission/ ED visit? 5/14/19 Hyperglycemia    What brought the patient back to ED? Patient presented twice today for High Blood Sugars / Anxiety    Did patient decline recommended services during last admission/ ED visit (if yes, what)? No    Has patient seen a provider since their last inpatient admission/ED visit (if yes, when)? No. PCP is Hermila Díaz seen on 6/17/19 . Ms. Griselda Faulkner states will call on 6/21/19 and request f/u appointment    CM Interventions:  From previous inpatient admission/ED visit:Assessment  From current inpatient admission/ED visit:Assessment      SSED/CM consult received. Updates received from DR WALLACE [facility]. Permission obtained to contact friend/Landlord Faviola Whelan [phone]. CM placed f/u call to friend/patient after ED visit today. Spoke w/ Ms. 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  lives 30 minutes from home and it is challenging to check in regularly. Friend requesting [facility] be located for in home services.  states patient previously at [facility] 2 years ago however facility [facility] not have record of screen. Call has been placed to [facility] on 6/17/19 however no return call back yet. CM provided direct number for [facility] application line and requested friend call on 6/21/19 informed process may take several weeks to have screen conducted in the home.  states \" was hoping she would have been hospitalized 3 days for screening but was discharged. \" This writer acknowledged would need community screening to occur. CM offered to call SNF and research medical record regarding [facility] screen. Informed had already checked SNF. Friend headed to meeting and took CM name/number to communicate back.      This writer reviewed chart noted 9/17 CM entry patient acknowledge having personal care at one time however discontinued service preferred HH/skilled care. Call placed back to Ms. Lucas Rose informed of above ([facility] would need to be effective w/ continuous service w/o break in service confirmed [address] screening line [phone] to contact and request [facility]).  acknowledged processes. 1600 Contact made w/  introduced self and HIPAA identifier verified. Patient states feeling better has eaten and taken insulin.  also picked up diabetic supplies after ED visit today. Support received from Felicia Ambrocio(pays bills/transports to MD appts.) and oldest daughter Lou Foster (assist w/ bathing). Patient has son in [address] [place] [address] and another daughter in [place]. No additional transitional care needs verbalized. Patient will call PCP tomorrow and request f/u appointment.

## 2019-06-20 NOTE — PROGRESS NOTES
Physical Therapy Screening:  Physical Therapy consult is not indicated at this time. A screening was performed on this patient's chart based on her entrance into the emergency department and TRST 3. The patients chart was reviewed. Patient was seen by this PT during a previous ED visit March 2019. Met with patient   Patient voiced having no additional falls. She continues to live alone though has support of family and is expecting family to pick her up from the ED today. Patient requested to use the bathroom. She initially wanted to walk to the bathroom but then voiced she would not be able to make it. Patient walked to the [facility] in the room. Patient requested a pepsi. Cleared with MD. Patient walked with PT to get a cup of ice and diet pepsi. She ambulated with no device with wide based fairly steady gait. She normally ambulates with a cane. Patient voiced returning to the ED this morning d/t anxiety related to her BG. She voiced having no insulin needles at home though states her niece will take her to the pharmacy when she picks her up today. Patient also states she plans to schedule an appointment with Dr Coleman Haas re; her BG. A physical therapy consult in the ED is not indicated at this time based on their prior level of function or current medical status. Please consult physical therapy if any therapy needs arise. Thank you.

## 2019-06-20 NOTE — ED TRIAGE NOTES
Pt arrives via EMS from home for c/o anxiety. Pt was seen here earlier today for hyperglycemia but when she returned to private residence became very anxious and nervous. Pt states she ran out of her \"calming medication. \"

## 2019-06-20 NOTE — ED PROVIDER NOTES
69 y.o. female with past medical history significant for diabetes, HTN, asthma, high cholesterol, hemorrhoids, anal polyp, GERD, arthritis, kidney stones, cataract, chronic knee and back pain, and CAD who presents from home via EMS with chief complaint of anxiety. Pt was previously evaluated in the ED this morning for elevated BGL and d/c home. She then developed new onset of anxiety due to \"being home by myself,\" and didn't have any more of her rx medication to treat it. Additionally, pt reports \"feeling like my sugar is elevated again,\" and decided to call EMS; . Of note, she has been compliant with insulin medication. Denies cp, cough, and fever. There are no other acute medical concerns at this time. Social hx: Formers smoker. PCP: Debora Duong MD    Note written by Jeimy Godoy, as dictated by Dinh Saenz MD 11:37 AM    The history is provided by the patient. No  was used.         Past Medical History:   Diagnosis Date    Anal polyp 6/13/2013    Arthritis     Asthma     CAD (coronary artery disease) 10/27/2017    Cataract     Chronic pain     knee - right/back    Diabetes [address] [address]     GERD (gastroesophageal reflux disease)     Hemorrhoids 6/13/2013    History of kidney stones     Hypertension     Ill-defined condition     abdominal pain and burning    Other ill-defined conditions(799.89)     high cholesterol       Past Surgical History:   Procedure Laterality Date    COLONOSCOPY  2/28/2013         EGD  2/28/2013         HX CATARACT REMOVAL Bilateral     HX CHOLECYSTECTOMY  6-2015    HX GI  6/27/13    anal polyps removed    HX HEENT      laser surgery for blood clot in right eye    HX KNEE REPLACEMENT      right    HX OTHER SURGICAL  4-2-14    lap gastrotomy and removal of polyp -  - not cancerous per pt    HX ALAN AND BSO      HX TONSILLECTOMY      HX UROLOGICAL      \"laser\" surgery for kidney stone    NEUROLOGICAL PROCEDURE UNLISTED  1990    tumor at back of neck, benign         Family History:   Problem Relation Age of Onset    Cancer Mother         colon    Diabetes Brother     Other Sister         GI BLEED    Heart Disease Brother     Heart Attack Brother     Heart Disease Brother     Heart Disease Brother     Schizophrenia Son     Anesth Problems Neg Hx        Social History     Socioeconomic History    Marital status:      Spouse name: Not on file    Number of children: Not on file    Years of education: Not on file    Highest education level: Not on file   Occupational History    Not on file   Social Needs    Financial resource strain: Not on file    Food insecurity:     Worry: Not on file     Inability: Not on file    Transportation needs:     Medical: Not on file     Non-medical: Not on file   Tobacco Use    Smoking status: Former Smoker     Years: 0.00    Smokeless tobacco: Never Used    Tobacco comment: age 13   Substance and Sexual Activity    Alcohol use: No    Drug use: No    Sexual activity: Never   Lifestyle    Physical activity:     Days per week: Not on file     Minutes per session: Not on file    Stress: Not on file   Relationships    Social connections:     Talks on phone: Not on file     Gets together: Not on file     Attends Synagogue service: Not on file     Active member of club or organization: Not on file     Attends meetings of clubs or organizations: Not on file     Relationship status: Not on file    Intimate partner violence:     Fear of current or ex partner: Not on file     Emotionally abused: Not on file     Physically abused: Not on file     Forced sexual activity: Not on file   Other Topics Concern    Not on file   Social History Narrative    Not on file         ALLERGIES: Seafood [shellfish containing products]    Review of Systems   Constitutional: Negative for fever. HENT: Negative for facial swelling. Eyes: Negative for visual disturbance.    Respiratory: Negative for cough and chest tightness. Cardiovascular: Negative for chest pain. Gastrointestinal: Negative for abdominal pain. Genitourinary: Negative for difficulty urinating and dysuria. Musculoskeletal: Negative for arthralgias. Skin: Negative for rash. Neurological: Negative for dizziness. Hematological: Negative for adenopathy. Psychiatric/Behavioral: Negative for suicidal ideas. The patient is nervous/anxious. Vitals:    06/20/19 1144   BP: 180/70   Pulse: (!) 110   Resp: 17   Temp: 98.3 °F (36.8 °C)   SpO2: 98%            Physical Exam   Constitutional: She is oriented to person, place, and time. She appears well-developed and well-nourished. No distress. HENT:   Head: Normocephalic and atraumatic. Mouth/Throat: Oropharynx is clear and moist.   Eyes: Pupils are equal, round, and reactive to light. No scleral icterus. Neck: Normal range of motion. Neck supple. No thyromegaly present. Cardiovascular: Normal rate, regular rhythm, normal heart sounds and intact distal pulses. No murmur heard. Pulmonary/Chest: Effort normal and breath sounds normal. No respiratory distress. Abdominal: Soft. Bowel sounds are normal. She exhibits no distension. There is no tenderness. Musculoskeletal: Normal range of motion. She exhibits no edema. Neurological: She is alert and oriented to person, place, and time. Skin: Skin is warm and dry. No rash noted. She is not diaphoretic. Psychiatric: Her mood appears anxious. tearful   Nursing note and vitals reviewed. Note written by Jeimy Paul, as dictated by Mandy Teran MD 11:37 AM  MDM  Number of Diagnoses or Management Options  Anxiety state:   Blood glucose elevated:   Diagnosis management comments: A:  Discharged from here earlier today to home. Returns now because of increasing anxiety at being home along. VS stable. BGL elevated. Exam reassuring. AFter several attempts at IV, pt now declining further attempts. She states that she feels better and would just like to go home and take her insulin. I offered to make one more IV attempt using US machine but patient continued to decline. She also added that she would follow up with Her PCP regarding anxiety medications.          Procedures

## 2019-06-20 NOTE — ED TRIAGE NOTES
Triage Note:  Patient arrives via EMS from home some SOB and anxiety due to elevated BG of 505 despite MD increasing insulin over last couple of weeks. Patient has hx of anxiety, HTN, asthma and DBM II.

## 2019-06-20 NOTE — ED NOTES
Urine occurrence. Wet brief and clothing removed from patient. Incontinence care performed by this RN. Patient able to log roll. Patient tearful and states, \"I want to leave, I'm going to have my niece come and get me, she's a nurse. \"    No blood specimen collection, patient extremely difficult stick. Rohith Moreau MD informed. 1:24 PM  Patient ambulatory with PT to restroom.

## 2019-06-20 NOTE — DISCHARGE INSTRUCTIONS
Patient Education        Anxiety Disorder: Care Instructions  Your Care Instructions    Anxiety is a normal reaction to stress. Difficult situations can cause you to have symptoms such as sweaty palms and a nervous feeling. In an anxiety disorder, the symptoms are far more severe. Constant worry, muscle tension, trouble sleeping, nausea and diarrhea, and other symptoms can make normal daily activities difficult or impossible. These symptoms may occur for no reason, and they can affect your work, school, or social life. Medicines, counseling, and self-care can all help. Follow-up care is a key part of your treatment and safety. Be sure to make and go to all appointments, and call your doctor if you are having problems. It's also a good idea to know your test results and keep a list of the medicines you take. How can you care for yourself at home? · Take medicines exactly as directed. Call your doctor if you think you are having a problem with your medicine. · Go to your counseling sessions and follow-up appointments. · Recognize and accept your anxiety. Then, when you are in a situation that makes you anxious, say to yourself, \"This is not an emergency. I feel uncomfortable, but I am not in danger. I can keep going even if I feel anxious. \"  · Be kind to your body:  ? Relieve tension with exercise or a massage. ? Get enough rest.  ? Avoid alcohol, caffeine, nicotine, and illegal drugs. They can increase your anxiety level and cause sleep problems. ? Learn and do relaxation techniques. See below for more about these techniques. · Engage your mind. Get out and do something you enjoy. Go to a funny movie, or take a walk or hike. Plan your day. Having too much or too little to do can make you anxious. · Keep a record of your symptoms. Discuss your fears with a good friend or family member, or join a support group for people with similar problems. Talking to others sometimes relieves stress.   · Get involved in social groups, or volunteer to help others. Being alone sometimes makes things seem worse than they are. · Get at least 30 minutes of exercise on most days of the week to relieve stress. Walking is a good choice. You also may want to do other activities, such as running, swimming, cycling, or playing tennis or team sports. Relaxation techniques  Do relaxation exercises 10 to 20 minutes a day. You can play soothing, relaxing music while you do them, if you wish. · Tell others in your house that you are going to do your relaxation exercises. Ask them not to disturb you. · Find a comfortable place, away from all distractions and noise. · Lie down on your back, or sit with your back straight. · Focus on your breathing. Make it slow and steady. · Breathe in through your nose. Breathe out through either your nose or mouth. · Breathe deeply, filling up the area between your navel and your rib cage. Breathe so that your belly goes up and down. · Do not hold your breath. · Breathe like this for 5 to 10 minutes. Notice the feeling of calmness throughout your whole body. As you continue to breathe slowly and deeply, relax by doing the following for another 5 to 10 minutes:  · Tighten and relax each muscle group in your body. You can begin at your toes and work your way up to your head. · Imagine your muscle groups relaxing and becoming heavy. · Empty your mind of all thoughts. · Let yourself relax more and more deeply. · Become aware of the state of calmness that surrounds you. · When your relaxation time is over, you can bring yourself back to alertness by moving your fingers and toes and then your hands and feet and then stretching and moving your entire body. Sometimes people fall asleep during relaxation, but they usually wake up shortly afterward. · Always give yourself time to return to full alertness before you drive a car or do anything that might cause an accident if you are not fully alert.  Never play a relaxation tape while you drive a car. When should you call for help? Call 911 anytime you think you may need emergency care. For example, call if:    · You feel you cannot stop from hurting yourself or someone else.   Kevon Pelayo the numbers for these national suicide hotlines: 9-542-757-TALK (1-660.686.1937) and 0-859-RCPGMXZ (6-198.643.5947). If you or someone you know talks about suicide or feeling hopeless, get help right away.   Watch closely for changes in your health, and be sure to contact your doctor if:    · You have anxiety or fear that affects your life.     · You have symptoms of anxiety that are new or different from those you had before. Where can you learn more? Go to http://arnaldo-mariano.info/. Enter P754 in the search box to learn more about \"Anxiety Disorder: Care Instructions. \"  Current as of: September 11, 2018  Content Version: 11.9  © 1533-4950 [employer]. Care instructions adapted under license by [employer] (which disclaims liability or warranty for this information). If you have questions about a medical condition or this instruction, always ask your healthcare professional. [address] any warranty or liability for your use of this information. Patient Education        Learning About High Blood Sugar  What is high blood sugar? Your body turns the food you eat into glucose (sugar), which it uses for energy. But if your body isn't able to use the sugar right away, it can build up in your blood and lead to high blood sugar. When the amount of sugar in your blood stays too high for too much of the time, you may have diabetes. Diabetes is a disease that can cause serious health problems. The good news is that lifestyle changes may help you get your blood sugar back to normal and avoid or delay diabetes. What causes high blood sugar?   Sugar (glucose) can build up in your blood if you:  · Are overweight. · Have a family history of diabetes. · Take certain medicines, such as steroids. What are the symptoms? Having high blood sugar may not cause any symptoms at all. Or it may make you feel very thirsty or very hungry. You may also urinate more often than usual, have blurry vision, or lose weight without trying. How is high blood sugar treated? You can take steps to lower your blood sugar level if you understand what makes it get higher. Your doctor may want you to learn how to test your blood sugar level at home. Then you can see how illness, stress, or different kinds of food or medicine raise or lower your blood sugar level. Other tests may be needed to see if you have diabetes. How can you prevent high blood sugar? · Watch your weight. If you're overweight, losing just a small amount of weight may help. Reducing fat around your waist is most important. · Limit the amount of calories, sweets, and unhealthy fat you eat. Ask your doctor if a dietitian can help you. A registered dietitian can help you create meal plans that fit your lifestyle. · Get at least 30 minutes of exercise on most days of the week. Exercise helps control your blood sugar. It also helps you maintain a healthy weight. Walking is a good choice. You also may want to do other activities, such as running, swimming, cycling, or playing tennis or team sports. · If your doctor prescribed medicines, take them exactly as prescribed. Call your doctor if you think you are having a problem with your medicine. You will get more details on the specific medicines your doctor prescribes. Follow-up care is a key part of your treatment and safety. Be sure to make and go to all appointments, and call your doctor if you are having problems. It's also a good idea to know your test results and keep a list of the medicines you take. Where can you learn more? Go to http://arnaldo-mariano.info/.   Enter O108 in the search box to learn more about \"Learning About High Blood Sugar. \"  Current as of: July 25, 2018  Content Version: 11.9  © 0517-6025 [employer]. Care instructions adapted under license by [employer] (which disclaims liability or warranty for this information). If you have questions about a medical condition or this instruction, always ask your healthcare professional. [address] any warranty or liability for your use of this information.

## 2019-06-20 NOTE — PROGRESS NOTES
Patient discharged and is requesting to wait for her ride in the waiting room. Cleared with nursing. Assisted patient to the waiting room. 1430 - Checked back with patient. Patient's ride, Mor Dasilva, who is also her landlord, was with her. Farhad Carver voiced concern re: patient's mentation and recent ED visits. Offered to have [facility] call her. Obtained permission from patient to have care management call Farhad Carver and discuss her condition/ circumstances. Mor Lymankhoijefferson  [phone]    Report provided to JESSICA Garibay, Care Management.

## 2019-06-20 NOTE — ED NOTES
0118:  Patient rang out and stated she needed to void. Before RN was able to bring bedside commode, patient voided on bed. Provided clean brief and changed bed linen. 0142:  Patient up to bedside commode, was able to provide urine sample. Approximately 240ml urine collected in specimen hat. 0231:  Patient provided crackers and beverage, states \"she has a really hard time swallowing pills\". Tolerated well.    0326:  Patient incontinent of urine, assisted patient with cleansing yan-area and applying new brief. Patient states she was cold, added more blankets and dimmed light for comfort. 0422:  Patient incontinent of urine, assisted patient with cleansing yan-area and applying new brief. Patient states she was cold, added more blankets and dimmed light for comfort.

## 2019-07-05 ENCOUNTER — HOSPITAL ENCOUNTER (INPATIENT)
Age: 79
LOS: 5 days | Discharge: NURSING, REHAB OR LONG-TERM CARE | DRG: 440 | End: 2019-07-12
Attending: EMERGENCY MEDICINE | Admitting: HOSPITALIST
Payer: MEDICARE

## 2019-07-05 ENCOUNTER — APPOINTMENT (OUTPATIENT)
Dept: GENERAL RADIOLOGY | Age: 79
DRG: 440 | End: 2019-07-05
Attending: EMERGENCY MEDICINE
Payer: MEDICARE

## 2019-07-05 DIAGNOSIS — R07.89 CHEST TIGHTNESS: ICD-10-CM

## 2019-07-05 DIAGNOSIS — R10.13 EPIGASTRIC ABDOMINAL PAIN: ICD-10-CM

## 2019-07-05 DIAGNOSIS — R73.9 HYPERGLYCEMIA: Primary | ICD-10-CM

## 2019-07-05 PROBLEM — R07.9 CHEST PAIN: Status: ACTIVE | Noted: 2019-07-05

## 2019-07-05 LAB
ALBUMIN SERPL-MCNC: 3.4 G/DL (ref 3.5–5)
ALBUMIN/GLOB SERPL: 0.9 {RATIO} (ref 1.1–2.2)
ALP SERPL-CCNC: 130 U/L (ref 45–117)
ALT SERPL-CCNC: 18 U/L (ref 12–78)
ANION GAP SERPL CALC-SCNC: 9 MMOL/L (ref 5–15)
APPEARANCE UR: CLEAR
AST SERPL-CCNC: 13 U/L (ref 15–37)
ATRIAL RATE: 99 BPM
BASOPHILS # BLD: 0.1 K/UL (ref 0–0.1)
BASOPHILS NFR BLD: 1 % (ref 0–1)
BILIRUB SERPL-MCNC: 0.3 MG/DL (ref 0.2–1)
BILIRUB UR QL: NEGATIVE
BUN SERPL-MCNC: 12 MG/DL (ref 6–20)
BUN/CREAT SERPL: 10 (ref 12–20)
CALCIUM SERPL-MCNC: 8.9 MG/DL (ref 8.5–10.1)
CALCULATED P AXIS, ECG09: 57 DEGREES
CALCULATED R AXIS, ECG10: 49 DEGREES
CALCULATED T AXIS, ECG11: 62 DEGREES
CHLORIDE SERPL-SCNC: 104 MMOL/L (ref 97–108)
CO2 SERPL-SCNC: 24 MMOL/L (ref 21–32)
COLOR UR: ABNORMAL
COMMENT, HOLDF: NORMAL
CREAT SERPL-MCNC: 1.15 MG/DL (ref 0.55–1.02)
DIAGNOSIS, 93000: NORMAL
DIFFERENTIAL METHOD BLD: ABNORMAL
EOSINOPHIL # BLD: 0.1 K/UL (ref 0–0.4)
EOSINOPHIL NFR BLD: 1 % (ref 0–7)
ERYTHROCYTE [DISTWIDTH] IN BLOOD BY AUTOMATED COUNT: 15.3 % (ref 11.5–14.5)
EST. AVERAGE GLUCOSE BLD GHB EST-MCNC: 315 MG/DL
GLOBULIN SER CALC-MCNC: 3.9 G/DL (ref 2–4)
GLUCOSE BLD STRIP.AUTO-MCNC: 125 MG/DL (ref 65–100)
GLUCOSE BLD STRIP.AUTO-MCNC: 175 MG/DL (ref 65–100)
GLUCOSE BLD STRIP.AUTO-MCNC: 294 MG/DL (ref 65–100)
GLUCOSE BLD STRIP.AUTO-MCNC: 476 MG/DL (ref 65–100)
GLUCOSE SERPL-MCNC: 433 MG/DL (ref 65–100)
GLUCOSE UR STRIP.AUTO-MCNC: >1000 MG/DL
HBA1C MFR BLD: 12.6 % (ref 4.2–6.3)
HCT VFR BLD AUTO: 38.7 % (ref 35–47)
HGB BLD-MCNC: 11.9 G/DL (ref 11.5–16)
HGB UR QL STRIP: NEGATIVE
IMM GRANULOCYTES # BLD AUTO: 0.1 K/UL (ref 0–0.04)
IMM GRANULOCYTES NFR BLD AUTO: 1 % (ref 0–0.5)
KETONES UR QL STRIP.AUTO: NEGATIVE MG/DL
LEUKOCYTE ESTERASE UR QL STRIP.AUTO: NEGATIVE
LYMPHOCYTES # BLD: 3.1 K/UL (ref 0.8–3.5)
LYMPHOCYTES NFR BLD: 25 % (ref 12–49)
MCH RBC QN AUTO: 26.1 PG (ref 26–34)
MCHC RBC AUTO-ENTMCNC: 30.7 G/DL (ref 30–36.5)
MCV RBC AUTO: 84.9 FL (ref 80–99)
MONOCYTES # BLD: 0.8 K/UL (ref 0–1)
MONOCYTES NFR BLD: 7 % (ref 5–13)
NEUTS SEG # BLD: 8.3 K/UL (ref 1.8–8)
NEUTS SEG NFR BLD: 65 % (ref 32–75)
NITRITE UR QL STRIP.AUTO: NEGATIVE
NRBC # BLD: 0 K/UL (ref 0–0.01)
NRBC BLD-RTO: 0 PER 100 WBC
P-R INTERVAL, ECG05: 124 MS
PH UR STRIP: 5 [PH] (ref 5–8)
PLATELET # BLD AUTO: 306 K/UL (ref 150–400)
PMV BLD AUTO: 11.4 FL (ref 8.9–12.9)
POTASSIUM SERPL-SCNC: 4.4 MMOL/L (ref 3.5–5.1)
PROT SERPL-MCNC: 7.3 G/DL (ref 6.4–8.2)
PROT UR STRIP-MCNC: NEGATIVE MG/DL
Q-T INTERVAL, ECG07: 384 MS
QRS DURATION, ECG06: 66 MS
QTC CALCULATION (BEZET), ECG08: 492 MS
RBC # BLD AUTO: 4.56 M/UL (ref 3.8–5.2)
SAMPLES BEING HELD,HOLD: NORMAL
SERVICE CMNT-IMP: ABNORMAL
SODIUM SERPL-SCNC: 137 MMOL/L (ref 136–145)
SP GR UR REFRACTOMETRY: ABNORMAL (ref 1–1.03)
TROPONIN I BLD-MCNC: <0.04 NG/ML (ref 0–0.08)
UROBILINOGEN UR QL STRIP.AUTO: 0.2 EU/DL (ref 0.2–1)
VENTRICULAR RATE, ECG03: 99 BPM
WBC # BLD AUTO: 12.5 K/UL (ref 3.6–11)

## 2019-07-05 PROCEDURE — 99218 HC RM OBSERVATION: CPT

## 2019-07-05 PROCEDURE — 74011250636 HC RX REV CODE- 250/636: Performed by: INTERNAL MEDICINE

## 2019-07-05 PROCEDURE — 74011250636 HC RX REV CODE- 250/636: Performed by: EMERGENCY MEDICINE

## 2019-07-05 PROCEDURE — 82962 GLUCOSE BLOOD TEST: CPT

## 2019-07-05 PROCEDURE — 36415 COLL VENOUS BLD VENIPUNCTURE: CPT

## 2019-07-05 PROCEDURE — 71046 X-RAY EXAM CHEST 2 VIEWS: CPT

## 2019-07-05 PROCEDURE — 93005 ELECTROCARDIOGRAM TRACING: CPT

## 2019-07-05 PROCEDURE — 81003 URINALYSIS AUTO W/O SCOPE: CPT

## 2019-07-05 PROCEDURE — 96374 THER/PROPH/DIAG INJ IV PUSH: CPT

## 2019-07-05 PROCEDURE — 84484 ASSAY OF TROPONIN QUANT: CPT

## 2019-07-05 PROCEDURE — 74011636637 HC RX REV CODE- 636/637: Performed by: EMERGENCY MEDICINE

## 2019-07-05 PROCEDURE — 99284 EMERGENCY DEPT VISIT MOD MDM: CPT

## 2019-07-05 PROCEDURE — 74011250637 HC RX REV CODE- 250/637: Performed by: EMERGENCY MEDICINE

## 2019-07-05 PROCEDURE — 83036 HEMOGLOBIN GLYCOSYLATED A1C: CPT

## 2019-07-05 PROCEDURE — 85025 COMPLETE CBC W/AUTO DIFF WBC: CPT

## 2019-07-05 PROCEDURE — 74011636637 HC RX REV CODE- 636/637: Performed by: NURSE PRACTITIONER

## 2019-07-05 PROCEDURE — 99285 EMERGENCY DEPT VISIT HI MDM: CPT

## 2019-07-05 PROCEDURE — 74011250637 HC RX REV CODE- 250/637: Performed by: NURSE PRACTITIONER

## 2019-07-05 PROCEDURE — 96361 HYDRATE IV INFUSION ADD-ON: CPT

## 2019-07-05 PROCEDURE — 74011250637 HC RX REV CODE- 250/637: Performed by: INTERNAL MEDICINE

## 2019-07-05 PROCEDURE — 80053 COMPREHEN METABOLIC PANEL: CPT

## 2019-07-05 RX ORDER — ALPRAZOLAM 0.5 MG/1
0.25 TABLET ORAL
Status: COMPLETED | OUTPATIENT
Start: 2019-07-05 | End: 2019-07-05

## 2019-07-05 RX ORDER — SODIUM CHLORIDE, SODIUM LACTATE, POTASSIUM CHLORIDE, CALCIUM CHLORIDE 600; 310; 30; 20 MG/100ML; MG/100ML; MG/100ML; MG/100ML
75 INJECTION, SOLUTION INTRAVENOUS CONTINUOUS
Status: DISCONTINUED | OUTPATIENT
Start: 2019-07-05 | End: 2019-07-06

## 2019-07-05 RX ORDER — NITROGLYCERIN 0.4 MG/1
0.4 TABLET SUBLINGUAL
Status: DISCONTINUED | OUTPATIENT
Start: 2019-07-05 | End: 2019-07-12 | Stop reason: HOSPADM

## 2019-07-05 RX ORDER — MAGNESIUM SULFATE 100 %
4 CRYSTALS MISCELLANEOUS AS NEEDED
Status: DISCONTINUED | OUTPATIENT
Start: 2019-07-05 | End: 2019-07-06

## 2019-07-05 RX ORDER — MORPHINE SULFATE 2 MG/ML
2 INJECTION, SOLUTION INTRAMUSCULAR; INTRAVENOUS
Status: DISCONTINUED | OUTPATIENT
Start: 2019-07-05 | End: 2019-07-12 | Stop reason: HOSPADM

## 2019-07-05 RX ORDER — SODIUM CHLORIDE 0.9 % (FLUSH) 0.9 %
5-40 SYRINGE (ML) INJECTION AS NEEDED
Status: DISCONTINUED | OUTPATIENT
Start: 2019-07-05 | End: 2019-07-12 | Stop reason: HOSPADM

## 2019-07-05 RX ORDER — INSULIN GLARGINE 100 [IU]/ML
20 INJECTION, SOLUTION SUBCUTANEOUS
Status: DISCONTINUED | OUTPATIENT
Start: 2019-07-06 | End: 2019-07-06

## 2019-07-05 RX ORDER — MIRTAZAPINE 15 MG/1
15 TABLET, FILM COATED ORAL
Status: DISCONTINUED | OUTPATIENT
Start: 2019-07-05 | End: 2019-07-06

## 2019-07-05 RX ORDER — DEXTROSE 50 % IN WATER (D50W) INTRAVENOUS SYRINGE
25-50 AS NEEDED
Status: DISCONTINUED | OUTPATIENT
Start: 2019-07-05 | End: 2019-07-06

## 2019-07-05 RX ORDER — GUAIFENESIN 100 MG/5ML
162 LIQUID (ML) ORAL
Status: COMPLETED | OUTPATIENT
Start: 2019-07-05 | End: 2019-07-05

## 2019-07-05 RX ORDER — QUETIAPINE FUMARATE 25 MG/1
25 TABLET, FILM COATED ORAL
Status: DISCONTINUED | OUTPATIENT
Start: 2019-07-05 | End: 2019-07-06

## 2019-07-05 RX ORDER — INSULIN LISPRO 100 [IU]/ML
INJECTION, SOLUTION INTRAVENOUS; SUBCUTANEOUS
Status: DISCONTINUED | OUTPATIENT
Start: 2019-07-06 | End: 2019-07-06

## 2019-07-05 RX ORDER — NITROGLYCERIN 0.4 MG/1
0.4 TABLET SUBLINGUAL
Status: DISCONTINUED | OUTPATIENT
Start: 2019-07-05 | End: 2019-07-06

## 2019-07-05 RX ORDER — ACETAMINOPHEN 325 MG/1
650 TABLET ORAL
Status: DISCONTINUED | OUTPATIENT
Start: 2019-07-05 | End: 2019-07-12 | Stop reason: HOSPADM

## 2019-07-05 RX ADMIN — ASPIRIN 81 MG 162 MG: 81 TABLET ORAL at 18:52

## 2019-07-05 RX ADMIN — QUETIAPINE FUMARATE 25 MG: 25 TABLET ORAL at 23:02

## 2019-07-05 RX ADMIN — Medication 10 ML: at 23:03

## 2019-07-05 RX ADMIN — ACETAMINOPHEN 650 MG: 325 TABLET ORAL at 23:02

## 2019-07-05 RX ADMIN — INSULIN LISPRO 3 UNITS: 100 INJECTION, SOLUTION INTRAVENOUS; SUBCUTANEOUS at 23:41

## 2019-07-05 RX ADMIN — INSULIN GLARGINE 20 UNITS: 100 INJECTION, SOLUTION SUBCUTANEOUS at 23:41

## 2019-07-05 RX ADMIN — ALPRAZOLAM 0.25 MG: 0.5 TABLET ORAL at 16:31

## 2019-07-05 RX ADMIN — SODIUM CHLORIDE 500 ML: 900 INJECTION, SOLUTION INTRAVENOUS at 15:07

## 2019-07-05 RX ADMIN — SODIUM CHLORIDE 500 ML: 900 INJECTION, SOLUTION INTRAVENOUS at 16:32

## 2019-07-05 RX ADMIN — HUMAN INSULIN 10 UNITS: 100 INJECTION, SOLUTION SUBCUTANEOUS at 15:32

## 2019-07-05 RX ADMIN — MIRTAZAPINE 15 MG: 15 TABLET, FILM COATED ORAL at 23:02

## 2019-07-05 RX ADMIN — SODIUM CHLORIDE, SODIUM LACTATE, POTASSIUM CHLORIDE, AND CALCIUM CHLORIDE 75 ML/HR: 600; 310; 30; 20 INJECTION, SOLUTION INTRAVENOUS at 22:32

## 2019-07-05 NOTE — ED PROVIDER NOTES
69 y.o. female with past medical history significant for DM, HTN, asthma, high cholesterol, GERD, kidney stones, and CAD who presents from home via EMS with chief complaint of chest pain. Pt presents to the ED and reports chest pressure, SOB (caused by pressure), chills, nausea, non-productive cough (onset around 6/21/19), and LE edema. Pt states the chest pressure/SOB began around 0300 today 7/5/19. EMS reports the pt had a BS of 558 during transit and that they gave the pt 324 mg aspirin prior to ED arrival. Pt notes that she currently undergoes insulin injections 2x/day. Pt states that she recently had her insulin dosage increased by her PCP which has not helped her hyperglycemia. Pt does not currently follow any special diet regimens to manage her diabetes. Pt states that she last saw her PCP around 6/21/19. Pt denies fever, diarrhea, dysuria, increased urinary frequency, or any other urinary symptoms. There are no other acute medical concerns at this time. Surgical hx - ALAN & BSO, right eye blood clot removal, right knee replacement, lap gastrotomy/polyp removal, cholecystectomy, back tumor removal, colonoscopy, EGD   Social hx - Tobacco use: former smoker, Alcohol Use: non-drinker, Drug Use: denies    PCP: Wendy Velez MD    Note written by Jeimy Suarez, as dictated by Adilson Clark MD 2:21 PM.      The history is provided by the patient. No  was used.         Past Medical History:   Diagnosis Date    Anal polyp 6/13/2013    Arthritis     Asthma     CAD (coronary artery disease) 10/27/2017    Cataract     Chronic pain     knee - right/back    Diabetes ([facility] [address]     GERD (gastroesophageal reflux disease)     Hemorrhoids 6/13/2013    History of kidney stones     Hypertension     Ill-defined condition     abdominal pain and burning    Other ill-defined conditions(799.89)     high cholesterol       Past Surgical History:   Procedure Laterality Date  COLONOSCOPY  2/28/2013         EGD  2/28/2013         HX CATARACT REMOVAL Bilateral     HX CHOLECYSTECTOMY  6-2015    HX GI  6/27/13    anal polyps removed    HX HEENT      laser surgery for blood clot in right eye    HX KNEE REPLACEMENT      right    HX OTHER SURGICAL  4-2-14    lap gastrotomy and removal of polyp -  - not cancerous per pt    HX ALAN AND BSO      HX TONSILLECTOMY      HX UROLOGICAL      \"laser\" surgery for kidney stone    NEUROLOGICAL PROCEDURE UNLISTED  1990    tumor at back of neck, benign         Family History:   Problem Relation Age of Onset    Cancer Mother         colon    Diabetes Brother     Other Sister         GI BLEED    Heart Disease Brother     Heart Attack Brother     Heart Disease Brother     Heart Disease Brother     Schizophrenia Son     Anesth Problems Neg Hx        Social History     Socioeconomic History    Marital status:      Spouse name: Not on file    Number of children: Not on file    Years of education: Not on file    Highest education level: Not on file   Occupational History    Not on file   Social Needs    Financial resource strain: Not on file    Food insecurity:     Worry: Not on file     Inability: Not on file    Transportation needs:     Medical: Not on file     Non-medical: Not on file   Tobacco Use    Smoking status: Former Smoker     Years: 0.00    Smokeless tobacco: Never Used    Tobacco comment: age 13   Substance and Sexual Activity    Alcohol use: No    Drug use: No    Sexual activity: Never   Lifestyle    Physical activity:     Days per week: Not on file     Minutes per session: Not on file    Stress: Not on file   Relationships    Social connections:     Talks on phone: Not on file     Gets together: Not on file     Attends Buddhist service: Not on file     Active member of club or organization: Not on file     Attends meetings of clubs or organizations: Not on file     Relationship status: Not on file    Intimate partner violence:     Fear of current or ex partner: Not on file     Emotionally abused: Not on file     Physically abused: Not on file     Forced sexual activity: Not on file   Other Topics Concern    Not on file   Social History Narrative    Not on file         ALLERGIES: Seafood [shellfish containing products]    Review of Systems   Constitutional: Positive for chills. Negative for appetite change, fatigue and fever. HENT: Negative for ear pain, facial swelling, sore throat and trouble swallowing. Eyes: Negative for pain, discharge and visual disturbance. Respiratory: Positive for cough and shortness of breath. Negative for chest tightness and wheezing. Cardiovascular: Positive for chest pain and leg swelling. Negative for palpitations. Gastrointestinal: Positive for nausea. Negative for abdominal pain, blood in stool and vomiting. Genitourinary: Negative for difficulty urinating, dyspareunia, dysuria, flank pain, frequency and hematuria. Musculoskeletal: Negative for arthralgias, joint swelling, myalgias and neck pain. Skin: Negative for color change and rash. Neurological: Negative for dizziness, weakness, numbness and headaches. Hematological: Negative for adenopathy. Does not bruise/bleed easily. Psychiatric/Behavioral: Negative for behavioral problems, confusion and sleep disturbance. All other systems reviewed and are negative. Vitals:    07/05/19 1421 07/05/19 1445   BP: 137/72 134/71   Pulse: (!) 102 (!) 101   Resp: 20 21   Temp: 97.5 °F (36.4 °C)    SpO2: 100% 99%            Physical Exam   Constitutional: She is oriented to person, place, and time. She appears well-developed and well-nourished. No distress. HENT:   Head: Normocephalic and atraumatic. Nose: Nose normal.   Mouth/Throat: Oropharynx is clear and moist.   Eyes: Pupils are equal, round, and reactive to light. Conjunctivae and EOM are normal. No scleral icterus.    Neck: Normal range of motion. Neck supple. No JVD present. No tracheal deviation present. No thyromegaly present. No carotid bruits noted. Cardiovascular: Normal rate, regular rhythm, normal heart sounds and intact distal pulses. Exam reveals no gallop and no friction rub. No murmur heard. Pulmonary/Chest: Effort normal and breath sounds normal. No respiratory distress. She has no wheezes. She has no rales. She exhibits no tenderness. Abdominal: Soft. Bowel sounds are normal. She exhibits no distension and no mass. There is no tenderness. There is no rebound and no guarding. Musculoskeletal: Normal range of motion. She exhibits no tenderness. Trace LE edema   Lymphadenopathy:     She has no cervical adenopathy. Neurological: She is alert and oriented to person, place, and time. She has normal reflexes. No cranial nerve deficit. Coordination normal.   Skin: Skin is warm and dry. No rash noted. No erythema. Psychiatric: She has a normal mood and affect. Her behavior is normal. Judgment and thought content normal.   Nursing note and vitals reviewed.      Note written by Stasia Dancer, Scribe, as dictated by Stefania Wharton MD 2:21 PM    MDM  Number of Diagnoses or Management Options  Chest tightness: new and requires workup  Hyperglycemia: new and requires workup     Amount and/or Complexity of Data Reviewed  Clinical lab tests: ordered and reviewed  Tests in the radiology section of CPT®: ordered and reviewed  Decide to obtain previous medical records or to obtain history from someone other than the patient: yes  Review and summarize past medical records: yes  Discuss the patient with other providers: yes  Independent visualization of images, tracings, or specimens: yes    Risk of Complications, Morbidity, and/or Mortality  Presenting problems: high  Diagnostic procedures: high  Management options: high    Patient Progress  Patient progress: stable         Procedures    Patient is complaining of bilateral leg cramps and is hyperventilating. She will be given some more fluids and a dose of Xanax. It appears she is under some significant emotional stress at this point with regard to her son. Blood sugar is noted to be 168     ED MD EKG interpretation : nsr WITH RATE 99 bpm. Normal axis. Normal intervals. T wave inversion V2-v6. Kenya Cantrell MD    Compared to EKG June 20, 2019, patient has some more T wave changes in V4-6     5:40 PM  Still awaiting UA. Repeating troponin. Patient has some changes in EKG compared to 20 June 2019. With her chest tightness, will ask the hospitalist to admit rule out. She is diabetic. She lives at home by herself. Patient is hemodynamically stable. [facility] Hospitalist [facility] for Admission  6:38 PM    ED Room Number: ER15/15  Patient Name and age:  Jade Dixon 69 y.o.  female  Working Diagnosis:   1. Hyperglycemia    2. Chest tightness      Readmission: no  Isolation Requirements:  no  Recommended Level of Care:  telemetry  Code Status:  Full       CONSULT NOTE:  7:18 PM Nelson Navarro MD communicated with Dr. Lucila Carpio, Consult for Hospitalist via [facility] Text. Discussed available diagnostic tests and clinical findings.   Dr. Lucila Carpio will admit the pt into the hospital

## 2019-07-05 NOTE — ED NOTES
Pt arrives with EMS for c/o chest pressure starting at 3 am. Pt  en route. Given 324 mg aspirin by EMS.  Pt also c/o SOB

## 2019-07-06 ENCOUNTER — APPOINTMENT (OUTPATIENT)
Dept: CT IMAGING | Age: 79
DRG: 440 | End: 2019-07-06
Attending: HOSPITALIST
Payer: MEDICARE

## 2019-07-06 LAB
ALBUMIN SERPL-MCNC: 2.9 G/DL (ref 3.5–5)
ALBUMIN/GLOB SERPL: 0.9 {RATIO} (ref 1.1–2.2)
ALP SERPL-CCNC: 100 U/L (ref 45–117)
ALT SERPL-CCNC: 14 U/L (ref 12–78)
ANION GAP SERPL CALC-SCNC: 8 MMOL/L (ref 5–15)
AST SERPL-CCNC: 7 U/L (ref 15–37)
BASOPHILS # BLD: 0 K/UL (ref 0–0.1)
BASOPHILS NFR BLD: 0 % (ref 0–1)
BILIRUB SERPL-MCNC: 0.2 MG/DL (ref 0.2–1)
BNP SERPL-MCNC: 17 PG/ML
BUN SERPL-MCNC: 11 MG/DL (ref 6–20)
BUN/CREAT SERPL: 12 (ref 12–20)
CALCIUM SERPL-MCNC: 7.9 MG/DL (ref 8.5–10.1)
CHLORIDE SERPL-SCNC: 108 MMOL/L (ref 97–108)
CHOLEST SERPL-MCNC: 131 MG/DL
CO2 SERPL-SCNC: 24 MMOL/L (ref 21–32)
CREAT SERPL-MCNC: 0.89 MG/DL (ref 0.55–1.02)
D DIMER PPP FEU-MCNC: 1.06 MG/L FEU (ref 0–0.65)
DIFFERENTIAL METHOD BLD: ABNORMAL
EOSINOPHIL # BLD: 0.2 K/UL (ref 0–0.4)
EOSINOPHIL NFR BLD: 2 % (ref 0–7)
ERYTHROCYTE [DISTWIDTH] IN BLOOD BY AUTOMATED COUNT: 15.1 % (ref 11.5–14.5)
GLOBULIN SER CALC-MCNC: 3.4 G/DL (ref 2–4)
GLUCOSE BLD STRIP.AUTO-MCNC: 111 MG/DL (ref 65–100)
GLUCOSE BLD STRIP.AUTO-MCNC: 177 MG/DL (ref 65–100)
GLUCOSE BLD STRIP.AUTO-MCNC: 180 MG/DL (ref 65–100)
GLUCOSE BLD STRIP.AUTO-MCNC: 216 MG/DL (ref 65–100)
GLUCOSE SERPL-MCNC: 230 MG/DL (ref 65–100)
HCT VFR BLD AUTO: 35.3 % (ref 35–47)
HDLC SERPL-MCNC: 69 MG/DL
HDLC SERPL: 1.9 {RATIO} (ref 0–5)
HGB BLD-MCNC: 11.1 G/DL (ref 11.5–16)
IMM GRANULOCYTES # BLD AUTO: 0 K/UL (ref 0–0.04)
IMM GRANULOCYTES NFR BLD AUTO: 0 % (ref 0–0.5)
LDLC SERPL CALC-MCNC: 41 MG/DL (ref 0–100)
LIPASE SERPL-CCNC: 837 U/L (ref 73–393)
LIPID PROFILE,FLP: NORMAL
LYMPHOCYTES # BLD: 2.6 K/UL (ref 0.8–3.5)
LYMPHOCYTES NFR BLD: 25 % (ref 12–49)
MAGNESIUM SERPL-MCNC: 2 MG/DL (ref 1.6–2.4)
MCH RBC QN AUTO: 26.5 PG (ref 26–34)
MCHC RBC AUTO-ENTMCNC: 31.4 G/DL (ref 30–36.5)
MCV RBC AUTO: 84.2 FL (ref 80–99)
MONOCYTES # BLD: 0.8 K/UL (ref 0–1)
MONOCYTES NFR BLD: 8 % (ref 5–13)
NEUTS SEG # BLD: 6.9 K/UL (ref 1.8–8)
NEUTS SEG NFR BLD: 65 % (ref 32–75)
NRBC # BLD: 0 K/UL (ref 0–0.01)
NRBC BLD-RTO: 0 PER 100 WBC
PHOSPHATE SERPL-MCNC: 3.1 MG/DL (ref 2.6–4.7)
PLATELET # BLD AUTO: 290 K/UL (ref 150–400)
PMV BLD AUTO: 10.4 FL (ref 8.9–12.9)
POTASSIUM SERPL-SCNC: 3.5 MMOL/L (ref 3.5–5.1)
PROT SERPL-MCNC: 6.3 G/DL (ref 6.4–8.2)
RBC # BLD AUTO: 4.19 M/UL (ref 3.8–5.2)
SERVICE CMNT-IMP: ABNORMAL
SODIUM SERPL-SCNC: 140 MMOL/L (ref 136–145)
TRIGL SERPL-MCNC: 105 MG/DL (ref ?–150)
TROPONIN I SERPL-MCNC: <0.05 NG/ML
TROPONIN I SERPL-MCNC: <0.05 NG/ML
TSH SERPL DL<=0.05 MIU/L-ACNC: 0.47 UIU/ML (ref 0.36–3.74)
VLDLC SERPL CALC-MCNC: 21 MG/DL
WBC # BLD AUTO: 10.6 K/UL (ref 3.6–11)

## 2019-07-06 PROCEDURE — 74176 CT ABD & PELVIS W/O CONTRAST: CPT

## 2019-07-06 PROCEDURE — 74011250636 HC RX REV CODE- 250/636: Performed by: INTERNAL MEDICINE

## 2019-07-06 PROCEDURE — 85025 COMPLETE CBC W/AUTO DIFF WBC: CPT

## 2019-07-06 PROCEDURE — 94640 AIRWAY INHALATION TREATMENT: CPT

## 2019-07-06 PROCEDURE — 84443 ASSAY THYROID STIM HORMONE: CPT

## 2019-07-06 PROCEDURE — 97161 PT EVAL LOW COMPLEX 20 MIN: CPT

## 2019-07-06 PROCEDURE — 74011636637 HC RX REV CODE- 636/637: Performed by: INTERNAL MEDICINE

## 2019-07-06 PROCEDURE — 74011250637 HC RX REV CODE- 250/637: Performed by: HOSPITALIST

## 2019-07-06 PROCEDURE — 74011000250 HC RX REV CODE- 250: Performed by: INTERNAL MEDICINE

## 2019-07-06 PROCEDURE — 80061 LIPID PANEL: CPT

## 2019-07-06 PROCEDURE — 97116 GAIT TRAINING THERAPY: CPT

## 2019-07-06 PROCEDURE — 83690 ASSAY OF LIPASE: CPT

## 2019-07-06 PROCEDURE — 84100 ASSAY OF PHOSPHORUS: CPT

## 2019-07-06 PROCEDURE — 83880 ASSAY OF NATRIURETIC PEPTIDE: CPT

## 2019-07-06 PROCEDURE — 82962 GLUCOSE BLOOD TEST: CPT

## 2019-07-06 PROCEDURE — 36415 COLL VENOUS BLD VENIPUNCTURE: CPT

## 2019-07-06 PROCEDURE — 74011250636 HC RX REV CODE- 250/636: Performed by: HOSPITALIST

## 2019-07-06 PROCEDURE — 85379 FIBRIN DEGRADATION QUANT: CPT

## 2019-07-06 PROCEDURE — 83735 ASSAY OF MAGNESIUM: CPT

## 2019-07-06 PROCEDURE — 99218 HC RM OBSERVATION: CPT

## 2019-07-06 PROCEDURE — 84484 ASSAY OF TROPONIN QUANT: CPT

## 2019-07-06 PROCEDURE — 80053 COMPREHEN METABOLIC PANEL: CPT

## 2019-07-06 PROCEDURE — 74011250637 HC RX REV CODE- 250/637: Performed by: INTERNAL MEDICINE

## 2019-07-06 RX ORDER — AMLODIPINE BESYLATE 5 MG/1
10 TABLET ORAL DAILY
Status: DISCONTINUED | OUTPATIENT
Start: 2019-07-06 | End: 2019-07-08

## 2019-07-06 RX ORDER — BUDESONIDE 0.5 MG/2ML
500 INHALANT ORAL
Status: DISCONTINUED | OUTPATIENT
Start: 2019-07-06 | End: 2019-07-12 | Stop reason: HOSPADM

## 2019-07-06 RX ORDER — SODIUM CHLORIDE, SODIUM LACTATE, POTASSIUM CHLORIDE, CALCIUM CHLORIDE 600; 310; 30; 20 MG/100ML; MG/100ML; MG/100ML; MG/100ML
100 INJECTION, SOLUTION INTRAVENOUS CONTINUOUS
Status: DISCONTINUED | OUTPATIENT
Start: 2019-07-06 | End: 2019-07-07

## 2019-07-06 RX ORDER — GUAIFENESIN 100 MG/5ML
81 LIQUID (ML) ORAL DAILY
Status: DISCONTINUED | OUTPATIENT
Start: 2019-07-06 | End: 2019-07-12 | Stop reason: HOSPADM

## 2019-07-06 RX ORDER — ARFORMOTEROL TARTRATE 15 UG/2ML
15 SOLUTION RESPIRATORY (INHALATION) 2 TIMES DAILY
Status: DISCONTINUED | OUTPATIENT
Start: 2019-07-06 | End: 2019-07-12 | Stop reason: HOSPADM

## 2019-07-06 RX ORDER — PANTOPRAZOLE SODIUM 40 MG/1
40 TABLET, DELAYED RELEASE ORAL
Status: DISCONTINUED | OUTPATIENT
Start: 2019-07-06 | End: 2019-07-12 | Stop reason: HOSPADM

## 2019-07-06 RX ORDER — ONDANSETRON 2 MG/ML
4 INJECTION INTRAMUSCULAR; INTRAVENOUS
Status: DISCONTINUED | OUTPATIENT
Start: 2019-07-06 | End: 2019-07-12 | Stop reason: HOSPADM

## 2019-07-06 RX ORDER — RANOLAZINE 500 MG/1
500 TABLET, EXTENDED RELEASE ORAL 2 TIMES DAILY
Status: DISCONTINUED | OUTPATIENT
Start: 2019-07-06 | End: 2019-07-12 | Stop reason: HOSPADM

## 2019-07-06 RX ORDER — LISINOPRIL 20 MG/1
20 TABLET ORAL DAILY
Status: DISCONTINUED | OUTPATIENT
Start: 2019-07-06 | End: 2019-07-08

## 2019-07-06 RX ORDER — BISACODYL 5 MG
5 TABLET, DELAYED RELEASE (ENTERIC COATED) ORAL DAILY PRN
Status: DISCONTINUED | OUTPATIENT
Start: 2019-07-06 | End: 2019-07-12 | Stop reason: HOSPADM

## 2019-07-06 RX ORDER — HEPARIN SODIUM 5000 [USP'U]/ML
5000 INJECTION, SOLUTION INTRAVENOUS; SUBCUTANEOUS EVERY 8 HOURS
Status: DISCONTINUED | OUTPATIENT
Start: 2019-07-06 | End: 2019-07-12 | Stop reason: HOSPADM

## 2019-07-06 RX ORDER — BUSPIRONE HYDROCHLORIDE 10 MG/1
20 TABLET ORAL 2 TIMES DAILY
Status: DISCONTINUED | OUTPATIENT
Start: 2019-07-06 | End: 2019-07-12 | Stop reason: HOSPADM

## 2019-07-06 RX ORDER — DEXLANSOPRAZOLE 60 MG/1
60 CAPSULE, DELAYED RELEASE ORAL
Status: DISCONTINUED | OUTPATIENT
Start: 2019-07-06 | End: 2019-07-06 | Stop reason: CLARIF

## 2019-07-06 RX ORDER — SODIUM CHLORIDE 0.9 % (FLUSH) 0.9 %
5-40 SYRINGE (ML) INJECTION AS NEEDED
Status: DISCONTINUED | OUTPATIENT
Start: 2019-07-06 | End: 2019-07-12 | Stop reason: HOSPADM

## 2019-07-06 RX ORDER — INSULIN GLARGINE 100 [IU]/ML
20 INJECTION, SOLUTION SUBCUTANEOUS 2 TIMES DAILY
Status: DISCONTINUED | OUTPATIENT
Start: 2019-07-06 | End: 2019-07-10

## 2019-07-06 RX ORDER — METOPROLOL TARTRATE 25 MG/1
12.5 TABLET, FILM COATED ORAL EVERY 12 HOURS
Status: DISCONTINUED | OUTPATIENT
Start: 2019-07-06 | End: 2019-07-12 | Stop reason: HOSPADM

## 2019-07-06 RX ORDER — SODIUM CHLORIDE 0.9 % (FLUSH) 0.9 %
5-40 SYRINGE (ML) INJECTION EVERY 8 HOURS
Status: DISCONTINUED | OUTPATIENT
Start: 2019-07-06 | End: 2019-07-12 | Stop reason: HOSPADM

## 2019-07-06 RX ORDER — INSULIN LISPRO 100 [IU]/ML
INJECTION, SOLUTION INTRAVENOUS; SUBCUTANEOUS
Status: DISCONTINUED | OUTPATIENT
Start: 2019-07-06 | End: 2019-07-12 | Stop reason: HOSPADM

## 2019-07-06 RX ORDER — ROSUVASTATIN CALCIUM 10 MG/1
10 TABLET, COATED ORAL
Status: DISCONTINUED | OUTPATIENT
Start: 2019-07-06 | End: 2019-07-12 | Stop reason: HOSPADM

## 2019-07-06 RX ORDER — MAGNESIUM SULFATE 100 %
4 CRYSTALS MISCELLANEOUS AS NEEDED
Status: DISCONTINUED | OUTPATIENT
Start: 2019-07-06 | End: 2019-07-12 | Stop reason: HOSPADM

## 2019-07-06 RX ORDER — ALPRAZOLAM 0.5 MG/1
0.25 TABLET ORAL
Status: DISCONTINUED | OUTPATIENT
Start: 2019-07-06 | End: 2019-07-12 | Stop reason: HOSPADM

## 2019-07-06 RX ORDER — IPRATROPIUM BROMIDE AND ALBUTEROL SULFATE 2.5; .5 MG/3ML; MG/3ML
3 SOLUTION RESPIRATORY (INHALATION)
Status: DISCONTINUED | OUTPATIENT
Start: 2019-07-06 | End: 2019-07-12 | Stop reason: HOSPADM

## 2019-07-06 RX ORDER — DEXTROSE 50 % IN WATER (D50W) INTRAVENOUS SYRINGE
12.5-25 AS NEEDED
Status: DISCONTINUED | OUTPATIENT
Start: 2019-07-06 | End: 2019-07-12 | Stop reason: HOSPADM

## 2019-07-06 RX ORDER — DULOXETIN HYDROCHLORIDE 60 MG/1
60 CAPSULE, DELAYED RELEASE ORAL DAILY
Status: DISCONTINUED | OUTPATIENT
Start: 2019-07-06 | End: 2019-07-12 | Stop reason: HOSPADM

## 2019-07-06 RX ADMIN — Medication 10 ML: at 14:00

## 2019-07-06 RX ADMIN — ACETAMINOPHEN 650 MG: 325 TABLET ORAL at 09:11

## 2019-07-06 RX ADMIN — BUSPIRONE HYDROCHLORIDE 20 MG: 5 TABLET ORAL at 17:59

## 2019-07-06 RX ADMIN — ROSUVASTATIN CALCIUM 10 MG: 10 TABLET, FILM COATED ORAL at 21:32

## 2019-07-06 RX ADMIN — RANOLAZINE 500 MG: 500 TABLET, FILM COATED, EXTENDED RELEASE ORAL at 11:03

## 2019-07-06 RX ADMIN — ROSUVASTATIN CALCIUM 10 MG: 10 TABLET, FILM COATED ORAL at 00:41

## 2019-07-06 RX ADMIN — HEPARIN SODIUM 5000 UNITS: 5000 INJECTION INTRAVENOUS; SUBCUTANEOUS at 17:59

## 2019-07-06 RX ADMIN — INSULIN GLARGINE 20 UNITS: 100 INJECTION, SOLUTION SUBCUTANEOUS at 08:41

## 2019-07-06 RX ADMIN — Medication 10 ML: at 06:47

## 2019-07-06 RX ADMIN — BUDESONIDE 500 MCG: 0.5 INHALANT RESPIRATORY (INHALATION) at 01:44

## 2019-07-06 RX ADMIN — BUSPIRONE HYDROCHLORIDE 20 MG: 5 TABLET ORAL at 08:40

## 2019-07-06 RX ADMIN — LISINOPRIL 20 MG: 20 TABLET ORAL at 08:41

## 2019-07-06 RX ADMIN — ALPRAZOLAM 0.25 MG: 0.25 TABLET ORAL at 22:19

## 2019-07-06 RX ADMIN — INSULIN GLARGINE 20 UNITS: 100 INJECTION, SOLUTION SUBCUTANEOUS at 18:03

## 2019-07-06 RX ADMIN — INSULIN LISPRO 3 UNITS: 100 INJECTION, SOLUTION INTRAVENOUS; SUBCUTANEOUS at 12:04

## 2019-07-06 RX ADMIN — DULOXETINE HYDROCHLORIDE 60 MG: 60 CAPSULE, DELAYED RELEASE ORAL at 08:40

## 2019-07-06 RX ADMIN — HEPARIN SODIUM 5000 UNITS: 5000 INJECTION INTRAVENOUS; SUBCUTANEOUS at 08:41

## 2019-07-06 RX ADMIN — RANOLAZINE 500 MG: 500 TABLET, FILM COATED, EXTENDED RELEASE ORAL at 18:03

## 2019-07-06 RX ADMIN — AMLODIPINE BESYLATE 10 MG: 5 TABLET ORAL at 08:40

## 2019-07-06 RX ADMIN — Medication 10 ML: at 00:41

## 2019-07-06 RX ADMIN — BUDESONIDE 500 MCG: 0.5 INHALANT RESPIRATORY (INHALATION) at 07:28

## 2019-07-06 RX ADMIN — ARFORMOTEROL TARTRATE 15 MCG: 15 SOLUTION RESPIRATORY (INHALATION) at 07:27

## 2019-07-06 RX ADMIN — ACETAMINOPHEN 650 MG: 325 TABLET ORAL at 17:59

## 2019-07-06 RX ADMIN — SODIUM CHLORIDE, SODIUM LACTATE, POTASSIUM CHLORIDE, AND CALCIUM CHLORIDE 100 ML/HR: 600; 310; 30; 20 INJECTION, SOLUTION INTRAVENOUS at 21:52

## 2019-07-06 RX ADMIN — PANTOPRAZOLE SODIUM 40 MG: 40 TABLET, DELAYED RELEASE ORAL at 06:47

## 2019-07-06 RX ADMIN — SODIUM CHLORIDE, SODIUM LACTATE, POTASSIUM CHLORIDE, AND CALCIUM CHLORIDE 100 ML/HR: 600; 310; 30; 20 INJECTION, SOLUTION INTRAVENOUS at 11:59

## 2019-07-06 RX ADMIN — INSULIN LISPRO 2 UNITS: 100 INJECTION, SOLUTION INTRAVENOUS; SUBCUTANEOUS at 17:59

## 2019-07-06 RX ADMIN — METOPROLOL TARTRATE 12.5 MG: 25 TABLET ORAL at 08:41

## 2019-07-06 RX ADMIN — METOPROLOL TARTRATE 12.5 MG: 25 TABLET ORAL at 21:32

## 2019-07-06 RX ADMIN — HEPARIN SODIUM 5000 UNITS: 5000 INJECTION INTRAVENOUS; SUBCUTANEOUS at 00:41

## 2019-07-06 RX ADMIN — ASPIRIN 81 MG 81 MG: 81 TABLET ORAL at 08:41

## 2019-07-06 NOTE — ROUTINE PROCESS
TRANSFER - OUT REPORT: 
 
Verbal report given to WENDY Iyer(name) on Zee Blank  being transferred to [facility](unit) for routine progression of care Report consisted of patients Situation, Background, Assessment and  
Recommendations(SBAR). Information from the following report(s) SBAR, ED Summary, [facility] and Recent Results was reviewed with the receiving nurse. Lines:  
Peripheral IV 07/05/19 Left Arm (Active) Site Assessment Clean, dry, & intact 7/5/2019  3:00 PM  
Phlebitis Assessment 0 7/5/2019  3:00 PM  
Infiltration Assessment 0 7/5/2019  3:00 PM  
Dressing Status Clean, dry, & intact 7/5/2019  3:00 PM  
Dressing Type 4 X 4;Transparent 7/5/2019  3:00 PM  
Hub Color/Line Status Pink;Flushed;Patent 7/5/2019  3:00 PM  
  
 
Opportunity for questions and clarification was provided. Patient transported with: 
 Monitor David Adame RN

## 2019-07-06 NOTE — PROGRESS NOTES
2202: TRANSFER - IN REPORT:    Verbal report received from Ángela(name) on Daylin Jain  being received from ED (unit) for routine progression of care. Report consisted of patients Situation, Background, Assessment and Recommendations(SBAR). Information from the following report(s) ED Summary, MAR, Accordion, Recent Results and Cardiac Rhythm NSR/ST was reviewed with the receiving nurse. Opportunity for questions and clarification was provided. Assessment completed upon patients arrival to unit and care assumed. 2245: patient reports taking mirtazapine and seroquel at bedtime, confirmed with PTA med list; paged hospitalist to request, spoke with Henry Frederick NP who will place orders    21 : HS , paged hospitalist NP for ACHS, sliding scale, and lantus orders (home medication, patient missed her home dose this evening while in ED). Henry Frederick NP will place orders    0645: Dr. Jenn Garcia notified of cardiology consult    8722: Dr. Jenn Garcia at bedside to see patient    46: Bedside shift change report given to Tracey Pallas (oncoming nurse) by Tolu Mays (offgoing nurse). Report included the following information SBAR, Intake/Output, MAR, Accordion, Recent Results, Med Rec Status and Cardiac Rhythm NSR.

## 2019-07-06 NOTE — PROGRESS NOTES
Bedside shift change report given to [facility] (oncoming nurse) by Casey Lopez (offgoing nurse). Report included the following information SBAR, Intake/Output and MAR.

## 2019-07-06 NOTE — PROGRESS NOTES
Hospitalist Progress Note  Mando Foreman MD  Answering service: [phone] from in house phone  Cell: [phone]      Date of Service:  2019  NAME:  Anders Weber  :  1940  MRN:  [id number]      Admission Summary: This is a 80-year-old woman with a past medical history significant for type 2 diabetes, hypertension, asthma, dyslipidemia, anxiety/depression, who was in her usual state of health until the day of her presentation at the emergency room when the patient developed chest pain. The chest pain woke up the patient from her sleep at about 03:00 a.m. The chest pain is located at the center of the chest.  The patient described the chest pain as pressure-like with no radiation. No known aggravating or relieving factors, 7/10 in severity, constant, associated with shortness of breath. The shortness of breath is with very minimal exertion. The patient also complained of cough which is nonproductive as well as fever and nausea but no vomiting. EMS was called. The patient was brought to the emergency room for further evaluation. Interval history / Subjective:     Epigastric dull pain 5/10, non radiating, no hx of alcohol abuse     Assessment & Plan:     Atypical chest pain , hx of CAD  -continue aspirin, crestor, ranexa, and metoprolol  -troponin <0.05 x 2, probnp 17  -EKG normal sinus rhythm vent rate 99 non specific st t wave, no significant change compared to previous ekg  -lipid panel normal  -seen by cardiologist, pain not cardiac, no further work up for now    Epigastric abdominal pain and levated lipase ?  Acute pancreatitis  -continue normal saline   -lipase 837, TG normal  -hx of s/p cholecystectomy   -no hx of alcohol abuse  -will do CT abdomen pelvis     T2DM  -on lantus 20 units, sliding scale and monitor finger stick glucose    HTN  -continue norvasc, lisinopril, metoprolol    Hx of asthma  -stable  -continue pulmicort, prn duo neb    Hx of anxiety/depression   -feels depressed and anxious recently related to her son health condition, who has chronic schizophrenia and lives at group home, he visited her on July 4, not sleeping well,  -no suicidal thought  -continue buspar, cymbalta  -consult to psychiatrist    Morbid obesity  -diet and weight loss program       Code status: Full Code  DVT prophylaxis: heparin    Care Plan discussed with: Patient/Family and Nurse  Disposition: TBD     Hospital Problems  Date Reviewed: 7/5/2019          Codes Class Noted POA    * (Principal) Chest pain ICD-10-CM: R07.9  ICD-9-CM: 786.50  7/5/2019 Yes              Vital Signs:    Last 24hrs VS reviewed since prior progress note. Most recent are:  Visit Vitals  /53 (BP 1 Location: Right arm, BP Patient Position: At rest)   Pulse 86   Temp 97.7 °F (36.5 °C)   Resp 18   Wt 81.8 kg (180 lb 5.4 oz)   SpO2 97%   Breastfeeding? No   BMI 31.95 kg/m²         Intake/Output Summary (Last 24 hours) at 7/6/2019 0813  Last data filed at 7/6/2019 0330  Gross per 24 hour   Intake 1594.5 ml   Output    Net 1594.5 ml        Physical Examination:             Constitutional:  No acute distress, cooperative, pleasant    ENT:  Oral mucous moist, oropharynx benign. Neck supple,    Resp:  CTA bilaterally. No wheezing/rhonchi/rales. No accessory muscle use   CV:  Regular rhythm, normal rate, no murmurs, gallops, rubs    GI:  Soft, non distended, non tender. normoactive bowel sounds, no hepatosplenomegaly     Musculoskeletal:  No edema,    Neurologic:  Moves all extremities.   AAOx3, CN II-XII reviewed     Skin:  Good turgor, no rashes or ulcers       Data Review:    Review and/or order of clinical lab test  Review and/or order of tests in the radiology section of CPT      Labs:     Recent Labs     07/06/19 0053 07/05/19  1449   WBC 10.6 12.5*   HGB 11.1* 11.9   HCT 35.3 38.7    306     Recent Labs     07/06/19 0053 07/05/19  1449    137   K 3.5 4.4    104   CO2 24 24   BUN 11 12   CREA 0.89 1. 15*   * 433*   CA 7.9* 8.9   MG 2.0  --    PHOS 3.1  --      Recent Labs     07/06/19  0053 07/05/19  1449   SGOT 7* 13*   ALT 14 18    130*   TBILI 0.2 0.3   TP 6.3* 7.3   ALB 2.9* 3.4*   GLOB 3.4 3.9   LPSE 837*  --      No results for input(s): INR, PTP, APTT in the last 72 hours. No lab exists for component: INREXT   No results for input(s): FE, TIBC, PSAT, FERR in the last 72 hours. Lab Results   Component Value Date/Time    Folate 13.2 05/20/2009 03:00 AM      No results for input(s): PH, PCO2, PO2 in the last 72 hours.   Recent Labs     07/06/19  0646 07/06/19 0053   TROIQ <0.05 <0.05     Lab Results   Component Value Date/Time    Cholesterol, total 131 07/06/2019 12:53 AM    HDL Cholesterol 69 07/06/2019 12:53 AM    LDL, calculated 41 07/06/2019 12:53 AM    Triglyceride 105 07/06/2019 12:53 AM    CHOL/HDL Ratio 1.9 07/06/2019 12:53 AM     Lab Results   Component Value Date/Time    Glucose (POC) 111 (H) 07/06/2019 06:32 AM    Glucose (POC) 294 (H) 07/05/2019 11:02 PM    Glucose (POC) 125 (H) 07/05/2019 05:39 PM    Glucose (POC) 175 (H) 07/05/2019 04:06 PM    Glucose (POC) 476 (H) 07/05/2019 02:43 PM     Lab Results   Component Value Date/Time    Color YELLOW/STRAW 07/05/2019 04:52 PM    Appearance CLEAR 07/05/2019 04:52 PM    Specific gravity RESISTANT 07/05/2019 04:52 PM    Specific gravity 1.026 06/20/2019 01:51 AM    pH (UA) 5.0 07/05/2019 04:52 PM    Protein NEGATIVE  07/05/2019 04:52 PM    Glucose >1,000 (A) 07/05/2019 04:52 PM    Ketone NEGATIVE  07/05/2019 04:52 PM    Bilirubin NEGATIVE  07/05/2019 04:52 PM    Urobilinogen 0.2 07/05/2019 04:52 PM    Nitrites NEGATIVE  07/05/2019 04:52 PM    Leukocyte Esterase NEGATIVE  07/05/2019 04:52 PM    Epithelial cells FEW 06/20/2019 01:51 AM    Bacteria NEGATIVE  06/20/2019 01:51 AM    WBC 0-4 06/20/2019 01:51 AM    RBC 0-5 06/20/2019 01:51 AM Medications Reviewed:     Current Facility-Administered Medications   Medication Dose Route Frequency    albuterol-ipratropium (DUO-NEB) 2.5 MG-0.5 MG/3 ML  3 mL Nebulization Q4H PRN    amLODIPine (NORVASC) tablet 10 mg  10 mg Oral DAILY    arformoterol (BROVANA) neb solution 15 mcg  15 mcg Nebulization BID    aspirin chewable tablet 81 mg  81 mg Oral DAILY    budesonide (PULMICORT) 500 mcg/2 ml nebulizer suspension  500 mcg Nebulization BID RT    busPIRone (BUSPAR) tablet 20 mg  20 mg Oral BID    DULoxetine (CYMBALTA) capsule 60 mg  60 mg Oral DAILY    lisinopril (PRINIVIL, ZESTRIL) tablet 20 mg  20 mg Oral DAILY    insulin glargine (LANTUS) injection 20 Units  20 Units SubCUTAneous BID    ranolazine ER (RANEXA) tablet 500 mg  500 mg Oral BID    rosuvastatin (CRESTOR) tablet 10 mg  10 mg Oral QHS    sodium chloride (NS) flush 5-40 mL  5-40 mL IntraVENous Q8H    sodium chloride (NS) flush 5-40 mL  5-40 mL IntraVENous PRN    ondansetron (ZOFRAN) injection 4 mg  4 mg IntraVENous Q4H PRN    bisacodyl (DULCOLAX) tablet 5 mg  5 mg Oral DAILY PRN    heparin (porcine) injection 5,000 Units  5,000 Units SubCUTAneous Q8H    insulin lispro (HUMALOG) injection   SubCUTAneous AC&HS    glucose chewable tablet 16 g  4 Tab Oral PRN    dextrose (D50W) injection syrg 12.5-25 g  12.5-25 g IntraVENous PRN    glucagon (GLUCAGEN) injection 1 mg  1 mg IntraMUSCular PRN    pantoprazole (PROTONIX) tablet 40 mg  40 mg Oral ACB    metoprolol tartrate (LOPRESSOR) tablet 12.5 mg  12.5 mg Oral Q12H    acetaminophen (TYLENOL) tablet 650 mg  650 mg Oral Q4H PRN    nitroglycerin (NITROSTAT) tablet 0.4 mg  0.4 mg SubLINGual Q5MIN PRN    morphine injection 2 mg  2 mg IntraVENous Q4H PRN    lactated Ringers infusion  75 mL/hr IntraVENous CONTINUOUS    sodium chloride (NS) flush 5-40 mL  5-40 mL IntraVENous PRN     ______________________________________________________________________  EXPECTED LENGTH OF STAY: - - -  ACTUAL LENGTH OF STAY:          Al Glasgow MD

## 2019-07-06 NOTE — CONSULTS
Cardiology Consult Note    CC: CP  Reason for consult:  CP  Requesting MD:  Dr. Gaby Moon     Subjective:      Date of  Admission: 7/5/2019  2:09 PM     Admission type:Emergency    Madison Beulah is a 69 y.o. female admitted for Chest pain [R07.9]. Patient complains of SS chest pressure with SOB, nausea, and weakness. Since she came in she was found to have high BS in 600s and two troponins were negative. Her past cardiac data include high calcium score but moderate RCA lesion which was negative for FFR on cardiac cath in 10/17, thus hemodynamically insignificant. Her presentation this time is very similar to 10/17 at which time her BS was much elevated as well and had a cardiac cath. Denies any recent exertional chest pain or palpitations.     Patient Active Problem List    Diagnosis Date Noted    Chest pain 07/05/2019    Pain due to knee joint prosthesis ([address] 11/14/2017    CAD (coronary artery disease) 10/27/2017    Pancreatitis, acute 09/08/2017    Epigastric abdominal pain 09/08/2017    Painful total knee replacement ([address] 08/09/2017    Status post right knee replacement 08/09/2017    Leukocytosis, unspecified 04/06/2015    DM (diabetes mellitus) ([address] 04/06/2015    HTN (hypertension) 04/06/2015    Arthritis 04/06/2015    Chest pain, unspecified 09/12/2013      Loraine Ferreira MD  Past Medical History:   Diagnosis Date    Anal polyp 6/13/2013    Arthritis     Asthma     CAD (coronary artery disease) 10/27/2017    Cataract     Chronic pain     knee - right/back    Diabetes ([address]     GERD (gastroesophageal reflux disease)     Hemorrhoids 6/13/2013    History of kidney stones     Hypertension     Ill-defined condition     abdominal pain and burning    Other ill-defined conditions(799.89)     high cholesterol      Past Surgical History:   Procedure Laterality Date    COLONOSCOPY  2/28/2013         EGD  2/28/2013         HX CATARACT REMOVAL Bilateral     HX CHOLECYSTECTOMY  6-2015    HX GI  6/27/13    anal polyps removed    HX HEENT      laser surgery for blood clot in right eye    HX KNEE REPLACEMENT      right    HX OTHER SURGICAL  4-2-14    lap gastrotomy and removal of polyp -  - not cancerous per pt    HX ALAN AND BSO      HX TONSILLECTOMY      HX UROLOGICAL      \"laser\" surgery for kidney stone    NEUROLOGICAL PROCEDURE UNLISTED  1990    tumor at back of neck, benign     Allergies   Allergen Reactions    Seafood [Shellfish Containing Products] Swelling      Family History   Problem Relation Age of Onset    Cancer Mother         colon    Diabetes Brother     Other Sister         GI BLEED    Heart Disease Brother     Heart Attack Brother     Heart Disease Brother     Heart Disease Brother     Schizophrenia Son     Anesth Problems Neg Hx       Current Facility-Administered Medications   Medication Dose Route Frequency    albuterol-ipratropium (DUO-NEB) 2.5 MG-0.5 MG/3 ML  3 mL Nebulization Q4H PRN    amLODIPine (NORVASC) tablet 10 mg  10 mg Oral DAILY    arformoterol (BROVANA) neb solution 15 mcg  15 mcg Nebulization BID    aspirin chewable tablet 81 mg  81 mg Oral DAILY    budesonide (PULMICORT) 500 mcg/2 ml nebulizer suspension  500 mcg Nebulization BID RT    busPIRone (BUSPAR) tablet 20 mg  20 mg Oral BID    DULoxetine (CYMBALTA) capsule 60 mg  60 mg Oral DAILY    lisinopril (PRINIVIL, ZESTRIL) tablet 20 mg  20 mg Oral DAILY    insulin glargine (LANTUS) injection 20 Units  20 Units SubCUTAneous BID    ranolazine ER (RANEXA) tablet 500 mg  500 mg Oral BID    rosuvastatin (CRESTOR) tablet 10 mg  10 mg Oral QHS    sodium chloride (NS) flush 5-40 mL  5-40 mL IntraVENous Q8H    sodium chloride (NS) flush 5-40 mL  5-40 mL IntraVENous PRN    ondansetron (ZOFRAN) injection 4 mg  4 mg IntraVENous Q4H PRN    bisacodyl (DULCOLAX) tablet 5 mg  5 mg Oral DAILY PRN    heparin (porcine) injection 5,000 Units  5,000 Units SubCUTAneous Q8H    insulin lispro (HUMALOG) injection   SubCUTAneous AC&HS    glucose chewable tablet 16 g  4 Tab Oral PRN    dextrose (D50W) injection syrg 12.5-25 g  12.5-25 g IntraVENous PRN    glucagon (GLUCAGEN) injection 1 mg  1 mg IntraMUSCular PRN    pantoprazole (PROTONIX) tablet 40 mg  40 mg Oral ACB    metoprolol tartrate (LOPRESSOR) tablet 12.5 mg  12.5 mg Oral Q12H    acetaminophen (TYLENOL) tablet 650 mg  650 mg Oral Q4H PRN    nitroglycerin (NITROSTAT) tablet 0.4 mg  0.4 mg SubLINGual Q5MIN PRN    morphine injection 2 mg  2 mg IntraVENous Q4H PRN    lactated Ringers infusion  75 mL/hr IntraVENous CONTINUOUS    sodium chloride (NS) flush 5-40 mL  5-40 mL IntraVENous PRN        Prior to Admission Medications:  Prior to Admission medications    Medication Sig Start Date End Date Taking? Authorizing Provider   albuterol (PROVENTIL VENTOLIN) 2.5 mg /3 mL (0.083 %) nebulizer solution 3 mL by Nebulization route every four (4) hours as needed for Wheezing. 3/11/19   Marleni Garcia MD   arformoterol (BROVANA) 15 mcg/2 mL nebu neb solution 2 mL by Nebulization route two (2) times a day. 3/11/19   Marleni Garcia MD   budesonide (PULMICORT) 0.5 mg/2 mL nbsp 2 mL by Nebulization route two (2) times a day. 3/11/19   Marleni Garcia MD   aspirin 81 mg chewable tablet Take 1 Tab by mouth daily. 3/12/19   Marleni Garcia MD   busPIRone (BUSPAR) 5 mg tablet Take 20 mg by mouth two (2) times a day. Provider, Historical   DULoxetine (CYMBALTA) 60 mg capsule Take 60 mg by mouth daily. Provider, Historical   ranolazine ER (RANEXA) 500 mg SR tablet Take 500 mg by mouth two (2) times a day. Provider, Historical   mirtazapine (REMERON) 15 mg tablet Take 15 mg by mouth nightly. Provider, Historical   QUEtiapine (SEROQUEL) 25 mg tablet Take 25 mg by mouth nightly. Provider, Historical   albuterol (PROVENTIL HFA, VENTOLIN HFA, PROAIR HFA) 90 mcg/actuation inhaler Take 2 Puffs by inhalation every four (4) hours as needed for Wheezing. 19   Brittaney Bloom MD   rosuvastatin (CRESTOR) 10 mg tablet Take 10 mg by mouth nightly. Provider, Historical   Dexlansoprazole (DEXILANT) 60 mg CpDB Take 60 mg by mouth Daily (before breakfast). Provider, Historical   amLODIPine (NORVASC) 10 mg tablet Take 10 mg by mouth daily. 2/12/15   Provider, Historical   fosinopril (MONOPRIL) 20 mg tablet Take 20 mg by mouth daily. 2/12/15   Provider, Historical   Insulin Glargine (LANTUS SOLOSTAR) 100 unit/mL (3 mL) flexpen 20 Units by SubCUTAneous route two (2) times a day. Indications: Diabetes Mellitus    Provider, Historical        Review of Symptoms:  Except as noted in HPI, patient denies recent fever or chills, nausea, vomiting, diarrhea, hemoptysis, hematemesis, dysuria, myalgias, focal neurologic symptoms, ecchymosis, angioedema, odynophagia, dysphagia, sore throat, earache,rash, melena, hematochezia, depression, GERD, cold intolerance, petechia, bleeding gums, or significant weight loss. A comprehensive review of systems was negative except for that written in the HPI. Subjective:    24 hr VS reviewed, overall VSSAF  Temp (24hrs), Av.7 °F (36.5 °C), Min:97.5 °F (36.4 °C), Max:97.8 °F (36.6 °C)    Patient Vitals for the past 8 hrs:   Pulse   19 0330 86    Patient Vitals for the past 8 hrs:   Resp   19 0330 18    Patient Vitals for the past 8 hrs:   BP   19 0330 112/53          Intake/Output Summary (Last 24 hours) at 2019 0737  Last data filed at 2019 033  Gross per 24 hour   Intake 1594.5 ml   Output    Net 1594.5 ml         Physical Exam (complete single organ system exam)      Visit Vitals  /53 (BP 1 Location: Right arm, BP Patient Position: At rest)   Pulse 86   Temp 97.7 °F (36.5 °C)   Resp 18   Wt 180 lb 5.4 oz (81.8 kg)   SpO2 97%   Breastfeeding? No   BMI 31.95 kg/m²     General Appearance:  Well developed, well nourished,alert and oriented x 3, and individual in no acute distress. Ears/Nose/Mouth/Throat:   Hearing grossly normal.         Neck: Supple. Chest:   Lungs clear to auscultation bilaterally. Cardiovascular:  Regular rate and rhythm, S1, S2 normal, no murmur. Abdomen:   Soft, non-tender, bowel sounds are active. Extremities: Rt leg 1+ edema    Skin: Warm and dry. Cardiographics    Telemetry: normal sinus rhythm  ECG: normal EKG, normal sinus rhythm, unchanged from previous tracings  Echocardiogram: Not done    Labs:   Recent Results (from the past 24 hour(s))   EKG, 12 LEAD, INITIAL    Collection Time: 07/05/19  2:26 PM   Result Value Ref Range    Ventricular Rate 99 BPM    Atrial Rate 99 BPM    P-R Interval 124 ms    QRS Duration 66 ms    Q-T Interval 384 ms    QTC Calculation (Bezet) 492 ms    Calculated P Axis 57 degrees    Calculated R Axis 49 degrees    Calculated T Axis 62 degrees    Diagnosis       Normal sinus rhythm  T wave abnormality, consider anterior ischemia  When compared with ECG of 20-JUN-2019 12:06,  No significant change was found  Confirmed by Lary Noe MD, Jarad ([id number]) on 7/5/2019 4:24:48 PM     GLUCOSE, POC    Collection Time: 07/05/19  2:43 PM   Result Value Ref Range    Glucose (POC) 476 (H) 65 - 100 mg/dL    Performed by EDUARDO PAULSON    CBC WITH AUTOMATED DIFF    Collection Time: 07/05/19  2:49 PM   Result Value Ref Range    WBC 12.5 (H) 3.6 - 11.0 K/uL    RBC 4.56 3.80 - 5.20 M/uL    HGB 11.9 11.5 - 16.0 g/dL    HCT 38.7 35.0 - 47.0 %    MCV 84.9 80.0 - 99.0 FL    MCH 26.1 26.0 - 34.0 PG    MCHC 30.7 30.0 - 36.5 g/dL    RDW 15.3 (H) 11.5 - 14.5 %    PLATELET 270 849 - 202 K/uL    MPV 11.4 8.9 - 12.9 FL    NRBC 0.0 0  WBC    ABSOLUTE NRBC 0.00 0.00 - 0.01 K/uL    NEUTROPHILS 65 32 - 75 %    LYMPHOCYTES 25 12 - 49 %    MONOCYTES 7 5 - 13 %    EOSINOPHILS 1 0 - 7 %    BASOPHILS 1 0 - 1 %    IMMATURE GRANULOCYTES 1 (H) 0.0 - 0.5 %    ABS. NEUTROPHILS 8.3 (H) 1.8 - 8.0 K/UL    ABS. LYMPHOCYTES 3.1 0.8 - 3.5 K/UL    ABS.  MONOCYTES 0.8 0.0 - 1.0 K/UL    ABS. EOSINOPHILS 0.1 0.0 - 0.4 K/UL    ABS. BASOPHILS 0.1 0.0 - 0.1 K/UL    ABS. IMM. GRANS. 0.1 (H) 0.00 - 0.04 K/UL    DF AUTOMATED     METABOLIC PANEL, COMPREHENSIVE    Collection Time: 07/05/19  2:49 PM   Result Value Ref Range    Sodium 137 136 - 145 mmol/L    Potassium 4.4 3.5 - 5.1 mmol/L    Chloride 104 97 - 108 mmol/L    CO2 24 21 - 32 mmol/L    Anion gap 9 5 - 15 mmol/L    Glucose 433 (H) 65 - 100 mg/dL    BUN 12 6 - 20 MG/DL    Creatinine 1.15 (H) 0.55 - 1.02 MG/DL    BUN/Creatinine ratio 10 (L) 12 - 20      GFR est AA 55 (L) >60 ml/min/1.73m2    GFR est non-AA 46 (L) >60 ml/min/1.73m2    Calcium 8.9 8.5 - 10.1 MG/DL    Bilirubin, total 0.3 0.2 - 1.0 MG/DL    ALT (SGPT) 18 12 - 78 U/L    AST (SGOT) 13 (L) 15 - 37 U/L    Alk. phosphatase 130 (H) 45 - 117 U/L    Protein, total 7.3 6.4 - 8.2 g/dL    Albumin 3.4 (L) 3.5 - 5.0 g/dL    Globulin 3.9 2.0 - 4.0 g/dL    A-G Ratio 0.9 (L) 1.1 - 2.2     HEMOGLOBIN A1C WITH EAG    Collection Time: 07/05/19  2:49 PM   Result Value Ref Range    Hemoglobin A1c 12.6 (H) 4.2 - 6.3 %    Est. average glucose 315 mg/dL   SAMPLES BEING HELD    Collection Time: 07/05/19  2:50 PM   Result Value Ref Range    SAMPLES BEING HELD 1RED,1BLUE     COMMENT        Add-on orders for these samples will be processed based on acceptable specimen integrity and analyte stability, which may vary by analyte.    GLUCOSE, POC    Collection Time: 07/05/19  4:06 PM   Result Value Ref Range    Glucose (POC) 175 (H) 65 - 100 mg/dL    Performed by EDUARDO PAULSON    URINALYSIS W/ RFLX MICROSCOPIC    Collection Time: 07/05/19  4:52 PM   Result Value Ref Range    Color YELLOW/STRAW      Appearance CLEAR CLEAR      Specific gravity RESISTANT 1.003 - 1.030      pH (UA) 5.0 5.0 - 8.0      Protein NEGATIVE  NEG mg/dL    Glucose >1,000 (A) NEG mg/dL    Ketone NEGATIVE  NEG mg/dL    Bilirubin NEGATIVE  NEG      Blood NEGATIVE  NEG      Urobilinogen 0.2 0.2 - 1.0 EU/dL    Nitrites NEGATIVE  NEG Leukocyte Esterase NEGATIVE  NEG     GLUCOSE, POC    Collection Time: 07/05/19  5:39 PM   Result Value Ref Range    Glucose (POC) 125 (H) 65 - 100 mg/dL    Performed by Latonya Garcia    POC TROPONIN-I    Collection Time: 07/05/19  6:22 PM   Result Value Ref Range    Troponin-I (POC) <0.04 0.00 - 0.08 ng/mL   GLUCOSE, POC    Collection Time: 07/05/19 11:02 PM   Result Value Ref Range    Glucose (POC) 294 (H) 65 - 100 mg/dL    Performed by MALINDA EPPS    METABOLIC PANEL, COMPREHENSIVE    Collection Time: 07/06/19 12:53 AM   Result Value Ref Range    Sodium 140 136 - 145 mmol/L    Potassium 3.5 3.5 - 5.1 mmol/L    Chloride 108 97 - 108 mmol/L    CO2 24 21 - 32 mmol/L    Anion gap 8 5 - 15 mmol/L    Glucose 230 (H) 65 - 100 mg/dL    BUN 11 6 - 20 MG/DL    Creatinine 0.89 0.55 - 1.02 MG/DL    BUN/Creatinine ratio 12 12 - 20      GFR est AA >60 >60 ml/min/1.73m2    GFR est non-AA >60 >60 ml/min/1.73m2    Calcium 7.9 (L) 8.5 - 10.1 MG/DL    Bilirubin, total 0.2 0.2 - 1.0 MG/DL    ALT (SGPT) 14 12 - 78 U/L    AST (SGOT) 7 (L) 15 - 37 U/L    Alk.  phosphatase 100 45 - 117 U/L    Protein, total 6.3 (L) 6.4 - 8.2 g/dL    Albumin 2.9 (L) 3.5 - 5.0 g/dL    Globulin 3.4 2.0 - 4.0 g/dL    A-G Ratio 0.9 (L) 1.1 - 2.2     LIPID PANEL    Collection Time: 07/06/19 12:53 AM   Result Value Ref Range    LIPID PROFILE          Cholesterol, total 131 <200 MG/DL    Triglyceride 105 <150 MG/DL    HDL Cholesterol 69 MG/DL    LDL, calculated 41 0 - 100 MG/DL    VLDL, calculated 21 MG/DL    CHOL/HDL Ratio 1.9 0.0 - 5.0     CBC WITH AUTOMATED DIFF    Collection Time: 07/06/19 12:53 AM   Result Value Ref Range    WBC 10.6 3.6 - 11.0 K/uL    RBC 4.19 3.80 - 5.20 M/uL    HGB 11.1 (L) 11.5 - 16.0 g/dL    HCT 35.3 35.0 - 47.0 %    MCV 84.2 80.0 - 99.0 FL    MCH 26.5 26.0 - 34.0 PG    MCHC 31.4 30.0 - 36.5 g/dL    RDW 15.1 (H) 11.5 - 14.5 %    PLATELET 887 813 - 703 K/uL    MPV 10.4 8.9 - 12.9 FL    NRBC 0.0 0  WBC    ABSOLUTE NRBC 0.00 0.00 - 0.01 K/uL    NEUTROPHILS 65 32 - 75 %    LYMPHOCYTES 25 12 - 49 %    MONOCYTES 8 5 - 13 %    EOSINOPHILS 2 0 - 7 %    BASOPHILS 0 0 - 1 %    IMMATURE GRANULOCYTES 0 0.0 - 0.5 %    ABS. NEUTROPHILS 6.9 1.8 - 8.0 K/UL    ABS. LYMPHOCYTES 2.6 0.8 - 3.5 K/UL    ABS. MONOCYTES 0.8 0.0 - 1.0 K/UL    ABS. EOSINOPHILS 0.2 0.0 - 0.4 K/UL    ABS. BASOPHILS 0.0 0.0 - 0.1 K/UL    ABS. IMM.  GRANS. 0.0 0.00 - 0.04 K/UL    DF AUTOMATED     TSH 3RD GENERATION    Collection Time: 07/06/19 12:53 AM   Result Value Ref Range    TSH 0.47 0.36 - 3.74 uIU/mL   MAGNESIUM    Collection Time: 07/06/19 12:53 AM   Result Value Ref Range    Magnesium 2.0 1.6 - 2.4 mg/dL   PHOSPHORUS    Collection Time: 07/06/19 12:53 AM   Result Value Ref Range    Phosphorus 3.1 2.6 - 4.7 MG/DL   D DIMER    Collection Time: 07/06/19 12:53 AM   Result Value Ref Range    D-dimer 1.06 (H) 0.00 - 0.65 mg/L FEU   LIPASE    Collection Time: 07/06/19 12:53 AM   Result Value Ref Range    Lipase 837 (H) 73 - 393 U/L   TROPONIN I    Collection Time: 07/06/19 12:53 AM   Result Value Ref Range    Troponin-I, Qt. <0.05 <0.05 ng/mL   NT-PRO BNP    Collection Time: 07/06/19 12:53 AM   Result Value Ref Range    NT pro-BNP 17 <450 PG/ML   GLUCOSE, POC    Collection Time: 07/06/19  6:32 AM   Result Value Ref Range    Glucose (POC) 111 (H) 65 - 100 mg/dL    Performed by Negrita SUMMERS (CON)    TROPONIN I    Collection Time: 07/06/19  6:46 AM   Result Value Ref Range    Troponin-I, Qt. <0.05 <0.05 ng/mL        Assessment:     Assessment:   SSCP/epigastric pain; probably related to ?pancreatitis with elevated lipase; not cardiac  CAD; based on high calcium score and her cath in 10/17; stable  IDDM; with very high BS on presentation  Abnormal ekg; nonspecific finding and not too significantly different   Plan:   No further cardiac workup needed    Wade Clay MD

## 2019-07-06 NOTE — PROGRESS NOTES
0730  Bedside shift change report given to Rosy Villanueva  (oncoming nurse) by Grzegorz Torres RN  (offgoing nurse). Report included the following information SBAR, Kardex, Procedure Summary, Intake/Output, MAR, Recent Results and Med Rec Status. 1211 TRANSFER - OUT REPORT:    Verbal report given to Milady Schneider RN (name) on [facility]  being transferred to [address] [place] [address](unit) for routine progression of care       Report consisted of patients Situation, Background, Assessment and   Recommendations(SBAR). Information from the following report(s) SBAR, Kardex, Procedure Summary, Intake/Output, MAR, Recent Results and Med Rec Status was reviewed with the receiving nurse. Lines:   Peripheral IV 07/05/19 Left Arm (Active)   Site Assessment Clean, dry, & intact 7/6/2019 12:53 PM   Phlebitis Assessment 0 7/6/2019 12:53 PM   Infiltration Assessment 0 7/6/2019 12:53 PM   Dressing Status Clean, dry, & intact 7/6/2019 12:53 PM   Dressing Type Transparent 7/6/2019  8:36 AM   Hub Color/Line Status Pink 7/6/2019 12:53 PM   Action Taken Open ports on tubing capped 7/6/2019 12:53 PM   Alcohol Cap Used Yes 7/6/2019 12:53 PM          Opportunity for questions and clarification was provided. Patient transported with:   Tech           Problem: Diabetes Self-Management  Goal: *Disease process and treatment process  Description  Define diabetes and identify own type of diabetes; list 3 options for treating diabetes. Outcome: Progressing Towards Goal  Goal: *Incorporating nutritional management into lifestyle  Description  Describe effect of type, amount and timing of food on blood glucose; list 3 methods for planning meals. Outcome: Progressing Towards Goal  Goal: *Incorporating physical activity into lifestyle  Description  State effect of exercise on blood glucose levels.   Outcome: Progressing Towards Goal  Goal: *Developing strategies to promote health/change behavior  Description  Define the ABC's of diabetes; identify appropriate screenings, schedule and personal plan for screenings. Outcome: Progressing Towards Goal     Problem: Patient Education: Go to Patient Education Activity  Goal: Patient/Family Education  Outcome: Progressing Towards Goal     Problem: Falls - Risk of  Goal: *Absence of Falls  Description  Document Kely Cooper Fall Risk and appropriate interventions in the flowsheet.   Outcome: Progressing Towards Goal

## 2019-07-06 NOTE — ED NOTES
Pt tearful and stating she wants to leave. Encouraged patient that staying would be best and safest option for her. Pt agrees to stay. New meal tray provided.

## 2019-07-06 NOTE — PROGRESS NOTES
Received request to speak with patient's family regarding [facility]. Spoke with Leonila Amrita at [phone] who states she has requested a community screening through [facility] in March 2019. Patient is on observation status. Education provided on [employer] CM resources.     Lydia Tijerina RN

## 2019-07-06 NOTE — PROGRESS NOTES
Problem: Mobility Impaired (Adult and Pediatric)  Goal: *Acute Goals and Plan of Care (Insert Text)  Description  Physical Therapy Goals  Initiated 7/6/2019  1. Patient will move from supine to sit and sit to supine  in bed with independence within 7 day(s). 2.  Patient will transfer from bed to chair and chair to bed with independence using the least restrictive device within 7 day(s). 3.  Patient will perform sit to stand with independence within 7 day(s). 4.  Patient will ambulate with independence for 150 feet with the least restrictive device within 7 day(s). 5.  Patient will ascend/descend 12 stairs with 1 handrail(s) with independence within 7 day(s). Outcome: Progressing Towards Goal   PHYSICAL THERAPY TREATMENT  Patient: Anders Weber (30 y.o. female)  Date: 7/6/2019  Diagnosis: Chest pain [R07.9] Chest pain       Precautions: Fall  Chart, physical therapy assessment, plan of care and goals were reviewed. ASSESSMENT:  Cleared by RN. Patient initially hesitant to work with PT due to fatigue and just returning to bed, however, with encouragement patient agreeable. Patient presents with decreased strength in lower extremities and limited overall functional mobility. Patient is close to her baseline status, but reports decreased endurance and activity in recent months. Patient agreeable to complete short walk in room x 30 ft using RW with CGA. Patient able to perform transfers with supervision and bed mobility supervision/light min A with LE management with return to supine. Patient will benefit from 1-3 additional visits to progress gait, assess stairs, and teach HEP for lower extremity strengthening. Patient reports that she would like to look into a home care aid at home since she lives alone and has had falls within the last couple of months. Patient may benefit from [facility] [address] services at discharge to improve strength and functional independence within her home.        Progression toward goals:  ?    Improving appropriately and progressing toward goals  ? Improving slowly and progressing toward goals  ? Not making progress toward goals and plan of care will be adjusted     PLAN:  Patient continues to benefit from skilled intervention to address the above impairments. Continue treatment per established plan of care. Discharge Recommendations:  Likely none vs TBD   Further Equipment Recommendations for Discharge:  TBD      SUBJECTIVE:   Patient stated I just got back to bed.     OBJECTIVE DATA SUMMARY:   Critical Behavior:  Neurologic State: Alert, Appropriate for age  Orientation Level: Oriented X4  Cognition: Appropriate safety awareness, Appropriate decision making, Follows commands  Safety/Judgement: Awareness of environment  Functional Mobility Training:  Bed Mobility:  Rolling: Modified independent  Supine to Sit: Modified independent  Sit to Supine: Minimum assistance(LE management)  Scooting: Independent        Transfers:  Sit to Stand: Contact guard assistance  Stand to Sit: Contact guard assistance  Stand Pivot Transfers: Contact guard assistance                          Balance:  Sitting: Intact  Standing: Intact; With support  Ambulation/Gait Training:  Distance (ft): 30 Feet (ft)(patient reporting fatigue and requesting to lie down)           Gait Description (WDL): Exceptions to [facility]           Base of Support: Widened     Speed/Teagan: Slow;Shuffled  Step Length: Left shortened;Right shortened                Pain:  Pain Scale 1: Numeric (0 - 10)  Pain Intensity 1: 0  Pain Location 1: Head  Pain Orientation 1: Anterior  Pain Description 1: Aching  Pain Intervention(s) 1: Medication (see MAR)  Activity Tolerance:   VSS, no c/o of SOB or chest pain  Please refer to the flowsheet for vital signs taken during this treatment. After treatment:   ?    Patient left in no apparent distress sitting up in chair  ? Patient left in no apparent distress in bed  ?     Call bell left within reach  ?    Nursing notified  ? Caregiver present  ?     Bed alarm activated    COMMUNICATION/COLLABORATION:   The patients plan of care was discussed with: Registered Nurse    Radha Viveros, PT   Time Calculation: 25 mins

## 2019-07-06 NOTE — H&P
[address] HISTORY AND PHYSICAL Name:  Jaqui Gamble 
MR#:  [id number] :  1940 ACCOUNT #:  [de-identified] ADMIT DATE:  2019 PRIMARY CARE PHYSICIAN:  Chrissie Jarvis MD 
 
SOURCE OF INFORMATION:  Patient. CHIEF COMPLAINT:  Chest pain. HISTORY OF PRESENT ILLNESS:  This is a 81-year-old woman with a past medical history significant for type 2 diabetes, hypertension, asthma, dyslipidemia, anxiety/depression, who was in her usual state of health until the day of her presentation at the emergency room when the patient developed chest pain. The chest pain woke up the patient from her sleep at about 03:00 a.m. The chest pain is located at the center of the chest.  The patient described the chest pain as pressure-like with no radiation. No known aggravating or relieving factors, 7/10 in severity, constant, associated with shortness of breath. The shortness of breath is with very minimal exertion. The patient also complained of cough which is nonproductive as well as fever and nausea but no vomiting. EMS was called. The patient was brought to the emergency room for further evaluation. When the EMS arrived at the scene, the patient's blood sugar was 558. The patient received aspirin on her way to the emergency room. She stated that for the past couple of weeks, she has been having a problem controlling her blood sugar. The dosage of insulin was also recently increased because of the poor control of her diabetes. The patient has not been very compliant with her diabetic diet. When the patient arrived at the emergency room, the first set of troponin was negative, but the patient has significant risk factors for coronary artery disease, and because of that, she was referred to the hospitalist service for evaluation for admission. The patient was last admitted to this hospital from 2019 to 2019.   The patient was admitted and treated for type 2 diabetes with hyperglycemia. During that hospitalization, the patient was found to have abnormal EKG. The patient was seen by cardiologist.  According to report, her cardiac catheterization in 2017 showed three-vessel disease. PAST MEDICAL HISTORY: 
1. Coronary artery disease. 2.  Hypertension. 3.  Dyslipidemia. 4.  Type 2 diabetes. 5.  Asthma. 6.  Anxiety/depression. ALLERGIES:  THE PATIENT IS ALLERGIC TO SEAFOOD. MEDICATIONS: 
1. Albuterol 90 mcg 2 puffs by inhalation every 4 hours as needed for wheezing. 2.  Albuterol nebulizer every 4 hours as needed for wheezing. 3.  Norvasc 10 mg daily. 4.  Brovana inhalation twice daily. 5.  Aspirin 81 mg daily. 6.  Pulmicort inhalation twice daily. 7.  BuSpar 20 mg twice daily. 8.  Dexilant 60 mg daily. 9.  Cymbalta 60 mg daily. 10.  Monopril 20 mg daily. 11.  Lantus insulin 20 units subcutaneously twice daily. 12.  Remeron 15 mg daily at bedtime. 13.  Seroquel 25 mg daily at bedtime. 14.  Ranexa 500 mg twice daily. 15.  Crestor 10 mg daily at bedtime. FAMILY HISTORY:  This was reviewed. Her mother had colon cancer. Her brother had diabetes. PAST SURGICAL HISTORY:  This is significant for, 
1. Bilateral cataract extraction. 2.  Cholecystectomy. 3.  Right knee replacement. 4.  Tonsillectomy. 5.  Total abdominal hysterectomy. SOCIAL HISTORY:  The patient is a former smoker. No history of alcohol abuse. REVIEW OF SYSTEMS: 
HEAD, EYES, EARS, NOSE, AND THROAT:  No headache, no dizziness, no blurring of vision, no photophobia. RESPIRATORY SYSTEM:  This is positive for cough and shortness of breath. No hemoptysis. CARDIOVASCULAR SYSTEM:  This is positive for chest pain. No orthopnea, no palpitations. GASTROINTESTINAL SYSTEM:  This is positive for nausea. No vomiting, no diarrhea, no constipation. GENITOURINARY SYSTEM:  No dysuria, no urgency, no frequency. All other systems are reviewed and they are negative. PHYSICAL EXAMINATION: 
GENERAL APPEARANCE:  The patient appeared ill, in moderate distress. VITAL SIGNS:  On arrival at the emergency room, temperature 97.5, pulse 102, respiratory rate 20, blood pressure 137/72, oxygen saturation 100% on room air. HEAD:  Normocephalic, atraumatic. EYES:  Normal eye movement. No redness, no drainage, no discharge. EARS:  Normal external ears with no evidence of drainage. NOSE:  No deformity, no drainage. MOUTH AND THROAT:  No visible oral lesion. NECK:  Neck is supple. No JVD, no thyromegaly. CHEST:  A few expiratory wheezing. No crackles. HEART:  Normal S1 and S2, regular. No clinically appreciable murmur. ABDOMEN:  Soft, obese, nontender. Normal bowel sounds. CNS:  Alert and oriented x3. No gross focal neurological deficit. EXTREMITIES:  Edema 2+. Pulses 2+ bilaterally. MUSCULOSKELETAL SYSTEM:  No obvious joint deformity or swelling. SKIN:  No active skin lesions seen in the exposed part of the body. PSYCHIATRY:  Normal mood and affect. LYMPHATIC SYSTEM:  No cervical lymphadenopathy. DIAGNOSTIC DATA:  Chest x-ray, no infiltrates. EKG shows, normal sinus rhythm, T-wave abnormality, to consider anterior ischemia. LABORATORY DATA:  Hematology; WBC 12.5, hemoglobin 11.9, hematocrit 38.7, platelets 595. Chemistry; sodium 137, potassium 4.4, chloride 104, CO2 of 24, glucose 433, BUN 12, creatinine 1.15, calcium 8.9, total bilirubin 0.3, ALT 18, AST 13, alkaline phosphatase 130, total protein 7.3, albumin level 3.4, globulin 3.9, hemoglobin A1c level 12.6. Urinalysis; this is significant for negative nitrite, negative leukocyte esterase, negative blood. Cardiac profile, troponin less than 0.04. 
 
ASSESSMENT: 
1. Chest pain. 2.  Hypertension. 3.  Dyslipidemia. 4.  Type 2 diabetes with hyperglycemia. 5.  Asthma. 6.  Leukocytosis. 7.  Anxiety/depression. 8.  Morbid obesity.  
 
PLAN: 
 1.  Chest pain. We will place the patient on observation. We will obtain serial cardiac markers to rule out acute myocardial infarction as a possible cause of chest pain. We will continue aspirin and ACE inhibitor. We will add beta-blocker. The patient's cardiologist will be consulted to assist in further evaluation and treatment of chest pain. The patient will also be evaluated for other atypical causes of chest pain. We will check D-dimer to evaluate the patient for thromboembolism. We will also check lipase level. 2.  Hypertension. We will resume her home medication and monitor the patient's blood pressure closely. 3.  Dyslipidemia. We will continue with preadmission medication. We will check lipid profile. 4.  Type 2 diabetes with hyperglycemia. The patient's hemoglobin A1c level done in the emergency room is 12.6, an indication that the diabetes is poorly controlled. The patient will be placed on sliding scale with insulin coverage. We will continue with home basal insulin. The patient will require adjustments to her insulin therapy for diabetes at the time of discharge to home. 5.  Asthma. Continue with preadmission medication. The patient will also be placed on DuoNeb. We will check BNP level. 6.  Leukocytosis. The patient is afebrile, nontoxic. Chest x-ray is negative. Urinalysis is negative. We will await the results of lipase level. We will continue to evaluate and treat the underlying factors. 7.  Anxiety/depression. We will resume home medication. 8.  Morbid obesity. Dietary consult will be requested for dietary counseling. OTHER ISSUES:  Code status: The patient is a full code. We will place the patient on heparin for DVT prophylaxis. FUNCTIONAL STATUS PRIOR TO ADMISSION:  The patient is ambulatory with a cane for long distance and wheelchair for ambulation around the house.  
 
 
 
Nicole Frederick MD 
 
 
[id number] 
D:  07/06/2019 4:55 
 T:  07/06/2019 5:06 JOB #:  [id number] CC:  Eric Mendez MD

## 2019-07-07 LAB
ALBUMIN SERPL-MCNC: 2.6 G/DL (ref 3.5–5)
ALBUMIN/GLOB SERPL: 0.8 {RATIO} (ref 1.1–2.2)
ALP SERPL-CCNC: 88 U/L (ref 45–117)
ALT SERPL-CCNC: 11 U/L (ref 12–78)
ANION GAP SERPL CALC-SCNC: 4 MMOL/L (ref 5–15)
AST SERPL-CCNC: 8 U/L (ref 15–37)
BILIRUB SERPL-MCNC: 0.2 MG/DL (ref 0.2–1)
BUN SERPL-MCNC: 11 MG/DL (ref 6–20)
BUN/CREAT SERPL: 13 (ref 12–20)
CALCIUM SERPL-MCNC: 8 MG/DL (ref 8.5–10.1)
CHLORIDE SERPL-SCNC: 114 MMOL/L (ref 97–108)
CO2 SERPL-SCNC: 27 MMOL/L (ref 21–32)
CREAT SERPL-MCNC: 0.85 MG/DL (ref 0.55–1.02)
ERYTHROCYTE [DISTWIDTH] IN BLOOD BY AUTOMATED COUNT: 15.5 % (ref 11.5–14.5)
GLOBULIN SER CALC-MCNC: 3.3 G/DL (ref 2–4)
GLUCOSE BLD STRIP.AUTO-MCNC: 130 MG/DL (ref 65–100)
GLUCOSE BLD STRIP.AUTO-MCNC: 135 MG/DL (ref 65–100)
GLUCOSE BLD STRIP.AUTO-MCNC: 194 MG/DL (ref 65–100)
GLUCOSE BLD STRIP.AUTO-MCNC: 56 MG/DL (ref 65–100)
GLUCOSE BLD STRIP.AUTO-MCNC: 67 MG/DL (ref 65–100)
GLUCOSE BLD STRIP.AUTO-MCNC: 78 MG/DL (ref 65–100)
GLUCOSE BLD STRIP.AUTO-MCNC: 95 MG/DL (ref 65–100)
GLUCOSE SERPL-MCNC: 99 MG/DL (ref 65–100)
HCT VFR BLD AUTO: 35.1 % (ref 35–47)
HGB BLD-MCNC: 10.8 G/DL (ref 11.5–16)
LIPASE SERPL-CCNC: 1609 U/L (ref 73–393)
MCH RBC QN AUTO: 26.3 PG (ref 26–34)
MCHC RBC AUTO-ENTMCNC: 30.8 G/DL (ref 30–36.5)
MCV RBC AUTO: 85.4 FL (ref 80–99)
NRBC # BLD: 0 K/UL (ref 0–0.01)
NRBC BLD-RTO: 0 PER 100 WBC
PLATELET # BLD AUTO: 285 K/UL (ref 150–400)
PMV BLD AUTO: 10.1 FL (ref 8.9–12.9)
POTASSIUM SERPL-SCNC: 3.8 MMOL/L (ref 3.5–5.1)
PROT SERPL-MCNC: 5.9 G/DL (ref 6.4–8.2)
RBC # BLD AUTO: 4.11 M/UL (ref 3.8–5.2)
SERVICE CMNT-IMP: ABNORMAL
SERVICE CMNT-IMP: NORMAL
SODIUM SERPL-SCNC: 145 MMOL/L (ref 136–145)
WBC # BLD AUTO: 9.5 K/UL (ref 3.6–11)

## 2019-07-07 PROCEDURE — 36415 COLL VENOUS BLD VENIPUNCTURE: CPT

## 2019-07-07 PROCEDURE — 83690 ASSAY OF LIPASE: CPT

## 2019-07-07 PROCEDURE — 74011250637 HC RX REV CODE- 250/637: Performed by: INTERNAL MEDICINE

## 2019-07-07 PROCEDURE — 99218 HC RM OBSERVATION: CPT

## 2019-07-07 PROCEDURE — 74011250636 HC RX REV CODE- 250/636: Performed by: HOSPITALIST

## 2019-07-07 PROCEDURE — 77010033678 HC OXYGEN DAILY

## 2019-07-07 PROCEDURE — 94640 AIRWAY INHALATION TREATMENT: CPT

## 2019-07-07 PROCEDURE — 82962 GLUCOSE BLOOD TEST: CPT

## 2019-07-07 PROCEDURE — 74011250637 HC RX REV CODE- 250/637: Performed by: HOSPITALIST

## 2019-07-07 PROCEDURE — 85027 COMPLETE CBC AUTOMATED: CPT

## 2019-07-07 PROCEDURE — 77030029684 HC NEB SM VOL KT MONA -A

## 2019-07-07 PROCEDURE — 80053 COMPREHEN METABOLIC PANEL: CPT

## 2019-07-07 PROCEDURE — 74011250636 HC RX REV CODE- 250/636: Performed by: NURSE PRACTITIONER

## 2019-07-07 PROCEDURE — 74011250636 HC RX REV CODE- 250/636: Performed by: INTERNAL MEDICINE

## 2019-07-07 PROCEDURE — 97535 SELF CARE MNGMENT TRAINING: CPT

## 2019-07-07 PROCEDURE — 74011000250 HC RX REV CODE- 250: Performed by: INTERNAL MEDICINE

## 2019-07-07 PROCEDURE — 94664 DEMO&/EVAL PT USE INHALER: CPT

## 2019-07-07 PROCEDURE — 97165 OT EVAL LOW COMPLEX 30 MIN: CPT

## 2019-07-07 RX ORDER — POLYETHYLENE GLYCOL 3350 17 G/17G
17 POWDER, FOR SOLUTION ORAL DAILY
Status: DISCONTINUED | OUTPATIENT
Start: 2019-07-07 | End: 2019-07-08

## 2019-07-07 RX ORDER — HYDROCODONE BITARTRATE AND ACETAMINOPHEN 5; 325 MG/1; MG/1
1 TABLET ORAL
Status: DISCONTINUED | OUTPATIENT
Start: 2019-07-07 | End: 2019-07-12 | Stop reason: HOSPADM

## 2019-07-07 RX ORDER — DEXTROSE MONOHYDRATE 50 MG/ML
100 INJECTION, SOLUTION INTRAVENOUS CONTINUOUS
Status: DISCONTINUED | OUTPATIENT
Start: 2019-07-07 | End: 2019-07-08

## 2019-07-07 RX ORDER — DOCUSATE SODIUM 100 MG/1
200 CAPSULE, LIQUID FILLED ORAL
Status: DISCONTINUED | OUTPATIENT
Start: 2019-07-07 | End: 2019-07-12 | Stop reason: HOSPADM

## 2019-07-07 RX ADMIN — BUDESONIDE 500 MCG: 0.5 INHALANT RESPIRATORY (INHALATION) at 09:50

## 2019-07-07 RX ADMIN — HEPARIN SODIUM 5000 UNITS: 5000 INJECTION INTRAVENOUS; SUBCUTANEOUS at 03:02

## 2019-07-07 RX ADMIN — BUSPIRONE HYDROCHLORIDE 20 MG: 5 TABLET ORAL at 18:48

## 2019-07-07 RX ADMIN — RANOLAZINE 500 MG: 500 TABLET, FILM COATED, EXTENDED RELEASE ORAL at 18:28

## 2019-07-07 RX ADMIN — MORPHINE SULFATE 2 MG: 2 INJECTION, SOLUTION INTRAMUSCULAR; INTRAVENOUS at 18:48

## 2019-07-07 RX ADMIN — ALPRAZOLAM 0.25 MG: 0.25 TABLET ORAL at 09:12

## 2019-07-07 RX ADMIN — SODIUM CHLORIDE, SODIUM LACTATE, POTASSIUM CHLORIDE, AND CALCIUM CHLORIDE 100 ML/HR: 600; 310; 30; 20 INJECTION, SOLUTION INTRAVENOUS at 07:57

## 2019-07-07 RX ADMIN — Medication 10 ML: at 21:39

## 2019-07-07 RX ADMIN — ASPIRIN 81 MG 81 MG: 81 TABLET ORAL at 09:16

## 2019-07-07 RX ADMIN — DEXTROSE MONOHYDRATE 100 ML/HR: 5 INJECTION, SOLUTION INTRAVENOUS at 21:37

## 2019-07-07 RX ADMIN — METOPROLOL TARTRATE 12.5 MG: 25 TABLET ORAL at 09:15

## 2019-07-07 RX ADMIN — DULOXETINE HYDROCHLORIDE 60 MG: 60 CAPSULE, DELAYED RELEASE ORAL at 09:16

## 2019-07-07 RX ADMIN — ROSUVASTATIN CALCIUM 10 MG: 10 TABLET, FILM COATED ORAL at 21:39

## 2019-07-07 RX ADMIN — HEPARIN SODIUM 5000 UNITS: 5000 INJECTION INTRAVENOUS; SUBCUTANEOUS at 09:17

## 2019-07-07 RX ADMIN — Medication 10 ML: at 18:52

## 2019-07-07 RX ADMIN — BUSPIRONE HYDROCHLORIDE 20 MG: 5 TABLET ORAL at 09:16

## 2019-07-07 RX ADMIN — ARFORMOTEROL TARTRATE 15 MCG: 15 SOLUTION RESPIRATORY (INHALATION) at 09:50

## 2019-07-07 RX ADMIN — POLYETHYLENE GLYCOL 3350 17 G: 17 POWDER, FOR SOLUTION ORAL at 12:46

## 2019-07-07 RX ADMIN — PANTOPRAZOLE SODIUM 40 MG: 40 TABLET, DELAYED RELEASE ORAL at 07:18

## 2019-07-07 RX ADMIN — BUDESONIDE 500 MCG: 0.5 INHALANT RESPIRATORY (INHALATION) at 21:42

## 2019-07-07 RX ADMIN — HYDROCODONE BITARTRATE AND ACETAMINOPHEN 1 TABLET: 5; 325 TABLET ORAL at 15:12

## 2019-07-07 RX ADMIN — ACETAMINOPHEN 650 MG: 325 TABLET ORAL at 05:30

## 2019-07-07 RX ADMIN — DEXTROSE MONOHYDRATE 12.5 G: 500 INJECTION PARENTERAL at 16:19

## 2019-07-07 RX ADMIN — ARFORMOTEROL TARTRATE 15 MCG: 15 SOLUTION RESPIRATORY (INHALATION) at 21:42

## 2019-07-07 RX ADMIN — ONDANSETRON 4 MG: 2 INJECTION INTRAMUSCULAR; INTRAVENOUS at 21:59

## 2019-07-07 RX ADMIN — DEXTROSE MONOHYDRATE 25 G: 500 INJECTION PARENTERAL at 21:29

## 2019-07-07 RX ADMIN — HEPARIN SODIUM 5000 UNITS: 5000 INJECTION INTRAVENOUS; SUBCUTANEOUS at 18:23

## 2019-07-07 NOTE — PROGRESS NOTES
Problem: Self Care Deficits Care Plan (Adult)  Goal: *Acute Goals and Plan of Care (Insert Text)  Description  Occupational Therapy Goals  Initiated 7/7/2019   1. Patient will complete grooming activities standing at the sink with supervision within 7 days. 2.  Patient will complete toilet transfer with supervision within 7 days. 3.  Patient will complete toileting with supervision within 7 days. 4.  Patient will complete bathing from chair with setup/supervision within 7 days. 5.  Patient will complete dressing activities from chair level with supervision within 7 days. 6.  Patient will tolerate standing for ADL activities for 5 minutes with supervision within 7 days. Outcome: Progressing Towards Goal    OCCUPATIONAL THERAPY EVALUATION  Patient: Celestina Zaragoza (52 y.o. female)  Date: 7/7/2019  Primary Diagnosis: Chest pain [R07.9]        Precautions:   Fall    ASSESSMENT :  Based on the objective data described below, the patient presents with decreased performance with all ADL activities following admission for chest pain. Thus far, cardiac work up has been negative including troponin's normal, EKG normal and showing no significant change from prior EKG. Pt now being worked up for epigastric abdominal pain and awaiting CT of abdomen and pelvis. Pt noted with decreased performance this date compared to PT evaluation yesterday. Today, she required min A for functional transfers using RW, cues and maximal encouragement to engage with activities, poor activity tolerance, general debility, and unable to tolerate any brief standing activities to engage with grooming tasks. Today pt required min A for all functional transfers using RW, max A for all ADl activities including bathing dressing and toileting, and maximal encouragement to remain OOB in chair following intervention.     Discharge recommendations: recommend discharge home with 24 hour care versus to rehab center if family unable to provide care. Returning home may be a challenge as she reports her daughter works during the day and in available to help her. Patient will benefit from skilled intervention to address the above impairments. Patient?s rehabilitation potential is considered to be Good  Factors which may influence rehabilitation potential include:   ? None noted  ? Mental ability/status  ? Medical condition  ? Home/family situation and support systems  ? Safety awareness  ? Pain tolerance/management  ? Other:      PLAN :  Recommendations and Planned Interventions:  ?               Self Care Training                  ? Therapeutic Activities  ? Functional Mobility Training    ? Cognitive Retraining  ? Therapeutic Exercises           ? Endurance Activities  ? Balance Training                   ? Neuromuscular Re-Education  ? Visual/Perceptual Training     ? Home Safety Training  ? Patient Education                 ? Family Training/Education  ? Other (comment):    Frequency/Duration: Patient will be followed by occupational therapy 5 times a week to address goals. Discharge Recommendations: Rehab and Home Health  Further Equipment Recommendations for Discharge: TBD      SUBJECTIVE:   Patient stated ? I am feeling terrible. ?    OBJECTIVE DATA SUMMARY:   HISTORY:   Past Medical History:   Diagnosis Date    Anal polyp 6/13/2013    Arthritis     Asthma     CAD (coronary artery disease) 10/27/2017    Cataract     Chronic pain     knee - right/back    Diabetes ([facility] [address]     GERD (gastroesophageal reflux disease)     Hemorrhoids 6/13/2013    History of kidney stones     Hypertension     Ill-defined condition     abdominal pain and burning    Other ill-defined conditions(799.89)     high cholesterol     Past Surgical History:   Procedure Laterality Date    COLONOSCOPY  2/28/2013         EGD  2/28/2013         HX CATARACT REMOVAL Bilateral     HX CHOLECYSTECTOMY  6-2015    HX GI  6/27/13    anal polyps removed    HX HEENT      laser surgery for blood clot in right eye    HX KNEE REPLACEMENT      right    HX OTHER SURGICAL  4-2-14    lap gastrotomy and removal of polyp -  - not cancerous per pt    HX ALAN AND BSO      HX TONSILLECTOMY      HX UROLOGICAL      \"laser\" surgery for kidney stone    NEUROLOGICAL PROCEDURE UNLISTED  1990    tumor at back of neck, benign       Prior Level of Function/Environment/Context: pt with poor engagement with activities at home. She reports her daughter comes to help her intermittently at home. She states she was independent with dressing activities and toileting but her daughter assisted with showering and IADL activities. Expanded or extensive additional review of patient history:     Home Situation  Home Environment: Private residence  # Steps to Enter: 1  Rails to Enter: No  One/Two Story Residence: Two story  # of Interior Steps: 12  Height of Each Step (in): (unknown)  Interior Rails: Left  Lift Chair Available: No  Living Alone: Yes  Support Systems: Family member(s)  Patient Expects to be Discharged to[de-identified] Private residence  Current DME Used/Available at Home: [employer]pa, straight, Walker, rolling  Tub or Shower Type: Shower    Hand dominance: Right    EXAMINATION OF PERFORMANCE DEFICITS:  Cognitive/Behavioral Status:  Neurologic State: Alert  Orientation Level: Oriented X4  Cognition: Appropriate for age attention/concentration  Perception: Appears intact  Perseveration: No perseveration noted  Safety/Judgement: Good awareness of safety precautions    Skin: see nursing notes    Edema: none noted    Hearing:   Auditory  Auditory Impairment: None    Vision/Perceptual:                           Acuity: Within Defined Limits         Range of Motion:    AROM: Within functional limits  PROM: Within functional limits                      Strength:    Strength: Within functional limits                Coordination:  Coordination: Within functional limits  Fine Motor Skills-Upper: Right Intact; Left Intact    Gross Motor Skills-Upper: Right Intact; Left Intact    Tone & Sensation:    Tone: Normal  Sensation: Intact                      Balance:  Sitting: Intact  Standing: Intact; With support    Functional Mobility and Transfers for ADLs:  Bed Mobility:  Supine to Sit: Supervision  Sit to Supine: (remained OOB in chair)    Transfers:  Sit to Stand: Minimum assistance  Stand to Sit: Minimum assistance  Bed to Chair: Minimum assistance  Bathroom Mobility: Minimum assistance  Toilet Transfer : Minimum assistance    ADL Assessment:  Feeding: Supervision    Oral Facial Hygiene/Grooming: Supervision    Bathing: Maximum assistance    Upper Body Dressing: Maximum assistance    Lower Body Dressing: Maximum assistance    Toileting: Maximum assistance                ADL Intervention and task modifications:   Pt progressed OOB for toileting and attempting grooming at the sink. Pt progressed to bathroom with min A for functional transfers. She attempted to have bowel movement but reporting she was unsuccessful. She also did not want to make much effort to have bowel movement as it was too challenging for her. Discussed with nursing. Pt remained OOB in chair following intervention. Cognitive Retraining  Safety/Judgement: Good awareness of safety precautions    Functional Measure:  Barthel Index:    Bathin  Bladder: 5  Bowels: 10  Groomin  Dressin  Feeding: 10  Mobility: 0  Stairs: 0  Toilet Use: 5  Transfer (Bed to Chair and Back): 10  Total: 45/100        Percentage of impairment   0%   1-19%   20-39%   40-59%   60-79%   80-99%   100%   Barthel Score 0-100 100 99-80 79-60 59-40 20-39 1-19   0     The Barthel ADL Index: Guidelines  1.  The index should be used as a record of what a patient does, not as a record of what a patient could do. 2. The main aim is to establish degree of independence from any help, physical or verbal, however minor and for whatever reason. 3. The need for supervision renders the patient not independent. 4. A patient's performance should be established using the best available evidence. Asking the patient, friends/relatives and nurses are the usual sources, but direct observation and common sense are also important. However direct testing is not needed. 5. Usually the patient's performance over the preceding 24-48 hours is important, but occasionally longer periods will be relevant. 6. Middle categories imply that the patient supplies over 50 per cent of the effort. 7. Use of aids to be independent is allowed. Valencia Saxena., Barthel, D.W. (3760). Functional evaluation: the Barthel Index. [facility] (14)2. RAMYA Waterman, Nathalia Steel., Yolande Olvera., [place], [facility] (1999). Measuring the change indisability after inpatient rehabilitation; comparison of the responsiveness of the Barthel Index and Functional [place] Measure. Journal of Neurology, Neurosurgery, and Psychiatry, 66(4), 249-014. Danielle Zarate, N.J.A, SAUL Trujillo.JAIRON, & Patricia Conn, M.A. (2004.) Assessment of post-stroke quality of life in cost-effectiveness studies: The usefulness of the Barthel Index and the EuroQoL-5D. Quality of Life Research, 15, 113-92     Occupational Therapy Evaluation Charge Determination   History Examination Decision-Making   LOW Complexity : Brief history review  HIGH Complexity : 5 or more performance deficits relating to physical, cognitive , or psychosocial skils that result in activity limitations and / or participation restrictions HIGH Complexity : Patient presents with comorbidities that affect occupational performance.  Signifigant modification of tasks or assistance (eg, physical or verbal) with assessment (s) is necessary to enable patient to complete evaluation       Based on the above components, the patient evaluation is determined to be of the following complexity level: LOW   Pain:  Pain Scale 1: Numeric (0 - 10)  Pain Intensity 1: 0              Activity Tolerance:   BP improving with OOB activities. Supine 97/52, sitting /84. Please refer to the flowsheet for vital signs taken during this treatment. After treatment:   ? Patient left in no apparent distress sitting up in chair  ? Patient left in no apparent distress in bed  ? Call bell left within reach  ? Nursing notified  ? Caregiver present  ? Bed alarm activated    COMMUNICATION/EDUCATION:   The patient?s plan of care was discussed with: Physical Therapist and Registered Nurse.  ? Home safety education was provided and the patient/caregiver indicated understanding. ? Patient/family have participated as able in goal setting and plan of care. ? Patient/family agree to work toward stated goals and plan of care. ? Patient understands intent and goals of therapy, but is neutral about his/her participation. ? Patient is unable to participate in goal setting and plan of care. This patient?s plan of care is appropriate for delegation to [facility].     Thank you for this referral.  Greta Jeter OT  Time Calculation: 26 mins

## 2019-07-07 NOTE — CONSULTS
Kelly Barker [facility] [address] M.D.  [phone]          GASTROENTEROLOGY CONSULTATION NOTE      NAME:  Johana Burch   :   1940   MRN:   [id number]       Referring Physician: Ceferino Bradley    Consult Date: 2019     Chief Complaint: dilated pancreatic duct    History of Present Illness:  Patient is a 69 y.o. with a history of CAD, GERD presented with chest pain and LLQ pain consulted for dilated pancreatic duct on CT scan imaging. Pt without history of pancreatitis. Mother with pancreatic cancer. She has chronic LLQ pain and constipation. She takes Linzess daily. She has seen Dr. SINGH [facility] and Dr. Griselda Elaine in the past. Tolerating diet. No EtOH abuse, CCY, nml triglycerides. PMH:  Past Medical History:   Diagnosis Date    Anal polyp 2013    Arthritis     Asthma     CAD (coronary artery disease) 10/27/2017    Cataract     Chronic pain     knee - right/back    Diabetes ([address]     GERD (gastroesophageal reflux disease)     Hemorrhoids 2013    History of kidney stones     Hypertension     Ill-defined condition     abdominal pain and burning    Other ill-defined conditions(799.89)     high cholesterol       PSH:  Past Surgical History:   Procedure Laterality Date    COLONOSCOPY  2013         EGD  2013         HX CATARACT REMOVAL Bilateral     HX CHOLECYSTECTOMY      HX GI  13    anal polyps removed    HX HEENT      laser surgery for blood clot in right eye    HX KNEE REPLACEMENT      right    HX OTHER SURGICAL  14    lap gastrotomy and removal of polyp -  - not cancerous per pt    HX ALAN AND BSO      HX TONSILLECTOMY      HX UROLOGICAL      \"laser\" surgery for kidney stone    NEUROLOGICAL PROCEDURE UNLISTED      tumor at back of neck, benign       Allergies:   Allergies   Allergen Reactions    Seafood [Shellfish Containing Products] Swelling       Home Medications:  Prior to Admission Medications   Prescriptions Last Dose Informant Patient Reported? Taking? DULoxetine (CYMBALTA) 60 mg capsule   Yes No   Sig: Take 60 mg by mouth daily. Dexlansoprazole (DEXILANT) 60 mg CpDB  Self Yes No   Sig: Take 60 mg by mouth Daily (before breakfast). Insulin Glargine (LANTUS SOLOSTAR) 100 unit/mL (3 mL) flexpen  Self Yes No   Si Units by SubCUTAneous route two (2) times a day. Indications: Diabetes Mellitus   QUEtiapine (SEROQUEL) 25 mg tablet   Yes No   Sig: Take 25 mg by mouth nightly. albuterol (PROVENTIL HFA, VENTOLIN HFA, PROAIR HFA) 90 mcg/actuation inhaler   No No   Sig: Take 2 Puffs by inhalation every four (4) hours as needed for Wheezing. albuterol (PROVENTIL VENTOLIN) 2.5 mg /3 mL (0.083 %) nebulizer solution   No No   Sig: 3 mL by Nebulization route every four (4) hours as needed for Wheezing. amLODIPine (NORVASC) 10 mg tablet  Self Yes No   Sig: Take 10 mg by mouth daily. arformoterol (BROVANA) 15 mcg/2 mL nebu neb solution   No No   Si mL by Nebulization route two (2) times a day. aspirin 81 mg chewable tablet   No No   Sig: Take 1 Tab by mouth daily. budesonide (PULMICORT) 0.5 mg/2 mL nbsp   No No   Si mL by Nebulization route two (2) times a day. busPIRone (BUSPAR) 5 mg tablet   Yes No   Sig: Take 20 mg by mouth two (2) times a day. fosinopril (MONOPRIL) 20 mg tablet  Self Yes No   Sig: Take 20 mg by mouth daily. mirtazapine (REMERON) 15 mg tablet   Yes No   Sig: Take 15 mg by mouth nightly. ranolazine ER (RANEXA) 500 mg SR tablet   Yes No   Sig: Take 500 mg by mouth two (2) times a day. rosuvastatin (CRESTOR) 10 mg tablet   Yes No   Sig: Take 10 mg by mouth nightly.       Facility-Administered Medications: None       Hospital Medications:  Current Facility-Administered Medications   Medication Dose Route Frequency    HYDROcodone-acetaminophen (NORCO) 5-325 mg per tablet 1 Tab  1 Tab Oral Q6H PRN    docusate sodium (COLACE) capsule 200 mg  200 mg Oral BID PRN    polyethylene glycol (MIRALAX) packet 17 g  17 g Oral DAILY    albuterol-ipratropium (DUO-NEB) 2.5 MG-0.5 MG/3 ML  3 mL Nebulization Q4H PRN    amLODIPine (NORVASC) tablet 10 mg  10 mg Oral DAILY    arformoterol (BROVANA) neb solution 15 mcg  15 mcg Nebulization BID    aspirin chewable tablet 81 mg  81 mg Oral DAILY    budesonide (PULMICORT) 500 mcg/2 ml nebulizer suspension  500 mcg Nebulization BID RT    busPIRone (BUSPAR) tablet 20 mg  20 mg Oral BID    DULoxetine (CYMBALTA) capsule 60 mg  60 mg Oral DAILY    lisinopril (PRINIVIL, ZESTRIL) tablet 20 mg  20 mg Oral DAILY    insulin glargine (LANTUS) injection 20 Units  20 Units SubCUTAneous BID    ranolazine ER (RANEXA) tablet 500 mg  500 mg Oral BID    rosuvastatin (CRESTOR) tablet 10 mg  10 mg Oral QHS    sodium chloride (NS) flush 5-40 mL  5-40 mL IntraVENous Q8H    sodium chloride (NS) flush 5-40 mL  5-40 mL IntraVENous PRN    ondansetron (ZOFRAN) injection 4 mg  4 mg IntraVENous Q4H PRN    bisacodyl (DULCOLAX) tablet 5 mg  5 mg Oral DAILY PRN    heparin (porcine) injection 5,000 Units  5,000 Units SubCUTAneous Q8H    insulin lispro (HUMALOG) injection   SubCUTAneous AC&HS    glucose chewable tablet 16 g  4 Tab Oral PRN    dextrose (D50W) injection syrg 12.5-25 g  12.5-25 g IntraVENous PRN    glucagon (GLUCAGEN) injection 1 mg  1 mg IntraMUSCular PRN    pantoprazole (PROTONIX) tablet 40 mg  40 mg Oral ACB    metoprolol tartrate (LOPRESSOR) tablet 12.5 mg  12.5 mg Oral Q12H    ALPRAZolam (XANAX) tablet 0.25 mg  0.25 mg Oral TID PRN    acetaminophen (TYLENOL) tablet 650 mg  650 mg Oral Q4H PRN    nitroglycerin (NITROSTAT) tablet 0.4 mg  0.4 mg SubLINGual Q5MIN PRN    morphine injection 2 mg  2 mg IntraVENous Q4H PRN    sodium chloride (NS) flush 5-40 mL  5-40 mL IntraVENous PRN       Family History:  Family History   Problem Relation Age of Onset    Cancer Mother         colon    Diabetes Brother     Other Sister         GI BLEED    Heart Disease Brother     Heart Attack Brother     Heart Disease Brother     Heart Disease Brother     Schizophrenia Son     Anesth Problems Neg Hx        Social History:  Social History     Tobacco Use    Smoking status: Former Smoker     Years: 0.00    Smokeless tobacco: Never Used    Tobacco comment: age 13   Substance Use Topics    Alcohol use: No       Review of Systems:  -Fever  -Chills  -HA  -Dizziness  +Good mood  -Rash  -Jaundice  -Joint pain  -Weight loss  -Odynophagia  -Dysphagia  +Heartburn  -Chest pain  -Dyspnea  -N/V  -Hematemesis  +Abd pain  -Diarrhea  +Constipation  -Dysuria  -Melena  -Hematochezia  The rest of the review of systems is negative except per HPI      Objective:     Patient Vitals for the past 8 hrs:   BP Temp Pulse Resp SpO2   07/07/19 1339 125/79 97.5 °F (36.4 °C) 70 17 99 %   07/07/19 0950     100 %   07/07/19 0913 106/49 98 °F (36.7 °C) 93 16 100 %     No intake/output data recorded. 07/05 1901 - 07/07 0700  In: 834.5 [P.O.:462; I.V.:372.5]  Out: -       PHYSICAL EXAM:  General: Alert, in no acute distress    HEENT: Anicteric conjunctiva. Lungs:            CTA Bilaterally  Heart:  Normal S1, S2    Abdomen: Soft, Non distended, tender in the LLQ. Normoactive bowel sounds, no rebound/guarding  MSK:   Normal muscle tone  Skin:   Warm to touch  Psych:   Good insight. Not anxious nor agitated. Lab Data Reviewed:   Recent Labs     07/07/19 0312 07/06/19 0053   WBC 9.5 10.6   HGB 10.8* 11.1*   HCT 35.1 35.3    290     Recent Labs     07/07/19 0312 07/06/19  0053    140   K 3.8 3.5   * 108   CO2 27 24   BUN 11 11   CREA 0.85 0.89   GLU 99 230*   CA 8.0* 7.9*   MG  --  2.0   PHOS  --  3.1     Recent Labs     07/07/19 0312 07/06/19  0053   SGOT 8* 7*   AP 88 100   TP 5.9* 6.3*   ALB 2.6* 2.9*   GLOB 3.3 3.4   LPSE 1,609* 837*     No results for input(s): INR, PTP, APTT in the last 72 hours.     No lab exists for component: INREXT   No results for input(s): FE, TIBC, PSAT, FERR in the last 72 hours. No results for input(s): CPK, CKMB in the last 72 hours. No lab exists for component: CARSON    See Electronic Medical Record for all procedure/radiology reports and details which were not copied into this note but were reviewed prior to the creation of the Plan. Assessment:   · Dilated pancreatic duct: consider pancreatic stone or less likely ampullary lesion  · Elevated lipase     Patient Active Problem List   Diagnosis Code    Chest pain, unspecified R07.9    Leukocytosis, unspecified D72.829    DM (diabetes mellitus) ([facility] [address] E11.9    HTN (hypertension) I10    Arthritis M19.90    Pancreatitis, acute K85.90    Epigastric abdominal pain R10.13    Painful total knee replacement (HCC) T84.84XA, Z96.659    Status post right knee replacement Z96.651    CAD (coronary artery disease) I25.10    Pain due to knee joint prosthesis (HCC) T84.84XA, Z96.659    Chest pain R07.9       Plan:   · MRI/MRCP of the abdomen to further evaluate the pancreas  · Thank you for the consultation. Please call with any questions. Dr. Romeo Bass to cover tomorrow. Signed by:  Erick Riley MD         7/7/2019  1:59 PM

## 2019-07-07 NOTE — PROGRESS NOTES
Hospitalist Progress Note  Awilda Curry MD  Answering service: [phone] from in house phone  Cell: [phone]      Date of Service:  2019  NAME:  Becka Goetz  :  1940  MRN:  [id number]      Admission Summary: This is a 59-year-old woman with a past medical history significant for type 2 diabetes, hypertension, asthma, dyslipidemia, anxiety/depression, who was in her usual state of health until the day of her presentation at the emergency room when the patient developed chest pain. The chest pain woke up the patient from her sleep at about 03:00 a.m. The chest pain is located at the center of the chest.  The patient described the chest pain as pressure-like with no radiation. No known aggravating or relieving factors, 7/10 in severity, constant, associated with shortness of breath. The shortness of breath is with very minimal exertion. The patient also complained of cough which is nonproductive as well as fever and nausea but no vomiting. EMS was called. The patient was brought to the emergency room for further evaluation. Interval history / Subjective:     Abdominal pain 7/10, nausea, no vomiting,no hx of alcohol abuse     Assessment & Plan:     Atypical chest pain , hx of CAD  -continue aspirin, crestor, ranexa, and metoprolol  -troponin <0.05 x 2, probnp 17  -EKG normal sinus rhythm vent rate 99 non specific st t wave, no significant change compared to previous ekg  -lipid panel normal  -seen by cardiologist, pain not cardiac, no further work up for now    Abdominal pain and levated lipase possible due to pancreatic duct obstruction  -keep NPO, continue ringer lactate   -CT possibly distended pancreatic duct which is not well visualized; this appears to measure approximately 0.6 cm at the neck.   -lipase trending up,   -TG normal  -hx of s/p cholecystectomy   -no hx of alcohol abuse  -consult to GI    T2DM  -NPO, hold lantus 20 units, continue sliding scale and monitor finger stick glucose    HTN  -continue norvasc, lisinopril, metoprolol    Hx of asthma  -stable  -continue pulmicort, prn duo neb    Hx of anxiety/depression   -feels depressed and anxious recently related to her son health condition, who has chronic schizophrenia and lives at group home, he visited her on July 4, not sleeping well,  -no suicidal thought  -continue buspar, cymbalta  -consult to psychiatrist    Morbid obesity  -diet and weight loss program       Code status: Full Code  DVT prophylaxis: heparin    Care Plan discussed with: Patient/Family and Nurse  Disposition: TBD     Hospital Problems  Date Reviewed: 7/5/2019          Codes Class Noted POA    * (Principal) Chest pain ICD-10-CM: R07.9  ICD-9-CM: 786.50  7/5/2019 Yes              Vital Signs:    Last 24hrs VS reviewed since prior progress note. Most recent are:  Visit Vitals  /49 (BP 1 Location: Right arm, BP Patient Position: At rest)   Pulse 93   Temp 98 °F (36.7 °C)   Resp 16   Wt 81.8 kg (180 lb 5.4 oz)   SpO2 100%   Breastfeeding? No   BMI 31.95 kg/m²       No intake or output data in the 24 hours ending 07/07/19 1058     Physical Examination:             Constitutional:  No acute distress, cooperative, pleasant    ENT:  Oral mucous moist, oropharynx benign. Neck supple,    Resp:  CTA bilaterally. No wheezing/rhonchi/rales. No accessory muscle use   CV:  Regular rhythm, normal rate, no murmurs, gallops, rubs    GI:  Soft, non distended, non tender. normoactive bowel sounds, no hepatosplenomegaly     Musculoskeletal:  No edema,    Neurologic:  Moves all extremities.   AAOx3, CN II-XII reviewed     Skin:  Good turgor, no rashes or ulcers       Data Review:    Review and/or order of clinical lab test  Review and/or order of tests in the radiology section of [facility]      Labs:     Recent Labs     07/07/19  0312 07/06/19  0053   WBC 9.5 10.6   HGB 10.8* 11.1*   HCT 35.1 35.3  290     Recent Labs     07/07/19 0312 07/06/19 0053 07/05/19  1449    140 137   K 3.8 3.5 4.4   * 108 104   CO2 27 24 24   BUN 11 11 12   CREA 0.85 0.89 1. 15*   GLU 99 230* 433*   CA 8.0* 7.9* 8.9   MG  --  2.0  --    PHOS  --  3.1  --      Recent Labs     07/07/19 0312 07/06/19 0053 07/05/19  1449   SGOT 8* 7* 13*   ALT 11* 14 18   AP 88 100 130*   TBILI 0.2 0.2 0.3   TP 5.9* 6.3* 7.3   ALB 2.6* 2.9* 3.4*   GLOB 3.3 3.4 3.9   LPSE 1,609* 837*  --      No results for input(s): INR, PTP, APTT in the last 72 hours. No lab exists for component: INREXT, INREXT   No results for input(s): FE, TIBC, PSAT, FERR in the last 72 hours. Lab Results   Component Value Date/Time    Folate 13.2 05/20/2009 03:00 AM      No results for input(s): PH, PCO2, PO2 in the last 72 hours.   Recent Labs     07/06/19  0646 07/06/19 0053   TROIQ <0.05 <0.05     Lab Results   Component Value Date/Time    Cholesterol, total 131 07/06/2019 12:53 AM    HDL Cholesterol 69 07/06/2019 12:53 AM    LDL, calculated 41 07/06/2019 12:53 AM    Triglyceride 105 07/06/2019 12:53 AM    CHOL/HDL Ratio 1.9 07/06/2019 12:53 AM     Lab Results   Component Value Date/Time    Glucose (POC) 135 (H) 07/07/2019 06:22 AM    Glucose (POC) 78 07/07/2019 06:00 AM    Glucose (POC) 180 (H) 07/06/2019 09:49 PM    Glucose (POC) 177 (H) 07/06/2019 05:04 PM    Glucose (POC) 216 (H) 07/06/2019 11:48 AM     Lab Results   Component Value Date/Time    Color YELLOW/STRAW 07/05/2019 04:52 PM    Appearance CLEAR 07/05/2019 04:52 PM    Specific gravity RESISTANT 07/05/2019 04:52 PM    Specific gravity 1.026 06/20/2019 01:51 AM    pH (UA) 5.0 07/05/2019 04:52 PM    Protein NEGATIVE  07/05/2019 04:52 PM    Glucose >1,000 (A) 07/05/2019 04:52 PM    Ketone NEGATIVE  07/05/2019 04:52 PM    Bilirubin NEGATIVE  07/05/2019 04:52 PM    Urobilinogen 0.2 07/05/2019 04:52 PM    Nitrites NEGATIVE  07/05/2019 04:52 PM    Leukocyte Esterase NEGATIVE  07/05/2019 04:52 PM Epithelial cells FEW 06/20/2019 01:51 AM    Bacteria NEGATIVE  06/20/2019 01:51 AM    WBC 0-4 06/20/2019 01:51 AM    RBC 0-5 06/20/2019 01:51 AM         Medications Reviewed:     Current Facility-Administered Medications   Medication Dose Route Frequency    albuterol-ipratropium (DUO-NEB) 2.5 MG-0.5 MG/3 ML  3 mL Nebulization Q4H PRN    amLODIPine (NORVASC) tablet 10 mg  10 mg Oral DAILY    arformoterol (BROVANA) neb solution 15 mcg  15 mcg Nebulization BID    aspirin chewable tablet 81 mg  81 mg Oral DAILY    budesonide (PULMICORT) 500 mcg/2 ml nebulizer suspension  500 mcg Nebulization BID RT    busPIRone (BUSPAR) tablet 20 mg  20 mg Oral BID    DULoxetine (CYMBALTA) capsule 60 mg  60 mg Oral DAILY    lisinopril (PRINIVIL, ZESTRIL) tablet 20 mg  20 mg Oral DAILY    insulin glargine (LANTUS) injection 20 Units  20 Units SubCUTAneous BID    ranolazine ER (RANEXA) tablet 500 mg  500 mg Oral BID    rosuvastatin (CRESTOR) tablet 10 mg  10 mg Oral QHS    sodium chloride (NS) flush 5-40 mL  5-40 mL IntraVENous Q8H    sodium chloride (NS) flush 5-40 mL  5-40 mL IntraVENous PRN    ondansetron (ZOFRAN) injection 4 mg  4 mg IntraVENous Q4H PRN    bisacodyl (DULCOLAX) tablet 5 mg  5 mg Oral DAILY PRN    heparin (porcine) injection 5,000 Units  5,000 Units SubCUTAneous Q8H    insulin lispro (HUMALOG) injection   SubCUTAneous AC&HS    glucose chewable tablet 16 g  4 Tab Oral PRN    dextrose (D50W) injection syrg 12.5-25 g  12.5-25 g IntraVENous PRN    glucagon (GLUCAGEN) injection 1 mg  1 mg IntraMUSCular PRN    pantoprazole (PROTONIX) tablet 40 mg  40 mg Oral ACB    metoprolol tartrate (LOPRESSOR) tablet 12.5 mg  12.5 mg Oral Q12H    ALPRAZolam (XANAX) tablet 0.25 mg  0.25 mg Oral TID PRN    lactated Ringers infusion  100 mL/hr IntraVENous CONTINUOUS    acetaminophen (TYLENOL) tablet 650 mg  650 mg Oral Q4H PRN    nitroglycerin (NITROSTAT) tablet 0.4 mg  0.4 mg SubLINGual Q5MIN PRN    morphine injection 2 mg  2 mg IntraVENous Q4H PRN    sodium chloride (NS) flush 5-40 mL  5-40 mL IntraVENous PRN     ______________________________________________________________________  EXPECTED LENGTH OF STAY: - - -  ACTUAL LENGTH OF STAY:          0                 Frankie Rosales MD

## 2019-07-07 NOTE — PROGRESS NOTES
Bedside shift change report given to Maikol Inman RN (oncoming nurse) by Geneva Leigh RN(offgoing nurse). Report given with SBAR.

## 2019-07-07 NOTE — BH NOTES
[address]    Name:  Perri Colindres  MR#:  [id number]  :  1940  ACCOUNT #:  [de-identified]  DATE OF SERVICE: 2019    Thank you for the opportunity to participate in the care of the patient. As you know, she is a 60-year-old female with history of anxiety and depression. We were consulted because of concerns about panic attacks. When I met with the patient, I recognized her from a previous hospitalization where I cared for her on the inpatient site. She reports that overall she is doing well, suffered periodic anxiety attacks. She is interested in taking some as needed medications for anxiety. She adamantly denies any suicidal or homicidal thoughts. She denies any auditory or visual hallucinations. MENTAL STATUS EXAM:  This is an overweight  female with bleached blonde hair. She is pleasant and cooperative. There is no agitation. Her mood is [place]. \"  Her affect is congruent. Speech is normal in rate and tone. Cognitive function is grossly intact. DIAGNOSES:  1. Generalized anxiety disorder. 2.  Panic disorder. 3.  Major depression. TREATMENT/RECOMMENDATIONS:  The patient may benefit from using mild p.r.n. medications for anxiety. You may consider Vistaril 25 to 50 mg every 6 hours or Seroquel 25 mg every 6 hours p.r.n. for anxiety. I would avoid benzodiazepines as this patient has a proximity to overuse them and given her relatively advanced age and other health problems, it does not seem advisable at this time except for any very acute situation. There is presently no acute psychiatric criteria. Thank you for the consultation. Should you have any further questions, please feel free to contact our service.       Ruth Arellano MD      [id number]  D:  2019 17:42  T:  2019 22:39  JOB #:  [id number]

## 2019-07-08 ENCOUNTER — APPOINTMENT (OUTPATIENT)
Dept: GENERAL RADIOLOGY | Age: 79
DRG: 440 | End: 2019-07-08
Attending: HOSPITALIST
Payer: MEDICARE

## 2019-07-08 ENCOUNTER — APPOINTMENT (OUTPATIENT)
Dept: MRI IMAGING | Age: 79
DRG: 440 | End: 2019-07-08
Attending: INTERNAL MEDICINE
Payer: MEDICARE

## 2019-07-08 LAB
ALBUMIN SERPL-MCNC: 2.6 G/DL (ref 3.5–5)
ALBUMIN/GLOB SERPL: 0.8 {RATIO} (ref 1.1–2.2)
ALP SERPL-CCNC: 125 U/L (ref 45–117)
ALT SERPL-CCNC: 99 U/L (ref 12–78)
ANION GAP SERPL CALC-SCNC: 6 MMOL/L (ref 5–15)
AST SERPL-CCNC: 244 U/L (ref 15–37)
BILIRUB SERPL-MCNC: 0.5 MG/DL (ref 0.2–1)
BUN SERPL-MCNC: 6 MG/DL (ref 6–20)
BUN/CREAT SERPL: 8 (ref 12–20)
CALCIUM SERPL-MCNC: 7.7 MG/DL (ref 8.5–10.1)
CHLORIDE SERPL-SCNC: 110 MMOL/L (ref 97–108)
CO2 SERPL-SCNC: 24 MMOL/L (ref 21–32)
CREAT SERPL-MCNC: 0.77 MG/DL (ref 0.55–1.02)
ERYTHROCYTE [DISTWIDTH] IN BLOOD BY AUTOMATED COUNT: 15.9 % (ref 11.5–14.5)
GLOBULIN SER CALC-MCNC: 3.2 G/DL (ref 2–4)
GLUCOSE BLD STRIP.AUTO-MCNC: 126 MG/DL (ref 65–100)
GLUCOSE BLD STRIP.AUTO-MCNC: 129 MG/DL (ref 65–100)
GLUCOSE BLD STRIP.AUTO-MCNC: 253 MG/DL (ref 65–100)
GLUCOSE BLD STRIP.AUTO-MCNC: 272 MG/DL (ref 65–100)
GLUCOSE SERPL-MCNC: 158 MG/DL (ref 65–100)
HCT VFR BLD AUTO: 35.4 % (ref 35–47)
HGB BLD-MCNC: 11 G/DL (ref 11.5–16)
LIPASE SERPL-CCNC: 139 U/L (ref 73–393)
MCH RBC QN AUTO: 26.6 PG (ref 26–34)
MCHC RBC AUTO-ENTMCNC: 31.1 G/DL (ref 30–36.5)
MCV RBC AUTO: 85.7 FL (ref 80–99)
NRBC # BLD: 0 K/UL (ref 0–0.01)
NRBC BLD-RTO: 0 PER 100 WBC
PLATELET # BLD AUTO: 265 K/UL (ref 150–400)
PMV BLD AUTO: 9.9 FL (ref 8.9–12.9)
POTASSIUM SERPL-SCNC: 4 MMOL/L (ref 3.5–5.1)
PROT SERPL-MCNC: 5.8 G/DL (ref 6.4–8.2)
RBC # BLD AUTO: 4.13 M/UL (ref 3.8–5.2)
SERVICE CMNT-IMP: ABNORMAL
SODIUM SERPL-SCNC: 140 MMOL/L (ref 136–145)
WBC # BLD AUTO: 9.1 K/UL (ref 3.6–11)

## 2019-07-08 PROCEDURE — 99218 HC RM OBSERVATION: CPT

## 2019-07-08 PROCEDURE — 77030021566 MRI ABD W MRCP W WO CONT

## 2019-07-08 PROCEDURE — 36415 COLL VENOUS BLD VENIPUNCTURE: CPT

## 2019-07-08 PROCEDURE — 74011250636 HC RX REV CODE- 250/636: Performed by: HOSPITALIST

## 2019-07-08 PROCEDURE — 97535 SELF CARE MNGMENT TRAINING: CPT

## 2019-07-08 PROCEDURE — 74011636637 HC RX REV CODE- 636/637: Performed by: INTERNAL MEDICINE

## 2019-07-08 PROCEDURE — 94760 N-INVAS EAR/PLS OXIMETRY 1: CPT

## 2019-07-08 PROCEDURE — 65270000029 HC RM PRIVATE

## 2019-07-08 PROCEDURE — 97116 GAIT TRAINING THERAPY: CPT

## 2019-07-08 PROCEDURE — 80053 COMPREHEN METABOLIC PANEL: CPT

## 2019-07-08 PROCEDURE — 82962 GLUCOSE BLOOD TEST: CPT

## 2019-07-08 PROCEDURE — 73560 X-RAY EXAM OF KNEE 1 OR 2: CPT

## 2019-07-08 PROCEDURE — 74011000258 HC RX REV CODE- 258: Performed by: HOSPITALIST

## 2019-07-08 PROCEDURE — 74011250637 HC RX REV CODE- 250/637: Performed by: NURSE PRACTITIONER

## 2019-07-08 PROCEDURE — 74011250636 HC RX REV CODE- 250/636: Performed by: INTERNAL MEDICINE

## 2019-07-08 PROCEDURE — 83690 ASSAY OF LIPASE: CPT

## 2019-07-08 PROCEDURE — 85027 COMPLETE CBC AUTOMATED: CPT

## 2019-07-08 PROCEDURE — 94640 AIRWAY INHALATION TREATMENT: CPT

## 2019-07-08 PROCEDURE — 74011000250 HC RX REV CODE- 250: Performed by: INTERNAL MEDICINE

## 2019-07-08 PROCEDURE — 74011250637 HC RX REV CODE- 250/637: Performed by: HOSPITALIST

## 2019-07-08 PROCEDURE — A9585 GADOBUTROL INJECTION: HCPCS | Performed by: HOSPITALIST

## 2019-07-08 PROCEDURE — 74011250637 HC RX REV CODE- 250/637: Performed by: INTERNAL MEDICINE

## 2019-07-08 RX ORDER — SODIUM CHLORIDE 9 MG/ML
100 INJECTION, SOLUTION INTRAVENOUS CONTINUOUS
Status: DISCONTINUED | OUTPATIENT
Start: 2019-07-08 | End: 2019-07-11

## 2019-07-08 RX ORDER — POLYETHYLENE GLYCOL 3350 17 G/17G
17 POWDER, FOR SOLUTION ORAL 2 TIMES DAILY
Status: DISCONTINUED | OUTPATIENT
Start: 2019-07-08 | End: 2019-07-12 | Stop reason: HOSPADM

## 2019-07-08 RX ORDER — LISINOPRIL 10 MG/1
10 TABLET ORAL DAILY
Status: DISCONTINUED | OUTPATIENT
Start: 2019-07-09 | End: 2019-07-12 | Stop reason: HOSPADM

## 2019-07-08 RX ORDER — AMLODIPINE BESYLATE 5 MG/1
5 TABLET ORAL DAILY
Status: DISCONTINUED | OUTPATIENT
Start: 2019-07-09 | End: 2019-07-12 | Stop reason: HOSPADM

## 2019-07-08 RX ADMIN — ARFORMOTEROL TARTRATE 15 MCG: 15 SOLUTION RESPIRATORY (INHALATION) at 07:35

## 2019-07-08 RX ADMIN — PANTOPRAZOLE SODIUM 40 MG: 40 TABLET, DELAYED RELEASE ORAL at 06:42

## 2019-07-08 RX ADMIN — RANOLAZINE 500 MG: 500 TABLET, FILM COATED, EXTENDED RELEASE ORAL at 12:07

## 2019-07-08 RX ADMIN — INSULIN LISPRO 3 UNITS: 100 INJECTION, SOLUTION INTRAVENOUS; SUBCUTANEOUS at 21:37

## 2019-07-08 RX ADMIN — ASPIRIN 81 MG 81 MG: 81 TABLET ORAL at 10:17

## 2019-07-08 RX ADMIN — SODIUM CHLORIDE 100 ML/HR: 900 INJECTION, SOLUTION INTRAVENOUS at 18:02

## 2019-07-08 RX ADMIN — POLYETHYLENE GLYCOL 3350 17 G: 17 POWDER, FOR SOLUTION ORAL at 12:08

## 2019-07-08 RX ADMIN — GADOBUTROL 10 ML: 604.72 INJECTION INTRAVENOUS at 10:00

## 2019-07-08 RX ADMIN — BUDESONIDE 500 MCG: 0.5 INHALANT RESPIRATORY (INHALATION) at 07:35

## 2019-07-08 RX ADMIN — HYDROCODONE BITARTRATE AND ACETAMINOPHEN 1 TABLET: 5; 325 TABLET ORAL at 05:15

## 2019-07-08 RX ADMIN — ACETAMINOPHEN 650 MG: 325 TABLET ORAL at 16:00

## 2019-07-08 RX ADMIN — SODIUM CHLORIDE 100 ML: 900 INJECTION, SOLUTION INTRAVENOUS at 10:00

## 2019-07-08 RX ADMIN — ACETAMINOPHEN 650 MG: 325 TABLET ORAL at 02:10

## 2019-07-08 RX ADMIN — INSULIN GLARGINE 20 UNITS: 100 INJECTION, SOLUTION SUBCUTANEOUS at 18:00

## 2019-07-08 RX ADMIN — INSULIN LISPRO 5 UNITS: 100 INJECTION, SOLUTION INTRAVENOUS; SUBCUTANEOUS at 17:40

## 2019-07-08 RX ADMIN — RANOLAZINE 500 MG: 500 TABLET, FILM COATED, EXTENDED RELEASE ORAL at 04:00

## 2019-07-08 RX ADMIN — BUSPIRONE HYDROCHLORIDE 20 MG: 5 TABLET ORAL at 10:17

## 2019-07-08 RX ADMIN — HEPARIN SODIUM 5000 UNITS: 5000 INJECTION INTRAVENOUS; SUBCUTANEOUS at 01:49

## 2019-07-08 RX ADMIN — BUDESONIDE 500 MCG: 0.5 INHALANT RESPIRATORY (INHALATION) at 19:41

## 2019-07-08 RX ADMIN — BUSPIRONE HYDROCHLORIDE 20 MG: 5 TABLET ORAL at 17:51

## 2019-07-08 RX ADMIN — HEPARIN SODIUM 5000 UNITS: 5000 INJECTION INTRAVENOUS; SUBCUTANEOUS at 17:40

## 2019-07-08 RX ADMIN — HYDROCODONE BITARTRATE AND ACETAMINOPHEN 1 TABLET: 5; 325 TABLET ORAL at 18:47

## 2019-07-08 RX ADMIN — Medication 10 ML: at 05:25

## 2019-07-08 RX ADMIN — POLYETHYLENE GLYCOL 3350 17 G: 17 POWDER, FOR SOLUTION ORAL at 17:40

## 2019-07-08 RX ADMIN — ARFORMOTEROL TARTRATE 15 MCG: 15 SOLUTION RESPIRATORY (INHALATION) at 19:41

## 2019-07-08 RX ADMIN — DULOXETINE HYDROCHLORIDE 60 MG: 60 CAPSULE, DELAYED RELEASE ORAL at 10:17

## 2019-07-08 RX ADMIN — Medication 10 ML: at 21:38

## 2019-07-08 RX ADMIN — ALPRAZOLAM 0.25 MG: 0.25 TABLET ORAL at 05:21

## 2019-07-08 RX ADMIN — ROSUVASTATIN CALCIUM 10 MG: 10 TABLET, FILM COATED ORAL at 21:37

## 2019-07-08 NOTE — PROGRESS NOTES
Problem: Mobility Impaired (Adult and Pediatric)  Goal: *Acute Goals and Plan of Care (Insert Text)  Description  Physical Therapy Goals  Initiated 7/6/2019  1. Patient will move from supine to sit and sit to supine  in bed with independence within 7 day(s). 2.  Patient will transfer from bed to chair and chair to bed with independence using the least restrictive device within 7 day(s). 3.  Patient will perform sit to stand with independence within 7 day(s). 4.  Patient will ambulate with independence for 150 feet with the least restrictive device within 7 day(s). 5.  Patient will ascend/descend 12 stairs with 1 handrail(s) with independence within 7 day(s). Outcome: Progressing Towards Goal   PHYSICAL THERAPY TREATMENT  Patient: Shante Howe (89 y.o. female)  Date: 7/8/2019  Diagnosis: Chest pain [R07.9]  Chest pain [R07.9] Chest pain       Precautions: Fall  Chart, physical therapy assessment, plan of care and goals were reviewed. ASSESSMENT:  Based on the objective data described below, the patient presents with Minimum assistance level overall for transfers. Gait training completed at Contact guard assistance, 115 feet and using a rolling walker. Pt reported abdominal discomfort and had noticeable antalgic gait due to cramping of her RLE, lateral aspect with onset occurring following 90 feet. Her gait was slower and more labored. The following are barriers to independence while in acute care:   -Cognitive and/or behavioral: fear of falling, reports falling on her stairs at home due to impaired vision  -Medical condition: strength, functional reach, standing balance and pain tolerance    -Other:   Impaired vision    Prior level of function: Ambulated with rolling walker with modified independence. Pt reports recent falls on stairs due to impaired vision.   She plans to move to a one story residence and reports a family member is assisting her with this as her home is not safe with upstairs bedroom and bathroom. PLAN:  Patient continues to benefit from skilled intervention to address the above impairments. Continue treatment per established plan of care. Recommend with staff:  Rob Spicer for meals and bathroom with rolling walker and assistance   Discharge recommendations: Rehab at skilled nursing facility (SNF) (to regain functional baseline patient requires rehab)  If above is not an option then recommend: Home health (to increase independence and safety)  24 supervision    Patient's barriers to discharging home, in addition to above impairments: lives alone  family availability to assist  history of falls  home environment unsafe due to bedroom and only bathroom is upstairs . Equipment recommendations for successful discharge (if) home: owns a rolling walker        SUBJECTIVE:   Patient stated I am planning to move to another place(one story residence).     OBJECTIVE DATA SUMMARY:   Critical Behavior:  Neurologic State: Alert  Orientation Level: Oriented X4  Cognition: Appropriate decision making, Appropriate for age attention/concentration, Appropriate safety awareness, Follows commands  Safety/Judgement: Good awareness of safety precautions  Functional Mobility Training:  Bed Mobility:      not assessed, OOB in chair               Transfers:  Sit to Stand: Minimum assistance;Assist x1;Adaptive equipment  Stand to Sit: Assist x1;Adaptive equipment;Contact guard assistance                             Balance:  Sitting: Intact  Standing: Intact; With support  Ambulation/Gait Training:  Distance (ft): 115 Feet (ft)  Assistive Device: Gait belt;Walker, rolling  Ambulation - Level of Assistance: Assist x1;Contact guard assistance        Gait Abnormalities: Antalgic;Decreased step clearance(flexed posture)        Base of Support: Widened     Speed/Teagan: Slow  Step Length: Right shortened;Left shortened                Pain:  During treatment: 5/10  Location: abdomen, RLE, lateral aspect, cramping with ambulation     Description:aching and cramping(leg)   Aggravating factors: ambulation     Activity Tolerance:   Fair  Please refer to the flowsheet for vital signs taken during this treatment.     After treatment patient left:   Up in chair  Call light within reach     COMMUNICATION/COLLABORATION:   The patients plan of care was discussed with: Registered Nurse    Ronak Guerra   Time Calculation: 14 mins

## 2019-07-08 NOTE — PROGRESS NOTES
Patient is on clear liquids and blood sugar is 253. Per Dr. Ceferino Bradley, give lantus and he also will switch her IV fluids from D5 to normal saline. 2015 - Bedside and Verbal shift change report given to [facility] (oncoming nurse) by Miles Villavicencio (offgoing nurse). Report included the following information SBAR, Kardex, Intake/Output, MAR and Accordion.

## 2019-07-08 NOTE — PROGRESS NOTES
1008 - Pt NPO before and after MRCP procedure. /47 and HR 80. Per Dr. Christofer Mckenzie, hold heparin, lantus, metoprolol, amlodipine, and lisinopril.

## 2019-07-08 NOTE — PROGRESS NOTES
A Spiritual Care Partner Volunteer visited patient in Rm 568 on 7/8/2019.   Documented by:  Chaplain Ryan MDiv, MS, [facility]  [phone])

## 2019-07-08 NOTE — PROGRESS NOTES
Hospitalist Progress Note  Hari Luther MD  Answering service: [phone] from in house phone  Cell: [phone]      Date of Service:  2019  NAME:  Saji Dyson  :  1940  MRN:  [id number]      Admission Summary: This is a 71-year-old woman with a past medical history significant for type 2 diabetes, hypertension, asthma, dyslipidemia, anxiety/depression, who was in her usual state of health until the day of her presentation at the emergency room when the patient developed chest pain. The chest pain woke up the patient from her sleep at about 03:00 a.m. The chest pain is located at the center of the chest.  The patient described the chest pain as pressure-like with no radiation. No known aggravating or relieving factors, 7/10 in severity, constant, associated with shortness of breath. The shortness of breath is with very minimal exertion. The patient also complained of cough which is nonproductive as well as fever and nausea but no vomiting. EMS was called. The patient was brought to the emergency room for further evaluation. Interval history / Subjective:     Feels nausea and bdominal pain 9/10, nausea, no vomiting,no hx of alcohol abuse     Assessment & Plan:     Atypical chest pain , hx of CAD  -continue aspirin, crestor, ranexa, and metoprolol  -troponin <0.05 x 2, probnp 17  -EKG normal sinus rhythm vent rate 99 non specific st t wave, no significant change compared to previous ekg  -lipid panel normal  -seen by cardiologist, pain not cardiac, no further work up for now    Abdominal pain and levated lipase possible due to pancreatic duct obstruction/acute pancreatitis   -keep NPO, continue ringer lactate and prn pain meds and zofran  -CT possibly distended pancreatic duct which is not well visualized; this appears to measure approximately 0.6 cm at the neck.   -lipase improved to 139 from 1609  -TG normal  -hx of s/p cholecystectomy   -no hx of alcohol abuse  -MRCP Mild pancreatic ductal dilatation at the head-body junction is due to mild stricture of the pancreatic duct within the head. No complete occlusion or obstructing mass. Uncertain clinical significance, but possibly sequela of old pancreatitis. No pancreatitis at this time.  - GI on board    T2DM  -NPO, hold lantus 20 units, continue sliding scale and monitor finger stick glucose    HTN  -BP normal, cut back norvasc to 5, lisinopril 10 on 7/8, continue metoprolol and monitor BP    Hx of asthma  -stable  -continue pulmicort, prn duo neb    Hx of anxiety/depression   -feels depressed and anxious recently related to her son health condition, who has chronic schizophrenia and lives at group home, he visited her on July 4, not sleeping well,  -no suicidal thought  -continue buspar, cymbalta  -consulted psychiatrist    Right knee pain   -hx of right knee replacement  -x ray of right knee  -prn tylenol    Morbid obesity  -diet and weight loss program       Code status: Full Code  DVT prophylaxis: heparin    Care Plan discussed with: Patient/Family and Nurse  Disposition: TBD     Hospital Problems  Date Reviewed: 7/5/2019          Codes Class Noted POA    * (Principal) Chest pain ICD-10-CM: R07.9  ICD-9-CM: 786.50  7/5/2019 Yes              Vital Signs:    Last 24hrs VS reviewed since prior progress note. Most recent are:  Visit Vitals  /47   Pulse 80   Temp 97.5 °F (36.4 °C)   Resp 16   Wt 81.8 kg (180 lb 5.4 oz)   SpO2 96%   Breastfeeding? No   BMI 31.95 kg/m²       No intake or output data in the 24 hours ending 07/08/19 1125     Physical Examination:             Constitutional:  No acute distress, cooperative, pleasant    ENT:  Oral mucous moist, oropharynx benign. Neck supple,    Resp:  CTA bilaterally. No wheezing/rhonchi/rales.  No accessory muscle use   CV:  Regular rhythm, normal rate, no murmurs, gallops, rubs    GI:  Soft, non distended, non tender. normoactive bowel sounds, no hepatosplenomegaly     Musculoskeletal:  No edema,    Neurologic:  Moves all extremities. AAOx3, CN II-XII reviewed     Skin:  Good turgor, no rashes or ulcers       Data Review:    Review and/or order of clinical lab test  Review and/or order of tests in the radiology section of [facility]      Labs:     Recent Labs     07/08/19 0155 07/07/19 0312   WBC 9.1 9.5   HGB 11.0* 10.8*   HCT 35.4 35.1    285     Recent Labs     07/08/19 0155 07/07/19 0312 07/06/19 0053    145 140   K 4.0 3.8 3.5   * 114* 108   CO2 24 27 24   BUN 6 11 11   CREA 0.77 0.85 0.89   * 99 230*   CA 7.7* 8.0* 7.9*   MG  --   --  2.0   PHOS  --   --  3.1     Recent Labs     07/08/19 0155 07/07/19 0312 07/06/19 0053   SGOT 244* 8* 7*   ALT 99* 11* 14   * 88 100   TBILI 0.5 0.2 0.2   TP 5.8* 5.9* 6.3*   ALB 2.6* 2.6* 2.9*   GLOB 3.2 3.3 3.4   LPSE 139 1,609* 837*     No results for input(s): INR, PTP, APTT in the last 72 hours. No lab exists for component: INREXT, INREXT   No results for input(s): FE, TIBC, PSAT, FERR in the last 72 hours. Lab Results   Component Value Date/Time    Folate 13.2 05/20/2009 03:00 AM      No results for input(s): PH, PCO2, PO2 in the last 72 hours.   Recent Labs     07/06/19  0646 07/06/19 0053   TROIQ <0.05 <0.05     Lab Results   Component Value Date/Time    Cholesterol, total 131 07/06/2019 12:53 AM    HDL Cholesterol 69 07/06/2019 12:53 AM    LDL, calculated 41 07/06/2019 12:53 AM    Triglyceride 105 07/06/2019 12:53 AM    CHOL/HDL Ratio 1.9 07/06/2019 12:53 AM     Lab Results   Component Value Date/Time    Glucose (POC) 126 (H) 07/08/2019 06:20 AM    Glucose (POC) 194 (H) 07/07/2019 09:50 PM    Glucose (POC) 56 (L) 07/07/2019 09:26 PM    Glucose (POC) 130 (H) 07/07/2019 04:51 PM    Glucose (POC) 67 07/07/2019 04:13 PM     Lab Results   Component Value Date/Time    Color YELLOW/STRAW 07/05/2019 04:52 PM    Appearance CLEAR 07/05/2019 04:52 PM    Specific gravity RESISTANT 07/05/2019 04:52 PM    Specific gravity 1.026 06/20/2019 01:51 AM    pH (UA) 5.0 07/05/2019 04:52 PM    Protein NEGATIVE  07/05/2019 04:52 PM    Glucose >1,000 (A) 07/05/2019 04:52 PM    Ketone NEGATIVE  07/05/2019 04:52 PM    Bilirubin NEGATIVE  07/05/2019 04:52 PM    Urobilinogen 0.2 07/05/2019 04:52 PM    Nitrites NEGATIVE  07/05/2019 04:52 PM    Leukocyte Esterase NEGATIVE  07/05/2019 04:52 PM    Epithelial cells FEW 06/20/2019 01:51 AM    Bacteria NEGATIVE  06/20/2019 01:51 AM    WBC 0-4 06/20/2019 01:51 AM    RBC 0-5 06/20/2019 01:51 AM         Medications Reviewed:     Current Facility-Administered Medications   Medication Dose Route Frequency    sodium chloride 0.9 % bolus infusion 100 mL  100 mL IntraVENous RAD ONCE    gadobutrol (GADAVIST) contrast solution 10 mL  10 mL IntraVENous RAD ONCE    HYDROcodone-acetaminophen (NORCO) 5-325 mg per tablet 1 Tab  1 Tab Oral Q6H PRN    docusate sodium (COLACE) capsule 200 mg  200 mg Oral BID PRN    polyethylene glycol (MIRALAX) packet 17 g  17 g Oral DAILY    dextrose 5% infusion  100 mL/hr IntraVENous CONTINUOUS    albuterol-ipratropium (DUO-NEB) 2.5 MG-0.5 MG/3 ML  3 mL Nebulization Q4H PRN    amLODIPine (NORVASC) tablet 10 mg  10 mg Oral DAILY    arformoterol (BROVANA) neb solution 15 mcg  15 mcg Nebulization BID    aspirin chewable tablet 81 mg  81 mg Oral DAILY    budesonide (PULMICORT) 500 mcg/2 ml nebulizer suspension  500 mcg Nebulization BID RT    busPIRone (BUSPAR) tablet 20 mg  20 mg Oral BID    DULoxetine (CYMBALTA) capsule 60 mg  60 mg Oral DAILY    lisinopril (PRINIVIL, ZESTRIL) tablet 20 mg  20 mg Oral DAILY    insulin glargine (LANTUS) injection 20 Units  20 Units SubCUTAneous BID    ranolazine ER (RANEXA) tablet 500 mg  500 mg Oral BID    rosuvastatin (CRESTOR) tablet 10 mg  10 mg Oral QHS    sodium chloride (NS) flush 5-40 mL  5-40 mL IntraVENous Q8H    sodium chloride (NS) flush 5-40 mL  5-40 mL IntraVENous PRN    ondansetron (ZOFRAN) injection 4 mg  4 mg IntraVENous Q4H PRN    bisacodyl (DULCOLAX) tablet 5 mg  5 mg Oral DAILY PRN    heparin (porcine) injection 5,000 Units  5,000 Units SubCUTAneous Q8H    insulin lispro (HUMALOG) injection   SubCUTAneous AC&HS    glucose chewable tablet 16 g  4 Tab Oral PRN    dextrose (D50W) injection syrg 12.5-25 g  12.5-25 g IntraVENous PRN    glucagon (GLUCAGEN) injection 1 mg  1 mg IntraMUSCular PRN    pantoprazole (PROTONIX) tablet 40 mg  40 mg Oral ACB    metoprolol tartrate (LOPRESSOR) tablet 12.5 mg  12.5 mg Oral Q12H    ALPRAZolam (XANAX) tablet 0.25 mg  0.25 mg Oral TID PRN    acetaminophen (TYLENOL) tablet 650 mg  650 mg Oral Q4H PRN    nitroglycerin (NITROSTAT) tablet 0.4 mg  0.4 mg SubLINGual Q5MIN PRN    morphine injection 2 mg  2 mg IntraVENous Q4H PRN    sodium chloride (NS) flush 5-40 mL  5-40 mL IntraVENous PRN     ______________________________________________________________________  EXPECTED LENGTH OF STAY: - - -  ACTUAL LENGTH OF STAY:          1                 Chon Spicer MD

## 2019-07-08 NOTE — PROGRESS NOTES
Bedside and Verbal shift change report given to Hank Wang RN (oncoming nurse) by [facility] RN (offgoing nurse). Report included the following information SBAR, Kardex, Intake/Output, MAR and Recent Results.

## 2019-07-08 NOTE — PROGRESS NOTES
[address]  [address] [facility] [address] [place], [address]  [phone]                GI PROGRESS NOTE  Meri Brown, AGACNP-BC  Work Cell: [phone]      NAME:   Jade Dixon   :    1940   MRN:    [id number]     Assessment/Plan   1. Abdominal pain and elevated lipase - likely related to acute pancreatitis, had similar presentation in 2017. CT with dilated PD. 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 Lipase normalized. - Clear liquids   - Supportive management per primary team  - Consider EUS as outpatient once symptoms improved     2. Elevated liver enzymes - possibly related to the above  - Trend labs   3. Constipation - with LLQ pain that improves after defecation   - Miralax BID - can titrate dose depending upon results   4. DM     Patient Active Problem List   Diagnosis Code    Chest pain, unspecified R07.9    Leukocytosis, unspecified D72.829    DM (diabetes mellitus) ([facility] [address] E11.9    HTN (hypertension) I10    Arthritis M19.90    Pancreatitis, acute K85.90    Epigastric abdominal pain R10.13    Painful total knee replacement (HCC) T84.84XA, Z96.659    Status post right knee replacement Z96.651    CAD (coronary artery disease) I25.10    Pain due to knee joint prosthesis (HCC) T84.84XA, Z96.659    Chest pain R07.9       Subjective:     Continues to report upper and lower abdominal pain, nausea and vomiting. LLQ pain improved after defecation. Takes Dulcolax chronically at home. Objective:     VITALS:   Last 24hrs VS reviewed since prior hospitalist progress note. Most recent are:  Visit Vitals  /47   Pulse 80   Temp 97.5 °F (36.4 °C)   Resp 16   Wt 81.8 kg (180 lb 5.4 oz)   SpO2 96%   Breastfeeding?  No   BMI 31.95 kg/m²     No intake or output data in the 24 hours ending 19 1406     PHYSICAL EXAM:  General   well developed, alert, in no acute distress  EENT  Normocephalic, Atraumatic, PERRLA, EOMI, sclera clear  Respiratory   Clear To Auscultation bilaterally - no wheezes, rales, rhonchi, or crackles  Cardiology  Regular Rate and Rythmn  - no murmurs, rubs or gallops  Abdominal  Soft, non-distended, mild epigastric/RUQ and LLQ tenderness, positive bowel sounds, no hepatosplenomegaly, no palpable mass  Neurological  No focal neurological deficits noted  Psychological  Oriented x 3. Normal affect. Lab Data   Recent Results (from the past 12 hour(s))   GLUCOSE, POC    Collection Time: 07/08/19  6:20 AM   Result Value Ref Range    Glucose (POC) 126 (H) 65 - 100 mg/dL    Performed by Anya Baker, POC    Collection Time: 07/08/19 12:32 PM   Result Value Ref Range    Glucose (POC) 129 (H) 65 - 100 mg/dL    Performed by Venita Andrew          Medications: Reviewed    PMH/ reviewed - no change compared to H&P  Mid-Level Provider: Sam Rosario NP   Date/Time:  7/8/2019    I have personally reviewed the history and independently examined the patient. I have reviewed the chart and agree with the documentation recorded by the Mid Level Provider, including the assessment, treatment plan, and disposition.       Albin Hodges MD

## 2019-07-08 NOTE — PROGRESS NOTES
0915:  Notified Dr. Michael Barker of patient BP of 106/49. Per Dr. Michael Barker hold norvasc, lisinopril, and ranexa related to systolic BP less than 428. Per Dr. Michael Barker hold patient's lantus while she is NPO.    4302:  Patient blood glucose 67.  1619:  RN went to administer 12.5 g dextrose injection to patient and IV infiltrated. New IV was placed and dextrose was given. 1651:  Blood glucose was rechecked and was 130.  1730:  Spoke to Dr. Michael Barker related to patient questioning if she still needed to be NPO. Dr. Michael Barker stated that she should remain NPO and that Dr. Simran Zacarias would be seeing the patient tomorrow. Patient was notified and she verbalized understanding. 2010:  Bedside shift change report given to Paris Ibarra RN (oncoming nurse) by Sana Mason RN (offgoing nurse). Report included the following information SBAR, Kardex, Intake/Output, MAR and Recent Results.

## 2019-07-08 NOTE — H&P
[address]  HISTORY AND PHYSICAL    Name:  Iraida Nguyen  MR#:  [id number]  :  1940  ACCOUNT #:  [de-identified]  ADMIT DATE:  2019    Amended document - dictated sentence omitted from original document; 19    Patient was seen, evaluated and admitted by me on 2019. This is my date of service. PRIMARY CARE PHYSICIAN:  Charlie Schultz MD    SOURCE OF INFORMATION:  Patient. CHIEF COMPLAINT:  Chest pain. HISTORY OF PRESENT ILLNESS:  This is a 46-year-old woman with a past medical history significant for type 2 diabetes, hypertension, asthma, dyslipidemia, anxiety/depression, who was in her usual state of health until the day of her presentation at the emergency room when the patient developed chest pain. The chest pain woke up the patient from her sleep at about 03:00 a.m. The chest pain is located at the center of the chest.  The patient described the chest pain as pressure-like with no radiation. No known aggravating or relieving factors, 7/10 in severity, constant, associated with shortness of breath. The shortness of breath is with very minimal exertion. The patient also complained of cough which is nonproductive as well as fever and nausea but no vomiting. EMS was called. The patient was brought to the emergency room for further evaluation. When the EMS arrived at the scene, the patient's blood sugar was 558. The patient received aspirin on her way to the emergency room. She stated that for the past couple of weeks, she has been having a problem controlling her blood sugar. The dosage of insulin was also recently increased because of the poor control of her diabetes. The patient has not been very compliant with her diabetic diet.   When the patient arrived at the emergency room, the first set of troponin was negative, but the patient has significant risk factors for coronary artery disease, and because of that, she was referred to the hospitalist service for evaluation for admission. The patient was last admitted to this hospital from 03/08/2019 to 03/11/2019. The patient was admitted and treated for type 2 diabetes with hyperglycemia. During that hospitalization, the patient was found to have abnormal EKG. The patient was seen by cardiologist.  According to report, her cardiac catheterization in 2017 showed three-vessel disease. PAST MEDICAL HISTORY:  1. Coronary artery disease. 2.  Hypertension. 3.  Dyslipidemia. 4.  Type 2 diabetes. 5.  Asthma. 6.  Anxiety/depression. ALLERGIES:  THE PATIENT IS ALLERGIC TO SEAFOOD. MEDICATIONS:  1. Albuterol 90 mcg 2 puffs by inhalation every 4 hours as needed for wheezing. 2.  Albuterol nebulizer every 4 hours as needed for wheezing. 3.  Norvasc 10 mg daily. 4.  Brovana inhalation twice daily. 5.  Aspirin 81 mg daily. 6.  Pulmicort inhalation twice daily. 7.  BuSpar 20 mg twice daily. 8.  Dexilant 60 mg daily. 9.  Cymbalta 60 mg daily. 10.  Monopril 20 mg daily. 11.  Lantus insulin 20 units subcutaneously twice daily. 12.  Remeron 15 mg daily at bedtime. 13.  Seroquel 25 mg daily at bedtime. 14.  Ranexa 500 mg twice daily. 15.  Crestor 10 mg daily at bedtime. FAMILY HISTORY:  This was reviewed. Her mother had colon cancer. Her brother had diabetes. PAST SURGICAL HISTORY:  This is significant for,  1. Bilateral cataract extraction. 2.  Cholecystectomy. 3.  Right knee replacement. 4.  Tonsillectomy. 5.  Total abdominal hysterectomy. SOCIAL HISTORY:  The patient is a former smoker. No history of alcohol abuse. REVIEW OF SYSTEMS:  HEAD, EYES, EARS, NOSE, AND THROAT:  No headache, no dizziness, no blurring of vision, no photophobia. RESPIRATORY SYSTEM:  This is positive for cough and shortness of breath. No hemoptysis. CARDIOVASCULAR SYSTEM:  This is positive for chest pain. No orthopnea, no palpitations. GASTROINTESTINAL SYSTEM:  This is positive for nausea.   No vomiting, no diarrhea, no constipation. GENITOURINARY SYSTEM:  No dysuria, no urgency, no frequency. All other systems are reviewed and they are negative. PHYSICAL EXAMINATION:  GENERAL APPEARANCE:  The patient appeared ill, in moderate distress. VITAL SIGNS:  On arrival at the emergency room, temperature 97.5, pulse 102, respiratory rate 20, blood pressure 137/72, oxygen saturation 100% on room air. HEAD:  Normocephalic, atraumatic. EYES:  Normal eye movement. No redness, no drainage, no discharge. EARS:  Normal external ears with no evidence of drainage. NOSE:  No deformity, no drainage. MOUTH AND THROAT:  No visible oral lesion. NECK:  Neck is supple. No JVD, no thyromegaly. CHEST:  A few expiratory wheezing. No crackles. HEART:  Normal S1 and S2, regular. No clinically appreciable murmur. ABDOMEN:  Soft, obese, nontender. Normal bowel sounds. CNS:  Alert and oriented x3. No gross focal neurological deficit. EXTREMITIES:  Edema 2+. Pulses 2+ bilaterally. MUSCULOSKELETAL SYSTEM:  No obvious joint deformity or swelling. SKIN:  No active skin lesions seen in the exposed part of the body. PSYCHIATRY:  Normal mood and affect. LYMPHATIC SYSTEM:  No cervical lymphadenopathy. DIAGNOSTIC DATA:  Chest x-ray, no infiltrates. EKG shows, normal sinus rhythm, T-wave abnormality, to consider anterior ischemia. LABORATORY DATA:  Hematology; WBC 12.5, hemoglobin 11.9, hematocrit 38.7, platelets 528. Chemistry; sodium 137, potassium 4.4, chloride 104, CO2 of 24, glucose 433, BUN 12, creatinine 1.15, calcium 8.9, total bilirubin 0.3, ALT 18, AST 13, alkaline phosphatase 130, total protein 7.3, albumin level 3.4, globulin 3.9, hemoglobin A1c level 12.6. Urinalysis; this is significant for negative nitrite, negative leukocyte esterase, negative blood. Cardiac profile, troponin less than 0.04.    ASSESSMENT:  1. Chest pain. 2.  Hypertension. 3.  Dyslipidemia.   4.  Type 2 diabetes with hyperglycemia. 5.  Asthma. 6.  Leukocytosis. 7.  Anxiety/depression. 8.  Morbid obesity. PLAN:  1. Chest pain. We will place the patient on observation. We will obtain serial cardiac markers to rule out acute myocardial infarction as a possible cause of chest pain. We will continue aspirin and ACE inhibitor. We will add beta-blocker. The patient's cardiologist will be consulted to assist in further evaluation and treatment of chest pain. The patient will also be evaluated for other atypical causes of chest pain. We will check D-dimer to evaluate the patient for thromboembolism. We will also check lipase level. 2.  Hypertension. We will resume her home medication and monitor the patient's blood pressure closely. 3.  Dyslipidemia. We will continue with preadmission medication. We will check lipid profile. 4.  Type 2 diabetes with hyperglycemia. The patient's hemoglobin A1c level done in the emergency room is 12.6, an indication that the diabetes is poorly controlled. The patient will be placed on sliding scale with insulin coverage. We will continue with home basal insulin. The patient will require adjustments to her insulin therapy for diabetes at the time of discharge to home. 5.  Asthma. Continue with preadmission medication. The patient will also be placed on DuoNeb. We will check BNP level. 6.  Leukocytosis. The patient is afebrile, nontoxic. Chest x-ray is negative. Urinalysis is negative. We will await the results of lipase level. We will continue to evaluate and treat the underlying factors. 7.  Anxiety/depression. We will resume home medication. 8.  Morbid obesity. Dietary consult will be requested for dietary counseling. OTHER ISSUES:  Code status: The patient is a full code. We will place the patient on heparin for DVT prophylaxis.     FUNCTIONAL STATUS PRIOR TO ADMISSION:  The patient is ambulatory with a cane for long distance and wheelchair for ambulation around the house.         Jose Elias Aldana MD      [id number]  D:  07/06/2019 4:55  T:  07/06/2019 5:06  JOB #:  [id number]  CC:  Michelle Arthur MD

## 2019-07-08 NOTE — PROGRESS NOTES
NUTRITION COMPLETE ASSESSMENT    RECOMMENDATIONS:   1. Clear liquid suggest progress to Consistent CHO diet  2. If PO less than 75% plan to add Glucerna Shake PRN    Interventions/Plan:   Food/Nutrient Delivery:             Nutrition Education:     Coordination of Care:    Nutrition Counseling:        Assessment:   Reason for Assessment:   [x] Provider Consult     Diet: Clear liquids  Supplements: No need for ONS at this time; RD montior  Nutritionally Significant Medications: [x] Reviewed & Includes:   Meal Intake:   Patient Vitals for the past 100 hrs:   % Diet Eaten   07/06/19 0836 100 %     Subjective:  I was hungry; Ate 100% clear liquid diet. No N/V but have some belly pain. I have two teeth on left that need to come out, so I chew on right side. Drink low lactose milk at home. Objective:  Past Medical History:   Diagnosis Date    Anal polyp 6/13/2013    Arthritis     Asthma     CAD (coronary artery disease) 10/27/2017    Cataract     Chronic pain     knee - right/back    Diabetes ([address]     GERD (gastroesophageal reflux disease)     Hemorrhoids 6/13/2013    History of kidney stones     Hypertension     Ill-defined condition     abdominal pain and burning    Other ill-defined conditions(799.89)     high cholesterol   Admit chest pain. Cardiology noted, not cardiac. Lipase elevated 1609 (7/7/19) and down to 139 today. GI suggests acute pancreatitis. NPO advanced to clear liquid diet. Appetite good and ate 100% liquid diet; Rx Protonix. BMI 35 obese however, pt claims she's been trying to lose a little weight on her own by eating better. Wt loss has been significant: Net loss 42# (19%) x past 9 months. Pattern of weight loss, 22# (9%) x 4 months (10/16/2018-1/19/2019), then additional 20# x 5 months. C/O no BM now. Rx Miralax; takes Rx for BM at home. Hx Type 2 DM, Rx insulin. POC  and 126 today. A1C 12.6 (7/5/19) and  en route to [facility] ED yesterday.  Suggest consistent CHO diet when diet upgraded. Note CC diet is generally low in fat. May benefit from self-management BG training, ? DTC outpatient. No GI intervention while inpatient, noted consider EUS as outpatient. ONS not indicated with good appetite and appears GI improving. Should be able to meet nutritional needs once diet advanced. If PO below 75% will re-evaluate for ONS. Estimated Nutrition Needs:   Kcals/day: 1600 Kcals/day  Protein: 80 g(~1 gm/kg)  Fluid: 1600 ml(~1 mL/kcal)     Based On: Baldo Ortiz(MSJ x 1.3)  Weight Used: Actual wt(81.8 kg)    Pt expected to meet estimated nutrient needs:  [x] Yes, once diet progressed  Comparative Standards:  ;  ;      Nutrition Diagnosis:   1. Altered GI function related to pancreas as evidenced by abdominal pain, elevated lipase, NPO on admit transition to clear liquids    Goals:     transition to solid diet and tolerate with lipase WNL and no abdominal pain in next 24-48 hr.      Monitoring & Evaluation:    - Oral fluids amount, Total energy intake, Protein intake, Carbohydrate intake   - Weight/weight change, Lean body mass, fat free mass, Glucose profile, GI profile   -      Previous Nutrition Goals Met:  N/A  Previous Recommendations:      N/A    Education & Discharge Needs:   [] None Identified   [] Identified and addressed    [] Participated in care plan, discharge planning, and/or interdisciplinary rounds        Cultural, Denominational and ethnic food preferences identified:   None    Skin Integrity: []Intact  []Other  Edema: []None []Other  Last BM: PTA  Food Allergies: [x] Seafood;  Lactose intolerance    Anthropometrics:    Weight Loss Metrics 7/6/2019 6/20/2019 4/4/2019 3/11/2019 1/19/2019 10/16/2018 11/14/2017   Today's Wt 180 lb 5.4 oz 175 lb 174 lb 181 lb 4.8 oz 200 lb 6.4 oz 222 lb 5 oz 175 lb   BMI 31.95 kg/m2 31 kg/m2 33.98 kg/m2 35.41 kg/m2 39.14 kg/m2 43.42 kg/m2 34.18 kg/m2      Last 3 Recorded Weights in this Encounter    07/05/19 8819 07/06/19 1022 Weight: 81.8 kg (180 lb 5.4 oz) 81.8 kg (180 lb 5.4 oz)      Weight Source: Standing scale (comment)   ,    Body mass index is 31.95 kg/m². IBW : 45.4 kg (100 lb),    Usual Body Weight: 81.2 kg (179 lb),      Labs:    Lab Results   Component Value Date/Time    Sodium 140 07/08/2019 01:55 AM    Potassium 4.0 07/08/2019 01:55 AM    Chloride 110 (H) 07/08/2019 01:55 AM    CO2 24 07/08/2019 01:55 AM    Glucose 158 (H) 07/08/2019 01:55 AM    BUN 6 07/08/2019 01:55 AM    Creatinine 0.77 07/08/2019 01:55 AM    Calcium 7.7 (L) 07/08/2019 01:55 AM    Magnesium 2.0 07/06/2019 12:53 AM    Phosphorus 3.1 07/06/2019 12:53 AM    Albumin 2.6 (L) 07/08/2019 01:55 AM     Lab Results   Component Value Date/Time    Hemoglobin A1c 12.6 (H) 07/05/2019 02:49 PM     Lab Results   Component Value Date/Time    Glucose 158 (H) 07/08/2019 01:55 AM    Glucose (POC) 129 (H) 07/08/2019 12:32 PM      Lab Results   Component Value Date/Time    ALT (SGPT) 99 (H) 07/08/2019 01:55 AM    AST (SGOT) 244 (H) 07/08/2019 01:55 AM    Alk.  phosphatase 125 (H) 07/08/2019 01:55 AM    Bilirubin, direct 0.1 10/16/2018 01:07 PM    Bilirubin, total 0.5 07/08/2019 01:55 AM        Vinnie Alas RD

## 2019-07-08 NOTE — PROGRESS NOTES
Problem: Self Care Deficits Care Plan (Adult)  Goal: *Acute Goals and Plan of Care (Insert Text)  Description  Occupational Therapy Goals  Initiated 7/7/2019   1. Patient will complete grooming activities standing at the sink with supervision within 7 days. 2.  Patient will complete toilet transfer with supervision within 7 days. 3.  Patient will complete toileting with supervision within 7 days. 4.  Patient will complete bathing from chair with setup/supervision within 7 days. 5.  Patient will complete dressing activities from chair level with supervision within 7 days. 6.  Patient will tolerate standing for ADL activities for 5 minutes with supervision within 7 days. Outcome: Progressing Towards Goal   OCCUPATIONAL THERAPY TREATMENT  Patient: Gagan Garcia (44 y.o. female)  Date: 7/8/2019  Diagnosis: Chest pain [R07.9]  Chest pain [R07.9] Chest pain       Precautions: Fall  Chart, occupational therapy assessment, plan of care, and goals were reviewed. ASSESSMENT:   The patient presents with Setup upper body ADLs, Maximum assistance and Total assistance lower body ADLs, and Minimum assistance, Additional time and Assist x1 assist functional mobility with RW, limited today by R knee pain post ambulation \"cramping\", RN notifed. Multiple falls at home, 2 story home with bed/bath upstairs, nephew assisting with possible senior living apartment or smaller home. Neetu Parisi to return to living alone, but now needs assist with LB ADLs and min A with RW use. Recommend SNF for rehab.     The following are barriers to ADL independence while in acute care:   - Cognitive and/or behavioral: insight into abilities  - Medical condition: strength, functional reach, functional endurance, sitting balance, standing balance, pain tolerance and medical history    - Other:       Prior level of function: mod I, doing all ADLs until recently needing more assist, family unable to provide 24h supervision, lives alone with bed/bath on 2nd floor        PLAN:  Patient continues to benefit from skilled intervention to address the above impairments. Continue treatment per established plan of care. Recommend with staff: Oren Buckley for all meals, toileting in bathroom, RW use, setup for ADLs, A for LB ADLs only  Recommend next OT session: bathroom mobility with RW, standing ADL challenge  Discharge recommendations: Rehab at skilled nursing facility (SNF) (to regain functional baseline patient requires rehab)  If above is not an option then recommend: 24 hour skilled services  24 supervision  physical assist during all functional mobility    Barriers to discharging home, in addition to above listed impairments: lives alone  total assist driving to follow up medical appointment(s)/groceries/obtain medication  entry and exit into the home, patient will require physical assist from medical transport/ambulance  home environment unsafe due to 2nd floor bed/bath   level of physical assist required to maintain patient safety. Equipment recommendations for successful discharge (if) home: has RW on each floor, would need possible BSC, grab bars, and bedroom moved to 1st floor if home      SUBJECTIVE:   Patient stated ? I've fallen a few times on the steps. ?    OBJECTIVE DATA SUMMARY:   Cognitive/Behavioral Status:         alert             Functional Mobility and Transfers for ADLs:  Bed Mobility:       Transfers:  Sit to Stand: Minimum assistance;Assist x1;Adaptive equipment  Functional Transfers  Toilet Transfer : Minimum assistance       Balance:  Sitting: Intact  Standing: Intact; With support    ADL Intervention:       Grooming  Washing Face: Set-up  Washing Hands: Set-up  Brushing Teeth: Set-up                   Lower Body Dressing Assistance  Underpants: Maximum assistance  Socks:  Total assistance (dependent)  Leg Crossed Method Used: Yes  Position Performed: Seated in chair;Bending forward method  Cues: Doff;Don;Physical assistance  Adaptive Equipment Used: Other(comment)(has AE but does not know how to use and has tried, unable)    Toileting  Toileting Assistance: Contact guard assistance  Bladder Hygiene: Contact guard assistance  Clothing Management: Minimum assistance         Therapeutic Exercises:   encouraged OOB and full participation with ADLs to improve strength and endurance. Patient instructed and indicated understanding the benefits of maintaining activity tolerance, functional mobility, and independence with self care tasks during acute stay  to ensure safe return home and to baseline. Encouraged patient to increase frequency and duration OOB, be out of bed for all meals, perform daily ADLs (as approved by RN/MD regarding bathing etc), and performing functional mobility to/from bathroom. Pain:    Post treatment:  7/10   Location: R knee    Description:cramping    Aggravating factors: ambulation,     Activity Tolerance:   Fair and requires rest breaks  Please refer to the flowsheet for vital signs taken during this treatment.     After treatment patient left:   Up in chair  Caregiver at bedside  Call light within reach  RN notified     COMMUNICATION/COLLABORATION:   The patient?s plan of care was discussed with: Physical Therapist and Registered Nurse    Rowan Escalante OT  Time Calculation: 13 mins

## 2019-07-08 NOTE — PROGRESS NOTES
LUZ MARINA: Referral sent to [facility]. Care Management Interventions  PCP Verified by CM: Yes  Mode of Transport at Discharge: Other (see comment)(TBD)  Transition of Care Consult (CM Consult): SNF, Discharge Planning  Girishhart Signup: No  Physical Therapy Consult: Yes  Occupational Therapy Consult: Yes  Speech Therapy Consult: No  Current Support Network: Own Home, Lives Alone  Confirm Follow Up Transport: Other (see comment)(TBD)  Plan discussed with Pt/Family/Caregiver: Yes  Freedom of Choice Offered: Yes  Discharge Location  Discharge Placement: Skilled nursing facility     Reason for Admission:   Chest pain               RRAT Score:     21             Resources/supports as identified by patient/family: Patient has family locally that take turns helping her, but she stated that she is not getting enough assistance. Per patient her daughter works alot visits her once per week. Top Challenges facing patient (as identified by patient/family and CM): Finances/Medication cost?  Patient has Medicare A&B and   Medicaid. No issues affording meds. Transportation? TBD              Support system or lack thereof? See above. Living arrangements? Lives alone            Self-care/ADLs/Cognition? Patient needs assistance with ADLs and IADLs. Her family has been taking turns helping her but she states that she needs someone to come everyday. Patient became teary during CM assessment. Family is not able to provide 24 hour care. Her neighbor does grocery shopping for her. Current Advanced Directive/Advance Care Plan:  Not on file. Plan for utilizing home health: [facility]. Patient has been to [facility] before and is open to going there again                 Transition of Care Plan:   SNF.     Oleksandr Lua

## 2019-07-09 LAB
ALBUMIN SERPL-MCNC: 2.9 G/DL (ref 3.5–5)
ALBUMIN/GLOB SERPL: 0.8 {RATIO} (ref 1.1–2.2)
ALP SERPL-CCNC: 135 U/L (ref 45–117)
ALT SERPL-CCNC: 85 U/L (ref 12–78)
ANION GAP SERPL CALC-SCNC: 7 MMOL/L (ref 5–15)
AST SERPL-CCNC: 59 U/L (ref 15–37)
BILIRUB SERPL-MCNC: 0.4 MG/DL (ref 0.2–1)
BUN SERPL-MCNC: 5 MG/DL (ref 6–20)
BUN/CREAT SERPL: 6 (ref 12–20)
CALCIUM SERPL-MCNC: 8.1 MG/DL (ref 8.5–10.1)
CHLORIDE SERPL-SCNC: 111 MMOL/L (ref 97–108)
CO2 SERPL-SCNC: 25 MMOL/L (ref 21–32)
CREAT SERPL-MCNC: 0.78 MG/DL (ref 0.55–1.02)
ERYTHROCYTE [DISTWIDTH] IN BLOOD BY AUTOMATED COUNT: 15.9 % (ref 11.5–14.5)
GLOBULIN SER CALC-MCNC: 3.6 G/DL (ref 2–4)
GLUCOSE BLD STRIP.AUTO-MCNC: 177 MG/DL (ref 65–100)
GLUCOSE BLD STRIP.AUTO-MCNC: 177 MG/DL (ref 65–100)
GLUCOSE BLD STRIP.AUTO-MCNC: 207 MG/DL (ref 65–100)
GLUCOSE BLD STRIP.AUTO-MCNC: 81 MG/DL (ref 65–100)
GLUCOSE SERPL-MCNC: 79 MG/DL (ref 65–100)
HCT VFR BLD AUTO: 36.7 % (ref 35–47)
HGB BLD-MCNC: 11.2 G/DL (ref 11.5–16)
LIPASE SERPL-CCNC: 173 U/L (ref 73–393)
MCH RBC QN AUTO: 26.5 PG (ref 26–34)
MCHC RBC AUTO-ENTMCNC: 30.5 G/DL (ref 30–36.5)
MCV RBC AUTO: 86.8 FL (ref 80–99)
NRBC # BLD: 0 K/UL (ref 0–0.01)
NRBC BLD-RTO: 0 PER 100 WBC
PLATELET # BLD AUTO: 244 K/UL (ref 150–400)
PMV BLD AUTO: 11.2 FL (ref 8.9–12.9)
POTASSIUM SERPL-SCNC: 3.8 MMOL/L (ref 3.5–5.1)
PROT SERPL-MCNC: 6.5 G/DL (ref 6.4–8.2)
RBC # BLD AUTO: 4.23 M/UL (ref 3.8–5.2)
SERVICE CMNT-IMP: ABNORMAL
SERVICE CMNT-IMP: NORMAL
SODIUM SERPL-SCNC: 143 MMOL/L (ref 136–145)
WBC # BLD AUTO: 8.1 K/UL (ref 3.6–11)

## 2019-07-09 PROCEDURE — 74011000250 HC RX REV CODE- 250: Performed by: INTERNAL MEDICINE

## 2019-07-09 PROCEDURE — 74011636637 HC RX REV CODE- 636/637: Performed by: INTERNAL MEDICINE

## 2019-07-09 PROCEDURE — 94640 AIRWAY INHALATION TREATMENT: CPT

## 2019-07-09 PROCEDURE — 74011250637 HC RX REV CODE- 250/637: Performed by: NURSE PRACTITIONER

## 2019-07-09 PROCEDURE — 74011250636 HC RX REV CODE- 250/636: Performed by: INTERNAL MEDICINE

## 2019-07-09 PROCEDURE — 94760 N-INVAS EAR/PLS OXIMETRY 1: CPT

## 2019-07-09 PROCEDURE — 36415 COLL VENOUS BLD VENIPUNCTURE: CPT

## 2019-07-09 PROCEDURE — 74011250636 HC RX REV CODE- 250/636: Performed by: HOSPITALIST

## 2019-07-09 PROCEDURE — 65270000032 HC RM SEMIPRIVATE

## 2019-07-09 PROCEDURE — 85027 COMPLETE CBC AUTOMATED: CPT

## 2019-07-09 PROCEDURE — 82962 GLUCOSE BLOOD TEST: CPT

## 2019-07-09 PROCEDURE — 74011250637 HC RX REV CODE- 250/637: Performed by: HOSPITALIST

## 2019-07-09 PROCEDURE — 80053 COMPREHEN METABOLIC PANEL: CPT

## 2019-07-09 PROCEDURE — 74011250637 HC RX REV CODE- 250/637: Performed by: INTERNAL MEDICINE

## 2019-07-09 PROCEDURE — 83690 ASSAY OF LIPASE: CPT

## 2019-07-09 RX ADMIN — POLYETHYLENE GLYCOL 3350 17 G: 17 POWDER, FOR SOLUTION ORAL at 09:13

## 2019-07-09 RX ADMIN — METOPROLOL TARTRATE 12.5 MG: 25 TABLET ORAL at 09:13

## 2019-07-09 RX ADMIN — DULOXETINE HYDROCHLORIDE 60 MG: 60 CAPSULE, DELAYED RELEASE ORAL at 09:13

## 2019-07-09 RX ADMIN — HYDROCODONE BITARTRATE AND ACETAMINOPHEN 1 TABLET: 5; 325 TABLET ORAL at 22:12

## 2019-07-09 RX ADMIN — ARFORMOTEROL TARTRATE 15 MCG: 15 SOLUTION RESPIRATORY (INHALATION) at 19:06

## 2019-07-09 RX ADMIN — INSULIN GLARGINE 20 UNITS: 100 INJECTION, SOLUTION SUBCUTANEOUS at 19:19

## 2019-07-09 RX ADMIN — INSULIN LISPRO 2 UNITS: 100 INJECTION, SOLUTION INTRAVENOUS; SUBCUTANEOUS at 12:22

## 2019-07-09 RX ADMIN — BUDESONIDE 500 MCG: 0.5 INHALANT RESPIRATORY (INHALATION) at 19:07

## 2019-07-09 RX ADMIN — RANOLAZINE 500 MG: 500 TABLET, FILM COATED, EXTENDED RELEASE ORAL at 00:00

## 2019-07-09 RX ADMIN — PANTOPRAZOLE SODIUM 40 MG: 40 TABLET, DELAYED RELEASE ORAL at 07:23

## 2019-07-09 RX ADMIN — INSULIN LISPRO 2 UNITS: 100 INJECTION, SOLUTION INTRAVENOUS; SUBCUTANEOUS at 18:11

## 2019-07-09 RX ADMIN — INSULIN LISPRO 2 UNITS: 100 INJECTION, SOLUTION INTRAVENOUS; SUBCUTANEOUS at 21:51

## 2019-07-09 RX ADMIN — Medication 10 ML: at 21:52

## 2019-07-09 RX ADMIN — HYDROCODONE BITARTRATE AND ACETAMINOPHEN 1 TABLET: 5; 325 TABLET ORAL at 01:34

## 2019-07-09 RX ADMIN — ASPIRIN 81 MG 81 MG: 81 TABLET ORAL at 09:13

## 2019-07-09 RX ADMIN — ALPRAZOLAM 0.25 MG: 0.25 TABLET ORAL at 22:10

## 2019-07-09 RX ADMIN — ROSUVASTATIN CALCIUM 10 MG: 10 TABLET, FILM COATED ORAL at 21:51

## 2019-07-09 RX ADMIN — HEPARIN SODIUM 5000 UNITS: 5000 INJECTION INTRAVENOUS; SUBCUTANEOUS at 09:13

## 2019-07-09 RX ADMIN — HYDROCODONE BITARTRATE AND ACETAMINOPHEN 1 TABLET: 5; 325 TABLET ORAL at 07:23

## 2019-07-09 RX ADMIN — BUDESONIDE 500 MCG: 0.5 INHALANT RESPIRATORY (INHALATION) at 09:57

## 2019-07-09 RX ADMIN — Medication 10 ML: at 15:06

## 2019-07-09 RX ADMIN — HEPARIN SODIUM 5000 UNITS: 5000 INJECTION INTRAVENOUS; SUBCUTANEOUS at 18:12

## 2019-07-09 RX ADMIN — RANOLAZINE 500 MG: 500 TABLET, FILM COATED, EXTENDED RELEASE ORAL at 12:22

## 2019-07-09 RX ADMIN — HEPARIN SODIUM 5000 UNITS: 5000 INJECTION INTRAVENOUS; SUBCUTANEOUS at 01:34

## 2019-07-09 RX ADMIN — INSULIN GLARGINE 20 UNITS: 100 INJECTION, SOLUTION SUBCUTANEOUS at 09:25

## 2019-07-09 RX ADMIN — Medication 10 ML: at 07:23

## 2019-07-09 RX ADMIN — HYDROCODONE BITARTRATE AND ACETAMINOPHEN 1 TABLET: 5; 325 TABLET ORAL at 15:39

## 2019-07-09 RX ADMIN — ONDANSETRON 4 MG: 2 INJECTION INTRAMUSCULAR; INTRAVENOUS at 01:37

## 2019-07-09 RX ADMIN — METOPROLOL TARTRATE 12.5 MG: 25 TABLET ORAL at 21:51

## 2019-07-09 RX ADMIN — SODIUM CHLORIDE 100 ML/HR: 900 INJECTION, SOLUTION INTRAVENOUS at 12:45

## 2019-07-09 RX ADMIN — BUSPIRONE HYDROCHLORIDE 20 MG: 5 TABLET ORAL at 11:56

## 2019-07-09 RX ADMIN — BUSPIRONE HYDROCHLORIDE 20 MG: 5 TABLET ORAL at 18:12

## 2019-07-09 RX ADMIN — ARFORMOTEROL TARTRATE 15 MCG: 15 SOLUTION RESPIRATORY (INHALATION) at 09:57

## 2019-07-09 NOTE — PROGRESS NOTES
/47, HR 71. Per Dr. Fabien Conner, hold amlodipine and lisinopril, give metoprolol. Blood sugar was 81 at 0647. Per Dr. Fabien Conner, give scheduled lantus 20 units.

## 2019-07-09 NOTE — PROGRESS NOTES
Problem: Diabetes Self-Management  Goal: *Disease process and treatment process  Description  Define diabetes and identify own type of diabetes; list 3 options for treating diabetes. Outcome: Progressing Towards Goal  Goal: *Incorporating nutritional management into lifestyle  Description  Describe effect of type, amount and timing of food on blood glucose; list 3 methods for planning meals. Outcome: Progressing Towards Goal  Goal: *Incorporating physical activity into lifestyle  Description  State effect of exercise on blood glucose levels. Outcome: Progressing Towards Goal  Goal: *Developing strategies to promote health/change behavior  Description  Define the ABC's of diabetes; identify appropriate screenings, schedule and personal plan for screenings. Outcome: Progressing Towards Goal  Goal: *Using medications safely  Description  State effect of diabetes medications on diabetes; name diabetes medication taking, action and side effects. Outcome: Progressing Towards Goal  Goal: *Monitoring blood glucose, interpreting and using results  Description  Identify recommended blood glucose targets  and personal targets. Outcome: Progressing Towards Goal     Problem: Patient Education: Go to Patient Education Activity  Goal: Patient/Family Education  Outcome: Progressing Towards Goal     Problem: Falls - Risk of  Goal: *Absence of Falls  Description  Document Arriolajeffery Millanon Fall Risk and appropriate interventions in the flowsheet. Outcome: Progressing Towards Goal     Problem: Patient Education: Go to Patient Education Activity  Goal: Patient/Family Education  Outcome: Progressing Towards Goal     Problem: Pressure Injury - Risk of  Goal: *Prevention of pressure injury  Description  Document Yovani Scale and appropriate interventions in the flowsheet.   Outcome: Progressing Towards Goal     Problem: Patient Education: Go to Patient Education Activity  Goal: Patient/Family Education  Outcome: Progressing Towards Goal

## 2019-07-09 NOTE — PROGRESS NOTES
Hospitalist Progress Note  Bia Longoria MD  Answering service: [phone] from in house phone  Cell:       Date of Service:  2019  NAME:  Maribell Key  :  1940  MRN:  [id number]      Admission Summary: This is a 78-year-old woman with a past medical history significant for type 2 diabetes, hypertension, asthma, dyslipidemia, anxiety/depression, who was in her usual state of health until the day of her presentation at the emergency room when the patient developed chest pain. The chest pain woke up the patient from her sleep at about 03:00 a.m. The chest pain is located at the center of the chest.  The patient described the chest pain as pressure-like with no radiation. No known aggravating or relieving factors, 7/10 in severity, constant, associated with shortness of breath. The shortness of breath is with very minimal exertion. The patient also complained of cough which is nonproductive as well as fever and nausea but no vomiting. EMS was called. The patient was brought to the emergency room for further evaluation. Interval history / Subjective:   Pt seen for FU of abdominal pain  Patient seen and examined by the bedside, Labs, images and notes reviewed  Abdominal pain is much improved, feels hungry, GI advance diet to GI lite. comfortable, Denies any chest pain, fevers, chills. No N/V/D  Transfer to medical floor. Discussed with nursing staff, no acute issues overnight, orders reviewed.        Assessment & Plan:     Atypical chest pain , hx of CAD  -continue aspirin, crestor, ranexa, and metoprolol  -labs and EKG reviewed  -lipid panel normal  -seen by cardiologist, pain not cardiac, no further work up for now    Abdominal pain and elevated lipase possible due to pancreatic duct obstruction/acute pancreatitis -POA  improving  - diet advanced to GI lite as lipase turns normal.  - GI note reviewed, supportive measures  -CT shows dilated PD, MRCP shows PD stricture, concerns for IPMN, outpatient EUS recommended by GI  -TG normal  -hx of s/p cholecystectomy   -no hx of alcohol abuse    T2DM  Cont Lantus, SSI, diabetic diet    HTN  -BP normal, hold norvasc and Lisinopril today, cont Lopressor    Hx of asthma  -stable  -continue pulmicort, prn duo neb    Hx of anxiety/depression   -feels depressed and anxious recently related to her son health condition, who has chronic schizophrenia and lives at group home, he visited her on July 4, not sleeping well,  -no suicidal thought  -continue buspar, cymbalta  -consulted psychiatrist    Right knee pain   -hx of right knee replacement  -x ray of right knee  -prn tylenol    Morbid obesity  -diet and weight loss program       Code status: Full Code  DVT prophylaxis: heparin    Care Plan discussed with: Patient/Family and Nurse  Disposition: TBD     Hospital Problems  Date Reviewed: 7/5/2019          Codes Class Noted POA    * (Principal) Chest pain ICD-10-CM: R07.9  ICD-9-CM: 786.50  7/5/2019 Yes              Vital Signs:    Last 24hrs VS reviewed since prior progress note. Most recent are:  Visit Vitals  /67   Pulse 75   Temp 98.1 °F (36.7 °C)   Resp 16   Wt 81.8 kg (180 lb 5.4 oz)   SpO2 100%   Breastfeeding? No   BMI 31.95 kg/m²         Intake/Output Summary (Last 24 hours) at 7/9/2019 1320  Last data filed at 7/9/2019 0700  Gross per 24 hour   Intake 1200 ml   Output    Net 1200 ml        Physical Examination:             Constitutional:  No acute distress, cooperative, pleasant, appears stated age. ENT:  Oral mucous moist, oropharynx benign. Neck supple,    Resp:  CTA bilaterally. No wheezing/rhonchi/rales. No accessory muscle use   CV:  Regular rhythm, normal rate, no murmurs, gallops, rubs    GI:  Soft, non distended, non tender. normoactive bowel sounds    Musculoskeletal:  No edema,    Neurologic:  Moves all extremities.   AAOx3     Data Review:    Review and/or order of clinical lab test  Review and/or order of tests in the radiology section of [facility]      Labs:     Recent Labs     07/09/19 0243 07/08/19 0155   WBC 8.1 9.1   HGB 11.2* 11.0*   HCT 36.7 35.4    265     Recent Labs     07/09/19  0243 07/08/19 0155 07/07/19 0312    140 145   K 3.8 4.0 3.8   * 110* 114*   CO2 25 24 27   BUN 5* 6 11   CREA 0.78 0.77 0.85   GLU 79 158* 99   CA 8.1* 7.7* 8.0*     Recent Labs     07/09/19 0243 07/08/19 0155 07/07/19 0312   SGOT 59* 244* 8*   ALT 85* 99* 11*   * 125* 88   TBILI 0.4 0.5 0.2   TP 6.5 5.8* 5.9*   ALB 2.9* 2.6* 2.6*   GLOB 3.6 3.2 3.3   LPSE 173 139 1,609*     No results for input(s): INR, PTP, APTT in the last 72 hours. No lab exists for component: INREXT, INREXT   No results for input(s): FE, TIBC, PSAT, FERR in the last 72 hours. Lab Results   Component Value Date/Time    Folate 13.2 05/20/2009 03:00 AM      No results for input(s): PH, PCO2, PO2 in the last 72 hours. No results for input(s): CPK, CKNDX, TROIQ in the last 72 hours.     No lab exists for component: CPKMB  Lab Results   Component Value Date/Time    Cholesterol, total 131 07/06/2019 12:53 AM    HDL Cholesterol 69 07/06/2019 12:53 AM    LDL, calculated 41 07/06/2019 12:53 AM    Triglyceride 105 07/06/2019 12:53 AM    CHOL/HDL Ratio 1.9 07/06/2019 12:53 AM     Lab Results   Component Value Date/Time    Glucose (POC) 177 (H) 07/09/2019 11:54 AM    Glucose (POC) 81 07/09/2019 06:47 AM    Glucose (POC) 272 (H) 07/08/2019 09:06 PM    Glucose (POC) 253 (H) 07/08/2019 05:01 PM    Glucose (POC) 129 (H) 07/08/2019 12:32 PM     Lab Results   Component Value Date/Time    Color YELLOW/STRAW 07/05/2019 04:52 PM    Appearance CLEAR 07/05/2019 04:52 PM    Specific gravity RESISTANT 07/05/2019 04:52 PM    Specific gravity 1.026 06/20/2019 01:51 AM    pH (UA) 5.0 07/05/2019 04:52 PM    Protein NEGATIVE  07/05/2019 04:52 PM    Glucose >1,000 (A) 07/05/2019 04:52 PM    Ketone NEGATIVE  07/05/2019 04:52 PM    Bilirubin NEGATIVE  07/05/2019 04:52 PM    Urobilinogen 0.2 07/05/2019 04:52 PM    Nitrites NEGATIVE  07/05/2019 04:52 PM    Leukocyte Esterase NEGATIVE  07/05/2019 04:52 PM    Epithelial cells FEW 06/20/2019 01:51 AM    Bacteria NEGATIVE  06/20/2019 01:51 AM    WBC 0-4 06/20/2019 01:51 AM    RBC 0-5 06/20/2019 01:51 AM         Medications Reviewed:     Current Facility-Administered Medications   Medication Dose Route Frequency    amLODIPine (NORVASC) tablet 5 mg  5 mg Oral DAILY    lisinopril (PRINIVIL, ZESTRIL) tablet 10 mg  10 mg Oral DAILY    polyethylene glycol (MIRALAX) packet 17 g  17 g Oral BID    0.9% sodium chloride infusion  100 mL/hr IntraVENous CONTINUOUS    HYDROcodone-acetaminophen (NORCO) 5-325 mg per tablet 1 Tab  1 Tab Oral Q6H PRN    docusate sodium (COLACE) capsule 200 mg  200 mg Oral BID PRN    albuterol-ipratropium (DUO-NEB) 2.5 MG-0.5 MG/3 ML  3 mL Nebulization Q4H PRN    arformoterol (BROVANA) neb solution 15 mcg  15 mcg Nebulization BID    aspirin chewable tablet 81 mg  81 mg Oral DAILY    budesonide (PULMICORT) 500 mcg/2 ml nebulizer suspension  500 mcg Nebulization BID RT    busPIRone (BUSPAR) tablet 20 mg  20 mg Oral BID    DULoxetine (CYMBALTA) capsule 60 mg  60 mg Oral DAILY    insulin glargine (LANTUS) injection 20 Units  20 Units SubCUTAneous BID    ranolazine ER (RANEXA) tablet 500 mg  500 mg Oral BID    rosuvastatin (CRESTOR) tablet 10 mg  10 mg Oral QHS    sodium chloride (NS) flush 5-40 mL  5-40 mL IntraVENous Q8H    sodium chloride (NS) flush 5-40 mL  5-40 mL IntraVENous PRN    ondansetron (ZOFRAN) injection 4 mg  4 mg IntraVENous Q4H PRN    bisacodyl (DULCOLAX) tablet 5 mg  5 mg Oral DAILY PRN    heparin (porcine) injection 5,000 Units  5,000 Units SubCUTAneous Q8H    insulin lispro (HUMALOG) injection   SubCUTAneous AC&HS    glucose chewable tablet 16 g  4 Tab Oral PRN    dextrose (D50W) injection syrg 12.5-25 g  12.5-25 g IntraVENous PRN    glucagon (GLUCAGEN) injection 1 mg  1 mg IntraMUSCular PRN    pantoprazole (PROTONIX) tablet 40 mg  40 mg Oral ACB    metoprolol tartrate (LOPRESSOR) tablet 12.5 mg  12.5 mg Oral Q12H    ALPRAZolam (XANAX) tablet 0.25 mg  0.25 mg Oral TID PRN    acetaminophen (TYLENOL) tablet 650 mg  650 mg Oral Q4H PRN    nitroglycerin (NITROSTAT) tablet 0.4 mg  0.4 mg SubLINGual Q5MIN PRN    morphine injection 2 mg  2 mg IntraVENous Q4H PRN    sodium chloride (NS) flush 5-40 mL  5-40 mL IntraVENous PRN     ______________________________________________________________________  EXPECTED LENGTH OF STAY: 2d 12h  ACTUAL LENGTH OF STAY:          2                 Carlos Enrique Mack MD

## 2019-07-09 NOTE — PROGRESS NOTES
[address]  [address] [place], [address]  [phone]                GI PROGRESS NOTE  Milton Toscano, AGACNP-BC  Work Cell: [phone]      NAME:   Emilie Curling   :    1940   MRN:    [id number]     Assessment/Plan   1. Abdominal pain and elevated lipase - likely related to acute pancreatitis, had similar presentation in 2017. CT with dilated PD. 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 Lipase normalized. Symptoms improving.   - Advance to GI lite   - Supportive management per primary team  - Consider EUS as outpatient once symptoms improved      2. Elevated liver enzymes - possibly related to the above, improving   - Trend labs   3. Constipation - with LLQ pain that improves after defecation   - Miralax BID - can titrate dose depending upon results   4. DM     Patient Active Problem List   Diagnosis Code    Chest pain, unspecified R07.9    Leukocytosis, unspecified D72.829    DM (diabetes mellitus) ([facility] [address] E11.9    HTN (hypertension) I10    Arthritis M19.90    Pancreatitis, acute K85.90    Epigastric abdominal pain R10.13    Painful total knee replacement (HCC) T84.84XA, Z96.659    Status post right knee replacement Z96.651    CAD (coronary artery disease) I25.10    Pain due to knee joint prosthesis (HCC) T84.84XA, Z96.659    Chest pain R07.9       Subjective:     Feeling better today. Reports some improvement in abdominal pain. Tolerating clears w/o nausea or vomiting. Passing flatus and had large BM yesterday evening. Objective:     VITALS:   Last 24hrs VS reviewed since prior hospitalist progress note. Most recent are:  Visit Vitals  /57 (BP 1 Location: Left arm, BP Patient Position: At rest)   Pulse 71   Temp 97.6 °F (36.4 °C)   Resp 16   Wt 81.8 kg (180 lb 5.4 oz)   SpO2 95%   Breastfeeding?  No   BMI 31.95 kg/m²       Intake/Output Summary (Last 24 hours) at 7/9/2019 0855  Last data filed at 7/9/2019 0400  Gross per 24 hour   Intake 900 ml   Output    Net 900 ml        PHYSICAL EXAM:  General   well developed, alert, in no acute distress  EENT  Normocephalic, Atraumatic, PERRLA, EOMI, sclera clear  Respiratory   Clear To Auscultation bilaterally - no wheezes, rales, rhonchi, or crackles  Cardiology  Regular Rate and Rythmn  - no murmurs, rubs or gallops  Abdominal  Soft, non-distended, mild epigastric/RUQ and LLQ tenderness, positive bowel sounds, no hepatosplenomegaly, no palpable mass  Neurological  No focal neurological deficits noted  Psychological  Oriented x 3. Normal affect. Lab Data   Recent Results (from the past 12 hour(s))   GLUCOSE, POC    Collection Time: 07/08/19  9:06 PM   Result Value Ref Range    Glucose (POC) 272 (H) 65 - 100 mg/dL    Performed by Ron Villareal    CBC W/O DIFF    Collection Time: 07/09/19  2:43 AM   Result Value Ref Range    WBC 8.1 3.6 - 11.0 K/uL    RBC 4.23 3.80 - 5.20 M/uL    HGB 11.2 (L) 11.5 - 16.0 g/dL    HCT 36.7 35.0 - 47.0 %    MCV 86.8 80.0 - 99.0 FL    MCH 26.5 26.0 - 34.0 PG    MCHC 30.5 30.0 - 36.5 g/dL    RDW 15.9 (H) 11.5 - 14.5 %    PLATELET 015 384 - 001 K/uL    MPV 11.2 8.9 - 12.9 FL    NRBC 0.0 0  WBC    ABSOLUTE NRBC 0.00 0.00 - 8.73 K/uL   METABOLIC PANEL, COMPREHENSIVE    Collection Time: 07/09/19  2:43 AM   Result Value Ref Range    Sodium 143 136 - 145 mmol/L    Potassium 3.8 3.5 - 5.1 mmol/L    Chloride 111 (H) 97 - 108 mmol/L    CO2 25 21 - 32 mmol/L    Anion gap 7 5 - 15 mmol/L    Glucose 79 65 - 100 mg/dL    BUN 5 (L) 6 - 20 MG/DL    Creatinine 0.78 0.55 - 1.02 MG/DL    BUN/Creatinine ratio 6 (L) 12 - 20      GFR est AA >60 >60 ml/min/1.73m2    GFR est non-AA >60 >60 ml/min/1.73m2    Calcium 8.1 (L) 8.5 - 10.1 MG/DL    Bilirubin, total 0.4 0.2 - 1.0 MG/DL    ALT (SGPT) 85 (H) 12 - 78 U/L    AST (SGOT) 59 (H) 15 - 37 U/L    Alk.  phosphatase 135 (H) 45 - 117 U/L    Protein, total 6.5 6.4 - 8.2 g/dL    Albumin 2.9 (L) 3.5 - 5.0 g/dL    Globulin 3.6 2.0 - 4.0 g/dL    A-G Ratio 0.8 (L) 1.1 - 2.2     LIPASE    Collection Time: 07/09/19  2:43 AM   Result Value Ref Range    Lipase 173 73 - 393 U/L   GLUCOSE, POC    Collection Time: 07/09/19  6:47 AM   Result Value Ref Range    Glucose (POC) 81 65 - 100 mg/dL    Performed by Rommel Hogue          Medications: Reviewed    PMH/ reviewed - no change compared to H&P  Mid-Level Provider: Lavaun Holter, NP   Date/Time:  7/9/2019    I have personally reviewed the history and independently examined the patient. I have reviewed the chart and agree with the documentation recorded by the Mid Level Provider, including the assessment, treatment plan, and disposition.       Leighton Varela MD

## 2019-07-09 NOTE — PROGRESS NOTES
Physical Therapy  7/9/2019    Chart reviewed. Attempted to see pt this afternoon, however PCT's present assisting pt w/ bath and brief change at this time. Will f/u tomorrow as able and appropriate.        Lindsey Means, PTA

## 2019-07-09 NOTE — PROGRESS NOTES
TRANSFER - OUT REPORT:    Verbal report given to  Gurpreet toledo RN(name) on Leafy Riddles  being transferred to (unit) for routine progression of care       Report consisted of patients Situation, Background, Assessment and   Recommendations(SBAR). Information from the following report(s) SBAR, Kardex, Intake/Output, MAR and Accordion was reviewed with the receiving nurse. Lines:   Peripheral IV 07/07/19 Right Antecubital (Active)   Site Assessment Clean, dry, & intact 7/8/2019  8:00 PM   Phlebitis Assessment 0 7/8/2019  8:00 PM   Infiltration Assessment 0 7/8/2019  8:00 PM   Dressing Status Clean, dry, & intact 7/8/2019  8:00 PM   Dressing Type Transparent 7/8/2019  8:00 PM   Hub Color/Line Status Flushed; Infusing 7/8/2019  8:00 PM   Action Taken Open ports on tubing capped 7/8/2019  8:00 PM   Alcohol Cap Used Yes 7/8/2019  8:00 PM        Opportunity for questions and clarification was provided.       Patient transported with:   Registered Nurse

## 2019-07-09 NOTE — DIABETES MGMT
[facility]    [facility] Progress Note    Recommendations/ Comments: Chart reviewed for hyperglycemia. Noted A1c of 12.6%, indicating poor blood glucose control PTA. FBG this morning was 81 mg/dl    If appropriate, please consider:  Adding Humalog 5 units ACTID  Decreasing Lantus to 15 units BID    Current hospital DM medication: Lantus 20 units BID and Humalog for correction, normal sensitivity scale     Chart reviewed on [facility]. Patient is a 69 y.o. female with hx Type 2 Diabetes on Lantus 20 units BID at home. A1c:   Lab Results   Component Value Date/Time    Hemoglobin A1c 12.6 (H) 07/05/2019 02:49 PM    Hemoglobin A1c 8.8 (H) 11/16/2017 05:40 AM       Recent Glucose Results:   Lab Results   Component Value Date/Time    GLU 79 07/09/2019 02:43 AM    GLUCPOC 177 (H) 07/09/2019 11:54 AM    GLUCPOC 81 07/09/2019 06:47 AM    GLUCPOC 272 (H) 07/08/2019 09:06 PM        Lab Results   Component Value Date/Time    Creatinine 0.78 07/09/2019 02:43 AM     Estimated Creatinine Clearance: 57.2 mL/min (based on SCr of 0.78 mg/dL). Active Orders   Diet    DIET GI LITE (POST SURGICAL)        PO intake: No data found. Will continue to follow as needed.     Thank you    Gonzalez Merida RD, [facility]  Pager: [phone]     Time spent: 8 min

## 2019-07-10 ENCOUNTER — APPOINTMENT (OUTPATIENT)
Dept: CT IMAGING | Age: 79
DRG: 440 | End: 2019-07-10
Attending: NURSE PRACTITIONER
Payer: MEDICARE

## 2019-07-10 LAB
ALBUMIN SERPL-MCNC: 3 G/DL (ref 3.5–5)
ALBUMIN/GLOB SERPL: 0.9 {RATIO} (ref 1.1–2.2)
ALP SERPL-CCNC: 124 U/L (ref 45–117)
ALT SERPL-CCNC: 60 U/L (ref 12–78)
ANION GAP SERPL CALC-SCNC: 6 MMOL/L (ref 5–15)
AST SERPL-CCNC: 21 U/L (ref 15–37)
BASOPHILS # BLD: 0 K/UL (ref 0–0.1)
BASOPHILS NFR BLD: 0 % (ref 0–1)
BILIRUB SERPL-MCNC: 0.2 MG/DL (ref 0.2–1)
BUN SERPL-MCNC: 9 MG/DL (ref 6–20)
BUN/CREAT SERPL: 12 (ref 12–20)
CALCIUM SERPL-MCNC: 8.3 MG/DL (ref 8.5–10.1)
CEA SERPL-MCNC: 1.6 NG/ML
CHLORIDE SERPL-SCNC: 111 MMOL/L (ref 97–108)
CO2 SERPL-SCNC: 27 MMOL/L (ref 21–32)
CREAT SERPL-MCNC: 0.76 MG/DL (ref 0.55–1.02)
DIFFERENTIAL METHOD BLD: ABNORMAL
EOSINOPHIL # BLD: 0.1 K/UL (ref 0–0.4)
EOSINOPHIL NFR BLD: 1 % (ref 0–7)
ERYTHROCYTE [DISTWIDTH] IN BLOOD BY AUTOMATED COUNT: 15.9 % (ref 11.5–14.5)
GLOBULIN SER CALC-MCNC: 3.4 G/DL (ref 2–4)
GLUCOSE BLD STRIP.AUTO-MCNC: 145 MG/DL (ref 65–100)
GLUCOSE BLD STRIP.AUTO-MCNC: 186 MG/DL (ref 65–100)
GLUCOSE BLD STRIP.AUTO-MCNC: 255 MG/DL (ref 65–100)
GLUCOSE BLD STRIP.AUTO-MCNC: 59 MG/DL (ref 65–100)
GLUCOSE BLD STRIP.AUTO-MCNC: 91 MG/DL (ref 65–100)
GLUCOSE SERPL-MCNC: 74 MG/DL (ref 65–100)
HCT VFR BLD AUTO: 35.6 % (ref 35–47)
HGB BLD-MCNC: 11.1 G/DL (ref 11.5–16)
IMM GRANULOCYTES # BLD AUTO: 0.1 K/UL (ref 0–0.04)
IMM GRANULOCYTES NFR BLD AUTO: 1 % (ref 0–0.5)
LYMPHOCYTES # BLD: 2.8 K/UL (ref 0.8–3.5)
LYMPHOCYTES NFR BLD: 29 % (ref 12–49)
MCH RBC QN AUTO: 27 PG (ref 26–34)
MCHC RBC AUTO-ENTMCNC: 31.2 G/DL (ref 30–36.5)
MCV RBC AUTO: 86.6 FL (ref 80–99)
MONOCYTES # BLD: 0.9 K/UL (ref 0–1)
MONOCYTES NFR BLD: 9 % (ref 5–13)
NEUTS SEG # BLD: 5.7 K/UL (ref 1.8–8)
NEUTS SEG NFR BLD: 60 % (ref 32–75)
NRBC # BLD: 0 K/UL (ref 0–0.01)
NRBC BLD-RTO: 0 PER 100 WBC
PLATELET # BLD AUTO: 296 K/UL (ref 150–400)
PMV BLD AUTO: 10.7 FL (ref 8.9–12.9)
POTASSIUM SERPL-SCNC: 3.9 MMOL/L (ref 3.5–5.1)
PROT SERPL-MCNC: 6.4 G/DL (ref 6.4–8.2)
RBC # BLD AUTO: 4.11 M/UL (ref 3.8–5.2)
SERVICE CMNT-IMP: ABNORMAL
SERVICE CMNT-IMP: NORMAL
SODIUM SERPL-SCNC: 144 MMOL/L (ref 136–145)
WBC # BLD AUTO: 9.7 K/UL (ref 3.6–11)

## 2019-07-10 PROCEDURE — 74011250637 HC RX REV CODE- 250/637: Performed by: NURSE PRACTITIONER

## 2019-07-10 PROCEDURE — 97535 SELF CARE MNGMENT TRAINING: CPT | Performed by: OCCUPATIONAL THERAPIST

## 2019-07-10 PROCEDURE — 97116 GAIT TRAINING THERAPY: CPT

## 2019-07-10 PROCEDURE — 74011636637 HC RX REV CODE- 636/637: Performed by: INTERNAL MEDICINE

## 2019-07-10 PROCEDURE — 80053 COMPREHEN METABOLIC PANEL: CPT

## 2019-07-10 PROCEDURE — 74011250637 HC RX REV CODE- 250/637: Performed by: HOSPITALIST

## 2019-07-10 PROCEDURE — 86301 IMMUNOASSAY TUMOR CA 19-9: CPT

## 2019-07-10 PROCEDURE — 74011250636 HC RX REV CODE- 250/636: Performed by: INTERNAL MEDICINE

## 2019-07-10 PROCEDURE — 82378 CARCINOEMBRYONIC ANTIGEN: CPT

## 2019-07-10 PROCEDURE — 74177 CT ABD & PELVIS W/CONTRAST: CPT

## 2019-07-10 PROCEDURE — 74011250637 HC RX REV CODE- 250/637: Performed by: INTERNAL MEDICINE

## 2019-07-10 PROCEDURE — 74178 CT ABD&PLV WO CNTR FLWD CNTR: CPT

## 2019-07-10 PROCEDURE — 36415 COLL VENOUS BLD VENIPUNCTURE: CPT

## 2019-07-10 PROCEDURE — 74011636320 HC RX REV CODE- 636/320: Performed by: RADIOLOGY

## 2019-07-10 PROCEDURE — 65270000032 HC RM SEMIPRIVATE

## 2019-07-10 PROCEDURE — 85025 COMPLETE CBC W/AUTO DIFF WBC: CPT

## 2019-07-10 PROCEDURE — 82962 GLUCOSE BLOOD TEST: CPT

## 2019-07-10 PROCEDURE — 94640 AIRWAY INHALATION TREATMENT: CPT

## 2019-07-10 PROCEDURE — 74011000258 HC RX REV CODE- 258: Performed by: RADIOLOGY

## 2019-07-10 PROCEDURE — 74011000250 HC RX REV CODE- 250: Performed by: INTERNAL MEDICINE

## 2019-07-10 RX ORDER — INSULIN GLARGINE 100 [IU]/ML
20 INJECTION, SOLUTION SUBCUTANEOUS
Status: DISCONTINUED | OUTPATIENT
Start: 2019-07-10 | End: 2019-07-12 | Stop reason: HOSPADM

## 2019-07-10 RX ORDER — SODIUM CHLORIDE 0.9 % (FLUSH) 0.9 %
10 SYRINGE (ML) INJECTION
Status: COMPLETED | OUTPATIENT
Start: 2019-07-10 | End: 2019-07-10

## 2019-07-10 RX ORDER — SODIUM CHLORIDE 0.9 % (FLUSH) 0.9 %
10 SYRINGE (ML) INJECTION
Status: DISCONTINUED | OUTPATIENT
Start: 2019-07-10 | End: 2019-07-10

## 2019-07-10 RX ADMIN — IOPAMIDOL 100 ML: 755 INJECTION, SOLUTION INTRAVENOUS at 17:02

## 2019-07-10 RX ADMIN — INSULIN LISPRO 3 UNITS: 100 INJECTION, SOLUTION INTRAVENOUS; SUBCUTANEOUS at 22:00

## 2019-07-10 RX ADMIN — Medication 10 ML: at 23:22

## 2019-07-10 RX ADMIN — POLYETHYLENE GLYCOL 3350 17 G: 17 POWDER, FOR SOLUTION ORAL at 09:11

## 2019-07-10 RX ADMIN — Medication 10 ML: at 12:30

## 2019-07-10 RX ADMIN — ARFORMOTEROL TARTRATE 15 MCG: 15 SOLUTION RESPIRATORY (INHALATION) at 08:57

## 2019-07-10 RX ADMIN — RANOLAZINE 500 MG: 500 TABLET, FILM COATED, EXTENDED RELEASE ORAL at 23:22

## 2019-07-10 RX ADMIN — LISINOPRIL 10 MG: 10 TABLET ORAL at 09:22

## 2019-07-10 RX ADMIN — ALPRAZOLAM 0.25 MG: 0.25 TABLET ORAL at 10:26

## 2019-07-10 RX ADMIN — INSULIN GLARGINE 20 UNITS: 100 INJECTION, SOLUTION SUBCUTANEOUS at 23:21

## 2019-07-10 RX ADMIN — BUSPIRONE HYDROCHLORIDE 20 MG: 5 TABLET ORAL at 09:21

## 2019-07-10 RX ADMIN — ASPIRIN 81 MG 81 MG: 81 TABLET ORAL at 09:21

## 2019-07-10 RX ADMIN — ARFORMOTEROL TARTRATE 15 MCG: 15 SOLUTION RESPIRATORY (INHALATION) at 23:34

## 2019-07-10 RX ADMIN — DULOXETINE HYDROCHLORIDE 60 MG: 60 CAPSULE, DELAYED RELEASE ORAL at 09:21

## 2019-07-10 RX ADMIN — ACETAMINOPHEN 650 MG: 325 TABLET ORAL at 09:34

## 2019-07-10 RX ADMIN — RANOLAZINE 500 MG: 500 TABLET, FILM COATED, EXTENDED RELEASE ORAL at 11:48

## 2019-07-10 RX ADMIN — HEPARIN SODIUM 5000 UNITS: 5000 INJECTION INTRAVENOUS; SUBCUTANEOUS at 00:17

## 2019-07-10 RX ADMIN — METOPROLOL TARTRATE 12.5 MG: 25 TABLET ORAL at 09:21

## 2019-07-10 RX ADMIN — ALPRAZOLAM 0.25 MG: 0.25 TABLET ORAL at 23:17

## 2019-07-10 RX ADMIN — RANOLAZINE 500 MG: 500 TABLET, FILM COATED, EXTENDED RELEASE ORAL at 00:18

## 2019-07-10 RX ADMIN — ROSUVASTATIN CALCIUM 10 MG: 10 TABLET, FILM COATED ORAL at 23:17

## 2019-07-10 RX ADMIN — BUDESONIDE 500 MCG: 0.5 INHALANT RESPIRATORY (INHALATION) at 23:34

## 2019-07-10 RX ADMIN — Medication 10 ML: at 06:36

## 2019-07-10 RX ADMIN — ACETAMINOPHEN 650 MG: 325 TABLET ORAL at 20:59

## 2019-07-10 RX ADMIN — INSULIN LISPRO 2 UNITS: 100 INJECTION, SOLUTION INTRAVENOUS; SUBCUTANEOUS at 15:57

## 2019-07-10 RX ADMIN — BUDESONIDE 500 MCG: 0.5 INHALANT RESPIRATORY (INHALATION) at 08:57

## 2019-07-10 RX ADMIN — AMLODIPINE BESYLATE 5 MG: 5 TABLET ORAL at 09:21

## 2019-07-10 RX ADMIN — SODIUM CHLORIDE 100 ML: 900 INJECTION, SOLUTION INTRAVENOUS at 17:03

## 2019-07-10 RX ADMIN — HEPARIN SODIUM 5000 UNITS: 5000 INJECTION INTRAVENOUS; SUBCUTANEOUS at 16:00

## 2019-07-10 RX ADMIN — PANTOPRAZOLE SODIUM 40 MG: 40 TABLET, DELAYED RELEASE ORAL at 06:36

## 2019-07-10 RX ADMIN — HEPARIN SODIUM 5000 UNITS: 5000 INJECTION INTRAVENOUS; SUBCUTANEOUS at 09:12

## 2019-07-10 RX ADMIN — Medication 10 ML: at 17:03

## 2019-07-10 RX ADMIN — METOPROLOL TARTRATE 12.5 MG: 25 TABLET ORAL at 23:21

## 2019-07-10 RX ADMIN — INSULIN LISPRO 2 UNITS: 100 INJECTION, SOLUTION INTRAVENOUS; SUBCUTANEOUS at 11:49

## 2019-07-10 RX ADMIN — BUSPIRONE HYDROCHLORIDE 20 MG: 5 TABLET ORAL at 17:35

## 2019-07-10 NOTE — PROGRESS NOTES
7/10/19 -   CM participated in IDRs on patient. Patient was anticipated to discharge to [facility] today, 7/10. UAI was completed and is on patient's hard chart for scanning. CM will submit a copy with patient's packet. Patient had MRCP on 7/8 that showed pancreatitis, and patient was advanced to GI Lite Diet on 7/9. Patient experienced hypoglycemic episodes this morning. Patient will need a repeat colonoscopy and repeat CT, and possible psych consult. Patient's discharge has been delayed.   CRM: Chidi Egan, MPH, [address] [place] [address]; Z: [phone]

## 2019-07-10 NOTE — DIABETES MGMT
[facility]    DTC Progress Note    Recommendations/ Comments: Chart reviewed for hyperglycemia. Noted A1c of 12.6%, indicating poor blood glucose control PTA. FBG this morning was 81 mg/dl    If appropriate, please consider:  Adding Humalog 4 units ACTID (once pt no longer has hypoglycemia and if post prandial BG continue >180 mg/dl)  D/c AM dose of Lantus 20 units    Current hospital DM medication: Lantus 20 units BID and Humalog for correction, normal sensitivity scale     Chart reviewed on [facility]. Patient is a 69 y.o. female with hx Type 2 Diabetes on Lantus 20 units BID at home. A1c:   Lab Results   Component Value Date/Time    Hemoglobin A1c 12.6 (H) 07/05/2019 02:49 PM    Hemoglobin A1c 8.8 (H) 11/16/2017 05:40 AM       Recent Glucose Results:   Lab Results   Component Value Date/Time    GLU 74 07/10/2019 03:55 AM    GLUCPOC 91 07/10/2019 06:30 AM    GLUCPOC 59 (L) 07/10/2019 06:10 AM    GLUCPOC 207 (H) 07/09/2019 09:00 PM        Lab Results   Component Value Date/Time    Creatinine 0.76 07/10/2019 03:55 AM     Estimated Creatinine Clearance: 58.7 mL/min (based on SCr of 0.76 mg/dL). Active Orders   Diet    DIET GI LITE (POST SURGICAL)        PO intake: No data found. Will continue to follow as needed.     Thank you    Kristen Blackman RD, [address] [facility]  Pager: [phone]     Time spent: 8 min

## 2019-07-10 NOTE — PROGRESS NOTES
Bedside shift change report given to Jackie Guardado (oncoming nurse) by Tana Doyle (offgoing nurse). Report included the following information SBAR.

## 2019-07-10 NOTE — PROGRESS NOTES
Problem: Self Care Deficits Care Plan (Adult)  Goal: *Acute Goals and Plan of Care (Insert Text)  Description  Occupational Therapy Goals  Initiated 7/7/2019   1. Patient will complete grooming activities standing at the sink with supervision within 7 days. 2.  Patient will complete toilet transfer with supervision within 7 days. 3.  Patient will complete toileting with supervision within 7 days. 4.  Patient will complete bathing from chair with setup/supervision within 7 days. 5.  Patient will complete dressing activities from chair level with supervision within 7 days. 6.  Patient will tolerate standing for ADL activities for 5 minutes with supervision within 7 days. Outcome: Progressing Towards Goal    OCCUPATIONAL THERAPY TREATMENT  Patient: Maribell Key (89 y.o. female)  Date: 7/10/2019  Diagnosis: Chest pain [R07.9]  Chest pain [R07.9] Chest pain       Precautions: Fall  Chart, occupational therapy assessment, plan of care, and goals were reviewed. ASSESSMENT:   The patient presents with Modified independent upper body ADLs, Minimum assistance lower body ADLs, and Stand-by assistance assist functional mobility. The following are barriers to ADL independence while in acute care:   - Cognitive and/or behavioral: safety awareness and insight into deficits  - Medical condition: strength and standing balance    - Other:       Prior level of function: lives alone, hx of falls, admitted to falling more than initially informed        PLAN:  Patient continues to benefit from skilled intervention to address the above impairments. Continue treatment per established plan of care. Recommend with staff: Recommend with nursing patient to complete as able in order to maintain strength, endurance and independence: ADLs with supervision/setup, OOB to chair 3x/day and mobilizing to the bathroom for toileting with SBA assist. Thank you for your assistance.    Recommend next OT session: bathing standing at the sink  Discharge recommendations: Rehab at skilled nursing facility (SNF) (to regain functional baseline patient requires rehab)  If above is not an option then recommend: 24 supervision    Barriers to discharging home, in addition to above listed impairments: lives alone  history of falls. Equipment recommendations for successful discharge (if) home: none      SUBJECTIVE:   Patient stated ? I've been going to the bathroom. ?    OBJECTIVE DATA SUMMARY:   Cognitive/Behavioral Status:  Neurologic State: Alert  Orientation Level: Oriented X4  Cognition: Appropriate decision making; Appropriate for age attention/concentration; Follows commands  Perception: Appears intact     Safety/Judgement: Decreased awareness of need for assistance; Awareness of environment    Functional Mobility and Transfers for ADLs:  Bed Mobility:  Supine to Sit: Supervision  Scooting: Supervision    Transfers:  Sit to Stand: Stand-by assistance  Functional Transfers  Bathroom Mobility: Stand-by assistance(furniture walking)  Toilet Transfer : Stand-by assistance  Bed to Chair: Stand-by assistance    Balance:  Sitting: Intact  Standing: Intact; With support    ADL Intervention:   Educated on fall prevention, use of call bell, potential benefit of walker versus furniture walking    Stood at sink to perform grooming with SBA       Toileting  Toileting Assistance: Stand-by assistance  Bladder Hygiene: Modified independent  Clothing Management: Stand-by assistance    Cognitive Retraining  Safety/Judgement: Decreased awareness of need for assistance; Awareness of environment        Pain:  No complaint however rubbing abdomen intermittently  Activity Tolerance:   Good  Please refer to the flowsheet for vital signs taken during this treatment.     After treatment patient left:   Up in chair  Call light within reach  RN notified     COMMUNICATION/COLLABORATION:   The patient?s plan of care was discussed with: Registered Nurse    Annetta Uriarte OTR/JESUS  Time Calculation: 10 mins

## 2019-07-10 NOTE — PROGRESS NOTES
Hospitalist Progress Note  Gilberto Mcclendon MD  Answering service: [phone] from in house phone  Cell: [phone]      Date of Service:  7/10/2019  NAME:  Tisa Felty  :  1940  MRN:  [id number]      Admission Summary: This is a 43-year-old woman with a past medical history significant for type 2 diabetes, hypertension, asthma, dyslipidemia, anxiety/depression, who was in her usual state of health until the day of her presentation at the emergency room when the patient developed chest pain. The chest pain woke up the patient from her sleep at about 03:00 a.m. The chest pain is located at the center of the chest.  The patient described the chest pain as pressure-like with no radiation. No known aggravating or relieving factors, 7/10 in severity, constant, associated with shortness of breath. The shortness of breath is with very minimal exertion. The patient also complained of cough which is nonproductive as well as fever and nausea but no vomiting. EMS was called. The patient was brought to the emergency room for further evaluation. Interval history / Subjective:   Pt seen for FU of cc: abdominal pain  Patient seen and examined by the bedside, Labs, images and notes reviewed  Tearful, crying today, feels depressed but no SI/Hi, agreeable to talk to Psych today. Denies active chest pain, minimal abdominal pain, was hypoglycemic today  DW GI, no further recs from their end. Discussed with nursing staff, no acute issues overnight, orders reviewed.          Assessment & Plan:     Atypical chest pain , hx of CAD  ACS ruled out  -continue aspirin, crestor, ranexa, and metoprolol  -labs and EKG reviewed  -lipid panel normal  -seen by cardiologist, pain not cardiac, no further work up for now    Abdominal pain and elevated lipase possible due to pancreatic duct obstruction/acute pancreatitis -POA  improving  - tolerating GI lite diet. - GI note reviewed, supportive measures  -CT shows dilated PD, MRCP shows PD stricture, concerns for IPMN, outpatient EUS recommended by GI  -TG normal  -hx of s/p cholecystectomy   -no hx of alcohol abuse    T2DM with hypoglycemic episodes  Decrease lantus to 20 units QHS only, cont SSI, diabetic diet    HTN  -BP normal, cont home meds    Hx of asthma  -stable  -continue pulmicort, prn duo neb    Hx of anxiety/depression   -feels depressed and anxious recently related to her son health condition, who has chronic schizophrenia and lives at group home, he visited her on July 4, not sleeping well,  -no suicidal thought  -continue buspar, cymbalta  -Psych consult as she feels very depressed. Right knee pain, chronic and stable  -hx of right knee replacement  -prn tylenol    Morbid obesity  -diet and weight loss program     Code status: Full Code  DVT prophylaxis: heparin    Care Plan discussed with: Patient/Family and Nurse  Disposition: TBD     Hospital Problems  Date Reviewed: 7/5/2019          Codes Class Noted POA    * (Principal) Chest pain ICD-10-CM: R07.9  ICD-9-CM: 786.50  7/5/2019 Yes              Vital Signs:    Last 24hrs VS reviewed since prior progress note. Most recent are:  Visit Vitals  /77 (BP 1 Location: Left arm, BP Patient Position: Sitting;Post activity)   Pulse 86   Temp 98.5 °F (36.9 °C)   Resp 16   Wt 81.8 kg (180 lb 5.4 oz)   SpO2 99%   Breastfeeding? No   BMI 31.95 kg/m²         Intake/Output Summary (Last 24 hours) at 7/10/2019 1127  Last data filed at 7/9/2019 2016  Gross per 24 hour   Intake 2638.33 ml   Output    Net 2638.33 ml        Physical Examination:             Constitutional:  No acute distress, cooperative, tearful, appears stated age. ENT:  Oral mucous moist, oropharynx benign. Neck supple,    Resp:  CTA bilaterally. No wheezing/rhonchi/rales.  No accessory muscle use   CV:  Regular rhythm, normal rate, no murmurs, gallops, rubs    GI:  Soft, non distended, non tender. normoactive bowel sounds    Musculoskeletal:  No edema,    Neurologic:  Moves all extremities. AAOx3  Psych: depressed,     Data Review:    Review and/or order of clinical lab test  Review and/or order of tests in the radiology section of [facility]      Labs:     Recent Labs     07/10/19  0355 07/09/19  0243   WBC 9.7 8.1   HGB 11.1* 11.2*   HCT 35.6 36.7    244     Recent Labs     07/10/19  0355 07/09/19  0243 07/08/19  0155    143 140   K 3.9 3.8 4.0   * 111* 110*   CO2 27 25 24   BUN 9 5* 6   CREA 0.76 0.78 0.77   GLU 74 79 158*   CA 8.3* 8.1* 7.7*     Recent Labs     07/10/19  0355 07/09/19  0243 07/08/19  0155   SGOT 21 59* 244*   ALT 60 85* 99*   * 135* 125*   TBILI 0.2 0.4 0.5   TP 6.4 6.5 5.8*   ALB 3.0* 2.9* 2.6*   GLOB 3.4 3.6 3.2   LPSE  --  173 139     No results for input(s): INR, PTP, APTT in the last 72 hours. No lab exists for component: INREXT, INREXT   No results for input(s): FE, TIBC, PSAT, FERR in the last 72 hours. Lab Results   Component Value Date/Time    Folate 13.2 05/20/2009 03:00 AM      No results for input(s): PH, PCO2, PO2 in the last 72 hours. No results for input(s): CPK, CKNDX, TROIQ in the last 72 hours.     No lab exists for component: CPKMB  Lab Results   Component Value Date/Time    Cholesterol, total 131 07/06/2019 12:53 AM    HDL Cholesterol 69 07/06/2019 12:53 AM    LDL, calculated 41 07/06/2019 12:53 AM    Triglyceride 105 07/06/2019 12:53 AM    CHOL/HDL Ratio 1.9 07/06/2019 12:53 AM     Lab Results   Component Value Date/Time    Glucose (POC) 91 07/10/2019 06:30 AM    Glucose (POC) 59 (L) 07/10/2019 06:10 AM    Glucose (POC) 207 (H) 07/09/2019 09:00 PM    Glucose (POC) 177 (H) 07/09/2019 04:27 PM    Glucose (POC) 177 (H) 07/09/2019 11:54 AM     Lab Results   Component Value Date/Time    Color YELLOW/STRAW 07/05/2019 04:52 PM    Appearance CLEAR 07/05/2019 04:52 PM    Specific gravity RESISTANT 07/05/2019 04:52 PM    Specific gravity 1.026 06/20/2019 01:51 AM    pH (UA) 5.0 07/05/2019 04:52 PM    Protein NEGATIVE  07/05/2019 04:52 PM    Glucose >1,000 (A) 07/05/2019 04:52 PM    Ketone NEGATIVE  07/05/2019 04:52 PM    Bilirubin NEGATIVE  07/05/2019 04:52 PM    Urobilinogen 0.2 07/05/2019 04:52 PM    Nitrites NEGATIVE  07/05/2019 04:52 PM    Leukocyte Esterase NEGATIVE  07/05/2019 04:52 PM    Epithelial cells FEW 06/20/2019 01:51 AM    Bacteria NEGATIVE  06/20/2019 01:51 AM    WBC 0-4 06/20/2019 01:51 AM    RBC 0-5 06/20/2019 01:51 AM         Medications Reviewed:     Current Facility-Administered Medications   Medication Dose Route Frequency    insulin glargine (LANTUS) injection 20 Units  20 Units SubCUTAneous QHS    amLODIPine (NORVASC) tablet 5 mg  5 mg Oral DAILY    lisinopril (PRINIVIL, ZESTRIL) tablet 10 mg  10 mg Oral DAILY    polyethylene glycol (MIRALAX) packet 17 g  17 g Oral BID    0.9% sodium chloride infusion  100 mL/hr IntraVENous CONTINUOUS    HYDROcodone-acetaminophen (NORCO) 5-325 mg per tablet 1 Tab  1 Tab Oral Q6H PRN    docusate sodium (COLACE) capsule 200 mg  200 mg Oral BID PRN    albuterol-ipratropium (DUO-NEB) 2.5 MG-0.5 MG/3 ML  3 mL Nebulization Q4H PRN    arformoterol (BROVANA) neb solution 15 mcg  15 mcg Nebulization BID    aspirin chewable tablet 81 mg  81 mg Oral DAILY    budesonide (PULMICORT) 500 mcg/2 ml nebulizer suspension  500 mcg Nebulization BID RT    busPIRone (BUSPAR) tablet 20 mg  20 mg Oral BID    DULoxetine (CYMBALTA) capsule 60 mg  60 mg Oral DAILY    ranolazine ER (RANEXA) tablet 500 mg  500 mg Oral BID    rosuvastatin (CRESTOR) tablet 10 mg  10 mg Oral QHS    sodium chloride (NS) flush 5-40 mL  5-40 mL IntraVENous Q8H    sodium chloride (NS) flush 5-40 mL  5-40 mL IntraVENous PRN    ondansetron (ZOFRAN) injection 4 mg  4 mg IntraVENous Q4H PRN    bisacodyl (DULCOLAX) tablet 5 mg  5 mg Oral DAILY PRN    heparin (porcine) injection 5,000 Units  5,000 Units SubCUTAneous Q8H  insulin lispro (HUMALOG) injection   SubCUTAneous AC&HS    glucose chewable tablet 16 g  4 Tab Oral PRN    dextrose (D50W) injection syrg 12.5-25 g  12.5-25 g IntraVENous PRN    glucagon (GLUCAGEN) injection 1 mg  1 mg IntraMUSCular PRN    pantoprazole (PROTONIX) tablet 40 mg  40 mg Oral ACB    metoprolol tartrate (LOPRESSOR) tablet 12.5 mg  12.5 mg Oral Q12H    ALPRAZolam (XANAX) tablet 0.25 mg  0.25 mg Oral TID PRN    acetaminophen (TYLENOL) tablet 650 mg  650 mg Oral Q4H PRN    nitroglycerin (NITROSTAT) tablet 0.4 mg  0.4 mg SubLINGual Q5MIN PRN    morphine injection 2 mg  2 mg IntraVENous Q4H PRN    sodium chloride (NS) flush 5-40 mL  5-40 mL IntraVENous PRN     ______________________________________________________________________  EXPECTED LENGTH OF STAY: 2d 12h  ACTUAL LENGTH OF STAY:          3                 Dixie Munoz MD

## 2019-07-10 NOTE — PROGRESS NOTES
Bedside shift change report given to Rajeev Dailey RN (oncoming nurse) by WENDY Bingham (offgoing nurse). Report included the following information SBAR.

## 2019-07-10 NOTE — PROGRESS NOTES
Problem: Mobility Impaired (Adult and Pediatric)  Goal: *Acute Goals and Plan of Care (Insert Text)  Description  Physical Therapy Goals  Initiated 7/6/2019  1. Patient will move from supine to sit and sit to supine  in bed with independence within 7 day(s). 2.  Patient will transfer from bed to chair and chair to bed with independence using the least restrictive device within 7 day(s). 3.  Patient will perform sit to stand with independence within 7 day(s). 4.  Patient will ambulate with independence for 150 feet with the least restrictive device within 7 day(s). 5.  Patient will ascend/descend 12 stairs with 1 handrail(s) with independence within 7 day(s). Outcome: Progressing Towards Goal   PHYSICAL THERAPY TREATMENT  Patient: Gagan Garcia (51 y.o. female)  Date: 7/10/2019  Diagnosis: Chest pain [R07.9]  Chest pain [R07.9] Chest pain       Precautions: Fall  Chart, physical therapy assessment, plan of care and goals were reviewed. ASSESSMENT:  Based on the objective data described below, the patient presents with Supervision to contact guard assist level overall for transfers. Gait training completed at Contact guard assistance, 140 feet and using a rolling walker. Pt had increased tolerance for ambulation and denies pain in her RLE with ambulation. The following are barriers to independence while in acute care:   -Cognitive and/or behavioral: fear of falling, reports falling on her stairs at home due to impaired vision  -Medical condition: strength, standing balance and medical history    -Other:   impaired vision    Prior level of function: Ambulated with rolling walker with modified independence. Pt reports recent falls on stairs due to impaired vision. She plans to move to a one story residence and reports a family member is assisting her with this as her home is not safe with upstairs bedroom and bathroom.        PLAN:  Patient continues to benefit from skilled intervention to address the above impairments. Continue treatment per established plan of care. Recommend with staff: OOB for meals and restroom with assistance  Discharge recommendations: Rehab at skilled nursing facility (SNF) (to regain functional baseline patient requires rehab)  Patient's barriers to discharging home, in addition to above impairments: lives alone  home environment unsafe due to [place] bedroom and bathroom. Pt has fallen on stair due to impaired vision. Her nephew is assisting with finding a new residence. Equipment recommendations for successful discharge (if) home: rolling walker       SUBJECTIVE:   Patient stated ? No I am not hurting today. ?    OBJECTIVE DATA SUMMARY:   Critical Behavior:  Neurologic State: Alert  Orientation Level: Oriented X4  Cognition: Appropriate decision making, Appropriate for age attention/concentration, Follows commands  Safety/Judgement: Good awareness of safety precautions  Functional Mobility Training:  Bed Mobility:     Supine to Sit: Supervision     Scooting: Supervision        Transfers:  Sit to Stand: Contact guard assistance;Assist x1;Additional time; Adaptive equipment  Stand to Sit: Contact guard assistance; Adaptive equipment; Additional time;Assist x1                             Balance:  Sitting: Intact  Standing: Intact; With support  Ambulation/Gait Training:  Distance (ft): 140 Feet (ft)  Assistive Device: Walker, rolling;Gait belt  Ambulation - Level of Assistance: Contact guard assistance;Assist x1        Gait Abnormalities: Decreased step clearance        Base of Support: Widened     Speed/Teagan: Slow  Step Length: Right shortened;Left shortened             Pain:  No complaint of pain today    Activity Tolerance:   Good    After treatment patient left:   Up in chair  Call light within reach     COMMUNICATION/COLLABORATION:   The patient?s plan of care was discussed with: Registered Nurse    Lilly Vines Rater   Time Calculation: 17 mins

## 2019-07-10 NOTE — PROGRESS NOTES
Bedside shift change report given to Annette Cardona (oncoming nurse) by Jennifer Nelson (offgoing nurse). Report included the following information SBAR, Kardex, MAR, Accordion, Recent Results, Med Rec Status and Quality Measures.

## 2019-07-10 NOTE — PROGRESS NOTES
UAI successfully processed and placed in the chart to be scanned.      Jen Moses, [address] [place] [address]

## 2019-07-10 NOTE — PROGRESS NOTES
[address]  [address] [facility] [address]  [address], [address]  [phone]                GI PROGRESS NOTE  Ruth Harris, AGAANNIA-BC  Work Cell: [phone]      NAME:   Viri Diaz   :    1940   MRN:    [id number]     Assessment/Plan   1. Abdominal pain and elevated lipase - likely related to acute pancreatitis, had similar presentation in 2017. CT with dilated PD. 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 Lipase normalized. Abdominal pain persists. - Diet as tolerated  - Supportive management per primary team  - Check CEA, CA 19-9  - Stool for H. Pylori   - Repeat CT w/ pancreatic protocol  - Consider EUS as outpatient once symptoms improved   - Needs repeat colonoscopy as well      2. Elevated liver enzymes - possibly related to the above, improving   - Trend labs   3. Constipation - with LLQ pain that improves after defecation   - Miralax BID - can titrate dose depending upon results   4. DM  5. Sadness/depression   - Consider re-consulting psychiatry      Patient Active Problem List   Diagnosis Code    Chest pain, unspecified R07.9    Leukocytosis, unspecified D72.829    DM (diabetes mellitus) ([facility] [address] E11.9    HTN (hypertension) I10    Arthritis M19.90    Pancreatitis, acute K85.90    Epigastric abdominal pain R10.13    Painful total knee replacement (HCC) T84.84XA, Z96.659    Status post right knee replacement Z96.651    CAD (coronary artery disease) I25.10    Pain due to knee joint prosthesis (HCC) T84.84XA, Z96.659    Chest pain R07.9       Subjective:     Crying upon my visit. Stating she is severely depressed as her son is all she has. Upset due to fact that she cannot care for him like she would like to. Continues to report abdominal pain. Tolerating diet and having stools. LFTs improved. Lipase normalized.      Review of old records:  -EGD 2017 - 4 cm hiatal hernia, gastric polyps and polypoid mass 2-3 cm at pylorus. Bx showed moderate chronic active gastritis. H. Pylori (+). -Colonoscopy 2/2013 - Anal lesion (bx showed tubulovillous adenoma) and multiple large polyps. 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 She is s/p surgical excision of anal lesion and adenomatous gastric polyp. Objective:     VITALS:   Last 24hrs VS reviewed since prior hospitalist progress note. Most recent are:  Visit Vitals  /77 (BP 1 Location: Left arm, BP Patient Position: Sitting;Post activity)   Pulse 86   Temp 98.5 °F (36.9 °C)   Resp 16   Wt 81.8 kg (180 lb 5.4 oz)   SpO2 99%   Breastfeeding? No   BMI 31.95 kg/m²       Intake/Output Summary (Last 24 hours) at 7/10/2019 1143  Last data filed at 7/9/2019 2016  Gross per 24 hour   Intake 2638.33 ml   Output    Net 2638.33 ml        PHYSICAL EXAM:  General   well developed, alert, in no acute distress  EENT  Normocephalic, Atraumatic, PERRLA, EOMI, sclera clear  Respiratory   Clear To Auscultation bilaterally - no wheezes, rales, rhonchi, or crackles  Cardiology  Regular Rate and Rythmn  - no murmurs, rubs or gallops  Abdominal  Soft, non-distended, mild epigastric/RUQ and LLQ tenderness, positive bowel sounds, no hepatosplenomegaly, no palpable mass  Neurological  No focal neurological deficits noted  Psychological  Oriented x 3.  Tearful.         Lab Data   Recent Results (from the past 12 hour(s))   CBC WITH AUTOMATED DIFF    Collection Time: 07/10/19  3:55 AM   Result Value Ref Range    WBC 9.7 3.6 - 11.0 K/uL    RBC 4.11 3.80 - 5.20 M/uL    HGB 11.1 (L) 11.5 - 16.0 g/dL    HCT 35.6 35.0 - 47.0 %    MCV 86.6 80.0 - 99.0 FL    MCH 27.0 26.0 - 34.0 PG    MCHC 31.2 30.0 - 36.5 g/dL    RDW 15.9 (H) 11.5 - 14.5 %    PLATELET 383 726 - 150 K/uL    MPV 10.7 8.9 - 12.9 FL    NRBC 0.0 0  WBC    ABSOLUTE NRBC 0.00 0.00 - 0.01 K/uL    NEUTROPHILS 60 32 - 75 %    LYMPHOCYTES 29 12 - 49 %    MONOCYTES 9 5 - 13 % EOSINOPHILS 1 0 - 7 %    BASOPHILS 0 0 - 1 %    IMMATURE GRANULOCYTES 1 (H) 0.0 - 0.5 %    ABS. NEUTROPHILS 5.7 1.8 - 8.0 K/UL    ABS. LYMPHOCYTES 2.8 0.8 - 3.5 K/UL    ABS. MONOCYTES 0.9 0.0 - 1.0 K/UL    ABS. EOSINOPHILS 0.1 0.0 - 0.4 K/UL    ABS. BASOPHILS 0.0 0.0 - 0.1 K/UL    ABS. IMM. GRANS. 0.1 (H) 0.00 - 0.04 K/UL    DF AUTOMATED     METABOLIC PANEL, COMPREHENSIVE    Collection Time: 07/10/19  3:55 AM   Result Value Ref Range    Sodium 144 136 - 145 mmol/L    Potassium 3.9 3.5 - 5.1 mmol/L    Chloride 111 (H) 97 - 108 mmol/L    CO2 27 21 - 32 mmol/L    Anion gap 6 5 - 15 mmol/L    Glucose 74 65 - 100 mg/dL    BUN 9 6 - 20 MG/DL    Creatinine 0.76 0.55 - 1.02 MG/DL    BUN/Creatinine ratio 12 12 - 20      GFR est AA >60 >60 ml/min/1.73m2    GFR est non-AA >60 >60 ml/min/1.73m2    Calcium 8.3 (L) 8.5 - 10.1 MG/DL    Bilirubin, total 0.2 0.2 - 1.0 MG/DL    ALT (SGPT) 60 12 - 78 U/L    AST (SGOT) 21 15 - 37 U/L    Alk. phosphatase 124 (H) 45 - 117 U/L    Protein, total 6.4 6.4 - 8.2 g/dL    Albumin 3.0 (L) 3.5 - 5.0 g/dL    Globulin 3.4 2.0 - 4.0 g/dL    A-G Ratio 0.9 (L) 1.1 - 2.2     GLUCOSE, POC    Collection Time: 07/10/19  6:10 AM   Result Value Ref Range    Glucose (POC) 59 (L) 65 - 100 mg/dL    Performed by Zohreh Childers 93, POC    Collection Time: 07/10/19  6:30 AM   Result Value Ref Range    Glucose (POC) 91 65 - 100 mg/dL    Performed by Zohreh Childers 93, POC    Collection Time: 07/10/19 11:30 AM   Result Value Ref Range    Glucose (POC) 145 (H) 65 - 100 mg/dL    Performed by Marco Heredia          Medications: Reviewed    PMH/SH reviewed - no change compared to H&P  Mid-Level Provider: Lavaun Holter, NP   Date/Time:  7/10/2019      I have personally reviewed the history and independently examined the patient. I have reviewed the chart and agree with the documentation recorded by the Mid Level Provider, including the assessment, treatment plan, and disposition.       Deloris Michael Tavarez MD

## 2019-07-10 NOTE — PROGRESS NOTES
Problem: Diabetes Self-Management  Goal: *Disease process and treatment process  Description  Define diabetes and identify own type of diabetes; list 3 options for treating diabetes. Outcome: Resolved/Met  Goal: *Incorporating nutritional management into lifestyle  Description  Describe effect of type, amount and timing of food on blood glucose; list 3 methods for planning meals. Outcome: Resolved/Met  Goal: *Incorporating physical activity into lifestyle  Description  State effect of exercise on blood glucose levels. Outcome: Resolved/Met  Goal: *Developing strategies to promote health/change behavior  Description  Define the ABC's of diabetes; identify appropriate screenings, schedule and personal plan for screenings. Outcome: Resolved/Met  Goal: *Using medications safely  Description  State effect of diabetes medications on diabetes; name diabetes medication taking, action and side effects. Outcome: Resolved/Met  Goal: *Monitoring blood glucose, interpreting and using results  Description  Identify recommended blood glucose targets  and personal targets. Outcome: Resolved/Met  Goal: *Prevention, detection, treatment of acute complications  Description  List symptoms of hyper- and hypoglycemia; describe how to treat low blood sugar and actions for lowering  high blood glucose level. Outcome: Resolved/Met  Goal: *Prevention, detection and treatment of chronic complications  Description  Define the natural course of diabetes and describe the relationship of blood glucose levels to long term complications of diabetes.   Outcome: Resolved/Met  Goal: *Developing strategies to address psychosocial issues  Description  Describe feelings about living with diabetes; identify support needed and support network  Outcome: Resolved/Met  Goal: *Insulin pump training  Outcome: Resolved/Met  Goal: *Sick day guidelines  Outcome: Resolved/Met  Goal: *Patient Specific Goal (EDIT GOAL, INSERT TEXT)  Outcome: Resolved/Met Problem: Patient Education: Go to Patient Education Activity  Goal: Patient/Family Education  Outcome: Resolved/Met     Problem: Falls - Risk of  Goal: *Absence of Falls  Description  Document Zaria Payment Fall Risk and appropriate interventions in the flowsheet. Outcome: Resolved/Met     Problem: Patient Education: Go to Patient Education Activity  Goal: Patient/Family Education  Outcome: Resolved/Met     Problem: Patient Education: Go to Patient Education Activity  Goal: Patient/Family Education  Outcome: Resolved/Met     Problem: Patient Education: Go to Patient Education Activity  Goal: Patient/Family Education  Outcome: Resolved/Met     Problem: Pressure Injury - Risk of  Goal: *Prevention of pressure injury  Description  Document Yovani Scale and appropriate interventions in the flowsheet.   Outcome: Resolved/Met     Problem: Patient Education: Go to Patient Education Activity  Goal: Patient/Family Education  Outcome: Resolved/Met     Problem: Nutrition Deficit  Goal: *Optimize nutritional status  Outcome: Resolved/Met

## 2019-07-10 NOTE — CONSULTS
Psychiatry  Consult    Subjective:     Date of Evaluation:  7/10/2019    Reason for Referral:  Marlene Morales was referred to the examiners from Psychiatry for depression and uncontrollable crying. History of Presenting Problem: As you know, Ms. Mike Swann is a 67-year-old woman with a past medical history significant for type 2 diabetes, hypertension, asthma, dyslipidemia, anxiety/depression, who was in her usual state of health until the day of her presentation at the emergency room when the patient developed chest pain. She has since been admitted and is currently being treated for acute pancreatitis.         Patient Active Problem List    Diagnosis Date Noted    Chest pain 07/05/2019    Pain due to knee joint prosthesis ([address] 11/14/2017    CAD (coronary artery disease) 10/27/2017    Pancreatitis, acute 09/08/2017    Epigastric abdominal pain 09/08/2017    Painful total knee replacement ([facility] [address] 08/09/2017    Status post right knee replacement 08/09/2017    Leukocytosis, unspecified 04/06/2015    DM (diabetes mellitus) ([facility] [address] [facility] 04/06/2015    HTN (hypertension) 04/06/2015    Arthritis 04/06/2015    Chest pain, unspecified 09/12/2013     Past Medical History:   Diagnosis Date    Anal polyp 6/13/2013    Arthritis     Asthma     CAD (coronary artery disease) 10/27/2017    Cataract     Chronic pain     knee - right/back    Diabetes ([address]     GERD (gastroesophageal reflux disease)     Hemorrhoids 6/13/2013    History of kidney stones     Hypertension     Ill-defined condition     abdominal pain and burning    Other ill-defined conditions(799.89)     high cholesterol      Family History   Problem Relation Age of Onset    Cancer Mother         colon    Diabetes Brother     Other Sister         GI BLEED    Heart Disease Brother     Heart Attack Brother     Heart Disease Brother     Heart Disease Brother     Schizophrenia Son     Anesth Problems Neg Hx       Social History     Tobacco Use    Smoking status: Former Smoker     Years: 0.00    Smokeless tobacco: Never Used    Tobacco comment: age 13   Substance Use Topics    Alcohol use: No     Past Surgical History:   Procedure Laterality Date    COLONOSCOPY  2/28/2013         EGD  2/28/2013         HX CATARACT REMOVAL Bilateral     HX CHOLECYSTECTOMY  6-2015    HX GI  6/27/13    anal polyps removed    HX HEENT      laser surgery for blood clot in right eye    HX KNEE REPLACEMENT      right    HX OTHER SURGICAL  4-2-14    lap gastrotomy and removal of polyp -  - not cancerous per pt    HX ALAN AND BSO      HX TONSILLECTOMY      HX UROLOGICAL      \"laser\" surgery for kidney stone    NEUROLOGICAL PROCEDURE UNLISTED  1990    tumor at back of neck, benign      Prior to Admission medications    Medication Sig Start Date End Date Taking? Authorizing Provider   albuterol (PROVENTIL VENTOLIN) 2.5 mg /3 mL (0.083 %) nebulizer solution 3 mL by Nebulization route every four (4) hours as needed for Wheezing. 3/11/19   José Quezada MD   arformoterol (BROVANA) 15 mcg/2 mL nebu neb solution 2 mL by Nebulization route two (2) times a day. 3/11/19   José Quezada MD   budesonide (PULMICORT) 0.5 mg/2 mL nbsp 2 mL by Nebulization route two (2) times a day. 3/11/19   José Quezada MD   aspirin 81 mg chewable tablet Take 1 Tab by mouth daily. 3/12/19   José Quezada MD   busPIRone (BUSPAR) 5 mg tablet Take 20 mg by mouth two (2) times a day. Provider, Historical   DULoxetine (CYMBALTA) 60 mg capsule Take 60 mg by mouth daily. Provider, Historical   ranolazine ER (RANEXA) 500 mg SR tablet Take 500 mg by mouth two (2) times a day. Provider, Historical   mirtazapine (REMERON) 15 mg tablet Take 15 mg by mouth nightly. Provider, Historical   QUEtiapine (SEROQUEL) 25 mg tablet Take 25 mg by mouth nightly.     Provider, Historical   albuterol (PROVENTIL HFA, VENTOLIN HFA, PROAIR HFA) 90 mcg/actuation inhaler Take 2 Puffs by inhalation every four (4) hours as needed for Wheezing. 1/19/19   Heidi Epstein MD   rosuvastatin (CRESTOR) 10 mg tablet Take 10 mg by mouth nightly. Provider, Historical   Dexlansoprazole (DEXILANT) 60 mg CpDB Take 60 mg by mouth Daily (before breakfast). Provider, Historical   amLODIPine (NORVASC) 10 mg tablet Take 10 mg by mouth daily. 2/12/15   Provider, Historical   fosinopril (MONOPRIL) 20 mg tablet Take 20 mg by mouth daily. 2/12/15   Provider, Historical   Insulin Glargine (LANTUS SOLOSTAR) 100 unit/mL (3 mL) flexpen 20 Units by SubCUTAneous route two (2) times a day. Indications: Diabetes Mellitus    Provider, Historical     Allergies   Allergen Reactions    Seafood [Shellfish Containing Products] Swelling          Objective:     No data found. Mental Status exam: WNL except for    Sensorium  Alert and Oriented x 2   Orientation person   Relations cooperative   Eye Contact appropriate   Appearance:  age appropriate   Motor Behavior:  within normal limits   Speech:  normal pitch and normal volume   Vocabulary average   Thought Process: goal directed and logical   Thought Content free of delusions and free of hallucinations   Suicidal ideations none   Homicidal ideations none   Mood:  euthymic   Affect:  euthymic   Memory recent  adequate   Memory remote:  adequate   Concentration:  adequate   Abstraction:  abstract   Insight:  fair   Reliability fair   Judgment:  age appropriate and fair       Clinical Interview: Ms. Marilu Benitez was seen today for psychiatric consult. She was found sitting up on side of bed, no acute distress. She was able to smile easily and was agreeable to interview stating \"you know I am not crazy now haha\". She was able to accurately describe events leading to hospitalization. She acknowledged history of anxiety/depression and was able to list her medications. She reports that she was having a \"tough day\" yesterday but is feeling much better now.   She reports that her son has schizophrenia and lives in a group home. She typically visits him often, but has been unable to do so since she is in the hospital.  She endorsed increased anxiety and feelings of sadness/worry that her son would think that she forgot about him. He does not currently know she is in the hospital.  She reports feeling better today because she is planning on calling him and is going to see if they will be able to bring him in to visit her. She denied any complaints of anxiety or depression at this time, noting that her current medication regimen has been adequate at managing them. She was free from SI/HI/SIB/AVH. No further questions or concerns. Impression:      Principal Problem:    Chest pain (7/5/2019)          Plan:     Recommendations for Treatment/Conditions:  Outpatient follow up recommended after release  No further psychiatric interventions recommended       I certify that this patients inpatient psychiatric hospital services furnished since the previous certification were, and continue to be, required for treatment that could reasonably be expected to improve the patient's condition, or for diagnostic study, and that the patient continues to need, on a daily basis, active treatment furnished directly by or requiring the supervision of inpatient psychiatric facility personnel. In addition, the hospital records show that services furnished were intensive treatment services, admission or related services, or equivalent services. 

## 2019-07-11 LAB
ANION GAP SERPL CALC-SCNC: 5 MMOL/L (ref 5–15)
BUN SERPL-MCNC: 9 MG/DL (ref 6–20)
BUN/CREAT SERPL: 11 (ref 12–20)
CALCIUM SERPL-MCNC: 8.5 MG/DL (ref 8.5–10.1)
CHLORIDE SERPL-SCNC: 110 MMOL/L (ref 97–108)
CO2 SERPL-SCNC: 29 MMOL/L (ref 21–32)
CREAT SERPL-MCNC: 0.84 MG/DL (ref 0.55–1.02)
GLUCOSE BLD STRIP.AUTO-MCNC: 100 MG/DL (ref 65–100)
GLUCOSE BLD STRIP.AUTO-MCNC: 197 MG/DL (ref 65–100)
GLUCOSE BLD STRIP.AUTO-MCNC: 234 MG/DL (ref 65–100)
GLUCOSE BLD STRIP.AUTO-MCNC: 288 MG/DL (ref 65–100)
GLUCOSE BLD STRIP.AUTO-MCNC: 74 MG/DL (ref 65–100)
GLUCOSE SERPL-MCNC: 72 MG/DL (ref 65–100)
POTASSIUM SERPL-SCNC: 3.8 MMOL/L (ref 3.5–5.1)
SERVICE CMNT-IMP: ABNORMAL
SERVICE CMNT-IMP: NORMAL
SERVICE CMNT-IMP: NORMAL
SODIUM SERPL-SCNC: 144 MMOL/L (ref 136–145)

## 2019-07-11 PROCEDURE — 94640 AIRWAY INHALATION TREATMENT: CPT

## 2019-07-11 PROCEDURE — 65270000032 HC RM SEMIPRIVATE

## 2019-07-11 PROCEDURE — 74011250636 HC RX REV CODE- 250/636: Performed by: INTERNAL MEDICINE

## 2019-07-11 PROCEDURE — 97535 SELF CARE MNGMENT TRAINING: CPT

## 2019-07-11 PROCEDURE — 74011636637 HC RX REV CODE- 636/637: Performed by: INTERNAL MEDICINE

## 2019-07-11 PROCEDURE — 74011250637 HC RX REV CODE- 250/637: Performed by: INTERNAL MEDICINE

## 2019-07-11 PROCEDURE — 36415 COLL VENOUS BLD VENIPUNCTURE: CPT

## 2019-07-11 PROCEDURE — 94760 N-INVAS EAR/PLS OXIMETRY 1: CPT

## 2019-07-11 PROCEDURE — 80048 BASIC METABOLIC PNL TOTAL CA: CPT

## 2019-07-11 PROCEDURE — 74011250637 HC RX REV CODE- 250/637: Performed by: HOSPITALIST

## 2019-07-11 PROCEDURE — 74011000250 HC RX REV CODE- 250: Performed by: INTERNAL MEDICINE

## 2019-07-11 PROCEDURE — 82962 GLUCOSE BLOOD TEST: CPT

## 2019-07-11 RX ADMIN — LISINOPRIL 10 MG: 10 TABLET ORAL at 08:40

## 2019-07-11 RX ADMIN — HEPARIN SODIUM 5000 UNITS: 5000 INJECTION INTRAVENOUS; SUBCUTANEOUS at 17:05

## 2019-07-11 RX ADMIN — Medication 10 ML: at 14:00

## 2019-07-11 RX ADMIN — Medication 10 ML: at 22:36

## 2019-07-11 RX ADMIN — HEPARIN SODIUM 5000 UNITS: 5000 INJECTION INTRAVENOUS; SUBCUTANEOUS at 08:39

## 2019-07-11 RX ADMIN — INSULIN GLARGINE 20 UNITS: 100 INJECTION, SOLUTION SUBCUTANEOUS at 22:22

## 2019-07-11 RX ADMIN — ARFORMOTEROL TARTRATE 15 MCG: 15 SOLUTION RESPIRATORY (INHALATION) at 21:21

## 2019-07-11 RX ADMIN — DULOXETINE HYDROCHLORIDE 60 MG: 60 CAPSULE, DELAYED RELEASE ORAL at 08:39

## 2019-07-11 RX ADMIN — INSULIN LISPRO 2 UNITS: 100 INJECTION, SOLUTION INTRAVENOUS; SUBCUTANEOUS at 11:30

## 2019-07-11 RX ADMIN — PANTOPRAZOLE SODIUM 40 MG: 40 TABLET, DELAYED RELEASE ORAL at 08:40

## 2019-07-11 RX ADMIN — BUSPIRONE HYDROCHLORIDE 20 MG: 5 TABLET ORAL at 08:40

## 2019-07-11 RX ADMIN — ASPIRIN 81 MG 81 MG: 81 TABLET ORAL at 08:40

## 2019-07-11 RX ADMIN — INSULIN LISPRO 3 UNITS: 100 INJECTION, SOLUTION INTRAVENOUS; SUBCUTANEOUS at 22:35

## 2019-07-11 RX ADMIN — BUSPIRONE HYDROCHLORIDE 20 MG: 5 TABLET ORAL at 17:04

## 2019-07-11 RX ADMIN — ROSUVASTATIN CALCIUM 10 MG: 10 TABLET, FILM COATED ORAL at 22:22

## 2019-07-11 RX ADMIN — INSULIN LISPRO 3 UNITS: 100 INJECTION, SOLUTION INTRAVENOUS; SUBCUTANEOUS at 16:30

## 2019-07-11 RX ADMIN — METOPROLOL TARTRATE 12.5 MG: 25 TABLET ORAL at 22:22

## 2019-07-11 RX ADMIN — ACETAMINOPHEN 650 MG: 325 TABLET ORAL at 09:43

## 2019-07-11 RX ADMIN — AMLODIPINE BESYLATE 5 MG: 5 TABLET ORAL at 08:40

## 2019-07-11 RX ADMIN — METOPROLOL TARTRATE 12.5 MG: 25 TABLET ORAL at 08:40

## 2019-07-11 RX ADMIN — ACETAMINOPHEN 650 MG: 325 TABLET ORAL at 15:43

## 2019-07-11 RX ADMIN — Medication 10 ML: at 05:58

## 2019-07-11 RX ADMIN — BUDESONIDE 500 MCG: 0.5 INHALANT RESPIRATORY (INHALATION) at 21:21

## 2019-07-11 RX ADMIN — RANOLAZINE 500 MG: 500 TABLET, FILM COATED, EXTENDED RELEASE ORAL at 11:56

## 2019-07-11 NOTE — DIABETES MGMT
[facility]    DTC Progress Note    Recommendations/ Comments: Chart reviewed for variable BG. Noted A1c of 12.6%, indicating poor blood glucose control PTA. FBG the last 72 hours was below 80 mg/dl - this am was 72 mg/dl  Pre lunch BG today was 197 mg/dl. Noted Lantus has been rescheduled from 20 units BID to 20 units HS only. If appropriate, please consider:  Adding Humalog 2 units ACTID  Change correction scale to high sensitivity  Add no conc sweets to diet order      Current hospital DM medication: Lantus 20 units HS and Humalog for correction, normal sensitivity scale     Chart reviewed on [facility]. Patient is a 69 y.o. female with hx Type 2 Diabetes on Lantus 20 units BID at home. A1c:   Lab Results   Component Value Date/Time    Hemoglobin A1c 12.6 (H) 07/05/2019 02:49 PM    Hemoglobin A1c 8.8 (H) 11/16/2017 05:40 AM       Recent Glucose Results:   Lab Results   Component Value Date/Time    GLU 72 07/11/2019 06:00 AM    GLUCPOC 197 (H) 07/11/2019 11:45 AM    GLUCPOC 100 07/11/2019 06:37 AM    GLUCPOC 74 07/11/2019 06:20 AM        Lab Results   Component Value Date/Time    Creatinine 0.84 07/11/2019 06:00 AM     Estimated Creatinine Clearance: 53.1 mL/min (based on SCr of 0.84 mg/dL). Active Orders   Diet    DIET GI LITE (POST SURGICAL)        PO intake: No data found. Will continue to follow as needed.     Thank you  Julien Clark RD, Diabetes Clinician  [facility]  Pager: [phone]     Time spent: 4 minutes

## 2019-07-11 NOTE — PROGRESS NOTES
Problem: Self Care Deficits Care Plan (Adult)  Goal: *Acute Goals and Plan of Care (Insert Text)  Description  Occupational Therapy Goals  Initiated 7/7/2019   1. Patient will complete grooming activities standing at the sink with supervision within 7 days. 2.  Patient will complete toilet transfer with supervision within 7 days. 3.  Patient will complete toileting with supervision within 7 days. 4.  Patient will complete bathing from chair with setup/supervision within 7 days. 5.  Patient will complete dressing activities from chair level with supervision within 7 days. 6.  Patient will tolerate standing for ADL activities for 5 minutes with supervision within 7 days. Outcome: Progressing Towards Goal    OCCUPATIONAL THERAPY TREATMENT  Patient: Viri Diaz (3 y.o. female)  Date: 7/11/2019  Diagnosis: Chest pain [R07.9]  Chest pain [R07.9] Chest pain       Precautions: Fall  Chart, occupational therapy assessment, plan of care, and goals were reviewed. ASSESSMENT:  Patient cleared by RN to be seen, received in bed and agreeable to treatment. Patient completed bed mobility with supervision and functional mobility with RW with SBA 2* reporting pain in knees. Patient completed grooming standing at sink with supervision and then requested to return to supine 2* pain in B knees and fatigue. Patient left in bed with call bell in reach, RN aware. Patient continues to function below baseline and would benefit from short SNF rehab stay once medically cleared for D/C. Will continue to follow. Recommend with nursing patient to complete as able in order to maintain strength, endurance and independence: ADLs with supervision/setup, OOB to chair 3x/day and mobilizing to the bathroom for toileting with 1 assist. Thank you for your assistance. Progression toward goals:  ?       Improving appropriately and progressing toward goals  ? Improving slowly and progressing toward goals  ?        Not making progress toward goals and plan of care will be adjusted     PLAN:  Patient continues to benefit from skilled intervention to address the above impairments. Continue treatment per established plan of care. Discharge Recommendations:  Mayco Martin  Further Equipment Recommendations for Discharge:  TBD      SUBJECTIVE:   Patient stated ? I just get so tired so fast.?    OBJECTIVE DATA SUMMARY:   Cognitive/Behavioral Status:  Neurologic State: Alert  Orientation Level: Oriented X4  Cognition: Appropriate for age attention/concentration; Follows commands  Perception: Appears intact  Perseveration: No perseveration noted  Safety/Judgement: Awareness of environment;Decreased awareness of need for assistance;Decreased awareness of need for safety; Fall prevention    Functional Mobility and Transfers for ADLs:  Bed Mobility:  Supine to Sit: Supervision  Sit to Supine: Supervision    Transfers:  Sit to Stand: Stand-by assistance    Balance:  Sitting: Intact  Standing: Intact; With support    ADL Intervention:    Grooming  Grooming Assistance: Supervision(standing at sink )  Washing Face: Supervision  Washing Hands: Supervision  Brushing Teeth: Supervision  Brushing/Combing Hair: Supervision    Cognitive Retraining  Safety/Judgement: Awareness of environment;Decreased awareness of need for assistance;Decreased awareness of need for safety; Fall prevention    Activity Tolerance:   Fair, VSS  Please refer to the flowsheet for vital signs taken during this treatment. After treatment:   ? Patient left in no apparent distress sitting up in chair  ? Patient left in no apparent distress in bed  ? Call bell left within reach  ? Nursing notified  ? Caregiver present  ?  Bed alarm activated    COMMUNICATION/COLLABORATION:   The patient?s plan of care was discussed with: Physical Therapist and Registered Nurse    Olesya Taylor OT  Time Calculation: 17 mins

## 2019-07-11 NOTE — PROGRESS NOTES
[address]  [address] [facility] [address] [facility], [address]  [phone]                GI PROGRESS NOTE  Lane Zavala, AGACN-BC  Work Cell: [phone]      NAME:   Gagan Garcia   :    1940   MRN:    [id number]     Assessment/Plan   1. Abdominal pain and elevated lipase - likely related to acute pancreatitis, had similar presentation in 2017. CT with dilated PD. 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 Lipase normalized. Repeat CT w/ pancreatic protocol shows mild PD dilation and congential variation in pancreatic ducts. No mass seen. - Diet as tolerated  - Supportive management per primary team  - CA 19-9 and stool for H. Pylori pending   - Consider EUS as outpatient once symptoms improved   - Needs repeat colonoscopy as well      2. Elevated liver enzymes - possibly related to the above, improving   - Trend labs   3. Constipation - with LLQ pain that improves after defecation   - Miralax BID - can titrate dose depending upon results   4. DM  5. Depression      Patient Active Problem List   Diagnosis Code    Chest pain, unspecified R07.9    Leukocytosis, unspecified D72.829    DM (diabetes mellitus) ([facility] [address] E11.9    HTN (hypertension) I10    Arthritis M19.90    Pancreatitis, acute K85.90    Epigastric abdominal pain R10.13    Painful total knee replacement ([facility]CC) T84.84XA, Z96.659    Status post right knee replacement Z96.651    CAD (coronary artery disease) I25.10    Pain due to knee joint prosthesis ([facility]CC) T84.84XA, Z96.659    Chest pain R07.9       Subjective:     Feels better today. States abdominal pain is improving. Tolerating diet. Passing flatus and having stools. Objective:     VITALS:   Last 24hrs VS reviewed since prior hospitalist progress note.  Most recent are:  Visit Vitals  /72 (BP 1 Location: Left arm, BP Patient Position: At rest)   Pulse 77   Temp 98 °F (36.7 °C)   Resp 16   Wt 81.8 kg (180 lb 5.4 oz)   SpO2 96%   Breastfeeding? No   BMI 31.95 kg/m²     No intake or output data in the 24 hours ending 07/11/19 0950     PHYSICAL EXAM:  General   well developed, alert, in no acute distress  EENT  Normocephalic, Atraumatic, PERRLA, EOMI, sclera clear  Respiratory   Clear To Auscultation bilaterally - no wheezes, rales, rhonchi, or crackles  Cardiology  Regular Rate and Rythmn  - no murmurs, rubs or gallops  Abdominal  Soft, non-distended, mild epigastric/RUQ and LLQ tenderness, positive bowel sounds, no hepatosplenomegaly, no palpable mass  Neurological  No focal neurological deficits noted  Psychological  Oriented x 3.  Calm.         Lab Data   Recent Results (from the past 12 hour(s))   METABOLIC PANEL, BASIC    Collection Time: 07/11/19  6:00 AM   Result Value Ref Range    Sodium 144 136 - 145 mmol/L    Potassium 3.8 3.5 - 5.1 mmol/L    Chloride 110 (H) 97 - 108 mmol/L    CO2 29 21 - 32 mmol/L    Anion gap 5 5 - 15 mmol/L    Glucose 72 65 - 100 mg/dL    BUN 9 6 - 20 MG/DL    Creatinine 0.84 0.55 - 1.02 MG/DL    BUN/Creatinine ratio 11 (L) 12 - 20      GFR est AA >60 >60 ml/min/1.73m2    GFR est non-AA >60 >60 ml/min/1.73m2    Calcium 8.5 8.5 - 10.1 MG/DL   GLUCOSE, POC    Collection Time: 07/11/19  6:20 AM   Result Value Ref Range    Glucose (POC) 74 65 - 100 mg/dL    Performed by Zohreh Lira, POC    Collection Time: 07/11/19  6:37 AM   Result Value Ref Range    Glucose (POC) 100 65 - 100 mg/dL    Performed by Suresh Wade          Medications: Reviewed    PMH/ reviewed - no change compared to H&P  Mid-Level Provider: Adrian Holden NP   Date/Time:  7/11/2019

## 2019-07-11 NOTE — PROGRESS NOTES
Bedside shift change report given to Kyra Bernheim (oncoming nurse) by Ayse Parish (offgoing nurse). Report included the following information SBAR and Kardex.

## 2019-07-11 NOTE — PROGRESS NOTES
Bedside shift change report given to Tena Lorenzo (oncoming nurse) by Samantha Moore (offgoing nurse). Report included the following information SBAR, Kardex, MAR, Accordion, Recent Results and Quality Measures.

## 2019-07-11 NOTE — PROGRESS NOTES
Hospitalist Progress Note  Familia Koroma MD  Answering service: [phone] from in house phone  Cell: [phone]      Date of Service:  2019  NAME:  Shante Howe  :  1940  MRN:  [id number]      Admission Summary: This is a 67-year-old woman with a past medical history significant for type 2 diabetes, hypertension, asthma, dyslipidemia, anxiety/depression, who was in her usual state of health until the day of her presentation at the emergency room when the patient developed chest pain. The chest pain woke up the patient from her sleep at about 03:00 a.m. The chest pain is located at the center of the chest.  The patient described the chest pain as pressure-like with no radiation. No known aggravating or relieving factors, 7/10 in severity, constant, associated with shortness of breath. The shortness of breath is with very minimal exertion. The patient also complained of cough which is nonproductive as well as fever and nausea but no vomiting. EMS was called. The patient was brought to the emergency room for further evaluation. Interval history / Subjective:   Pt seen for FU of cc: abdominal pain  Patient seen and examined by the bedside, Labs, images and notes reviewed  Much better mood today, smiling, feels comfortable, not as depressed. Patient wants to be discharged to [facility] in am.  Denies any chest pain, abdominal pain, fevers, chills. No N/V/D  Discussed with nursing staff, no acute issues overnight, orders reviewed. Assessment & Plan:     Atypical chest pain , hx of CAD  ACS ruled out  -continue aspirin, crestor, ranexa, and metoprolol  -labs and EKG reviewed  -lipid panel normal  -seen by cardiologist, pain not cardiac, no further work up for now    Abdominal pain and elevated lipase possible due to pancreatic duct obstruction/acute pancreatitis -POA  improving  - tolerating GI lite diet.   - GI note reviewed, supportive measures  -CT shows dilated PD, MRCP shows PD stricture, concerns for IPMN, outpatient EUS recommended by GI  - repeat CT pancreatic protocol 7/10: shows mild PD dilatation and congential variation in pancreatic ducts, no mass  - CA 19-9 and stool H. pylori are ordered. - Stop IVF  -TG normal  -hx of s/p cholecystectomy   -no hx of alcohol abuse    T2DM with hypoglycemic episodes  Decrease lantus to 20 units QHS only, cont SSI, diabetic diet    HTN  -BP normal, cont home meds    Hx of asthma  -stable  -continue pulmicort, prn duo neb    Hx of anxiety/depression   -feels depressed and anxious recently related to her son health condition, who has chronic schizophrenia and lives at group home, he visited her on July 4, not sleeping well,  -no suicidal thought  -continue buspar, cymbalta  -Psych consult noted    Right knee pain, chronic and stable  -hx of right knee replacement  -prn tylenol    Morbid obesity  -diet and weight loss program     Code status: Full Code  DVT prophylaxis: heparin    Care Plan discussed with: Patient/Family and Nurse  Disposition: TBD   Lab holiday in am     Hospital Problems  Date Reviewed: 7/5/2019          Codes Class Noted POA    * (Principal) Chest pain ICD-10-CM: R07.9  ICD-9-CM: 786.50  7/5/2019 Yes              Vital Signs:    Last 24hrs VS reviewed since prior progress note. Most recent are:  Visit Vitals  /72 (BP 1 Location: Left arm, BP Patient Position: At rest)   Pulse 77   Temp 98 °F (36.7 °C)   Resp 16   Wt 81.8 kg (180 lb 5.4 oz)   SpO2 96%   Breastfeeding? No   BMI 31.95 kg/m²       No intake or output data in the 24 hours ending 07/11/19 1019     Physical Examination:             Constitutional:  No acute distress, cooperative, pleasant, appears stated age. ENT:  Oral mucous moist, oropharynx benign. Neck supple,    Resp:  CTA bilaterally. No wheezing/rhonchi/rales.  No accessory muscle use   CV:  Regular rhythm, normal rate, no murmurs, gallops, rubs    GI:  Soft, non distended, non tender. normoactive bowel sounds    Musculoskeletal:  No edema,    Neurologic:  Moves all extremities. AAOx3  Psych: normal mood and affect     Data Review:    Review and/or order of clinical lab test  Review and/or order of tests in the radiology section of [facility]      Labs:     Recent Labs     07/10/19  0355 07/09/19  0243   WBC 9.7 8.1   HGB 11.1* 11.2*   HCT 35.6 36.7    244     Recent Labs     07/11/19  0600 07/10/19  0355 07/09/19  0243    144 143   K 3.8 3.9 3.8   * 111* 111*   CO2 29 27 25   BUN 9 9 5*   CREA 0.84 0.76 0.78   GLU 72 74 79   CA 8.5 8.3* 8.1*     Recent Labs     07/10/19  0355 07/09/19  0243   SGOT 21 59*   ALT 60 85*   * 135*   TBILI 0.2 0.4   TP 6.4 6.5   ALB 3.0* 2.9*   GLOB 3.4 3.6   LPSE  --  173     No results for input(s): INR, PTP, APTT in the last 72 hours. No lab exists for component: INREXT, INREXT   No results for input(s): FE, TIBC, PSAT, FERR in the last 72 hours. Lab Results   Component Value Date/Time    Folate 13.2 05/20/2009 03:00 AM      No results for input(s): PH, PCO2, PO2 in the last 72 hours. No results for input(s): CPK, CKNDX, TROIQ in the last 72 hours.     No lab exists for component: CPKMB  Lab Results   Component Value Date/Time    Cholesterol, total 131 07/06/2019 12:53 AM    HDL Cholesterol 69 07/06/2019 12:53 AM    LDL, calculated 41 07/06/2019 12:53 AM    Triglyceride 105 07/06/2019 12:53 AM    CHOL/HDL Ratio 1.9 07/06/2019 12:53 AM     Lab Results   Component Value Date/Time    Glucose (POC) 100 07/11/2019 06:37 AM    Glucose (POC) 74 07/11/2019 06:20 AM    Glucose (POC) 255 (H) 07/10/2019 09:13 PM    Glucose (POC) 186 (H) 07/10/2019 03:46 PM    Glucose (POC) 145 (H) 07/10/2019 11:30 AM     Lab Results   Component Value Date/Time    Color YELLOW/STRAW 07/05/2019 04:52 PM    Appearance CLEAR 07/05/2019 04:52 PM    Specific gravity RESISTANT 07/05/2019 04:52 PM    Specific gravity 1.026 06/20/2019 01:51 AM    pH (UA) 5.0 07/05/2019 04:52 PM    Protein NEGATIVE  07/05/2019 04:52 PM    Glucose >1,000 (A) 07/05/2019 04:52 PM    Ketone NEGATIVE  07/05/2019 04:52 PM    Bilirubin NEGATIVE  07/05/2019 04:52 PM    Urobilinogen 0.2 07/05/2019 04:52 PM    Nitrites NEGATIVE  07/05/2019 04:52 PM    Leukocyte Esterase NEGATIVE  07/05/2019 04:52 PM    Epithelial cells FEW 06/20/2019 01:51 AM    Bacteria NEGATIVE  06/20/2019 01:51 AM    WBC 0-4 06/20/2019 01:51 AM    RBC 0-5 06/20/2019 01:51 AM         Medications Reviewed:     Current Facility-Administered Medications   Medication Dose Route Frequency    insulin glargine (LANTUS) injection 20 Units  20 Units SubCUTAneous QHS    amLODIPine (NORVASC) tablet 5 mg  5 mg Oral DAILY    lisinopril (PRINIVIL, ZESTRIL) tablet 10 mg  10 mg Oral DAILY    polyethylene glycol (MIRALAX) packet 17 g  17 g Oral BID    0.9% sodium chloride infusion  100 mL/hr IntraVENous CONTINUOUS    HYDROcodone-acetaminophen (NORCO) 5-325 mg per tablet 1 Tab  1 Tab Oral Q6H PRN    docusate sodium (COLACE) capsule 200 mg  200 mg Oral BID PRN    albuterol-ipratropium (DUO-NEB) 2.5 MG-0.5 MG/3 ML  3 mL Nebulization Q4H PRN    arformoterol (BROVANA) neb solution 15 mcg  15 mcg Nebulization BID    aspirin chewable tablet 81 mg  81 mg Oral DAILY    budesonide (PULMICORT) 500 mcg/2 ml nebulizer suspension  500 mcg Nebulization BID RT    busPIRone (BUSPAR) tablet 20 mg  20 mg Oral BID    DULoxetine (CYMBALTA) capsule 60 mg  60 mg Oral DAILY    ranolazine ER (RANEXA) tablet 500 mg  500 mg Oral BID    rosuvastatin (CRESTOR) tablet 10 mg  10 mg Oral QHS    sodium chloride (NS) flush 5-40 mL  5-40 mL IntraVENous Q8H    sodium chloride (NS) flush 5-40 mL  5-40 mL IntraVENous PRN    ondansetron (ZOFRAN) injection 4 mg  4 mg IntraVENous Q4H PRN    bisacodyl (DULCOLAX) tablet 5 mg  5 mg Oral DAILY PRN    heparin (porcine) injection 5,000 Units  5,000 Units SubCUTAneous Q8H    insulin lispro (HUMALOG) injection   SubCUTAneous AC&HS    glucose chewable tablet 16 g  4 Tab Oral PRN    dextrose (D50W) injection syrg 12.5-25 g  12.5-25 g IntraVENous PRN    glucagon (GLUCAGEN) injection 1 mg  1 mg IntraMUSCular PRN    pantoprazole (PROTONIX) tablet 40 mg  40 mg Oral ACB    metoprolol tartrate (LOPRESSOR) tablet 12.5 mg  12.5 mg Oral Q12H    ALPRAZolam (XANAX) tablet 0.25 mg  0.25 mg Oral TID PRN    acetaminophen (TYLENOL) tablet 650 mg  650 mg Oral Q4H PRN    nitroglycerin (NITROSTAT) tablet 0.4 mg  0.4 mg SubLINGual Q5MIN PRN    morphine injection 2 mg  2 mg IntraVENous Q4H PRN    sodium chloride (NS) flush 5-40 mL  5-40 mL IntraVENous PRN     ______________________________________________________________________  EXPECTED LENGTH OF STAY: 2d 12h  ACTUAL LENGTH OF STAY:          4                 Samira Lopez MD

## 2019-07-11 NOTE — PROGRESS NOTES
7/11/19 -   Patient's 7/10 CT was negative for a mass. CM selected receiving facility in [facility].     LUZ MARINA:  - Discharge to [facility] tomorrow, 7/12  - PT today  - OT today  - Stop IVF today  - Pending labs  - Miralax BID  - OP Psych consult upon discharge  CRM: Jeevan Martinez, MPH, [address] [place] [address]; Z: [phone]

## 2019-07-12 VITALS
BODY MASS INDEX: 35.51 KG/M2 | HEART RATE: 72 BPM | TEMPERATURE: 98.3 F | SYSTOLIC BLOOD PRESSURE: 109 MMHG | WEIGHT: 181.8 LBS | RESPIRATION RATE: 16 BRPM | DIASTOLIC BLOOD PRESSURE: 62 MMHG | OXYGEN SATURATION: 97 %

## 2019-07-12 PROBLEM — R07.9 CHEST PAIN: Status: RESOLVED | Noted: 2019-07-05 | Resolved: 2019-07-12

## 2019-07-12 LAB
CANCER AG19-9 SERPL-ACNC: 1 U/ML (ref 0–35)
GLUCOSE BLD STRIP.AUTO-MCNC: 121 MG/DL (ref 65–100)
GLUCOSE BLD STRIP.AUTO-MCNC: 199 MG/DL (ref 65–100)
GLUCOSE BLD STRIP.AUTO-MCNC: 97 MG/DL (ref 65–100)
SERVICE CMNT-IMP: ABNORMAL
SERVICE CMNT-IMP: ABNORMAL
SERVICE CMNT-IMP: NORMAL

## 2019-07-12 PROCEDURE — 74011000250 HC RX REV CODE- 250: Performed by: INTERNAL MEDICINE

## 2019-07-12 PROCEDURE — 74011636637 HC RX REV CODE- 636/637: Performed by: INTERNAL MEDICINE

## 2019-07-12 PROCEDURE — 94640 AIRWAY INHALATION TREATMENT: CPT

## 2019-07-12 PROCEDURE — 74011250637 HC RX REV CODE- 250/637: Performed by: INTERNAL MEDICINE

## 2019-07-12 PROCEDURE — 74011250637 HC RX REV CODE- 250/637: Performed by: HOSPITALIST

## 2019-07-12 PROCEDURE — 82962 GLUCOSE BLOOD TEST: CPT

## 2019-07-12 PROCEDURE — 74011250636 HC RX REV CODE- 250/636: Performed by: INTERNAL MEDICINE

## 2019-07-12 RX ORDER — INSULIN GLARGINE 100 [IU]/ML
20 INJECTION, SOLUTION SUBCUTANEOUS
Qty: 1 ADJUSTABLE DOSE PRE-FILLED PEN SYRINGE | Refills: 0 | Status: SHIPPED
Start: 2019-07-12 | End: 2021-05-29

## 2019-07-12 RX ORDER — AMLODIPINE BESYLATE 10 MG/1
5 TABLET ORAL DAILY
Qty: 1 TAB | Refills: 0 | Status: ON HOLD
Start: 2019-07-12 | End: 2020-04-27 | Stop reason: SDUPTHER

## 2019-07-12 RX ORDER — POLYETHYLENE GLYCOL 3350 17 G/17G
17 POWDER, FOR SOLUTION ORAL 2 TIMES DAILY
Qty: 60 PACKET | Refills: 0 | Status: SHIPPED | OUTPATIENT
Start: 2019-07-12 | End: 2019-08-11

## 2019-07-12 RX ORDER — HYDROCODONE BITARTRATE AND ACETAMINOPHEN 5; 325 MG/1; MG/1
1 TABLET ORAL
Qty: 10 TAB | Refills: 0 | Status: SHIPPED | OUTPATIENT
Start: 2019-07-12 | End: 2019-07-15

## 2019-07-12 RX ORDER — METOPROLOL TARTRATE 25 MG/1
12.5 TABLET, FILM COATED ORAL EVERY 12 HOURS
Qty: 30 TAB | Refills: 0 | Status: SHIPPED | OUTPATIENT
Start: 2019-07-12 | End: 2019-08-11

## 2019-07-12 RX ORDER — BISACODYL 5 MG
5 TABLET, DELAYED RELEASE (ENTERIC COATED) ORAL
Qty: 10 TAB | Refills: 0 | Status: SHIPPED
Start: 2019-07-12

## 2019-07-12 RX ADMIN — BUDESONIDE 500 MCG: 0.5 INHALANT RESPIRATORY (INHALATION) at 08:32

## 2019-07-12 RX ADMIN — RANOLAZINE 500 MG: 500 TABLET, FILM COATED, EXTENDED RELEASE ORAL at 00:40

## 2019-07-12 RX ADMIN — METOPROLOL TARTRATE 12.5 MG: 25 TABLET ORAL at 09:41

## 2019-07-12 RX ADMIN — DULOXETINE HYDROCHLORIDE 60 MG: 60 CAPSULE, DELAYED RELEASE ORAL at 09:41

## 2019-07-12 RX ADMIN — HEPARIN SODIUM 5000 UNITS: 5000 INJECTION INTRAVENOUS; SUBCUTANEOUS at 09:40

## 2019-07-12 RX ADMIN — INSULIN LISPRO 2 UNITS: 100 INJECTION, SOLUTION INTRAVENOUS; SUBCUTANEOUS at 12:21

## 2019-07-12 RX ADMIN — PANTOPRAZOLE SODIUM 40 MG: 40 TABLET, DELAYED RELEASE ORAL at 06:39

## 2019-07-12 RX ADMIN — HYDROCODONE BITARTRATE AND ACETAMINOPHEN 1 TABLET: 5; 325 TABLET ORAL at 00:45

## 2019-07-12 RX ADMIN — RANOLAZINE 500 MG: 500 TABLET, FILM COATED, EXTENDED RELEASE ORAL at 14:20

## 2019-07-12 RX ADMIN — Medication 10 ML: at 06:07

## 2019-07-12 RX ADMIN — ASPIRIN 81 MG 81 MG: 81 TABLET ORAL at 09:41

## 2019-07-12 RX ADMIN — ARFORMOTEROL TARTRATE 15 MCG: 15 SOLUTION RESPIRATORY (INHALATION) at 08:32

## 2019-07-12 RX ADMIN — ALPRAZOLAM 0.25 MG: 0.25 TABLET ORAL at 00:45

## 2019-07-12 RX ADMIN — LISINOPRIL 10 MG: 10 TABLET ORAL at 09:41

## 2019-07-12 RX ADMIN — BUSPIRONE HYDROCHLORIDE 20 MG: 5 TABLET ORAL at 09:41

## 2019-07-12 RX ADMIN — AMLODIPINE BESYLATE 5 MG: 5 TABLET ORAL at 09:41

## 2019-07-12 RX ADMIN — HEPARIN SODIUM 5000 UNITS: 5000 INJECTION INTRAVENOUS; SUBCUTANEOUS at 00:40

## 2019-07-12 NOTE — DISCHARGE INSTRUCTIONS
Discharge Instructions       PATIENT ID: Marlene Morales  MRN: [id number]   YOB: 1940    DATE OF ADMISSION: 7/5/2019  2:09 PM    DATE OF DISCHARGE: 7/12/2019    PRIMARY CARE PROVIDER: Caryn Bullock MD     ATTENDING PHYSICIAN: Milady Fuentes MD  DISCHARGING PROVIDER: Iman Almendarez MD    To contact this individual call [phone] and ask the  to page. If unavailable ask to be transferred the Adult Hospitalist Department. DISCHARGE DIAGNOSES     Atypical chest pain , hx of CAD  ACS ruled out  -continue aspirin, crestor, ranexa, and metoprolol  -labs and EKG reviewed  -lipid panel normal  -seen by cardiologist, pain not cardiac, no further work up for now     Abdominal pain and elevated lipase possible due to pancreatic duct obstruction/acute pancreatitis -POA  improving  - GI note reviewed, supportive measures  -CT shows dilated PD, MRCP shows PD stricture, concerns for IPMN, outpatient EUS and C-scopy recommended by GI  - repeat CT pancreatic protocol 7/10: shows mild PD dilatation and congential variation in pancreatic ducts, no mass  - CA 19-9 and stool H. pylori are ordered. pending  -TG normal  -hx of s/p cholecystectomy   -no hx of alcohol abuse     T2DM with hypoglycemic episodes  Decrease lantus to 20 units QHS only, cont SSI, diabetic diet     HTN  -BP normal, cont home meds     Hx of asthma  -stable  -continue pulmicort, prn duo neb     Hx of anxiety/depression      Right knee pain, chronic and stable    CONSULTATIONS: IP CONSULT TO CARDIOLOGY  IP CONSULT TO PSYCHIATRY  IP CONSULT TO GASTROENTEROLOGY  IP CONSULT TO PSYCHIATRY    PROCEDURES/SURGERIES: * No surgery found *    PENDING TEST RESULTS:   At the time of discharge the following test results are still pending: - CA 19-9 and stool H. pylori are ordered.  pending    FOLLOW UP APPOINTMENTS:   Follow-up Information     Follow up With Specialties Details [facility] [address] [place] [address] Skilled Nursing Facility   [facility]  [phone]      Garima Hinds MD Internal Medicine Schedule an appointment as soon as possible for a visit in 1 week  [address]  [address]      Pincus Seip, MD Gastroenterology, Gastroenterology Schedule an appointment as soon as possible for a visit as recommended, for C-scopy,EUS and biopsy report follow up Rose Marie Herrera  [phone]             ADDITIONAL CARE RECOMMENDATIONS:   · It is important that you take the medication exactly as they are prescribed. · Keep your medication in the bottles provided by the pharmacist and keep a list of the medication names, dosages, and times to be taken in your wallet. · Do not take other medications without consulting your doctor. · No drinking alcohol or driving car or operating machinery if you are on narcotic pain medications. Donot take sedating mediations if you are sleepy or confused. · Fall Precautions  · Keep Well Hydrated  · Report to your medical provider if you feel you have  developed allergies to medications  · Follow up with your PCP or Consultant for medication adjustments and refills  · Monitor for signs of fevers,chills,bleeding,chest pain and seek medical attention if you do so. DIET:   DIET REGULAR with 50 gm fat diet    ACTIVITY: PT/OT Eval and Treat    WOUND CARE: n/a    EQUIPMENT needed: n/a      DISCHARGE MEDICATIONS:   See Medication Reconciliation Form    · It is important that you take the medication exactly as they are prescribed. · Keep your medication in the bottles provided by the pharmacist and keep a list of the medication names, dosages, and times to be taken in your wallet. · Do not take other medications without consulting your doctor. NOTIFY YOUR PHYSICIAN FOR ANY OF THE FOLLOWING:   Fever over 101 degrees for 24 hours.    Chest pain, shortness of breath, fever, chills, nausea, vomiting, diarrhea, change in mentation, falling, weakness, bleeding. Severe pain or pain not relieved by medications. Or, any other signs or symptoms that you may have questions about. DISPOSITION:    Home With:   OT  PT  HH  RN      x SNF/Inpatient Rehab/LTAC    Independent/assisted living    Hospice    Other:       Information obtained by :   I understand that if any problems occur once I am at home I am to contact my physician. I understand and acknowledge receipt of the instructions indicated above.                                                                                                                                              [de-identified] or R.N.'s Signature                                                                  Date/Time                                                                                                                                              Patient or Representative Signature                                                          Date/Time          Signed:   Bebeto Chan MD  7/12/2019  10:30 AM

## 2019-07-12 NOTE — PROGRESS NOTES
TRANSFER - OUT REPORT:    Verbal report given to Navneet Ahn on Manjinder Aas  being transferred to [facility]) for routine progression of care       Report consisted of patients Situation, Background, Assessment and   Recommendations(SBAR). Information from the following report(s) SBAR was reviewed with the receiving nurse. Lines:       Opportunity for questions and clarification was provided.       Patient transported with:   EMS

## 2019-07-12 NOTE — PROGRESS NOTES
Patient & LPN given discharge instructions, when to return for worsening symptoms, IVs discontinued, prescriptions on the chart, discharged via EMS with all belongings,& incontinence care completed.

## 2019-07-12 NOTE — PROGRESS NOTES
Problem: Falls - Risk of  Goal: *Absence of Falls  Description  Document Milta Raring Fall Risk and appropriate interventions in the flowsheet.   Outcome: Progressing Towards Goal     Problem: Patient Education: Go to Patient Education Activity  Goal: Patient/Family Education  Outcome: Progressing Towards Goal

## 2019-07-12 NOTE — PROGRESS NOTES
[address]  [address] [facility] [address] Masood Mora, [address]  [phone]                GI PROGRESS NOTE  Donna Newberry, [facility]  Work Cell: [phone]      NAME:   Manjinder Estrada   :    1940   MRN:    [id number]     Assessment/Plan   1. Abdominal pain and elevated lipase - likely related to acute pancreatitis, had similar presentation in 2017. CT with dilated PD. 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 Lipase normalized. Repeat CT w/ pancreatic protocol shows mild PD dilation and congential variation in pancreatic ducts. No mass seen. - Diet as tolerated  - Supportive management per primary team  - Stool for H. Pylori pending   - Consider EUS as outpatient once symptoms improved   - Needs repeat colonoscopy as well      2. Elevated liver enzymes - possibly related to the above, improving   - Trend labs   3. Constipation - with LLQ pain that improves after defecation   - Miralax BID - can titrate dose depending upon results   4. DM  5. Depression     Okay to discharge from GI standpoint - follow-up outpatient for the above      Patient Active Problem List   Diagnosis Code    Chest pain, unspecified R07.9    Leukocytosis, unspecified D72.829    DM (diabetes mellitus) ([facility] [address] E11.9    HTN (hypertension) I10    Arthritis M19.90    Pancreatitis, acute K85.90    Epigastric abdominal pain R10.13    Painful total knee replacement (HCC) T84.84XA, Z96.659    Status post right knee replacement Z96.651    CAD (coronary artery disease) I25.10    Pain due to knee joint prosthesis (HCC) T84.84XA, Z96.659    Chest pain R07.9       Subjective:     Still with some abdominal pain; but overall better. Tolerating diet. Passing stools. Awaiting to go to [facility]. Objective:     VITALS:   Last 24hrs VS reviewed since prior hospitalist progress note.  Most recent are:  Visit Vitals  /62 (BP 1 Location: Left arm, BP Patient Position: At rest)   Pulse 72   Temp 98.3 °F (36.8 °C)   Resp 16   Wt 81.8 kg (180 lb 5.4 oz)   SpO2 97%   Breastfeeding? No   BMI 31.95 kg/m²     No intake or output data in the 24 hours ending 07/12/19 1025     PHYSICAL EXAM:  General   well developed, alert, in no acute distress  EENT  Normocephalic, Atraumatic, PERRLA, EOMI, sclera clear  Respiratory   Clear To Auscultation bilaterally - no wheezes, rales, rhonchi, or crackles  Cardiology  Regular Rate and Rythmn  - no murmurs, rubs or gallops  Abdominal  Soft, non-distended, mild epigastric/RUQ and LLQ tenderness, positive bowel sounds, no hepatosplenomegaly, no palpable mass  Neurological  No focal neurological deficits noted  Psychological  Oriented x 3.  Calm.        Lab Data   Recent Results (from the past 12 hour(s))   GLUCOSE, POC    Collection Time: 07/12/19 12:52 AM   Result Value Ref Range    Glucose (POC) 121 (H) 65 - 100 mg/dL    Performed by [facility] Abts    GLUCOSE, POC    Collection Time: 07/12/19  6:06 AM   Result Value Ref Range    Glucose (POC) 97 65 - 100 mg/dL    Performed by Ely Abts          Medications: Reviewed    PMH/ reviewed - no change compared to H&P  Mid-Level Provider: Joy Salazar NP   Date/Time:  7/12/2019

## 2019-07-12 NOTE — PROGRESS NOTES
Bedside shift change report given to Sharon Jaimes (oncoming nurse) by Joanna Hartley (offgoing nurse). Report included the following information SBAR, Kardex, Intake/Output, MAR, Accordion, Recent Results, Med Rec Status and Quality Measures.

## 2019-07-12 NOTE — PROGRESS NOTES
7/12/19 -   LUZ MARINA:  Cesar Rosado acceptance confirmed with facility ([facility]: [phone])  - UAI completed and in discharge packet and scanned to chart  - Discharge today, 7/12/19  - Transport via [facility] with requested time of 13:30  - CM to complete: PCS, FACE SHEET, H&P, SBAR, KARDEX. Packet on hard chart  - NURSING TO COMPLETE: MAR, DISCHARGE, REPORT: [phone], [facility]    CM verified patient is eligible for discharge to [facility].      CRM: Vida Dobbs, MPH, [address] [place] [address]; Z: [phone]

## 2019-07-12 NOTE — DISCHARGE SUMMARY
Inpatient hospitalist discharge summary                Brief Overview    PATIENT ID: Beatrice Heath    MRN: [id number]     YOB: 1940    Admitting Provider: Mili Ragsdale MD    Discharging Provider: Red Stacy MD   To contact this individual call [phone] and ask the  to page. If unavailable ask to be transferred the Adult Hospitalist Department. PCP at discharge: Chuck Ferris [phone]   [facility] / [address]    Admission date: 7/5/2019  Date of Discharge: 07/12/19    Chief complaint:   Chief Complaint   Patient presents with    Chest Pain    High Blood Sugar     Patient Active Problem List   Diagnosis Code    Chest pain, unspecified R07.9    Leukocytosis, unspecified D72.829    DM (diabetes mellitus) ([facility] E11.9    HTN (hypertension) I10    Arthritis M19.90    Pancreatitis, acute K85.90    Epigastric abdominal pain R10.13    Painful total knee replacement ([facility] T84.84XA, Z96.659    Status post right knee replacement Z96.651    CAD (coronary artery disease) I25.10    Pain due to knee joint prosthesis ([facility] [address] [facility] T84.84XA, Z96.659         Discharge diagnosis, hospital course/plan:    Atypical chest pain , hx of CAD  ACS ruled out  -continue aspirin, crestor, ranexa, and metoprolol  -labs and EKG reviewed  -lipid panel normal  -seen by cardiologist, pain not cardiac, no further work up for now     Abdominal pain and elevated lipase possible due to pancreatic duct obstruction/acute pancreatitis -POA  improving  - GI note reviewed, supportive measures  -CT shows dilated PD, MRCP shows PD stricture, concerns for IPMN, outpatient EUS and C-scopy recommended by GI  - repeat CT pancreatic protocol 7/10: shows mild PD dilatation and congential variation in pancreatic ducts, no mass  - CA 19-9 and stool H. pylori are ordered.  pending  -TG normal  -hx of s/p cholecystectomy   -no hx of alcohol abuse     T2DM with hypoglycemic episodes  Decrease lantus to 20 units QHS only, cont SSI, diabetic diet     HTN  -BP normal, cont home meds     Hx of asthma  -stable  -continue pulmicort, prn duo neb     Hx of anxiety/depression      Right knee pain, chronic and stable    obesity: Body mass index is 31.95 kg/m². Weight loss recommended    On the date of discharge, diagnostic face to face encounter was performed. Patient was hemodynamically stable, offering no new complaints. Denies any shortness of breath at rest, no fevers or chills, no diarrhea or constipation. Patient is agreeable for discharge. Patient understood and verbalized the understanding of the discharge plan. Patient was advised to seek medical help/ care or return to ED, if symptoms recur, worsen or new symptoms develop. Discharge Disposition:  Home or Self Care    Discharge activity:  Ambulate in house and PT/OT Eval and Treat    Code status at discharge:  Full Code     Active issues requiring follow up:  Pancreatic duct stricture, need for EUS/C-scopy    Outpatient follow up:     Follow-up Information     Follow up With Specialties Details Why Curt [address] [place] [address] 74  [phone]      Odin Lopes MD Internal Medicine Schedule an appointment as soon as possible for a visit in 1 week  [address].  [address]      Hubert France MD Gastroenterology, Gastroenterology Schedule an appointment as soon as possible for a visit as recommended, for C-scopy,EUS and biopsy report follow up [address]  [phone]            Test results pending upon discharge:  n/a    Operative procedures performed:      Treatments: IV hydration    Consults:  IP CONSULT TO CARDIOLOGY  IP CONSULT TO PSYCHIATRY  IP CONSULT TO GASTROENTEROLOGY  IP CONSULT TO PSYCHIATRY    Procedures: none       Diet:   DIET REGULAR  50 gm fat diet    Pertinent test results:  Xr Chest Pa Lat    Result Date: 7/5/2019  EXAM: XR CHEST PA LAT INDICATION: Chest pain and hyperglycemia. Evaluation for pneumonia COMPARISON: 4/3/2019. FINDINGS: PA and lateral radiographs of the chest demonstrate hyperinflated lungs with linear streaky changes at both lung bases. The cardiac and mediastinal contours and pulmonary vascularity are normal. Spondylitic changes are present. Heavy costochondral calcification is seen. IMPRESSION: Bibasilar subsegmental atelectasis. No infiltrate. Xr Knee Rt Max 2 Vws    Result Date: 7/8/2019  EXAM: XR KNEE RT MAX 2 VWS INDICATION: right knee pain. COMPARISON: April 2015. FINDINGS: Two views of the right knee demonstrate no fracture or other acute osseous or articular abnormality. There is an intact constrained total knee arthroplasty, which has been revised since the prior study. IMPRESSION: No acute abnormality. Ct Abd Pelv Wo Cont    Result Date: 7/6/2019  EXAM:  CT ABDOMEN PELVIS WITHOUT CONTRAST INDICATION:  epigastric area pain, elevated lipase, no hx of alcohol,. Additional history: COMPARISON: CT of the abdomen and pelvis, 3/8/2019. Laron Washington TECHNIQUE: Unenhanced multislice helical CT was performed from the diaphragm to the symphysis pubis without intravenous contrast administration. Contiguous 5 mm axial images were reconstructed and lung and soft tissue windows were generated. Coronal and sagittal reformations were generated. CT dose reduction was achieved through use of a standardized protocol tailored for this examination and automatic exposure control for dose modulation. Laron Delarosa FINDINGS: INCIDENTALLY IMAGED CHEST: Heart/vessels: Calcifications in the coronary arteries. Lungs/Pleura: Within normal limits. . ABDOMEN: Liver: Within normal limits. Gallbladder/Biliary: Status post cholecystectomy. Spleen: Within normal limits.  Pancreas: Possibly distended pancreatic duct which is not well visualized; this appears to measure approximately 0.6 cm at the neck. Adrenals: Within normal limits. Kidneys: Within normal limits. Peritoneum/Mesenteries: Within normal limits. Extraperitoneum: Within normal limits. Gastrointestinal tract: Postsurgical changes with suture material at the gastric antrum Diverticulosis in the descending and sigmoid colon. Vascular: Calcifications in the aorta. Leana Spenser PELVIS: Extraperitoneum: Within normal limits. Ureters: Within normal limits. Bladder: Decompressed and unremarkable. Reproductive System: Status post hysterectomy. . MSK: Coarse calcifications posteriorly in the buttocks. Small, fat-containing umbilical hernia . IMPRESSION: 1. Possibly distended pancreatic duct which is not well visualized; this appears to measure approximately 0.6 cm at the neck. 2. Incidental findings as above. Mri Abd W Mrcp W Wo Cont    Result Date: 7/8/2019  EXAM:  MRI ABD W MRCP W WO CONT INDICATION: Dilated pancreatic duct on recent noncontrast CT. Hospital admission for chest pain and hyperglycemia. Cholecystectomy. COMPARISON: CT abdomen on 7/6/2019 and 3/8/2019. MRI abdomen on 9/8/2017. TECHNIQUE: Coronal T2 and postcontrast T1; Axial T2, in/out of phase, MRCP, pre- and dynamic postcontrast T1 MRI of the abdomen. 10 mL Gadavist. Subtractions were performed. FINDINGS: Liver: Signal is within normal limits. No evidence of mass. 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 Size is within normal limits. Surface is smooth. Biliary tree: Cholecystectomy. Proximal CBD measures 7 mm. Distal CBD measures 5 mm. No filling defect or obstructing mass. Pancreas: Pancreatic duct at the junction of the head and body measures 5 mm in diameter. In the central head, the pancreatic duct measures 2 mm. No filling defect. No obstructing mass. No inflammation or divisum. Spleen: Within normal limits. Adrenal glands: Within normal limits. Kidneys: Mild hydronephrosis of the right upper pole is unchanged. No solid renal mass. Vasculature: Patent. Bowel: No evidence of inflammation. Lymph nodes: No lymphadenopathy. Miscellaneous: No ascites. Bones are within normal limits. Lumbar spinal stenosis is partially imaged and likely mild. IMPRESSION: 1. Mild pancreatic ductal dilatation at the head-body junction is due to mild stricture of the pancreatic duct within the head. No complete occlusion or obstructing mass. Uncertain clinical significance, but possibly sequela of old pancreatitis. No pancreatitis at this time. 2. Normal CBD post cholecystectomy. 3. Unchanged innumerable hepatic cysts/biliary hamartomas. 4. Unchanged chronic mild hydronephrosis of the upper pole right renal calyces. Ct Abdomen W Wo Cont And Pelvis W Cont    Result Date: 7/10/2019  CT ABDOMEN AND PELVIS WITH AND WITHOUT CONTRAST. 7/10/2019 5:22 PM INDICATION: Abdominal pain, pancreatitis, constipation, colonic polyps, gastric polyps. Pancreatic duct dilation and stricturing of the pancreatic duct in the head on prior MRI. COMPARISON: 7/6/2019, 3/8/2019, 10/16/2018, 5/1/2012. MRI 7/8/2019. TECHNIQUE: CT of the abdomen and pelvis was performed. Images of the abdomen were obtained before contrast and in the arterial, portal venous, and delayed phases. Pelvis acquisition was performed in venous phase after contrast administration only. 100 cc IV contrast (Isovue 370) was administered. CT dose reduction was achieved through use of a standardized protocol tailored for this examination and automatic exposure control for dose modulation. Adaptive statistical iterative reconstruction (ASIR) was utilized. FINDINGS: Pancreatic ducts: The pancreatic duct is mildly dilated (6 mm) in the proximal body. This is stable since at least 2012, and of doubtful significance. Compare image [id number] today to image [id number] on 5/1/2012 and to image [id number] on 6/10/2014. There is no pancreatic mass.  No infiltration of the perivascular fat around the superior mesenteric vein, portal vein, splenic vein, and superior mesenteric artery. At the proximal and distal ends of the focal duct dilation, the duct tapers smoothly and normally into a more normal caliber. On prior MRCP, the pancreatic duct is bifid in the distal body and tail (15-65, 15-57 on 7/8/2019). The mild dilation of the main pancreatic duct in the body is immediately downstream from this. There is variant ductal anatomy in the pancreatic neck and head as well. The duct of Wirsung is normal caliber (15-2 on 7/8/2019). The dilated segment of the main pancreatic duct in the proximal body connects to the duct of Wirsung via a longer, more posteriorly angled connecting duct than is often seen (15-15 on 7/8/2019). This is the segment that appears strictured. The duct of Santorini drains from the junction of this connecting duct and the duct of Wirsung, and has more draining branches in the uncinate than is typical (15-13 on 7/8/2019). Inferior chest: Minimal atelectasis in the lung bases. The heart is enlarged. Three-vessel coronary artery calcifications are extensive. Mitral annular calcification and aortic valve calcification are mild. Abdomen: Innumerable tiny cysts are scattered throughout the liver. Even more, tiny air cysts were visible on prior MRCP, consistent with innumerable biliary hamartomas (von Meyenburg complexes). No concerning hepatic mass. Fetal lobulation of the kidneys is a normal variant. Mild renal cortical scarring is superimposed. Subcentimeter hypodense bilateral renal lesions are too small to characterize, but likely represent cysts. A staple line along the antrum indicates a distal, partial gastrectomy. Post cholecystectomy. Dropped surgical clips anterior to the hepatic flexure (6-104) and medial to hepatic segment 6 (601-75, 5-69). The distal esophagus, duodenum, spleen, and adrenals are normal. Pelvis: Several gonadal vein phleboliths lie along the course of the left ureter; no urinary calculi.  Diverticulosis is mild throughout the colon. Post hysterectomy. The small bowel, ileocecal junction, colon, and bladder are otherwise normal. The appendix is not visualized; no pericecal inflammatory process. No free air or fluid, and no abdominopelvic lymphadenopathy. Dystrophic calcifications in the subcutaneous fat of the bilateral buttocks suggest old trauma. Gas and subcutaneous fat stranding in the lower anterior abdominal subcutaneous fat are consistent with injection sites. IMPRESSION: No pancreatic mass. Mild pancreatic duct dilation in the proximal body is stable since at least 2012 and of doubtful significance. It may be attributable to congenital variation of the pancreatic ducts, as described above.       Recent Results (from the past 336 hour(s))   EKG, 12 LEAD, INITIAL    Collection Time: 07/05/19  2:26 PM   Result Value Ref Range    Ventricular Rate 99 BPM    Atrial Rate 99 BPM    P-R Interval 124 ms    QRS Duration 66 ms    Q-T Interval 384 ms    QTC Calculation (Bezet) 492 ms    Calculated P Axis 57 degrees    Calculated R Axis 49 degrees    Calculated T Axis 62 degrees    Diagnosis       Normal sinus rhythm  T wave abnormality, consider anterior ischemia  When compared with ECG of 20-JUN-2019 12:06,  No significant change was found  Confirmed by Bladimir Mckeon MD, Jarad ([id number]) on 7/5/2019 4:24:48 PM     GLUCOSE, POC    Collection Time: 07/05/19  2:43 PM   Result Value Ref Range    Glucose (POC) 476 (H) 65 - 100 mg/dL    Performed by EDUARDO PAULSON    CBC WITH AUTOMATED DIFF    Collection Time: 07/05/19  2:49 PM   Result Value Ref Range    WBC 12.5 (H) 3.6 - 11.0 K/uL    RBC 4.56 3.80 - 5.20 M/uL    HGB 11.9 11.5 - 16.0 g/dL    HCT 38.7 35.0 - 47.0 %    MCV 84.9 80.0 - 99.0 FL    MCH 26.1 26.0 - 34.0 PG    MCHC 30.7 30.0 - 36.5 g/dL    RDW 15.3 (H) 11.5 - 14.5 %    PLATELET 706 653 - 260 K/uL    MPV 11.4 8.9 - 12.9 FL    NRBC 0.0 0  WBC    ABSOLUTE NRBC 0.00 0.00 - 0.01 K/uL    NEUTROPHILS 65 32 - 75 %    LYMPHOCYTES 25 12 - 49 %    MONOCYTES 7 5 - 13 %    EOSINOPHILS 1 0 - 7 %    BASOPHILS 1 0 - 1 %    IMMATURE GRANULOCYTES 1 (H) 0.0 - 0.5 %    ABS. NEUTROPHILS 8.3 (H) 1.8 - 8.0 K/UL    ABS. LYMPHOCYTES 3.1 0.8 - 3.5 K/UL    ABS. MONOCYTES 0.8 0.0 - 1.0 K/UL    ABS. EOSINOPHILS 0.1 0.0 - 0.4 K/UL    ABS. BASOPHILS 0.1 0.0 - 0.1 K/UL    ABS. IMM. GRANS. 0.1 (H) 0.00 - 0.04 K/UL    DF AUTOMATED     METABOLIC PANEL, COMPREHENSIVE    Collection Time: 07/05/19  2:49 PM   Result Value Ref Range    Sodium 137 136 - 145 mmol/L    Potassium 4.4 3.5 - 5.1 mmol/L    Chloride 104 97 - 108 mmol/L    CO2 24 21 - 32 mmol/L    Anion gap 9 5 - 15 mmol/L    Glucose 433 (H) 65 - 100 mg/dL    BUN 12 6 - 20 MG/DL    Creatinine 1.15 (H) 0.55 - 1.02 MG/DL    BUN/Creatinine ratio 10 (L) 12 - 20      GFR est AA 55 (L) >60 ml/min/1.73m2    GFR est non-AA 46 (L) >60 ml/min/1.73m2    Calcium 8.9 8.5 - 10.1 MG/DL    Bilirubin, total 0.3 0.2 - 1.0 MG/DL    ALT (SGPT) 18 12 - 78 U/L    AST (SGOT) 13 (L) 15 - 37 U/L    Alk. phosphatase 130 (H) 45 - 117 U/L    Protein, total 7.3 6.4 - 8.2 g/dL    Albumin 3.4 (L) 3.5 - 5.0 g/dL    Globulin 3.9 2.0 - 4.0 g/dL    A-G Ratio 0.9 (L) 1.1 - 2.2     HEMOGLOBIN A1C WITH EAG    Collection Time: 07/05/19  2:49 PM   Result Value Ref Range    Hemoglobin A1c 12.6 (H) 4.2 - 6.3 %    Est. average glucose 315 mg/dL   SAMPLES BEING HELD    Collection Time: 07/05/19  2:50 PM   Result Value Ref Range    SAMPLES BEING HELD 1RED,1BLUE     COMMENT        Add-on orders for these samples will be processed based on acceptable specimen integrity and analyte stability, which may vary by analyte.    GLUCOSE, POC    Collection Time: 07/05/19  4:06 PM   Result Value Ref Range    Glucose (POC) 175 (H) 65 - 100 mg/dL    Performed by EDUARDO PAULSON    URINALYSIS W/ RFLX MICROSCOPIC    Collection Time: 07/05/19  4:52 PM   Result Value Ref Range    Color YELLOW/STRAW      Appearance CLEAR CLEAR      Specific gravity RESISTANT 1.003 - 1.030      pH (UA) 5.0 5.0 - 8.0      Protein NEGATIVE  NEG mg/dL    Glucose >1,000 (A) NEG mg/dL    Ketone NEGATIVE  NEG mg/dL    Bilirubin NEGATIVE  NEG      Blood NEGATIVE  NEG      Urobilinogen 0.2 0.2 - 1.0 EU/dL    Nitrites NEGATIVE  NEG      Leukocyte Esterase NEGATIVE  NEG     GLUCOSE, POC    Collection Time: 07/05/19  5:39 PM   Result Value Ref Range    Glucose (POC) 125 (H) 65 - 100 mg/dL    Performed by María Elena Grayson    POC TROPONIN-I    Collection Time: 07/05/19  6:22 PM   Result Value Ref Range    Troponin-I (POC) <0.04 0.00 - 0.08 ng/mL   GLUCOSE, POC    Collection Time: 07/05/19 11:02 PM   Result Value Ref Range    Glucose (POC) 294 (H) 65 - 100 mg/dL    Performed by MALINDA EPPS    METABOLIC PANEL, COMPREHENSIVE    Collection Time: 07/06/19 12:53 AM   Result Value Ref Range    Sodium 140 136 - 145 mmol/L    Potassium 3.5 3.5 - 5.1 mmol/L    Chloride 108 97 - 108 mmol/L    CO2 24 21 - 32 mmol/L    Anion gap 8 5 - 15 mmol/L    Glucose 230 (H) 65 - 100 mg/dL    BUN 11 6 - 20 MG/DL    Creatinine 0.89 0.55 - 1.02 MG/DL    BUN/Creatinine ratio 12 12 - 20      GFR est AA >60 >60 ml/min/1.73m2    GFR est non-AA >60 >60 ml/min/1.73m2    Calcium 7.9 (L) 8.5 - 10.1 MG/DL    Bilirubin, total 0.2 0.2 - 1.0 MG/DL    ALT (SGPT) 14 12 - 78 U/L    AST (SGOT) 7 (L) 15 - 37 U/L    Alk.  phosphatase 100 45 - 117 U/L    Protein, total 6.3 (L) 6.4 - 8.2 g/dL    Albumin 2.9 (L) 3.5 - 5.0 g/dL    Globulin 3.4 2.0 - 4.0 g/dL    A-G Ratio 0.9 (L) 1.1 - 2.2     LIPID PANEL    Collection Time: 07/06/19 12:53 AM   Result Value Ref Range    LIPID PROFILE          Cholesterol, total 131 <200 MG/DL    Triglyceride 105 <150 MG/DL    HDL Cholesterol 69 MG/DL    LDL, calculated 41 0 - 100 MG/DL    VLDL, calculated 21 MG/DL    CHOL/HDL Ratio 1.9 0.0 - 5.0     CBC WITH AUTOMATED DIFF    Collection Time: 07/06/19 12:53 AM   Result Value Ref Range    WBC 10.6 3.6 - 11.0 K/uL    RBC 4.19 3.80 - 5.20 M/uL    HGB 11.1 (L) 11.5 - 16.0 g/dL    HCT 35.3 35.0 - 47.0 %    MCV 84.2 80.0 - 99.0 FL    MCH 26.5 26.0 - 34.0 PG    MCHC 31.4 30.0 - 36.5 g/dL    RDW 15.1 (H) 11.5 - 14.5 %    PLATELET 111 757 - 678 K/uL    MPV 10.4 8.9 - 12.9 FL    NRBC 0.0 0  WBC    ABSOLUTE NRBC 0.00 0.00 - 0.01 K/uL    NEUTROPHILS 65 32 - 75 %    LYMPHOCYTES 25 12 - 49 %    MONOCYTES 8 5 - 13 %    EOSINOPHILS 2 0 - 7 %    BASOPHILS 0 0 - 1 %    IMMATURE GRANULOCYTES 0 0.0 - 0.5 %    ABS. NEUTROPHILS 6.9 1.8 - 8.0 K/UL    ABS. LYMPHOCYTES 2.6 0.8 - 3.5 K/UL    ABS. MONOCYTES 0.8 0.0 - 1.0 K/UL    ABS. EOSINOPHILS 0.2 0.0 - 0.4 K/UL    ABS. BASOPHILS 0.0 0.0 - 0.1 K/UL    ABS. IMM.  GRANS. 0.0 0.00 - 0.04 K/UL    DF AUTOMATED     TSH 3RD GENERATION    Collection Time: 07/06/19 12:53 AM   Result Value Ref Range    TSH 0.47 0.36 - 3.74 uIU/mL   MAGNESIUM    Collection Time: 07/06/19 12:53 AM   Result Value Ref Range    Magnesium 2.0 1.6 - 2.4 mg/dL   PHOSPHORUS    Collection Time: 07/06/19 12:53 AM   Result Value Ref Range    Phosphorus 3.1 2.6 - 4.7 MG/DL   D DIMER    Collection Time: 07/06/19 12:53 AM   Result Value Ref Range    D-dimer 1.06 (H) 0.00 - 0.65 mg/L FEU   LIPASE    Collection Time: 07/06/19 12:53 AM   Result Value Ref Range    Lipase 837 (H) 73 - 393 U/L   TROPONIN I    Collection Time: 07/06/19 12:53 AM   Result Value Ref Range    Troponin-I, Qt. <0.05 <0.05 ng/mL   NT-PRO BNP    Collection Time: 07/06/19 12:53 AM   Result Value Ref Range    NT pro-BNP 17 <450 PG/ML   GLUCOSE, POC    Collection Time: 07/06/19  6:32 AM   Result Value Ref Range    Glucose (POC) 111 (H) 65 - 100 mg/dL    Performed by Melissa SUMMERS (CON)    TROPONIN I    Collection Time: 07/06/19  6:46 AM   Result Value Ref Range    Troponin-I, Qt. <0.05 <0.05 ng/mL   GLUCOSE, POC    Collection Time: 07/06/19 11:48 AM   Result Value Ref Range    Glucose (POC) 216 (H) 65 - 100 mg/dL    Performed by Rita Simmons    GLUCOSE, POC    Collection Time: 07/06/19  5:04 PM   Result Value Ref Range    Glucose (POC) 177 (H) 65 - 100 mg/dL    Performed by Melissa Spivey POC    Collection Time: 07/06/19  9:49 PM   Result Value Ref Range    Glucose (POC) 180 (H) 65 - 100 mg/dL    Performed by Jaxson Henriquez    CBC W/O DIFF    Collection Time: 07/07/19  3:12 AM   Result Value Ref Range    WBC 9.5 3.6 - 11.0 K/uL    RBC 4.11 3.80 - 5.20 M/uL    HGB 10.8 (L) 11.5 - 16.0 g/dL    HCT 35.1 35.0 - 47.0 %    MCV 85.4 80.0 - 99.0 FL    MCH 26.3 26.0 - 34.0 PG    MCHC 30.8 30.0 - 36.5 g/dL    RDW 15.5 (H) 11.5 - 14.5 %    PLATELET 141 838 - 236 K/uL    MPV 10.1 8.9 - 12.9 FL    NRBC 0.0 0  WBC    ABSOLUTE NRBC 0.00 0.00 - 0.96 K/uL   METABOLIC PANEL, COMPREHENSIVE    Collection Time: 07/07/19  3:12 AM   Result Value Ref Range    Sodium 145 136 - 145 mmol/L    Potassium 3.8 3.5 - 5.1 mmol/L    Chloride 114 (H) 97 - 108 mmol/L    CO2 27 21 - 32 mmol/L    Anion gap 4 (L) 5 - 15 mmol/L    Glucose 99 65 - 100 mg/dL    BUN 11 6 - 20 MG/DL    Creatinine 0.85 0.55 - 1.02 MG/DL    BUN/Creatinine ratio 13 12 - 20      GFR est AA >60 >60 ml/min/1.73m2    GFR est non-AA >60 >60 ml/min/1.73m2    Calcium 8.0 (L) 8.5 - 10.1 MG/DL    Bilirubin, total 0.2 0.2 - 1.0 MG/DL    ALT (SGPT) 11 (L) 12 - 78 U/L    AST (SGOT) 8 (L) 15 - 37 U/L    Alk.  phosphatase 88 45 - 117 U/L    Protein, total 5.9 (L) 6.4 - 8.2 g/dL    Albumin 2.6 (L) 3.5 - 5.0 g/dL    Globulin 3.3 2.0 - 4.0 g/dL    A-G Ratio 0.8 (L) 1.1 - 2.2     LIPASE    Collection Time: 07/07/19  3:12 AM   Result Value Ref Range    Lipase 1,609 (H) 73 - 393 U/L   GLUCOSE, POC    Collection Time: 07/07/19  6:00 AM   Result Value Ref Range    Glucose (POC) 78 65 - 100 mg/dL    Performed by [employer]    GLUCOSE, POC    Collection Time: 07/07/19  6:22 AM   Result Value Ref Range    Glucose (POC) 135 (H) 65 - 100 mg/dL    Performed by [employer]    GLUCOSE, POC    Collection Time: 07/07/19 11:15 AM   Result Value Ref Range    Glucose (POC) 95 65 - 100 mg/dL    [id number] [facility], POC    Collection Time: 07/07/19  4:13 PM   Result Value Ref Range    Glucose (POC) 67 65 - 100 mg/dL    Performed by [id number], POC    Collection Time: 07/07/19  4:51 PM   Result Value Ref Range    Glucose (POC) 130 (H) 65 - 100 mg/dL    Performed by Barbara Etienne    GLUCOSE, POC    Collection Time: 07/07/19  9:26 PM   Result Value Ref Range    Glucose (POC) 56 (L) 65 - 100 mg/dL    Performed by Marti Joyner    GLUCOSE, POC    Collection Time: 07/07/19  9:50 PM   Result Value Ref Range    Glucose (POC) 194 (H) 65 - 100 mg/dL    Performed by Cynthia Baltazar    CBC W/O DIFF    Collection Time: 07/08/19  1:55 AM   Result Value Ref Range    WBC 9.1 3.6 - 11.0 K/uL    RBC 4.13 3.80 - 5.20 M/uL    HGB 11.0 (L) 11.5 - 16.0 g/dL    HCT 35.4 35.0 - 47.0 %    MCV 85.7 80.0 - 99.0 FL    MCH 26.6 26.0 - 34.0 PG    MCHC 31.1 30.0 - 36.5 g/dL    RDW 15.9 (H) 11.5 - 14.5 %    PLATELET 285 939 - 266 K/uL    MPV 9.9 8.9 - 12.9 FL    NRBC 0.0 0  WBC    ABSOLUTE NRBC 0.00 0.00 - 2.36 K/uL   METABOLIC PANEL, COMPREHENSIVE    Collection Time: 07/08/19  1:55 AM   Result Value Ref Range    Sodium 140 136 - 145 mmol/L    Potassium 4.0 3.5 - 5.1 mmol/L    Chloride 110 (H) 97 - 108 mmol/L    CO2 24 21 - 32 mmol/L    Anion gap 6 5 - 15 mmol/L    Glucose 158 (H) 65 - 100 mg/dL    BUN 6 6 - 20 MG/DL    Creatinine 0.77 0.55 - 1.02 MG/DL    BUN/Creatinine ratio 8 (L) 12 - 20      GFR est AA >60 >60 ml/min/1.73m2    GFR est non-AA >60 >60 ml/min/1.73m2    Calcium 7.7 (L) 8.5 - 10.1 MG/DL    Bilirubin, total 0.5 0.2 - 1.0 MG/DL    ALT (SGPT) 99 (H) 12 - 78 U/L    AST (SGOT) 244 (H) 15 - 37 U/L    Alk.  phosphatase 125 (H) 45 - 117 U/L    Protein, total 5.8 (L) 6.4 - 8.2 g/dL    Albumin 2.6 (L) 3.5 - 5.0 g/dL    Globulin 3.2 2.0 - 4.0 g/dL    A-G Ratio 0.8 (L) 1.1 - 2.2     LIPASE    Collection Time: 07/08/19  1:55 AM   Result Value Ref Range    Lipase 139 73 - 393 U/L   GLUCOSE, POC    Collection Time: 07/08/19  6:20 AM   Result Value Ref Range    Glucose (POC) 126 (H) 65 - 100 mg/dL    Performed by Melodie Aden, POC    Collection Time: 07/08/19 12:32 PM   Result Value Ref Range    Glucose (POC) 129 (H) 65 - 100 mg/dL    Performed by Olive Maldonado    GLUCOSE, POC    Collection Time: 07/08/19  5:01 PM   Result Value Ref Range    Glucose (POC) 253 (H) 65 - 100 mg/dL    Performed by Olive Maldonado    GLUCOSE, POC    Collection Time: 07/08/19  9:06 PM   Result Value Ref Range    Glucose (POC) 272 (H) 65 - 100 mg/dL    Performed by Elizabeth Bailey    CBC W/O DIFF    Collection Time: 07/09/19  2:43 AM   Result Value Ref Range    WBC 8.1 3.6 - 11.0 K/uL    RBC 4.23 3.80 - 5.20 M/uL    HGB 11.2 (L) 11.5 - 16.0 g/dL    HCT 36.7 35.0 - 47.0 %    MCV 86.8 80.0 - 99.0 FL    MCH 26.5 26.0 - 34.0 PG    MCHC 30.5 30.0 - 36.5 g/dL    RDW 15.9 (H) 11.5 - 14.5 %    PLATELET 077 814 - 259 K/uL    MPV 11.2 8.9 - 12.9 FL    NRBC 0.0 0  WBC    ABSOLUTE NRBC 0.00 0.00 - 6.52 K/uL   METABOLIC PANEL, COMPREHENSIVE    Collection Time: 07/09/19  2:43 AM   Result Value Ref Range    Sodium 143 136 - 145 mmol/L    Potassium 3.8 3.5 - 5.1 mmol/L    Chloride 111 (H) 97 - 108 mmol/L    CO2 25 21 - 32 mmol/L    Anion gap 7 5 - 15 mmol/L    Glucose 79 65 - 100 mg/dL    BUN 5 (L) 6 - 20 MG/DL    Creatinine 0.78 0.55 - 1.02 MG/DL    BUN/Creatinine ratio 6 (L) 12 - 20      GFR est AA >60 >60 ml/min/1.73m2    GFR est non-AA >60 >60 ml/min/1.73m2    Calcium 8.1 (L) 8.5 - 10.1 MG/DL    Bilirubin, total 0.4 0.2 - 1.0 MG/DL    ALT (SGPT) 85 (H) 12 - 78 U/L    AST (SGOT) 59 (H) 15 - 37 U/L    Alk.  phosphatase 135 (H) 45 - 117 U/L    Protein, total 6.5 6.4 - 8.2 g/dL    Albumin 2.9 (L) 3.5 - 5.0 g/dL    Globulin 3.6 2.0 - 4.0 g/dL    A-G Ratio 0.8 (L) 1.1 - 2.2     LIPASE    Collection Time: 07/09/19  2:43 AM   Result Value Ref Range    Lipase 173 73 - 393 U/L   GLUCOSE, POC    Collection Time: 07/09/19  6:47 AM   Result Value Ref Range    Glucose (POC) 81 65 - 100 mg/dL    Performed by Jose Martinez, POC    Collection Time: 07/09/19 11:54 AM   Result Value Ref Range    Glucose (POC) 177 (H) 65 - 100 mg/dL    Performed by Amanda TRIANA, POC    Collection Time: 07/09/19  4:27 PM   Result Value Ref Range    Glucose (POC) 177 (H) 65 - 100 mg/dL    Performed by Rodríguez Hurtado, POC    Collection Time: 07/09/19  9:00 PM   Result Value Ref Range    Glucose (POC) 207 (H) 65 - 100 mg/dL    Performed by Lenore Valdovinos    CBC WITH AUTOMATED DIFF    Collection Time: 07/10/19  3:55 AM   Result Value Ref Range    WBC 9.7 3.6 - 11.0 K/uL    RBC 4.11 3.80 - 5.20 M/uL    HGB 11.1 (L) 11.5 - 16.0 g/dL    HCT 35.6 35.0 - 47.0 %    MCV 86.6 80.0 - 99.0 FL    MCH 27.0 26.0 - 34.0 PG    MCHC 31.2 30.0 - 36.5 g/dL    RDW 15.9 (H) 11.5 - 14.5 %    PLATELET 421 643 - 905 K/uL    MPV 10.7 8.9 - 12.9 FL    NRBC 0.0 0  WBC    ABSOLUTE NRBC 0.00 0.00 - 0.01 K/uL    NEUTROPHILS 60 32 - 75 %    LYMPHOCYTES 29 12 - 49 %    MONOCYTES 9 5 - 13 %    EOSINOPHILS 1 0 - 7 %    BASOPHILS 0 0 - 1 %    IMMATURE GRANULOCYTES 1 (H) 0.0 - 0.5 %    ABS. NEUTROPHILS 5.7 1.8 - 8.0 K/UL    ABS. LYMPHOCYTES 2.8 0.8 - 3.5 K/UL    ABS. MONOCYTES 0.9 0.0 - 1.0 K/UL    ABS. EOSINOPHILS 0.1 0.0 - 0.4 K/UL    ABS. BASOPHILS 0.0 0.0 - 0.1 K/UL    ABS. IMM. GRANS. 0.1 (H) 0.00 - 0.04 K/UL    DF AUTOMATED     METABOLIC PANEL, COMPREHENSIVE    Collection Time: 07/10/19  3:55 AM   Result Value Ref Range    Sodium 144 136 - 145 mmol/L    Potassium 3.9 3.5 - 5.1 mmol/L    Chloride 111 (H) 97 - 108 mmol/L    CO2 27 21 - 32 mmol/L    Anion gap 6 5 - 15 mmol/L    Glucose 74 65 - 100 mg/dL    BUN 9 6 - 20 MG/DL    Creatinine 0.76 0.55 - 1.02 MG/DL    BUN/Creatinine ratio 12 12 - 20      GFR est AA >60 >60 ml/min/1.73m2    GFR est non-AA >60 >60 ml/min/1.73m2    Calcium 8.3 (L) 8.5 - 10.1 MG/DL    Bilirubin, total 0.2 0.2 - 1.0 MG/DL    ALT (SGPT) 60 12 - 78 U/L    AST (SGOT) 21 15 - 37 U/L    Alk. phosphatase 124 (H) 45 - 117 U/L    Protein, total 6.4 6.4 - 8.2 g/dL    Albumin 3.0 (L) 3.5 - 5.0 g/dL    Globulin 3.4 2.0 - 4.0 g/dL    A-G Ratio 0.9 (L) 1.1 - 2.2     GLUCOSE, POC    Collection Time: 07/10/19  6:10 AM   Result Value Ref Range    Glucose (POC) 59 (L) 65 - 100 mg/dL    Performed by Zohreh Childers , POC    Collection Time: 07/10/19  6:30 AM   Result Value Ref Range    Glucose (POC) 91 65 - 100 mg/dL    Performed by Zohreh Childers , POC    Collection Time: 07/10/19 11:30 AM   Result Value Ref Range    Glucose (POC) 145 (H) 65 - 100 mg/dL    Performed by Monroe Meyer    CEA    Collection Time: 07/10/19 12:28 PM   Result Value Ref Range    CEA 1.6 ng/mL   CANCER AG 19-9    Collection Time: 07/10/19 12:38 PM   Result Value Ref Range    Carbohydrate Antigen 19-9, (CA 19-9) 1 0 - 35 U/mL   GLUCOSE, POC    Collection Time: 07/10/19  3:46 PM   Result Value Ref Range    Glucose (POC) 186 (H) 65 - 100 mg/dL    Performed by [address] [place] [address], POC    Collection Time: 07/10/19  9:13 PM   Result Value Ref Range    Glucose (POC) 255 (H) 65 - 100 mg/dL    Performed by [facility]    METABOLIC PANEL, BASIC    Collection Time: 07/11/19  6:00 AM   Result Value Ref Range    Sodium 144 136 - 145 mmol/L    Potassium 3.8 3.5 - 5.1 mmol/L    Chloride 110 (H) 97 - 108 mmol/L    CO2 29 21 - 32 mmol/L    Anion gap 5 5 - 15 mmol/L    Glucose 72 65 - 100 mg/dL    BUN 9 6 - 20 MG/DL    Creatinine 0.84 0.55 - 1.02 MG/DL    BUN/Creatinine ratio 11 (L) 12 - 20      GFR est AA >60 >60 ml/min/1.73m2    GFR est non-AA >60 >60 ml/min/1.73m2    Calcium 8.5 8.5 - 10.1 MG/DL   GLUCOSE, POC    Collection Time: 07/11/19  6:20 AM   Result Value Ref Range    Glucose (POC) 74 65 - 100 mg/dL    Performed by Zohre[address] Child[address], POC    Collection Time: 07/11/19  6:37 AM   Result Value Ref Range    Glucose (POC) 100 65 - 100 mg/dL    Performed by Zohreh Childers [address], POC    Collection Time: 07/11/19 11:45 AM   Result Value Ref Range    Glucose (POC) 197 (H) 65 - 100 mg/dL    Performed by Lauri aOkley    GLUCOSE, POC    Collection Time: 07/11/19  4:21 PM   Result Value Ref Range    Glucose (POC) 234 (H) 65 - 100 mg/dL    Performed by Jose Raul Armas    GLUCOSE, POC    Collection Time: 07/11/19  9:04 PM   Result Value Ref Range    Glucose (POC) 288 (H) 65 - 100 mg/dL    Performed by Brain Amador, POC    Collection Time: 07/12/19 12:52 AM   Result Value Ref Range    Glucose (POC) 121 (H) 65 - 100 mg/dL    Performed by Nereida Enriquez    GLUCOSE, POC    Collection Time: 07/12/19  6:06 AM   Result Value Ref Range    Glucose (POC) 97 65 - 100 mg/dL    Performed by Nereida Enriquez            Physical Exam on Discharge:    Discharge condition: fair    Vital signs:   Patient Vitals for the past 12 hrs:   Temp Pulse Resp BP SpO2   07/12/19 0833     97 %   07/12/19 0742 98.3 °F (36.8 °C) 72 16 109/62 96 %   07/12/19 0040  60  122/53        General appearance: alert, cooperative, no distress, appears stated age  Lungs: clear to auscultation bilaterally  Abdomen: soft, non-tender. Bowel sounds normal. No masses,  no organomegaly    Current Discharge Medication List      START taking these medications    Details   bisacodyl (DULCOLAX) 5 mg EC tablet Take 1 Tab by mouth daily as needed for Constipation. Qty: 10 Tab, Refills: 0      HYDROcodone-acetaminophen (NORCO) 5-325 mg per tablet Take 1 Tab by mouth every six (6) hours as needed for Pain for up to 3 days. Max Daily Amount: 4 Tabs. Qty: 10 Tab, Refills: 0    Associated Diagnoses: Epigastric abdominal pain      metoprolol tartrate (LOPRESSOR) 25 mg tablet Take 0.5 Tabs by mouth every twelve (12) hours for 30 days. Qty: 30 Tab, Refills: 0      polyethylene glycol (MIRALAX) 17 gram packet Take 1 Packet by mouth two (2) times a day for 30 days.   Qty: 60 Packet, Refills: 0         CONTINUE these medications which have CHANGED Details   insulin glargine (LANTUS SOLOSTAR U-100 INSULIN) 100 unit/mL (3 mL) inpn 20 Units by SubCUTAneous route nightly. Indications: diabetes, type 2 diabetes mellitus  Qty: 1 Adjustable Dose Pre-filled Pen Syringe, Refills: 0      amLODIPine (NORVASC) 10 mg tablet Take 0.5 Tabs by mouth daily. Qty: 1 Tab, Refills: 0         CONTINUE these medications which have NOT CHANGED    Details   albuterol (PROVENTIL VENTOLIN) 2.5 mg /3 mL (0.083 %) nebulizer solution 3 mL by Nebulization route every four (4) hours as needed for Wheezing. Qty: 60 Each, Refills: 0      arformoterol (BROVANA) 15 mcg/2 mL nebu neb solution 2 mL by Nebulization route two (2) times a day. Qty: 60 Vial, Refills: 0      budesonide (PULMICORT) 0.5 mg/2 mL nbsp 2 mL by Nebulization route two (2) times a day. Qty: 60 Each, Refills: 1      aspirin 81 mg chewable tablet Take 1 Tab by mouth daily. Qty: 30 Tab, Refills: 0      busPIRone (BUSPAR) 5 mg tablet Take 20 mg by mouth two (2) times a day. DULoxetine (CYMBALTA) 60 mg capsule Take 60 mg by mouth daily. ranolazine ER (RANEXA) 500 mg SR tablet Take 500 mg by mouth two (2) times a day. mirtazapine (REMERON) 15 mg tablet Take 15 mg by mouth nightly. QUEtiapine (SEROQUEL) 25 mg tablet Take 25 mg by mouth nightly. albuterol (PROVENTIL HFA, VENTOLIN HFA, PROAIR HFA) 90 mcg/actuation inhaler Take 2 Puffs by inhalation every four (4) hours as needed for Wheezing. Qty: 1 Inhaler, Refills: 0      rosuvastatin (CRESTOR) 10 mg tablet Take 10 mg by mouth nightly. Dexlansoprazole (DEXILANT) 60 mg CpDB Take 60 mg by mouth Daily (before breakfast). fosinopril (MONOPRIL) 20 mg tablet Take 20 mg by mouth daily.   Refills: 0               Total time spent on discharge planning, counseling and co-ordination of care:   32 minutes    Jose L Hawkins MD  07/12/19  10:33 AM

## 2019-07-18 ENCOUNTER — PATIENT OUTREACH (OUTPATIENT)
Dept: CASE MANAGEMENT | Age: 79
End: 2019-07-18

## 2019-07-18 NOTE — PROGRESS NOTES
Community Care Team documentation for patient in [facility]  Initial Follow Up       Patient was discharged to [facility], [facility]. Information included in this progress note has been provided to SNF. Hospital Admission and Diagnosis:  [facility] 7/5-7/12 Atypical chest pain , hx of CAD ACS ruled out       RRAT Score: 24        Advance Care Planning: Not on file     PCP : Ron Lantigua MD    SNF Attending:  Andrew Robles MD    Spoke with SNF team. Ensured patient arrived to SNF safely with admission packet in order. Provided needed hospital follow up appointments:  Kisha Hathaway MD Gastroenterology Schedule an appointment as soon as possible for a visit as recommended, for C-scopy,EUS and biopsy report follow up. PT/OT providing skilled therapy. Currently supervision with transfers. Ambulating 200ft without DME. Modified independent with toileting. Set up assist required with ADLs. Confirmed [facility] copy is in [facility] under [address]. CCT to provide to SNF if needed. Plan to discharge 8/1. Pt lives alone and has family support. Community Care Team will follow up weekly with [facility] until discharge. Medications were not reconciled and general patient assessment was not completed during this [facility] outreach.

## 2019-07-25 ENCOUNTER — PATIENT OUTREACH (OUTPATIENT)
Dept: CASE MANAGEMENT | Age: 79
End: 2019-07-25

## 2019-07-25 NOTE — PROGRESS NOTES
Community Care Team Documentation for Patient in [facility]  Subsequent Follow up     Patient remains at [facility] ([facility]). See previous [facility] Team notes. PCP : Rachel Garcia MD    Spoke with SNF team.  Provided needed hospital follow up appointments:  Kevon Oh MD Gastroenterology Schedule an appointment as soon as possible for a visit as recommended, for C-scopy,EUS and biopsy report follow up. PT/OT continue. Currently modified independent with transfers. Ambulating 200ft without DME. Modified independent with ADLs Plan to discharge 8/1 home alone with [facility]. Medications were not reconciled and general patient assessment was not completed during this skilled nursing facility outreach.

## 2019-08-02 ENCOUNTER — PATIENT OUTREACH (OUTPATIENT)
Dept: CASE MANAGEMENT | Age: 79
End: 2019-08-02

## 2019-08-02 NOTE — PROGRESS NOTES
Community Care Team Documentation for Patient in [facility]  Discharge Note    Patient has been discharged from [facility] ([facility]). See previous [facility] Team notes. PCP : Britney Henriquez MD    Spoke with SNF team.  Confirmed patient was discharged 8/1 to home alone with [facility] due to insurance. Community Care Team will sign off at this time. Medications were not reconciled and general patient assessment was not completed during this skilled nursing facility outreach.      Pratima Mosley, MSN, RN, ACNS-BC, [facility]  Nurse Navigator, [employer] [phone]

## 2019-08-16 ENCOUNTER — HOSPITAL ENCOUNTER (EMERGENCY)
Age: 79
Discharge: HOME | End: 2019-08-16
Attending: EMERGENCY MEDICINE
Payer: MEDICARE

## 2019-08-16 ENCOUNTER — APPOINTMENT (OUTPATIENT)
Dept: GENERAL RADIOLOGY | Age: 79
End: 2019-08-16
Attending: EMERGENCY MEDICINE
Payer: MEDICARE

## 2019-08-16 VITALS
OXYGEN SATURATION: 98 % | SYSTOLIC BLOOD PRESSURE: 123 MMHG | HEART RATE: 99 BPM | DIASTOLIC BLOOD PRESSURE: 65 MMHG | RESPIRATION RATE: 14 BRPM | TEMPERATURE: 98.2 F

## 2019-08-16 DIAGNOSIS — R73.9 HYPERGLYCEMIA: ICD-10-CM

## 2019-08-16 DIAGNOSIS — J45.21 MILD INTERMITTENT ASTHMA WITH ACUTE EXACERBATION: ICD-10-CM

## 2019-08-16 DIAGNOSIS — R07.9 CHEST PAIN, UNSPECIFIED TYPE: Primary | ICD-10-CM

## 2019-08-16 LAB
ALBUMIN SERPL-MCNC: 3.2 G/DL (ref 3.5–5)
ALBUMIN/GLOB SERPL: 0.9 {RATIO} (ref 1.1–2.2)
ALP SERPL-CCNC: 102 U/L (ref 45–117)
ALT SERPL-CCNC: 21 U/L (ref 12–78)
ANION GAP SERPL CALC-SCNC: 8 MMOL/L (ref 5–15)
AST SERPL-CCNC: 6 U/L (ref 15–37)
ATRIAL RATE: 95 BPM
BASOPHILS # BLD: 0 K/UL (ref 0–0.1)
BASOPHILS NFR BLD: 0 % (ref 0–1)
BILIRUB SERPL-MCNC: 0.3 MG/DL (ref 0.2–1)
BNP SERPL-MCNC: 15 PG/ML
BUN SERPL-MCNC: 17 MG/DL (ref 6–20)
BUN/CREAT SERPL: 15 (ref 12–20)
CALCIUM SERPL-MCNC: 8.5 MG/DL (ref 8.5–10.1)
CALCULATED P AXIS, ECG09: 66 DEGREES
CALCULATED R AXIS, ECG10: 48 DEGREES
CALCULATED T AXIS, ECG11: 53 DEGREES
CHLORIDE SERPL-SCNC: 108 MMOL/L (ref 97–108)
CO2 SERPL-SCNC: 24 MMOL/L (ref 21–32)
COMMENT, HOLDF: NORMAL
CREAT SERPL-MCNC: 1.14 MG/DL (ref 0.55–1.02)
DIAGNOSIS, 93000: NORMAL
DIFFERENTIAL METHOD BLD: ABNORMAL
EOSINOPHIL # BLD: 0.1 K/UL (ref 0–0.4)
EOSINOPHIL NFR BLD: 1 % (ref 0–7)
ERYTHROCYTE [DISTWIDTH] IN BLOOD BY AUTOMATED COUNT: 16.2 % (ref 11.5–14.5)
GLOBULIN SER CALC-MCNC: 3.6 G/DL (ref 2–4)
GLUCOSE BLD STRIP.AUTO-MCNC: 328 MG/DL (ref 65–100)
GLUCOSE SERPL-MCNC: 214 MG/DL (ref 65–100)
HCT VFR BLD AUTO: 35.8 % (ref 35–47)
HGB BLD-MCNC: 11.2 G/DL (ref 11.5–16)
IMM GRANULOCYTES # BLD AUTO: 0 K/UL (ref 0–0.04)
IMM GRANULOCYTES NFR BLD AUTO: 0 % (ref 0–0.5)
LYMPHOCYTES # BLD: 2.5 K/UL (ref 0.8–3.5)
LYMPHOCYTES NFR BLD: 22 % (ref 12–49)
MCH RBC QN AUTO: 26.7 PG (ref 26–34)
MCHC RBC AUTO-ENTMCNC: 31.3 G/DL (ref 30–36.5)
MCV RBC AUTO: 85.2 FL (ref 80–99)
MONOCYTES # BLD: 0.9 K/UL (ref 0–1)
MONOCYTES NFR BLD: 8 % (ref 5–13)
NEUTS SEG # BLD: 7.8 K/UL (ref 1.8–8)
NEUTS SEG NFR BLD: 69 % (ref 32–75)
NRBC # BLD: 0 K/UL (ref 0–0.01)
NRBC BLD-RTO: 0 PER 100 WBC
P-R INTERVAL, ECG05: 136 MS
PLATELET # BLD AUTO: 248 K/UL (ref 150–400)
PMV BLD AUTO: 10.8 FL (ref 8.9–12.9)
POTASSIUM SERPL-SCNC: 4.6 MMOL/L (ref 3.5–5.1)
PROT SERPL-MCNC: 6.8 G/DL (ref 6.4–8.2)
Q-T INTERVAL, ECG07: 366 MS
QRS DURATION, ECG06: 64 MS
QTC CALCULATION (BEZET), ECG08: 459 MS
RBC # BLD AUTO: 4.2 M/UL (ref 3.8–5.2)
SAMPLES BEING HELD,HOLD: NORMAL
SERVICE CMNT-IMP: ABNORMAL
SODIUM SERPL-SCNC: 140 MMOL/L (ref 136–145)
TROPONIN I SERPL-MCNC: <0.05 NG/ML
TROPONIN I SERPL-MCNC: <0.05 NG/ML
VENTRICULAR RATE, ECG03: 95 BPM
WBC # BLD AUTO: 11.4 K/UL (ref 3.6–11)

## 2019-08-16 PROCEDURE — 94640 AIRWAY INHALATION TREATMENT: CPT

## 2019-08-16 PROCEDURE — 74011250637 HC RX REV CODE- 250/637: Performed by: EMERGENCY MEDICINE

## 2019-08-16 PROCEDURE — 83880 ASSAY OF NATRIURETIC PEPTIDE: CPT

## 2019-08-16 PROCEDURE — 71046 X-RAY EXAM CHEST 2 VIEWS: CPT

## 2019-08-16 PROCEDURE — 82962 GLUCOSE BLOOD TEST: CPT

## 2019-08-16 PROCEDURE — 97161 PT EVAL LOW COMPLEX 20 MIN: CPT

## 2019-08-16 PROCEDURE — 93005 ELECTROCARDIOGRAM TRACING: CPT

## 2019-08-16 PROCEDURE — 84484 ASSAY OF TROPONIN QUANT: CPT

## 2019-08-16 PROCEDURE — 97530 THERAPEUTIC ACTIVITIES: CPT

## 2019-08-16 PROCEDURE — 74011000250 HC RX REV CODE- 250: Performed by: EMERGENCY MEDICINE

## 2019-08-16 PROCEDURE — 80053 COMPREHEN METABOLIC PANEL: CPT

## 2019-08-16 PROCEDURE — 85025 COMPLETE CBC W/AUTO DIFF WBC: CPT

## 2019-08-16 PROCEDURE — 36415 COLL VENOUS BLD VENIPUNCTURE: CPT

## 2019-08-16 PROCEDURE — 74011250636 HC RX REV CODE- 250/636: Performed by: EMERGENCY MEDICINE

## 2019-08-16 PROCEDURE — 99285 EMERGENCY DEPT VISIT HI MDM: CPT

## 2019-08-16 RX ORDER — IPRATROPIUM BROMIDE AND ALBUTEROL SULFATE 2.5; .5 MG/3ML; MG/3ML
3 SOLUTION RESPIRATORY (INHALATION) AS NEEDED
Status: DISCONTINUED | OUTPATIENT
Start: 2019-08-16 | End: 2019-08-16 | Stop reason: HOSPADM

## 2019-08-16 RX ORDER — ACETAMINOPHEN 500 MG
1000 TABLET ORAL ONCE
Status: COMPLETED | OUTPATIENT
Start: 2019-08-16 | End: 2019-08-16

## 2019-08-16 RX ADMIN — SODIUM CHLORIDE 1000 ML: 900 INJECTION, SOLUTION INTRAVENOUS at 14:34

## 2019-08-16 RX ADMIN — IPRATROPIUM BROMIDE AND ALBUTEROL SULFATE 3 ML: .5; 3 SOLUTION RESPIRATORY (INHALATION) at 11:51

## 2019-08-16 RX ADMIN — ACETAMINOPHEN 1000 MG: 500 TABLET ORAL at 14:39

## 2019-08-16 RX ADMIN — IPRATROPIUM BROMIDE AND ALBUTEROL SULFATE 3 ML: .5; 3 SOLUTION RESPIRATORY (INHALATION) at 09:49

## 2019-08-16 NOTE — PROGRESS NOTES
Updates received from [address] to follow on Saturday 8/17/19. CM placed call to sister Emelyn Dong [phone] regarding transportation options home. Romayne Babinski acknowledged her and brother at [facility] and can pick patient up from [facility] in 5 minutes. CM communicated w/ Nursing. This writer met w/this writer and asked to speak w/ SW regarding getting bed at [facility] for her legs and needing help at home. CM introduced self and informed patient [facility] Burns will visit tomorrow afternoon.  expressed her appreciation sister Jeromy Park at bedside and family provided ride home. No additional transitional care needs verbalized at this time.

## 2019-08-16 NOTE — ED TRIAGE NOTES
Patient presents from home with complaints of chest pain that woke her up since 0400 this morning. Patient also has LLQ tenderness, swelling to bilateral legs and R hand.   Patient reports constipation     Patient received 324 mg of aspirin en route

## 2019-08-16 NOTE — ED NOTES
Patient maintained normal oxygen saturation when walking with PT.   Patient did reports SOB again and appear winded

## 2019-08-16 NOTE — SENIOR SERVICES NOTE
Chart reviewed and [facility] team note states patient was discharged from [facility] on the August 1st because of insurance. I spoke with  at [facility] and she states that patient met her goals and was a planned discharge. Also reports that on 8/1 pt insurance from traditional  Medicare to [employer] so would need prior authorization if needed to come back. Spoke with dr. Miriam Ricardo regarding prior authorization and he is in agreement with starting the process. Met with patient and she reports that she was recently discharged from [facility] and [address] [place] [address]. Pt is agreeable to go back. OT and PT consults placed. Pt independent with PT so doubt will get approval for SNF. Will see if [facility] can make a visit tomorrow. Stephan Sherman will do a prn visit tomorrow.

## 2019-08-16 NOTE — ED NOTES
RN ambulatory to restroom with patient. Patient reports increased SOB and chest pain with ambulation.  MD aware

## 2019-08-16 NOTE — ED PROVIDER NOTES
69 y.o. female with past medical history significant for dm, htn, asthma, hemorrhoids, anal polyp, gerd, arthritis, kidney stones, cad, who presents from ems with chief complaint of chest pain. Pt woke up at 4:00 am this morning w/ onset of diffuse, mechanical CP that's described as \"burning. \" Pt cites \"this is the first time I've had this kind of CP. \" Accompanying sx include right hand dedema, dizziness, sob, nausea, and cough productive of yellow sputum. EMS was called this morning and administered ASA en route. S/p arrival to ED, pt reports that her pain has significantly improved. Denies vomiting. Of note, she lives by herself at home and uses a walker at baseline; home health nurse management starts on Monday. BGL controlled. There are no other acute medical concerns at this time. PCP: Britney Henriquez MD    Note written by Jeimy Del Cid, as dictated by Alberto Romero MD 9:24 AM    The history is provided by the patient and the EMS personnel. No  was used.         Past Medical History:   Diagnosis Date    Anal polyp 6/13/2013    Arthritis     Asthma     CAD (coronary artery disease) 10/27/2017    Cataract     Chronic pain     knee - right/back    Diabetes ([address]     GERD (gastroesophageal reflux disease)     Hemorrhoids 6/13/2013    History of kidney stones     Hypertension     Ill-defined condition     abdominal pain and burning    Other ill-defined conditions(799.89)     high cholesterol       Past Surgical History:   Procedure Laterality Date    COLONOSCOPY  2/28/2013         EGD  2/28/2013         HX CATARACT REMOVAL Bilateral     HX CHOLECYSTECTOMY  6-2015    HX GI  6/27/13    anal polyps removed    HX HEENT      laser surgery for blood clot in right eye    HX KNEE REPLACEMENT      right    HX OTHER SURGICAL  4-2-14    lap gastrotomy and removal of polyp -  - not cancerous per pt    HX ALAN AND BSO      HX TONSILLECTOMY      HX UROLOGICAL \"laser\" surgery for kidney stone    NEUROLOGICAL PROCEDURE UNLISTED  1990    tumor at back of neck, benign         Family History:   Problem Relation Age of Onset    Cancer Mother         colon    Diabetes Brother     Other Sister         GI BLEED    Heart Disease Brother     Heart Attack Brother     Heart Disease Brother     Heart Disease Brother     Schizophrenia Son     Anesth Problems Neg Hx        Social History     Socioeconomic History    Marital status:      Spouse name: Not on file    Number of children: Not on file    Years of education: Not on file    Highest education level: Not on file   Occupational History    Not on file   Social Needs    Financial resource strain: Not on file    Food insecurity:     Worry: Not on file     Inability: Not on file    Transportation needs:     Medical: Not on file     Non-medical: Not on file   Tobacco Use    Smoking status: Former Smoker     Years: 0.00    Smokeless tobacco: Never Used    Tobacco comment: age 13   Substance and Sexual Activity    Alcohol use: No    Drug use: No    Sexual activity: Never   Lifestyle    Physical activity:     Days per week: Not on file     Minutes per session: Not on file    Stress: Not on file   Relationships    Social connections:     Talks on phone: Not on file     Gets together: Not on file     Attends [employer] service: Not on file     Active member of club or organization: Not on file     Attends meetings of clubs or organizations: Not on file     Relationship status: Not on file    Intimate partner violence:     Fear of current or ex partner: Not on file     Emotionally abused: Not on file     Physically abused: Not on file     Forced sexual activity: Not on file   Other Topics Concern    Not on file   Social History Narrative    Not on file         ALLERGIES: Seafood [shellfish containing products]    Review of Systems   Constitutional: Negative for activity change, chills and fever. HENT: Negative for nosebleeds, sore throat, trouble swallowing and voice change. Eyes: Negative for visual disturbance. Respiratory: Positive for cough and shortness of breath. Cardiovascular: Positive for chest pain. Negative for palpitations. Gastrointestinal: Positive for nausea. Negative for abdominal pain, constipation, diarrhea and vomiting. Genitourinary: Negative for difficulty urinating, dysuria, hematuria and urgency. Musculoskeletal: Positive for joint swelling. Negative for back pain, neck pain and neck stiffness. Skin: Negative for color change. Allergic/Immunologic: Negative for immunocompromised state. Neurological: Positive for dizziness. Negative for seizures, syncope, weakness, light-headedness, numbness and headaches. Psychiatric/Behavioral: Negative for behavioral problems, confusion, hallucinations, self-injury and suicidal ideas. Vitals:    08/16/19 0950 08/16/19 1151 08/16/19 1330 08/16/19 1340   BP:   96/68 125/62   Pulse:   (!) 130 (!) 108   Resp:       Temp:       SpO2: 98% 98% 94% 98%            Physical Exam   Constitutional: She is oriented to person, place, and time. She appears well-developed and well-nourished. No distress. HENT:   Head: Normocephalic and atraumatic. Eyes: Pupils are equal, round, and reactive to light. Neck: Normal range of motion. Neck supple. Cardiovascular: Normal rate, regular rhythm and normal heart sounds. Exam reveals no gallop and no friction rub. No murmur heard. Pulmonary/Chest: Effort normal. No respiratory distress. She has decreased breath sounds. She has wheezes (mild end expiratory). She exhibits tenderness (w/ palpation). Abdominal: Soft. Bowel sounds are normal. She exhibits no distension. There is no tenderness. There is no rebound and no guarding. Musculoskeletal: Normal range of motion. Neurological: She is alert and oriented to person, place, and time. Skin: Skin is warm. No rash noted.  She is not diaphoretic. Psychiatric: She has a normal mood and affect. Her behavior is normal. Judgment and thought content normal.   Nursing note and vitals reviewed. Note written by Claudean Gibbs, Scribe, as dictated by Sukhwinder Clark MD 9:24 AM    MDM     This is a 81-year-old female with past medical history, review of systems, physical exam as above, presenting with multiple medical complaints, including chest pain, shortness of breath, cough, right hand swelling, leg pain. Patient history significant for coronary disease, recent discharge home from rehab after multiple evaluations including hyperglycemia. Patient states she has had cough productive of scant sputum, unknown fevers, states chest pain is worse with coughing. She states burning chest pain woke her from sleep this morning, has improved with aspirin administered by EMS. She endorses associated shortness of breath as above, right hand swelling that she states also work her up this morning as well as left hip pain, without known trauma. Physical exam is remarkable for well-appearing chronically ill female, in no acute distress, with mildly reproducible chest pain on palpation, diminished breath sounds throughout, unremarkable appearing right hand and left hip, noted to be satting well on room air without fevers or tachycardia. Differential includes acute coronary syndrome, congestive heart failure, upper respiratory infection including pneumonia. Plan to obtain CMP, CBC, EKG, chest x-ray, cardiac enzymes. We will consult with case management due to her frequent healthcare needs, and make a disposition. Procedures    ED EKG interpretation:  Rhythm: normal sinus rhythm; Rate (approx.): 95; No acute ischemia; unchanged from 7/5/19  Note written by Claudean Gibbs, Scribe, as dictated by Sukhwinder Clark MD 9:36 AM    PROGRESS NOTE:  3:05 PM  Evaluated by . Unable to place pt today.  Will d/c home w/ home health services and close f/u.    Patient's results have been reviewed with them. Patient and/or family have verbally conveyed their understanding and agreement of the patient's signs, symptoms, diagnosis, treatment and prognosis and additionally agree to follow up as recommended or return to the Emergency Room should their condition change prior to follow-up. Discharge instructions have also been provided to the patient with some educational information regarding their diagnosis as well a list of reasons why they would want to return to the ER prior to their follow-up appointment should their condition change.

## 2019-08-16 NOTE — PROGRESS NOTES
PHYSICAL THERAPY EMERGENCY DEPARTMENT EVALUATION WITH DISCHARGE  Patient: Tisa Felty (70 y.o. female)  Date: 8/16/2019  Primary Diagnosis: No admission diagnoses are documented for this encounter. Precautions: fall       ASSESSMENT  Based on the objective data described below, the patient presents functioning below her recent baseline. Two days ago she was able to get out of the house and walk an entire block with her single point cane. Today her ability to walk was limited to 80 ft with frequent standing rest breaks and the use of a walker for support due to shortness of breath. SpO2 was w/in acceptable limits though decreased from 98% at rest to 94% walking, HR increased to 127 bpm.  Patient denied chest pain throughout this therapy encounter though voiced right knee pain which is chronic. Patient voiced concern re: her ability to care for herself in her home. She states the paperwork for a medicaid aide was submitted last week and that she plans to have her landlord/ friend be her private care aide. Until that time, she has very little in home caregiver assistance. With increased assistance in the home, patient would be appropriate to return home with home health PT and OT to address her endurance, sob and for energy conservation training. Given that she does not have a strong support system and with her decreased endurance and increased SOB with minimal activity, she would be appropriate to transition back to SNF to progress back to her baseline. Patient voiced she is in agreement with this plan. SS[facility] NP and CM made aware. Functional Outcome Measure: The patient scored 17/28 on the Tinetti Gait and Balance Assessment outcome measure which is indicative of high fall risk. Other factors to consider for discharge: lives alone, very little caregiver support, SOB with min activity     Further skilled acute physical therapy is not indicated at this time.      PLAN :  Recommendation for discharge: (in order for the patient to meet his/her long term goals)  To be determined: Home with home health physical therapy and occupational therapy AND in home caregiver assistance. If no caregiver in the home to assist, recommend return to SNF. This discharge recommendation:  Has been made in collaboration with the senior services nurse practitioner and/or case management    Equipment recommendations for successful discharge: none     SUBJECTIVE:   Patient stated I can't go home. I don't have anyone there to assist me.     OBJECTIVE DATA SUMMARY:   HISTORY:    Past Medical History:   Diagnosis Date    Anal polyp 6/13/2013    Arthritis     Asthma     CAD (coronary artery disease) 10/27/2017    Cataract     Chronic pain     knee - right/back    Diabetes ([address]     GERD (gastroesophageal reflux disease)     Hemorrhoids 6/13/2013    History of kidney stones     Hypertension     Ill-defined condition     abdominal pain and burning    Other ill-defined conditions(799.89)     high cholesterol     Past Surgical History:   Procedure Laterality Date    COLONOSCOPY  2/28/2013         EGD  2/28/2013         HX CATARACT REMOVAL Bilateral     HX CHOLECYSTECTOMY  6-2015    HX GI  6/27/13    anal polyps removed    HX HEENT      laser surgery for blood clot in right eye    HX KNEE REPLACEMENT      right    HX OTHER SURGICAL  4-2-14    lap gastrotomy and removal of polyp -  - not cancerous per pt    HX ALAN AND BSO      HX TONSILLECTOMY      HX UROLOGICAL      \"laser\" surgery for kidney stone    NEUROLOGICAL PROCEDURE UNLISTED  1990    tumor at back of neck, benign     Prior Level of Function/Home Situation: Patient recently discharged from [facility] rehab where she endorses being for two months. She states two days ago, she was able to walk an entire block with her cane. She notes a fall history with the most recent fall occurring about 3 months ago (none since her rehab stay).   She has chronic right knee pain. She lives alone in a home she is renting. There is one step to enter. The bedroom and bathroom are located on the 2nd floor. She relies on a bedside commode which she uses downstairs during the day and relies on her daughter for supervision when bathing (1x/ wk). She has applied for a medicaid aide in her landlord (states paperwork was submitted last week). Home Situation  Home Environment: Private residence  # Steps to Enter: 1  One/Two Story Residence: Two story  # of Interior Steps: 14  Living Alone: Yes  Support Systems: Family member(s), Friends \ neighbors  Patient Expects to be Discharged to[de-identified] Rehabilitation facility  Current DME Used/Available at Home: [address], [address], straight, Walker, rolling, Shower chair, Commode, bedside  Tub or Shower Type: Tub/Shower combination     Education (15:21 - 15:28) - Informed by NP that the plan is to discharge home with home health and have patient follow up in the community for SNF referral.  NP notified the home health agency to request a visit tomorrow. CM is contacting patient's family. Met with patient to provide her with education re: ways to manage in her home. Recommended she consider staying on the main level of her home until the home health therapist works with her on the steps. Patient states she cannot sleep on the sofa. Recommended she consider having her family/ friend move a bed into the dining room where her bedside commode is located to allow her to limit stairs to times when she has family assisting. Patient is considering. Assisted patient to the bathroom using the rolling walker. Patient ambulated 80 ft x2 with no standing rest breaks this session and managed her own clothing. Patient is however, somewhat anxious re: discharge. Asked care management to speak with her.       EXAMINATION/PRESENTATION/DECISION MAKING:   Critical Behavior:  Neurologic State: Alert  Orientation Level: Oriented to person, Oriented to place, Oriented to situation  Cognition: Appropriate decision making, Follows commands, Appropriate safety awareness  Safety/Judgement: Awareness of environment, Good awareness of safety precautions  Hearing: Auditory  Auditory Impairment: None    Range Of Motion:  AROM: Generally decreased, functional                       Strength:    Strength: Within functional limits                    Tone & Sensation:   Tone: Normal              Sensation: Intact(light touch BLE)               Coordination:  Coordination: Within functional limits  Vision:   Diplopia: No  Acuity: Able to read clock/calendar on wall without difficulty  Functional Mobility:  Bed Mobility:  Supine to Sit: Independent  Sit to Supine: Independent  Transfers:  Sit to Stand: Modified independent  Stand to Sit: Modified independent  Balance:   Sitting: Intact; Without support  Standing: Impaired; Without support  Standing - Static: Fair  Standing - Dynamic : Fair  Ambulation/Gait Training:  Distance (ft): 80 Feet x2. Several brief standing rest breaks. Assistive Device: Walker, rolling  Ambulation - Level of Assistance: Supervision; Adaptive equipment  Gait Description (WDL): Exceptions to [facility]  Base of Support: Widened  Interventions: Verbal cues    Special Tests:  Tinetti test:    Sitting Balance: 1  Arises: 1  Attempts to Rise: 2  Immediate Standing Balance: 1  Standing Balance: 1  Nudged: 0  Eyes Closed: 0  Turn 360 Degrees - Continuous/Discontinuous: 1  Turn 360 Degrees - Steady/Unsteady: 1(with walker)  Sitting Down: 1  Balance Score: 9  Indication of Gait: 1  R Step Length/Height: 1  L Step Length/Height: 1  R Foot Clearance: 1  L Foot Clearance: 1  Step Symmetry: 1  Step Continuity: 1  Path: 1  Trunk: 0  Walking Time: 0  Gait Score: 8  Total Score: 17         Tinetti Tool Score Risk of Falls  <19 = High Fall Risk  19-24 = Moderate Fall Risk  25-28 = Low Fall Risk  Tinetti ME. Performance-Oriented Assessment of Mobility Problems in Elderly Patients. [facility]; [id number]. (Scoring Description: PT Bulletin Feb. 10, 1993)    Older adults: Char Bertrand et al, 2009; n = 1000 [facility] elderly evaluated with ABC, MIKAYLA, ADL, and IADL)  · Mean MIKAYLA score for males aged 69-68 years = 26.21(3.40)  · Mean MIKAYLA score for females age 69-68 years = 25.16(4.30)  · Mean MIKAYLA score for males over 80 years = 23.29(6.02)  · Mean MIKAYLA score for females over 80 years = 17.20(8.32)        Physical Therapy Evaluation Charge Determination   History Examination Presentation Decision-Making   MEDIUM  Complexity : 1-2 comorbidities / personal factors will impact the outcome/ POC  LOW Complexity : 1-2 Standardized tests and measures addressing body structure, function, activity limitation and / or participation in recreation  LOW Complexity : Stable, uncomplicated  LOW Complexity : FOTO score of       Based on the above components, the patient evaluation is determined to be of the following complexity level: LOW     Therapeutic Exercises:     Pain:  Pain Scale 1: Numeric (0 - 10)  Pain Intensity 1: 5  Pain Location 1: right knee pain  Patient denied chest pain throughout this visit    Activity Tolerance:   Poor - required several brief standing rest breaks due to SOB. HR increased to 127 with min activity (walking 80 ft). Please refer to the flowsheet for vital signs taken during this treatment. After treatment patient left in no apparent distress:   Supine in bed and bedrails x1 per pt request    COMMUNICATION/EDUCATION:   Communication/Collaboration:  Fall prevention education was provided and the patient/caregiver indicated understanding., Patient/family have participated as able in goal setting and plan of care. and Patient/family agree to work toward stated goals and plan of care.     Findings and recommendations were discussed with: MD physician and RN, NP    Thank you for this referral.  Dc Mariscal, PT   Time Calculation: 20 mins

## 2019-08-16 NOTE — DISCHARGE INSTRUCTIONS
Patient Education     Learning About Asthma Triggers  What are triggers? When you have asthma, certain things can make your symptoms worse. These are called triggers. They include:  · Cigarette smoke or air pollution. · Things you are allergic to, such as:  ¨ Pollen, mold, or dust mites. ¨ Pet hair, skin, or saliva. · Illnesses, like colds, flu, or pneumonia. · Exercise. · Dry, cold air. How do triggers affect asthma? Triggers can make it harder for your lungs to work as they should and can lead to sudden difficulty breathing and other symptoms. When you are around a trigger, an asthma attack is more likely. If your symptoms are severe, you may need emergency treatment or have to go to the hospital for treatment. If you know what your triggers are and can avoid them, you may be able to prevent asthma attacks, reduce how often you have them, and make them less severe. What can you do to avoid triggers? The first thing is to know your triggers. When you are having symptoms, note the things around you that might be causing them. Then look for patterns in what may be triggering your symptoms. Record your triggers on a piece of paper or in an asthma diary. When you have your list of possible triggers, work with your doctor to find ways to avoid them. You also can check how well your lungs are working by measuring your peak expiratory flow (PEF) throughout the day. Your PEF may drop when you are near things that trigger symptoms. Here are some ways to avoid a few common triggers. · Do not smoke or allow others to smoke around you. If you need help quitting, talk to your doctor about stop-smoking programs and medicines. These can increase your chances of quitting for good. · If there is a lot of pollution, pollen, or dust outside, stay at home and keep your windows closed. Use an air conditioner or air filter in your home.  Check your local weather report or newspaper for air quality and pollen reports. · Get a flu shot every year. Talk to your doctor about getting a pneumococcal shot. Wash your hands often to prevent infections. · Avoid exercising outdoors in cold weather. If you are outdoors in cold weather, wear a scarf around your face and breathe through your nose. How can you manage an asthma attack? · If you have an asthma action plan, follow the plan. In general:  ¨ Use your quick-relief inhaler as directed by your doctor. If your symptoms do not get better after you use your medicine, have someone take you to the emergency room. Call an ambulance if needed. ¨ If your doctor has given you other inhaled medicines or steroid pills, take them as directed. Where can you learn more? Go to [employer]  Enter M564 in the search box to learn more about \"Learning About Asthma Triggers. \"   © 8581-9066 Healthwise, Incorporated. Care instructions adapted under license by [facility] [employer] (which disclaims liability or warranty for this information). This care instruction is for use with your licensed healthcare professional. If you have questions about a medical condition or this instruction, always ask your healthcare professional. [place] [address] any warranty or liability for your use of this information. Content Version: 82.3.448563; Last Revised: February 23, 2012                 Patient Education        Chest Pain: Care Instructions  Your Care Instructions    There are many things that can cause chest pain. Some are not serious and will get better on their own in a few days. But some kinds of chest pain need more testing and treatment. Your doctor may have recommended a follow-up visit in the next 8 to 12 hours. If you are not getting better, you may need more tests or treatment. Even though your doctor has released you, you still need to watch for any problems. The doctor carefully checked you, but sometimes problems can develop later.  If you have new symptoms or if your symptoms do not get better, get medical care right away. If you have worse or different chest pain or pressure that lasts more than 5 minutes or you passed out (lost consciousness), call 911 or seek other emergency help right away. A medical visit is only one step in your treatment. Even if you feel better, you still need to do what your doctor recommends, such as going to all suggested follow-up appointments and taking medicines exactly as directed. This will help you recover and help prevent future problems. How can you care for yourself at home? · Rest until you feel better. · Take your medicine exactly as prescribed. Call your doctor if you think you are having a problem with your medicine. · Do not drive after taking a prescription pain medicine. When should you call for help? Call 911 if:    · You passed out (lost consciousness).     · You have severe difficulty breathing.     · You have symptoms of a heart attack. These may include:  ? Chest pain or pressure, or a strange feeling in your chest.  ? Sweating. ? Shortness of breath. ? Nausea or vomiting. ? Pain, pressure, or a strange feeling in your back, neck, jaw, or upper belly or in one or both shoulders or arms. ? Lightheadedness or sudden weakness. ? A fast or irregular heartbeat. After you call 911, the  may tell you to chew 1 adult-strength or 2 to 4 low-dose aspirin. Wait for an ambulance. Do not try to drive yourself.    Call your doctor today if:    · You have any trouble breathing.     · Your chest pain gets worse.     · You are dizzy or lightheaded, or you feel like you may faint.     · You are not getting better as expected.     · You are having new or different chest pain. Where can you learn more? Go to http://arnaldo-mariano.info/. Enter A120 in the search box to learn more about \"Chest Pain: Care Instructions. \"  Current as of: September 23, 2018  Content Version: 12.1  © 2575-7944 Healthwise, Incorporated. Care instructions adapted under license by [employer] (which disclaims liability or warranty for this information). If you have questions about a medical condition or this instruction, always ask your healthcare professional. [address] any warranty or liability for your use of this information. Patient Education        Learning About High Blood Sugar  What is high blood sugar? Your body turns the food you eat into glucose (sugar), which it uses for energy. But if your body isn't able to use the sugar right away, it can build up in your blood and lead to high blood sugar. When the amount of sugar in your blood stays too high for too much of the time, you may have diabetes. Diabetes is a disease that can cause serious health problems. The good news is that lifestyle changes may help you get your blood sugar back to normal and avoid or delay diabetes. What causes high blood sugar? Sugar (glucose) can build up in your blood if you:  · Are overweight. · Have a family history of diabetes. · Take certain medicines, such as steroids. What are the symptoms? Having high blood sugar may not cause any symptoms at all. Or it may make you feel very thirsty or very hungry. You may also urinate more often than usual, have blurry vision, or lose weight without trying. How is high blood sugar treated? You can take steps to lower your blood sugar level if you understand what makes it get higher. Your doctor may want you to learn how to test your blood sugar level at home. Then you can see how illness, stress, or different kinds of food or medicine raise or lower your blood sugar level. Other tests may be needed to see if you have diabetes. How can you prevent high blood sugar? · Watch your weight. If you're overweight, losing just a small amount of weight may help. Reducing fat around your waist is most important.   · Limit the amount of calories, sweets, and unhealthy fat you eat. Ask your doctor if a dietitian can help you. A registered dietitian can help you create meal plans that fit your lifestyle. · Get at least 30 minutes of exercise on most days of the week. Exercise helps control your blood sugar. It also helps you maintain a healthy weight. Walking is a good choice. You also may want to do other activities, such as running, swimming, cycling, or playing tennis or team sports. · If your doctor prescribed medicines, take them exactly as prescribed. Call your doctor if you think you are having a problem with your medicine. You will get more details on the specific medicines your doctor prescribes. Follow-up care is a key part of your treatment and safety. Be sure to make and go to all appointments, and call your doctor if you are having problems. It's also a good idea to know your test results and keep a list of the medicines you take. Where can you learn more? Go to http://arnaldo-mariano.info/. Enter O108 in the search box to learn more about \"Learning About High Blood Sugar. \"  Current as of: July 25, 2018  Content Version: 12.1  © 3799-7528 Healthwise, Incorporated. Care instructions adapted under license by [employer] (which disclaims liability or warranty for this information). If you have questions about a medical condition or this instruction, always ask your healthcare professional. [address] any warranty or liability for your use of this information.

## 2019-08-16 NOTE — PROGRESS NOTES
Date of previous inpatient admission/ ED visit? 7/5/19-7/12/19 Chest Pain    What brought the patient back to ED? Chart reviewed. Patient presents from home with complaints of chest pain that woke her up since 0400 this morning. Patient also has LLQ tenderness, swelling to bilateral legs and R hand. Patient reports constipation      Patient received 324 mg of aspirin en route     Did patient decline recommended services during last admission/ ED visit (if yes, what)? NO    Has patient seen a provider since their last inpatient admission/ED visit (if yes, when)? Yes, seen by PCP 8/6/19 [facility]. Opened to [facility] for RN and PT. Last visit was 8/15/19 for RN and 8/14/19 for PT. CM Interventions:  From previous inpatient admission/ED visit: Patient transitioned to [facility] for skilled services. UAI completed, submitted and successfully processed and provided to facility. Transport arranged via [facility]. From current inpatient admission/ED visit:  Senior Service Case Management Consult received. Patient currently in the ED being evaluated and needs assessed. CM will continue to follow and assist with transition of care needs as they arise. Senior Services NP following as well.    69year old female, AOx4, resides in 2 story home alone with 1 step to enter and 14 steps to reach second level of the home. She reports that she requires assistance with ADL's and IADL's provided by her daughter and landlord. DME utilized consist of a cane and walker. Currently open to [facility] for PT/OT and RN for services. Recently discharged from skilled facility 8/1/19. Pharmacy utilized for prescriptions is [facility]. Family to transport or be arranged. Care Management Interventions  PCP Verified by CM:  Yes  Last Visit to PCP: 08/06/19  Palliative Care Criteria Met (RRAT>21 & CHF Dx)?: No  Mode of Transport at Discharge: BLS  Transition of Care Consult (CM Consult):  Other(Senior Services Case Management)  Discharge Durable Medical Equipment: No  Physical Therapy Consult: No  Occupational Therapy Consult: No  Speech Therapy Consult: No  Current Support Network: Own Home, Lives Alone  Confirm Follow Up Transport: Family  Plan discussed with Pt/Family/Caregiver: Yes  Discharge Location  Discharge Placement: Home with home health(TBD/subject to change pending recommendations)      MALIKA Urias/CRM  Care Management  11:34 AM

## 2019-08-29 ENCOUNTER — APPOINTMENT (OUTPATIENT)
Dept: GENERAL RADIOLOGY | Age: 79
End: 2019-08-29
Attending: STUDENT IN AN ORGANIZED HEALTH CARE EDUCATION/TRAINING PROGRAM
Payer: MEDICARE

## 2019-08-29 ENCOUNTER — HOSPITAL ENCOUNTER (EMERGENCY)
Age: 79
Discharge: HOME | End: 2019-08-29
Attending: STUDENT IN AN ORGANIZED HEALTH CARE EDUCATION/TRAINING PROGRAM
Payer: MEDICARE

## 2019-08-29 ENCOUNTER — APPOINTMENT (OUTPATIENT)
Dept: CT IMAGING | Age: 79
End: 2019-08-29
Attending: STUDENT IN AN ORGANIZED HEALTH CARE EDUCATION/TRAINING PROGRAM
Payer: MEDICARE

## 2019-08-29 VITALS
RESPIRATION RATE: 18 BRPM | HEART RATE: 90 BPM | TEMPERATURE: 98 F | DIASTOLIC BLOOD PRESSURE: 48 MMHG | SYSTOLIC BLOOD PRESSURE: 125 MMHG | OXYGEN SATURATION: 98 % | WEIGHT: 174 LBS | BODY MASS INDEX: 34.16 KG/M2 | HEIGHT: 60 IN

## 2019-08-29 DIAGNOSIS — R10.32 ABDOMINAL PAIN, LLQ (LEFT LOWER QUADRANT): Primary | ICD-10-CM

## 2019-08-29 LAB
ALBUMIN SERPL-MCNC: 3.2 G/DL (ref 3.5–5)
ALBUMIN/GLOB SERPL: 0.8 {RATIO} (ref 1.1–2.2)
ALP SERPL-CCNC: 105 U/L (ref 45–117)
ALT SERPL-CCNC: 14 U/L (ref 12–78)
ANION GAP SERPL CALC-SCNC: 6 MMOL/L (ref 5–15)
APPEARANCE UR: CLEAR
AST SERPL-CCNC: 11 U/L (ref 15–37)
BACTERIA URNS QL MICRO: NEGATIVE /HPF
BASOPHILS # BLD: 0.1 K/UL (ref 0–0.1)
BASOPHILS NFR BLD: 0 % (ref 0–1)
BILIRUB SERPL-MCNC: 0.3 MG/DL (ref 0.2–1)
BILIRUB UR QL CFM: NEGATIVE
BUN SERPL-MCNC: 15 MG/DL (ref 6–20)
BUN/CREAT SERPL: 12 (ref 12–20)
CALCIUM SERPL-MCNC: 9 MG/DL (ref 8.5–10.1)
CHLORIDE SERPL-SCNC: 111 MMOL/L (ref 97–108)
CO2 SERPL-SCNC: 26 MMOL/L (ref 21–32)
COLOR UR: NORMAL
COMMENT, HOLDF: NORMAL
CREAT SERPL-MCNC: 1.24 MG/DL (ref 0.55–1.02)
DIFFERENTIAL METHOD BLD: ABNORMAL
EOSINOPHIL # BLD: 0.1 K/UL (ref 0–0.4)
EOSINOPHIL NFR BLD: 1 % (ref 0–7)
EPITH CASTS URNS QL MICRO: NORMAL /LPF
ERYTHROCYTE [DISTWIDTH] IN BLOOD BY AUTOMATED COUNT: 16.5 % (ref 11.5–14.5)
GLOBULIN SER CALC-MCNC: 3.9 G/DL (ref 2–4)
GLUCOSE SERPL-MCNC: 158 MG/DL (ref 65–100)
GLUCOSE UR STRIP.AUTO-MCNC: NEGATIVE MG/DL
HCT VFR BLD AUTO: 38.9 % (ref 35–47)
HGB BLD-MCNC: 11.8 G/DL (ref 11.5–16)
HGB UR QL STRIP: NEGATIVE
HYALINE CASTS URNS QL MICRO: NORMAL /LPF (ref 0–5)
IMM GRANULOCYTES # BLD AUTO: 0.1 K/UL (ref 0–0.04)
IMM GRANULOCYTES NFR BLD AUTO: 1 % (ref 0–0.5)
KETONES UR QL STRIP.AUTO: NEGATIVE MG/DL
LEUKOCYTE ESTERASE UR QL STRIP.AUTO: NEGATIVE
LIPASE SERPL-CCNC: 223 U/L (ref 73–393)
LYMPHOCYTES # BLD: 3.3 K/UL (ref 0.8–3.5)
LYMPHOCYTES NFR BLD: 25 % (ref 12–49)
MCH RBC QN AUTO: 26.8 PG (ref 26–34)
MCHC RBC AUTO-ENTMCNC: 30.3 G/DL (ref 30–36.5)
MCV RBC AUTO: 88.2 FL (ref 80–99)
MONOCYTES # BLD: 1 K/UL (ref 0–1)
MONOCYTES NFR BLD: 8 % (ref 5–13)
NEUTS SEG # BLD: 8.7 K/UL (ref 1.8–8)
NEUTS SEG NFR BLD: 65 % (ref 32–75)
NITRITE UR QL STRIP.AUTO: NEGATIVE
NRBC # BLD: 0 K/UL (ref 0–0.01)
NRBC BLD-RTO: 0 PER 100 WBC
PH UR STRIP: 5.5 [PH] (ref 5–8)
PLATELET # BLD AUTO: 375 K/UL (ref 150–400)
PMV BLD AUTO: 10.8 FL (ref 8.9–12.9)
POTASSIUM SERPL-SCNC: 4.2 MMOL/L (ref 3.5–5.1)
PROT SERPL-MCNC: 7.1 G/DL (ref 6.4–8.2)
PROT UR STRIP-MCNC: NEGATIVE MG/DL
RBC # BLD AUTO: 4.41 M/UL (ref 3.8–5.2)
RBC #/AREA URNS HPF: NORMAL /HPF (ref 0–5)
SAMPLES BEING HELD,HOLD: NORMAL
SODIUM SERPL-SCNC: 143 MMOL/L (ref 136–145)
SP GR UR REFRACTOMETRY: 1.03 (ref 1–1.03)
UR CULT HOLD, URHOLD: NORMAL
UROBILINOGEN UR QL STRIP.AUTO: 1 EU/DL (ref 0.2–1)
WBC # BLD AUTO: 13.2 K/UL (ref 3.6–11)
WBC URNS QL MICRO: NORMAL /HPF (ref 0–4)

## 2019-08-29 PROCEDURE — 85025 COMPLETE CBC W/AUTO DIFF WBC: CPT

## 2019-08-29 PROCEDURE — 71046 X-RAY EXAM CHEST 2 VIEWS: CPT

## 2019-08-29 PROCEDURE — 81001 URINALYSIS AUTO W/SCOPE: CPT

## 2019-08-29 PROCEDURE — 99285 EMERGENCY DEPT VISIT HI MDM: CPT

## 2019-08-29 PROCEDURE — 83690 ASSAY OF LIPASE: CPT

## 2019-08-29 PROCEDURE — 74176 CT ABD & PELVIS W/O CONTRAST: CPT

## 2019-08-29 PROCEDURE — 51701 INSERT BLADDER CATHETER: CPT

## 2019-08-29 PROCEDURE — 80053 COMPREHEN METABOLIC PANEL: CPT

## 2019-08-29 PROCEDURE — 36415 COLL VENOUS BLD VENIPUNCTURE: CPT

## 2019-08-29 RX ORDER — ACETAMINOPHEN 325 MG/1
650 TABLET ORAL
Status: DISCONTINUED | OUTPATIENT
Start: 2019-08-29 | End: 2019-08-29 | Stop reason: HOSPADM

## 2019-08-29 NOTE — SENIOR SERVICES NOTE
Pt states that she has had flank pain for about a week but this morning her hand were swollen as well and she was concerned about her kidneys so she came to ED. Reports that Niyah Fowler has been out to see her and that she is walking to corner on her own when therapy is not there. Reports that her landlord and nephew continues to help her out. She has a BSC now downstairs that she uses unless she has to have BM.

## 2019-08-29 NOTE — PROGRESS NOTES
Informed patient family not available for ride home. This writer to assist w/ logisticare set up for cab informed may take up to 3 hours.  states can walk from vehicle w/o her RW. Patient attempted to call family/friends - states person who normally drives her is at work. [phone] Call placed to [address] [place] [address] spoke w/ Autumn Fleeting requested cab ride for patient (next available). Reference # [id number] provided CM number [phone] for trip update requested  call when en route (outcome pending).

## 2019-08-29 NOTE — ED PROVIDER NOTES
69 y.o. female with past medical history significant for diabetes, HTN, GERD, and CAD who presents from home via EMS with chief complaint of abdominal pain. Pt states she has abdominal pain in the left upper and lower quadrants that radiates to her back. The pain has been present for approximately one week, and she rates her discomfort as a 9/10 in severity. Pt states she is nauseous, and has swelling in her legs. Pt explains she has been nauseated to the point that she has not be eating or drinking properly, and has a loss of appetite. Pt notes she is diabetic, and has PMH of diverticulitis, and kidney problems. Pt denies any fever, vomiting, and SOB. Pt notes she does not remember when her last BM was. There are no other acute medical concerns at this time. Social hx: Former smoker (at age 13), no EtOH use  PCP: Katarzyna Hudson MD    Note written by Jeimy Forbes, as dictated by Akil Dennis DO 1:10 PM      The history is provided by the patient. No  was used.        Past Medical History:   Diagnosis Date    Anal polyp 6/13/2013    Arthritis     Asthma     CAD (coronary artery disease) 10/27/2017    Cataract     Chronic pain     knee - right/back    Diabetes ([facility] [address]     GERD (gastroesophageal reflux disease)     Hemorrhoids 6/13/2013    History of kidney stones     Hypertension     Ill-defined condition     abdominal pain and burning    Other ill-defined conditions(799.89)     high cholesterol       Past Surgical History:   Procedure Laterality Date    COLONOSCOPY  2/28/2013         EGD  2/28/2013         HX CATARACT REMOVAL Bilateral     HX CHOLECYSTECTOMY  6-2015    HX GI  6/27/13    anal polyps removed    HX HEENT      laser surgery for blood clot in right eye    HX KNEE REPLACEMENT      right    HX OTHER SURGICAL  4-2-14    lap gastrotomy and removal of polyp -  - not cancerous per pt    HX ALAN AND BSO      HX TONSILLECTOMY      HX UROLOGICAL      \"laser\" surgery for kidney stone    NEUROLOGICAL PROCEDURE UNLISTED  1990    tumor at back of neck, benign         Family History:   Problem Relation Age of Onset    Cancer Mother         colon    Diabetes Brother     Other Sister         GI BLEED    Heart Disease Brother     Heart Attack Brother     Heart Disease Brother     Heart Disease Brother     Schizophrenia Son     Anesth Problems Neg Hx          ALLERGIES: Seafood [shellfish containing products]    Review of Systems   Constitutional: Positive for appetite change. Negative for chills and fever. HENT: Negative for congestion and rhinorrhea. Eyes: Negative for redness and visual disturbance. Respiratory: Negative for cough and shortness of breath. Cardiovascular: Positive for leg swelling. Negative for chest pain. Gastrointestinal: Positive for abdominal pain and nausea. Negative for diarrhea and vomiting. Genitourinary: Negative for dysuria, frequency, hematuria and urgency. Musculoskeletal: Positive for back pain. Negative for arthralgias, myalgias and neck pain. Skin: Negative for rash and wound. Allergic/Immunologic: Negative for immunocompromised state. Neurological: Negative for dizziness and headaches. Vitals:    08/29/19 1305 08/29/19 1315 08/29/19 1400   BP: 167/54 144/59 122/49   Pulse: 100     Resp: 20     Temp: 98.2 °F (36.8 °C)     SpO2: 95% 96% 96%   Weight: 78.9 kg (174 lb)     Height: 5' (1.524 m)              Physical Exam   Constitutional: She is oriented to person, place, and time. She appears well-developed and well-nourished. No distress. HENT:   Head: Normocephalic. Mouth/Throat: Oropharynx is clear and moist. No oropharyngeal exudate. Eyes: Pupils are equal, round, and reactive to light. EOM are normal. Right eye exhibits no discharge. Left eye exhibits no discharge. Neck: Normal range of motion. Neck supple.    Cardiovascular: Normal rate, regular rhythm, normal heart sounds and intact distal pulses. Exam reveals no gallop and no friction rub. No murmur heard. Pulmonary/Chest: Effort normal and breath sounds normal. No stridor. No respiratory distress. She has no wheezes. She has no rales. Abdominal: Soft. Bowel sounds are normal. She exhibits no distension. There is tenderness in the left lower quadrant. There is guarding and CVA tenderness. There is no rebound. LLQ tenderness w/ guarding, CVA tenderness   Musculoskeletal: Normal range of motion. She exhibits no edema or deformity. Neurological: She is alert and oriented to person, place, and time. Skin: Skin is warm and dry. Capillary refill takes less than 2 seconds. No rash noted. She is not diaphoretic. Psychiatric: She has a normal mood and affect. Her behavior is normal.   Nursing note and vitals reviewed. Note written by Jeimy Biswas, as dictated by ToshiaAkil Blount DO 1:10 PM      Labs Reviewed:   CBC: mild leukocytosis and no anemia  CMP: creat mildly elevated, normal sodium and K; mild hyperglycemia; LFT without biliary obstructive process and not c/w hepatitis   UA: not c/w UTI          Imaging Reviewed: CT abd shows diverticulosis without acute process      Course:  3:13 PM re-evaluated pt. Pain better. Now c/o cough and given flank pain with the cough will ensure no pneumonia. Getting cxr. CXR negative for acute process      MDM:  Patient is a 81-year-old female presenting to the ED today with about a week of left lower quadrant pain radiating into the left flank. She is well-appearing and is stable vital signs. Equal pulses throughout. She has some left lower quadrant tenderness but otherwise no significant findings on exam.  Labs grossly unremarkable not consistent with pancreatitis, hepatitis, biliary obstructive process. CT was ordered and not consistent with diverticulitis, appendicitis, bowel obstruction or perforation.   She did start to complain of a cough and given the flank pain I entertain the idea of possible pneumonia therefore get a chest x-ray however this was negative. Given the negative work-up I feel she is safe for discharge home. She was advised to return if any change in symptoms or worsening abdominal pain. Clinical Impression:     ICD-10-CM ICD-9-CM    1. Abdominal pain, LLQ (left lower quadrant) R10.32 789.04            Disposition: [place] home.     Akil Burk,

## 2019-08-29 NOTE — ED NOTES
Patient reports friend is coming to get her. RN reviewed discharge instructions with patient. Patient verbalized understanding. Time allotted for questions. A&O at time of discharge. VSS. Patient wheeled off unit.

## 2019-08-29 NOTE — ED NOTES
Patient sitting on side of bed eating meal tray. Call bell within reach, no further needs at this time.

## 2019-08-29 NOTE — PROGRESS NOTES
Date of previous inpatient admission/ ED visit? ED visit  8/16/19 (Chest Pain)    What brought the patient back to ED? Patient presents to the ED for left flank pain    Did patient decline recommended services during last admission/ ED visit (if yes, what)? No    Has patient seen a provider since their last inpatient admission/ED visit (if yes, when)? CM Interventions:  From previous inpatient admission/ED visit: Assessment  From current inpatient admission/ED visit: Assessment    EMR reviewed. History significant for HTN, GERD, and CAD. Case discussed w/ [facility] Team.     CM verified [address] - contacted agency receiving RN/PT/OT. Informed currently in the ED. Met w/patient and introduced self. Ms. Chepe Matos lives home alone and utilizes RW. Patient's landlord provides assistance w/ household and plan is to become consumer directive agent. CM provided senior resources including [employer] and [employer]. Noted patient has had 2 ED visits this month after 8/1/19 [facility] discharge.  receptive of CM placing call to [address] [place] [address] [facility] office and requesting f/u appointment. Patient states not sure if she has transportation benefit through insurance however preference is 1400 appointment or after. CM placed call to PCP office and obtained appointment for Tuesday 9/3/19 @ 1229. Call placed to [facility] Medicaid to inquire about transportation benefit provider acknowledged has used trips and was ineligible. CM provided number for benefits. This writer informed patient will need to call [facility] [phone] and provided [facility] Medicare policy number [id number] for ride request 3 days before appointment.  has 39 trips/year calender /eligibility year is August (new enrollment). Met back w/ patient and provided printed appointment and transportation information.  Patient states she has a car and will get someone to drive her \"around\" and declined NP appointment states prefers Ty[facility] Kapadia and will reschedule her appointment.  asking for [facility] Rm [phone] be contacted for ride home. Patient asking technician and CM for tylenol . Nursing and MD informed. Call placed to Ms. Kyara Talbert states will need to  Grandchildren - ETA 1800. Updates provided to [facility]. No transitional care needs verbalized at this time. 1630 Call received from Ms. Kyara Talbert states she is not feeling well requesting hospital assist with ride home.  This writer acknowledged RW is not w/ patient if a family member would be able to return 8/30 or following week to [phone] - verbalized would assist w/ DME getting back to hospital.

## 2019-08-29 NOTE — ED TRIAGE NOTES
Patient comes to the ER c/o L flank pain x1 week with chills and fever. +Decreased appetite.  +Dysuria

## 2019-08-29 NOTE — PROGRESS NOTES
Physical Therapy Screening:  Physical Therapy consult is not indicated at this time. A screening was performed on this patient's chart based on their entrance into the emergency department and potential need for physical therapy identified. The patients chart was reviewed and the patient interviewed. Patient was seen by therapy during her previous ED visit 8/16/19. She was discharged home with continued home health services. Patient voiced she is currently open to [facility] for PT,OT,Nsg and doing well with her therapy. She states she is walking with her walker and that she is able to walk outside of her home for exercise. She voiced no falls. She notes that her landlord assists her with house cleaning and that the plan is for her to become her private duty caregiver once the Medicaid application is approved. Report provided to [facility] care management. A physical therapy consult in the ED is not indicated at this time. Please consult physical therapy if any therapy needs arise. Thank you.

## 2019-08-30 ENCOUNTER — DOCUMENTATION ONLY (OUTPATIENT)
Dept: CASE MANAGEMENT | Age: 79
End: 2019-08-30

## 2019-08-30 NOTE — PROGRESS NOTES
Resources provided at discharge included [facility] and [address].

## 2019-09-05 ENCOUNTER — APPOINTMENT (OUTPATIENT)
Dept: ULTRASOUND IMAGING | Age: 79
End: 2019-09-05
Attending: EMERGENCY MEDICINE
Payer: MEDICARE

## 2019-09-05 ENCOUNTER — HOSPITAL ENCOUNTER (EMERGENCY)
Age: 79
Discharge: HOME | End: 2019-09-05
Attending: EMERGENCY MEDICINE
Payer: MEDICARE

## 2019-09-05 ENCOUNTER — APPOINTMENT (OUTPATIENT)
Dept: GENERAL RADIOLOGY | Age: 79
End: 2019-09-05
Attending: EMERGENCY MEDICINE
Payer: MEDICARE

## 2019-09-05 VITALS
OXYGEN SATURATION: 97 % | WEIGHT: 173.94 LBS | DIASTOLIC BLOOD PRESSURE: 75 MMHG | HEIGHT: 60 IN | RESPIRATION RATE: 15 BRPM | BODY MASS INDEX: 34.15 KG/M2 | HEART RATE: 86 BPM | SYSTOLIC BLOOD PRESSURE: 130 MMHG | TEMPERATURE: 98 F

## 2019-09-05 DIAGNOSIS — R07.9 CHEST PAIN, UNSPECIFIED TYPE: Primary | ICD-10-CM

## 2019-09-05 LAB
ALBUMIN SERPL-MCNC: 3.3 G/DL (ref 3.5–5)
ALBUMIN/GLOB SERPL: 0.8 {RATIO} (ref 1.1–2.2)
ALP SERPL-CCNC: 111 U/L (ref 45–117)
ALT SERPL-CCNC: 17 U/L (ref 12–78)
ANION GAP SERPL CALC-SCNC: 8 MMOL/L (ref 5–15)
AST SERPL-CCNC: 19 U/L (ref 15–37)
BASOPHILS # BLD: 0.1 K/UL (ref 0–0.1)
BASOPHILS NFR BLD: 1 % (ref 0–1)
BILIRUB SERPL-MCNC: 0.4 MG/DL (ref 0.2–1)
BUN SERPL-MCNC: 14 MG/DL (ref 6–20)
BUN/CREAT SERPL: 15 (ref 12–20)
CALCIUM SERPL-MCNC: 9 MG/DL (ref 8.5–10.1)
CHLORIDE SERPL-SCNC: 106 MMOL/L (ref 97–108)
CO2 SERPL-SCNC: 23 MMOL/L (ref 21–32)
COMMENT, HOLDF: NORMAL
CREAT SERPL-MCNC: 0.91 MG/DL (ref 0.55–1.02)
DIFFERENTIAL METHOD BLD: ABNORMAL
EOSINOPHIL # BLD: 0.2 K/UL (ref 0–0.4)
EOSINOPHIL NFR BLD: 1 % (ref 0–7)
ERYTHROCYTE [DISTWIDTH] IN BLOOD BY AUTOMATED COUNT: 16 % (ref 11.5–14.5)
GLOBULIN SER CALC-MCNC: 4.3 G/DL (ref 2–4)
GLUCOSE SERPL-MCNC: 274 MG/DL (ref 65–100)
HCT VFR BLD AUTO: 37.7 % (ref 35–47)
HGB BLD-MCNC: 11.8 G/DL (ref 11.5–16)
IMM GRANULOCYTES # BLD AUTO: 0.1 K/UL (ref 0–0.04)
IMM GRANULOCYTES NFR BLD AUTO: 1 % (ref 0–0.5)
LIPASE SERPL-CCNC: 362 U/L (ref 73–393)
LYMPHOCYTES # BLD: 3.1 K/UL (ref 0.8–3.5)
LYMPHOCYTES NFR BLD: 25 % (ref 12–49)
MCH RBC QN AUTO: 26.5 PG (ref 26–34)
MCHC RBC AUTO-ENTMCNC: 31.3 G/DL (ref 30–36.5)
MCV RBC AUTO: 84.5 FL (ref 80–99)
MONOCYTES # BLD: 0.9 K/UL (ref 0–1)
MONOCYTES NFR BLD: 7 % (ref 5–13)
NEUTS SEG # BLD: 8.2 K/UL (ref 1.8–8)
NEUTS SEG NFR BLD: 65 % (ref 32–75)
NRBC # BLD: 0 K/UL (ref 0–0.01)
NRBC BLD-RTO: 0 PER 100 WBC
PLATELET # BLD AUTO: 363 K/UL (ref 150–400)
PMV BLD AUTO: 11.1 FL (ref 8.9–12.9)
POTASSIUM SERPL-SCNC: 4.2 MMOL/L (ref 3.5–5.1)
PROT SERPL-MCNC: 7.6 G/DL (ref 6.4–8.2)
RBC # BLD AUTO: 4.46 M/UL (ref 3.8–5.2)
SAMPLES BEING HELD,HOLD: NORMAL
SODIUM SERPL-SCNC: 137 MMOL/L (ref 136–145)
TROPONIN I SERPL-MCNC: <0.05 NG/ML
WBC # BLD AUTO: 12.5 K/UL (ref 3.6–11)

## 2019-09-05 PROCEDURE — 99284 EMERGENCY DEPT VISIT MOD MDM: CPT

## 2019-09-05 PROCEDURE — 85027 COMPLETE CBC AUTOMATED: CPT

## 2019-09-05 PROCEDURE — 74011000250 HC RX REV CODE- 250: Performed by: EMERGENCY MEDICINE

## 2019-09-05 PROCEDURE — 36415 COLL VENOUS BLD VENIPUNCTURE: CPT

## 2019-09-05 PROCEDURE — 80053 COMPREHEN METABOLIC PANEL: CPT

## 2019-09-05 PROCEDURE — 83690 ASSAY OF LIPASE: CPT

## 2019-09-05 PROCEDURE — 84484 ASSAY OF TROPONIN QUANT: CPT

## 2019-09-05 PROCEDURE — 71045 X-RAY EXAM CHEST 1 VIEW: CPT

## 2019-09-05 PROCEDURE — 96374 THER/PROPH/DIAG INJ IV PUSH: CPT

## 2019-09-05 PROCEDURE — 74011250636 HC RX REV CODE- 250/636: Performed by: EMERGENCY MEDICINE

## 2019-09-05 PROCEDURE — 93005 ELECTROCARDIOGRAM TRACING: CPT

## 2019-09-05 RX ORDER — PANTOPRAZOLE SODIUM 40 MG/1
40 TABLET, DELAYED RELEASE ORAL DAILY
Qty: 20 TAB | Refills: 0 | Status: SHIPPED | OUTPATIENT
Start: 2019-09-05 | End: 2019-09-25

## 2019-09-05 RX ADMIN — FAMOTIDINE 20 MG: 10 INJECTION, SOLUTION INTRAVENOUS at 19:03

## 2019-09-05 NOTE — ED PROVIDER NOTES
69 y.o. female with past medical history significant for diabetes, HTN, GERD, and CAD who presents from home via EMS with chief complaint of chest pain. Per old chart review paint has had numerous ED visits with CC of Chest pain. Pt had a cardiac cath on 10/03/2017. Pt states she woke up this morning at 0800 with chest pain. Pt states the chest pain woke her up from her sleep. Pt states her chest pain is intermittent however she thought it would go away completely. Pt explains the chest pain made her feel nauseated, and each episode lasts 20-30mins and then gets better. Pt states she has never felt this type of pain before. Pt deneis current chest pain, and eating. Pt also denies hx of kidney and/or liver problems. There are no other acute medical concerns at this time. Social hx: Former Smoker (0yrs @ 17), No EtOH use  PCP: Erick Soni MD      Note written by Jeimy Baker, as dictated by Joey Serrano MD [id number]      The history is provided by the patient. No  was used.         Past Medical History:   Diagnosis Date    Anal polyp 6/13/2013    Arthritis     Asthma     CAD (coronary artery disease) 10/27/2017    Cataract     Chronic pain     knee - right/back    Diabetes ([address]     GERD (gastroesophageal reflux disease)     Hemorrhoids 6/13/2013    History of kidney stones     Hypertension     Ill-defined condition     abdominal pain and burning    Other ill-defined conditions(799.89)     high cholesterol       Past Surgical History:   Procedure Laterality Date    COLONOSCOPY  2/28/2013         EGD  2/28/2013         HX CATARACT REMOVAL Bilateral     HX CHOLECYSTECTOMY  6-2015    HX GI  6/27/13    anal polyps removed    HX HEENT      laser surgery for blood clot in right eye    HX KNEE REPLACEMENT      right    HX OTHER SURGICAL  4-2-14    lap gastrotomy and removal of polyp -  - not cancerous per pt    HX ALAN AND BSO      HX TONSILLECTOMY      HX UROLOGICAL      \"laser\" surgery for kidney stone    NEUROLOGICAL PROCEDURE UNLISTED  1990    tumor at back of neck, benign         Family History:   Problem Relation Age of Onset    Cancer Mother         colon    Diabetes Brother     Other Sister         GI BLEED    Heart Disease Brother     Heart Attack Brother     Heart Disease Brother     Heart Disease Brother     Schizophrenia Son     Anesth Problems Neg Hx        Social History     Socioeconomic History    Marital status:      Spouse name: Not on file    Number of children: Not on file    Years of education: Not on file    Highest education level: Not on file   Occupational History    Not on file   Social Needs    Financial resource strain: Not on file    Food insecurity:     Worry: Not on file     Inability: Not on file    Transportation needs:     Medical: Not on file     Non-medical: Not on file   Tobacco Use    Smoking status: Former Smoker     Years: 0.00    Smokeless tobacco: Never Used    Tobacco comment: age 13   Substance and Sexual Activity    Alcohol use: No    Drug use: No    Sexual activity: Never   Lifestyle    Physical activity:     Days per week: Not on file     Minutes per session: Not on file    Stress: Not on file   Relationships    Social connections:     Talks on phone: Not on file     Gets together: Not on file     Attends Baptism service: Not on file     Active member of club or organization: Not on file     Attends meetings of clubs or organizations: Not on file     Relationship status: Not on file    Intimate partner violence:     Fear of current or ex partner: Not on file     Emotionally abused: Not on file     Physically abused: Not on file     Forced sexual activity: Not on file   Other Topics Concern    Not on file   Social History Narrative    Not on file         ALLERGIES: Seafood [shellfish containing products]    Review of Systems   Constitutional: Negative for activity change, appetite change and fatigue. HENT: Negative for ear pain, facial swelling, sore throat and trouble swallowing. Eyes: Negative for pain, discharge and visual disturbance. Respiratory: Positive for chest tightness. Negative for shortness of breath and wheezing. Cardiovascular: Negative for palpitations. Gastrointestinal: Positive for nausea. Negative for abdominal pain, blood in stool and vomiting. Genitourinary: Negative for difficulty urinating, flank pain and hematuria. Musculoskeletal: Negative for arthralgias, joint swelling, myalgias and neck pain. Skin: Negative for color change and rash. Neurological: Negative for dizziness, weakness, numbness and headaches. Hematological: Negative for adenopathy. Does not bruise/bleed easily. Psychiatric/Behavioral: Negative for behavioral problems, confusion and sleep disturbance. All other systems reviewed and are negative. Vitals:    09/05/19 1654   BP: 146/70   Pulse: 87   Resp: 16   Temp: 97.8 °F (36.6 °C)   SpO2: 99%   Weight: 78.9 kg (173 lb 15.1 oz)   Height: 5' (1.524 m)            Physical Exam   Constitutional: She is oriented to person, place, and time. She appears well-developed and well-nourished. No distress. HENT:   Head: Normocephalic and atraumatic. Nose: Nose normal.   Mouth/Throat: Oropharynx is clear and moist.   Eyes: Pupils are equal, round, and reactive to light. Conjunctivae and EOM are normal. No scleral icterus. Neck: Normal range of motion. Neck supple. No JVD present. No tracheal deviation present. No thyromegaly present. No carotid bruits noted. Cardiovascular: Normal rate, regular rhythm, normal heart sounds and intact distal pulses. Exam reveals no gallop and no friction rub. No murmur heard. Pulmonary/Chest: Effort normal and breath sounds normal. No respiratory distress. She has no wheezes. She has no rales. She exhibits no tenderness. Abdominal: Soft.  Bowel sounds are normal. She exhibits no distension and no mass. There is no tenderness. There is no rebound and no guarding. Musculoskeletal: Normal range of motion. She exhibits no edema or tenderness. Lymphadenopathy:     She has no cervical adenopathy. Neurological: She is alert and oriented to person, place, and time. She has normal reflexes. No cranial nerve deficit. Coordination normal.   Skin: Skin is warm and dry. No rash noted. No erythema. Psychiatric: She has a normal mood and affect. Her behavior is normal. Judgment and thought content normal.   Nursing note and vitals reviewed. Note written by Jeimy Canada, as dictated by José Armstrong MD [id number]      MDM  Number of Diagnoses or Management Options  Chest pain, unspecified type: new and requires workup     Amount and/or Complexity of Data Reviewed  Clinical lab tests: ordered and reviewed  Decide to obtain previous medical records or to obtain history from someone other than the patient: yes  Review and summarize past medical records: yes    Risk of Complications, Morbidity, and/or Mortality  Presenting problems: moderate  Diagnostic procedures: moderate  Management options: moderate    Patient Progress  Patient progress: stable         Procedures    6:46 PM  Patient's results have been reviewed with them. Patient and/or family have verbally conveyed their understanding and agreement of the patient's signs, symptoms, diagnosis, treatment and prognosis and additionally agree to follow up as recommended or return to the Emergency Room should their condition change prior to follow-up. Discharge instructions have also been provided to the patient with some educational information regarding their diagnosis as well a list of reasons why they would want to return to the ER prior to their follow-up appointment should their condition change.

## 2019-09-05 NOTE — DISCHARGE INSTRUCTIONS
You will need to stay on a bland diet and use liquid antacids like Maalox, Mylanta or Gelusil every hour or 2 if you are having discomfort follow-up with your own physician if continued use the medications as directed to decrease the acid production in your stomach. Do not lay completely flat.

## 2019-09-05 NOTE — ED TRIAGE NOTES
Pt comes in via EMS from home for Chest pain starting at 4pm. Pt states she has been in pain \"all over\" all day and at around 4pm the pain localized to the middle of her chest. Pt endorses nausea, SOB.  324 ASA given by EMS

## 2019-09-05 NOTE — ED NOTES
Pt rang call bell to inform RN she is having a cramp in her LLE because she is cold. Socks brought to pt; pt tearful and requesting to sit in chair. MD notified.

## 2019-09-06 LAB
ATRIAL RATE: 87 BPM
CALCULATED P AXIS, ECG09: 55 DEGREES
CALCULATED R AXIS, ECG10: 40 DEGREES
CALCULATED T AXIS, ECG11: 49 DEGREES
DIAGNOSIS, 93000: NORMAL
P-R INTERVAL, ECG05: 94 MS
Q-T INTERVAL, ECG07: 414 MS
QRS DURATION, ECG06: 68 MS
QTC CALCULATION (BEZET), ECG08: 498 MS
VENTRICULAR RATE, ECG03: 87 BPM

## 2019-09-10 ENCOUNTER — HOSPITAL ENCOUNTER (OUTPATIENT)
Dept: GENERAL RADIOLOGY | Age: 79
Discharge: HOME | End: 2019-09-10
Attending: ORTHOPAEDIC SURGERY
Payer: MEDICARE

## 2019-09-10 DIAGNOSIS — M16.11 PRIMARY OSTEOARTHRITIS OF RIGHT HIP: ICD-10-CM

## 2019-09-10 PROCEDURE — 74011250636 HC RX REV CODE- 250/636: Performed by: RADIOLOGY

## 2019-09-10 PROCEDURE — 20610 DRAIN/INJ JOINT/BURSA W/O US: CPT

## 2019-09-10 PROCEDURE — 74011000250 HC RX REV CODE- 250: Performed by: RADIOLOGY

## 2019-09-10 PROCEDURE — 74011636320 HC RX REV CODE- 636/320: Performed by: RADIOLOGY

## 2019-09-10 RX ORDER — LIDOCAINE HYDROCHLORIDE 10 MG/ML
3 INJECTION, SOLUTION EPIDURAL; INFILTRATION; INTRACAUDAL; PERINEURAL
Status: COMPLETED | OUTPATIENT
Start: 2019-09-10 | End: 2019-09-10

## 2019-09-10 RX ORDER — TRIAMCINOLONE ACETONIDE 40 MG/ML
40 INJECTION, SUSPENSION INTRA-ARTICULAR; INTRAMUSCULAR
Status: COMPLETED | OUTPATIENT
Start: 2019-09-10 | End: 2019-09-10

## 2019-09-10 RX ORDER — BUPIVACAINE HYDROCHLORIDE 5 MG/ML
3 INJECTION, SOLUTION EPIDURAL; INTRACAUDAL
Status: COMPLETED | OUTPATIENT
Start: 2019-09-10 | End: 2019-09-10

## 2019-09-10 RX ADMIN — TRIAMCINOLONE ACETONIDE 40 MG: 40 INJECTION, SUSPENSION INTRA-ARTICULAR; INTRAMUSCULAR at 11:00

## 2019-09-10 RX ADMIN — IOHEXOL 3 ML: 180 INJECTION INTRAVENOUS at 11:00

## 2019-09-10 RX ADMIN — LIDOCAINE HYDROCHLORIDE 2 ML: 10 INJECTION, SOLUTION EPIDURAL; INFILTRATION; INTRACAUDAL; PERINEURAL at 11:00

## 2019-09-10 RX ADMIN — BUPIVACAINE HYDROCHLORIDE 2.5 MG: 5 INJECTION, SOLUTION EPIDURAL; INTRACAUDAL; PERINEURAL at 11:00

## 2019-09-17 ENCOUNTER — HOSPITAL ENCOUNTER (EMERGENCY)
Age: 79
Discharge: HOME | End: 2019-09-17
Attending: EMERGENCY MEDICINE | Admitting: EMERGENCY MEDICINE
Payer: MEDICARE

## 2019-09-17 VITALS
RESPIRATION RATE: 16 BRPM | OXYGEN SATURATION: 96 % | SYSTOLIC BLOOD PRESSURE: 130 MMHG | TEMPERATURE: 98.1 F | HEIGHT: 60 IN | BODY MASS INDEX: 34.36 KG/M2 | HEART RATE: 93 BPM | DIASTOLIC BLOOD PRESSURE: 61 MMHG | WEIGHT: 175 LBS

## 2019-09-17 DIAGNOSIS — R73.9 HYPERGLYCEMIA: Primary | ICD-10-CM

## 2019-09-17 LAB
ALBUMIN SERPL-MCNC: 3.1 G/DL (ref 3.5–5)
ALBUMIN/GLOB SERPL: 0.8 {RATIO} (ref 1.1–2.2)
ALP SERPL-CCNC: 104 U/L (ref 45–117)
ALT SERPL-CCNC: 17 U/L (ref 12–78)
ANION GAP SERPL CALC-SCNC: 8 MMOL/L (ref 5–15)
APPEARANCE UR: CLEAR
AST SERPL-CCNC: 20 U/L (ref 15–37)
BACTERIA URNS QL MICRO: NEGATIVE /HPF
BASOPHILS # BLD: 0 K/UL (ref 0–0.1)
BASOPHILS NFR BLD: 0 % (ref 0–1)
BILIRUB SERPL-MCNC: 0.4 MG/DL (ref 0.2–1)
BILIRUB UR QL: NEGATIVE
BUN SERPL-MCNC: 17 MG/DL (ref 6–20)
BUN/CREAT SERPL: 15 (ref 12–20)
CALCIUM SERPL-MCNC: 8.3 MG/DL (ref 8.5–10.1)
CHLORIDE SERPL-SCNC: 103 MMOL/L (ref 97–108)
CO2 SERPL-SCNC: 25 MMOL/L (ref 21–32)
COLOR UR: ABNORMAL
CREAT SERPL-MCNC: 1.1 MG/DL (ref 0.55–1.02)
DIFFERENTIAL METHOD BLD: ABNORMAL
EOSINOPHIL # BLD: 0.1 K/UL (ref 0–0.4)
EOSINOPHIL NFR BLD: 1 % (ref 0–7)
EPITH CASTS URNS QL MICRO: ABNORMAL /LPF
ERYTHROCYTE [DISTWIDTH] IN BLOOD BY AUTOMATED COUNT: 16 % (ref 11.5–14.5)
GLOBULIN SER CALC-MCNC: 3.9 G/DL (ref 2–4)
GLUCOSE BLD STRIP.AUTO-MCNC: 128 MG/DL (ref 65–100)
GLUCOSE BLD STRIP.AUTO-MCNC: 383 MG/DL (ref 65–100)
GLUCOSE SERPL-MCNC: 415 MG/DL (ref 65–100)
GLUCOSE UR STRIP.AUTO-MCNC: >1000 MG/DL
HCT VFR BLD AUTO: 35 % (ref 35–47)
HGB BLD-MCNC: 10.7 G/DL (ref 11.5–16)
HGB UR QL STRIP: NEGATIVE
HYALINE CASTS URNS QL MICRO: ABNORMAL /LPF (ref 0–5)
IMM GRANULOCYTES # BLD AUTO: 0.1 K/UL (ref 0–0.04)
IMM GRANULOCYTES NFR BLD AUTO: 1 % (ref 0–0.5)
KETONES UR QL STRIP.AUTO: NEGATIVE MG/DL
LEUKOCYTE ESTERASE UR QL STRIP.AUTO: NEGATIVE
LIPASE SERPL-CCNC: 283 U/L (ref 73–393)
LYMPHOCYTES # BLD: 3.1 K/UL (ref 0.8–3.5)
LYMPHOCYTES NFR BLD: 24 % (ref 12–49)
MCH RBC QN AUTO: 26.6 PG (ref 26–34)
MCHC RBC AUTO-ENTMCNC: 30.6 G/DL (ref 30–36.5)
MCV RBC AUTO: 86.8 FL (ref 80–99)
MONOCYTES # BLD: 0.5 K/UL (ref 0–1)
MONOCYTES NFR BLD: 4 % (ref 5–13)
NEUTS SEG # BLD: 9.3 K/UL (ref 1.8–8)
NEUTS SEG NFR BLD: 70 % (ref 32–75)
NITRITE UR QL STRIP.AUTO: NEGATIVE
NRBC # BLD: 0 K/UL (ref 0–0.01)
NRBC BLD-RTO: 0 PER 100 WBC
PH UR STRIP: 5.5 [PH] (ref 5–8)
PLATELET # BLD AUTO: 249 K/UL (ref 150–400)
PMV BLD AUTO: 11.1 FL (ref 8.9–12.9)
POTASSIUM SERPL-SCNC: 4.7 MMOL/L (ref 3.5–5.1)
PROT SERPL-MCNC: 7 G/DL (ref 6.4–8.2)
PROT UR STRIP-MCNC: NEGATIVE MG/DL
RBC # BLD AUTO: 4.03 M/UL (ref 3.8–5.2)
RBC #/AREA URNS HPF: ABNORMAL /HPF (ref 0–5)
SERVICE CMNT-IMP: ABNORMAL
SERVICE CMNT-IMP: ABNORMAL
SODIUM SERPL-SCNC: 136 MMOL/L (ref 136–145)
SP GR UR REFRACTOMETRY: 1.03 (ref 1–1.03)
UR CULT HOLD, URHOLD: NORMAL
UROBILINOGEN UR QL STRIP.AUTO: 0.2 EU/DL (ref 0.2–1)
WBC # BLD AUTO: 13.1 K/UL (ref 3.6–11)
WBC URNS QL MICRO: ABNORMAL /HPF (ref 0–4)

## 2019-09-17 PROCEDURE — 74011636637 HC RX REV CODE- 636/637: Performed by: EMERGENCY MEDICINE

## 2019-09-17 PROCEDURE — 82962 GLUCOSE BLOOD TEST: CPT

## 2019-09-17 PROCEDURE — 83690 ASSAY OF LIPASE: CPT

## 2019-09-17 PROCEDURE — 85025 COMPLETE CBC W/AUTO DIFF WBC: CPT

## 2019-09-17 PROCEDURE — 80053 COMPREHEN METABOLIC PANEL: CPT

## 2019-09-17 PROCEDURE — 81001 URINALYSIS AUTO W/SCOPE: CPT

## 2019-09-17 PROCEDURE — 74011250636 HC RX REV CODE- 250/636: Performed by: EMERGENCY MEDICINE

## 2019-09-17 PROCEDURE — 36415 COLL VENOUS BLD VENIPUNCTURE: CPT

## 2019-09-17 PROCEDURE — 96361 HYDRATE IV INFUSION ADD-ON: CPT

## 2019-09-17 PROCEDURE — 99285 EMERGENCY DEPT VISIT HI MDM: CPT

## 2019-09-17 PROCEDURE — 96374 THER/PROPH/DIAG INJ IV PUSH: CPT

## 2019-09-17 RX ADMIN — SODIUM CHLORIDE 1000 ML: 900 INJECTION, SOLUTION INTRAVENOUS at 14:50

## 2019-09-17 RX ADMIN — HUMAN INSULIN 10 UNITS: 100 INJECTION, SOLUTION SUBCUTANEOUS at 15:49

## 2019-09-17 NOTE — DISCHARGE INSTRUCTIONS
Patient Education        Learning About High Blood Sugar  What is high blood sugar? Your body turns the food you eat into glucose (sugar), which it uses for energy. But if your body isn't able to use the sugar right away, it can build up in your blood and lead to high blood sugar. When the amount of sugar in your blood stays too high for too much of the time, you may have diabetes. Diabetes is a disease that can cause serious health problems. The good news is that lifestyle changes may help you get your blood sugar back to normal and avoid or delay diabetes. What causes high blood sugar? Sugar (glucose) can build up in your blood if you:  · Are overweight. · Have a family history of diabetes. · Take certain medicines, such as steroids. What are the symptoms? Having high blood sugar may not cause any symptoms at all. Or it may make you feel very thirsty or very hungry. You may also urinate more often than usual, have blurry vision, or lose weight without trying. How is high blood sugar treated? You can take steps to lower your blood sugar level if you understand what makes it get higher. Your doctor may want you to learn how to test your blood sugar level at home. Then you can see how illness, stress, or different kinds of food or medicine raise or lower your blood sugar level. Other tests may be needed to see if you have diabetes. How can you prevent high blood sugar? · Watch your weight. If you're overweight, losing just a small amount of weight may help. Reducing fat around your waist is most important. · Limit the amount of calories, sweets, and unhealthy fat you eat. Ask your doctor if a dietitian can help you. A registered dietitian can help you create meal plans that fit your lifestyle. · Get at least 30 minutes of exercise on most days of the week. Exercise helps control your blood sugar. It also helps you maintain a healthy weight. Walking is a good choice.  You also may want to do other activities, such as running, swimming, cycling, or playing tennis or team sports. · If your doctor prescribed medicines, take them exactly as prescribed. Call your doctor if you think you are having a problem with your medicine. You will get more details on the specific medicines your doctor prescribes. Follow-up care is a key part of your treatment and safety. Be sure to make and go to all appointments, and call your doctor if you are having problems. It's also a good idea to know your test results and keep a list of the medicines you take. Where can you learn more? Go to http://aranldo-mariano.info/. Enter O108 in the search box to learn more about \"Learning About High Blood Sugar. \"  Current as of: July 25, 2018  Content Version: 12.1  © 3275-1166 [employer]. Care instructions adapted under license by [employer] (which disclaims liability or warranty for this information). If you have questions about a medical condition or this instruction, always ask your healthcare professional. [address] any warranty or liability for your use of this information.

## 2019-09-17 NOTE — ED TRIAGE NOTES
Pt presents to the ED from home with high blood sugar. Pt reports taking her medication like normal however her sugar has been high. Pt admits to not eating healthy or watching what she eats. Pt also states she feels tired and depressed. Pt has a friend who is in the process of taking care of her.

## 2019-09-17 NOTE — ED PROVIDER NOTES
69 y.o. female with past medical history significant for diabetes, HTN, asthma, high cholesterol, hemorrhoids, anal polyps, GERD, arthritis, and CAD who presents from home via EMS with chief complaint of high blood sugar. Pt's home health nurse noticed a elevated BG level. Pt reports taking Lantus at 1:30 PM (1 hour PTA). Per EMS, pt did not have any food in her house and there were coca cola cans laying around the house. Pt reports having a home health nurse 1-2x/week. Pt affirms nausea, weakness, fatigue, SOB, and cough. Pt denies fever, chills, vomiting, and dysuria. There are no other acute medical concerns at this time. Social hx: Former smoker. PCP: Alison Mckeon MD    Note written by ronald Cobb, as dictated by Cassie Young MD 2:35 PM      The history is provided by the patient and the EMS personnel. No  was used.         Past Medical History:   Diagnosis Date    Anal polyp 6/13/2013    Arthritis     Asthma     CAD (coronary artery disease) 10/27/2017    Cataract     Chronic pain     knee - right/back    Diabetes ([facility] [address]     GERD (gastroesophageal reflux disease)     Hemorrhoids 6/13/2013    History of kidney stones     Hypertension     Ill-defined condition     abdominal pain and burning    Other ill-defined conditions(799.89)     high cholesterol       Past Surgical History:   Procedure Laterality Date    COLONOSCOPY  2/28/2013         EGD  2/28/2013         HX CATARACT REMOVAL Bilateral     HX CHOLECYSTECTOMY  6-2015    HX GI  6/27/13    anal polyps removed    HX HEENT      laser surgery for blood clot in right eye    HX KNEE REPLACEMENT      right    HX OTHER SURGICAL  4-2-14    lap gastrotomy and removal of polyp -  - not cancerous per pt    HX ALAN AND BSO      HX TONSILLECTOMY      HX UROLOGICAL      \"laser\" surgery for kidney stone    NEUROLOGICAL PROCEDURE UNLISTED  1990    tumor at back of neck, benign         Family History:   Problem Relation Age of Onset    Cancer Mother         colon    Diabetes Brother     Other Sister         GI BLEED    Heart Disease Brother     Heart Attack Brother     Heart Disease Brother     Heart Disease Brother     Schizophrenia Son     Anesth Problems Neg Hx        Social History     Socioeconomic History    Marital status:      Spouse name: Not on file    Number of children: Not on file    Years of education: Not on file    Highest education level: Not on file   Occupational History    Not on file   Social Needs    Financial resource strain: Not on file    Food insecurity:     Worry: Not on file     Inability: Not on file    Transportation needs:     Medical: Not on file     Non-medical: Not on file   Tobacco Use    Smoking status: Former Smoker     Years: 0.00    Smokeless tobacco: Never Used    Tobacco comment: age 13   Substance and Sexual Activity    Alcohol use: No    Drug use: No    Sexual activity: Never   Lifestyle    Physical activity:     Days per week: Not on file     Minutes per session: Not on file    Stress: Not on file   Relationships    Social connections:     Talks on phone: Not on file     Gets together: Not on file     Attends Jainism service: Not on file     Active member of club or organization: Not on file     Attends meetings of clubs or organizations: Not on file     Relationship status: Not on file    Intimate partner violence:     Fear of current or ex partner: Not on file     Emotionally abused: Not on file     Physically abused: Not on file     Forced sexual activity: Not on file   Other Topics Concern    Not on file   Social History Narrative    Not on file         ALLERGIES: Seafood [shellfish containing products]    Review of Systems   Constitutional: Positive for fatigue. Negative for chills and fever. Respiratory: Positive for cough and shortness of breath. Cardiovascular: Negative for chest pain.    Gastrointestinal: Positive for nausea. Negative for abdominal pain, constipation, diarrhea and vomiting. Genitourinary: Negative for dysuria. Neurological: Positive for weakness. Negative for dizziness and light-headedness. All other systems reviewed and are negative. Vitals:    09/17/19 1443   BP: 130/61   Pulse: 93   Resp: 16   Temp: 98.1 °F (36.7 °C)   SpO2: 96%   Weight: 79.4 kg (175 lb)   Height: 5' (1.524 m)            Physical Exam   Constitutional: She is oriented to person, place, and time. She appears well-developed and well-nourished. HENT:   Head: Normocephalic and atraumatic. Eyes: Pupils are equal, round, and reactive to light. Neck: Normal range of motion. Neck supple. Cardiovascular: Normal rate, regular rhythm and normal heart sounds. Pulmonary/Chest: Effort normal and breath sounds normal.   Abdominal: Soft. She exhibits no distension. There is no tenderness. Neurological: She is alert and oriented to person, place, and time. Skin: Skin is warm and dry. Capillary refill takes less than 2 seconds. Psychiatric: She has a normal mood and affect. Her behavior is normal.   Nursing note and vitals reviewed. Note written by ronald Kemp, as dictated by Bayron Sosa MD 2:35 PM    MDM  Number of Diagnoses or Management Options  Hyperglycemia:   Diagnosis management comments: Pt presents with hyperglycemia w/o gap. No evidence of DKA, HHS, sepsis, UTI. Procedures      The patient's results have been reviewed with them and/or available family. Patient and/or family verbally conveyed their understanding and agreement of the patient's signs, symptoms, diagnosis, treatment and prognosis and additionally agree to follow up as recommended in the discharge instructions or to return to the Emergency Room should their condition change prior to their follow-up appointment.  The patient/family verbally agrees with the care-plan and verbally conveys that all of their questions have been answered. The discharge instructions have also been provided to the patient and/or family with some educational information regarding the patient's diagnosis as well a list of reasons why the patient would want to return to the ER prior to their follow-up appointment, should their condition change.

## 2019-09-17 NOTE — PROGRESS NOTES
Physical Therapy Screening:  Physical Therapy consult is not indicated at this time. A screening was performed on this patient's chart based on their entrance into the emergency department and potential need for physical therapy identified. The patients chart was reviewed and the patient was interviewed/screened. A physical therapy consult is not indicated at this time based on their prior level of function or current medical status. Please consult physical therapy if any therapy needs arise. Thank you. 17:58 - Patient lives alone, ambulates with a rolling walker, open to home health for therapy and nursing. States the plan is for her landlord to become her personal care aide once the paper work goes through. Observed patient independently ambulate with a rollator walker and transition positions while in the ED.

## 2019-09-19 ENCOUNTER — HOSPITAL ENCOUNTER (EMERGENCY)
Age: 79
Discharge: HOME | End: 2019-09-19
Attending: EMERGENCY MEDICINE | Admitting: EMERGENCY MEDICINE
Payer: MEDICARE

## 2019-09-19 ENCOUNTER — APPOINTMENT (OUTPATIENT)
Dept: CT IMAGING | Age: 79
End: 2019-09-19
Attending: EMERGENCY MEDICINE
Payer: MEDICARE

## 2019-09-19 ENCOUNTER — APPOINTMENT (OUTPATIENT)
Dept: GENERAL RADIOLOGY | Age: 79
End: 2019-09-19
Attending: EMERGENCY MEDICINE
Payer: MEDICARE

## 2019-09-19 VITALS
SYSTOLIC BLOOD PRESSURE: 140 MMHG | DIASTOLIC BLOOD PRESSURE: 48 MMHG | TEMPERATURE: 98.3 F | OXYGEN SATURATION: 99 % | HEART RATE: 93 BPM | RESPIRATION RATE: 12 BRPM

## 2019-09-19 DIAGNOSIS — R07.9 CHEST PAIN, UNSPECIFIED TYPE: Primary | ICD-10-CM

## 2019-09-19 DIAGNOSIS — K20.90 ESOPHAGITIS: ICD-10-CM

## 2019-09-19 LAB
ANION GAP SERPL CALC-SCNC: 7 MMOL/L (ref 5–15)
ATRIAL RATE: 85 BPM
BASOPHILS # BLD: 0.1 K/UL (ref 0–0.1)
BASOPHILS NFR BLD: 0 % (ref 0–1)
BUN SERPL-MCNC: 14 MG/DL (ref 6–20)
BUN/CREAT SERPL: 13 (ref 12–20)
CALCIUM SERPL-MCNC: 9.3 MG/DL (ref 8.5–10.1)
CALCULATED P AXIS, ECG09: 71 DEGREES
CALCULATED R AXIS, ECG10: 48 DEGREES
CALCULATED T AXIS, ECG11: 70 DEGREES
CHLORIDE SERPL-SCNC: 108 MMOL/L (ref 97–108)
CO2 SERPL-SCNC: 25 MMOL/L (ref 21–32)
COMMENT, HOLDF: NORMAL
CREAT SERPL-MCNC: 1.1 MG/DL (ref 0.55–1.02)
D DIMER PPP FEU-MCNC: 1.65 MG/L FEU (ref 0–0.65)
DIAGNOSIS, 93000: NORMAL
DIFFERENTIAL METHOD BLD: ABNORMAL
EOSINOPHIL # BLD: 0.2 K/UL (ref 0–0.4)
EOSINOPHIL NFR BLD: 1 % (ref 0–7)
ERYTHROCYTE [DISTWIDTH] IN BLOOD BY AUTOMATED COUNT: 16 % (ref 11.5–14.5)
GLUCOSE SERPL-MCNC: 277 MG/DL (ref 65–100)
HCT VFR BLD AUTO: 39.5 % (ref 35–47)
HGB BLD-MCNC: 12.1 G/DL (ref 11.5–16)
IMM GRANULOCYTES # BLD AUTO: 0.1 K/UL (ref 0–0.04)
IMM GRANULOCYTES NFR BLD AUTO: 1 % (ref 0–0.5)
INR PPP: 1.2 (ref 0.9–1.1)
LYMPHOCYTES # BLD: 3.5 K/UL (ref 0.8–3.5)
LYMPHOCYTES NFR BLD: 23 % (ref 12–49)
MCH RBC QN AUTO: 26.3 PG (ref 26–34)
MCHC RBC AUTO-ENTMCNC: 30.6 G/DL (ref 30–36.5)
MCV RBC AUTO: 85.9 FL (ref 80–99)
MONOCYTES # BLD: 1.1 K/UL (ref 0–1)
MONOCYTES NFR BLD: 7 % (ref 5–13)
NEUTS SEG # BLD: 10.3 K/UL (ref 1.8–8)
NEUTS SEG NFR BLD: 68 % (ref 32–75)
NRBC # BLD: 0 K/UL (ref 0–0.01)
NRBC BLD-RTO: 0 PER 100 WBC
P-R INTERVAL, ECG05: 122 MS
PLATELET # BLD AUTO: 283 K/UL (ref 150–400)
PMV BLD AUTO: 11.2 FL (ref 8.9–12.9)
POTASSIUM SERPL-SCNC: 4.8 MMOL/L (ref 3.5–5.1)
PROTHROMBIN TIME: 11.6 SEC (ref 9–11.1)
Q-T INTERVAL, ECG07: 392 MS
QRS DURATION, ECG06: 66 MS
QTC CALCULATION (BEZET), ECG08: 466 MS
RBC # BLD AUTO: 4.6 M/UL (ref 3.8–5.2)
SAMPLES BEING HELD,HOLD: NORMAL
SODIUM SERPL-SCNC: 140 MMOL/L (ref 136–145)
TROPONIN I SERPL-MCNC: <0.05 NG/ML
TROPONIN I SERPL-MCNC: <0.05 NG/ML
VENTRICULAR RATE, ECG03: 85 BPM
WBC # BLD AUTO: 15.1 K/UL (ref 3.6–11)

## 2019-09-19 PROCEDURE — 96360 HYDRATION IV INFUSION INIT: CPT

## 2019-09-19 PROCEDURE — 74011250637 HC RX REV CODE- 250/637: Performed by: EMERGENCY MEDICINE

## 2019-09-19 PROCEDURE — 84484 ASSAY OF TROPONIN QUANT: CPT

## 2019-09-19 PROCEDURE — 36415 COLL VENOUS BLD VENIPUNCTURE: CPT

## 2019-09-19 PROCEDURE — 74011000258 HC RX REV CODE- 258: Performed by: RADIOLOGY

## 2019-09-19 PROCEDURE — 71046 X-RAY EXAM CHEST 2 VIEWS: CPT

## 2019-09-19 PROCEDURE — 80048 BASIC METABOLIC PNL TOTAL CA: CPT

## 2019-09-19 PROCEDURE — 85610 PROTHROMBIN TIME: CPT

## 2019-09-19 PROCEDURE — 85379 FIBRIN DEGRADATION QUANT: CPT

## 2019-09-19 PROCEDURE — 74011636320 HC RX REV CODE- 636/320: Performed by: RADIOLOGY

## 2019-09-19 PROCEDURE — 85025 COMPLETE CBC W/AUTO DIFF WBC: CPT

## 2019-09-19 PROCEDURE — 71275 CT ANGIOGRAPHY CHEST: CPT

## 2019-09-19 PROCEDURE — 93005 ELECTROCARDIOGRAM TRACING: CPT

## 2019-09-19 PROCEDURE — 99285 EMERGENCY DEPT VISIT HI MDM: CPT

## 2019-09-19 RX ORDER — ACETAMINOPHEN 325 MG/1
325 TABLET ORAL
Status: COMPLETED | OUTPATIENT
Start: 2019-09-19 | End: 2019-09-19

## 2019-09-19 RX ORDER — SODIUM CHLORIDE 0.9 % (FLUSH) 0.9 %
10 SYRINGE (ML) INJECTION
Status: COMPLETED | OUTPATIENT
Start: 2019-09-19 | End: 2019-09-19

## 2019-09-19 RX ADMIN — Medication 10 ML: at 17:16

## 2019-09-19 RX ADMIN — SODIUM CHLORIDE 100 ML: 900 INJECTION, SOLUTION INTRAVENOUS at 17:16

## 2019-09-19 RX ADMIN — ACETAMINOPHEN 325 MG: 325 TABLET, FILM COATED ORAL at 19:24

## 2019-09-19 RX ADMIN — IOPAMIDOL 75 ML: 755 INJECTION, SOLUTION INTRAVENOUS at 17:16

## 2019-09-19 NOTE — ED TRIAGE NOTES
Triage: Patient arrives from home with c/o chest pain along with bilateral hand numbness. Patient states she was recently seen here for the same symptoms. Patient is AXOX4.  She states she doesn't  Know why her home health nurse calls for her high blood glucose

## 2019-09-19 NOTE — ED PROVIDER NOTES
69 y.o. female with past medical history significant for CAD, diabetes, HTN, asthma, hypercholesterolemia, hemorrhoids, anal polyp, GERD, arthritis, and kidney stones who presents from home via EMS with chief complaint of chest pain. EMS reports pt began with chest pain at 0900 this morning while laying flat at rest. She c/o bilateral hand numbness at this time as well. She received 324 ASA en route that improved symptoms, per EMS, and her BG was 373. Pt says she had same a few weeks ago when seen in ED also and does not know why her home health nurse continues to call EMS. She says she did not want to come. She denies any exacerbating factors. Pt also c/o right knee pain and endorses history of right knee replacement 2 years ago. Pt reports taking an antibiotic for a chest cold, tylenol #3 for her knee pain, and her anxiety medication. Pt specifically denies nausea, vomiting, abdominal pain, lightheadedness, and any other pain or symptoms. There are no other acute medical concerns at this time. Social hx: Former tobacco smoker; Denies EtOH use; Denies illicit drug use  PCP: Garima Hinds MD    Note written by Jeimy Blount, as dictated by Iftikhar Franco MD 2:52 PM    The history is provided by the patient, the EMS personnel and medical records. No  was used.         Past Medical History:   Diagnosis Date    Anal polyp 6/13/2013    Arthritis     Asthma     CAD (coronary artery disease) 10/27/2017    Cataract     Chronic pain     knee - right/back    Diabetes ([address]     GERD (gastroesophageal reflux disease)     Hemorrhoids 6/13/2013    History of kidney stones     Hypertension     Ill-defined condition     abdominal pain and burning    Other ill-defined conditions(799.89)     high cholesterol       Past Surgical History:   Procedure Laterality Date    COLONOSCOPY  2/28/2013         EGD  2/28/2013         HX CATARACT REMOVAL Bilateral     HX CHOLECYSTECTOMY  6-2015    HX GI  6/27/13    anal polyps removed    HX HEENT      laser surgery for blood clot in right eye    HX KNEE REPLACEMENT      right    HX OTHER SURGICAL  4-2-14    lap gastrotomy and removal of polyp -  - not cancerous per pt    HX ALAN AND BSO      HX TONSILLECTOMY      HX UROLOGICAL      \"laser\" surgery for kidney stone    NEUROLOGICAL PROCEDURE UNLISTED  1990    tumor at back of neck, benign         Family History:   Problem Relation Age of Onset    Cancer Mother         colon    Diabetes Brother     Other Sister         GI BLEED    Heart Disease Brother     Heart Attack Brother     Heart Disease Brother     Heart Disease Brother     Schizophrenia Son     Anesth Problems Neg Hx        Social History     Socioeconomic History    Marital status:      Spouse name: Not on file    Number of children: Not on file    Years of education: Not on file    Highest education level: Not on file   Occupational History    Not on file   Social Needs    Financial resource strain: Not on file    Food insecurity:     Worry: Not on file     Inability: Not on file    Transportation needs:     Medical: Not on file     Non-medical: Not on file   Tobacco Use    Smoking status: Former Smoker     Years: 0.00    Smokeless tobacco: Never Used    Tobacco comment: age 13   Substance and Sexual Activity    Alcohol use: No    Drug use: No    Sexual activity: Never   Lifestyle    Physical activity:     Days per week: Not on file     Minutes per session: Not on file    Stress: Not on file   Relationships    Social connections:     Talks on phone: Not on file     Gets together: Not on file     Attends Denominational service: Not on file     Active member of club or organization: Not on file     Attends meetings of clubs or organizations: Not on file     Relationship status: Not on file    Intimate partner violence:     Fear of current or ex partner: Not on file     Emotionally abused: Not on file     Physically abused: Not on file     Forced sexual activity: Not on file   Other Topics Concern    Not on file   Social History Narrative    Not on file         ALLERGIES: Seafood [shellfish containing products]    Review of Systems   Constitutional: Negative for chills, diaphoresis and fever. HENT: Negative for congestion and trouble swallowing. Eyes: Negative for photophobia. Respiratory: Negative for cough, chest tightness and shortness of breath. Cardiovascular: Positive for chest pain. Negative for palpitations and leg swelling. Gastrointestinal: Negative for abdominal pain, diarrhea, nausea and vomiting. Genitourinary: Negative for difficulty urinating, dysuria, flank pain and frequency. Musculoskeletal: Positive for arthralgias. Negative for back pain and myalgias. Skin: Negative for rash and wound. Neurological: Positive for numbness. Negative for weakness, light-headedness and headaches. Hematological: Negative for adenopathy. Does not bruise/bleed easily. Psychiatric/Behavioral: Negative for agitation and confusion. All other systems reviewed and are negative. Visit Vitals  /55 (BP 1 Location: Right arm)   Pulse 88   Temp 98.4 °F (36.9 °C)   Resp 18   SpO2 99%            Physical Exam   Constitutional: She is oriented to person, place, and time. She appears well-developed and well-nourished. No distress. HENT:   Head: Normocephalic. Mouth/Throat: Oropharynx is clear and moist.   Eyes: Pupils are equal, round, and reactive to light. Conjunctivae and EOM are normal.   Neck: Normal range of motion. Neck supple. No JVD present. Cardiovascular: Normal rate, regular rhythm, normal heart sounds and intact distal pulses. Pulmonary/Chest: Effort normal and breath sounds normal.   Abdominal: Soft. Bowel sounds are normal. She exhibits no distension. There is no tenderness. Musculoskeletal: Normal range of motion.  She exhibits no edema or deformity. Right knee: She exhibits swelling. Tenderness found. Pt exhibits tenderness to R knee and has well-healed old surgical scar without warmth. Exhibits mild swelling to R knee. Lymphadenopathy:     She has no cervical adenopathy. Neurological: She is alert and oriented to person, place, and time. No cranial nerve deficit or sensory deficit. Skin: Skin is warm and dry. Capillary refill takes less than 2 seconds. No rash noted. She is not diaphoretic. No erythema. Psychiatric:   Pt is tearful. Nursing note and vitals reviewed. Note written by Jeimy Philip, as dictated by Mychal Dozier MD 2:52 PM    MDM       Procedures    ED EKG interpretation: 1519  Rhythm: Sinus rhythm with occasional PVC's; and regular . Rate (approx.): 85 bpm; Normal intervals; No ST elevation or depression; ST/T wave: non-specific changes. Note written by Jeimy Philip, as dictated by Mychal Dozier MD 3:31 PM    4:38 PM  Compared old EKG from 9/5/19 to today. Only thing new is occasional PVCs. Elevated D-dimer thus CTA ordered but no PE. Only signs of possible esophagitis. Since no change in EKG and trop negative will tx for esophagitis with cards and GI follow up. Patient's results have been reviewed with them. Patient and/or family have verbally conveyed their understanding and agreement of the patient's signs, symptoms, diagnosis, treatment and prognosis and additionally agree to follow up as recommended or return to the Emergency Room should their condition change prior to follow-up. Discharge instructions have also been provided to the patient with some educational information regarding their diagnosis as well a list of reasons why they would want to return to the ER prior to their follow-up appointment should their condition change.     Gabe Nobles MD

## 2019-09-19 NOTE — ED NOTES
Patient discharged home. Removed peripheral IV. Educated patient on discharge instructions. Provided her with written copies of discharge instructions and prescriptions. Opportunity for questions to be answered.  Discharged via wheelchair

## 2019-09-19 NOTE — DISCHARGE INSTRUCTIONS
Patient Education        Chest Pain: Care Instructions  Your Care Instructions    There are many things that can cause chest pain. Some are not serious and will get better on their own in a few days. But some kinds of chest pain need more testing and treatment. Your doctor may have recommended a follow-up visit in the next 8 to 12 hours. If you are not getting better, you may need more tests or treatment. Even though your doctor has released you, you still need to watch for any problems. The doctor carefully checked you, but sometimes problems can develop later. If you have new symptoms or if your symptoms do not get better, get medical care right away. If you have worse or different chest pain or pressure that lasts more than 5 minutes or you passed out (lost consciousness), call 911 or seek other emergency help right away. A medical visit is only one step in your treatment. Even if you feel better, you still need to do what your doctor recommends, such as going to all suggested follow-up appointments and taking medicines exactly as directed. This will help you recover and help prevent future problems. How can you care for yourself at home? · Rest until you feel better. · Take your medicine exactly as prescribed. Call your doctor if you think you are having a problem with your medicine. · Do not drive after taking a prescription pain medicine. When should you call for help? Call 911 if:    · You passed out (lost consciousness).     · You have severe difficulty breathing.     · You have symptoms of a heart attack. These may include:  ? Chest pain or pressure, or a strange feeling in your chest.  ? Sweating. ? Shortness of breath. ? Nausea or vomiting. ? Pain, pressure, or a strange feeling in your back, neck, jaw, or upper belly or in one or both shoulders or arms. ? Lightheadedness or sudden weakness. ? A fast or irregular heartbeat.   After you call 911, the  may tell you to chew 1 adult-strength or 2 to 4 low-dose aspirin. Wait for an ambulance. Do not try to drive yourself.    Call your doctor today if:    · You have any trouble breathing.     · Your chest pain gets worse.     · You are dizzy or lightheaded, or you feel like you may faint.     · You are not getting better as expected.     · You are having new or different chest pain. Where can you learn more? Go to http://arnaldo-mariano.info/. Enter A120 in the search box to learn more about \"Chest Pain: Care Instructions. \"  Current as of: September 23, 2018  Content Version: 12.1  © 7258-0960 [employer]. Care instructions adapted under license by [employer] (which disclaims liability or warranty for this information). If you have questions about a medical condition or this instruction, always ask your healthcare professional. [place] [address] any warranty or liability for your use of this information. Patient Education        Esophagitis: Care Instructions  Your Care Instructions    Esophagitis (say \"ih-sof-uh-JY-tus\") is irritation of the esophagus, the tube that carries food from your throat to your stomach. Acid reflux is the most common cause of this condition. When you have reflux, stomach acid and juices flow upward. This can cause pain or a burning feeling in your chest. You may have a sore throat. It may be hard to swallow. Other causes of this condition include some medicines and supplements. Allergies or an infection can also cause it. Your doctor will ask about your symptoms and past health. He or she might do tests to find the cause of your symptoms. Treatment depends on what is causing the problem. Treatment might include changing your diet or taking medicine to relieve your symptoms. It might also include changing a medicine that is causing your symptoms.   If you have reflux, medicine that reduces the stomach acid helps your body heal. It might take 1 to 3 weeks to heal.  Follow-up care is a key part of your treatment and safety. Be sure to make and go to all appointments, and call your doctor if you are having problems. It's also a good idea to know your test results and keep a list of the medicines you take. How can you care for yourself at home? · If you have acid reflux, your doctor may recommend that you:  ? Eat several small meals instead of two or three large meals. After you eat, wait 2 to 3 hours before you lie down. ? Avoid chocolate, mint, alcohol, and spicy foods. ? Don't smoke or use smokeless tobacco. Smoking can make this condition worse. If you need help quitting, talk to your doctor about stop-smoking programs and medicines. These can increase your chances of quitting for good. ? Raise the head of your bed 6 to 8 inches if you have symptoms at night. ? Lose weight if you are overweight. ? Take an over-the-counter antacid, such as Maalox, Mylanta, or Tums. Be careful when you take over-the-counter antacid medicines. Many of these medicines have aspirin in them. Read the label to make sure that you are not taking more than the recommended dose. Too much aspirin can be harmful. ? Take stronger acid reducers. Examples are famotidine (such as Pepcid), omeprazole (such as Prilosec), and ranitidine (such as Zantac). · If your condition is caused by infection, allergy, or other problems, use the medicine or treatments that your doctor recommends. · Be safe with medicines. Take your medicines exactly as prescribed. Call your doctor if you think you are having a problem with your medicine. When should you call for help?   Call your doctor now or seek immediate medical care if:    · You have new or worse belly pain.     · You are vomiting.    Watch closely for changes in your health, and be sure to contact your doctor if:    · You have new or worse symptoms of reflux.     · You have trouble or pain swallowing.     · You are losing weight.     · You do not get better as expected. Where can you learn more? Go to http://arnaldo-mariano.info/. Enter B992 in the search box to learn more about \"Esophagitis: Care Instructions. \"  Current as of: November 7, 2018  Content Version: 12.1  © 0970-2078 [employer], [employer]. Care instructions adapted under license by [employer] (which disclaims liability or warranty for this information). If you have questions about a medical condition or this instruction, always ask your healthcare professional. [address] any warranty or liability for your use of this information.

## 2019-09-23 ENCOUNTER — APPOINTMENT (OUTPATIENT)
Dept: CT IMAGING | Age: 79
End: 2019-09-23
Attending: EMERGENCY MEDICINE
Payer: MEDICARE

## 2019-09-23 ENCOUNTER — APPOINTMENT (OUTPATIENT)
Dept: GENERAL RADIOLOGY | Age: 79
End: 2019-09-23
Attending: EMERGENCY MEDICINE
Payer: MEDICARE

## 2019-09-23 ENCOUNTER — HOSPITAL ENCOUNTER (OUTPATIENT)
Age: 79
Setting detail: OBSERVATION
Discharge: HOME | End: 2019-09-25
Attending: EMERGENCY MEDICINE | Admitting: INTERNAL MEDICINE
Payer: MEDICARE

## 2019-09-23 DIAGNOSIS — J18.9 PNEUMONIA OF BOTH LOWER LOBES DUE TO INFECTIOUS ORGANISM: Primary | ICD-10-CM

## 2019-09-23 LAB
BASOPHILS # BLD: 0.1 K/UL (ref 0–0.1)
BASOPHILS NFR BLD: 0 % (ref 0–1)
COMMENT, HOLDF: NORMAL
DIFFERENTIAL METHOD BLD: ABNORMAL
EOSINOPHIL # BLD: 0 K/UL (ref 0–0.4)
EOSINOPHIL NFR BLD: 0 % (ref 0–7)
ERYTHROCYTE [DISTWIDTH] IN BLOOD BY AUTOMATED COUNT: 16 % (ref 11.5–14.5)
GLUCOSE BLD STRIP.AUTO-MCNC: 113 MG/DL (ref 65–100)
GLUCOSE BLD STRIP.AUTO-MCNC: 92 MG/DL (ref 65–100)
HCT VFR BLD AUTO: 41.3 % (ref 35–47)
HGB BLD-MCNC: 12.9 G/DL (ref 11.5–16)
IMM GRANULOCYTES # BLD AUTO: 0.1 K/UL (ref 0–0.04)
IMM GRANULOCYTES NFR BLD AUTO: 1 % (ref 0–0.5)
LACTATE BLD-SCNC: 1.27 MMOL/L (ref 0.4–2)
LYMPHOCYTES # BLD: 1.5 K/UL (ref 0.8–3.5)
LYMPHOCYTES NFR BLD: 8 % (ref 12–49)
MCH RBC QN AUTO: 26.6 PG (ref 26–34)
MCHC RBC AUTO-ENTMCNC: 31.2 G/DL (ref 30–36.5)
MCV RBC AUTO: 85.2 FL (ref 80–99)
MONOCYTES # BLD: 1.2 K/UL (ref 0–1)
MONOCYTES NFR BLD: 6 % (ref 5–13)
NEUTS SEG # BLD: 16.3 K/UL (ref 1.8–8)
NEUTS SEG NFR BLD: 85 % (ref 32–75)
NRBC # BLD: 0 K/UL (ref 0–0.01)
NRBC BLD-RTO: 0 PER 100 WBC
PLATELET # BLD AUTO: 283 K/UL (ref 150–400)
PMV BLD AUTO: 10.8 FL (ref 8.9–12.9)
RBC # BLD AUTO: 4.85 M/UL (ref 3.8–5.2)
SAMPLES BEING HELD,HOLD: NORMAL
SERVICE CMNT-IMP: ABNORMAL
SERVICE CMNT-IMP: NORMAL
WBC # BLD AUTO: 19.2 K/UL (ref 3.6–11)

## 2019-09-23 PROCEDURE — 85025 COMPLETE CBC W/AUTO DIFF WBC: CPT

## 2019-09-23 PROCEDURE — 82962 GLUCOSE BLOOD TEST: CPT

## 2019-09-23 PROCEDURE — 83735 ASSAY OF MAGNESIUM: CPT

## 2019-09-23 PROCEDURE — 96374 THER/PROPH/DIAG INJ IV PUSH: CPT

## 2019-09-23 PROCEDURE — 71045 X-RAY EXAM CHEST 1 VIEW: CPT

## 2019-09-23 PROCEDURE — 81001 URINALYSIS AUTO W/SCOPE: CPT

## 2019-09-23 PROCEDURE — 36415 COLL VENOUS BLD VENIPUNCTURE: CPT

## 2019-09-23 PROCEDURE — 96365 THER/PROPH/DIAG IV INF INIT: CPT

## 2019-09-23 PROCEDURE — 83605 ASSAY OF LACTIC ACID: CPT

## 2019-09-23 PROCEDURE — 99285 EMERGENCY DEPT VISIT HI MDM: CPT

## 2019-09-23 PROCEDURE — 51701 INSERT BLADDER CATHETER: CPT

## 2019-09-23 PROCEDURE — 84484 ASSAY OF TROPONIN QUANT: CPT

## 2019-09-23 PROCEDURE — 93005 ELECTROCARDIOGRAM TRACING: CPT

## 2019-09-23 PROCEDURE — 74011250636 HC RX REV CODE- 250/636: Performed by: EMERGENCY MEDICINE

## 2019-09-23 PROCEDURE — 80053 COMPREHEN METABOLIC PANEL: CPT

## 2019-09-23 RX ORDER — KETOROLAC TROMETHAMINE 30 MG/ML
15 INJECTION, SOLUTION INTRAMUSCULAR; INTRAVENOUS
Status: COMPLETED | OUTPATIENT
Start: 2019-09-23 | End: 2019-09-23

## 2019-09-23 RX ADMIN — SODIUM CHLORIDE 1000 ML: 900 INJECTION, SOLUTION INTRAVENOUS at 23:51

## 2019-09-23 RX ADMIN — KETOROLAC TROMETHAMINE 15 MG: 30 INJECTION, SOLUTION INTRAMUSCULAR at 23:50

## 2019-09-24 ENCOUNTER — APPOINTMENT (OUTPATIENT)
Dept: CT IMAGING | Age: 79
End: 2019-09-24
Attending: EMERGENCY MEDICINE
Payer: MEDICARE

## 2019-09-24 PROBLEM — J18.9 BILATERAL PNEUMONIA: Status: ACTIVE | Noted: 2019-09-24

## 2019-09-24 LAB
ALBUMIN SERPL-MCNC: 3 G/DL (ref 3.5–5)
ALBUMIN SERPL-MCNC: 3.5 G/DL (ref 3.5–5)
ALBUMIN/GLOB SERPL: 0.8 {RATIO} (ref 1.1–2.2)
ALBUMIN/GLOB SERPL: 0.9 {RATIO} (ref 1.1–2.2)
ALP SERPL-CCNC: 104 U/L (ref 45–117)
ALP SERPL-CCNC: 112 U/L (ref 45–117)
ALT SERPL-CCNC: 16 U/L (ref 12–78)
ALT SERPL-CCNC: 17 U/L (ref 12–78)
ANION GAP SERPL CALC-SCNC: 7 MMOL/L (ref 5–15)
ANION GAP SERPL CALC-SCNC: 9 MMOL/L (ref 5–15)
APPEARANCE UR: CLEAR
AST SERPL-CCNC: 10 U/L (ref 15–37)
AST SERPL-CCNC: 7 U/L (ref 15–37)
ATRIAL RATE: 90 BPM
BACTERIA URNS QL MICRO: NEGATIVE /HPF
BASOPHILS # BLD: 0 K/UL (ref 0–0.1)
BASOPHILS NFR BLD: 0 % (ref 0–1)
BILIRUB SERPL-MCNC: 0.2 MG/DL (ref 0.2–1)
BILIRUB SERPL-MCNC: 0.2 MG/DL (ref 0.2–1)
BILIRUB UR QL CFM: NEGATIVE
BUN SERPL-MCNC: 15 MG/DL (ref 6–20)
BUN SERPL-MCNC: 19 MG/DL (ref 6–20)
BUN/CREAT SERPL: 17 (ref 12–20)
BUN/CREAT SERPL: 20 (ref 12–20)
CALCIUM SERPL-MCNC: 8.7 MG/DL (ref 8.5–10.1)
CALCIUM SERPL-MCNC: 9.9 MG/DL (ref 8.5–10.1)
CALCULATED P AXIS, ECG09: 59 DEGREES
CALCULATED R AXIS, ECG10: 50 DEGREES
CALCULATED T AXIS, ECG11: 56 DEGREES
CAOX CRY URNS QL MICRO: ABNORMAL
CHLORIDE SERPL-SCNC: 108 MMOL/L (ref 97–108)
CHLORIDE SERPL-SCNC: 109 MMOL/L (ref 97–108)
CO2 SERPL-SCNC: 25 MMOL/L (ref 21–32)
CO2 SERPL-SCNC: 25 MMOL/L (ref 21–32)
COLOR UR: ABNORMAL
CREAT SERPL-MCNC: 0.87 MG/DL (ref 0.55–1.02)
CREAT SERPL-MCNC: 0.93 MG/DL (ref 0.55–1.02)
DIAGNOSIS, 93000: NORMAL
DIFFERENTIAL METHOD BLD: ABNORMAL
EOSINOPHIL # BLD: 0.1 K/UL (ref 0–0.4)
EOSINOPHIL NFR BLD: 1 % (ref 0–7)
EPITH CASTS URNS QL MICRO: ABNORMAL /LPF
ERYTHROCYTE [DISTWIDTH] IN BLOOD BY AUTOMATED COUNT: 16.1 % (ref 11.5–14.5)
FLUAV AG NPH QL IA: NEGATIVE
FLUBV AG NOSE QL IA: NEGATIVE
GLOBULIN SER CALC-MCNC: 3.6 G/DL (ref 2–4)
GLOBULIN SER CALC-MCNC: 3.9 G/DL (ref 2–4)
GLUCOSE BLD STRIP.AUTO-MCNC: 187 MG/DL (ref 65–100)
GLUCOSE BLD STRIP.AUTO-MCNC: 295 MG/DL (ref 65–100)
GLUCOSE BLD STRIP.AUTO-MCNC: 340 MG/DL (ref 65–100)
GLUCOSE SERPL-MCNC: 278 MG/DL (ref 65–100)
GLUCOSE SERPL-MCNC: 74 MG/DL (ref 65–100)
GLUCOSE UR STRIP.AUTO-MCNC: 250 MG/DL
HCT VFR BLD AUTO: 38.2 % (ref 35–47)
HGB BLD-MCNC: 11.7 G/DL (ref 11.5–16)
HGB UR QL STRIP: NEGATIVE
IMM GRANULOCYTES # BLD AUTO: 0.1 K/UL (ref 0–0.04)
IMM GRANULOCYTES NFR BLD AUTO: 1 % (ref 0–0.5)
KETONES UR QL STRIP.AUTO: ABNORMAL MG/DL
LEUKOCYTE ESTERASE UR QL STRIP.AUTO: NEGATIVE
LYMPHOCYTES # BLD: 2.7 K/UL (ref 0.8–3.5)
LYMPHOCYTES NFR BLD: 17 % (ref 12–49)
MAGNESIUM SERPL-MCNC: 2.2 MG/DL (ref 1.6–2.4)
MCH RBC QN AUTO: 26.4 PG (ref 26–34)
MCHC RBC AUTO-ENTMCNC: 30.6 G/DL (ref 30–36.5)
MCV RBC AUTO: 86.2 FL (ref 80–99)
MONOCYTES # BLD: 1.1 K/UL (ref 0–1)
MONOCYTES NFR BLD: 7 % (ref 5–13)
NEUTS SEG # BLD: 11.8 K/UL (ref 1.8–8)
NEUTS SEG NFR BLD: 74 % (ref 32–75)
NITRITE UR QL STRIP.AUTO: NEGATIVE
NRBC # BLD: 0 K/UL (ref 0–0.01)
NRBC BLD-RTO: 0 PER 100 WBC
P-R INTERVAL, ECG05: 128 MS
PH UR STRIP: 6 [PH] (ref 5–8)
PLATELET # BLD AUTO: 283 K/UL (ref 150–400)
PMV BLD AUTO: 10.5 FL (ref 8.9–12.9)
POTASSIUM SERPL-SCNC: 3.7 MMOL/L (ref 3.5–5.1)
POTASSIUM SERPL-SCNC: 3.7 MMOL/L (ref 3.5–5.1)
PROT SERPL-MCNC: 6.6 G/DL (ref 6.4–8.2)
PROT SERPL-MCNC: 7.4 G/DL (ref 6.4–8.2)
PROT UR STRIP-MCNC: ABNORMAL MG/DL
Q-T INTERVAL, ECG07: 368 MS
QRS DURATION, ECG06: 64 MS
QTC CALCULATION (BEZET), ECG08: 450 MS
RBC # BLD AUTO: 4.43 M/UL (ref 3.8–5.2)
RBC #/AREA URNS HPF: ABNORMAL /HPF (ref 0–5)
SERVICE CMNT-IMP: ABNORMAL
SODIUM SERPL-SCNC: 140 MMOL/L (ref 136–145)
SODIUM SERPL-SCNC: 143 MMOL/L (ref 136–145)
SP GR UR REFRACTOMETRY: 1.03 (ref 1–1.03)
TROPONIN I SERPL-MCNC: <0.05 NG/ML
UA: UC IF INDICATED,UAUC: ABNORMAL
UROBILINOGEN UR QL STRIP.AUTO: 1 EU/DL (ref 0.2–1)
VENTRICULAR RATE, ECG03: 90 BPM
WBC # BLD AUTO: 15.7 K/UL (ref 3.6–11)
WBC URNS QL MICRO: ABNORMAL /HPF (ref 0–4)

## 2019-09-24 PROCEDURE — 96367 TX/PROPH/DG ADDL SEQ IV INF: CPT

## 2019-09-24 PROCEDURE — 74011000258 HC RX REV CODE- 258: Performed by: INTERNAL MEDICINE

## 2019-09-24 PROCEDURE — 80053 COMPREHEN METABOLIC PANEL: CPT

## 2019-09-24 PROCEDURE — 96372 THER/PROPH/DIAG INJ SC/IM: CPT

## 2019-09-24 PROCEDURE — 74011250636 HC RX REV CODE- 250/636: Performed by: EMERGENCY MEDICINE

## 2019-09-24 PROCEDURE — 74011000250 HC RX REV CODE- 250: Performed by: INTERNAL MEDICINE

## 2019-09-24 PROCEDURE — 96366 THER/PROPH/DIAG IV INF ADDON: CPT

## 2019-09-24 PROCEDURE — 74011636637 HC RX REV CODE- 636/637: Performed by: INTERNAL MEDICINE

## 2019-09-24 PROCEDURE — 99218 HC RM OBSERVATION: CPT

## 2019-09-24 PROCEDURE — 96375 TX/PRO/DX INJ NEW DRUG ADDON: CPT

## 2019-09-24 PROCEDURE — 70450 CT HEAD/BRAIN W/O DYE: CPT

## 2019-09-24 PROCEDURE — 87804 INFLUENZA ASSAY W/OPTIC: CPT

## 2019-09-24 PROCEDURE — 74011250637 HC RX REV CODE- 250/637: Performed by: INTERNAL MEDICINE

## 2019-09-24 PROCEDURE — 87040 BLOOD CULTURE FOR BACTERIA: CPT

## 2019-09-24 PROCEDURE — 82962 GLUCOSE BLOOD TEST: CPT

## 2019-09-24 PROCEDURE — 36415 COLL VENOUS BLD VENIPUNCTURE: CPT

## 2019-09-24 PROCEDURE — 85025 COMPLETE CBC W/AUTO DIFF WBC: CPT

## 2019-09-24 PROCEDURE — 74011250636 HC RX REV CODE- 250/636: Performed by: INTERNAL MEDICINE

## 2019-09-24 PROCEDURE — 94640 AIRWAY INHALATION TREATMENT: CPT

## 2019-09-24 PROCEDURE — 77030029684 HC NEB SM VOL KT MONA -A

## 2019-09-24 RX ORDER — INSULIN GLARGINE 100 [IU]/ML
20 INJECTION, SOLUTION SUBCUTANEOUS
Status: DISCONTINUED | OUTPATIENT
Start: 2019-09-24 | End: 2019-09-25 | Stop reason: HOSPADM

## 2019-09-24 RX ORDER — LISINOPRIL 10 MG/1
10 TABLET ORAL DAILY
Status: DISCONTINUED | OUTPATIENT
Start: 2019-09-24 | End: 2019-09-25 | Stop reason: HOSPADM

## 2019-09-24 RX ORDER — ALBUTEROL SULFATE 0.83 MG/ML
2.5 SOLUTION RESPIRATORY (INHALATION)
Status: DISCONTINUED | OUTPATIENT
Start: 2019-09-24 | End: 2019-09-25 | Stop reason: HOSPADM

## 2019-09-24 RX ORDER — MIRTAZAPINE 15 MG/1
15 TABLET, FILM COATED ORAL
Status: DISCONTINUED | OUTPATIENT
Start: 2019-09-24 | End: 2019-09-25 | Stop reason: HOSPADM

## 2019-09-24 RX ORDER — ACETAMINOPHEN 325 MG/1
650 TABLET ORAL
Status: DISCONTINUED | OUTPATIENT
Start: 2019-09-24 | End: 2019-09-25 | Stop reason: HOSPADM

## 2019-09-24 RX ORDER — RANOLAZINE 500 MG/1
500 TABLET, EXTENDED RELEASE ORAL 2 TIMES DAILY
Status: DISCONTINUED | OUTPATIENT
Start: 2019-09-24 | End: 2019-09-25 | Stop reason: HOSPADM

## 2019-09-24 RX ORDER — ENOXAPARIN SODIUM 100 MG/ML
40 INJECTION SUBCUTANEOUS EVERY 24 HOURS
Status: DISCONTINUED | OUTPATIENT
Start: 2019-09-24 | End: 2019-09-25 | Stop reason: HOSPADM

## 2019-09-24 RX ORDER — GUAIFENESIN 100 MG/5ML
81 LIQUID (ML) ORAL DAILY
Status: DISCONTINUED | OUTPATIENT
Start: 2019-09-24 | End: 2019-09-25 | Stop reason: HOSPADM

## 2019-09-24 RX ORDER — QUETIAPINE FUMARATE 25 MG/1
25 TABLET, FILM COATED ORAL
Status: DISCONTINUED | OUTPATIENT
Start: 2019-09-24 | End: 2019-09-25 | Stop reason: HOSPADM

## 2019-09-24 RX ORDER — AMLODIPINE BESYLATE 5 MG/1
5 TABLET ORAL DAILY
Status: DISCONTINUED | OUTPATIENT
Start: 2019-09-24 | End: 2019-09-25 | Stop reason: HOSPADM

## 2019-09-24 RX ORDER — BISACODYL 5 MG
5 TABLET, DELAYED RELEASE (ENTERIC COATED) ORAL
Status: DISCONTINUED | OUTPATIENT
Start: 2019-09-24 | End: 2019-09-25 | Stop reason: HOSPADM

## 2019-09-24 RX ORDER — ROSUVASTATIN CALCIUM 10 MG/1
10 TABLET, COATED ORAL
Status: DISCONTINUED | OUTPATIENT
Start: 2019-09-24 | End: 2019-09-25 | Stop reason: HOSPADM

## 2019-09-24 RX ORDER — BUDESONIDE 0.5 MG/2ML
500 INHALANT ORAL
Status: DISCONTINUED | OUTPATIENT
Start: 2019-09-24 | End: 2019-09-25 | Stop reason: HOSPADM

## 2019-09-24 RX ORDER — BUSPIRONE HYDROCHLORIDE 10 MG/1
20 TABLET ORAL 2 TIMES DAILY
Status: DISCONTINUED | OUTPATIENT
Start: 2019-09-24 | End: 2019-09-25 | Stop reason: HOSPADM

## 2019-09-24 RX ORDER — SODIUM CHLORIDE 0.9 % (FLUSH) 0.9 %
5-40 SYRINGE (ML) INJECTION AS NEEDED
Status: DISCONTINUED | OUTPATIENT
Start: 2019-09-24 | End: 2019-09-25 | Stop reason: HOSPADM

## 2019-09-24 RX ORDER — DULOXETIN HYDROCHLORIDE 60 MG/1
60 CAPSULE, DELAYED RELEASE ORAL DAILY
Status: DISCONTINUED | OUTPATIENT
Start: 2019-09-24 | End: 2019-09-25 | Stop reason: HOSPADM

## 2019-09-24 RX ORDER — INSULIN LISPRO 100 [IU]/ML
INJECTION, SOLUTION INTRAVENOUS; SUBCUTANEOUS
Status: DISCONTINUED | OUTPATIENT
Start: 2019-09-24 | End: 2019-09-25 | Stop reason: HOSPADM

## 2019-09-24 RX ORDER — LEVOFLOXACIN 5 MG/ML
750 INJECTION, SOLUTION INTRAVENOUS
Status: COMPLETED | OUTPATIENT
Start: 2019-09-24 | End: 2019-09-24

## 2019-09-24 RX ORDER — MAGNESIUM SULFATE 100 %
4 CRYSTALS MISCELLANEOUS AS NEEDED
Status: DISCONTINUED | OUTPATIENT
Start: 2019-09-24 | End: 2019-09-25 | Stop reason: HOSPADM

## 2019-09-24 RX ORDER — ARFORMOTEROL TARTRATE 15 UG/2ML
15 SOLUTION RESPIRATORY (INHALATION)
Status: DISCONTINUED | OUTPATIENT
Start: 2019-09-24 | End: 2019-09-25 | Stop reason: HOSPADM

## 2019-09-24 RX ORDER — SODIUM CHLORIDE 0.9 % (FLUSH) 0.9 %
5-40 SYRINGE (ML) INJECTION EVERY 8 HOURS
Status: DISCONTINUED | OUTPATIENT
Start: 2019-09-24 | End: 2019-09-25 | Stop reason: HOSPADM

## 2019-09-24 RX ADMIN — SODIUM CHLORIDE 1000 ML: 900 INJECTION, SOLUTION INTRAVENOUS at 02:26

## 2019-09-24 RX ADMIN — ASPIRIN 81 MG 81 MG: 81 TABLET ORAL at 08:57

## 2019-09-24 RX ADMIN — CEFTRIAXONE 1 G: 1 INJECTION, POWDER, FOR SOLUTION INTRAMUSCULAR; INTRAVENOUS at 08:55

## 2019-09-24 RX ADMIN — BUSPIRONE HYDROCHLORIDE 20 MG: 10 TABLET ORAL at 08:56

## 2019-09-24 RX ADMIN — Medication 10 ML: at 21:26

## 2019-09-24 RX ADMIN — AMLODIPINE BESYLATE 5 MG: 5 TABLET ORAL at 08:56

## 2019-09-24 RX ADMIN — INSULIN LISPRO 7 UNITS: 100 INJECTION, SOLUTION INTRAVENOUS; SUBCUTANEOUS at 12:08

## 2019-09-24 RX ADMIN — ARFORMOTEROL TARTRATE 15 MCG: 15 SOLUTION RESPIRATORY (INHALATION) at 07:45

## 2019-09-24 RX ADMIN — LEVOFLOXACIN 750 MG: 5 INJECTION, SOLUTION INTRAVENOUS at 03:08

## 2019-09-24 RX ADMIN — QUETIAPINE FUMARATE 25 MG: 25 TABLET ORAL at 21:20

## 2019-09-24 RX ADMIN — INSULIN LISPRO 3 UNITS: 100 INJECTION, SOLUTION INTRAVENOUS; SUBCUTANEOUS at 21:21

## 2019-09-24 RX ADMIN — ROSUVASTATIN CALCIUM 10 MG: 10 TABLET, COATED ORAL at 21:20

## 2019-09-24 RX ADMIN — BUDESONIDE 500 MCG: 0.5 INHALANT RESPIRATORY (INHALATION) at 07:38

## 2019-09-24 RX ADMIN — LISINOPRIL 10 MG: 10 TABLET ORAL at 08:56

## 2019-09-24 RX ADMIN — ENOXAPARIN SODIUM 40 MG: 40 INJECTION SUBCUTANEOUS at 08:57

## 2019-09-24 RX ADMIN — ACETAMINOPHEN 650 MG: 325 TABLET, FILM COATED ORAL at 12:52

## 2019-09-24 RX ADMIN — MIRTAZAPINE 15 MG: 15 TABLET, FILM COATED ORAL at 21:20

## 2019-09-24 RX ADMIN — ACETAMINOPHEN 650 MG: 325 TABLET, FILM COATED ORAL at 06:48

## 2019-09-24 RX ADMIN — AZITHROMYCIN MONOHYDRATE 500 MG: 500 INJECTION, POWDER, LYOPHILIZED, FOR SOLUTION INTRAVENOUS at 06:48

## 2019-09-24 RX ADMIN — RANOLAZINE 500 MG: 500 TABLET, FILM COATED, EXTENDED RELEASE ORAL at 08:56

## 2019-09-24 RX ADMIN — INSULIN LISPRO 2 UNITS: 100 INJECTION, SOLUTION INTRAVENOUS; SUBCUTANEOUS at 16:27

## 2019-09-24 RX ADMIN — INSULIN GLARGINE 20 UNITS: 100 INJECTION, SOLUTION SUBCUTANEOUS at 21:37

## 2019-09-24 RX ADMIN — DULOXETINE HYDROCHLORIDE 60 MG: 60 CAPSULE, DELAYED RELEASE ORAL at 08:56

## 2019-09-24 RX ADMIN — Medication 10 ML: at 06:48

## 2019-09-24 RX ADMIN — BUSPIRONE HYDROCHLORIDE 20 MG: 10 TABLET ORAL at 18:23

## 2019-09-24 RX ADMIN — RANOLAZINE 500 MG: 500 TABLET, FILM COATED, EXTENDED RELEASE ORAL at 21:37

## 2019-09-24 NOTE — PROGRESS NOTES
TRANSFER - IN REPORT:    Verbal report received from Jose G Franz (name) on [facility]  being received from [facility] (unit) for routine progression of care      Report consisted of patients Situation, Background, Assessment and   Recommendations(SBAR). Information from the following report(s) SBAR and Kardex was reviewed with the receiving nurse. Opportunity for questions and clarification was provided. Assessment completed upon patients arrival to unit and care assumed.

## 2019-09-24 NOTE — PROGRESS NOTES
Bedside shift change report given to Beth (oncoming nurse) by Anamaria Kendrick (offgoing nurse).  Report included the following information SBAR, Intake/Output, MAR, Recent Results and Cardiac Rhythm SR.

## 2019-09-24 NOTE — ED PROVIDER NOTES
HPI     72-year-old female with a history of arthritis, asthma, coronary artery disease, diabetes, acid reflux, hypertension, high cholesterol, presents to the ED with diffuse body pain. She reports muscle spasms all over her body onset tonight. She denies fevers or chills. She reports some nausea but no vomiting. She has not taken anything for the pain. She denies any falls. She does have a headache. She states she is never had this before. She has had no change in medication recently. She lives alone. She denies any urinary frequency or burning. She states she feels weak all over. There is no focal weakness or numbness. She is ambulating.     Past Medical History:   Diagnosis Date    Anal polyp 6/13/2013    Arthritis     Asthma     CAD (coronary artery disease) 10/27/2017    Cataract     Chronic pain     knee - right/back    Diabetes ([facility] [address]     GERD (gastroesophageal reflux disease)     Hemorrhoids 6/13/2013    History of kidney stones     Hypertension     Ill-defined condition     abdominal pain and burning    Other ill-defined conditions(799.89)     high cholesterol       Past Surgical History:   Procedure Laterality Date    COLONOSCOPY  2/28/2013         EGD  2/28/2013         HX CATARACT REMOVAL Bilateral     HX CHOLECYSTECTOMY  6-2015    HX GI  6/27/13    anal polyps removed    HX HEENT      laser surgery for blood clot in right eye    HX KNEE REPLACEMENT      right    HX OTHER SURGICAL  4-2-14    lap gastrotomy and removal of polyp -  - not cancerous per pt    HX ALAN AND BSO      HX TONSILLECTOMY      HX UROLOGICAL      \"laser\" surgery for kidney stone    NEUROLOGICAL PROCEDURE UNLISTED  1990    tumor at back of neck, benign         Family History:   Problem Relation Age of Onset    Cancer Mother         colon    Diabetes Brother     Other Sister         GI BLEED    Heart Disease Brother     Heart Attack Brother     Heart Disease Brother     Heart Disease Brother     Schizophrenia Son     Anesth Problems Neg Hx        Social History     Socioeconomic History    Marital status:      Spouse name: Not on file    Number of children: Not on file    Years of education: Not on file    Highest education level: Not on file   Occupational History    Not on file   Social Needs    Financial resource strain: Not on file    Food insecurity:     Worry: Not on file     Inability: Not on file    Transportation needs:     Medical: Not on file     Non-medical: Not on file   Tobacco Use    Smoking status: Former Smoker     Years: 0.00    Smokeless tobacco: Never Used    Tobacco comment: age 13   Substance and Sexual Activity    Alcohol use: No    Drug use: No    Sexual activity: Never   Lifestyle    Physical activity:     Days per week: Not on file     Minutes per session: Not on file    Stress: Not on file   Relationships    Social connections:     Talks on phone: Not on file     Gets together: Not on file     Attends Baptism service: Not on file     Active member of club or organization: Not on file     Attends meetings of clubs or organizations: Not on file     Relationship status: Not on file    Intimate partner violence:     Fear of current or ex partner: Not on file     Emotionally abused: Not on file     Physically abused: Not on file     Forced sexual activity: Not on file   Other Topics Concern    Not on file   Social History Narrative    Not on file         ALLERGIES: Seafood [shellfish containing products]    Review of Systems   Constitutional: Negative for fever. HENT: Negative for congestion. Eyes: Negative for visual disturbance. Respiratory: Negative for cough and shortness of breath. Cardiovascular: Negative for chest pain. Gastrointestinal: Positive for nausea. Negative for abdominal pain and vomiting. Genitourinary: Negative for dysuria. Musculoskeletal: Positive for gait problem. Skin: Negative for rash. Neurological: Positive for weakness and headaches. Psychiatric/Behavioral: Negative for dysphoric mood. Vitals:    09/23/19 2000   BP: 146/68   Pulse: (!) 118   Resp: 20   Temp: 97.7 °F (36.5 °C)   SpO2: 96%            Physical Exam   Constitutional: She is oriented to person, place, and time. She appears well-developed and well-nourished. No distress. HENT:   Head: Normocephalic and atraumatic. Mouth/Throat: No oropharyngeal exudate. Eyes: Pupils are equal, round, and reactive to light. Right eye exhibits no discharge. Left eye exhibits no discharge. No scleral icterus. Neck: Normal range of motion. Neck supple. No JVD present. Cardiovascular: Regular rhythm and normal heart sounds. Tachycardia present. No murmur heard. Pulmonary/Chest: Effort normal and breath sounds normal. No stridor. No respiratory distress. She has no wheezes. She has no rales. She exhibits no tenderness. Abdominal: Soft. Bowel sounds are normal. She exhibits no distension and no mass. There is no tenderness. There is no rebound and no guarding. Musculoskeletal: Normal range of motion. Neurological: She is oriented to person, place, and time. Skin: Skin is warm and dry. Capillary refill takes less than 2 seconds. No rash noted. Psychiatric: She has a normal mood and affect. Her behavior is normal. Judgment and thought content normal.        [facility]       Procedures        Wbc up to 19K. She has been seen ehre mutliple times with month with slowly rising WBC. CXR with bibasilar atelectasis. Patient reports cough and sob concerning for pneumoina. Will start levaquin. No fever, no lactate. Cultures sent. Will admit. Hospitalist Jefferson for Admission  1:50 AM    ED Room Number: ER10/10  Patient Name and age:  Irena Nash 69 y.o.  female  Working Diagnosis:   1.  Pneumonia of both lower lobes due to infectious organism [facility])      Readmission: no  Isolation Requirements:  no  Recommended Level of Care: telemetry  Code Status:  Full Code  Department:[facility] Adult ED - [phone]  Other:  69year old female presents with diffuse pain, cough, sob. WBC up to 19K. Lactate ok. Tachycardia. LEvaquin ordered. ED EKG interpretation:  Rhythm: normal sinus rhythm; and regular . Rate (approx.): 90; Axis: normal; P wave: normal; QRS interval: normal ; ST/T wave: non-specific changes; This EKG was interpreted by Dale Vences MD,ED Provider.

## 2019-09-24 NOTE — H&P
History and Physical  Primary Care Provider: Humberto Aj MD    Subjective:     Radha Dolan is a 69 y.o. female  With past medical history of Asthma , CAD, DM-2, HTN who presented with chills and generalized body aches since this afternoon. Patient reported that she started to have sudden onset of cramping all over her body along with fever and diaphoresis. She has occasional non productive cough but denied chest pain or SOB. She feels nauseated but has no vomiting  abdominal pain or Diarrhea . No chest pain or urinary symptoms       Review of Systems:    A limited 12 point ROS was done found to be negative except the one mentioned in the HPI     Past Medical History:   Diagnosis Date    Anal polyp 6/13/2013    Arthritis     Asthma     CAD (coronary artery disease) 10/27/2017    Cataract     Chronic pain     knee - right/back    Diabetes ([facility] [address]     GERD (gastroesophageal reflux disease)     Hemorrhoids 6/13/2013    History of kidney stones     Hypertension     Ill-defined condition     abdominal pain and burning    Other ill-defined conditions(799.89)     high cholesterol      Past Surgical History:   Procedure Laterality Date    COLONOSCOPY  2/28/2013         EGD  2/28/2013         HX CATARACT REMOVAL Bilateral     HX CHOLECYSTECTOMY  6-2015    HX GI  6/27/13    anal polyps removed    HX HEENT      laser surgery for blood clot in right eye    HX KNEE REPLACEMENT      right    HX OTHER SURGICAL  4-2-14    lap gastrotomy and removal of polyp -  - not cancerous per pt    HX ALAN AND BSO      HX TONSILLECTOMY      HX UROLOGICAL      \"laser\" surgery for kidney stone    NEUROLOGICAL PROCEDURE UNLISTED  1990    tumor at back of neck, benign     Prior to Admission medications    Medication Sig Start Date End Date Taking? Authorizing Provider   pantoprazole (PROTONIX) 40 mg tablet Take 1 Tab by mouth daily for 20 days.  9/5/19 9/25/19  Estella Romberg, MD   insulin glargine (LANTUS SOLOSTAR U-100 INSULIN) 100 unit/mL (3 mL) inpn 20 Units by SubCUTAneous route nightly. Indications: diabetes, type 2 diabetes mellitus 7/12/19   Binu Garvey MD   bisacodyl (DULCOLAX) 5 mg EC tablet Take 1 Tab by mouth daily as needed for Constipation. 7/12/19   Binu Garvey MD   amLODIPine (NORVASC) 10 mg tablet Take 0.5 Tabs by mouth daily. 7/12/19   Binu Garvey MD   albuterol (PROVENTIL VENTOLIN) 2.5 mg /3 mL (0.083 %) nebulizer solution 3 mL by Nebulization route every four (4) hours as needed for Wheezing. 3/11/19   Branden Tenorio MD   arformoterol (BROVANA) 15 mcg/2 mL nebu neb solution 2 mL by Nebulization route two (2) times a day. 3/11/19   Branden Tenorio MD   budesonide (PULMICORT) 0.5 mg/2 mL nbsp 2 mL by Nebulization route two (2) times a day. 3/11/19   Branden Tenorio MD   aspirin 81 mg chewable tablet Take 1 Tab by mouth daily. 3/12/19   Branden Tenorio MD   busPIRone (BUSPAR) 5 mg tablet Take 20 mg by mouth two (2) times a day. Provider, Historical   DULoxetine (CYMBALTA) 60 mg capsule Take 60 mg by mouth daily. Provider, Historical   ranolazine ER (RANEXA) 500 mg SR tablet Take 500 mg by mouth two (2) times a day. Provider, Historical   mirtazapine (REMERON) 15 mg tablet Take 15 mg by mouth nightly. Provider, Historical   QUEtiapine (SEROQUEL) 25 mg tablet Take 25 mg by mouth nightly. Provider, Historical   albuterol (PROVENTIL HFA, VENTOLIN HFA, PROAIR HFA) 90 mcg/actuation inhaler Take 2 Puffs by inhalation every four (4) hours as needed for Wheezing. 1/19/19   Galina Meneses MD   rosuvastatin (CRESTOR) 10 mg tablet Take 10 mg by mouth nightly. Provider, Historical   Dexlansoprazole (DEXILANT) 60 mg CpDB Take 60 mg by mouth Daily (before breakfast). Provider, Historical   fosinopril (MONOPRIL) 20 mg tablet Take 20 mg by mouth daily.  2/12/15   Provider, Historical     Allergies   Allergen Reactions    Seafood [Shellfish Containing Products] Swelling Family History   Problem Relation Age of Onset    Cancer Mother         colon    Diabetes Brother     Other Sister         GI BLEED    Heart Disease Brother     Heart Attack Brother     Heart Disease Brother     Heart Disease Brother     Schizophrenia Son     Anesth Problems Neg Hx         SOCIAL HISTORY:  Patient resides at home  Patient ambulates  Independently   Smoking history: Former smoker  Alcohol history:  None        Objective:   Physical Exam   Constitutional: She is oriented to person, place, and time and well-developed, well-nourished, and in no distress. No distress. HENT:   Head: Normocephalic and atraumatic. Eyes: Pupils are equal, round, and reactive to light. Conjunctivae are normal. Right eye exhibits no discharge. Left eye exhibits no discharge. Scleral icterus is present. Neck: Normal range of motion. Neck supple. No JVD present. No tracheal deviation present. No thyromegaly present. Cardiovascular: Normal rate and regular rhythm. Exam reveals no gallop and no friction rub. No murmur heard. Pulmonary/Chest: Effort normal. No stridor. No respiratory distress. She has no wheezes. She has no rales. She exhibits no tenderness. Abdominal: Soft. Bowel sounds are normal. She exhibits no distension. There is no tenderness. There is no rebound and no guarding. Musculoskeletal: She exhibits edema. She exhibits no tenderness or deformity. Lymphadenopathy:     She has no cervical adenopathy. Neurological: She is alert and oriented to person, place, and time. She displays normal reflexes. No cranial nerve deficit. She exhibits normal muscle tone. Coordination normal.   Skin: Skin is warm and dry. No rash noted. She is not diaphoretic. No erythema. Psychiatric: Memory, affect and judgment normal.         ECG:  NSR, Non specific T wave changes . Unchanged from previous study    Data Review: All diagnostic labs and studies have been reviewed.     Chest x-ray bibasilar atelectasis. Assessment and Plan     # Bilateral Pneumonia   -  Reported subjective fever and sweating along with chills   - she has leucocytosis . CXR mild bibasilar atelectasis. Lactic acid normal  - Follow Blood culture.  Legionella urine Ag test, rapid flu Ag test  - Empiric IV ceftriaxone/ zithromax  - incentive spirometry  - supplemental O2 by NC as needed     # CAD  - stable   - resume home meds      # DM-2   - continue Home dose of latus   - PCOT Fs q6 hrs   - LDSS  - DM consistent diet    # HTN  - continue home meds     # Asthma   - stable saturating well  On RA  - resume home inhalers / nebs           FUNCTIONAL STATUS PRIOR TO HOSPITALIZATION  Ambulatory sometimes uses cane    Signed By: Osiris Bailey MD     September 24, 2019

## 2019-09-24 NOTE — PROGRESS NOTES
Observation notice provided in writing to patient and/or caregiver as well as verbal explanation of the policy. Patients who are in outpatient status also receive the Observation notice. Ernesto Pham MSA, RN, CRM.

## 2019-09-24 NOTE — DIABETES MGMT
[facility]    [facility] Consult Note    Recommendations/ Comments: BG's vary widely - 74 mg/dL - 113 mg/dL yesterday. Then BG at 0546 278 mg/dl and tara to 340 mg/dL pre lunch today. Home dose of Lantus 20 units added and will start this evening    If appropriate, please consider   Add A1c to next lab draw    Current hospital DM medication:   Lantus 20 units at bedtime starting 9/24/2019  Lispro normal sensitivity correction scale    Consult received for:  [x]             Assessment of home management                []      Medication Recommendations                []             Meter/monitoring     []             Insulin instruction     []             New diagnosis     []             Outpatient education     []             Insulin pump patient     []             Insulin infusion     []             DKA/HHS    Chart reviewed and initial evaluation complete on Gagan Garcia. Patient is a 69 y.o. female with known DM on Lantus 20 units PTA      BG monitoring at home \"I test twice a day\". Some results are < 80 mg/dLand some > 200 mg/dL  A1c is from 7-5-2019  She takes her medication routinely    Assessed and instructed patient on the following:   ·  interpretation of lab results, blood sugar goals, complications of diabetes mellitus, hypoglycemia prevention and treatment, illness management, SMBG skills, nutrition and referred to Diabetes Educator    Encouraged the following:   · continue testing BG 2x/day  · Plate method for portion control  · Report BG's > 200 mg/dL to MD    Provided patient with the following: [x]             Diabetes Self Care Guide               []             Insulin education materials               []             CHO counting education materials               [x]             Outpatient DTC contact number               []             Glucometer                 Discussed with patient and/or family need for follow up appointment for diabetes management after discharge.       A1c: Lab Results   Component Value Date/Time    Hemoglobin A1c 12.6 (H) 07/05/2019 02:49 PM       Recent Glucose Results:   Lab Results   Component Value Date/Time     (H) 09/24/2019 05:46 AM    GLU 74 09/23/2019 11:46 PM    GLUCPOC 187 (H) 09/24/2019 03:53 PM    GLUCPOC 340 (H) 09/24/2019 12:03 PM    GLUCPOC 92 09/23/2019 11:13 PM        Lab Results   Component Value Date/Time    Creatinine 0.87 09/24/2019 05:46 AM     Estimated Creatinine Clearance: 51.4 mL/min (based on SCr of 0.87 mg/dL). Active Orders   Diet    DIET CARDIAC Regular        PO intake:   Patient Vitals for the past 72 hrs:   % Diet Eaten   09/24/19 1254 50 %       Will continue to follow as needed. Thank you.   William Diaz RN, CDE    Time spent: 25 min

## 2019-09-24 NOTE — PROGRESS NOTES
TRANSFER - OUT REPORT:    Verbal report given to Elle(name) on [place]  being transferred to [address] [place] [address](unit) for routine progression of care       Report consisted of patients Situation, Background, Assessment and   Recommendations(SBAR). Information from the following report(s) SBAR, ED Summary, Intake/Output, MAR and Cardiac Rhythm normal sinus was reviewed with the receiving nurse. Lines:   Peripheral IV 09/23/19 Left Hand (Active)   Site Assessment Clean, dry, & intact 9/24/2019  8:14 AM   Phlebitis Assessment 0 9/24/2019  8:14 AM   Infiltration Assessment 0 9/24/2019  8:14 AM   Dressing Status Clean, dry, & intact 9/24/2019  8:14 AM   Dressing Type Transparent 9/24/2019  8:14 AM   Hub Color/Line Status Pink;Capped 9/24/2019  8:14 AM   Action Taken Blood drawn 9/23/2019 11:10 PM   Alcohol Cap Used Yes 9/24/2019  8:14 AM       Peripheral IV 09/24/19 Right Antecubital (Active)   Site Assessment Clean, dry, & intact 9/24/2019  8:14 AM   Phlebitis Assessment 0 9/24/2019  8:14 AM   Infiltration Assessment 0 9/24/2019  8:14 AM   Dressing Status Clean, dry, & intact 9/24/2019  8:14 AM   Dressing Type Transparent 9/24/2019  8:14 AM   Hub Color/Line Status Pink;Capped 9/24/2019  8:14 AM   Alcohol Cap Used Yes 9/24/2019  8:14 AM        Opportunity for questions and clarification was provided.       Patient transported with:   Monitor

## 2019-09-24 NOTE — ROUTINE PROCESS
TRANSFER - OUT REPORT: 
 
Verbal report given to Angelita Burgess RN(name) on Giuseppe Del Rio  being transferred to [facility](unit) for routine progression of care Report consisted of patients Situation, Background, Assessment and  
Recommendations(SBAR). Information from the following report(s) SBAR, Kardex, ED Summary, [facility] and Recent Results was reviewed with the receiving nurse. Lines:  
Peripheral IV 09/23/19 Left Hand (Active) Site Assessment Clean, dry, & intact 9/23/2019 11:10 PM  
Phlebitis Assessment 0 9/23/2019 11:10 PM  
Infiltration Assessment 0 9/23/2019 11:10 PM  
Dressing Status Clean, dry, & intact 9/23/2019 11:10 PM  
Dressing Type Transparent 9/23/2019 11:10 PM  
Hub Color/Line Status Pink;Capped;Flushed;Patent 9/23/2019 11:10 PM  
Action Taken Blood drawn 9/23/2019 11:10 PM  
   
Peripheral IV 09/24/19 Right Antecubital (Active) Site Assessment Clean, dry, & intact 9/24/2019  2:30 AM  
Phlebitis Assessment 0 9/24/2019  2:30 AM  
Infiltration Assessment 0 9/24/2019  2:30 AM  
Dressing Status Clean, dry, & intact 9/24/2019  2:30 AM  
Dressing Type 4 X 4 9/24/2019  2:30 AM  
Hub Color/Line Status Pink 9/24/2019  2:30 AM  
  
 
Opportunity for questions and clarification was provided. Patient transported with: 
 Monitor Visit Vitals /59 (BP 1 Location: Left arm, BP Patient Position: At rest) Pulse 80 Temp 98.1 °F (36.7 °C) Resp 16 SpO2 98%

## 2019-09-24 NOTE — PROGRESS NOTES
Hospitalist Progress Note  Lawrence Dela Cruz MD  Answering service: [phone] OR [phone] from in house phone      Date of Service:  2019  NAME:  Manjinder Estrada  :  1940  MRN:  [id number]      Admission Summary:   Manjinder Estrada is a 69 y.o. female  With past medical history of Asthma , CAD, DM-2, HTN who presented with chills and generalized body aches since this afternoon. Patient reported that she started to have sudden onset of cramping all over her body along with fever and diaphoresis. She has occasional non productive cough but denied chest pain or SOB. She feels nauseated but has no vomiting  abdominal pain or diarrhea . No chest pain or urinary symptoms     Interval history / Subjective:      Patient seen and examined this afternoon. She is feeling better. No new complaints     Assessment & Plan:     # Bilateral community acquired Pneumonia(POA)-   - Reported subjective fever and sweating along with chills w/ leucocytosis   - CXR mild bibasilar atelectasis. Lactic acid normal  - Blood culture and Legionella urine Ag test- pending, rapid flu negative and respiratory panel test- pending   - continue with IV ceftriaxone/ zithromax  - incentive spirometry  - supplemental O2 by NC as needed     # Asthma w/o exacerbation    - stable saturating well  On RA  - resume home inhalers / nebs    # CAD  - stable   - resume home meds      # DM-2   - check A1c  - continue Home dose of latus, SSI  - DM consistent diet     # HTN  - continue home meds     DVT prop: Enoxaparin   Code: Full      Hospital Problems  Date Reviewed: 2019          Codes Class Noted POA    Bilateral pneumonia ICD-10-CM: J18.9  ICD-9-CM: 836  2019 Unknown                Review of Systems:   Pertinent positive mentioned in interval hx/HPI. Other systems reviewed and negative     Vital Signs:    Last 24hrs VS reviewed since prior progress note.  Most recent are:  Visit Vitals  /82 (BP 1 Location: Right arm, BP Patient Position: At rest)   Pulse 98   Temp 97.8 °F (36.6 °C)   Resp 16   Wt 82.1 kg (181 lb)   SpO2 96%   BMI 35.35 kg/m²       No intake or output data in the 24 hours ending 09/24/19 3296     Physical Examination:             Constitutional:  No acute distress, cooperative, pleasant    ENT:  Oral mucous moist, oropharynx benign. Neck supple,    Resp:  CTA bilaterally. No wheezing/rhonchi/rales. No accessory muscle use   CV:  Regular rhythm, normal rate, no murmurs, gallops, rubs    GI:  Soft, non distended, non tender. normoactive bowel sounds, no hepatosplenomegaly     Musculoskeletal:  No edema, warm, 2+ pulses throughout    Neurologic:  Moves all extremities. AAOx3, CN II-XII reviewed            Data Review:   I personally reviewed labs and imaging       Labs:     Recent Labs     09/24/19  0546 09/23/19  2301   WBC 15.7* 19.2*   HGB 11.7 12.9   HCT 38.2 41.3    283     Recent Labs     09/24/19  0546 09/23/19  2346    143   K 3.7 3.7    109*   CO2 25 25   BUN 15 19   CREA 0.87 0.93   * 74   CA 8.7 9.9   MG  --  2.2     Recent Labs     09/24/19  0546 09/23/19  2346   SGOT 10* 7*   ALT 17 16    104   TBILI 0.2 0.2   TP 6.6 7.4   ALB 3.0* 3.5   GLOB 3.6 3.9     No results for input(s): INR, PTP, APTT in the last 72 hours. No lab exists for component: INREXT   No results for input(s): FE, TIBC, PSAT, FERR in the last 72 hours. Lab Results   Component Value Date/Time    Folate 13.2 05/20/2009 03:00 AM      No results for input(s): PH, PCO2, PO2 in the last 72 hours.   Recent Labs     09/23/19  2339   TROIQ <0.05     Lab Results   Component Value Date/Time    Cholesterol, total 131 07/06/2019 12:53 AM    HDL Cholesterol 69 07/06/2019 12:53 AM    LDL, calculated 41 07/06/2019 12:53 AM    Triglyceride 105 07/06/2019 12:53 AM    CHOL/HDL Ratio 1.9 07/06/2019 12:53 AM     Lab Results   Component Value Date/Time    Glucose (POC) 92 09/23/2019 11:13 PM    Glucose (POC) 113 (H) 09/23/2019 10:48 PM    Glucose (POC) 128 (H) 09/17/2019 05:19 PM    Glucose (POC) 383 (H) 09/17/2019 02:50 PM    Glucose (POC) 328 (H) 08/16/2019 02:30 PM     Lab Results   Component Value Date/Time    Color YELLOW/STRAW 09/23/2019 11:46 PM    Appearance CLEAR 09/23/2019 11:46 PM    Specific gravity 1.029 09/23/2019 11:46 PM    Specific gravity RESISTANT 07/05/2019 04:52 PM    pH (UA) 6.0 09/23/2019 11:46 PM    Protein TRACE (A) 09/23/2019 11:46 PM    Glucose 250 (A) 09/23/2019 11:46 PM    Ketone TRACE (A) 09/23/2019 11:46 PM    Bilirubin NEGATIVE  09/17/2019 05:50 PM    Urobilinogen 1.0 09/23/2019 11:46 PM    Nitrites NEGATIVE  09/23/2019 11:46 PM    Leukocyte Esterase NEGATIVE  09/23/2019 11:46 PM    Epithelial cells FEW 09/23/2019 11:46 PM    Bacteria NEGATIVE  09/23/2019 11:46 PM    WBC 0-4 09/23/2019 11:46 PM    RBC 0-5 09/23/2019 11:46 PM         Medications Reviewed:     Current Facility-Administered Medications   Medication Dose Route Frequency    albuterol (PROVENTIL VENTOLIN) nebulizer solution 2.5 mg  2.5 mg Nebulization Q4H PRN    amLODIPine (NORVASC) tablet 5 mg  5 mg Oral DAILY    arformoterol (BROVANA) neb solution 15 mcg  15 mcg Nebulization BID RT    aspirin chewable tablet 81 mg  81 mg Oral DAILY    bisacodyl (DULCOLAX) tablet 5 mg  5 mg Oral DAILY PRN    budesonide (PULMICORT) 500 mcg/2 ml nebulizer suspension  500 mcg Nebulization BID RT    busPIRone (BUSPAR) tablet 20 mg  20 mg Oral BID    DULoxetine (CYMBALTA) capsule 60 mg  60 mg Oral DAILY    lisinopril (PRINIVIL, ZESTRIL) tablet 10 mg  10 mg Oral DAILY    rosuvastatin (CRESTOR) tablet 10 mg  10 mg Oral QHS    ranolazine ER (RANEXA) tablet 500 mg  500 mg Oral BID    QUEtiapine (SEROquel) tablet 25 mg  25 mg Oral QHS    insulin glargine (LANTUS) injection 20 Units  20 Units SubCUTAneous QHS    mirtazapine (REMERON) tablet 15 mg  15 mg Oral QHS    sodium chloride (NS) flush 5-40 mL 5-40 mL IntraVENous Q8H    sodium chloride (NS) flush 5-40 mL  5-40 mL IntraVENous PRN    cefTRIAXone (ROCEPHIN) 1 g in 0.9% sodium chloride (MBP/ADV) 50 mL  1 g IntraVENous Q24H    azithromycin (ZITHROMAX) 500 mg in 0.9% sodium chloride (MBP/ADV) 250 mL  500 mg IntraVENous Q24H    enoxaparin (LOVENOX) injection 40 mg  40 mg SubCUTAneous Q24H    acetaminophen (TYLENOL) tablet 650 mg  650 mg Oral Q6H PRN    glucose chewable tablet 16 g  4 Tab Oral PRN    glucagon (GLUCAGEN) injection 1 mg  1 mg IntraMUSCular PRN    insulin lispro (HUMALOG) injection   SubCUTAneous AC&HS     ______________________________________________________________________  EXPECTED LENGTH OF STAY: - - -  ACTUAL LENGTH OF STAY:          0                 Saira West MD   Patient has given Verbal permission to discuss medical care with   persons present in the room and and also with contact as listed on face sheet.

## 2019-09-25 VITALS
RESPIRATION RATE: 17 BRPM | BODY MASS INDEX: 36.43 KG/M2 | OXYGEN SATURATION: 97 % | DIASTOLIC BLOOD PRESSURE: 95 MMHG | WEIGHT: 186.51 LBS | HEART RATE: 100 BPM | SYSTOLIC BLOOD PRESSURE: 189 MMHG | TEMPERATURE: 97.9 F

## 2019-09-25 LAB
ANION GAP SERPL CALC-SCNC: 4 MMOL/L (ref 5–15)
BUN SERPL-MCNC: 8 MG/DL (ref 6–20)
BUN/CREAT SERPL: 11 (ref 12–20)
CALCIUM SERPL-MCNC: 8.5 MG/DL (ref 8.5–10.1)
CHLORIDE SERPL-SCNC: 109 MMOL/L (ref 97–108)
CO2 SERPL-SCNC: 28 MMOL/L (ref 21–32)
CREAT SERPL-MCNC: 0.73 MG/DL (ref 0.55–1.02)
ERYTHROCYTE [DISTWIDTH] IN BLOOD BY AUTOMATED COUNT: 16.2 % (ref 11.5–14.5)
EST. AVERAGE GLUCOSE BLD GHB EST-MCNC: 309 MG/DL
GLUCOSE BLD STRIP.AUTO-MCNC: 200 MG/DL (ref 65–100)
GLUCOSE BLD STRIP.AUTO-MCNC: 341 MG/DL (ref 65–100)
GLUCOSE SERPL-MCNC: 226 MG/DL (ref 65–100)
HBA1C MFR BLD: 12.4 % (ref 4.2–6.3)
HCT VFR BLD AUTO: 36 % (ref 35–47)
HGB BLD-MCNC: 11.2 G/DL (ref 11.5–16)
MCH RBC QN AUTO: 26.4 PG (ref 26–34)
MCHC RBC AUTO-ENTMCNC: 31.1 G/DL (ref 30–36.5)
MCV RBC AUTO: 84.9 FL (ref 80–99)
NRBC # BLD: 0 K/UL (ref 0–0.01)
NRBC BLD-RTO: 0 PER 100 WBC
PLATELET # BLD AUTO: 275 K/UL (ref 150–400)
PMV BLD AUTO: 10.6 FL (ref 8.9–12.9)
POTASSIUM SERPL-SCNC: 3.8 MMOL/L (ref 3.5–5.1)
RBC # BLD AUTO: 4.24 M/UL (ref 3.8–5.2)
SERVICE CMNT-IMP: ABNORMAL
SERVICE CMNT-IMP: ABNORMAL
SODIUM SERPL-SCNC: 141 MMOL/L (ref 136–145)
WBC # BLD AUTO: 12 K/UL (ref 3.6–11)

## 2019-09-25 PROCEDURE — 74011250637 HC RX REV CODE- 250/637: Performed by: INTERNAL MEDICINE

## 2019-09-25 PROCEDURE — 83036 HEMOGLOBIN GLYCOSYLATED A1C: CPT

## 2019-09-25 PROCEDURE — 85027 COMPLETE CBC AUTOMATED: CPT

## 2019-09-25 PROCEDURE — 96372 THER/PROPH/DIAG INJ SC/IM: CPT

## 2019-09-25 PROCEDURE — 80048 BASIC METABOLIC PNL TOTAL CA: CPT

## 2019-09-25 PROCEDURE — 74011000250 HC RX REV CODE- 250: Performed by: INTERNAL MEDICINE

## 2019-09-25 PROCEDURE — 74011636637 HC RX REV CODE- 636/637: Performed by: INTERNAL MEDICINE

## 2019-09-25 PROCEDURE — 74011250636 HC RX REV CODE- 250/636: Performed by: INTERNAL MEDICINE

## 2019-09-25 PROCEDURE — 36415 COLL VENOUS BLD VENIPUNCTURE: CPT

## 2019-09-25 PROCEDURE — 94640 AIRWAY INHALATION TREATMENT: CPT

## 2019-09-25 PROCEDURE — 82962 GLUCOSE BLOOD TEST: CPT

## 2019-09-25 PROCEDURE — 74011000258 HC RX REV CODE- 258: Performed by: INTERNAL MEDICINE

## 2019-09-25 PROCEDURE — 74011250637 HC RX REV CODE- 250/637: Performed by: NURSE PRACTITIONER

## 2019-09-25 PROCEDURE — 99218 HC RM OBSERVATION: CPT

## 2019-09-25 PROCEDURE — 96366 THER/PROPH/DIAG IV INF ADDON: CPT

## 2019-09-25 PROCEDURE — 96376 TX/PRO/DX INJ SAME DRUG ADON: CPT

## 2019-09-25 RX ORDER — AMOXICILLIN 250 MG
1 CAPSULE ORAL DAILY
Status: DISCONTINUED | OUTPATIENT
Start: 2019-09-25 | End: 2019-09-25 | Stop reason: HOSPADM

## 2019-09-25 RX ORDER — LANOLIN ALCOHOL/MO/W.PET/CERES
3 CREAM (GRAM) TOPICAL
Status: DISCONTINUED | OUTPATIENT
Start: 2019-09-25 | End: 2019-09-25 | Stop reason: HOSPADM

## 2019-09-25 RX ORDER — KETOROLAC TROMETHAMINE 30 MG/ML
15 INJECTION, SOLUTION INTRAMUSCULAR; INTRAVENOUS
Status: COMPLETED | OUTPATIENT
Start: 2019-09-25 | End: 2019-09-25

## 2019-09-25 RX ORDER — AZITHROMYCIN 500 MG/1
500 TABLET, FILM COATED ORAL DAILY
Qty: 5 TAB | Refills: 0 | Status: SHIPPED | OUTPATIENT
Start: 2019-09-26 | End: 2019-10-01

## 2019-09-25 RX ORDER — POLYETHYLENE GLYCOL 3350 17 G/17G
17 POWDER, FOR SOLUTION ORAL DAILY
Status: DISCONTINUED | OUTPATIENT
Start: 2019-09-25 | End: 2019-09-25 | Stop reason: HOSPADM

## 2019-09-25 RX ORDER — AMOXICILLIN AND CLAVULANATE POTASSIUM 875; 125 MG/1; MG/1
1 TABLET, FILM COATED ORAL 2 TIMES DAILY
Qty: 10 TAB | Refills: 0 | Status: SHIPPED | OUTPATIENT
Start: 2019-09-25 | End: 2019-09-30

## 2019-09-25 RX ADMIN — ARFORMOTEROL TARTRATE 15 MCG: 15 SOLUTION RESPIRATORY (INHALATION) at 08:00

## 2019-09-25 RX ADMIN — LISINOPRIL 10 MG: 10 TABLET ORAL at 10:07

## 2019-09-25 RX ADMIN — SENNOSIDES, DOCUSATE SODIUM 1 TABLET: 50; 8.6 TABLET, FILM COATED ORAL at 12:56

## 2019-09-25 RX ADMIN — AZITHROMYCIN MONOHYDRATE 500 MG: 500 INJECTION, POWDER, LYOPHILIZED, FOR SOLUTION INTRAVENOUS at 08:18

## 2019-09-25 RX ADMIN — AMLODIPINE BESYLATE 5 MG: 5 TABLET ORAL at 10:08

## 2019-09-25 RX ADMIN — DULOXETINE HYDROCHLORIDE 60 MG: 60 CAPSULE, DELAYED RELEASE ORAL at 09:00

## 2019-09-25 RX ADMIN — INSULIN LISPRO 7 UNITS: 100 INJECTION, SOLUTION INTRAVENOUS; SUBCUTANEOUS at 12:16

## 2019-09-25 RX ADMIN — MELATONIN 3 MG: at 02:34

## 2019-09-25 RX ADMIN — POLYETHYLENE GLYCOL 3350 17 G: 17 POWDER, FOR SOLUTION ORAL at 12:56

## 2019-09-25 RX ADMIN — BUSPIRONE HYDROCHLORIDE 20 MG: 10 TABLET ORAL at 10:07

## 2019-09-25 RX ADMIN — ENOXAPARIN SODIUM 40 MG: 40 INJECTION SUBCUTANEOUS at 10:07

## 2019-09-25 RX ADMIN — Medication 10 ML: at 08:19

## 2019-09-25 RX ADMIN — BUDESONIDE 500 MCG: 0.5 INHALANT RESPIRATORY (INHALATION) at 08:00

## 2019-09-25 RX ADMIN — CEFTRIAXONE 1 G: 1 INJECTION, POWDER, FOR SOLUTION INTRAMUSCULAR; INTRAVENOUS at 07:27

## 2019-09-25 RX ADMIN — RANOLAZINE 500 MG: 500 TABLET, FILM COATED, EXTENDED RELEASE ORAL at 10:09

## 2019-09-25 RX ADMIN — ACETAMINOPHEN 650 MG: 325 TABLET, FILM COATED ORAL at 09:02

## 2019-09-25 RX ADMIN — ACETAMINOPHEN 650 MG: 325 TABLET, FILM COATED ORAL at 00:54

## 2019-09-25 RX ADMIN — KETOROLAC TROMETHAMINE 15 MG: 30 INJECTION, SOLUTION INTRAMUSCULAR at 10:06

## 2019-09-25 RX ADMIN — INSULIN LISPRO 3 UNITS: 100 INJECTION, SOLUTION INTRAVENOUS; SUBCUTANEOUS at 07:38

## 2019-09-25 RX ADMIN — ASPIRIN 81 MG 81 MG: 81 TABLET ORAL at 10:07

## 2019-09-25 NOTE — PROGRESS NOTES
Hospital follow-up PCP transitional care appointment has been scheduled with Dr. Ramakrishna Chávez for Tuesday, 10/1/19 at 1:45 p.m. Pending patient discharge.   Robert Smith, Care Management Specialist.

## 2019-09-25 NOTE — PROGRESS NOTES
CM sent a referral to [facility] via all scripts for resumption of home health services. 11:26 am: Patient has been accepted by [facility] for home health services. Patient is trying to contact family members for transport, CM will assist if needed.      Gopal Puga     [phone]

## 2019-09-25 NOTE — PROGRESS NOTES
Problem: Falls - Risk of  Goal: *Absence of Falls  Description  Document Cathi Gregg Fall Risk and appropriate interventions in the flowsheet. Outcome: Progressing Towards Goal  Note:   Fall Risk Interventions:  Mobility Interventions: Communicate number of staff needed for ambulation/transfer    Medication Interventions: Teach patient to arise slowly       Problem: Diabetes Self-Management  Goal: *Disease process and treatment process  Description  Define diabetes and identify own type of diabetes; list 3 options for treating diabetes.   Outcome: Progressing Towards Goal     Problem: Hypertension  Goal: *Blood pressure within specified parameters  Outcome: Progressing Towards Goal

## 2019-09-25 NOTE — DISCHARGE SUMMARY
Discharge Summary       PATIENT ID: Saji Dyson  MRN: [id number]   YOB: 1940    DATE OF ADMISSION: 9/23/2019 10:54 PM    DATE OF DISCHARGE: 09/25/19  PRIMARY CARE PROVIDER: Sumeet Leigh MD       DISCHARGING PROVIDER: Alicia Pickering MD    To contact this individual call [phone] and ask the  to page. If unavailable ask to be transferred the Adult Hospitalist Department. CONSULTATIONS: IP CONSULT TO HOSPITALIST  IP CONSULT TO HOSPITALIST      PROCEDURES/SURGERIES: * No surgery found *    IMAGING  Ct Head Wo Cont    Result Date: 9/24/2019  IMPRESSION: No acute intracranial process. Unchanged right parietal encephalomalacia. Old lacunar infarct in the right thalamus. Xr Chest Port    Result Date: 9/24/2019  IMPRESSION: Mild bibasilar atelectasis. ADMITTING DIAGNOSES & HOSPITAL COURSE:   # Bilateral community acquired Pneumonia(POA)-   - Reported subjective fever and sweating along with chills w/ leucocytosis   - CXR mild bibasilar atelectasis.  Lactic acid normal  - Blood culture and Legionella urine Ag test- pending, rapid flu negative and respiratory panel test- pending   - Received IV ceftriaxone/ zithromax 9/24-9/25  - incentive spirometry  - supplemental O2 by NC as needed     # Asthma w/o exacerbation    - stable saturating well  On RA  - resume home inhalers / nebs     # CAD  - stable   - resume home meds      # DM-2   - check A1c  - continue Home dose of latus, SSI  - DM consistent diet     # HTN  - continue home meds        DISCHARGE DIAGNOSES / PLAN:    # Bilateral community acquired Pneumonia(POA)-   # Asthma w/o exacerbation    # CAD  # DM-2   # HTN    Patient Active Problem List   Diagnosis Code    Chest pain, unspecified R07.9    Leukocytosis, unspecified D72.829    DM (diabetes mellitus) ([facility] [address] E11.9    HTN (hypertension) I10    Arthritis M19.90    Pancreatitis, acute K85.90    Epigastric abdominal pain R10.13    Painful total knee replacement (HCC) T84.84XA, Z96.659    Status post right knee replacement Z96.651    CAD (coronary artery disease) I25.10    Pain due to knee joint prosthesis (HCC) T84.84XA, Z96.659    Bilateral pneumonia J18.9               PENDING TEST RESULTS:   At the time of discharge the following test results are still pending: Legionella urine Ag     FOLLOW UP APPOINTMENTS:    Follow-up Information     Follow up With Specialties Details Why Contact Info    Erick Soni MD Internal Medicine Schedule an appointment as soon as possible for a visit in 1 week Follow up  [address] [address] [phone]             ADDITIONAL CARE RECOMMENDATIONS:   · It is important that you take the medication exactly as they are prescribed. · Keep your medication in the bottles provided by the pharmacist and keep a list of the medication names, dosages, and times to be taken in your wallet. · Do not take other medications without consulting your doctor. · No drinking alcohol or driving car or operating machinery if you are on narcotic pain medications. Donot take sedating mediations if you are sleepy or confused. · Fall Precautions  · Keep Well Hydrated  · Report to your medical provider if you feel you have  developed allergies to medications  · Follow up with your PCP or Consultant for medication adjustments and refills  · Monitor for signs of fevers,chills,bleeding,chest pain and seek medical attention if you do so. DIET: Diabetic diet     ACTIVITY: As tolerated     WOUND CARE: None     EQUIPMENT needed: None       DISCHARGE MEDICATIONS:  Current Discharge Medication List      START taking these medications    Details   azithromycin (ZITHROMAX) 500 mg tab Take 1 Tab by mouth daily for 5 days.   Qty: 5 Tab, Refills: 0      amoxicillin-clavulanate (AUGMENTIN) 875-125 mg per tablet Take 1 Tab by mouth two (2) times a day for 5 days. Qty: 10 Tab, Refills: 0         CONTINUE these medications which have NOT CHANGED    Details   pantoprazole (PROTONIX) 40 mg tablet Take 1 Tab by mouth daily for 20 days. Qty: 20 Tab, Refills: 0      insulin glargine (LANTUS SOLOSTAR U-100 INSULIN) 100 unit/mL (3 mL) inpn 20 Units by SubCUTAneous route nightly. Indications: diabetes, type 2 diabetes mellitus  Qty: 1 Adjustable Dose Pre-filled Pen Syringe, Refills: 0      bisacodyl (DULCOLAX) 5 mg EC tablet Take 1 Tab by mouth daily as needed for Constipation. Qty: 10 Tab, Refills: 0      amLODIPine (NORVASC) 10 mg tablet Take 0.5 Tabs by mouth daily. Qty: 1 Tab, Refills: 0      albuterol (PROVENTIL VENTOLIN) 2.5 mg /3 mL (0.083 %) nebulizer solution 3 mL by Nebulization route every four (4) hours as needed for Wheezing. Qty: 60 Each, Refills: 0      arformoterol (BROVANA) 15 mcg/2 mL nebu neb solution 2 mL by Nebulization route two (2) times a day. Qty: 60 Vial, Refills: 0      budesonide (PULMICORT) 0.5 mg/2 mL nbsp 2 mL by Nebulization route two (2) times a day. Qty: 60 Each, Refills: 1      aspirin 81 mg chewable tablet Take 1 Tab by mouth daily. Qty: 30 Tab, Refills: 0      busPIRone (BUSPAR) 5 mg tablet Take 20 mg by mouth two (2) times a day. DULoxetine (CYMBALTA) 60 mg capsule Take 60 mg by mouth daily. ranolazine ER (RANEXA) 500 mg SR tablet Take 500 mg by mouth two (2) times a day. mirtazapine (REMERON) 15 mg tablet Take 15 mg by mouth nightly. QUEtiapine (SEROQUEL) 25 mg tablet Take 25 mg by mouth nightly. albuterol (PROVENTIL HFA, VENTOLIN HFA, PROAIR HFA) 90 mcg/actuation inhaler Take 2 Puffs by inhalation every four (4) hours as needed for Wheezing. Qty: 1 Inhaler, Refills: 0      rosuvastatin (CRESTOR) 10 mg tablet Take 10 mg by mouth nightly. fosinopril (MONOPRIL) 20 mg tablet Take 20 mg by mouth daily.   Refills: 0             All Micro Results     Procedure Component Value Units Date/Time    CULTURE, BLOOD, PAIRED [[id number]] Collected:  09/24/19 0227    Order Status:  Completed Specimen:  Blood Updated:  09/25/19 0506     Special Requests: NO SPECIAL REQUESTS        Culture result: NO GROWTH AFTER 22 HOURS       RESPIRATORY PANEL,PCR,NASOPHARYNGEAL [[id number]]     Order Status:  Sent Specimen:  NASOPHARYNGEAL SWAB     LEGIONELLA PNEUMOPHILA AG, URINE [[id number]]     Order Status:  Sent Specimen:  Urine     INFLUENZA A & B AG (RAPID TEST) [[id number]] Collected:  09/24/19 0333    Order Status:  Completed Specimen:  Nasopharyngeal from Nasal washing Updated:  09/24/19 0415     Influenza A Antigen NEGATIVE         Influenza B Antigen NEGATIVE              Recent Results (from the past 24 hour(s))   GLUCOSE, POC    Collection Time: 09/24/19 12:03 PM   Result Value Ref Range    Glucose (POC) 340 (H) 65 - 100 mg/dL    Performed by Nando Gilman    GLUCOSE, POC    Collection Time: 09/24/19  3:53 PM   Result Value Ref Range    Glucose (POC) 187 (H) 65 - 100 mg/dL    Performed by Nando Gilman    GLUCOSE, POC    Collection Time: 09/24/19  8:59 PM   Result Value Ref Range    Glucose (POC) 295 (H) 65 - 100 mg/dL    Performed by Khushi Curry    CBC W/O DIFF    Collection Time: 09/25/19  5:38 AM   Result Value Ref Range    WBC 12.0 (H) 3.6 - 11.0 K/uL    RBC 4.24 3.80 - 5.20 M/uL    HGB 11.2 (L) 11.5 - 16.0 g/dL    HCT 36.0 35.0 - 47.0 %    MCV 84.9 80.0 - 99.0 FL    MCH 26.4 26.0 - 34.0 PG    MCHC 31.1 30.0 - 36.5 g/dL    RDW 16.2 (H) 11.5 - 14.5 %    PLATELET 129 853 - 225 K/uL    MPV 10.6 8.9 - 12.9 FL    NRBC 0.0 0  WBC    ABSOLUTE NRBC 0.00 0.00 - 7.42 K/uL   METABOLIC PANEL, BASIC    Collection Time: 09/25/19  5:38 AM   Result Value Ref Range    Sodium 141 136 - 145 mmol/L    Potassium 3.8 3.5 - 5.1 mmol/L    Chloride 109 (H) 97 - 108 mmol/L    CO2 28 21 - 32 mmol/L    Anion gap 4 (L) 5 - 15 mmol/L    Glucose 226 (H) 65 - 100 mg/dL    BUN 8 6 - 20 MG/DL    Creatinine 0.73 0.55 - 1.02 MG/DL    BUN/Creatinine ratio 11 (L) 12 - 20      GFR est AA >60 >60 ml/min/1.73m2    GFR est non-AA >60 >60 ml/min/1.73m2    Calcium 8.5 8.5 - 10.1 MG/DL   HEMOGLOBIN A1C WITH EAG    Collection Time: 09/25/19  5:38 AM   Result Value Ref Range    Hemoglobin A1c 12.4 (H) 4.2 - 6.3 %    Est. average glucose 309 mg/dL   GLUCOSE, POC    Collection Time: 09/25/19  7:26 AM   Result Value Ref Range    Glucose (POC) 200 (H) 65 - 100 mg/dL    Performed by MANI HINOJOSA            NOTIFY YOUR PHYSICIAN FOR ANY OF THE FOLLOWING:   Fever over 101 degrees for 24 hours. Chest pain, shortness of breath, fever, chills, nausea, vomiting, diarrhea, change in mentation, falling, weakness, bleeding. Severe pain or pain not relieved by medications. Or, any other signs or symptoms that you may have questions about. DISPOSITION:  x  Home With:   OT  PT  HH  RN       Long term SNF/Inpatient Rehab    Independent/assisted living    Hospice    Other:       PATIENT CONDITION AT DISCHARGE:     Functional status    Poor     Deconditioned    x Independent      Cognition     Lucid     Forgetful     Dementia      Catheters/lines (plus indication)    [place]     PICC     PEG    x None      Code status   x  Full code     DNR      PHYSICAL EXAMINATION AT DISCHARGE:  Gen:  No distress, alert  HEENT:  Normal cephalic atraumatic, extra-occular movements are intact. Neck:  Supple, No JVD  Lungs:  Clear bilaterally, no wheeze, no rales, normal effort  Heart:  Regular Rate and Rhythm, normal S1 and S2, no edema  Abdomen:  Soft, non tender, normal bowel sounds, no guarding.   Extremities:  Well perfused, no cyanosis or edema  Neurological:  Awake and alert, CN's are intact, normal strength throughout extremities  Skin:  No rashes or moles  Mental Status:  Normal thought process, does not appear anxious      CHRONIC MEDICAL DIAGNOSES:  Problem List as of 9/25/2019 Date Reviewed: 7/5/2019          Codes Class Noted - Resolved    Bilateral pneumonia ICD-10-CM: J18.9  ICD-9-CM: 024 9/24/2019 - Present        Pain due to knee joint prosthesis ([facility] ICD-10-CM: T84.84XA, Z96.659  ICD-9-CM: 996.77, 338.18, V43.65  11/14/2017 - Present        CAD (coronary artery disease) ICD-10-CM: I25.10  ICD-9-CM: 414.00  10/27/2017 - Present        Pancreatitis, acute ICD-10-CM: K85.90  ICD-9-CM: 577.0  9/8/2017 - Present        Epigastric abdominal pain ICD-10-CM: R10.13  ICD-9-CM: 789.06  9/8/2017 - Present        Painful total knee replacement ([facility] ICD-10-CM: T84.84XA, Z96.659  ICD-9-CM: 996.77, V43.65  8/9/2017 - Present        Status post right knee replacement ICD-10-CM: Z96.651  ICD-9-CM: V43.65  8/9/2017 - Present        Leukocytosis, unspecified ICD-10-CM: D72.829  ICD-9-CM: 288.60  4/6/2015 - Present        DM (diabetes mellitus) ([facility] ICD-10-CM: E11.9  ICD-9-CM: 250.00  4/6/2015 - Present        HTN (hypertension) ICD-10-CM: I10  ICD-9-CM: 401.9  4/6/2015 - Present        Arthritis ICD-10-CM: M19.90  ICD-9-CM: 716.90  4/6/2015 - Present        Chest pain, unspecified ICD-10-CM: R07.9  ICD-9-CM: 786.50  9/12/2013 - Present        RESOLVED: Chest pain ICD-10-CM: R07.9  ICD-9-CM: 786.50  7/5/2019 - 7/12/2019        RESOLVED: Abnormal ECG ICD-10-CM: R94.31  ICD-9-CM: 794.31  3/9/2019 - 3/11/2019        RESOLVED: Abnormal EKG ICD-10-CM: R94.31  ICD-9-CM: 794.31  3/8/2019 - 3/11/2019        RESOLVED: Gastric polyp ICD-10-CM: K31.7  ICD-9-CM: 211.1  9/13/2013 - 10/27/2017        RESOLVED: Hemorrhoids ICD-10-CM: K64.9  ICD-9-CM: 455.6  6/13/2013 - 10/27/2017        RESOLVED: Anal polyp ICD-10-CM: K62.0  ICD-9-CM: 569.0  6/13/2013 - 10/27/2017                Discussed with patient and family. Explained the importance of following up, Compliance with medications and recommendations on discharge,Side effect profile of medications were explained. Safety precautions at home and while taking pain medications also explained. All questions answered to the satisfaction of the patient/family.  Discussed with consultant(s) who are agreeable to the discharge. Verbal and written instructions on discharge given. Explained about Discharge medications and side effect profile. Advised patient/family to followup with their pcp for medication refills and preauthorization of medications, Home health orders. checkups,screenign programs as appropriate for age. Thank you Caryn Bullock MD for taking care of your patient, Please call with any questions.       Greater than 35 minutes were spent with the patient on counseling and coordination of care    Signed:   Jelani Peters MD  9/25/2019  9:20 AM

## 2019-09-25 NOTE — DIABETES MGMT
[facility]    DTC Progress Note    Recommendations/ Comments:Chart reviewed on Beckaalysa Lambjason for hyperglycemia. If appropriate, please consider:   - addition of Januvia 50 mg with breakfast (can include a now dose  Message sent to Dr. Christine Roe regarding above    Current hospital DM medication:   Lantus 20 units at bedtime  Lispro normal sensitivity correction scale    Patient is a 69 y.o. female with known DM on Lantus 20 units PTA  A1c:   Lab Results   Component Value Date/Time    Hemoglobin A1c 12.4 (H) 09/25/2019 05:38 AM    Hemoglobin A1c 12.6 (H) 07/05/2019 02:49 PM       Recent Glucose Results:   Lab Results   Component Value Date/Time     (H) 09/25/2019 05:38 AM    GLUCPOC 200 (H) 09/25/2019 07:26 AM    GLUCPOC 295 (H) 09/24/2019 08:59 PM    GLUCPOC 187 (H) 09/24/2019 03:53 PM        Lab Results   Component Value Date/Time    Creatinine 0.73 09/25/2019 05:38 AM     Estimated Creatinine Clearance: 62.3 mL/min (based on SCr of 0.73 mg/dL). Active Orders   Diet    DIET DIABETIC CONSISTENT CARB Regular        PO intake:   Patient Vitals for the past 72 hrs:   % Diet Eaten   09/24/19 1254 50 %       Will continue to follow as needed.     Thank you  Merna Concepcion MS, RN, CDE    Time spent: 5 minutes

## 2019-09-25 NOTE — DISCHARGE INSTRUCTIONS
Dear Emilie Curling,    Thank you for choosing [facility] for your healthcare needs. We strive to provide EXCELLENT care to you and your family. In an effort to explain clearly why you were here in the hospital, I've also written a very brief summary below. Other details including formal diagnosis, medication changes, and follow up appointment recommendations can also be found in this packet. You were admitted for Bilateral community acquired Pneumonia and you were started on antibiotics. You also received care from specialist physicians in the following specialties:  Jose TO HOSPITALIST    Here are the updates to your medication list: see attached list       Remember that it is important for you to take your medications exactly as they are prescribed. It is helpful to keep a list of your medication with the names, dosages, and times to be taken in your wallet. Additionally,   - Diabetic diet     Make sure to also see your primary care doctor for follow-up. Bring these papers with you and be sure to review your medication list with your doctor. I cannot stress the importance of follow up enough. I've included the information for your follow-up appointments below: Follow-up Information     Follow up With Specialties Details Why Contact Info    Alhaji Carlson MD Internal Medicine Schedule an appointment as soon as possible for a visit in 1 week Follow up  [address]  [phone]            At this time, the following test results are still pending: None     Again, please follow-up these results with your primary care provider. Should you have any fever over 101 degrees for 24 hours, chest pain, shortness of breath, fever, chills, nausea, vomiting, diarrhea, change in mentation, falling, weakness, bleeding, or worsening pain, please seek medical attention immediately.      Finally, as your discharging physician, you may be receiving a survey regarding my care. I would greatly value and appreciate your input in the survey as we strive for excellence. If you have any questions, I can be reached at [phone]. Thank you so much again for allowing me to care for you at [facility].    Respectfully yours,  Robin Parson MD       Patient Education        Pneumonia: Care Instructions  Your Care Instructions    Pneumonia is an infection of the lungs. Most cases are caused by infections from bacteria or viruses. Pneumonia may be mild or very severe. If it is caused by bacteria, you will be treated with antibiotics. It may take a few weeks to a few months to recover fully from pneumonia, depending on how sick you were and whether your overall health is good. Follow-up care is a key part of your treatment and safety. Be sure to make and go to all appointments, and call your doctor if you are having problems. It's also a good idea to know your test results and keep a list of the medicines you take. How can you care for yourself at home? · Take your antibiotics exactly as directed. Do not stop taking the medicine just because you are feeling better. You need to take the full course of antibiotics. · Take your medicines exactly as prescribed. Call your doctor if you think you are having a problem with your medicine. · Get plenty of rest and sleep. You may feel weak and tired for a while, but your energy level will improve with time. · To prevent dehydration, drink plenty of fluids, enough so that your urine is light yellow or clear like water. Choose water and other caffeine-free clear liquids until you feel better. If you have kidney, heart, or liver disease and have to limit fluids, talk with your doctor before you increase the amount of fluids you drink. · Take care of your cough so you can rest. A cough that brings up mucus from your lungs is common with pneumonia.  It is one way your body gets rid of the infection. But if coughing keeps you from resting or causes severe fatigue and chest-wall pain, talk to your doctor. He or she may suggest that you take a medicine to reduce the cough. · Use a vaporizer or humidifier to add moisture to your bedroom. Follow the directions for cleaning the machine. · Do not smoke or allow others to smoke around you. Smoke will make your cough last longer. If you need help quitting, talk to your doctor about stop-smoking programs and medicines. These can increase your chances of quitting for good. · Take an over-the-counter pain medicine, such as acetaminophen (Tylenol), ibuprofen (Advil, Motrin), or naproxen (Aleve). Read and follow all instructions on the label. · Do not take two or more pain medicines at the same time unless the doctor told you to. Many pain medicines have acetaminophen, which is Tylenol. Too much acetaminophen (Tylenol) can be harmful. · If you were given a spirometer to measure how well your lungs are working, use it as instructed. This can help your doctor tell how your recovery is going. · To prevent pneumonia in the future, talk to your doctor about getting a flu vaccine (once a year) and a pneumococcal vaccine (one time only for most people). When should you call for help? Call 911 anytime you think you may need emergency care. For example, call if:    · You have severe trouble breathing.    Call your doctor now or seek immediate medical care if:    · You cough up dark brown or bloody mucus (sputum).     · You have new or worse trouble breathing.     · You are dizzy or lightheaded, or you feel like you may faint.    Watch closely for changes in your health, and be sure to contact your doctor if:    · You have a new or higher fever.     · You are coughing more deeply or more often.     · You are not getting better after 2 days (48 hours).     · You do not get better as expected. Where can you learn more?   Go to http://suzette.info/. Enter [id number] in the search box to learn more about \"Pneumonia: Care Instructions. \"  Current as of: June 9, 2019  Content Version: 12.2  © 6626-8212 [employer]. Care instructions adapted under license by [employer] (which disclaims liability or warranty for this information). If you have questions about a medical condition or this instruction, always ask your healthcare professional. [place] [address] any warranty or liability for your use of this information.

## 2019-09-25 NOTE — PROGRESS NOTES
MD order to discharge, IV sites removed. Rx scripts and discharge instructions reviewed with patient. Wheelchair escort to discharge area. Pt transported home by friend with private car.

## 2019-09-29 LAB
BACTERIA SPEC CULT: NORMAL
SERVICE CMNT-IMP: NORMAL

## 2019-10-04 ENCOUNTER — HOSPITAL ENCOUNTER (EMERGENCY)
Age: 79
Discharge: HOME | End: 2019-10-04
Attending: EMERGENCY MEDICINE
Payer: MEDICARE

## 2019-10-04 VITALS
HEIGHT: 60 IN | OXYGEN SATURATION: 100 % | SYSTOLIC BLOOD PRESSURE: 117 MMHG | HEART RATE: 94 BPM | WEIGHT: 174 LBS | BODY MASS INDEX: 34.16 KG/M2 | RESPIRATION RATE: 15 BRPM | DIASTOLIC BLOOD PRESSURE: 50 MMHG | TEMPERATURE: 98.4 F

## 2019-10-04 DIAGNOSIS — R07.9 CHEST PAIN, UNSPECIFIED TYPE: ICD-10-CM

## 2019-10-04 DIAGNOSIS — R20.2 PARESTHESIA: Primary | ICD-10-CM

## 2019-10-04 DIAGNOSIS — R51.9 NONINTRACTABLE HEADACHE, UNSPECIFIED CHRONICITY PATTERN, UNSPECIFIED HEADACHE TYPE: ICD-10-CM

## 2019-10-04 LAB
ALBUMIN SERPL-MCNC: 3.3 G/DL (ref 3.5–5)
ALBUMIN/GLOB SERPL: 1 {RATIO} (ref 1.1–2.2)
ALP SERPL-CCNC: 90 U/L (ref 45–117)
ALT SERPL-CCNC: 17 U/L (ref 12–78)
ANION GAP SERPL CALC-SCNC: 6 MMOL/L (ref 5–15)
AST SERPL-CCNC: 8 U/L (ref 15–37)
ATRIAL RATE: 82 BPM
BASOPHILS # BLD: 0.1 K/UL (ref 0–0.1)
BASOPHILS NFR BLD: 1 % (ref 0–1)
BILIRUB SERPL-MCNC: 0.2 MG/DL (ref 0.2–1)
BNP SERPL-MCNC: 19 PG/ML
BUN SERPL-MCNC: 13 MG/DL (ref 6–20)
BUN/CREAT SERPL: 18 (ref 12–20)
CALCIUM SERPL-MCNC: 8.5 MG/DL (ref 8.5–10.1)
CALCULATED P AXIS, ECG09: 71 DEGREES
CALCULATED R AXIS, ECG10: 46 DEGREES
CALCULATED T AXIS, ECG11: 53 DEGREES
CHLORIDE SERPL-SCNC: 109 MMOL/L (ref 97–108)
CO2 SERPL-SCNC: 26 MMOL/L (ref 21–32)
COMMENT, HOLDF: NORMAL
COMMENT, HOLDF: NORMAL
CREAT SERPL-MCNC: 0.72 MG/DL (ref 0.55–1.02)
DIAGNOSIS, 93000: NORMAL
DIFFERENTIAL METHOD BLD: ABNORMAL
EOSINOPHIL # BLD: 0.2 K/UL (ref 0–0.4)
EOSINOPHIL NFR BLD: 2 % (ref 0–7)
ERYTHROCYTE [DISTWIDTH] IN BLOOD BY AUTOMATED COUNT: 15.9 % (ref 11.5–14.5)
GLOBULIN SER CALC-MCNC: 3.3 G/DL (ref 2–4)
GLUCOSE SERPL-MCNC: 85 MG/DL (ref 65–100)
HCT VFR BLD AUTO: 40.9 % (ref 35–47)
HGB BLD-MCNC: 12.8 G/DL (ref 11.5–16)
IMM GRANULOCYTES # BLD AUTO: 0 K/UL (ref 0–0.04)
IMM GRANULOCYTES NFR BLD AUTO: 0 % (ref 0–0.5)
LYMPHOCYTES # BLD: 2.9 K/UL (ref 0.8–3.5)
LYMPHOCYTES NFR BLD: 27 % (ref 12–49)
MAGNESIUM SERPL-MCNC: 1.8 MG/DL (ref 1.6–2.4)
MCH RBC QN AUTO: 26.4 PG (ref 26–34)
MCHC RBC AUTO-ENTMCNC: 31.3 G/DL (ref 30–36.5)
MCV RBC AUTO: 84.5 FL (ref 80–99)
MONOCYTES # BLD: 1 K/UL (ref 0–1)
MONOCYTES NFR BLD: 9 % (ref 5–13)
NEUTS SEG # BLD: 6.6 K/UL (ref 1.8–8)
NEUTS SEG NFR BLD: 61 % (ref 32–75)
NRBC # BLD: 0 K/UL (ref 0–0.01)
NRBC BLD-RTO: 0 PER 100 WBC
P-R INTERVAL, ECG05: 126 MS
PLATELET # BLD AUTO: 242 K/UL (ref 150–400)
PLATELET COMMENTS,PCOM: ABNORMAL
POTASSIUM SERPL-SCNC: 3.4 MMOL/L (ref 3.5–5.1)
PROT SERPL-MCNC: 6.6 G/DL (ref 6.4–8.2)
Q-T INTERVAL, ECG07: 420 MS
QRS DURATION, ECG06: 68 MS
QTC CALCULATION (BEZET), ECG08: 490 MS
RBC # BLD AUTO: 4.84 M/UL (ref 3.8–5.2)
RBC MORPH BLD: ABNORMAL
SAMPLES BEING HELD,HOLD: NORMAL
SAMPLES BEING HELD,HOLD: NORMAL
SODIUM SERPL-SCNC: 141 MMOL/L (ref 136–145)
TROPONIN I SERPL-MCNC: <0.05 NG/ML
VENTRICULAR RATE, ECG03: 82 BPM
WBC # BLD AUTO: 10.8 K/UL (ref 3.6–11)

## 2019-10-04 PROCEDURE — 74011250637 HC RX REV CODE- 250/637: Performed by: EMERGENCY MEDICINE

## 2019-10-04 PROCEDURE — 93005 ELECTROCARDIOGRAM TRACING: CPT

## 2019-10-04 PROCEDURE — 99285 EMERGENCY DEPT VISIT HI MDM: CPT

## 2019-10-04 PROCEDURE — 83735 ASSAY OF MAGNESIUM: CPT

## 2019-10-04 PROCEDURE — 83880 ASSAY OF NATRIURETIC PEPTIDE: CPT

## 2019-10-04 PROCEDURE — 84484 ASSAY OF TROPONIN QUANT: CPT

## 2019-10-04 PROCEDURE — 80053 COMPREHEN METABOLIC PANEL: CPT

## 2019-10-04 PROCEDURE — 85025 COMPLETE CBC W/AUTO DIFF WBC: CPT

## 2019-10-04 PROCEDURE — 36415 COLL VENOUS BLD VENIPUNCTURE: CPT

## 2019-10-04 RX ORDER — POTASSIUM CHLORIDE 750 MG/1
40 TABLET, FILM COATED, EXTENDED RELEASE ORAL
Status: COMPLETED | OUTPATIENT
Start: 2019-10-04 | End: 2019-10-04

## 2019-10-04 RX ORDER — NAPROXEN 250 MG/1
500 TABLET ORAL
Status: COMPLETED | OUTPATIENT
Start: 2019-10-04 | End: 2019-10-04

## 2019-10-04 RX ADMIN — POTASSIUM CHLORIDE 40 MEQ: 750 TABLET, EXTENDED RELEASE ORAL at 07:15

## 2019-10-04 RX ADMIN — NAPROXEN 500 MG: 250 TABLET ORAL at 03:51

## 2019-10-04 NOTE — ED NOTES
Patient verbalizes understanding of discharge instructions. Pt alert and oriented, appears in no acute distress, respirations equal and unlabored. Patient wheeled to discharge.

## 2019-10-04 NOTE — ED PROVIDER NOTES
The patient presents to to the ED with multiple complaints. She reports symptoms began 9 hrs ago at 6 PM (she confirms 6 PM whereas she told triage 7 AM). She reports generalized. She also reports nausea and photophobia. She feels her entire body is numb. She denies any arm or leg weakness. She denies any difficulty swallowing or speaking. She also complains of chest pain and cough. She was admitted to [facility] from 9/23-9/25. Her cough is better. She does report pain across her anterior chest. She denies increased shortness of breath. She has nausea, but no vomiting, diarrhea, or abdominal pain. She denies any dysuria. She did take Acetaminophen for her symptoms - last dose just prior to arrival. Per Yesica Preciado, this is her 19th ED visit this year. The history is provided by the patient.         Past Medical History:   Diagnosis Date    Anal polyp 6/13/2013    Arthritis     Asthma     CAD (coronary artery disease) 10/27/2017    Cataract     Chronic pain     knee - right/back    Diabetes ([facility] [address]     GERD (gastroesophageal reflux disease)     Hemorrhoids 6/13/2013    History of kidney stones     Hypertension     Ill-defined condition     abdominal pain and burning    Other ill-defined conditions(799.89)     high cholesterol       Past Surgical History:   Procedure Laterality Date    COLONOSCOPY  2/28/2013         EGD  2/28/2013         HX CATARACT REMOVAL Bilateral     HX CHOLECYSTECTOMY  6-2015    HX GI  6/27/13    anal polyps removed    HX HEENT      laser surgery for blood clot in right eye    HX KNEE REPLACEMENT      right    HX OTHER SURGICAL  4-2-14    lap gastrotomy and removal of polyp -  - not cancerous per pt    HX ALAN AND BSO      HX TONSILLECTOMY      HX UROLOGICAL      \"laser\" surgery for kidney stone    NEUROLOGICAL PROCEDURE UNLISTED  1990    tumor at back of neck, benign         Family History:   Problem Relation Age of Onset    Cancer Mother         colon    Diabetes Brother  Other Sister         GI BLEED    Heart Disease Brother     Heart Attack Brother     Heart Disease Brother     Heart Disease Brother     Schizophrenia Son     Anesth Problems Neg Hx        Social History     Socioeconomic History    Marital status:      Spouse name: Not on file    Number of children: Not on file    Years of education: Not on file    Highest education level: Not on file   Occupational History    Not on file   Social Needs    Financial resource strain: Not on file    Food insecurity:     Worry: Not on file     Inability: Not on file    Transportation needs:     Medical: Not on file     Non-medical: Not on file   Tobacco Use    Smoking status: Former Smoker     Years: 0.00    Smokeless tobacco: Never Used    Tobacco comment: age 13   Substance and Sexual Activity    Alcohol use: No    Drug use: No    Sexual activity: Never   Lifestyle    Physical activity:     Days per week: Not on file     Minutes per session: Not on file    Stress: Not on file   Relationships    Social connections:     Talks on phone: Not on file     Gets together: Not on file     Attends Hinduism service: Not on file     Active member of club or organization: Not on file     Attends meetings of clubs or organizations: Not on file     Relationship status: Not on file    Intimate partner violence:     Fear of current or ex partner: Not on file     Emotionally abused: Not on file     Physically abused: Not on file     Forced sexual activity: Not on file   Other Topics Concern    Not on file   Social History Narrative    Not on file         ALLERGIES: Seafood [shellfish containing products]    Review of Systems   Constitutional: Negative for appetite change and fever. HENT: Negative for congestion, nosebleeds and sore throat. Eyes: Negative for discharge. Respiratory: Positive for cough. Negative for shortness of breath. Cardiovascular: Positive for chest pain.    Gastrointestinal: Negative for abdominal pain, diarrhea, nausea and vomiting. Genitourinary: Negative for dysuria. Musculoskeletal: Negative. Skin: Negative for rash. Neurological: Positive for numbness and headaches. Negative for weakness. Hematological: Negative for adenopathy. Psychiatric/Behavioral: Negative. All other systems reviewed and are negative. Vitals:    10/04/19 0308 10/04/19 0359 10/04/19 0400   BP: 128/58  97/66   Pulse: 85  76   Resp: 16  14   Temp: 97.9 °F (36.6 °C)     SpO2: 96% 98% 96%   Weight: 78.9 kg (174 lb)     Height: 5' (1.524 m)              Physical Exam   Constitutional: She is oriented to person, place, and time. She appears well-developed and well-nourished. HENT:   Head: Normocephalic and atraumatic. Mouth/Throat: Oropharynx is clear and moist.   Eyes: Pupils are equal, round, and reactive to light. Conjunctivae and EOM are normal.   Neck: Normal range of motion. Neck supple. Cardiovascular: Normal rate, regular rhythm and normal heart sounds. Pulmonary/Chest: Effort normal and breath sounds normal.   Abdominal: Soft. Bowel sounds are normal. There is no tenderness. Musculoskeletal: Normal range of motion. She exhibits no edema or tenderness. Neurological: She is alert and oriented to person, place, and time. She is not disoriented. No cranial nerve deficit. No numbness on exam.    Skin: Skin is warm and dry. Psychiatric: She has a normal mood and affect. Her behavior is normal.   Nursing note and vitals reviewed. MDM       Procedures    ED EKG interpretation:  Rhythm: normal sinus rhythm; and regular . Rate (approx.): 82; Axis: normal; P wave: prolonged; QRS interval: normal ; ST/T wave: non-specific changes;interpreted by Tee Carlin MD, ED MD.    A/P:  1. Headache - resolved. 2 Paresthesia  3. Chest pain   4. Cough - improving per patient  5. Hypokalemia - repleted. Patient's results have been reviewed with them.   Patient and/or family have verbally conveyed their understanding and agreement of the patient's signs, symptoms, diagnosis, treatment and prognosis and additionally agree to follow up as recommended or return to the Emergency Room should their condition change prior to follow-up. Discharge instructions have also been provided to the patient with some educational information regarding their diagnosis as well a list of reasons why they would want to return to the ER prior to their follow-up appointment should their condition change.

## 2019-10-04 NOTE — ED TRIAGE NOTES
Pt comes in via EMS fornumerous complaints including all over body numbness, headache, nausea, photophobia, and cough. Pt recently was admitted at [facility] for bilateral PNM and started on abx. Pt states CP hurst worse with coughing/inspiration. Pt states numbness started yesterday AM at 7 when she woke up.   via EMS

## 2019-10-04 NOTE — DISCHARGE INSTRUCTIONS
- Continue Acetaminophen as needed for pain. - Please follow-up with Dr. Lluvia Colindres next week. - Return to ED for any concerns. Patient Education        Chest Pain: Care Instructions  Your Care Instructions    There are many things that can cause chest pain. Some are not serious and will get better on their own in a few days. But some kinds of chest pain need more testing and treatment. Your doctor may have recommended a follow-up visit in the next 8 to 12 hours. If you are not getting better, you may need more tests or treatment. Even though your doctor has released you, you still need to watch for any problems. The doctor carefully checked you, but sometimes problems can develop later. If you have new symptoms or if your symptoms do not get better, get medical care right away. If you have worse or different chest pain or pressure that lasts more than 5 minutes or you passed out (lost consciousness), call 911 or seek other emergency help right away. A medical visit is only one step in your treatment. Even if you feel better, you still need to do what your doctor recommends, such as going to all suggested follow-up appointments and taking medicines exactly as directed. This will help you recover and help prevent future problems. How can you care for yourself at home? · Rest until you feel better. · Take your medicine exactly as prescribed. Call your doctor if you think you are having a problem with your medicine. · Do not drive after taking a prescription pain medicine. When should you call for help? Call 911 if:    · You passed out (lost consciousness).     · You have severe difficulty breathing.     · You have symptoms of a heart attack. These may include:  ? Chest pain or pressure, or a strange feeling in your chest.  ? Sweating. ? Shortness of breath. ? Nausea or vomiting.   ? Pain, pressure, or a strange feeling in your back, neck, jaw, or upper belly or in one or both shoulders or arms.  ? Lightheadedness or sudden weakness. ? A fast or irregular heartbeat. After you call 911, the  may tell you to chew 1 adult-strength or 2 to 4 low-dose aspirin. Wait for an ambulance. Do not try to drive yourself.    Call your doctor today if:    · You have any trouble breathing.     · Your chest pain gets worse.     · You are dizzy or lightheaded, or you feel like you may faint.     · You are not getting better as expected.     · You are having new or different chest pain. Where can you learn more? Go to http://arnaldo-mariano.info/. Enter A120 in the search box to learn more about \"Chest Pain: Care Instructions. \"  Current as of: June 26, 2019  Content Version: 12.2  © 9411-6337 [employer]. Care instructions adapted under license by [employer] (which disclaims liability or warranty for this information). If you have questions about a medical condition or this instruction, always ask your healthcare professional. [place] [address] any warranty or liability for your use of this information. Patient Education        Headache: Care Instructions  Your Care Instructions    Headaches have many possible causes. Most headaches aren't a sign of a more serious problem, and they will get better on their own. Home treatment may help you feel better faster. The doctor has checked you carefully, but problems can develop later. If you notice any problems or new symptoms, get medical treatment right away. Follow-up care is a key part of your treatment and safety. Be sure to make and go to all appointments, and call your doctor if you are having problems. It's also a good idea to know your test results and keep a list of the medicines you take. How can you care for yourself at home? · Do not drive if you have taken a prescription pain medicine. · Rest in a quiet, dark room until your headache is gone.  Close your eyes and try to relax or go to sleep. Don't watch TV or read. · Put a cold, moist cloth or cold pack on the painful area for 10 to 20 minutes at a time. Put a thin cloth between the cold pack and your skin. · Use a warm, moist towel or a heating pad set on low to relax tight shoulder and neck muscles. · Have someone gently massage your neck and shoulders. · Take pain medicines exactly as directed. ? If the doctor gave you a prescription medicine for pain, take it as prescribed. ? If you are not taking a prescription pain medicine, ask your doctor if you can take an over-the-counter medicine. · Be careful not to take pain medicine more often than the instructions allow, because you may get worse or more frequent headaches when the medicine wears off. · Do not ignore new symptoms that occur with a headache, such as a fever, weakness or numbness, vision changes, or confusion. These may be signs of a more serious problem. To prevent headaches  · Keep a headache diary so you can figure out what triggers your headaches. Avoiding triggers may help you prevent headaches. Record when each headache began, how long it lasted, and what the pain was like (throbbing, aching, stabbing, or dull). Write down any other symptoms you had with the headache, such as nausea, flashing lights or dark spots, or sensitivity to bright light or loud noise. Note if the headache occurred near your period. List anything that might have triggered the headache, such as certain foods (chocolate, cheese, wine) or odors, smoke, bright light, stress, or lack of sleep. · Find healthy ways to deal with stress. Headaches are most common during or right after stressful times. Take time to relax before and after you do something that has caused a headache in the past.  · Try to keep your muscles relaxed by keeping good posture. Check your jaw, face, neck, and shoulder muscles for tension, and try relaxing them.  When sitting at a desk, change positions often, and stretch for 30 seconds each hour. · Get plenty of sleep and exercise. · Eat regularly and well. Long periods without food can trigger a headache. · Treat yourself to a massage. Some people find that regular massages are very helpful in relieving tension. · Limit caffeine by not drinking too much coffee, tea, or soda. But don't quit caffeine suddenly, because that can also give you headaches. · Reduce eyestrain from computers by blinking frequently and looking away from the computer screen every so often. Make sure you have proper eyewear and that your monitor is set up properly, about an arm's length away. · Seek help if you have depression or anxiety. Your headaches may be linked to these conditions. Treatment can both prevent headaches and help with symptoms of anxiety or depression. When should you call for help? Call 911 anytime you think you may need emergency care. For example, call if:    · You have signs of a stroke. These may include:  ? Sudden numbness, paralysis, or weakness in your face, arm, or leg, especially on only one side of your body. ? Sudden vision changes. ? Sudden trouble speaking. ? Sudden confusion or trouble understanding simple statements. ? Sudden problems with walking or balance. ? A sudden, severe headache that is different from past headaches.    Call your doctor now or seek immediate medical care if:    · You have a new or worse headache.     · Your headache gets much worse. Where can you learn more? Go to http://arnaldo-mariano.info/. Enter M271 in the search box to learn more about \"Headache: Care Instructions. \"  Current as of: March 28, 2019  Content Version: 12.2  © 0324-4947 [employer]. Care instructions adapted under license by [employer] (which disclaims liability or warranty for this information).  If you have questions about a medical condition or this instruction, always ask your healthcare professional. Endy Nair disclaims any warranty or liability for your use of this information. Patient Education        Numbness and Tingling: Care Instructions  Your Care Instructions    Many things can cause numbness or tingling. Swelling may put pressure on a nerve. This could cause you to lose feeling or have a pins-and-needles sensation on part of your body. Nerves may be damaged from trauma, toxins, or diseases, such as diabetes or multiple sclerosis (MS). Sometimes, though, the cause is not clear. If there is no clear reason for your symptoms, and you are not having any other symptoms, your doctor may suggest watching and waiting for a while to see if the numbness or tingling goes away on its own. Your doctor may want you to have blood or nerve tests to find the cause of your symptoms. Follow-up care is a key part of your treatment and safety. Be sure to make and go to all appointments, and call your doctor if you are having problems. It's also a good idea to know your test results and keep a list of the medicines you take. How can you care for yourself at home? · If your doctor prescribes medicine, take it exactly as directed. Call your doctor if you think you are having a problem with your medicine. · If you have any swelling, put ice or a cold pack on the area for 10 to 20 minutes at a time. Put a thin cloth between the ice and your skin. When should you call for help? Call 911 anytime you think you may need emergency care. For example, call if:    · You have weakness, numbness, or tingling in both legs.     · You lose bowel or bladder control.     · You have symptoms of a stroke. These may include:  ? Sudden numbness, tingling, weakness, or loss of movement in your face, arm, or leg, especially on only one side of your body. ? Sudden vision changes. ? Sudden trouble speaking. ? Sudden confusion or trouble understanding simple statements. ? Sudden problems with walking or balance.   ? A sudden, severe headache that is different from past headaches.    Watch closely for changes in your health, and be sure to contact your doctor if you have any problems, or if:    · You do not get better as expected. Where can you learn more? Go to http://arnaldo-mariano.info/. Enter N120 in the search box to learn more about \"Numbness and Tingling: Care Instructions. \"  Current as of: March 28, 2019  Content Version: 12.2  © 6782-6680 [employer]. Care instructions adapted under license by [employer] (which disclaims liability or warranty for this information). If you have questions about a medical condition or this instruction, always ask your healthcare professional. [address] any warranty or liability for your use of this information.

## 2019-10-04 NOTE — ED NOTES
Attempted to call Andrew Jimenez (contact pt gave RN for transportation home). He did not answer. Pt unsure of how else to get home.  WIll put in case management consult

## 2019-10-07 ENCOUNTER — APPOINTMENT (OUTPATIENT)
Dept: GENERAL RADIOLOGY | Age: 79
End: 2019-10-07
Attending: EMERGENCY MEDICINE
Payer: MEDICARE

## 2019-10-07 ENCOUNTER — HOSPITAL ENCOUNTER (EMERGENCY)
Age: 79
Discharge: HOME | End: 2019-10-07
Attending: EMERGENCY MEDICINE | Admitting: EMERGENCY MEDICINE
Payer: MEDICARE

## 2019-10-07 ENCOUNTER — TELEPHONE (OUTPATIENT)
Dept: CASE MANAGEMENT | Age: 79
End: 2019-10-07

## 2019-10-07 VITALS
DIASTOLIC BLOOD PRESSURE: 82 MMHG | RESPIRATION RATE: 20 BRPM | TEMPERATURE: 97.6 F | OXYGEN SATURATION: 98 % | SYSTOLIC BLOOD PRESSURE: 156 MMHG | HEART RATE: 84 BPM

## 2019-10-07 DIAGNOSIS — R73.9 HYPERGLYCEMIA: Primary | ICD-10-CM

## 2019-10-07 LAB
ALBUMIN SERPL-MCNC: 3.1 G/DL (ref 3.5–5)
ALBUMIN/GLOB SERPL: 1 {RATIO} (ref 1.1–2.2)
ALP SERPL-CCNC: 111 U/L (ref 45–117)
ALT SERPL-CCNC: 19 U/L (ref 12–78)
ANION GAP SERPL CALC-SCNC: 9 MMOL/L (ref 5–15)
APPEARANCE UR: CLEAR
AST SERPL-CCNC: 13 U/L (ref 15–37)
BACTERIA URNS QL MICRO: NEGATIVE /HPF
BASOPHILS # BLD: 0.1 K/UL (ref 0–0.1)
BASOPHILS NFR BLD: 1 % (ref 0–1)
BILIRUB DIRECT SERPL-MCNC: <0.1 MG/DL (ref 0–0.2)
BILIRUB SERPL-MCNC: 0.3 MG/DL (ref 0.2–1)
BILIRUB UR QL: NEGATIVE
BNP SERPL-MCNC: 28 PG/ML
BUN SERPL-MCNC: 10 MG/DL (ref 6–20)
BUN/CREAT SERPL: 10 (ref 12–20)
CALCIUM SERPL-MCNC: 8.9 MG/DL (ref 8.5–10.1)
CHLORIDE SERPL-SCNC: 106 MMOL/L (ref 97–108)
CK MB CFR SERPL CALC: 1.1 % (ref 0–2.5)
CK MB SERPL-MCNC: 1.3 NG/ML (ref 5–25)
CK SERPL-CCNC: 122 U/L (ref 26–192)
CO2 SERPL-SCNC: 25 MMOL/L (ref 21–32)
COLOR UR: ABNORMAL
COMMENT, HOLDF: NORMAL
CREAT SERPL-MCNC: 0.97 MG/DL (ref 0.55–1.02)
DIFFERENTIAL METHOD BLD: ABNORMAL
EOSINOPHIL # BLD: 0.2 K/UL (ref 0–0.4)
EOSINOPHIL NFR BLD: 1 % (ref 0–7)
EPITH CASTS URNS QL MICRO: ABNORMAL /LPF
ERYTHROCYTE [DISTWIDTH] IN BLOOD BY AUTOMATED COUNT: 16.1 % (ref 11.5–14.5)
GLOBULIN SER CALC-MCNC: 3.2 G/DL (ref 2–4)
GLUCOSE BLD STRIP.AUTO-MCNC: 133 MG/DL (ref 65–100)
GLUCOSE BLD STRIP.AUTO-MCNC: 411 MG/DL (ref 65–100)
GLUCOSE SERPL-MCNC: 439 MG/DL (ref 65–100)
GLUCOSE UR STRIP.AUTO-MCNC: >1000 MG/DL
HCT VFR BLD AUTO: 36.2 % (ref 35–47)
HGB BLD-MCNC: 11.2 G/DL (ref 11.5–16)
HGB UR QL STRIP: NEGATIVE
HYALINE CASTS URNS QL MICRO: ABNORMAL /LPF (ref 0–5)
IMM GRANULOCYTES # BLD AUTO: 0.1 K/UL (ref 0–0.04)
IMM GRANULOCYTES NFR BLD AUTO: 1 % (ref 0–0.5)
KETONES UR QL STRIP.AUTO: NEGATIVE MG/DL
LEUKOCYTE ESTERASE UR QL STRIP.AUTO: NEGATIVE
LIPASE SERPL-CCNC: 283 U/L (ref 73–393)
LYMPHOCYTES # BLD: 3.2 K/UL (ref 0.8–3.5)
LYMPHOCYTES NFR BLD: 27 % (ref 12–49)
MCH RBC QN AUTO: 26.4 PG (ref 26–34)
MCHC RBC AUTO-ENTMCNC: 30.9 G/DL (ref 30–36.5)
MCV RBC AUTO: 85.4 FL (ref 80–99)
MONOCYTES # BLD: 0.8 K/UL (ref 0–1)
MONOCYTES NFR BLD: 7 % (ref 5–13)
NEUTS SEG # BLD: 7.4 K/UL (ref 1.8–8)
NEUTS SEG NFR BLD: 63 % (ref 32–75)
NITRITE UR QL STRIP.AUTO: NEGATIVE
NRBC # BLD: 0 K/UL (ref 0–0.01)
NRBC BLD-RTO: 0 PER 100 WBC
PH UR STRIP: 5.5 [PH] (ref 5–8)
PLATELET # BLD AUTO: 316 K/UL (ref 150–400)
PMV BLD AUTO: 11.2 FL (ref 8.9–12.9)
POTASSIUM SERPL-SCNC: 4.1 MMOL/L (ref 3.5–5.1)
PROT SERPL-MCNC: 6.3 G/DL (ref 6.4–8.2)
PROT UR STRIP-MCNC: NEGATIVE MG/DL
RBC # BLD AUTO: 4.24 M/UL (ref 3.8–5.2)
RBC #/AREA URNS HPF: ABNORMAL /HPF (ref 0–5)
SAMPLES BEING HELD,HOLD: NORMAL
SERVICE CMNT-IMP: ABNORMAL
SERVICE CMNT-IMP: ABNORMAL
SODIUM SERPL-SCNC: 140 MMOL/L (ref 136–145)
SP GR UR REFRACTOMETRY: 1.01 (ref 1–1.03)
TROPONIN I SERPL-MCNC: <0.05 NG/ML
UR CULT HOLD, URHOLD: NORMAL
UROBILINOGEN UR QL STRIP.AUTO: 0.2 EU/DL (ref 0.2–1)
WBC # BLD AUTO: 11.7 K/UL (ref 3.6–11)
WBC URNS QL MICRO: ABNORMAL /HPF (ref 0–4)

## 2019-10-07 PROCEDURE — 83690 ASSAY OF LIPASE: CPT

## 2019-10-07 PROCEDURE — 82550 ASSAY OF CK (CPK): CPT

## 2019-10-07 PROCEDURE — 81001 URINALYSIS AUTO W/SCOPE: CPT

## 2019-10-07 PROCEDURE — 74011250637 HC RX REV CODE- 250/637: Performed by: EMERGENCY MEDICINE

## 2019-10-07 PROCEDURE — 99285 EMERGENCY DEPT VISIT HI MDM: CPT

## 2019-10-07 PROCEDURE — 83880 ASSAY OF NATRIURETIC PEPTIDE: CPT

## 2019-10-07 PROCEDURE — 71046 X-RAY EXAM CHEST 2 VIEWS: CPT

## 2019-10-07 PROCEDURE — 74011636637 HC RX REV CODE- 636/637: Performed by: EMERGENCY MEDICINE

## 2019-10-07 PROCEDURE — 84484 ASSAY OF TROPONIN QUANT: CPT

## 2019-10-07 PROCEDURE — 96374 THER/PROPH/DIAG INJ IV PUSH: CPT

## 2019-10-07 PROCEDURE — 80076 HEPATIC FUNCTION PANEL: CPT

## 2019-10-07 PROCEDURE — 77030011943

## 2019-10-07 PROCEDURE — 80048 BASIC METABOLIC PNL TOTAL CA: CPT

## 2019-10-07 PROCEDURE — 36415 COLL VENOUS BLD VENIPUNCTURE: CPT

## 2019-10-07 PROCEDURE — 74011250636 HC RX REV CODE- 250/636: Performed by: EMERGENCY MEDICINE

## 2019-10-07 PROCEDURE — 85025 COMPLETE CBC W/AUTO DIFF WBC: CPT

## 2019-10-07 PROCEDURE — 82962 GLUCOSE BLOOD TEST: CPT

## 2019-10-07 RX ORDER — ACETAMINOPHEN 325 MG/1
650 TABLET ORAL
Status: COMPLETED | OUTPATIENT
Start: 2019-10-07 | End: 2019-10-07

## 2019-10-07 RX ADMIN — ACETAMINOPHEN 650 MG: 325 TABLET ORAL at 16:45

## 2019-10-07 RX ADMIN — SODIUM CHLORIDE 1000 ML: 900 INJECTION, SOLUTION INTRAVENOUS at 15:44

## 2019-10-07 RX ADMIN — HUMAN INSULIN 6 UNITS: 100 INJECTION, SOLUTION SUBCUTANEOUS at 15:44

## 2019-10-07 NOTE — PROGRESS NOTES
Physical Therapy Screening:  Physical Therapy consult is not indicated at this time. A screening was performed on this patient's chart based on their entrance into the emergency department and potential need for physical therapy identified. The patients chart was reviewed and the patient was interviewed/screened. A physical therapy consult is not indicated at this time based on their prior level of function or current medical status. Please consult physical therapy if any therapy needs arise. Thank you. 1530 - Met with patient due to numerous ED visits, TRST 3, and with extremity weakness voiced today. Patient reports she is open to home health for nursing and physical therapy. States her home health nurse called her PCP today, told to come to the ED (hyperglycemic). Patient denies any new problems with her mobility and that she has been walking at her baseline. Patient voiced her landlord is her medicaid personal care aide [phone] and that she does \"everything for me\".   D/w SS[facility] care management and NP.

## 2019-10-07 NOTE — ED NOTES
Received call from [address] [place] [address] that she is concerned about patient's living condition. Message relayed to case management.

## 2019-10-07 NOTE — SENIOR SERVICES NOTE
Met with patient as she has had multiple ED visits in last 6 months. Pt states that home health nurse sent her to ER because her blood sugar was >500 today. Pt states that she takes her insulin as prescribed. Pt also states that she is here because she has pneumonia. Will follow up with CM who is calling home health agency to gather additional information.

## 2019-10-07 NOTE — ED PROVIDER NOTES
69 y.o. female with past medical history significant for DM, HTN, CAD, GERD, kidney stones who presents from home via EMS with chief complaint of hyperglycemia. Pt presents to the ED and reports that her home health care nurse called EMS against her wishes after the pt was found to have a blood sugar in the 500s. Pt takes lantus insulin daily and her home health nurse immediately administered a dosage of insulin after obtaining the high blood sugar reading. EMS arrived on scene and found a blood sugar that was still in the 500s so they administered another dosage of insulin prior to transport. Pt states that she has not ate or drank anything all day today 10/7/19. Pt also reports generalized weakness, fatigue, mild SOB, and increased urinary frequency. Pt denies any V/D or dysuria. There are no other acute medical concerns at this time. Old chart review: Pt has had 20 ED visits in 12 months and she has been having trouble with blood sugars. Seen 5x in September 2019. Here on 10/3/19 for multiple complaints consisiting of coughing and numbness. Senior services were involved on last visit on 10/3/19 who tried to contact the pt today 10/7/19 with no success    Surgical hx - ALAN and BSO, EGD, benign tumor removal from neck, cholecystectomy, lap gastrotomy & polyp removal, right knee replacement  Social hx - Tobacco use: former smoker, Alcohol Use: non drinker, Drug Use: denies    PCP: Maegan Johnson MD    Note written by Jeimy Baires, as dictated by Kathy Nieves MD 2:56 PM.      The history is provided by the patient. No  was used.         Past Medical History:   Diagnosis Date    Anal polyp 6/13/2013    Arthritis     Asthma     CAD (coronary artery disease) 10/27/2017    Cataract     Chronic pain     knee - right/back    Diabetes ([facility] [address]     GERD (gastroesophageal reflux disease)     Hemorrhoids 6/13/2013    History of kidney stones     Hypertension     Ill-defined condition     abdominal pain and burning    Other ill-defined conditions(799.89)     high cholesterol       Past Surgical History:   Procedure Laterality Date    COLONOSCOPY  2/28/2013         EGD  2/28/2013         HX CATARACT REMOVAL Bilateral     HX CHOLECYSTECTOMY  6-2015    HX GI  6/27/13    anal polyps removed    HX HEENT      laser surgery for blood clot in right eye    HX KNEE REPLACEMENT      right    HX OTHER SURGICAL  4-2-14    lap gastrotomy and removal of polyp -  - not cancerous per pt    HX ALAN AND BSO      HX TONSILLECTOMY      HX UROLOGICAL      \"laser\" surgery for kidney stone    NEUROLOGICAL PROCEDURE UNLISTED  1990    tumor at back of neck, benign         Family History:   Problem Relation Age of Onset    Cancer Mother         colon    Diabetes Brother     Other Sister         GI BLEED    Heart Disease Brother     Heart Attack Brother     Heart Disease Brother     Heart Disease Brother     Schizophrenia Son     Anesth Problems Neg Hx        Social History     Socioeconomic History    Marital status:      Spouse name: Not on file    Number of children: Not on file    Years of education: Not on file    Highest education level: Not on file   Occupational History    Not on file   Social Needs    Financial resource strain: Not on file    Food insecurity:     Worry: Not on file     Inability: Not on file    Transportation needs:     Medical: Not on file     Non-medical: Not on file   Tobacco Use    Smoking status: Former Smoker     Years: 0.00    Smokeless tobacco: Never Used    Tobacco comment: age 13   Substance and Sexual Activity    Alcohol use: No    Drug use: No    Sexual activity: Never   Lifestyle    Physical activity:     Days per week: Not on file     Minutes per session: Not on file    Stress: Not on file   Relationships    Social connections:     Talks on phone: Not on file     Gets together: Not on file     Attends Quaker service: Not on file     Active member of club or organization: Not on file     Attends meetings of clubs or organizations: Not on file     Relationship status: Not on file    Intimate partner violence:     Fear of current or ex partner: Not on file     Emotionally abused: Not on file     Physically abused: Not on file     Forced sexual activity: Not on file   Other Topics Concern    Not on file   Social History Narrative    Not on file         ALLERGIES: Seafood [shellfish containing products]    Review of Systems   Constitutional: Positive for appetite change and fatigue. Negative for fever. HENT: Negative for facial swelling. Eyes: Negative for pain. Respiratory: Positive for shortness of breath. Cardiovascular: Negative for chest pain and leg swelling. Gastrointestinal: Negative for abdominal pain, diarrhea and vomiting. Endocrine: Negative for polyuria. Genitourinary: Positive for frequency. Negative for difficulty urinating, dysuria and flank pain. Musculoskeletal: Negative for arthralgias and back pain. Skin: Negative for color change. Allergic/Immunologic: Negative for immunocompromised state. Neurological: Positive for weakness. Negative for dizziness and headaches. Hematological: Does not bruise/bleed easily. Psychiatric/Behavioral: Negative for confusion. All other systems reviewed and are negative. There were no vitals filed for this visit. Physical Exam   Constitutional: She is oriented to person, place, and time. She appears well-developed and well-nourished. No distress. HENT:   Head: Normocephalic and atraumatic. Right Ear: External ear normal.   Left Ear: External ear normal.   Eyes: Conjunctivae and EOM are normal.   Neck: Normal range of motion. Neck supple. No tracheal deviation present. No thyromegaly present. Cardiovascular: Normal rate, regular rhythm, normal heart sounds and intact distal pulses.  Exam reveals no gallop and no friction rub. No murmur heard. Pulmonary/Chest: Effort normal and breath sounds normal. No respiratory distress. She has no wheezes. She has no rales. She exhibits no tenderness. Abdominal: Soft. Bowel sounds are normal. She exhibits no distension. There is no tenderness. Musculoskeletal: Normal range of motion. She exhibits no edema, tenderness or deformity. Neurological: She is alert and oriented to person, place, and time. She displays normal reflexes. No cranial nerve deficit. She exhibits normal muscle tone. Coordination normal.   Skin: Skin is warm and dry. No rash noted. She is not diaphoretic. No erythema. Psychiatric: She has a normal mood and affect. Her behavior is normal. Judgment and thought content normal.      Note written by Jeimy Hansen, as dictated by Ruth Paz MD 2:56 PM.      MDM  Number of Diagnoses or Management Options  Diagnosis management comments: 81-year-old -American female with multiple visits to the emergency department presents with high blood sugar. She apparently had high blood sugar today by the nurse at home health. Her blood sugar currently is 411. Will check to make sure she is not in DKA. We will give her IV fluids and insulin. Will check basic blood work and urinalysis. Patient agrees. Amount and/or Complexity of Data Reviewed  Clinical lab tests: reviewed and ordered  Tests in the medicine section of CPT®: ordered and reviewed  Decide to obtain previous medical records or to obtain history from someone other than the patient: yes  Review and summarize past medical records: yes  Independent visualization of images, tracings, or specimens: yes    Risk of Complications, Morbidity, and/or Mortality  Presenting problems: high  Diagnostic procedures: high  Management options: high           Procedures      Labs are back to normal.  Sugar is improved. No DKA. Urine is clear. Will discharge with PCP follow-up.     Good return precautions given to patient. Close follow up with PCP recommended. Patient and/or family voices understanding of this plan. Discharge instructions were explained by me and all concerns were addressed.

## 2019-10-07 NOTE — TELEPHONE ENCOUNTER
F/U call placed to patient [phone]   mailbox is full. Noted patient w/ 20 ED visits in past 12 months.      Will re-attempt patient (outcome pending)

## 2019-10-07 NOTE — PROGRESS NOTES
Call placed to PCP Karie Holloway spoke w/ Verito Velazquez MA informed has had multiple communication w/ [place] agency regarding the need for DM teaching however agency is concerned about patient's memory. CM asked if followed by Neurology referral placed in 2017 and also has a Psychologist however questions if patient has followed up. Noted SNF stay 7/12/19-8/5/19 and PCP appointment 8/6/19. Patient had previous appointment w/ PCP however stated her leg hurt and did not make it to visit. New appointment obtained for Friday 11/11/19 @ 1100 with PCP Dr. Silvia Schuster.

## 2019-10-07 NOTE — ED TRIAGE NOTES
Triage: Patient arrives from home with c/o hyperglycemia and generalized weakness. Patient states she was seen here recently with the same complaints. Patient is AXOX4.

## 2019-10-07 NOTE — PROGRESS NOTES
Date of previous inpatient admission/ ED visit? 10/4/19 ED visit with multiple  complaints including body numbness    What brought the patient back to ED? Patient presents to the ED w/ chief compliant of hyperglycemia and generalized weakness    Did patient decline recommended services during last admission/ ED visit (if yes, what)? No    Has patient seen a provider since their last inpatient admission/ED visit (if yes, when)? Patient is followed by [facility] Interventions:  From previous inpatient admission/ED visit: Assessment  From current inpatient admission/ED visit: Assessment    [facility] Report reviewed 20 ED visits in 12 months. This writer had attempted to contact patient this a.m. For follow up (10/4/19 visit). Noted mailbox is full. Call placed to [facility]  Provider spoke w/ Areta Cranker Manager [phone] informed patient had BS of 528 today and usually between 300-500. Informed RN Shira Watkins visits in the home . Family has verbalized to agency patient manages her medications and her care is more responsibility to handle. Patient has brother/son/ and DNL today son and DNL arrived at time RN/EMS at home. [facility] Medicare / Medicaid are insurance providers listed. [facility] is not Medicaid [address] for patient not certain of provider assigned. Case Management will follow for Transitions of Care needs.

## 2019-10-07 NOTE — PROGRESS NOTES
CM received updates from [address] [phone] from [facility]. Spoke w/ Kari,RN informed agency has been following patient for a while however recently started visiting for Nursing Assessment. RN at home on Thursday and Today informed by patient caregiver was to bring groceries last week however Nurse noticed patient still with out groceries in the home today. This writer asked if call had been placed to APS regarding home and patient's current situation. No call placed Nurse will place call today. Informed CM family states patient has Nurse/ Agency to provide services including medications and needs. RN states patient has had MSW in the past informed this writer will obtain another order for MSW. Met w/patient  And introduced to self - informed of discharge patient requesting call be placed to [facility]   [phone] . CM informed if unable to reach will contact Medicaid provider. This writer inquired about personal care attendant. Informed of Ms. Barnet Crigler this writer offered to contact - Ms. Sullivan Rosaura states she doesn't have her number right now (in her phone). States Declan Favors helps anytime she needs her. CM asked about hours patient states [phone] however waiting on paperwork to come back. This writer attempted to get time frame patient states September. Call placed to Ama spoke w/ Kala Faith (spouse) will transport at 1800. Nursing informed of ETA at ED Discharge.

## 2019-10-11 ENCOUNTER — APPOINTMENT (OUTPATIENT)
Dept: GENERAL RADIOLOGY | Age: 79
End: 2019-10-11
Attending: EMERGENCY MEDICINE
Payer: MEDICARE

## 2019-10-11 ENCOUNTER — TELEPHONE (OUTPATIENT)
Dept: CASE MANAGEMENT | Age: 79
End: 2019-10-11

## 2019-10-11 ENCOUNTER — HOSPITAL ENCOUNTER (EMERGENCY)
Age: 79
Discharge: HOME | End: 2019-10-11
Attending: EMERGENCY MEDICINE | Admitting: EMERGENCY MEDICINE
Payer: MEDICARE

## 2019-10-11 ENCOUNTER — HOSPITAL ENCOUNTER (EMERGENCY)
Age: 79
Discharge: HOME | End: 2019-10-11
Attending: EMERGENCY MEDICINE
Payer: MEDICARE

## 2019-10-11 ENCOUNTER — APPOINTMENT (OUTPATIENT)
Dept: CT IMAGING | Age: 79
End: 2019-10-11
Attending: EMERGENCY MEDICINE
Payer: MEDICARE

## 2019-10-11 VITALS
OXYGEN SATURATION: 98 % | RESPIRATION RATE: 16 BRPM | DIASTOLIC BLOOD PRESSURE: 78 MMHG | HEART RATE: 88 BPM | TEMPERATURE: 98 F | SYSTOLIC BLOOD PRESSURE: 140 MMHG

## 2019-10-11 VITALS
SYSTOLIC BLOOD PRESSURE: 137 MMHG | TEMPERATURE: 98.1 F | BODY MASS INDEX: 34.16 KG/M2 | HEIGHT: 60 IN | RESPIRATION RATE: 16 BRPM | OXYGEN SATURATION: 98 % | DIASTOLIC BLOOD PRESSURE: 86 MMHG | HEART RATE: 89 BPM | WEIGHT: 174 LBS

## 2019-10-11 DIAGNOSIS — B34.9 VIRAL ILLNESS: Primary | ICD-10-CM

## 2019-10-11 DIAGNOSIS — R53.83 FATIGUE, UNSPECIFIED TYPE: Primary | ICD-10-CM

## 2019-10-11 DIAGNOSIS — J44.9 CHRONIC OBSTRUCTIVE PULMONARY DISEASE, UNSPECIFIED COPD TYPE (HCC): ICD-10-CM

## 2019-10-11 LAB
ALBUMIN SERPL-MCNC: 3.3 G/DL (ref 3.5–5)
ALBUMIN/GLOB SERPL: 0.9 {RATIO} (ref 1.1–2.2)
ALP SERPL-CCNC: 110 U/L (ref 45–117)
ALT SERPL-CCNC: 19 U/L (ref 12–78)
ANION GAP SERPL CALC-SCNC: 4 MMOL/L (ref 5–15)
AST SERPL-CCNC: 7 U/L (ref 15–37)
BASOPHILS # BLD: 0.1 K/UL (ref 0–0.1)
BASOPHILS NFR BLD: 0 % (ref 0–1)
BILIRUB SERPL-MCNC: 0.2 MG/DL (ref 0.2–1)
BUN SERPL-MCNC: 10 MG/DL (ref 6–20)
BUN/CREAT SERPL: 10 (ref 12–20)
CALCIUM SERPL-MCNC: 9 MG/DL (ref 8.5–10.1)
CHLORIDE SERPL-SCNC: 108 MMOL/L (ref 97–108)
CO2 SERPL-SCNC: 27 MMOL/L (ref 21–32)
COMMENT, HOLDF: NORMAL
CREAT SERPL-MCNC: 0.99 MG/DL (ref 0.55–1.02)
D DIMER PPP FEU-MCNC: 1.77 MG/L FEU (ref 0–0.65)
DIFFERENTIAL METHOD BLD: ABNORMAL
EOSINOPHIL # BLD: 0.2 K/UL (ref 0–0.4)
EOSINOPHIL NFR BLD: 2 % (ref 0–7)
ERYTHROCYTE [DISTWIDTH] IN BLOOD BY AUTOMATED COUNT: 15.9 % (ref 11.5–14.5)
GLOBULIN SER CALC-MCNC: 3.8 G/DL (ref 2–4)
GLUCOSE SERPL-MCNC: 201 MG/DL (ref 65–100)
HCT VFR BLD AUTO: 38.1 % (ref 35–47)
HGB BLD-MCNC: 11.8 G/DL (ref 11.5–16)
IMM GRANULOCYTES # BLD AUTO: 0.1 K/UL (ref 0–0.04)
IMM GRANULOCYTES NFR BLD AUTO: 0 % (ref 0–0.5)
LYMPHOCYTES # BLD: 3.6 K/UL (ref 0.8–3.5)
LYMPHOCYTES NFR BLD: 28 % (ref 12–49)
MCH RBC QN AUTO: 26.3 PG (ref 26–34)
MCHC RBC AUTO-ENTMCNC: 31 G/DL (ref 30–36.5)
MCV RBC AUTO: 84.9 FL (ref 80–99)
MONOCYTES # BLD: 0.9 K/UL (ref 0–1)
MONOCYTES NFR BLD: 7 % (ref 5–13)
NEUTS SEG # BLD: 7.8 K/UL (ref 1.8–8)
NEUTS SEG NFR BLD: 63 % (ref 32–75)
NRBC # BLD: 0 K/UL (ref 0–0.01)
NRBC BLD-RTO: 0 PER 100 WBC
PLATELET # BLD AUTO: 324 K/UL (ref 150–400)
PMV BLD AUTO: 10.2 FL (ref 8.9–12.9)
POTASSIUM SERPL-SCNC: 3.8 MMOL/L (ref 3.5–5.1)
PROT SERPL-MCNC: 7.1 G/DL (ref 6.4–8.2)
RBC # BLD AUTO: 4.49 M/UL (ref 3.8–5.2)
SAMPLES BEING HELD,HOLD: NORMAL
SODIUM SERPL-SCNC: 139 MMOL/L (ref 136–145)
TROPONIN I SERPL-MCNC: <0.05 NG/ML
WBC # BLD AUTO: 12.5 K/UL (ref 3.6–11)

## 2019-10-11 PROCEDURE — 74011636320 HC RX REV CODE- 636/320: Performed by: RADIOLOGY

## 2019-10-11 PROCEDURE — 93005 ELECTROCARDIOGRAM TRACING: CPT

## 2019-10-11 PROCEDURE — 99284 EMERGENCY DEPT VISIT MOD MDM: CPT

## 2019-10-11 PROCEDURE — 36415 COLL VENOUS BLD VENIPUNCTURE: CPT

## 2019-10-11 PROCEDURE — 74011000258 HC RX REV CODE- 258: Performed by: RADIOLOGY

## 2019-10-11 PROCEDURE — 80053 COMPREHEN METABOLIC PANEL: CPT

## 2019-10-11 PROCEDURE — 71275 CT ANGIOGRAPHY CHEST: CPT

## 2019-10-11 PROCEDURE — 71046 X-RAY EXAM CHEST 2 VIEWS: CPT

## 2019-10-11 PROCEDURE — 85025 COMPLETE CBC W/AUTO DIFF WBC: CPT

## 2019-10-11 PROCEDURE — 85379 FIBRIN DEGRADATION QUANT: CPT

## 2019-10-11 PROCEDURE — 84484 ASSAY OF TROPONIN QUANT: CPT

## 2019-10-11 RX ORDER — SODIUM CHLORIDE 0.9 % (FLUSH) 0.9 %
10 SYRINGE (ML) INJECTION
Status: COMPLETED | OUTPATIENT
Start: 2019-10-11 | End: 2019-10-11

## 2019-10-11 RX ORDER — BENZONATATE 100 MG/1
100 CAPSULE ORAL
Qty: 30 CAP | Refills: 0 | Status: SHIPPED | OUTPATIENT
Start: 2019-10-11 | End: 2019-10-18

## 2019-10-11 RX ADMIN — IOPAMIDOL 85 ML: 755 INJECTION, SOLUTION INTRAVENOUS at 04:42

## 2019-10-11 RX ADMIN — Medication 10 ML: at 04:42

## 2019-10-11 RX ADMIN — SODIUM CHLORIDE 100 ML: 900 INJECTION, SOLUTION INTRAVENOUS at 04:42

## 2019-10-11 NOTE — ED PROVIDER NOTES
69 y.o. female with past medical history significant for asthma, arthritis, chronic knee pain, diabetes, GERD, kidney stones, and hypertension who presents from home via EMS with chief complaint of malaise. Pt states she does not feel well and she is aching all over. She c/o hand numbness, headaches, cough, and states she cannot sleep at night. Pt admits to being seen in ED this morning and states \"nobody gave me anything to make me feel better\". Pt states she had pneumonia last week. She denies any new complaints since this morning. She is currently taking an antibiotic prescribed by PCP today. Pt specifically denies rhinorrhea and any other pain or symptoms. There are no other acute medical concerns at this time. Social hx: Former tobacco smoker; Denies EtOH use; Denies illicit drug use  PCP: Melina Payton MD    Note written by Jeimy Rodriguez, as dictated by Alexey Cornelius NP 7:57 PM    The history is provided by the patient and medical records. No  was used.         Past Medical History:   Diagnosis Date    Anal polyp 6/13/2013    Arthritis     Asthma     CAD (coronary artery disease) 10/27/2017    Cataract     Chronic pain     knee - right/back    Diabetes ([address]     GERD (gastroesophageal reflux disease)     Hemorrhoids 6/13/2013    History of kidney stones     Hypertension     Ill-defined condition     abdominal pain and burning    Other ill-defined conditions(799.89)     high cholesterol       Past Surgical History:   Procedure Laterality Date    COLONOSCOPY  2/28/2013         EGD  2/28/2013         HX CATARACT REMOVAL Bilateral     HX CHOLECYSTECTOMY  6-2015    HX GI  6/27/13    anal polyps removed    HX HEENT      laser surgery for blood clot in right eye    HX KNEE REPLACEMENT      right    HX OTHER SURGICAL  4-2-14    lap gastrotomy and removal of polyp -  - not cancerous per pt    HX ALAN AND BSO      HX TONSILLECTOMY      HX UROLOGICAL      \"laser\" surgery for kidney stone    NEUROLOGICAL PROCEDURE UNLISTED  1990    tumor at back of neck, benign         Family History:   Problem Relation Age of Onset    Cancer Mother         colon    Diabetes Brother     Other Sister         GI BLEED    Heart Disease Brother     Heart Attack Brother     Heart Disease Brother     Heart Disease Brother     Schizophrenia Son     Anesth Problems Neg Hx        Social History     Socioeconomic History    Marital status:      Spouse name: Not on file    Number of children: Not on file    Years of education: Not on file    Highest education level: Not on file   Occupational History    Not on file   Social Needs    Financial resource strain: Not on file    Food insecurity:     Worry: Not on file     Inability: Not on file    Transportation needs:     Medical: Not on file     Non-medical: Not on file   Tobacco Use    Smoking status: Former Smoker     Years: 0.00    Smokeless tobacco: Never Used    Tobacco comment: age 13   Substance and Sexual Activity    Alcohol use: No    Drug use: No    Sexual activity: Never   Lifestyle    Physical activity:     Days per week: Not on file     Minutes per session: Not on file    Stress: Not on file   Relationships    Social connections:     Talks on phone: Not on file     Gets together: Not on file     Attends Orthodoxy service: Not on file     Active member of club or organization: Not on file     Attends meetings of clubs or organizations: Not on file     Relationship status: Not on file    Intimate partner violence:     Fear of current or ex partner: Not on file     Emotionally abused: Not on file     Physically abused: Not on file     Forced sexual activity: Not on file   Other Topics Concern    Not on file   Social History Narrative    Not on file         ALLERGIES: Seafood [shellfish containing products]    Review of Systems   HENT: Negative for rhinorrhea.     Respiratory: Positive for cough. Musculoskeletal: Positive for myalgias. Neurological: Positive for numbness and headaches. Psychiatric/Behavioral: Positive for sleep disturbance. All other systems reviewed and are negative. Vitals:    10/11/19 1911   BP: 137/86   Pulse: 89   Resp: 16   Temp: 98.1 °F (36.7 °C)   SpO2: 98%   Weight: 78.9 kg (174 lb)   Height: 5' (1.524 m)            Physical Exam   Constitutional: She is oriented to person, place, and time. She appears well-developed and well-nourished. No distress. HENT:   Head: Normocephalic and atraumatic. Eyes: Pupils are equal, round, and reactive to light. Conjunctivae and EOM are normal.   Neck: Normal range of motion. Cardiovascular: Normal rate, regular rhythm, normal heart sounds and intact distal pulses. Pulmonary/Chest: Effort normal and breath sounds normal. She has no wheezes. Musculoskeletal: Normal range of motion. She exhibits no deformity. Neurological: She is alert and oriented to person, place, and time. Coordination normal.   Skin: She is not diaphoretic. Psychiatric: She has a normal mood and affect. Her behavior is normal. Judgment and thought content normal.   Nursing note and vitals reviewed. MDM  Number of Diagnoses or Management Options  Viral illness: minor  Diagnosis management comments:     Patient was recently seen the eighth of the month for her similar symptoms. Initially, she was diagnosed as having walking pneumonia and was given a course of antibiotics by her primary care physician. She was seen this morning here in the emergency department, and had a negative PE, cardiac workups and was advised to followup with her primary care physician and continue with medication regimen. She had seen her PCP this afternoon prior to coming to the emergency department. Her physical examination is unremarkable.  She was should, this is likely viral in etiology self-limiting recommendations continue symptomatic support follow with primary care physician.        Amount and/or Complexity of Data Reviewed  Review and summarize past medical records: yes  Discuss the patient with other providers: yes           Procedures

## 2019-10-11 NOTE — DISCHARGE INSTRUCTIONS
Patient Education      Work up in the emergency room is again reassuring- no sign of pneumonia or heart issue. Your cough/shortness of breath is likely related to your COPD/asthma  Continue your medications and follow up with your primary doctor next week. Return to the ED if you feel your symptoms/condition is acutely worsening  Fatigue: Care Instructions  Your Care Instructions    Fatigue is a feeling of tiredness, exhaustion, or lack of energy. You may feel fatigue because of too much or not enough activity. It can also come from stress, lack of sleep, boredom, and poor diet. Many medical problems, such as viral infections, can cause fatigue. Emotional problems, especially depression, are often the cause of fatigue. Fatigue is most often a symptom of another problem. Treatment for fatigue depends on the cause. For example, if you have fatigue because you have a certain health problem, treating this problem also treats your fatigue. If depression or anxiety is the cause, treatment may help. Follow-up care is a key part of your treatment and safety. Be sure to make and go to all appointments, and call your doctor if you are having problems. It's also a good idea to know your test results and keep a list of the medicines you take. How can you care for yourself at home? · Get regular exercise. But don't overdo it. Go back and forth between rest and exercise. · Get plenty of rest.  · Eat a healthy diet. Do not skip meals, especially breakfast.  · Reduce your use of caffeine, tobacco, and alcohol. Caffeine is most often found in coffee, tea, cola drinks, and chocolate. · Limit medicines that can cause fatigue. This includes tranquilizers and cold and allergy medicines. When should you call for help?   Watch closely for changes in your health, and be sure to contact your doctor if:    · You have new symptoms such as fever or a rash.     · Your fatigue gets worse.     · You have been feeling down, depressed, or hopeless. Or you may have lost interest in things that you usually enjoy.     · You are not getting better as expected. Where can you learn more? Go to http://arnaldo-mariano.info/. Enter E221 in the search box to learn more about \"Fatigue: Care Instructions. \"  Current as of: June 26, 2019  Content Version: 12.2  © 7698-0987 [employer]. Care instructions adapted under license by [employer] (which disclaims liability or warranty for this information). If you have questions about a medical condition or this instruction, always ask your healthcare professional. [place] [address] any warranty or liability for your use of this information.

## 2019-10-11 NOTE — ED NOTES
I have reviewed discharge instructions with the patient. The patient verbalized understanding. Ambulated out of the department in no acute distress. [place] called for transportation home.  Reference number [id number]

## 2019-10-11 NOTE — ED TRIAGE NOTES
Pt reports having cough, chest congestion and for the past several days. Pt was seen here earlier this morning for same symptoms. Pt also reports HX of chronic bronchitis.

## 2019-10-11 NOTE — TELEPHONE ENCOUNTER
SSED/CM referral received. Patient seen in ED early morning 10/11/19 noted 21 ED visits in 12 months. Call placed to Ms. [place] introduced self - patient states she is \"so sick\". Informed patient of appointment scheduled for PCP today at 1100. Patient states she doesn't have anyone to take her and will try to locate. This writer communicated 21 ED visits in 12 months and PCP can follow in the community. CM asked if Landlord/ Aide Attendant Cammie Guo was available to assist/ drive - informed does not have her number and last saw 1 week ago. Call placed to [facility] report placed and agency will follow - informed Patient has declined CM to assist w/ locating [place] desires to remain in her home.

## 2019-10-11 NOTE — ED PROVIDER NOTES
HPI     43-year-old female with a history of asthma, arthritis, chronic knee pain, diabetes, GERD, kidney stones, hypertension, presents to the ED complaining of persistent cough and chest pain. She states she has not felt well for the past 3 weeks. She was seen here and diagnosed with bronchitis and put on antibiotics but she is still taking. She has generalized weakness and poor appetite she has no nausea or vomiting she denies a fever but reports she has sweats. She reports normal urination. She is describes her chest pain as \"it hurts to breathe\" she has tried her inhaler with some relief. She has not currently on steroids and has not been on steroids for this episode. Patient denies history of heart disease to me although her past medical history states she has heart disease.   Patient does not smoke and was never a smoker she denies any calf pain or swelling she denies any recent travel she denies a history of blood clots    Past Medical History:   Diagnosis Date    Anal polyp 6/13/2013    Arthritis     Asthma     CAD (coronary artery disease) 10/27/2017    Cataract     Chronic pain     knee - right/back    Diabetes ([address]     GERD (gastroesophageal reflux disease)     Hemorrhoids 6/13/2013    History of kidney stones     Hypertension     Ill-defined condition     abdominal pain and burning    Other ill-defined conditions(799.89)     high cholesterol       Past Surgical History:   Procedure Laterality Date    COLONOSCOPY  2/28/2013         EGD  2/28/2013         HX CATARACT REMOVAL Bilateral     HX CHOLECYSTECTOMY  6-2015    HX GI  6/27/13    anal polyps removed    HX HEENT      laser surgery for blood clot in right eye    HX KNEE REPLACEMENT      right    HX OTHER SURGICAL  4-2-14    lap gastrotomy and removal of polyp -  - not cancerous per pt    HX ALAN AND BSO      HX TONSILLECTOMY      HX UROLOGICAL      \"laser\" surgery for kidney stone    NEUROLOGICAL PROCEDURE UNLISTED  1990    tumor at back of neck, benign         Family History:   Problem Relation Age of Onset    Cancer Mother         colon    Diabetes Brother     Other Sister         GI BLEED    Heart Disease Brother     Heart Attack Brother     Heart Disease Brother     Heart Disease Brother     Schizophrenia Son     Anesth Problems Neg Hx        Social History     Socioeconomic History    Marital status:      Spouse name: Not on file    Number of children: Not on file    Years of education: Not on file    Highest education level: Not on file   Occupational History    Not on file   Social Needs    Financial resource strain: Not on file    Food insecurity:     Worry: Not on file     Inability: Not on file    Transportation needs:     Medical: Not on file     Non-medical: Not on file   Tobacco Use    Smoking status: Former Smoker     Years: 0.00    Smokeless tobacco: Never Used    Tobacco comment: age 13   Substance and Sexual Activity    Alcohol use: No    Drug use: No    Sexual activity: Never   Lifestyle    Physical activity:     Days per week: Not on file     Minutes per session: Not on file    Stress: Not on file   Relationships    Social connections:     Talks on phone: Not on file     Gets together: Not on file     Attends Alevism service: Not on file     Active member of club or organization: Not on file     Attends meetings of clubs or organizations: Not on file     Relationship status: Not on file    Intimate partner violence:     Fear of current or ex partner: Not on file     Emotionally abused: Not on file     Physically abused: Not on file     Forced sexual activity: Not on file   Other Topics Concern    Not on file   Social History Narrative    Not on file         ALLERGIES: Seafood [shellfish containing products]    Review of Systems   Constitutional: Positive for appetite change, chills, diaphoresis and fatigue. Negative for fever. HENT: Negative for congestion. Eyes: Negative for visual disturbance. Respiratory: Positive for cough and shortness of breath. Cardiovascular: Positive for chest pain. Gastrointestinal: Negative for abdominal pain, nausea and vomiting. Genitourinary: Negative for dysuria. Musculoskeletal: Negative for gait problem. Skin: Negative for rash. Neurological: Negative for headaches. Psychiatric/Behavioral: Negative for dysphoric mood. Vitals:    10/11/19 0237   BP: 139/74   Pulse: 90   Resp: 18   Temp: 98.2 °F (36.8 °C)   SpO2: 96%            Physical Exam   Constitutional: She is oriented to person, place, and time. She appears well-developed and well-nourished. No distress. HENT:   Head: Normocephalic and atraumatic. Mouth/Throat: No oropharyngeal exudate. Eyes: Pupils are equal, round, and reactive to light. Right eye exhibits no discharge. Left eye exhibits no discharge. No scleral icterus. Neck: Normal range of motion. Neck supple. No JVD present. Cardiovascular: Normal rate, regular rhythm and normal heart sounds. No murmur heard. Pulmonary/Chest: Effort normal and breath sounds normal. No stridor. No respiratory distress. She has no wheezes. She has no rales. She exhibits no tenderness. Diminished breath sounds   Abdominal: Soft. Bowel sounds are normal. She exhibits no distension and no mass. There is no tenderness. There is no rebound and no guarding. Musculoskeletal: Normal range of motion. Neurological: She is oriented to person, place, and time. Skin: Skin is warm and dry. Capillary refill takes less than 2 seconds. No rash noted. Psychiatric: She has a normal mood and affect. Her behavior is normal. Judgment and thought content normal.        MDM       Procedures      ED EKG interpretation:  Rhythm: normal sinus rhythm; and regular . Rate (approx.): 91; Axis: normal; P wave: normal; QRS interval: normal ; ST/T wave: non-specific changes; NO acute EKG changes compared to previous EKGs on file. This EKG was interpreted by Janice Caban MD,ED Provider. Work up reassuring in the ED>  ddimer elev, but CT neg for pe or pneumonia. Patient with fatigue. Vitals are stable. Will d/c to follow up with primary care.

## 2019-10-11 NOTE — ED TRIAGE NOTES
Patient arrives via EMS from home with CC of chest pain with inspiration. Pt reports she was seen on 10/4 and diagnosed with \"walking pneumonia\" Pt states she continues to have a cough and the pain feels no better. Hx DM.  for EMS.

## 2019-10-12 LAB
ATRIAL RATE: 91 BPM
ATRIAL RATE: 96 BPM
CALCULATED P AXIS, ECG09: 59 DEGREES
CALCULATED R AXIS, ECG10: 34 DEGREES
CALCULATED R AXIS, ECG10: 36 DEGREES
CALCULATED T AXIS, ECG11: 55 DEGREES
CALCULATED T AXIS, ECG11: 63 DEGREES
DIAGNOSIS, 93000: NORMAL
DIAGNOSIS, 93000: NORMAL
P-R INTERVAL, ECG05: 126 MS
P-R INTERVAL, ECG05: 98 MS
Q-T INTERVAL, ECG07: 374 MS
Q-T INTERVAL, ECG07: 376 MS
QRS DURATION, ECG06: 54 MS
QRS DURATION, ECG06: 66 MS
QTC CALCULATION (BEZET), ECG08: 460 MS
QTC CALCULATION (BEZET), ECG08: 475 MS
VENTRICULAR RATE, ECG03: 91 BPM
VENTRICULAR RATE, ECG03: 96 BPM

## 2019-10-16 ENCOUNTER — HOSPITAL ENCOUNTER (EMERGENCY)
Age: 79
Discharge: HOME | End: 2019-10-16
Attending: EMERGENCY MEDICINE | Admitting: EMERGENCY MEDICINE
Payer: MEDICARE

## 2019-10-16 VITALS
BODY MASS INDEX: 33.76 KG/M2 | HEART RATE: 96 BPM | TEMPERATURE: 97.8 F | DIASTOLIC BLOOD PRESSURE: 79 MMHG | OXYGEN SATURATION: 96 % | HEIGHT: 60 IN | WEIGHT: 171.96 LBS | SYSTOLIC BLOOD PRESSURE: 146 MMHG | RESPIRATION RATE: 24 BRPM

## 2019-10-16 DIAGNOSIS — F41.1 ANXIETY STATE: Primary | ICD-10-CM

## 2019-10-16 LAB
ANION GAP BLD CALC-SCNC: 15 MMOL/L (ref 10–20)
BUN BLD-MCNC: 6 MG/DL (ref 9–20)
CA-I BLD-MCNC: 1.05 MMOL/L (ref 1.12–1.32)
CHLORIDE BLD-SCNC: 104 MMOL/L (ref 98–107)
CO2 BLD-SCNC: 26 MMOL/L (ref 21–32)
CREAT BLD-MCNC: 0.5 MG/DL (ref 0.6–1.3)
GLUCOSE BLD-MCNC: 276 MG/DL (ref 65–100)
HCT VFR BLD CALC: 34 % (ref 35–47)
POTASSIUM BLD-SCNC: 3.8 MMOL/L (ref 3.5–5.1)
SERVICE CMNT-IMP: ABNORMAL
SODIUM BLD-SCNC: 141 MMOL/L (ref 136–145)

## 2019-10-16 PROCEDURE — 99284 EMERGENCY DEPT VISIT MOD MDM: CPT

## 2019-10-16 PROCEDURE — 80047 BASIC METABLC PNL IONIZED CA: CPT

## 2019-10-16 RX ORDER — HYDROXYZINE 50 MG/1
50 TABLET, FILM COATED ORAL
Qty: 20 TAB | Refills: 0 | Status: SHIPPED | OUTPATIENT
Start: 2019-10-16 | End: 2019-10-26

## 2019-10-16 NOTE — ED PROVIDER NOTES
This is a 66 yo femalie with a history of CAD, hypertension, anxiety and DM who presents to the ED with a complaint of \"burning all over\". She has had multiple complaints of this problem with no clear etiology for this complaint. Multiple labs, ct and radiological proceedures have been done fore this complaint. These symptoms began yesterday and she has done nothing to try and treat this. She has had no fever or chills, SOB, N/V or other gi/gu symptoms. No chest pain. Only a burning sensation generalized. She offers no other acute complaints or anything different from what she has complained of before. No other complaints are offered.             Past Medical History:   Diagnosis Date    Anal polyp 6/13/2013    Arthritis     Asthma     CAD (coronary artery disease) 10/27/2017    Cataract     Chronic pain     knee - right/back    Diabetes ([address]     GERD (gastroesophageal reflux disease)     Hemorrhoids 6/13/2013    History of kidney stones     Hypertension     Ill-defined condition     abdominal pain and burning    Other ill-defined conditions(799.89)     high cholesterol       Past Surgical History:   Procedure Laterality Date    COLONOSCOPY  2/28/2013         EGD  2/28/2013         HX CATARACT REMOVAL Bilateral     HX CHOLECYSTECTOMY  6-2015    HX GI  6/27/13    anal polyps removed    HX HEENT      laser surgery for blood clot in right eye    HX KNEE REPLACEMENT      right    HX OTHER SURGICAL  4-2-14    lap gastrotomy and removal of polyp -  - not cancerous per pt    HX ALAN AND BSO      HX TONSILLECTOMY      HX UROLOGICAL      \"laser\" surgery for kidney stone    NEUROLOGICAL PROCEDURE UNLISTED  1990    tumor at back of neck, benign         Family History:   Problem Relation Age of Onset    Cancer Mother         colon    Diabetes Brother     Other Sister         GI BLEED    Heart Disease Brother     Heart Attack Brother     Heart Disease Brother     Heart Disease Brother    [facility] Schizophrenia Son     Anesth Problems Neg Hx        Social History     Socioeconomic History    Marital status:      Spouse name: Not on file    Number of children: Not on file    Years of education: Not on file    Highest education level: Not on file   Occupational History    Not on file   Social Needs    Financial resource strain: Not on file    Food insecurity:     Worry: Not on file     Inability: Not on file    Transportation needs:     Medical: Not on file     Non-medical: Not on file   Tobacco Use    Smoking status: Former Smoker     Years: 0.00    Smokeless tobacco: Never Used    Tobacco comment: age 13   Substance and Sexual Activity    Alcohol use: No    Drug use: No    Sexual activity: Never   Lifestyle    Physical activity:     Days per week: Not on file     Minutes per session: Not on file    Stress: Not on file   Relationships    Social connections:     Talks on phone: Not on file     Gets together: Not on file     Attends Sikhism service: Not on file     Active member of club or organization: Not on file     Attends meetings of clubs or organizations: Not on file     Relationship status: Not on file    Intimate partner violence:     Fear of current or ex partner: Not on file     Emotionally abused: Not on file     Physically abused: Not on file     Forced sexual activity: Not on file   Other Topics Concern    Not on file   Social History Narrative    Not on file         ALLERGIES: Seafood [shellfish containing products]    Review of Systems   Constitutional: Negative for activity change, appetite change and fatigue. Some generalized burning  \"I just feel bad\"  No specific complaints. HENT: Negative for ear pain, facial swelling, sore throat and trouble swallowing. Eyes: Negative for pain, discharge and visual disturbance. Respiratory: Negative for chest tightness, shortness of breath and wheezing. Cardiovascular: Negative for chest pain and palpitations. Gastrointestinal: Negative for abdominal pain, blood in stool, nausea and vomiting. Genitourinary: Negative for difficulty urinating, flank pain and hematuria. Musculoskeletal: Negative for arthralgias, joint swelling, myalgias and neck pain. Skin: Negative for color change and rash. Neurological: Negative for dizziness, weakness, numbness and headaches. Hematological: Negative for adenopathy. Does not bruise/bleed easily. Psychiatric/Behavioral: Negative for behavioral problems, confusion and sleep disturbance. All other systems reviewed and are negative. Vitals:    10/16/19 0550   BP: 146/79   Pulse: 96   Resp: 24   Temp: 97.8 °F (36.6 °C)   SpO2: 96%   Weight: 78 kg (171 lb 15.3 oz)   Height: 5' (1.524 m)            Physical Exam   Constitutional: She is oriented to person, place, and time. She appears well-developed and well-nourished. No distress. Anxious  VS as noted. HENT:   Head: Normocephalic and atraumatic. Nose: Nose normal.   Mouth/Throat: Oropharynx is clear and moist.   Eyes: Pupils are equal, round, and reactive to light. Conjunctivae and EOM are normal. No scleral icterus. Neck: Normal range of motion. Neck supple. No JVD present. No tracheal deviation present. No thyromegaly present. No carotid bruits noted. Cardiovascular: Normal rate, regular rhythm, normal heart sounds and intact distal pulses. Exam reveals no gallop and no friction rub. No murmur heard. Pulmonary/Chest: Effort normal and breath sounds normal. No respiratory distress. She has no wheezes. She has no rales. She exhibits no tenderness. Abdominal: Soft. Bowel sounds are normal. She exhibits no distension and no mass. There is no tenderness. There is no rebound and no guarding. Musculoskeletal: Normal range of motion. She exhibits no edema or tenderness. Lymphadenopathy:     She has no cervical adenopathy. Neurological: She is alert and oriented to person, place, and time.  She has normal reflexes. No cranial nerve deficit. Coordination normal.   Skin: Skin is warm and dry. No rash noted. No erythema. Psychiatric:   anxious   Nursing note and vitals reviewed. MDM  Number of Diagnoses or Management Options  Anxiety state: established and worsening     Amount and/or Complexity of Data Reviewed  Clinical lab tests: ordered and reviewed  Decide to obtain previous medical records or to obtain history from someone other than the patient: yes  Review and summarize past medical records: yes    Risk of Complications, Morbidity, and/or Mortality  Presenting problems: moderate  Diagnostic procedures: low  Management options: moderate    Patient Progress  Patient progress: stable         Procedures  Reviewed old charts. Reviewed old labs and images. Patient has had multiple workups over the last month and a half with no abnormalities noted. Multiple CTs of the head, abdomen, pelvis and chest without significant findings. A consult with [facility] was offerred and declined. She is given some atarax and instructed to follow up with Dr. Mary Marks as often as necessary to resolve her issues. She is unable now to articulate any particular specific complaint.      Have counselled patient on a more appropriate use of ED

## 2019-10-16 NOTE — ED TRIAGE NOTES
Pt arrives via EMS from home with complaints of \"burning all over. \"  Pt has been seen numerous times for similar complaints in the past.  Pt appears very anxious, hyperventilating. Pt has not taken anything for pain or anxiety. EMS reports pt's blood glucose was elevated at 325, pt admits \"forgetting\" to take her Metformin numerous times over the last week. Received 250ml NS while enroute to hospital. No new complaints.

## 2019-10-16 NOTE — ED NOTES
Pt provided with discharge instructions. Verbalizes understanding.  Left ambulatory with steady gait, all belongings, and stable VS.

## 2019-11-11 ENCOUNTER — DOCUMENTATION ONLY (OUTPATIENT)
Dept: CASE MANAGEMENT | Age: 79
End: 2019-11-11

## 2019-11-11 NOTE — MANAGEMENT PLAN
Patient Management Plan        Pt Name: Mary Stallworth TCB:[id number]        Management Plan by: Surjit Henriquez                                                                                           Date Placed:  11/11/19     Pt's Physicians:         Primary Care:  Adriana Tirado MD       Contact #: [phone]               Summary    Reason for Referral: This patient has been provided a management plan for medical needs, psychiatric needs, and radiation limitation. Frequent ED visits for: Pneumonia, hyperglycemia     Warning/Safety Alert:     Narx Report Scores and Risk Indicators: Narcotic: 290  Sedative: 361    Risk Score:  500  . This indicates the pt is 25   times more likely to experience an unintentional overdose than the general population. Guidelines recommend discussing concerns with pt/review use patterns for unsafe conditions. Consider contacting other providers directly as patient has multiple prescribers. Consider providing Rx/information on obtaining Naloxone. Consider inpatient admit or referral for outpatient evaluation and treatment. Situation: Chronic Conditions Summary -  Root Cause Medical Problems List:  HTN, DM, arthritis, pancreatitis  Root Cause Psychosocial Problems List:  Anxiety w/panic attacks, depression, noted overuse of benzodiazepine   Root Cause Social Determinants of Health (SDOH):  Pt is retired and . Pts adult son has schizophrenia and lives in a group home. Pt is very anxious regarding his well-being and her ability to visit him. 10/11/19 CM contacted [facility]. Incidence of Testing: Over past 12 months: 14 XR, 7 CT, 1 MRI    Pattern of access between October 2018- October 2019: 23 [facility] ED visits, 1 ED visits @ [facility], 0 ED visits at [facility]. Pt has been hospitalized 3 times at [facility] facilities.      Goals/Interventions:    ED Provider/nursing(together) to discuss mgmt plan w/ pt   ED Provider to review  with pt   Nursing to obtain ordered UDS (last UDS 09/08/17 +for benzos and opiates)   Nursing/CM obtain updated list of current providers and attempt to obtain ALEX for coordination of care  o Request/obtain consent for Isabela Kern [address] team member for follow up outreach.  Consider non-narcotic medications/alternatives to benzodiazepines unless emergently necessary. o Consider subcutaneous medication administration as an alternate.  Educate patient on the appropriate use of ED. Provide Urgent Care and Dispatch Cedric information   Include the Opioid overdose: Naloxone general Info in AVS. Encourage pt to identify someone who can administer Narcan in the event pt becomes unresponsive.  Consider contacting PCP to see if pt has a controlled substance agreement    Consider psych consult/referral to counseling, Wen Horton: [phone].  Consider GI referral to [facility]  [phone]   Consider pulmonology referral to [facility] [phone]    During Business Hours: CM/PCPs Office   After Hours: [facility](only if pt in crisis)     Challenges for Self Management:      Impairment of cognition and/or functional limitations: None    Financial Constraints: Retired   Poor follow ups    Utilization: HIGH ED Utilization   Emotional status of Patient: Labile (often tearful/anxious)   Behavioral Health Factors:  Anxiety (w/panic attacks), and Depression   Motivational level: moderate    Transportation: Ask pt to call friend for ride home [facility] [phone]), if pt cannot locate ride, staff to assist with coordination     Medication Management: Complications from poor adherence, Poor Adherence, Poly-pharm     Advanced Care Plan: None               ** This plan has been created by the [facility] , a multi-disciplinary team. The patient and their physician were invited to participate in this plan.  This management plan is intended to provide consistent evaluation and treatment for this patient not to supersede physician judgment. **

## 2019-12-06 ENCOUNTER — HOSPITAL ENCOUNTER (EMERGENCY)
Age: 79
Discharge: HOME | End: 2019-12-06
Attending: EMERGENCY MEDICINE | Admitting: EMERGENCY MEDICINE
Payer: MEDICARE

## 2019-12-06 VITALS
TEMPERATURE: 98 F | OXYGEN SATURATION: 99 % | HEART RATE: 82 BPM | RESPIRATION RATE: 17 BRPM | SYSTOLIC BLOOD PRESSURE: 150 MMHG | DIASTOLIC BLOOD PRESSURE: 77 MMHG

## 2019-12-06 DIAGNOSIS — E86.0 DEHYDRATION: ICD-10-CM

## 2019-12-06 DIAGNOSIS — R25.2 MUSCLE CRAMPS: Primary | ICD-10-CM

## 2019-12-06 LAB
ALBUMIN SERPL-MCNC: 3.4 G/DL (ref 3.5–5)
ALBUMIN/GLOB SERPL: 0.9 {RATIO} (ref 1.1–2.2)
ALP SERPL-CCNC: 108 U/L (ref 45–117)
ALT SERPL-CCNC: 18 U/L (ref 12–78)
ANION GAP SERPL CALC-SCNC: 6 MMOL/L (ref 5–15)
APPEARANCE UR: CLEAR
AST SERPL-CCNC: 8 U/L (ref 15–37)
BASOPHILS # BLD: 0.1 K/UL (ref 0–0.1)
BASOPHILS NFR BLD: 1 % (ref 0–1)
BILIRUB SERPL-MCNC: 0.3 MG/DL (ref 0.2–1)
BILIRUB UR QL: NEGATIVE
BUN SERPL-MCNC: 14 MG/DL (ref 6–20)
BUN/CREAT SERPL: 12 (ref 12–20)
CALCIUM SERPL-MCNC: 8.8 MG/DL (ref 8.5–10.1)
CHLORIDE SERPL-SCNC: 106 MMOL/L (ref 97–108)
CO2 SERPL-SCNC: 26 MMOL/L (ref 21–32)
COLOR UR: ABNORMAL
COMMENT, HOLDF: NORMAL
CREAT SERPL-MCNC: 1.14 MG/DL (ref 0.55–1.02)
DIFFERENTIAL METHOD BLD: ABNORMAL
EOSINOPHIL # BLD: 0.1 K/UL (ref 0–0.4)
EOSINOPHIL NFR BLD: 1 % (ref 0–7)
ERYTHROCYTE [DISTWIDTH] IN BLOOD BY AUTOMATED COUNT: 16.2 % (ref 11.5–14.5)
FLUAV AG NPH QL IA: NEGATIVE
FLUBV AG NOSE QL IA: NEGATIVE
GLOBULIN SER CALC-MCNC: 3.9 G/DL (ref 2–4)
GLUCOSE SERPL-MCNC: 339 MG/DL (ref 65–100)
GLUCOSE UR STRIP.AUTO-MCNC: >1000 MG/DL
HCT VFR BLD AUTO: 38.1 % (ref 35–47)
HGB BLD-MCNC: 12.1 G/DL (ref 11.5–16)
HGB UR QL STRIP: NEGATIVE
IMM GRANULOCYTES # BLD AUTO: 0.1 K/UL (ref 0–0.04)
IMM GRANULOCYTES NFR BLD AUTO: 1 % (ref 0–0.5)
KETONES UR QL STRIP.AUTO: NEGATIVE MG/DL
LEUKOCYTE ESTERASE UR QL STRIP.AUTO: NEGATIVE
LYMPHOCYTES # BLD: 3.3 K/UL (ref 0.8–3.5)
LYMPHOCYTES NFR BLD: 26 % (ref 12–49)
MCH RBC QN AUTO: 27.1 PG (ref 26–34)
MCHC RBC AUTO-ENTMCNC: 31.8 G/DL (ref 30–36.5)
MCV RBC AUTO: 85.4 FL (ref 80–99)
MONOCYTES # BLD: 1 K/UL (ref 0–1)
MONOCYTES NFR BLD: 8 % (ref 5–13)
NEUTS SEG # BLD: 8 K/UL (ref 1.8–8)
NEUTS SEG NFR BLD: 63 % (ref 32–75)
NITRITE UR QL STRIP.AUTO: NEGATIVE
NRBC # BLD: 0 K/UL (ref 0–0.01)
NRBC BLD-RTO: 0 PER 100 WBC
PH UR STRIP: 6 [PH] (ref 5–8)
PLATELET # BLD AUTO: 304 K/UL (ref 150–400)
PMV BLD AUTO: 10.8 FL (ref 8.9–12.9)
POTASSIUM SERPL-SCNC: 3.7 MMOL/L (ref 3.5–5.1)
PROT SERPL-MCNC: 7.3 G/DL (ref 6.4–8.2)
PROT UR STRIP-MCNC: NEGATIVE MG/DL
RBC # BLD AUTO: 4.46 M/UL (ref 3.8–5.2)
RBC MORPH BLD: ABNORMAL
SAMPLES BEING HELD,HOLD: NORMAL
SODIUM SERPL-SCNC: 138 MMOL/L (ref 136–145)
SP GR UR REFRACTOMETRY: 1.03 (ref 1–1.03)
UR CULT HOLD, URHOLD: NORMAL
UROBILINOGEN UR QL STRIP.AUTO: 0.2 EU/DL (ref 0.2–1)
WBC # BLD AUTO: 12.6 K/UL (ref 3.6–11)

## 2019-12-06 PROCEDURE — 96374 THER/PROPH/DIAG INJ IV PUSH: CPT

## 2019-12-06 PROCEDURE — 80053 COMPREHEN METABOLIC PANEL: CPT

## 2019-12-06 PROCEDURE — 85025 COMPLETE CBC W/AUTO DIFF WBC: CPT

## 2019-12-06 PROCEDURE — 36415 COLL VENOUS BLD VENIPUNCTURE: CPT

## 2019-12-06 PROCEDURE — 81003 URINALYSIS AUTO W/O SCOPE: CPT

## 2019-12-06 PROCEDURE — 74011250636 HC RX REV CODE- 250/636: Performed by: EMERGENCY MEDICINE

## 2019-12-06 PROCEDURE — 87804 INFLUENZA ASSAY W/OPTIC: CPT

## 2019-12-06 PROCEDURE — 99285 EMERGENCY DEPT VISIT HI MDM: CPT

## 2019-12-06 RX ORDER — IBUPROFEN 600 MG/1
600 TABLET ORAL
Qty: 20 TAB | Refills: 0 | Status: SHIPPED | OUTPATIENT
Start: 2019-12-06 | End: 2021-06-03

## 2019-12-06 RX ORDER — KETOROLAC TROMETHAMINE 30 MG/ML
15 INJECTION, SOLUTION INTRAMUSCULAR; INTRAVENOUS ONCE
Status: COMPLETED | OUTPATIENT
Start: 2019-12-06 | End: 2019-12-06

## 2019-12-06 RX ADMIN — KETOROLAC TROMETHAMINE 15 MG: 30 INJECTION, SOLUTION INTRAMUSCULAR at 03:15

## 2019-12-06 NOTE — ED TRIAGE NOTES
Patient arrives via EMS from home with CC of \"cramping all over\" that started around 8pm last night\"  Pt denies chest pain, denies weakness/numbness. VSS en route for EMS,  Blood sugar was elevated in the 300's. Pt anxious and tearful in triage. Took Tylenol without relief.

## 2019-12-06 NOTE — DISCHARGE INSTRUCTIONS
Dehydration: Care Instructions  Your Care Instructions  Dehydration happens when your body loses too much fluid. This might happen when you do not drink enough water or you lose large amounts of fluids from your body because of diarrhea, vomiting, or sweating. Severe dehydration can be life-threatening. Water and minerals called electrolytes help put your body fluids back in balance. Learn the early signs of fluid loss, and drink more fluids to prevent dehydration. Follow-up care is a key part of your treatment and safety. Be sure to make and go to all appointments, and call your doctor if you are having problems. It's also a good idea to know your test results and keep a list of the medicines you take. How can you care for yourself at home? · To prevent dehydration, drink plenty of fluids, enough so that your urine is light yellow or clear like water. Choose water and other caffeine-free clear liquids until you feel better. If you have kidney, heart, or liver disease and have to limit fluids, talk with your doctor before you increase the amount of fluids you drink. · If you do not feel like eating or drinking, try taking small sips of water, sports drinks, or other rehydration drinks. · Get plenty of rest.  To prevent dehydration  · Add more fluids to your diet and daily routine, unless your doctor has told you not to. · During hot weather, drink more fluids. Drink even more fluids if you exercise a lot. Stay away from drinks with alcohol or caffeine. · Watch for the symptoms of dehydration. These include:  ? A dry, sticky mouth. ? Dark yellow urine, and not much of it. ? Dry and sunken eyes. ? Feeling very tired. · Learn what problems can lead to dehydration. These include:  ? Diarrhea, fever, and vomiting. ? Any illness with a fever, such as pneumonia or the flu. ? Activities that cause heavy sweating, such as endurance races and heavy outdoor work in hot or humid weather. ?  Alcohol or drug use or problems caused by quitting their use (withdrawal). ? Certain medicines, such as cold and allergy pills (antihistamines), diet pills (diuretics), and laxatives. ? Certain diseases, such as diabetes, cancer, and heart or kidney disease. When should you call for help? Call 911 anytime you think you may need emergency care. For example, call if:    · You passed out (lost consciousness).    Call your doctor now or seek immediate medical care if:    · You are confused and cannot think clearly.     · You are dizzy or lightheaded, or you feel like you may faint.     · You have signs of needing more fluids. You have sunken eyes and a dry mouth, and you pass only a little dark urine.     · You cannot keep fluids down.    Watch closely for changes in your health, and be sure to contact your doctor if:    · You are not making tears.     · Your skin is very dry and sags slowly back into place after you pinch it.     · Your mouth and eyes are very dry. Where can you learn more? Go to http://arnaldo-mariano.info/. Enter U359 in the search box to learn more about \"Dehydration: Care Instructions. \"  Current as of: June 26, 2019  Content Version: 12.2  © 4377-5434 [employer]. Care instructions adapted under license by [employer] (which disclaims liability or warranty for this information). If you have questions about a medical condition or this instruction, always ask your healthcare professional. [place] [address] any warranty or liability for your use of this information. Patient Education        Muscle Cramps: Care Instructions  Your Care Instructions    A muscle cramp occurs when a muscle tightens up suddenly. A cramp often happens in the legs. A muscle cramp is also called a muscle spasm or a charley horse. Muscle cramps usually last less than a minute. However, the pain may last for several minutes.  Leg cramps that occur at night may wake you up.  Heavy exercise, dehydration, and being overweight can increase your risk of getting cramps. An imbalance of certain chemicals in your blood, called electrolytes, can also lead to muscle cramps. Pregnant women sometimes get muscle cramps during sleep. Muscle cramps can be treated by stretching and massaging the muscle. If cramps keep coming back, your doctor may prescribe medicine that relaxes your muscles. Follow-up care is a key part of your treatment and safety. Be sure to make and go to all appointments, and call your doctor if you are having problems. It's also a good idea to know your test results and keep a list of the medicines you take. How can you care for yourself at home? · Drink plenty of fluids to prevent dehydration. Choose water and other caffeine-free clear liquids until you feel better. If you have kidney, heart, or liver disease and have to limit fluids, talk with your doctor before you increase the amount of fluids you drink. · Stretch your muscles every day, especially before and after exercise and at bedtime. Regular stretching can relax your muscles and may prevent cramps. · Do not suddenly increase the amount of exercise you get. Increase your exercise a little each week. · When you get a cramp, stretch and massage the muscle. You can also take a warm shower or bath to relax the muscle. A heating pad placed on the muscle can also help. · Take a daily multivitamin supplement. · Ask your doctor if you can take an over-the-counter pain medicine, such as acetaminophen (Tylenol), ibuprofen (Advil, Motrin), or naproxen (Aleve). Be safe with medicines. Read and follow all instructions on the label. When should you call for help? Watch closely for changes in your health, and be sure to contact your doctor if:    · You get muscle cramps often that do not go away after home treatment.     · Your muscle cramps often wake you up at night.     · You do not get better as expected.    Where can you learn more? Go to http://arnaldo-mariano.info/. Enter L018 in the search box to learn more about \"Muscle Cramps: Care Instructions. \"  Current as of: June 26, 2019  Content Version: 12.2  © 8859-6463 [employer]. Care instructions adapted under license by [employer] (which disclaims liability or warranty for this information). If you have questions about a medical condition or this instruction, always ask your healthcare professional. [address] any warranty or liability for your use of this information.

## 2019-12-06 NOTE — ED NOTES
[facility] called for patient, pt is self pay. patient ambulatory with a slow steady gait without assistance,   I have reviewed discharge instructions with the patient. The patient verbalized understanding.

## 2019-12-06 NOTE — ED PROVIDER NOTES
69-year-old female with past medical history significant for diabetes, hypertension, high cholesterol presents with complaints of generalized body cramps starting around 5 PM today. Denies fever, chills, nausea, vomiting, chest pain, shortness of breath, abdominal pain, diarrhea, constipation. Denies urinary complaints. No history of similar complaints.     Former smoker  Denies drug and alcohol use  Primary care physician[place]           Past Medical History:   Diagnosis Date    Anal polyp 6/13/2013    Arthritis     Asthma     CAD (coronary artery disease) 10/27/2017    Cataract     Chronic pain     knee - right/back    Diabetes ([address]     GERD (gastroesophageal reflux disease)     Hemorrhoids 6/13/2013    History of kidney stones     Hypertension     Ill-defined condition     abdominal pain and burning    Other ill-defined conditions(799.89)     high cholesterol       Past Surgical History:   Procedure Laterality Date    COLONOSCOPY  2/28/2013         EGD  2/28/2013         HX CATARACT REMOVAL Bilateral     HX CHOLECYSTECTOMY  6-2015    HX GI  6/27/13    anal polyps removed    HX HEENT      laser surgery for blood clot in right eye    HX KNEE REPLACEMENT      right    HX OTHER SURGICAL  4-2-14    lap gastrotomy and removal of polyp -  - not cancerous per pt    HX ALAN AND BSO      HX TONSILLECTOMY      HX UROLOGICAL      \"laser\" surgery for kidney stone    NEUROLOGICAL PROCEDURE UNLISTED  1990    tumor at back of neck, benign         Family History:   Problem Relation Age of Onset    Cancer Mother         colon    Diabetes Brother     Other Sister         GI BLEED    Heart Disease Brother     Heart Attack Brother     Heart Disease Brother     Heart Disease Brother     Schizophrenia Son     Anesth Problems Neg Hx        Social History     Socioeconomic History    Marital status:      Spouse name: Not on file    Number of children: Not on file    Years of education: Not on file    Highest education level: Not on file   Occupational History    Not on file   Social Needs    Financial resource strain: Not on file    Food insecurity:     Worry: Not on file     Inability: Not on file    Transportation needs:     Medical: Not on file     Non-medical: Not on file   Tobacco Use    Smoking status: Former Smoker     Years: 0.00    Smokeless tobacco: Never Used    Tobacco comment: age 13   Substance and Sexual Activity    Alcohol use: No    Drug use: No    Sexual activity: Never   Lifestyle    Physical activity:     Days per week: Not on file     Minutes per session: Not on file    Stress: Not on file   Relationships    Social connections:     Talks on phone: Not on file     Gets together: Not on file     Attends Scientologist service: Not on file     Active member of club or organization: Not on file     Attends meetings of clubs or organizations: Not on file     Relationship status: Not on file    Intimate partner violence:     Fear of current or ex partner: Not on file     Emotionally abused: Not on file     Physically abused: Not on file     Forced sexual activity: Not on file   Other Topics Concern    Not on file   Social History Narrative    Not on file         ALLERGIES: Seafood [shellfish containing products]    Review of Systems   Constitutional: Negative for chills and fever. HENT: Negative for congestion, nosebleeds and rhinorrhea. Eyes: Negative for pain and redness. Respiratory: Negative for cough and shortness of breath. Cardiovascular: Negative for chest pain and palpitations. Gastrointestinal: Negative for abdominal pain, nausea and vomiting. Genitourinary: Negative for dysuria, frequency, vaginal bleeding and vaginal pain. Musculoskeletal: Positive for myalgias. Skin: Negative for rash and wound. Neurological: Negative for seizures, syncope and weakness. Hematological: Does not bruise/bleed easily.    Psychiatric/Behavioral: Negative for agitation, confusion, dysphoric mood and suicidal ideas. The patient is not nervous/anxious. Vitals:    12/06/19 0206   BP: 158/78   Pulse: 94   Resp: 19   Temp: 97.5 °F (36.4 °C)   SpO2: 98%            Physical Exam  Vitals signs and nursing note reviewed. Constitutional:       Appearance: She is well-developed. HENT:      Head: Normocephalic and atraumatic. Eyes:      Pupils: Pupils are equal, round, and reactive to light. Neck:      Musculoskeletal: Normal range of motion and neck supple. Trachea: No tracheal deviation. Cardiovascular:      Rate and Rhythm: Normal rate and regular rhythm. Heart sounds: Normal heart sounds. Pulmonary:      Effort: Pulmonary effort is normal. No respiratory distress. Breath sounds: Normal breath sounds. No stridor. No wheezing or rales. Chest:      Chest wall: No tenderness. Abdominal:      General: Bowel sounds are normal. There is no distension. Palpations: Abdomen is soft. Tenderness: There is no tenderness. There is no rebound. Musculoskeletal: Normal range of motion. General: No tenderness. Skin:     General: Skin is warm and dry. Coloration: Skin is not pale. Findings: No rash. Neurological:      Mental Status: She is alert and oriented to person, place, and time. Cranial Nerves: No cranial nerve deficit. MDM  Number of Diagnoses or Management Options  Dehydration:   Muscle cramps:   Diagnosis management comments: 20-year-old female presents with complaints of generalized body cramping. Patient is afebrile, well-appearing, nontoxic, hemodynamically stable, no respiratory distress, clear to auscultation bilaterally, normal room air oxygen saturation. PlanIV fluid hydration, CBC/CMP/UA, flu swab.     Labs unremarkable       Amount and/or Complexity of Data Reviewed  Clinical lab tests: ordered and reviewed    Patient Progress  Patient progress: resolved Procedures        4:34 AM  Patient reports all symptoms resolved after IV fluids and Toradol. No other complaints. Discussed results with patient. Agreeable with plan for discharge and follow-up with primary care physician.

## 2020-01-22 ENCOUNTER — HOSPITAL ENCOUNTER (EMERGENCY)
Age: 80
Discharge: HOME | End: 2020-01-23
Attending: EMERGENCY MEDICINE
Payer: MEDICARE

## 2020-01-22 VITALS
HEIGHT: 60 IN | SYSTOLIC BLOOD PRESSURE: 153 MMHG | HEART RATE: 93 BPM | BODY MASS INDEX: 33.58 KG/M2 | TEMPERATURE: 98.3 F | DIASTOLIC BLOOD PRESSURE: 81 MMHG | OXYGEN SATURATION: 100 % | RESPIRATION RATE: 20 BRPM

## 2020-01-22 DIAGNOSIS — F41.8 ANXIETY ASSOCIATED WITH DEPRESSION: Primary | ICD-10-CM

## 2020-01-22 PROCEDURE — 99284 EMERGENCY DEPT VISIT MOD MDM: CPT

## 2020-01-23 VITALS
OXYGEN SATURATION: 97 % | DIASTOLIC BLOOD PRESSURE: 85 MMHG | HEART RATE: 92 BPM | SYSTOLIC BLOOD PRESSURE: 165 MMHG | TEMPERATURE: 97.7 F | RESPIRATION RATE: 16 BRPM

## 2020-01-23 VITALS
TEMPERATURE: 97.3 F | RESPIRATION RATE: 28 BRPM | DIASTOLIC BLOOD PRESSURE: 76 MMHG | OXYGEN SATURATION: 99 % | SYSTOLIC BLOOD PRESSURE: 171 MMHG | HEART RATE: 101 BPM

## 2020-01-23 DIAGNOSIS — F41.9 ANXIOUSNESS: Primary | ICD-10-CM

## 2020-01-23 DIAGNOSIS — F41.1 ANXIETY STATE: Primary | ICD-10-CM

## 2020-01-23 LAB
AMPHET UR QL SCN: NEGATIVE
BARBITURATES UR QL SCN: NEGATIVE
BENZODIAZ UR QL: NEGATIVE
CANNABINOIDS UR QL SCN: NEGATIVE
COCAINE UR QL SCN: NEGATIVE
DRUG SCRN COMMENT,DRGCM: NORMAL
GLUCOSE BLD STRIP.AUTO-MCNC: 276 MG/DL (ref 65–100)
METHADONE UR QL: NEGATIVE
OPIATES UR QL: NEGATIVE
PCP UR QL: NEGATIVE
SERVICE CMNT-IMP: ABNORMAL

## 2020-01-23 PROCEDURE — 82962 GLUCOSE BLOOD TEST: CPT

## 2020-01-23 PROCEDURE — 99284 EMERGENCY DEPT VISIT MOD MDM: CPT

## 2020-01-23 PROCEDURE — 74011250637 HC RX REV CODE- 250/637: Performed by: EMERGENCY MEDICINE

## 2020-01-23 PROCEDURE — 99283 EMERGENCY DEPT VISIT LOW MDM: CPT

## 2020-01-23 PROCEDURE — 80307 DRUG TEST PRSMV CHEM ANLYZR: CPT

## 2020-01-23 RX ORDER — HYDROXYZINE PAMOATE 25 MG/1
25 CAPSULE ORAL
Qty: 12 CAP | Refills: 0 | Status: SHIPPED | OUTPATIENT
Start: 2020-01-23 | End: 2020-02-06

## 2020-01-23 RX ORDER — ALPRAZOLAM 0.5 MG/1
0.25 TABLET ORAL
Status: COMPLETED | OUTPATIENT
Start: 2020-01-23 | End: 2020-01-23

## 2020-01-23 RX ORDER — HYDROXYZINE 50 MG/1
50 TABLET, FILM COATED ORAL
Status: COMPLETED | OUTPATIENT
Start: 2020-01-23 | End: 2020-01-23

## 2020-01-23 RX ADMIN — HYDROXYZINE HYDROCHLORIDE 50 MG: 50 TABLET, FILM COATED ORAL at 05:26

## 2020-01-23 RX ADMIN — ALPRAZOLAM 0.25 MG: 0.5 TABLET ORAL at 17:55

## 2020-01-23 NOTE — ED NOTES
Confirmation number [id number].   Pt to [address] [place] [address] to await transportation

## 2020-01-23 NOTE — BSMART NOTE
Comprehensive Assessment Form Part 1 Section I - Disposition Axis I - Anxiety NOS Axis II - deferred Axis III - Past Medical History:  
Diagnosis Date  Anal polyp 6/13/2013  Arthritis  Asthma  CAD (coronary artery disease) 10/27/2017  Cataract  Chronic pain   
 knee - right/back  Diabetes [address] [address]  GERD (gastroesophageal reflux disease)  Hemorrhoids 6/13/2013  History of kidney stones  Hypertension  Ill-defined condition   
 abdominal pain and burning  Other ill-defined conditions(799.89)   
 high cholesterol Axis IV - lack of daily structure Axis V - 55 The Medical Doctor to Psychiatrist conference was not completed. The Medical Doctor is in agreement with Psychiatrist disposition because of (reason) patient does not meet inpatient criteria. The plan is to discharge the patient with a plan to call her pharmacy in the morning who will then deliver her medications to her today. The on-call Psychiatrist consulted was Dr. Dima Rizvi. The admitting Psychiatrist will be Dr. Rebel Lo. The admitting Diagnosis is na. The Payor source is na. Section II - Integrated Summary Summary:  The patient arrives complaining of suicidal ideation and anxiety. She states that she has been treated for anxiety for many years by her PCP with an unknown medication. She states that her pharmacy puts her medications in a bubble pack for her and she can't find it. Her brother came other to help her but wasn't able to either. She had not called her pharmacy to ask for a sooner refill but states that they would do that for her and would deliver it to her house as they always do. She has been feeling increased confusion recently but states it is not affecting her daily live skills. She has been thinking about moving from her multi-level home into a one level apartment or [facility]CI.  Patient reports a strong support system including her brother, her two daughters, her son, a friend and that friend's children. She states these people visit her often and bring her food and money. They are willing to help her transition to new housing. She is agreeable to having senior services and case management follow up with her via phone. Patient denies current suicidal ideation stating that about a month ago she had a vague thought but no plan but quickly changed her thoughts. She states that she has too much to live for and does not want to miss out on living her life with her current friends and family. The patienthas demonstrated mental capacity to provide informed consent. The information is given by the patient. The Chief Complaint is needing anxiety medication. The Precipitant Factors are losing her medication. Previous Hospitalizations: none reported The patient has not previously been in restraints. Current Psychiatrist and/or  is none reported . Lethality Assessment: 
 
The potential for suicide noted by the following: ideation . The potential for homicide is not noted. The patient has not been a perpetrator of sexual or physical abuse. There are not pending charges. The patient is not felt to be at risk for self harm or harm to others. The attending nurse was advised the patient can be discharged once medically cleared. Section III - Psychosocial 
The patient's overall mood and attitude is anxious, pleasant and cooperative. Feelings of helplessness and hopelessness are not observed. Generalized anxiety is not observed. Panic is observed by patient report PTA. Phobias are not observed. Obsessive compulsive tendencies are not observed. Section IV - Mental Status Exam 
The patient's appearance is unkempt. The patient's behavior shows no evidence of impairment. The patient is oriented to time, place, person and situation. The patient's speech shows no evidence of impairment. The patient's mood is anxious.   The range of affect shows no evidence of impairment. The patient's thought content demonstrates no evidence of impairment. The thought process shows no evidence of impairment. The patient's perception shows no evidence of impairment. The patient's memory shows no evidence of impairment. The patient's appetite shows no evidence of impairment. The patient's sleep shows no evidence of impairment. The patient's insight shows no evidence of impairment. The patient's judgement shows no evidence of impairment. Section V - Substance Abuse The patient is not using substances. Section VI - Living Arrangements The patient is single. The patient lives alone. The patient has 3 children ages 73,41, 45. The patient does plan to return home upon discharge. The patient does not have legal issues pending. The patient's source of income comes from disability, social security and family. Yarsani and cultural practices have not been voiced at this time. The patient's greatest support comes from family friends and this person will be involved with the treatment. The patient has not been in an event described as horrible or outside the realm of ordinary life experience either currently or in the past. 
The patient has not been a victim of sexual/physical abuse. Section VII - Other Areas of Clinical Concern The highest grade achieved is unknown with the overall quality of school experience being described as unknown. The patient is currently retired and speaks [place] as a primary language. The patient has no communication impairments affecting communication. The patient's preference for learning can be described as: can read and write adequately and learns best by oral information.   The patient's hearing is normal.  The patient's vision is normal. 
 
 
Mac Hinton MS

## 2020-01-23 NOTE — ED NOTES
Re-assumed care of patient. Was just seen in ED for anxiety and given prescriptions to get filled. Pt states she didn't want to go to the pharmacy alone, so she did not get them filled. \"I need you to give me something for the night. \"  Denies any new complaints.

## 2020-01-23 NOTE — ED NOTES
Verbal shift change report given to Manjinder PINEDO RN (oncoming nurse) by Tamanna Youssef RN (offgoing nurse). Report included the following information SBAR, ED Summary and MAR.

## 2020-01-23 NOTE — ED PROVIDER NOTES
67 y.o. female with past medical history significant for HTN, DM, chronic pain, CAD, Asthma, and Arthritis who presents from home via EMS with chief complaint of mental health problems. Patient was seen twice in the past ~24 hours for similar complaints, see old chart review. Patient stated that she \"woke up this morning with full body numbness and feeling anxious. \" Patient states [place] got her prescription filled, however, it did not relieve her symptoms. \" Patient presents to [facility] ED with complaints of mental health problems described as \"numbness all over and anxiousness. \" Patient endorses loss of appetite, diaphoresis, and chills. Patient also reports \"her BG is elevated. \" Of note, patient states she used to be on anxiety medication \"but she ran out of her medication. \" Patient denies fever and vomiting. There are no other acute medical concerns at this time. Social hx: Former smoker, No EtOH use, No illicit drug use. PCP: Noemi Haskins MD    Old Chart Review: Patient was seen at [facility] ED with similar complaints on 01/22/2020. Patient was discharged with instructions to start Hydroxyzine as needed and schedule an appoinment with Dr. Yvonne Uribe as needed for re-evalution. Patient was discharged and returned on 01/23/2020 (earlier today) because she was unable to get her prescription filled (Vistaril) and her symptoms persisted. Patient was discharged after given a dose of Hydroxyzine with instructions to follow up with Dr. Yvonne Uribe for re-evalution and to fill her outpatient medications. Note written by Jeimy Arce, as dictated by Boone Chavira MD 5:18 PM     The history is provided by the patient and medical records. No  was used.         Past Medical History:   Diagnosis Date    Anal polyp 6/13/2013    Arthritis     Asthma     CAD (coronary artery disease) 10/27/2017    Cataract     Chronic pain     knee - right/back    Diabetes ([facility] [address]     GERD (gastroesophageal reflux disease)     Hemorrhoids 6/13/2013    History of kidney stones     Hypertension     Ill-defined condition     abdominal pain and burning    Other ill-defined conditions(799.89)     high cholesterol       Past Surgical History:   Procedure Laterality Date    COLONOSCOPY  2/28/2013         EGD  2/28/2013         HX CATARACT REMOVAL Bilateral     HX CHOLECYSTECTOMY  6-2015    HX GI  6/27/13    anal polyps removed    HX HEENT      laser surgery for blood clot in right eye    HX KNEE REPLACEMENT      right    HX OTHER SURGICAL  4-2-14    lap gastrotomy and removal of polyp -  - not cancerous per pt    HX ALAN AND BSO      HX TONSILLECTOMY      HX UROLOGICAL      \"laser\" surgery for kidney stone    NEUROLOGICAL PROCEDURE UNLISTED  1990    tumor at back of neck, benign         Family History:   Problem Relation Age of Onset    Cancer Mother         colon    Diabetes Brother     Other Sister         GI BLEED    Heart Disease Brother     Heart Attack Brother     Heart Disease Brother     Heart Disease Brother     Schizophrenia Son     Anesth Problems Neg Hx        Social History     Socioeconomic History    Marital status:      Spouse name: Not on file    Number of children: Not on file    Years of education: Not on file    Highest education level: Not on file   Occupational History    Not on file   Social Needs    Financial resource strain: Not on file    Food insecurity:     Worry: Not on file     Inability: Not on file    Transportation needs:     Medical: Not on file     Non-medical: Not on file   Tobacco Use    Smoking status: Former Smoker     Years: 0.00    Smokeless tobacco: Never Used    Tobacco comment: age 13   Substance and Sexual Activity    Alcohol use: No    Drug use: No    Sexual activity: Never   Lifestyle    Physical activity:     Days per week: Not on file     Minutes per session: Not on file    Stress: Not on file   Relationships    Social connections:     Talks on phone: Not on file     Gets together: Not on file     Attends Latter day service: Not on file     Active member of club or organization: Not on file     Attends meetings of clubs or organizations: Not on file     Relationship status: Not on file    Intimate partner violence:     Fear of current or ex partner: Not on file     Emotionally abused: Not on file     Physically abused: Not on file     Forced sexual activity: Not on file   Other Topics Concern    Not on file   Social History Narrative    Not on file         ALLERGIES: Seafood [shellfish containing products]    Review of Systems   Constitutional: Positive for appetite change, chills and diaphoresis. Negative for fever. Gastrointestinal: Negative for vomiting. Psychiatric/Behavioral: The patient is nervous/anxious (\"feels numb all over\"). All other systems reviewed and are negative. Vitals:    01/23/20 1711   BP: 159/86   Pulse: 86   Resp: 18   Temp: 98.2 °F (36.8 °C)   SpO2: 99%            Physical Exam  Vitals signs and nursing note reviewed. Constitutional:       General: She is not in acute distress. Appearance: She is well-developed. She is not ill-appearing, toxic-appearing or diaphoretic. HENT:      Head: Normocephalic. Nose: Nose normal.   Eyes:      General: No scleral icterus. Conjunctiva/sclera: Conjunctivae normal.      Pupils: Pupils are equal, round, and reactive to light. Neck:      Musculoskeletal: Normal range of motion and neck supple. Thyroid: No thyromegaly. Vascular: No JVD. Trachea: No tracheal deviation. Comments: No carotid bruits noted. Cardiovascular:      Rate and Rhythm: Normal rate and regular rhythm. Heart sounds: Normal heart sounds. No murmur. No friction rub. No gallop. Pulmonary:      Effort: Pulmonary effort is normal. No respiratory distress. Breath sounds: Normal breath sounds. No wheezing or rales.    Chest:      Chest wall: No tenderness. Abdominal:      General: Bowel sounds are normal. There is no distension. Palpations: Abdomen is soft. There is no mass. Tenderness: There is no tenderness. There is no guarding or rebound. Musculoskeletal: Normal range of motion. General: No swelling, tenderness, deformity or signs of injury. Lymphadenopathy:      Cervical: No cervical adenopathy. Skin:     General: Skin is warm and dry. Findings: No erythema or rash. Neurological:      Mental Status: She is alert and oriented to person, place, and time. Cranial Nerves: No cranial nerve deficit. Coordination: Coordination normal.      Deep Tendon Reflexes: Reflexes are normal and symmetric. Psychiatric:         Mood and Affect: Mood normal.         Behavior: Behavior normal.         Thought Content:  Thought content normal.         Judgment: Judgment normal.     Note written by Jeimy Yang, as dictated by Nitish Pickering MD 5:18 PM      MDM       Procedures

## 2020-01-23 NOTE — ED PROVIDER NOTES
76-year-old female presents the emergency department with complaints of feeling anxious and having suicidal ideations. States that she has been without any of her medications for the last 3 days and cannot find them. She says are usually in a blister bubble pack. She lives alone. She has family in the [facility] but did not call them as they have been at work. This despite her being without meds for the last 3 days. She denies any headache, visual changes, chest pain, shortness of breath, abdominal pain, nausea vomiting, or weakness. She is tearful during my questioning.            Past Medical History:   Diagnosis Date    Anal polyp 6/13/2013    Arthritis     Asthma     CAD (coronary artery disease) 10/27/2017    Cataract     Chronic pain     knee - right/back    Diabetes ([address]     GERD (gastroesophageal reflux disease)     Hemorrhoids 6/13/2013    History of kidney stones     Hypertension     Ill-defined condition     abdominal pain and burning    Other ill-defined conditions(799.89)     high cholesterol       Past Surgical History:   Procedure Laterality Date    COLONOSCOPY  2/28/2013         EGD  2/28/2013         HX CATARACT REMOVAL Bilateral     HX CHOLECYSTECTOMY  6-2015    HX GI  6/27/13    anal polyps removed    HX HEENT      laser surgery for blood clot in right eye    HX KNEE REPLACEMENT      right    HX OTHER SURGICAL  4-2-14    lap gastrotomy and removal of polyp -  - not cancerous per pt    HX ALAN AND BSO      HX TONSILLECTOMY      HX UROLOGICAL      \"laser\" surgery for kidney stone    NEUROLOGICAL PROCEDURE UNLISTED  1990    tumor at back of neck, benign         Family History:   Problem Relation Age of Onset    Cancer Mother         colon    Diabetes Brother     Other Sister         GI BLEED    Heart Disease Brother     Heart Attack Brother     Heart Disease Brother     Heart Disease Brother     Schizophrenia Son     Anesth Problems Neg Hx Social History     Socioeconomic History    Marital status:      Spouse name: Not on file    Number of children: Not on file    Years of education: Not on file    Highest education level: Not on file   Occupational History    Not on file   Social Needs    Financial resource strain: Not on file    Food insecurity:     Worry: Not on file     Inability: Not on file    Transportation needs:     Medical: Not on file     Non-medical: Not on file   Tobacco Use    Smoking status: Former Smoker     Years: 0.00    Smokeless tobacco: Never Used    Tobacco comment: age 13   Substance and Sexual Activity    Alcohol use: No    Drug use: No    Sexual activity: Never   Lifestyle    Physical activity:     Days per week: Not on file     Minutes per session: Not on file    Stress: Not on file   Relationships    Social connections:     Talks on phone: Not on file     Gets together: Not on file     Attends Faith service: Not on file     Active member of club or organization: Not on file     Attends meetings of clubs or organizations: Not on file     Relationship status: Not on file    Intimate partner violence:     Fear of current or ex partner: Not on file     Emotionally abused: Not on file     Physically abused: Not on file     Forced sexual activity: Not on file   Other Topics Concern    Not on file   Social History Narrative    Not on file         ALLERGIES: Seafood [shellfish containing products]    Review of Systems   Constitutional: Negative for chills, diaphoresis and fever. HENT: Negative for congestion, postnasal drip, rhinorrhea and sore throat. Eyes: Negative for photophobia, discharge, redness and visual disturbance. Respiratory: Negative for cough, chest tightness, shortness of breath and wheezing. Cardiovascular: Negative for chest pain, palpitations and leg swelling.    Gastrointestinal: Negative for abdominal distention, abdominal pain, blood in stool, constipation, diarrhea, nausea and vomiting. Genitourinary: Negative for difficulty urinating, dysuria, frequency, hematuria and urgency. Musculoskeletal: Negative for arthralgias, back pain, joint swelling and myalgias. Skin: Negative for color change and rash. Neurological: Negative for dizziness, speech difficulty, weakness, light-headedness, numbness and headaches. Psychiatric/Behavioral: Negative for confusion. The patient is nervous/anxious. All other systems reviewed and are negative. Vitals:    01/22/20 2133   BP: 153/81   Pulse: 93   Resp: 20   Temp: 98.3 °F (36.8 °C)   SpO2: 100%   Height: 5' (1.524 m)            Physical Exam  Vitals signs and nursing note reviewed. Constitutional:       General: She is not in acute distress. Appearance: She is well-developed. She is not diaphoretic. Comments: Patient sleeping in the chair. HENT:      Head: Normocephalic and atraumatic. Right Ear: External ear normal.      Left Ear: External ear normal.      Nose: Nose normal.   Eyes:      General: No scleral icterus. Conjunctiva/sclera: Conjunctivae normal.      Pupils: Pupils are equal, round, and reactive to light. Neck:      Musculoskeletal: Normal range of motion and neck supple. Thyroid: No thyromegaly. Vascular: No JVD. Trachea: No tracheal deviation. Cardiovascular:      Rate and Rhythm: Normal rate and regular rhythm. Heart sounds: Normal heart sounds. No murmur. No friction rub. No gallop. Pulmonary:      Effort: Pulmonary effort is normal. No respiratory distress. Breath sounds: Normal breath sounds. No wheezing or rales. Chest:      Chest wall: No tenderness. Abdominal:      General: Bowel sounds are normal. There is no distension. Palpations: Abdomen is soft. There is no mass. Tenderness: There is no tenderness. There is no guarding or rebound. Musculoskeletal: Normal range of motion. General: No tenderness.    Lymphadenopathy: Cervical: No cervical adenopathy. Skin:     General: Skin is warm and dry. Findings: No erythema or rash. Neurological:      Mental Status: She is alert and oriented to person, place, and time. Cranial Nerves: No cranial nerve deficit. Motor: No tremor, atrophy or abnormal muscle tone. Coordination: Coordination normal.      Gait: Gait normal.   Psychiatric:         Behavior: Behavior normal.         Thought Content: Thought content normal.         Judgment: Judgment normal.      Comments: Tearful            MDM  Number of Diagnoses or Management Options  Diagnosis management comments: Impression: 67-year-old female with a history of anxiety as well as multiple medical problems presenting to the emergency department with an anxious state. States she has been without medications for the last 3 days. Plan of care will be to have her evaluated by behavioral and will treat accordingly. Patient may need case management as well. Procedures        12:14 AMChange of shift. Care of patient signed over to Dr Lilli Haskins. Handoff complete.   Await baseline labs, BSMART eval.  If she is discharged will need Case Management follow up in AM

## 2020-01-23 NOTE — ED NOTES
Assumed care of patient. Tearful. Reports depression and thoughts of ending her life by taking pills. States she has been without her medications for anxiety and depression x 2 days. Denies any medical complaints.   Awaiting BSMART evaluation

## 2020-01-23 NOTE — ED TRIAGE NOTES
Arrives via EMS from home with c/o of feeling anxious, lonely, and depressed. Was unable to find her anxiety meds and is unsure of what medicine she takes. States she lives by herself and does not have family nearby. Pt teary during triage +SI with unclear plan.  Denies HI

## 2020-01-23 NOTE — ED TRIAGE NOTES
Pt comes in for anxiety and hyperglycemia. Pt has been seen at [facility] ED x 2 in the past 12 hrs for same c/c.

## 2020-01-23 NOTE — ED PROVIDER NOTES
The history is provided by the patient. Anxiety    This is a recurrent problem. The current episode started yesterday. The problem has not changed since onset. The problem occurs constantly. The pain is associated with normal activity and stress. The patient is experiencing no pain. Pertinent negatives include no abdominal pain, no fever, no lower extremity edema and no weakness. She has tried nothing for the symptoms.         Past Medical History:   Diagnosis Date    Anal polyp 6/13/2013    Arthritis     Asthma     CAD (coronary artery disease) 10/27/2017    Cataract     Chronic pain     knee - right/back    Diabetes ([address]     GERD (gastroesophageal reflux disease)     Hemorrhoids 6/13/2013    History of kidney stones     Hypertension     Ill-defined condition     abdominal pain and burning    Other ill-defined conditions(799.89)     high cholesterol       Past Surgical History:   Procedure Laterality Date    COLONOSCOPY  2/28/2013         EGD  2/28/2013         HX CATARACT REMOVAL Bilateral     HX CHOLECYSTECTOMY  6-2015    HX GI  6/27/13    anal polyps removed    HX HEENT      laser surgery for blood clot in right eye    HX KNEE REPLACEMENT      right    HX OTHER SURGICAL  4-2-14    lap gastrotomy and removal of polyp -  - not cancerous per pt    HX ALAN AND BSO      HX TONSILLECTOMY      HX UROLOGICAL      \"laser\" surgery for kidney stone    NEUROLOGICAL PROCEDURE UNLISTED  1990    tumor at back of neck, benign         Family History:   Problem Relation Age of Onset    Cancer Mother         colon    Diabetes Brother     Other Sister         GI BLEED    Heart Disease Brother     Heart Attack Brother     Heart Disease Brother     Heart Disease Brother     Schizophrenia Son     Anesth Problems Neg Hx        Social History     Socioeconomic History    Marital status:      Spouse name: Not on file    Number of children: Not on file    Years of education: Not on file  Highest education level: Not on file   Occupational History    Not on file   Social Needs    Financial resource strain: Not on file    Food insecurity:     Worry: Not on file     Inability: Not on file    Transportation needs:     Medical: Not on file     Non-medical: Not on file   Tobacco Use    Smoking status: Former Smoker     Years: 0.00    Smokeless tobacco: Never Used    Tobacco comment: age 13   Substance and Sexual Activity    Alcohol use: No    Drug use: No    Sexual activity: Never   Lifestyle    Physical activity:     Days per week: Not on file     Minutes per session: Not on file    Stress: Not on file   Relationships    Social connections:     Talks on phone: Not on file     Gets together: Not on file     Attends Anabaptist service: Not on file     Active member of club or organization: Not on file     Attends meetings of clubs or organizations: Not on file     Relationship status: Not on file    Intimate partner violence:     Fear of current or ex partner: Not on file     Emotionally abused: Not on file     Physically abused: Not on file     Forced sexual activity: Not on file   Other Topics Concern    Not on file   Social History Narrative    Not on file         ALLERGIES: Seafood [shellfish containing products]    Review of Systems   Constitutional: Negative for fever. Gastrointestinal: Negative for abdominal pain. Neurological: Negative for weakness. All other systems reviewed and are negative. Vitals:    01/23/20 0509   BP: 171/76   Pulse: (!) 101   Resp: 28   Temp: 97.3 °F (36.3 °C)   SpO2: 99%            Physical Exam  Vitals signs and nursing note reviewed. Constitutional:       General: She is not in acute distress. Appearance: She is well-developed. HENT:      Head: Normocephalic and atraumatic. Eyes:      Conjunctiva/sclera: Conjunctivae normal.   Neck:      Musculoskeletal: Neck supple.    Cardiovascular:      Rate and Rhythm: Normal rate and regular rhythm. Pulmonary:      Effort: Pulmonary effort is normal. No respiratory distress. Abdominal:      General: There is no distension. Musculoskeletal: Normal range of motion. General: No deformity. Skin:     General: Skin is warm and dry. Neurological:      Mental Status: She is alert. Cranial Nerves: No cranial nerve deficit. Psychiatric:         Behavior: Behavior normal.          MDM     67 y.o. female presents with recurrent anxiety. She was just discharged and returned because she was unable to get her vistaril prescription filled and didn't feel better. We discussed reasons to call an ambulance and anxiety is not really a good reason. Provided a dose of vistaril here and she was thankful and ambulatory.      Procedures

## 2020-01-23 NOTE — ED TRIAGE NOTES
Pt arrives via EMS for c/o of anxiety, depression and loneliness. Pt seen in ED earlier for same symptoms. Discharged with RX, unable to fill tonight and was unable to fall asleep.  Pt requesting for meds to be given in ED

## 2020-01-23 NOTE — PROGRESS NOTES
CM notified of consult for assistance at home. Chart reviewed-Management Plan reviewed. CM discussed Management Plan with MD- MD ordering UDS per recommendation. Pt RRAT score is 21- HIGH. Pt has had [address] ED visits in the past 6 months and 25 overall ED visits in the past 12 months. Pt has had 2 IP admissions in the past 12 months. CM met with pt at bedside to discuss CM role and to assess pt needs. CM verified demographics including insurance and emergency contact information. Pt lives alone in a private residence; there is one step to enter home. Transportation: Pt request Medicaid cab for discharge-  to arrange. Pt reports a walker and inhaler for DME use and IADLs[facility]. Pt uses [facility] in [place] and reports no concerns with getting medications. Pt verified PCP and reports last visit was 3 weeks ago. CM to notify PCP of ED visit and fax discharge summary. CM discussed [address] and completed ALEX with pt; signed copy to placed into chart. CM discussed resources for [email or web address] and [facility]-provided written material; pt agreeable to referral to [email or web address]-  to place. Pt reports no concerns for discharge at this time. CM to follow and assist with disposition needs as they arise. Jaspal Sinha RN, BSN  Care Management Department      1782: CM notified pt ready for discharge. CM verified address and RN verified pt ambulatory with cane and able to take cab home on discharge; pt has cane with her. CM arranged Medicaid transport via [address]. [employer] currently seeking authorization. [employer]: [id number]. Jaspal Sinha RN, BSN- Care Management    1902: CM notified authorization received. [employer] seeking providers; no ETA at this time. Authorization #: [id number].  Jaspal Sinha RN, BSN- Care Management

## 2020-01-24 NOTE — ED NOTES
Discharge instructions given to patient by MD and nurse. Pt has been given counseling on medication use and verbalizes understanding. Pt to follow up with PCP. Case mangement to follow up with patient. IV d/c. Pt wheeled off of unit in no signs of distress.

## 2020-04-25 ENCOUNTER — HOSPITAL ENCOUNTER (INPATIENT)
Age: 80
LOS: 2 days | Discharge: HOME | DRG: 683 | End: 2020-04-27
Attending: EMERGENCY MEDICINE | Admitting: INTERNAL MEDICINE
Payer: MEDICARE

## 2020-04-25 ENCOUNTER — APPOINTMENT (OUTPATIENT)
Dept: GENERAL RADIOLOGY | Age: 80
DRG: 683 | End: 2020-04-25
Attending: EMERGENCY MEDICINE
Payer: MEDICARE

## 2020-04-25 DIAGNOSIS — A41.9 SEPSIS WITHOUT ACUTE ORGAN DYSFUNCTION, DUE TO UNSPECIFIED ORGANISM (HCC): ICD-10-CM

## 2020-04-25 DIAGNOSIS — R06.02 SHORTNESS OF BREATH: Primary | ICD-10-CM

## 2020-04-25 DIAGNOSIS — Z20.822 SUSPECTED COVID-19 VIRUS INFECTION: ICD-10-CM

## 2020-04-25 LAB
ALBUMIN SERPL-MCNC: 3.8 G/DL (ref 3.5–5)
ALBUMIN/GLOB SERPL: 0.8 {RATIO} (ref 1.1–2.2)
ALP SERPL-CCNC: 140 U/L (ref 45–117)
ALT SERPL-CCNC: 21 U/L (ref 12–78)
ANION GAP SERPL CALC-SCNC: 9 MMOL/L (ref 5–15)
AST SERPL-CCNC: 16 U/L (ref 15–37)
BASOPHILS # BLD: 0.1 K/UL (ref 0–0.1)
BASOPHILS NFR BLD: 1 % (ref 0–1)
BILIRUB SERPL-MCNC: 0.4 MG/DL (ref 0.2–1)
BUN SERPL-MCNC: 12 MG/DL (ref 6–20)
BUN/CREAT SERPL: 10 (ref 12–20)
CALCIUM SERPL-MCNC: 9.7 MG/DL (ref 8.5–10.1)
CHLORIDE SERPL-SCNC: 94 MMOL/L (ref 97–108)
CO2 SERPL-SCNC: 27 MMOL/L (ref 21–32)
COMMENT, HOLDF: NORMAL
CREAT SERPL-MCNC: 1.24 MG/DL (ref 0.55–1.02)
DIFFERENTIAL METHOD BLD: ABNORMAL
EOSINOPHIL # BLD: 0.1 K/UL (ref 0–0.4)
EOSINOPHIL NFR BLD: 1 % (ref 0–7)
ERYTHROCYTE [DISTWIDTH] IN BLOOD BY AUTOMATED COUNT: 15.1 % (ref 11.5–14.5)
GLOBULIN SER CALC-MCNC: 4.5 G/DL (ref 2–4)
GLUCOSE BLD STRIP.AUTO-MCNC: 408 MG/DL (ref 65–100)
GLUCOSE SERPL-MCNC: 476 MG/DL (ref 65–100)
HCT VFR BLD AUTO: 42.3 % (ref 35–47)
HGB BLD-MCNC: 13.7 G/DL (ref 11.5–16)
IMM GRANULOCYTES # BLD AUTO: 0.1 K/UL (ref 0–0.04)
IMM GRANULOCYTES NFR BLD AUTO: 1 % (ref 0–0.5)
LACTATE SERPL-SCNC: 1.6 MMOL/L (ref 0.4–2)
LIPASE SERPL-CCNC: 255 U/L (ref 73–393)
LYMPHOCYTES # BLD: 5.2 K/UL (ref 0.8–3.5)
LYMPHOCYTES NFR BLD: 34 % (ref 12–49)
MCH RBC QN AUTO: 28.4 PG (ref 26–34)
MCHC RBC AUTO-ENTMCNC: 32.4 G/DL (ref 30–36.5)
MCV RBC AUTO: 87.6 FL (ref 80–99)
MONOCYTES # BLD: 1.3 K/UL (ref 0–1)
MONOCYTES NFR BLD: 8 % (ref 5–13)
NEUTS SEG # BLD: 8.4 K/UL (ref 1.8–8)
NEUTS SEG NFR BLD: 55 % (ref 32–75)
NRBC # BLD: 0 K/UL (ref 0–0.01)
NRBC BLD-RTO: 0 PER 100 WBC
PLATELET # BLD AUTO: 298 K/UL (ref 150–400)
PMV BLD AUTO: 11 FL (ref 8.9–12.9)
POTASSIUM SERPL-SCNC: 4.2 MMOL/L (ref 3.5–5.1)
PROT SERPL-MCNC: 8.3 G/DL (ref 6.4–8.2)
RBC # BLD AUTO: 4.83 M/UL (ref 3.8–5.2)
SAMPLES BEING HELD,HOLD: NORMAL
SERVICE CMNT-IMP: ABNORMAL
SODIUM SERPL-SCNC: 130 MMOL/L (ref 136–145)
TROPONIN I SERPL-MCNC: <0.05 NG/ML
UR CULT HOLD, URHOLD: NORMAL
WBC # BLD AUTO: 15.2 K/UL (ref 3.6–11)

## 2020-04-25 PROCEDURE — 84484 ASSAY OF TROPONIN QUANT: CPT

## 2020-04-25 PROCEDURE — 83880 ASSAY OF NATRIURETIC PEPTIDE: CPT

## 2020-04-25 PROCEDURE — 36415 COLL VENOUS BLD VENIPUNCTURE: CPT

## 2020-04-25 PROCEDURE — 74011250636 HC RX REV CODE- 250/636: Performed by: EMERGENCY MEDICINE

## 2020-04-25 PROCEDURE — 82962 GLUCOSE BLOOD TEST: CPT

## 2020-04-25 PROCEDURE — 99285 EMERGENCY DEPT VISIT HI MDM: CPT

## 2020-04-25 PROCEDURE — 87040 BLOOD CULTURE FOR BACTERIA: CPT

## 2020-04-25 PROCEDURE — 83605 ASSAY OF LACTIC ACID: CPT

## 2020-04-25 PROCEDURE — 83615 LACTATE (LD) (LDH) ENZYME: CPT

## 2020-04-25 PROCEDURE — 65660000000 HC RM CCU STEPDOWN

## 2020-04-25 PROCEDURE — 85379 FIBRIN DEGRADATION QUANT: CPT

## 2020-04-25 PROCEDURE — 83690 ASSAY OF LIPASE: CPT

## 2020-04-25 PROCEDURE — 85025 COMPLETE CBC W/AUTO DIFF WBC: CPT

## 2020-04-25 PROCEDURE — 65270000029 HC RM PRIVATE

## 2020-04-25 PROCEDURE — 82728 ASSAY OF FERRITIN: CPT

## 2020-04-25 PROCEDURE — 93005 ELECTROCARDIOGRAM TRACING: CPT

## 2020-04-25 PROCEDURE — 81001 URINALYSIS AUTO W/SCOPE: CPT

## 2020-04-25 PROCEDURE — 74011000258 HC RX REV CODE- 258: Performed by: EMERGENCY MEDICINE

## 2020-04-25 PROCEDURE — 86140 C-REACTIVE PROTEIN: CPT

## 2020-04-25 PROCEDURE — 94760 N-INVAS EAR/PLS OXIMETRY 1: CPT

## 2020-04-25 PROCEDURE — 71045 X-RAY EXAM CHEST 1 VIEW: CPT

## 2020-04-25 PROCEDURE — 80053 COMPREHEN METABOLIC PANEL: CPT

## 2020-04-25 RX ORDER — ACETAMINOPHEN 325 MG/1
650 TABLET ORAL
Status: DISCONTINUED | OUTPATIENT
Start: 2020-04-25 | End: 2020-04-27 | Stop reason: HOSPADM

## 2020-04-25 RX ORDER — SODIUM CHLORIDE 0.9 % (FLUSH) 0.9 %
5-40 SYRINGE (ML) INJECTION EVERY 8 HOURS
Status: DISCONTINUED | OUTPATIENT
Start: 2020-04-25 | End: 2020-04-27

## 2020-04-25 RX ORDER — SODIUM CHLORIDE 0.9 % (FLUSH) 0.9 %
5-40 SYRINGE (ML) INJECTION EVERY 8 HOURS
Status: DISCONTINUED | OUTPATIENT
Start: 2020-04-25 | End: 2020-04-27 | Stop reason: HOSPADM

## 2020-04-25 RX ORDER — ACETAMINOPHEN 650 MG/1
650 SUPPOSITORY RECTAL
Status: DISCONTINUED | OUTPATIENT
Start: 2020-04-25 | End: 2020-04-27 | Stop reason: HOSPADM

## 2020-04-25 RX ORDER — SODIUM CHLORIDE 0.9 % (FLUSH) 0.9 %
5-40 SYRINGE (ML) INJECTION AS NEEDED
Status: DISCONTINUED | OUTPATIENT
Start: 2020-04-25 | End: 2020-04-27 | Stop reason: HOSPADM

## 2020-04-25 RX ADMIN — SODIUM CHLORIDE 1000 ML: 900 INJECTION, SOLUTION INTRAVENOUS at 22:41

## 2020-04-25 RX ADMIN — AZITHROMYCIN MONOHYDRATE 500 MG: 500 INJECTION, POWDER, LYOPHILIZED, FOR SOLUTION INTRAVENOUS at 23:57

## 2020-04-25 RX ADMIN — Medication 10 ML: at 23:47

## 2020-04-25 RX ADMIN — CEFTRIAXONE SODIUM 2 G: 2 INJECTION, POWDER, FOR SOLUTION INTRAMUSCULAR; INTRAVENOUS at 23:45

## 2020-04-26 LAB
ANION GAP SERPL CALC-SCNC: 8 MMOL/L (ref 5–15)
APPEARANCE UR: CLEAR
ATRIAL RATE: 99 BPM
B PERT DNA SPEC QL NAA+PROBE: NOT DETECTED
BACTERIA URNS QL MICRO: NEGATIVE /HPF
BASOPHILS # BLD: 0.1 K/UL (ref 0–0.1)
BASOPHILS NFR BLD: 1 % (ref 0–1)
BILIRUB UR QL: NEGATIVE
BNP SERPL-MCNC: 31 PG/ML
BORDETELLA PARAPERTUSSIS PCR, BORPAR: NOT DETECTED
BUN SERPL-MCNC: 8 MG/DL (ref 6–20)
BUN/CREAT SERPL: 9 (ref 12–20)
C PNEUM DNA SPEC QL NAA+PROBE: NOT DETECTED
CALCIUM SERPL-MCNC: 8.4 MG/DL (ref 8.5–10.1)
CALCULATED P AXIS, ECG09: 61 DEGREES
CALCULATED R AXIS, ECG10: 53 DEGREES
CALCULATED T AXIS, ECG11: 61 DEGREES
CHLORIDE SERPL-SCNC: 102 MMOL/L (ref 97–108)
CO2 SERPL-SCNC: 24 MMOL/L (ref 21–32)
COLOR UR: ABNORMAL
CREAT SERPL-MCNC: 0.93 MG/DL (ref 0.55–1.02)
CRP SERPL-MCNC: 1.45 MG/DL (ref 0–0.6)
D DIMER PPP FEU-MCNC: 1.03 MG/L FEU (ref 0–0.65)
DIAGNOSIS, 93000: NORMAL
DIFFERENTIAL METHOD BLD: ABNORMAL
EOSINOPHIL # BLD: 0.1 K/UL (ref 0–0.4)
EOSINOPHIL NFR BLD: 1 % (ref 0–7)
EPITH CASTS URNS QL MICRO: ABNORMAL /LPF
ERYTHROCYTE [DISTWIDTH] IN BLOOD BY AUTOMATED COUNT: 15.1 % (ref 11.5–14.5)
EST. AVERAGE GLUCOSE BLD GHB EST-MCNC: 324 MG/DL
FERRITIN SERPL-MCNC: 50 NG/ML (ref 26–388)
FLUAV H1 2009 PAND RNA SPEC QL NAA+PROBE: NOT DETECTED
FLUAV H1 RNA SPEC QL NAA+PROBE: NOT DETECTED
FLUAV H3 RNA SPEC QL NAA+PROBE: NOT DETECTED
FLUAV SUBTYP SPEC NAA+PROBE: NOT DETECTED
FLUBV RNA SPEC QL NAA+PROBE: NOT DETECTED
GLUCOSE BLD STRIP.AUTO-MCNC: 256 MG/DL (ref 65–100)
GLUCOSE BLD STRIP.AUTO-MCNC: 278 MG/DL (ref 65–100)
GLUCOSE BLD STRIP.AUTO-MCNC: 297 MG/DL (ref 65–100)
GLUCOSE BLD STRIP.AUTO-MCNC: 329 MG/DL (ref 65–100)
GLUCOSE BLD STRIP.AUTO-MCNC: 336 MG/DL (ref 65–100)
GLUCOSE SERPL-MCNC: 372 MG/DL (ref 65–100)
GLUCOSE UR STRIP.AUTO-MCNC: >1000 MG/DL
HADV DNA SPEC QL NAA+PROBE: NOT DETECTED
HBA1C MFR BLD: 12.9 % (ref 4–5.6)
HCOV 229E RNA SPEC QL NAA+PROBE: NOT DETECTED
HCOV HKU1 RNA SPEC QL NAA+PROBE: NOT DETECTED
HCOV NL63 RNA SPEC QL NAA+PROBE: NOT DETECTED
HCOV OC43 RNA SPEC QL NAA+PROBE: NOT DETECTED
HCT VFR BLD AUTO: 39 % (ref 35–47)
HGB BLD-MCNC: 12.4 G/DL (ref 11.5–16)
HGB UR QL STRIP: NEGATIVE
HMPV RNA SPEC QL NAA+PROBE: NOT DETECTED
HPIV1 RNA SPEC QL NAA+PROBE: NOT DETECTED
HPIV2 RNA SPEC QL NAA+PROBE: NOT DETECTED
HPIV3 RNA SPEC QL NAA+PROBE: NOT DETECTED
HPIV4 RNA SPEC QL NAA+PROBE: NOT DETECTED
HYALINE CASTS URNS QL MICRO: ABNORMAL /LPF (ref 0–5)
IMM GRANULOCYTES # BLD AUTO: 0.1 K/UL (ref 0–0.04)
IMM GRANULOCYTES NFR BLD AUTO: 1 % (ref 0–0.5)
KETONES UR QL STRIP.AUTO: NEGATIVE MG/DL
LDH SERPL L TO P-CCNC: 314 U/L (ref 81–246)
LEUKOCYTE ESTERASE UR QL STRIP.AUTO: NEGATIVE
LYMPHOCYTES # BLD: 4.1 K/UL (ref 0.8–3.5)
LYMPHOCYTES NFR BLD: 30 % (ref 12–49)
M PNEUMO DNA SPEC QL NAA+PROBE: NOT DETECTED
MCH RBC QN AUTO: 28.4 PG (ref 26–34)
MCHC RBC AUTO-ENTMCNC: 31.8 G/DL (ref 30–36.5)
MCV RBC AUTO: 89.2 FL (ref 80–99)
MONOCYTES # BLD: 1 K/UL (ref 0–1)
MONOCYTES NFR BLD: 7 % (ref 5–13)
NEUTS SEG # BLD: 8.5 K/UL (ref 1.8–8)
NEUTS SEG NFR BLD: 60 % (ref 32–75)
NITRITE UR QL STRIP.AUTO: NEGATIVE
NRBC # BLD: 0 K/UL (ref 0–0.01)
NRBC BLD-RTO: 0 PER 100 WBC
P-R INTERVAL, ECG05: 128 MS
PH UR STRIP: 6.5 [PH] (ref 5–8)
PLATELET # BLD AUTO: 296 K/UL (ref 150–400)
PMV BLD AUTO: 10.9 FL (ref 8.9–12.9)
POTASSIUM SERPL-SCNC: 3.7 MMOL/L (ref 3.5–5.1)
PROCALCITONIN SERPL-MCNC: <0.05 NG/ML
PROT UR STRIP-MCNC: NEGATIVE MG/DL
Q-T INTERVAL, ECG07: 368 MS
QRS DURATION, ECG06: 66 MS
QTC CALCULATION (BEZET), ECG08: 472 MS
RBC # BLD AUTO: 4.37 M/UL (ref 3.8–5.2)
RBC #/AREA URNS HPF: ABNORMAL /HPF (ref 0–5)
RSV RNA SPEC QL NAA+PROBE: NOT DETECTED
RV+EV RNA SPEC QL NAA+PROBE: NOT DETECTED
SERVICE CMNT-IMP: ABNORMAL
SODIUM SERPL-SCNC: 134 MMOL/L (ref 136–145)
SP GR UR REFRACTOMETRY: <1.005
UROBILINOGEN UR QL STRIP.AUTO: 0.2 EU/DL (ref 0.2–1)
VENTRICULAR RATE, ECG03: 99 BPM
WBC # BLD AUTO: 13.9 K/UL (ref 3.6–11)
WBC URNS QL MICRO: ABNORMAL /HPF (ref 0–4)

## 2020-04-26 PROCEDURE — 80048 BASIC METABOLIC PNL TOTAL CA: CPT

## 2020-04-26 PROCEDURE — 74011000258 HC RX REV CODE- 258: Performed by: INTERNAL MEDICINE

## 2020-04-26 PROCEDURE — 87635 SARS-COV-2 COVID-19 AMP PRB: CPT

## 2020-04-26 PROCEDURE — 0100U RESPIRATORY PANEL,PCR,NASOPHARYNGEAL: CPT

## 2020-04-26 PROCEDURE — 74011250636 HC RX REV CODE- 250/636: Performed by: INTERNAL MEDICINE

## 2020-04-26 PROCEDURE — 74011250637 HC RX REV CODE- 250/637: Performed by: INTERNAL MEDICINE

## 2020-04-26 PROCEDURE — 36415 COLL VENOUS BLD VENIPUNCTURE: CPT

## 2020-04-26 PROCEDURE — 74011636637 HC RX REV CODE- 636/637: Performed by: INTERNAL MEDICINE

## 2020-04-26 PROCEDURE — 85025 COMPLETE CBC W/AUTO DIFF WBC: CPT

## 2020-04-26 PROCEDURE — 82962 GLUCOSE BLOOD TEST: CPT

## 2020-04-26 PROCEDURE — 84145 PROCALCITONIN (PCT): CPT

## 2020-04-26 PROCEDURE — 77030038269 HC DRN EXT URIN PURWCK BARD -A

## 2020-04-26 PROCEDURE — 65660000000 HC RM CCU STEPDOWN

## 2020-04-26 PROCEDURE — 83036 HEMOGLOBIN GLYCOSYLATED A1C: CPT

## 2020-04-26 PROCEDURE — 94760 N-INVAS EAR/PLS OXIMETRY 1: CPT

## 2020-04-26 RX ORDER — ROSUVASTATIN CALCIUM 10 MG/1
10 TABLET, COATED ORAL
Status: DISCONTINUED | OUTPATIENT
Start: 2020-04-26 | End: 2020-04-27 | Stop reason: HOSPADM

## 2020-04-26 RX ORDER — MIRTAZAPINE 15 MG/1
15 TABLET, FILM COATED ORAL
Status: DISCONTINUED | OUTPATIENT
Start: 2020-04-26 | End: 2020-04-27 | Stop reason: HOSPADM

## 2020-04-26 RX ORDER — AMLODIPINE BESYLATE 5 MG/1
5 TABLET ORAL DAILY
Status: DISCONTINUED | OUTPATIENT
Start: 2020-04-26 | End: 2020-04-27 | Stop reason: HOSPADM

## 2020-04-26 RX ORDER — DEXTROSE MONOHYDRATE 100 MG/ML
0-250 INJECTION, SOLUTION INTRAVENOUS AS NEEDED
Status: DISCONTINUED | OUTPATIENT
Start: 2020-04-26 | End: 2020-04-27 | Stop reason: HOSPADM

## 2020-04-26 RX ORDER — INSULIN GLARGINE 100 [IU]/ML
20 INJECTION, SOLUTION SUBCUTANEOUS
Status: DISCONTINUED | OUTPATIENT
Start: 2020-04-26 | End: 2020-04-27

## 2020-04-26 RX ORDER — OXYCODONE AND ACETAMINOPHEN 5; 325 MG/1; MG/1
1 TABLET ORAL
Status: DISCONTINUED | OUTPATIENT
Start: 2020-04-26 | End: 2020-04-27 | Stop reason: HOSPADM

## 2020-04-26 RX ORDER — INSULIN GLARGINE 100 [IU]/ML
40 INJECTION, SOLUTION SUBCUTANEOUS DAILY
Status: DISCONTINUED | OUTPATIENT
Start: 2020-04-26 | End: 2020-04-27 | Stop reason: HOSPADM

## 2020-04-26 RX ORDER — BUSPIRONE HYDROCHLORIDE 10 MG/1
20 TABLET ORAL 2 TIMES DAILY
Status: DISCONTINUED | OUTPATIENT
Start: 2020-04-26 | End: 2020-04-27 | Stop reason: HOSPADM

## 2020-04-26 RX ORDER — QUETIAPINE FUMARATE 25 MG/1
25 TABLET, FILM COATED ORAL
Status: DISCONTINUED | OUTPATIENT
Start: 2020-04-26 | End: 2020-04-27 | Stop reason: HOSPADM

## 2020-04-26 RX ORDER — INSULIN GLARGINE 100 [IU]/ML
20 INJECTION, SOLUTION SUBCUTANEOUS DAILY
Status: DISCONTINUED | OUTPATIENT
Start: 2020-04-26 | End: 2020-04-26

## 2020-04-26 RX ORDER — BISACODYL 5 MG
5 TABLET, DELAYED RELEASE (ENTERIC COATED) ORAL
Status: DISCONTINUED | OUTPATIENT
Start: 2020-04-26 | End: 2020-04-27 | Stop reason: HOSPADM

## 2020-04-26 RX ORDER — INSULIN LISPRO 100 [IU]/ML
INJECTION, SOLUTION INTRAVENOUS; SUBCUTANEOUS
Status: DISCONTINUED | OUTPATIENT
Start: 2020-04-26 | End: 2020-04-27 | Stop reason: HOSPADM

## 2020-04-26 RX ORDER — GUAIFENESIN 100 MG/5ML
81 LIQUID (ML) ORAL DAILY
Status: DISCONTINUED | OUTPATIENT
Start: 2020-04-26 | End: 2020-04-27 | Stop reason: HOSPADM

## 2020-04-26 RX ORDER — ZINC SULFATE 50(220)MG
1 CAPSULE ORAL EVERY 12 HOURS
Status: DISCONTINUED | OUTPATIENT
Start: 2020-04-26 | End: 2020-04-27 | Stop reason: HOSPADM

## 2020-04-26 RX ORDER — MAGNESIUM SULFATE 100 %
4 CRYSTALS MISCELLANEOUS AS NEEDED
Status: DISCONTINUED | OUTPATIENT
Start: 2020-04-26 | End: 2020-04-27 | Stop reason: HOSPADM

## 2020-04-26 RX ORDER — INSULIN LISPRO 100 [IU]/ML
INJECTION, SOLUTION INTRAVENOUS; SUBCUTANEOUS
Status: DISCONTINUED | OUTPATIENT
Start: 2020-04-26 | End: 2020-04-26

## 2020-04-26 RX ORDER — DULOXETIN HYDROCHLORIDE 60 MG/1
60 CAPSULE, DELAYED RELEASE ORAL DAILY
Status: DISCONTINUED | OUTPATIENT
Start: 2020-04-26 | End: 2020-04-27 | Stop reason: HOSPADM

## 2020-04-26 RX ORDER — ASCORBIC ACID 500 MG
500 TABLET ORAL 2 TIMES DAILY
Status: DISCONTINUED | OUTPATIENT
Start: 2020-04-26 | End: 2020-04-27 | Stop reason: HOSPADM

## 2020-04-26 RX ADMIN — OXYCODONE HYDROCHLORIDE AND ACETAMINOPHEN 500 MG: 500 TABLET ORAL at 18:35

## 2020-04-26 RX ADMIN — AMLODIPINE BESYLATE 5 MG: 5 TABLET ORAL at 11:46

## 2020-04-26 RX ADMIN — INSULIN LISPRO 4 UNITS: 100 INJECTION, SOLUTION INTRAVENOUS; SUBCUTANEOUS at 04:10

## 2020-04-26 RX ADMIN — ACETAMINOPHEN 650 MG: 325 TABLET ORAL at 02:23

## 2020-04-26 RX ADMIN — INSULIN GLARGINE 40 UNITS: 100 INJECTION, SOLUTION SUBCUTANEOUS at 11:44

## 2020-04-26 RX ADMIN — CEFTRIAXONE 1 G: 1 INJECTION, POWDER, FOR SOLUTION INTRAMUSCULAR; INTRAVENOUS at 22:45

## 2020-04-26 RX ADMIN — OXYCODONE HYDROCHLORIDE AND ACETAMINOPHEN 500 MG: 500 TABLET ORAL at 09:08

## 2020-04-26 RX ADMIN — QUETIAPINE FUMARATE 25 MG: 25 TABLET ORAL at 04:10

## 2020-04-26 RX ADMIN — Medication 10 ML: at 23:07

## 2020-04-26 RX ADMIN — Medication 1 CAPSULE: at 09:08

## 2020-04-26 RX ADMIN — MIRTAZAPINE 15 MG: 15 TABLET, FILM COATED ORAL at 04:10

## 2020-04-26 RX ADMIN — OXYCODONE HYDROCHLORIDE AND ACETAMINOPHEN 1 TABLET: 5; 325 TABLET ORAL at 22:46

## 2020-04-26 RX ADMIN — Medication 1 CAPSULE: at 21:50

## 2020-04-26 RX ADMIN — DULOXETINE HYDROCHLORIDE 60 MG: 60 CAPSULE, DELAYED RELEASE ORAL at 07:57

## 2020-04-26 RX ADMIN — OXYCODONE HYDROCHLORIDE AND ACETAMINOPHEN 1 TABLET: 5; 325 TABLET ORAL at 12:36

## 2020-04-26 RX ADMIN — Medication 10 ML: at 05:38

## 2020-04-26 RX ADMIN — ASPIRIN 81 MG 81 MG: 81 TABLET ORAL at 11:46

## 2020-04-26 RX ADMIN — INSULIN GLARGINE 20 UNITS: 100 INJECTION, SOLUTION SUBCUTANEOUS at 22:15

## 2020-04-26 RX ADMIN — INSULIN LISPRO 5 UNITS: 100 INJECTION, SOLUTION INTRAVENOUS; SUBCUTANEOUS at 11:45

## 2020-04-26 RX ADMIN — INSULIN LISPRO 7 UNITS: 100 INJECTION, SOLUTION INTRAVENOUS; SUBCUTANEOUS at 16:50

## 2020-04-26 RX ADMIN — OXYCODONE HYDROCHLORIDE AND ACETAMINOPHEN 1 TABLET: 5; 325 TABLET ORAL at 16:51

## 2020-04-26 RX ADMIN — INSULIN LISPRO 5 UNITS: 100 INJECTION, SOLUTION INTRAVENOUS; SUBCUTANEOUS at 08:01

## 2020-04-26 RX ADMIN — ROSUVASTATIN CALCIUM 10 MG: 10 TABLET, COATED ORAL at 22:35

## 2020-04-26 RX ADMIN — Medication 1 CAPSULE: at 03:21

## 2020-04-26 RX ADMIN — Medication 10 ML: at 05:39

## 2020-04-26 RX ADMIN — INSULIN LISPRO 3 UNITS: 100 INJECTION, SOLUTION INTRAVENOUS; SUBCUTANEOUS at 22:49

## 2020-04-26 RX ADMIN — QUETIAPINE FUMARATE 25 MG: 25 TABLET ORAL at 22:00

## 2020-04-26 RX ADMIN — BUSPIRONE HYDROCHLORIDE 20 MG: 10 TABLET ORAL at 09:08

## 2020-04-26 RX ADMIN — MIRTAZAPINE 15 MG: 15 TABLET, FILM COATED ORAL at 22:00

## 2020-04-26 RX ADMIN — BUSPIRONE HYDROCHLORIDE 20 MG: 10 TABLET ORAL at 18:35

## 2020-04-26 RX ADMIN — ACETAMINOPHEN 650 MG: 325 TABLET ORAL at 07:55

## 2020-04-26 RX ADMIN — AZITHROMYCIN MONOHYDRATE 500 MG: 500 INJECTION, POWDER, LYOPHILIZED, FOR SOLUTION INTRAVENOUS at 23:12

## 2020-04-26 RX ADMIN — Medication 10 ML: at 14:00

## 2020-04-26 NOTE — PROGRESS NOTES
[facility] Adult  Hospitalist Group                                                                                          Hospitalist Progress Note  Inder Dunn MD  Answering service: [phone] from in house phone        Date of Service:  2020  NAME:  Jalaine Aase  :  1940  MRN:  [id number]      Admission Summary:     Jalaine Aase is a 67 y.o. female past medical history significant for hypertension, diabetes, asthma presented emergency room complaining of shortness of breath, productive cough and pleuritic chest pain for about a week. Patient states that for the last week she started experiencing body aches, chills, subjective fever in the last few days she has been having more productive cough with yellow sputum and shortness of breath. She reports pain when coughing and with deep inspiration. She denies any recent travel or sick contact. She has not been taking any medication for her symptoms. In the ED she was found to be hemodynamically stable. Found to have leukocytosis. Given her symptoms she was started on empiric antibiotic with actinomycin and Rocephin.     Interval history / Subjective: Follow up SOB, cough  Patient seen and examined by the bedside, Labs, images and notes reviewed  Pt reports pain rt leg, says had knee replacement years ago and has pain. Pt was in tears saying that. Reports cough without any phlegm, states her difficulty of breathing is better than yesterday  Discussed with nursing staff, orders reviewed. Plan discussed with patient       Assessment & Plan:       1. Shortness of breath- concerned for COVID. - Chest x-ray- showed bibasilar  scaring or atelectasis. -  Respiratory PCR negative  - Empiric antibiotic with azithromycin and rocephin.  - COVID test pending  - proBNP, procalcitonin, ferritin, LDH and d-dimer are not significant  - Supplemental O2 to keep O2 saturations greater than 92%.   - Eugene Castro needed. - Placed on droplet precaution plus contact     2. Hypertension-controlled   -Continue home medication. -PRN hydralazine 10 mg every 6 hours for SBP greater than 170.        3. Diabetes mellitus type II: Uncontrolled hyperglycemia with a blood glucose 329. Continue with sliding scale insulin before meals and at bedtime.  -Accu-Cheks   - Lantus. 40 in the morning and 20 in the evening     4. AMALIA: likely due to acute infection vs uncontrolled hyperglycemia. -Resolved  -Monitor renal function. Avoid nephrotoxic agent.     5. Pseudohyponatremia -improving     DVT prophylaxis: Heparin  CODE STATUS: Full code     Surrogate decision maker: Patient has not assigned a surrogate decision maker. She did not want us to contact her family at this time. Hospital Problems  Date Reviewed: 7/5/2019          Codes Class Noted POA    Shortness of breath ICD-10-CM: R06.02  ICD-9-CM: 786.05  4/25/2020 Unknown        Suspected Covid-19 Virus Infection ICD-10-CM: R68.89  4/25/2020 Unknown                Review of Systems:   A comprehensive review of systems was negative except for that written in the HPI. Vital Signs:    Last 24hrs VS reviewed since prior progress note. Most recent are:  Visit Vitals  /66 (BP 1 Location: Right arm, BP Patient Position: At rest)   Pulse (!) 106   Temp 97.8 °F (36.6 °C)   Resp 17   Ht 5' (1.524 m)   Wt 78.9 kg (174 lb)   SpO2 98%   Breastfeeding No   BMI 33.98 kg/m²       No intake or output data in the 24 hours ending 04/26/20 1206     Physical Examination:             Constitutional:  No acute distress, cooperative, pleasant    ENT:  Oral mucosa moist, oropharynx benign. Resp:  CTA bilaterally. No wheezing/rhonchi/rales. No accessory muscle use   CV:  Regular rhythm, normal rate, no murmurs, gallops, rubs    GI:  Soft, non distended, non tender.  normoactive bowel sounds, no hepatosplenomegaly     Musculoskeletal:  No edema, warm, 2+ pulses throughout    Neurologic: Moves all extremities. AAOx3, CN II-XII reviewed            Data Review:    Review and/or order of clinical lab test      Labs:     Recent Labs     04/26/20 0536 04/25/20 2230   WBC 13.9* 15.2*   HGB 12.4 13.7   HCT 39.0 42.3    298     Recent Labs     04/26/20 0536 04/25/20 2230   * 130*   K 3.7 4.2    94*   CO2 24 27   BUN 8 12   CREA 0.93 1.24*   * 476*   CA 8.4* 9.7     Recent Labs     04/25/20 2230   SGOT 16   ALT 21   *   TBILI 0.4   TP 8.3*   ALB 3.8   GLOB 4.5*   LPSE 255     No results for input(s): INR, PTP, APTT, INREXT in the last 72 hours. Recent Labs     04/25/20 2230   FERR 48      Lab Results   Component Value Date/Time    Folate 13.2 05/20/2009 03:00 AM      No results for input(s): PH, PCO2, PO2 in the last 72 hours.   Recent Labs     04/25/20 2230   TROIQ <0.05     Lab Results   Component Value Date/Time    Cholesterol, total 131 07/06/2019 12:53 AM    HDL Cholesterol 69 07/06/2019 12:53 AM    LDL, calculated 41 07/06/2019 12:53 AM    Triglyceride 105 07/06/2019 12:53 AM    CHOL/HDL Ratio 1.9 07/06/2019 12:53 AM     Lab Results   Component Value Date/Time    Glucose (POC) 278 (H) 04/26/2020 11:19 AM    Glucose (POC) 297 (H) 04/26/2020 07:51 AM    Glucose (POC) 329 (H) 04/26/2020 03:20 AM    Glucose (POC) 408 (H) 04/25/2020 10:39 PM    Glucose (POC) 276 (H) 01/23/2020 05:03 PM     Lab Results   Component Value Date/Time    Color YELLOW/STRAW 04/25/2020 11:38 PM    Appearance CLEAR 04/25/2020 11:38 PM    Specific gravity <1.005 04/25/2020 11:38 PM    Specific gravity 1.028 12/06/2019 03:22 AM    pH (UA) 6.5 04/25/2020 11:38 PM    Protein Negative 04/25/2020 11:38 PM    Glucose >1,000 (A) 04/25/2020 11:38 PM    Ketone Negative 04/25/2020 11:38 PM    Bilirubin Negative 04/25/2020 11:38 PM    Urobilinogen 0.2 04/25/2020 11:38 PM    Nitrites Negative 04/25/2020 11:38 PM    Leukocyte Esterase Negative 04/25/2020 11:38 PM    Epithelial cells FEW 04/25/2020 11:38 PM Bacteria Negative 04/25/2020 11:38 PM    WBC 0-4 04/25/2020 11:38 PM    RBC 0-5 04/25/2020 11:38 PM         Medications Reviewed:     Current Facility-Administered Medications   Medication Dose Route Frequency    glucose chewable tablet 16 g  4 Tab Oral PRN    glucagon (GLUCAGEN) injection 1 mg  1 mg IntraMUSCular PRN    dextrose 10% infusion 0-250 mL  0-250 mL IntraVENous PRN    [START ON 4/27/2020] azithromycin (ZITHROMAX) 500 mg in 0.9% sodium chloride (MBP/ADV) 250 mL  500 mg IntraVENous Q24H    cefTRIAXone (ROCEPHIN) 1 g in 0.9% sodium chloride (MBP/ADV) 50 mL  1 g IntraVENous Q24H    zinc sulfate (ZINCATE) 220 (50) mg capsule 1 Cap  1 Cap Oral Q12H    ascorbic acid (vitamin C) (VITAMIN C) tablet 500 mg  500 mg Oral BID    amLODIPine (NORVASC) tablet 5 mg  5 mg Oral DAILY    aspirin chewable tablet 81 mg  81 mg Oral DAILY    bisacodyL (DULCOLAX) tablet 5 mg  5 mg Oral DAILY PRN    busPIRone (BUSPAR) tablet 20 mg  20 mg Oral BID    DULoxetine (CYMBALTA) capsule 60 mg  60 mg Oral DAILY    mirtazapine (REMERON) tablet 15 mg  15 mg Oral QHS    QUEtiapine (SEROquel) tablet 25 mg  25 mg Oral QHS    . PHARMACY TO SUBSTITUTE PER PROTOCOL (Reordered from: ranolazine ER (RANEXA) 500 mg SR tablet)    Per Protocol    rosuvastatin (CRESTOR) tablet 10 mg  10 mg Oral QHS    insulin lispro (HUMALOG) injection   SubCUTAneous AC&HS    insulin glargine (LANTUS) injection 40 Units  40 Units SubCUTAneous DAILY    oxyCODONE-acetaminophen (PERCOCET) 5-325 mg per tablet 1 Tab  1 Tab Oral Q4H PRN    sodium chloride (NS) flush 5-40 mL  5-40 mL IntraVENous Q8H    sodium chloride (NS) flush 5-40 mL  5-40 mL IntraVENous PRN    sodium chloride (NS) flush 5-40 mL  5-40 mL IntraVENous Q8H    sodium chloride (NS) flush 5-40 mL  5-40 mL IntraVENous PRN    acetaminophen (TYLENOL) tablet 650 mg  650 mg Oral Q6H PRN    Or    acetaminophen (TYLENOL) suppository 650 mg  650 mg Rectal Q6H PRN ______________________________________________________________________  EXPECTED LENGTH OF STAY: - - -  ACTUAL LENGTH OF STAY:          1                 Ilan Zavala MD

## 2020-04-26 NOTE — H&P
[address] [facility] Adult  Hospitalist Group  History and Physical    Primary Care Provider: Aman Camarillo MD  Date of Service:  4/26/2020    Subjective:     Tacho Rodriguez is a 67 y.o. female past medical history significant for hypertension, diabetes, asthma presented emergency room complaining of shortness of breath, productive cough and pleuritic chest pain for about a week. Patient states that for the last week she started experiencing body aches, chills, subjective fever in the last few days she has been having more productive cough with yellow sputum and shortness of breath. She reports pain when coughing and with deep inspiration. She denies any recent travel or sick contact. She has not been taking any medication for her symptoms. In the ED she was found to be hemodynamically stable. Found to have leukocytosis. Given her symptoms she was started on empiric antibiotic with actinomycin and Rocephin. Review of Systems:    Review of Systems   Constitutional: Positive for chills, fever and malaise/fatigue. Negative for weight loss. HENT: Negative for congestion, ear discharge and hearing loss. Eyes: Negative for blurred vision and double vision. Respiratory: Positive for cough, sputum production, shortness of breath and wheezing. Cardiovascular: Positive for chest pain. Negative for palpitations, orthopnea, leg swelling and PND. Gastrointestinal: Negative for abdominal pain, constipation, diarrhea, melena, nausea and vomiting. Genitourinary: Negative for dysuria, frequency and urgency. Musculoskeletal: Positive for myalgias. Negative for back pain and joint pain. Skin: Negative for itching and rash. Neurological: Positive for weakness and headaches. Negative for dizziness, sensory change, speech change and focal weakness. Endo/Heme/Allergies: Negative for polydipsia. Does not bruise/bleed easily.        Past Medical History:   Diagnosis Date    Anal polyp 6/13/2013    Arthritis     Asthma     CAD (coronary artery disease) 10/27/2017    Cataract     Chronic pain     knee - right/back    Diabetes ([facility] [address]     GERD (gastroesophageal reflux disease)     Hemorrhoids 6/13/2013    History of kidney stones     Hypertension     Ill-defined condition     abdominal pain and burning    Other ill-defined conditions(799.89)     high cholesterol      Past Surgical History:   Procedure Laterality Date    COLONOSCOPY  2/28/2013         EGD  2/28/2013         HX CATARACT REMOVAL Bilateral     HX CHOLECYSTECTOMY  6-2015    HX GI  6/27/13    anal polyps removed    HX HEENT      laser surgery for blood clot in right eye    HX KNEE REPLACEMENT      right    HX OTHER SURGICAL  4-2-14    lap gastrotomy and removal of polyp -  - not cancerous per pt    HX ALAN AND BSO      HX TONSILLECTOMY      HX UROLOGICAL      \"laser\" surgery for kidney stone    NEUROLOGICAL PROCEDURE UNLISTED  1990    tumor at back of neck, benign     Prior to Admission medications    Medication Sig Start Date End Date Taking? Authorizing Provider   ibuprofen (MOTRIN) 600 mg tablet Take 1 Tab by mouth every six (6) hours as needed for Pain. 12/6/19   Derek Padron MD   insulin glargine (LANTUS SOLOSTAR U-100 INSULIN) 100 unit/mL (3 mL) inpn 20 Units by SubCUTAneous route nightly. Indications: diabetes, type 2 diabetes mellitus 7/12/19   Mariluz Saucedo MD   bisacodyl (DULCOLAX) 5 mg EC tablet Take 1 Tab by mouth daily as needed for Constipation. 7/12/19   Mariluz Saucedo MD   amLODIPine (NORVASC) 10 mg tablet Take 0.5 Tabs by mouth daily. 7/12/19   Mariluz Saucedo MD   albuterol (PROVENTIL VENTOLIN) 2.5 mg /3 mL (0.083 %) nebulizer solution 3 mL by Nebulization route every four (4) hours as needed for Wheezing. 3/11/19   Maribel Nayak MD   arformoterol (BROVANA) 15 mcg/2 mL nebu neb solution 2 mL by Nebulization route two (2) times a day.  3/11/19   Maribel Nayak MD   budesonide (PULMICORT) 0.5 mg/2 mL nbsp 2 mL by Nebulization route two (2) times a day. 3/11/19   Sanya Murray MD   aspirin 81 mg chewable tablet Take 1 Tab by mouth daily. 3/12/19   Sanya Murray MD   busPIRone (BUSPAR) 5 mg tablet Take 20 mg by mouth two (2) times a day. Provider, Historical   DULoxetine (CYMBALTA) 60 mg capsule Take 60 mg by mouth daily. Provider, Historical   ranolazine ER (RANEXA) 500 mg SR tablet Take 500 mg by mouth two (2) times a day. Provider, Historical   mirtazapine (REMERON) 15 mg tablet Take 15 mg by mouth nightly. Provider, Historical   QUEtiapine (SEROQUEL) 25 mg tablet Take 25 mg by mouth nightly. Provider, Historical   albuterol (PROVENTIL HFA, VENTOLIN HFA, PROAIR HFA) 90 mcg/actuation inhaler Take 2 Puffs by inhalation every four (4) hours as needed for Wheezing. 1/19/19   Rolando Nj MD   rosuvastatin (CRESTOR) 10 mg tablet Take 10 mg by mouth nightly. Provider, Historical   Dexlansoprazole (DEXILANT) 60 mg CpDB Take 60 mg by mouth Daily (before breakfast). Provider, Historical   fosinopril (MONOPRIL) 20 mg tablet Take 20 mg by mouth daily.  2/12/15   Provider, Historical     Allergies   Allergen Reactions    Seafood [Shellfish Containing Products] Swelling      Family History   Problem Relation Age of Onset    Cancer Mother         colon    Diabetes Brother     Other Sister         GI BLEED    Heart Disease Brother     Heart Attack Brother     Heart Disease Brother     Heart Disease Brother     Schizophrenia Son     Anesth Problems Neg Hx         SOCIAL HISTORY:  Patient resides at home  Patient ambulates independently  Smoking history: Never  Alcohol history: None        Objective:       Physical Exam:   Patient Vitals for the past 12 hrs:   Temp Pulse Resp BP SpO2   04/25/20 2330  99 16 166/84 96 %   04/25/20 2315  96 15 172/76 96 %   04/25/20 2300  99 17 171/72 96 %   04/25/20 2230  100 19 173/76 96 %   04/25/20 2220 98.1 °F (36.7 °C) (!) 106 17 (!) 184/98 96 %     GEN APPEARANCE: Ill-appearing. HEENT: Conjunctiva Clear  CVS: Tachycardic. LUNGS: Coarse sounds bilaterally. Fine inspiratory wheezing. ABD: Soft; No TTP; + Normoactive BS  EXT: no edema   Skin exam: No gross lesions noted on exposed skin surfaces  MENTAL STATUS: Answers questions appropriately, responds to commands. Neuro: No focal deficit. Data Review:   Recent Results (from the past 24 hour(s))   EKG, 12 LEAD, INITIAL    Collection Time: 04/25/20 10:27 PM   Result Value Ref Range    Ventricular Rate 99 BPM    Atrial Rate 99 BPM    P-R Interval 128 ms    QRS Duration 66 ms    Q-T Interval 368 ms    QTC Calculation (Bezet) 472 ms    Calculated P Axis 61 degrees    Calculated R Axis 53 degrees    Calculated T Axis 61 degrees    Diagnosis       Normal sinus rhythm  When compared with ECG of 11-OCT-2019 18:48,  Nonspecific T wave abnormality has replaced inverted T waves in Anterior   leads     CBC WITH AUTOMATED DIFF    Collection Time: 04/25/20 10:30 PM   Result Value Ref Range    WBC 15.2 (H) 3.6 - 11.0 K/uL    RBC 4.83 3.80 - 5.20 M/uL    HGB 13.7 11.5 - 16.0 g/dL    HCT 42.3 35.0 - 47.0 %    MCV 87.6 80.0 - 99.0 FL    MCH 28.4 26.0 - 34.0 PG    MCHC 32.4 30.0 - 36.5 g/dL    RDW 15.1 (H) 11.5 - 14.5 %    PLATELET 897 508 - 773 K/uL    MPV 11.0 8.9 - 12.9 FL    NRBC 0.0 0  WBC    ABSOLUTE NRBC 0.00 0.00 - 0.01 K/uL    NEUTROPHILS 55 32 - 75 %    LYMPHOCYTES 34 12 - 49 %    MONOCYTES 8 5 - 13 %    EOSINOPHILS 1 0 - 7 %    BASOPHILS 1 0 - 1 %    IMMATURE GRANULOCYTES 1 (H) 0.0 - 0.5 %    ABS. NEUTROPHILS 8.4 (H) 1.8 - 8.0 K/UL    ABS. LYMPHOCYTES 5.2 (H) 0.8 - 3.5 K/UL    ABS. MONOCYTES 1.3 (H) 0.0 - 1.0 K/UL    ABS. EOSINOPHILS 0.1 0.0 - 0.4 K/UL    ABS. BASOPHILS 0.1 0.0 - 0.1 K/UL    ABS. IMM.  GRANS. 0.1 (H) 0.00 - 0.04 K/UL    DF AUTOMATED     METABOLIC PANEL, COMPREHENSIVE    Collection Time: 04/25/20 10:30 PM   Result Value Ref Range    Sodium 130 (L) 136 - 145 mmol/L Potassium 4.2 3.5 - 5.1 mmol/L    Chloride 94 (L) 97 - 108 mmol/L    CO2 27 21 - 32 mmol/L    Anion gap 9 5 - 15 mmol/L    Glucose 476 (H) 65 - 100 mg/dL    BUN 12 6 - 20 MG/DL    Creatinine 1.24 (H) 0.55 - 1.02 MG/DL    BUN/Creatinine ratio 10 (L) 12 - 20      GFR est AA 51 (L) >60 ml/min/1.73m2    GFR est non-AA 42 (L) >60 ml/min/1.73m2    Calcium 9.7 8.5 - 10.1 MG/DL    Bilirubin, total 0.4 0.2 - 1.0 MG/DL    ALT (SGPT) 21 12 - 78 U/L    AST (SGOT) 16 15 - 37 U/L    Alk. phosphatase 140 (H) 45 - 117 U/L    Protein, total 8.3 (H) 6.4 - 8.2 g/dL    Albumin 3.8 3.5 - 5.0 g/dL    Globulin 4.5 (H) 2.0 - 4.0 g/dL    A-G Ratio 0.8 (L) 1.1 - 2.2     LACTIC ACID    Collection Time: 04/25/20 10:30 PM   Result Value Ref Range    Lactic acid 1.6 0.4 - 2.0 MMOL/L   SAMPLES BEING HELD    Collection Time: 04/25/20 10:30 PM   Result Value Ref Range    SAMPLES BEING HELD [id number]     COMMENT        Add-on orders for these samples will be processed based on acceptable specimen integrity and analyte stability, which may vary by analyte. LIPASE    Collection Time: 04/25/20 10:30 PM   Result Value Ref Range    Lipase 255 73 - 393 U/L   TROPONIN I    Collection Time: 04/25/20 10:30 PM   Result Value Ref Range    Troponin-I, Qt. <0.05 <0.05 ng/mL   GLUCOSE, POC    Collection Time: 04/25/20 10:39 PM   Result Value Ref Range    Glucose (POC) 408 (H) 65 - 100 mg/dL    Performed by [address] [place] [address] SAMPLE    Collection Time: 04/25/20 11:38 PM   Result Value Ref Range    Urine culture hold        Urine on hold in Microbiology dept for 2 days. If unpreserved urine is submitted, it cannot be used for addtional testing after 24 hours, recollection will be required. Xr Chest Port    Result Date: 4/25/2020  IMPRESSION: Decreased bibasilar scarring or atelectasis more likely than pneumonia. No evidence of pulmonary edema or pneumothorax.        Assessment:     Active Problems:    Shortness of breath (4/25/2020) Suspected Covid-19 Virus Infection (4/25/2020)        Plan:     1. Shortness of breath- concerned for COVID. - Chest x-ray- showed bibasilar  scaring or atelectasis. -  Respiratory PCR ordered. - Empiric antibiotic with azithromycin and rocephin.  - COVID test ordered.  -We will check proBNP, procalcitonin, ferritin and LDH.  - Supplemental O2 to keep O2 saturations greater than 92%. - MDI as needed. - Placed on droplet precaution plus contact    2. Hypertension-uncontrolled hypertension.    -Restart home medication.  -Add hydralazine 10 mg every 6 hours PRN for SBP greater than 170. 3.Diabetes mellitus type II: Uncontrolled hyperglycemia with a blood glucose 400. No signs of DKA. We will place patient on sliding scale insulin before meals and at bedtime.  -Accu-Cheks   -Restart home dose of Lantus. 4. AMALIA: likely due to acute infection vs uncontrolled hyperglycemia. -Received IV fluid in the ED.  P.o. intake encouraged. -Monitor renal function. Avoid nephrotoxic agent. 5. Pseudohyponatremia - due to hyperglycemia. Corrected NA is 136. DVT prophylaxis: Heparin  CODE STATUS: Full code    Surrogate decision maker: Patient has not assigned a surrogate decision maker. She did not want me to contact her family at this time. FUNCTIONAL STATUS PRIOR TO HOSPITALIZATION: independent.     Signed By: Giulia Naranjo MD     April 26, 2020

## 2020-04-26 NOTE — PROGRESS NOTES
0908:  Patient verbalized complaints of discomfort in her legs due to cramping. Patient stated that the sequential compression devices helped to alleviate this. Dr. Fransisco Barraza notified and aware. 1159:  Dr. Fransisco Barraza paged. 1205:  RN notified Dr. Fransisco Barraza that patient was in tears and verbalizing complaints of pain due to muscle cramps in her legs. Per Dr. Fransisco Barraza pain medication orders would be placed. Will continue to monitor patient. 1503:  Patient has been demonstrating forgetfulness throughout the day. Patient placed on bed alarm. Will continue to monitor patient. 1758:  RN notified Dr. Fransisco Barraza that patient's COVID-19 test resulted as negative. Per Dr. Fransisco Barraza order would be placed for test to be repeated. Will continue to monitor patient. 1958:  Verbal shift change report given to Hitesh Bryan RN (oncoming nurse) by Mitchel Simmons RN (offgoing nurse). Report included the following information SBAR, Kardex, Intake/Output, MAR and Recent Results.

## 2020-04-26 NOTE — ED PROVIDER NOTES
History of hypertension, diabetes, hyperlipidemia, asthma, arthritis, chronic back and knee pain, GERD, anxiety; she presents via EMS with complaints of cough, dyspnea, subjective fever, nausea, poor appetite, and epigastric abdominal pain. She states that she has had a cough reductive of some white sputum for about a week. She states that 5 days ago she began to have nausea, poor appetite, and epigastric discomfort. Last night she began experiencing shortness of breath, headache, and extremity \"stiffness. \"  She also complains of left-sided chest pain that is been constant since last night and is worse with movement. Per EMS, blood sugar greater than 500 in route. Patient states that is not unusual for her blood sugar does run high into the 400s.            Past Medical History:   Diagnosis Date    Anal polyp 6/13/2013    Arthritis     Asthma     CAD (coronary artery disease) 10/27/2017    Cataract     Chronic pain     knee - right/back    Diabetes ([facility] [address]     GERD (gastroesophageal reflux disease)     Hemorrhoids 6/13/2013    History of kidney stones     Hypertension     Ill-defined condition     abdominal pain and burning    Other ill-defined conditions(799.89)     high cholesterol       Past Surgical History:   Procedure Laterality Date    COLONOSCOPY  2/28/2013         EGD  2/28/2013         HX CATARACT REMOVAL Bilateral     HX CHOLECYSTECTOMY  6-2015    HX GI  6/27/13    anal polyps removed    HX HEENT      laser surgery for blood clot in right eye    HX KNEE REPLACEMENT      right    HX OTHER SURGICAL  4-2-14    lap gastrotomy and removal of polyp -  - not cancerous per pt    HX ALAN AND BSO      HX TONSILLECTOMY      HX UROLOGICAL      \"laser\" surgery for kidney stone    NEUROLOGICAL PROCEDURE UNLISTED  1990    tumor at back of neck, benign         Family History:   Problem Relation Age of Onset    Cancer Mother         colon    Diabetes Brother     Other Sister         GI BLEED    Heart Disease Brother     Heart Attack Brother     Heart Disease Brother     Heart Disease Brother     Schizophrenia Son     Anesth Problems Neg Hx        Social History     Socioeconomic History    Marital status:      Spouse name: Not on file    Number of children: Not on file    Years of education: Not on file    Highest education level: Not on file   Occupational History    Not on file   Social Needs    Financial resource strain: Not on file    Food insecurity     Worry: Not on file     Inability: Not on file   [employer] needs     Medical: Not on file     Non-medical: Not on file   Tobacco Use    Smoking status: Former Smoker     Years: 0.00    Smokeless tobacco: Never Used    Tobacco comment: age 13   Substance and Sexual Activity    Alcohol use: No    Drug use: No    Sexual activity: Never   Lifestyle    Physical activity     Days per week: Not on file     Minutes per session: Not on file    Stress: Not on file   Relationships    Social connections     Talks on phone: Not on file     Gets together: Not on file     Attends [employer] service: Not on file     Active member of club or organization: Not on file     Attends meetings of clubs or organizations: Not on file     Relationship status: Not on file    Intimate partner violence     Fear of current or ex partner: Not on file     Emotionally abused: Not on file     Physically abused: Not on file     Forced sexual activity: Not on file   Other Topics Concern    Not on file   Social History Narrative    Not on file         ALLERGIES: Seafood [shellfish containing products]    Review of Systems   All other systems reviewed and are negative. There were no vitals filed for this visit. Physical Exam  Vitals signs and nursing note reviewed. Constitutional:       Appearance: She is well-developed. Comments: Appears anxious. Mildly ill-appearing. HENT:      Head: Normocephalic and atraumatic. Eyes:      Conjunctiva/sclera: Conjunctivae normal.   Neck:      Musculoskeletal: Neck supple. Trachea: No tracheal deviation. Cardiovascular:      Rate and Rhythm: Normal rate and regular rhythm. Heart sounds: Normal heart sounds. No murmur. No friction rub. No gallop. Pulmonary:      Effort: Pulmonary effort is normal.      Comments: Mild bibasilar crackles  Abdominal:      Palpations: Abdomen is soft. Tenderness: There is no abdominal tenderness. Musculoskeletal:         General: No deformity. Skin:     General: Skin is warm and dry. Neurological:      Mental Status: She is alert. Comments: oriented          MDM       Procedures    EKG: Normal sinus rhythm; rate of 99; normal ST, Gael Garcia MD  10:47 PM    Hospitalist Perfect Serve for Admission  11:23 PM    ED Room Number: ER17/17  Patient Name and age:  Lloyd Aldridge 67 y.o.  female  Working Diagnosis: Dyspnea, cough, subjective fevers, sepsis criteria, questionable pneumonia    COVID-19 Suspicion:  yes    Readmission: no  Isolation Requirements:  yes  Recommended Level of Care:  telemetry  Department:[facility] Adult ED - 21   Other: Presents with a weeklong history of cough. Since last night she has had dyspnea and subjective fevers. Poor p.o. intake for the last several days. Blood sugar greater than 400. Feels she needs to be ruled out for COVID. IMPRESSION:    Dyspnea, cough, subjective fever    - Sepsis order set entered: YES  - Broad Spectrum Antibiotics given: Ceftriaxone and Azithromycin  - Repeat lactic acid ordered for time: not indicated (initial lactate normal)  - Sepsis Re-assessment performed at time: 2330  and clinical condition stable. - Actions taken: none.     If Septic Shock (SBP<90, MAP<65, Lactate >4):     - IVF:  Not indicated due to Septic Shock not present  - Vasopressors: Not indicated due to Septic Shock not present    Plan:  Admit to Telemetry    Total critical care time spent exclusive of procedures:   35 minutes    Bobby Spicer MD    Consult note: Dr. Joseph Michael -will arrange admission.   Bobby Spicer MD  11:38 PM

## 2020-04-26 NOTE — PROGRESS NOTES
Transition of Care Plan     Disposition: TBD   RUR-21%   Transport: Medicaid with [employer], but will need to follow   Follow up: PCP/Specialist(s)       Attempted initial assessment. Patient is not able to participate. Called emergency contact Chris Lai, -[phone] and no answer. CM will need to attempt initial assessment.         Sherice Ambrocio  8:59 AM

## 2020-04-26 NOTE — PROGRESS NOTES
TRANSFER - IN REPORT:    Verbal report received from Gabriela Muniz rn(name) on Eladia Coon  being received from ED(unit) for routine progression of care      Report consisted of patients Situation, Background, Assessment and   Recommendations(SBAR). Information from the following report(s) SBAR, Kardex, ED Summary, [facility] and Recent Results was reviewed with the receiving nurse. Opportunity for questions and clarification was provided. Assessment completed upon patients arrival to unit and care assumed.

## 2020-04-26 NOTE — ED TRIAGE NOTES
Pt C/O Cough, body aches, pain in left upper abd, for 1 week, denies N/V/D, pt arrived via [address]

## 2020-04-26 NOTE — ED NOTES
Pt assisted to [facility] w/ minimal assistance. UA sample collected and sent to lab.

## 2020-04-27 VITALS
HEART RATE: 95 BPM | WEIGHT: 174 LBS | HEIGHT: 60 IN | SYSTOLIC BLOOD PRESSURE: 135 MMHG | RESPIRATION RATE: 18 BRPM | DIASTOLIC BLOOD PRESSURE: 80 MMHG | BODY MASS INDEX: 34.16 KG/M2 | TEMPERATURE: 97.4 F | OXYGEN SATURATION: 94 %

## 2020-04-27 LAB
ANION GAP SERPL CALC-SCNC: 6 MMOL/L (ref 5–15)
BASOPHILS # BLD: 0.1 K/UL (ref 0–0.1)
BASOPHILS NFR BLD: 1 % (ref 0–1)
BUN SERPL-MCNC: 11 MG/DL (ref 6–20)
BUN/CREAT SERPL: 15 (ref 12–20)
CALCIUM SERPL-MCNC: 8.3 MG/DL (ref 8.5–10.1)
CHLORIDE SERPL-SCNC: 108 MMOL/L (ref 97–108)
CO2 SERPL-SCNC: 25 MMOL/L (ref 21–32)
CREAT SERPL-MCNC: 0.72 MG/DL (ref 0.55–1.02)
DIFFERENTIAL METHOD BLD: ABNORMAL
EOSINOPHIL # BLD: 0.1 K/UL (ref 0–0.4)
EOSINOPHIL NFR BLD: 1 % (ref 0–7)
ERYTHROCYTE [DISTWIDTH] IN BLOOD BY AUTOMATED COUNT: 15.3 % (ref 11.5–14.5)
GLUCOSE BLD STRIP.AUTO-MCNC: 159 MG/DL (ref 65–100)
GLUCOSE BLD STRIP.AUTO-MCNC: 172 MG/DL (ref 65–100)
GLUCOSE SERPL-MCNC: 228 MG/DL (ref 65–100)
HCT VFR BLD AUTO: 39.2 % (ref 35–47)
HGB BLD-MCNC: 12.5 G/DL (ref 11.5–16)
IMM GRANULOCYTES # BLD AUTO: 0.1 K/UL (ref 0–0.04)
IMM GRANULOCYTES NFR BLD AUTO: 1 % (ref 0–0.5)
LYMPHOCYTES # BLD: 2.6 K/UL (ref 0.8–3.5)
LYMPHOCYTES NFR BLD: 28 % (ref 12–49)
MCH RBC QN AUTO: 28.3 PG (ref 26–34)
MCHC RBC AUTO-ENTMCNC: 31.9 G/DL (ref 30–36.5)
MCV RBC AUTO: 88.9 FL (ref 80–99)
MONOCYTES # BLD: 0.9 K/UL (ref 0–1)
MONOCYTES NFR BLD: 9 % (ref 5–13)
NEUTS SEG # BLD: 5.6 K/UL (ref 1.8–8)
NEUTS SEG NFR BLD: 60 % (ref 32–75)
NRBC # BLD: 0 K/UL (ref 0–0.01)
NRBC BLD-RTO: 0 PER 100 WBC
PLATELET # BLD AUTO: 288 K/UL (ref 150–400)
PMV BLD AUTO: 10.4 FL (ref 8.9–12.9)
POTASSIUM SERPL-SCNC: 3.8 MMOL/L (ref 3.5–5.1)
RBC # BLD AUTO: 4.41 M/UL (ref 3.8–5.2)
SARS-COV-2, COV2: NOT DETECTED
SARS-COV-2, COV2: NOT DETECTED
SERVICE CMNT-IMP: ABNORMAL
SERVICE CMNT-IMP: ABNORMAL
SODIUM SERPL-SCNC: 139 MMOL/L (ref 136–145)
SPECIMEN SOURCE, FCOV2M: NORMAL
SPECIMEN SOURCE, FCOV2M: NORMAL
WBC # BLD AUTO: 9.3 K/UL (ref 3.6–11)

## 2020-04-27 PROCEDURE — 74011250637 HC RX REV CODE- 250/637: Performed by: INTERNAL MEDICINE

## 2020-04-27 PROCEDURE — 80048 BASIC METABOLIC PNL TOTAL CA: CPT

## 2020-04-27 PROCEDURE — 36415 COLL VENOUS BLD VENIPUNCTURE: CPT

## 2020-04-27 PROCEDURE — 74011636637 HC RX REV CODE- 636/637: Performed by: INTERNAL MEDICINE

## 2020-04-27 PROCEDURE — 85025 COMPLETE CBC W/AUTO DIFF WBC: CPT

## 2020-04-27 PROCEDURE — 77030038269 HC DRN EXT URIN PURWCK BARD -A

## 2020-04-27 PROCEDURE — 82962 GLUCOSE BLOOD TEST: CPT

## 2020-04-27 RX ORDER — AMOXICILLIN AND CLAVULANATE POTASSIUM 875; 125 MG/1; MG/1
1 TABLET, FILM COATED ORAL 2 TIMES DAILY
Qty: 10 TAB | Refills: 0 | Status: SHIPPED | OUTPATIENT
Start: 2020-04-27 | End: 2020-05-02

## 2020-04-27 RX ORDER — AZITHROMYCIN 500 MG/1
500 TABLET, FILM COATED ORAL DAILY
Qty: 2 TAB | Refills: 0 | Status: SHIPPED | OUTPATIENT
Start: 2020-04-27 | End: 2021-06-03

## 2020-04-27 RX ORDER — ASCORBIC ACID 500 MG
500 TABLET ORAL 2 TIMES DAILY
Qty: 60 TAB | Refills: 0 | Status: SHIPPED | OUTPATIENT
Start: 2020-04-27 | End: 2021-05-29

## 2020-04-27 RX ORDER — AMLODIPINE BESYLATE 5 MG/1
5 TABLET ORAL DAILY
Qty: 30 TAB | Refills: 0 | Status: SHIPPED | OUTPATIENT
Start: 2020-04-27 | End: 2021-06-03

## 2020-04-27 RX ORDER — AZITHROMYCIN 250 MG/1
500 TABLET, FILM COATED ORAL DAILY
Status: DISCONTINUED | OUTPATIENT
Start: 2020-04-28 | End: 2020-04-27

## 2020-04-27 RX ORDER — INSULIN GLARGINE 100 [IU]/ML
40 INJECTION, SOLUTION SUBCUTANEOUS
Status: DISCONTINUED | OUTPATIENT
Start: 2020-04-27 | End: 2020-04-27 | Stop reason: HOSPADM

## 2020-04-27 RX ADMIN — OXYCODONE HYDROCHLORIDE AND ACETAMINOPHEN 1 TABLET: 5; 325 TABLET ORAL at 09:00

## 2020-04-27 RX ADMIN — ASPIRIN 81 MG 81 MG: 81 TABLET ORAL at 09:01

## 2020-04-27 RX ADMIN — OXYCODONE HYDROCHLORIDE AND ACETAMINOPHEN 1 TABLET: 5; 325 TABLET ORAL at 04:13

## 2020-04-27 RX ADMIN — ACETAMINOPHEN 650 MG: 325 TABLET ORAL at 14:46

## 2020-04-27 RX ADMIN — INSULIN LISPRO 2 UNITS: 100 INJECTION, SOLUTION INTRAVENOUS; SUBCUTANEOUS at 07:56

## 2020-04-27 RX ADMIN — OXYCODONE HYDROCHLORIDE AND ACETAMINOPHEN 500 MG: 500 TABLET ORAL at 09:01

## 2020-04-27 RX ADMIN — AMLODIPINE BESYLATE 5 MG: 5 TABLET ORAL at 09:10

## 2020-04-27 RX ADMIN — OXYCODONE HYDROCHLORIDE AND ACETAMINOPHEN 1 TABLET: 5; 325 TABLET ORAL at 12:52

## 2020-04-27 RX ADMIN — Medication 10 ML: at 07:41

## 2020-04-27 RX ADMIN — Medication 1 CAPSULE: at 09:01

## 2020-04-27 RX ADMIN — DULOXETINE HYDROCHLORIDE 60 MG: 60 CAPSULE, DELAYED RELEASE ORAL at 09:01

## 2020-04-27 RX ADMIN — INSULIN LISPRO 2 UNITS: 100 INJECTION, SOLUTION INTRAVENOUS; SUBCUTANEOUS at 12:26

## 2020-04-27 RX ADMIN — INSULIN GLARGINE 40 UNITS: 100 INJECTION, SOLUTION SUBCUTANEOUS at 08:59

## 2020-04-27 RX ADMIN — BUSPIRONE HYDROCHLORIDE 20 MG: 10 TABLET ORAL at 09:00

## 2020-04-27 NOTE — CDMP QUERY
Pt admitted with shortness of breath, subjective fever and cough. Pt noted to have 2/4 SIRS criteria (WBC and HR) and retested for COVID. If possible, please document in the progress notes and discharge summary if you are evaluating and /or treating any of the following: 
 
? Sepsis, present on admission, d/t false negative COVID-19 (presumptive COVID-19) ? Sepsis, present on admission, source unknown ? SIRS d/t non-infections souce, with organ dysfunction (AMALIA) ? No Sepsis, localized infection only (please specify) ? Sepsis was ruled out (include corresponding diagnosis for patients clinical picture and treatment) ? Other, please specify ? Clinically unable to determine The medical record reflects the following: 
   Risk Factors: 80yo, with HTN, diabeties, and asthma Clinical Indicators: WBC: 15.2 (4/25) and 9.3 (4/27) HR 99 on admission and up to 106 on 4/26 Lactic Acid: 1.6 C-reactive protein: 1.45 (4/25) Altered Mental Status: 4/26 Nurses note [phone] [id number]:  Patient has been demonstrating forgetfulness throughout the day. Patient placed on bed alarm. Will continue to monitor patient Admission blood sugar: 476 on admission Chest x-ray: Decreased bibasilar scarring or atelectasis more likely than pneumonia. No 
evidence of pulmonary edema or pneumothorax. D-dimer: 1.03 
COVID- Negative result on 4/26 patient re-screened Treatment: Antibiotics: zithromax and rocephin 
Daily labs Blood cultures done on admission \"severe sepsis and septic shock bundle initiated\" in patient orders COVID test recheck Thank you, Mary Clinton [phone]

## 2020-04-27 NOTE — PROGRESS NOTES
LUZ MARINA: Plan for discharge home today. Lyft transport has been set-up via [employer] for 5 PM to pick-up patient at main entrance (). RN is aware. Discharge orders pending. Care Management Interventions  PCP Verified by CM: Mychal Rivas MD)  Mode of Transport at Discharge: Other (see comment)(patient may need medicaid cab arranged at discharge)  Transition of Care Consult (CM Consult): Discharge Planning  MyChart Signup: No  Discharge Durable Medical Equipment: No(patient owns cane, walker, and shower chair)  Physical Therapy Consult: No  Occupational Therapy Consult: No  Speech Therapy Consult: No  Current Support Network: Lives Alone, Family Lives Nearby  Confirm Follow Up Transport: Cab  The Patient and/or Patient Representative was Provided with a Choice of Provider and Agrees with the Discharge Plan?: Yes  Freedom of Choice List was Provided with Basic Dialogue that Supports the Patient's Individualized Plan of Care/Goals, Treatment Preferences and Shares the Quality Data Associated with the Providers?: Yes  Discharge Location  Discharge Placement: Home     Reason for Admission:  Shortness of breath                    RUR Score:      16%             Plan for utilizing home health:  No home care orders at this time. Patient has had home health with [facility] in the past     PCP: First and Last name:  Lisa Gaines MD   Name of Practice: [address] [place] [address]   Are you a current patient: Yes/No: Yes   Approximate date of last visit: 3 weeks ago                    Current Advanced Directive/Advance Care Plan: Patient does not have an AMD. She has declined at this time. Transition of Care Plan:  Home when stable. Chart reviewed. CM spoke with patient and completed CM assessment via phone. Patient lives alone and has caregivers through Medicaid 4 days per week for 4 hours per day. Caregivers assist with meals, bathing and dressing.  Patient uses a cane and walker to ambulate. She also has a shower chair. Patient has two supportive sister-in-laws, Kena and Leela Mcbride who live locally and check on her frequently. Patient gets prescriptions filled at [facility] on [address]. Patient stated that a friend provides transportation to appointments, etc. Patient gets social security income and stated that her medicaid covers the cost of all her medications. Patient has used [facility] for home health in the past and has been to [facility] for rehab. A UAI was done in July 2019. Patient stated that she may need a medicaid cab arranged at discharge. CM will continue to follow for discharge planning. 3:30 PM: CM received call from RN. The patient has tested negative for COVID x2 and MD plans to discharge patient home today. Confirmed patient is safe to transport home via cab. CM spoke with patient and discussed IM letter via phone.  CM requested Lyft transport via [address] for 5 PM.     Yolanda Denver, BSW/KRISTAN

## 2020-04-27 NOTE — DISCHARGE INSTRUCTIONS
Discharge Instructions       PATIENT ID: Moira Torres  MRN: [id number]   YOB: 1940    DATE OF ADMISSION: 4/25/2020 10:03 PM    DATE OF DISCHARGE: 4/27/2020    PRIMARY CARE PROVIDER: Rachel Mckeon MD     ATTENDING PHYSICIAN: Slime Brown MD  DISCHARGING PROVIDER: Quincy Diaz MD    To contact this individual call [phone] and ask the  to page. If unavailable ask to be transferred the Adult Hospitalist Department. DISCHARGE DIAGNOSES     Upper respiratory infection    CONSULTATIONS: IP CONSULT TO HOSPITALIST    PROCEDURES/SURGERIES: * No surgery found *    PENDING TEST RESULTS:   At the time of discharge the following test results are still pending: none    FOLLOW UP APPOINTMENTS:   Follow-up Information     Follow up With Specialties Details Why Contact Info    Rachel Mckeon MD Internal Medicine   [address] [place] [address] Route [facility]      Rachel Mckeon MD Internal Medicine In 1 week  [address] [place] [address]  [phone]             ADDITIONAL CARE RECOMMENDATIONS:   · It is important that you take the medication exactly as they are prescribed. · Keep your medication in the bottles provided by the pharmacist and keep a list of the medication names, dosages, and times to be taken in your wallet. · Do not take other medications without consulting your doctor. · No drinking alcohol or driving car or operating machinery if you are on narcotic pain medications. Donot take sedating mediations if you are sleepy or confused. · Fall Precautions  · Keep Well Hydrated  · Report to your medical provider if you feel you have  developed allergies to medications  · Follow up with your PCP or Consultant for medication adjustments and refills  · Monitor for signs of fevers,chills,bleeding,chest pain and seek medical attention if you do so.    ·          DIET: Diabetic Diet      ACTIVITY: Activity as tolerated    WOUND CARE: none    EQUIPMENT needed: none      DISCHARGE MEDICATIONS:   See Medication Reconciliation Form         My Medications      START taking these medications      Instructions Each Dose to Equal Morning Noon Evening Bedtime   amoxicillin-clavulanate 875-125 mg per tablet  Commonly known as: Augmentin    Your last dose was: Your next dose is: Take 1 Tab by mouth two (2) times a day for 5 days. 1 Tab                 ascorbic acid (vitamin C) 500 mg tablet  Commonly known as:  VITAMIN C    Your last dose was: Your next dose is: Take 1 Tab by mouth two (2) times a day. 500 mg                 azithromycin 500 mg Tab  Commonly known as:  ZITHROMAX    Your last dose was: Your next dose is: Take 1 Tab by mouth daily. 500 mg                    CHANGE how you take these medications      Instructions Each Dose to Equal Morning Noon Evening Bedtime   amLODIPine 5 mg tablet  Commonly known as:  NORVASC  What changed:  medication strength    Your last dose was: Your next dose is: Take 1 Tab by mouth daily. 5 mg                    CONTINUE taking these medications      Instructions Each Dose to Equal Morning Noon Evening Bedtime   * albuterol 90 mcg/actuation inhaler  Commonly known as:  PROVENTIL HFA, VENTOLIN HFA, PROAIR HFA    Your last dose was: Your next dose is: Take 2 Puffs by inhalation every four (4) hours as needed for Wheezing. 2 Puff                 * albuterol 2.5 mg /3 mL (0.083 %) Nebu  Commonly known as:  PROVENTIL VENTOLIN    Your last dose was: Your next dose is:          3 mL by Nebulization route every four (4) hours as needed for Wheezing. 2.5 mg                 arformoteroL 15 mcg/2 mL Nebu neb solution  Commonly known as:  BROVANA    Your last dose was: Your next dose is:          2 mL by Nebulization route two (2) times a day.    15 mcg                 aspirin 81 mg chewable tablet    Your last dose was: Your next dose is: Take 1 Tab by mouth daily. 81 mg                 bisacodyL 5 mg EC tablet  Commonly known as:  DULCOLAX    Your last dose was: Your next dose is: Take 1 Tab by mouth daily as needed for Constipation. 5 mg                 budesonide 0.5 mg/2 mL Nbsp  Commonly known as:  PULMICORT    Your last dose was: Your next dose is:          2 mL by Nebulization route two (2) times a day. 500 mcg                 busPIRone 5 mg tablet  Commonly known as:  BUSPAR    Your last dose was: Your next dose is: Take 20 mg by mouth two (2) times a day. 20 mg                 Dexilant 60 mg Cpdb capsule (delayed release)  Generic drug:  dexlansoprazole    Your last dose was: Your next dose is: Take 60 mg by mouth Daily (before breakfast). 60 mg                 DULoxetine 60 mg capsule  Commonly known as:  CYMBALTA    Your last dose was: Your next dose is: Take 60 mg by mouth daily. 60 mg                 fosinopriL 20 mg tablet  Commonly known as:  MONOPRIL    Your last dose was: Your next dose is: Take 20 mg by mouth daily. 20 mg                 ibuprofen 600 mg tablet  Commonly known as:  MOTRIN    Your last dose was: Your next dose is: Take 1 Tab by mouth every six (6) hours as needed for Pain. 600 mg                 insulin glargine 100 unit/mL (3 mL) Inpn  Commonly known as:  Lantus Solostar U-100 Insulin    Your last dose was: Your next dose is:          20 Units by SubCUTAneous route nightly. Indications: diabetes, type 2 diabetes mellitus   20 Units                 mirtazapine 15 mg tablet  Commonly known as:  REMERON    Your last dose was: Your next dose is: Take 15 mg by mouth nightly. 15 mg                 QUEtiapine 25 mg tablet  Commonly known as:  SEROquel    Your last dose was: Your next dose is: Take 25 mg by mouth nightly.    25 mg rosuvastatin 10 mg tablet  Commonly known as:  CRESTOR    Your last dose was: Your next dose is: Take 10 mg by mouth nightly. 10 mg                     * This list has 2 medication(s) that are the same as other medications prescribed for you. Read the directions carefully, and ask your doctor or other care provider to review them with you. STOP taking these medications    Ranexa 500 mg SR tablet  Generic drug:  ranolazine ER              Where to Get Your Medications      These medications were sent to [facility]/pharmacy #[id number] [facility]  [address], [address], [address] [place] [address]    Phone:  [phone]   · amLODIPine 5 mg tablet  · amoxicillin-clavulanate 875-125 mg per tablet  · ascorbic acid (vitamin C) 500 mg tablet  · azithromycin 500 mg Tab         · It is important that you take the medication exactly as they are prescribed. · Keep your medication in the bottles provided by the pharmacist and keep a list of the medication names, dosages, and times to be taken in your wallet. · Do not take other medications without consulting your doctor. NOTIFY YOUR PHYSICIAN FOR ANY OF THE FOLLOWING:   Fever over 101 degrees for 24 hours. Chest pain, shortness of breath, fever, chills, nausea, vomiting, diarrhea, change in mentation, falling, weakness, bleeding. Severe pain or pain not relieved by medications. Or, any other signs or symptoms that you may have questions about. DISPOSITION:  x  Home With:   OT  PT  HH  RN       SNF/Inpatient Rehab/LTAC    Independent/assisted living    Hospice    Other:     CDMP Checked:   Yes x     PROBLEM LIST Updated:  Yes x       Signed:   Megan Barragan MD  4/27/2020  4:25 PM  Advance Care Planning  People with COVID-19 may have no symptoms, mild symptoms, such as fever, cough, and shortness of breath or they may have more severe illness, developing severe and fatal pneumonia.   As a result, Advance Care Planning with attention to naming a health care decision maker (someone you trust to make healthcare decisions for you if you could not speak for yourself) and sharing other health care preferences is important BEFORE a possible health crisis. Please contact your Primary Care Provider to discuss Advance Care Planning. Preventing the Spread of Coronavirus Disease 2019 in Homes and Residential Communities  For the most recent information go to [email or web address]    Prevention steps for People with confirmed or suspected COVID-19 (including persons under investigation) who do not need to be hospitalized  and   People with confirmed COVID-19 who were hospitalized and determined to be medically stable to go home    Your healthcare provider and public health staff will evaluate whether you can be cared for at home. If it is determined that you do not need to be hospitalized and can be isolated at home, you will be monitored by staff from your local or state health department. You should follow the prevention steps below until a healthcare provider or local or state health department says you can return to your normal activities. Stay home except to get medical care  People who are mildly ill with COVID-19 are able to isolate at home during their illness. You should restrict activities outside your home, except for getting medical care. Do not go to work, school, or public areas. Avoid using public transportation, ride-sharing, or taxis. Separate yourself from other people and animals in your home  People: As much as possible, you should stay in a specific room and away from other people in your home. Also, you should use a separate bathroom, if available. Animals: You should restrict contact with pets and other animals while you are sick with COVID-19, just like you would around other people.  Although there have not been reports of pets or other animals becoming sick with COVID-19, it is still recommended that people sick with COVID-19 limit contact with animals until more information is known about the virus. When possible, have another member of your household care for your animals while you are sick. If you are sick with COVID-19, avoid contact with your pet, including petting, snuggling, being kissed or licked, and sharing food. If you must care for your pet or be around animals while you are sick, wash your hands before and after you interact with pets and wear a facemask. Call ahead before visiting your doctor  If you have a medical appointment, call the healthcare provider and tell them that you have or may have COVID-19. This will help the healthcare providers office take steps to keep other people from getting infected or exposed. Wear a facemask  You should wear a facemask when you are around other people (e.g., sharing a room or vehicle) or pets and before you enter a healthcare providers office. If you are not able to wear a facemask (for example, because it causes trouble breathing), then people who live with you should not stay in the same room with you, or they should wear a facemask if they enter your room. Cover your coughs and sneezes  Cover your mouth and nose with a tissue when you cough or sneeze. Throw used tissues in a lined trash can. Immediately wash your hands with soap and water for at least 20 seconds or, if soap and water are not available, clean your hands with an alcohol-based hand  that contains at least 60% alcohol. Clean your hands often  Wash your hands often with soap and water for at least 20 seconds, especially after blowing your nose, coughing, or sneezing; going to the bathroom; and before eating or preparing food. If soap and water are not readily available, use an alcohol-based hand  with at least 60% alcohol, covering all surfaces of your hands and rubbing them together until they feel dry.   Soap and water are the best option if hands are visibly dirty. Avoid touching your eyes, nose, and mouth with unwashed hands. Avoid sharing personal household items  You should not share dishes, drinking glasses, cups, eating utensils, towels, or bedding with other people or pets in your home. After using these items, they should be washed thoroughly with soap and water. Clean all high-touch surfaces everyday  High touch surfaces include counters, tabletops, doorknobs, bathroom fixtures, toilets, phones, keyboards, tablets, and bedside tables. Also, clean any surfaces that may have blood, stool, or body fluids on them. Use a household cleaning spray or wipe, according to the label instructions. Labels contain instructions for safe and effective use of the cleaning product including precautions you should take when applying the product, such as wearing gloves and making sure you have good ventilation during use of the product. Monitor your symptoms  Seek prompt medical attention if your illness is worsening (e.g., difficulty breathing). Before seeking care, call your healthcare provider and tell them that you have, or are being evaluated for, COVID-19. Put on a facemask before you enter the facility. These steps will help the healthcare providers office to keep other people in the office or waiting room from getting infected or exposed. Ask your healthcare provider to call the local or state health department. Persons who are placed under active monitoring or facilitated self-monitoring should follow instructions provided by their local health department or occupational health professionals, as appropriate. When working with your local health department check their available hours. If you have a medical emergency and need to call 911, notify the dispatch personnel that you have, or are being evaluated for COVID-19. If possible, put on a facemask before emergency medical services arrive.   Discontinuing home isolation  Patients with confirmed COVID-19 should remain under home isolation precautions until the risk of secondary transmission to others is thought to be low. The decision to discontinue home isolation precautions should be made on a case-by-case basis, in consultation with healthcare providers and state and local health departments.

## 2020-04-27 NOTE — PROGRESS NOTES
Hospital follow-up PCP virtual transitional care appointment has been scheduled with Dr. Jaskaran Calderon for Friday, 5/1/20 at 10:45 a.m. Pending patient discharge.   Candi Tapia, Care Management Specialist.

## 2020-04-27 NOTE — PROGRESS NOTES
Problem: Diabetes Self-Management  Goal: *Disease process and treatment process  Description: Define diabetes and identify own type of diabetes; list 3 options for treating diabetes. Outcome: Progressing Towards Goal     Problem: Diabetes Self-Management  Goal: *Incorporating nutritional management into lifestyle  Description: Describe effect of type, amount and timing of food on blood glucose; list 3 methods for planning meals. Outcome: Progressing Towards Goal     Problem: Diabetes Self-Management  Goal: *Developing strategies to promote health/change behavior  Description: Define the ABC's of diabetes; identify appropriate screenings, schedule and personal plan for screenings. Outcome: Progressing Towards Goal     Problem: Diabetes Self-Management  Goal: *Using medications safely  Description: State effect of diabetes medications on diabetes; name diabetes medication taking, action and side effects.   Outcome: Progressing Towards Goal

## 2020-04-27 NOTE — PROGRESS NOTES
Verbal shift change report given to ASHLEY CLEMENTE RN (oncoming nurse) by Jose M Orellana RN (offgoing nurse). Report included the following information SBAR, Kardex and MAR.

## 2020-04-27 NOTE — DISCHARGE SUMMARY
Discharge Summary       PATIENT ID: Branden Winters  MRN: [id number]   YOB: 1940    DATE OF ADMISSION: 4/25/2020 10:03 PM    DATE OF DISCHARGE: 04/27/20    PRIMARY CARE PROVIDER: aPris Griffin MD     ATTENDING PHYSICIAN: Reynaldo Watson MD    DISCHARGING PROVIDER: Reynaldo Watson MD    To contact this individual call [phone] and ask the  to page. If unavailable ask to be transferred the Adult Hospitalist Department. CONSULTATIONS: IP CONSULT TO HOSPITALIST    PROCEDURES/SURGERIES: * No surgery found *    ADMITTING [address] [place] [address] COURSE:   Carlos Alberto Junior a 67 y. o. female past medical history significant for hypertension, diabetes, asthma presented emergency room complaining of shortness of breath, productive cough and pleuritic chest pain for about a week.  Patient states that for the last week she started experiencing body aches, chills, subjective fever in the last few days she has been having more productive cough with yellow sputum and shortness of breath.  She reports pain when coughing and with deep inspiration.  She denies any recent travel or sick contact.  She has not been taking any medication for her symptoms.       In the ED she was found to be hemodynamically stable.  Found to have leukocytosis.  Given her symptoms she was started on empiric antibiotic with actinomycin and Rocephin. 1. Shortness of breath- concerned for COVID.    - Chest x-ray- showed bibasilar  scaring or atelectasis. -  Respiratory PCR negative  - Empiric antibiotic with azithromycin and rocephin.  - COVID test pending  - proBNP, procalcitonin, ferritin, LDH and d-dimer are not significant  - Supplemental O2 to keep O2 saturations greater than 92%. - MDI as needed. - Placed on droplet precaution plus contact  - Pt not septic     2. Hypertension-controlled   -Continue home medication. -PRN hydralazine 10 mg every 6 hours for SBP greater than 170.        3. Diabetes mellitus type II: Uncontrolled hyperglycemia with a blood glucose 329.    Continue with sliding scale insulin before meals and at bedtime.  -Accu-Cheks   - Lantus. 40 in the morning and 20 in the evening     4. AMALIA: likely due to acute infection vs uncontrolled hyperglycemia. -Resolved  -Monitor renal function. Avoid nephrotoxic agent.     5. Pseudohyponatremia resolved         PENDING TEST RESULTS:   At the time of discharge the following test results are still pending: none    FOLLOW UP APPOINTMENTS:    Follow-up Information     Follow up With Specialties Details Why Contact Info    Priti Mariano MD Internal Medicine   [address]  [phone]      Priti Mariano MD Internal Medicine In 1 week  [address]  [phone]             ADDITIONAL CARE RECOMMENDATIONS:   · It is important that you take the medication exactly as they are prescribed. · Keep your medication in the bottles provided by the pharmacist and keep a list of the medication names, dosages, and times to be taken in your wallet. · Do not take other medications without consulting your doctor. · No drinking alcohol or driving car or operating machinery if you are on narcotic pain medications. Donot take sedating mediations if you are sleepy or confused. · Fall Precautions  · Keep Well Hydrated  · Report to your medical provider if you feel you have  developed allergies to medications  · Follow up with your PCP or Consultant for medication adjustments and refills  · Monitor for signs of fevers,chills,bleeding,chest pain and seek medical attention if you do so.    ·          DIET: Diabetic Diet      ACTIVITY: Activity as tolerated    WOUND CARE: none    EQUIPMENT needed: none      DISCHARGE MEDICATIONS:  Current Discharge Medication List      START taking these medications    Details   ascorbic acid, vitamin C, (VITAMIN C) 500 mg tablet Take 1 Tab by mouth two (2) times a day. Qty: 60 Tab, Refills: 0         CONTINUE these medications which have CHANGED    Details   amLODIPine (NORVASC) 5 mg tablet Take 1 Tab by mouth daily. Qty: 30 Tab, Refills: 0         CONTINUE these medications which have NOT CHANGED    Details   ibuprofen (MOTRIN) 600 mg tablet Take 1 Tab by mouth every six (6) hours as needed for Pain. Qty: 20 Tab, Refills: 0      insulin glargine (LANTUS SOLOSTAR U-100 INSULIN) 100 unit/mL (3 mL) inpn 20 Units by SubCUTAneous route nightly. Indications: diabetes, type 2 diabetes mellitus  Qty: 1 Adjustable Dose Pre-filled Pen Syringe, Refills: 0      bisacodyl (DULCOLAX) 5 mg EC tablet Take 1 Tab by mouth daily as needed for Constipation. Qty: 10 Tab, Refills: 0      albuterol (PROVENTIL VENTOLIN) 2.5 mg /3 mL (0.083 %) nebulizer solution 3 mL by Nebulization route every four (4) hours as needed for Wheezing. Qty: 60 Each, Refills: 0      arformoterol (BROVANA) 15 mcg/2 mL nebu neb solution 2 mL by Nebulization route two (2) times a day. Qty: 60 Vial, Refills: 0      budesonide (PULMICORT) 0.5 mg/2 mL nbsp 2 mL by Nebulization route two (2) times a day. Qty: 60 Each, Refills: 1      aspirin 81 mg chewable tablet Take 1 Tab by mouth daily. Qty: 30 Tab, Refills: 0      busPIRone (BUSPAR) 5 mg tablet Take 20 mg by mouth two (2) times a day. DULoxetine (CYMBALTA) 60 mg capsule Take 60 mg by mouth daily. mirtazapine (REMERON) 15 mg tablet Take 15 mg by mouth nightly. QUEtiapine (SEROQUEL) 25 mg tablet Take 25 mg by mouth nightly. albuterol (PROVENTIL HFA, VENTOLIN HFA, PROAIR HFA) 90 mcg/actuation inhaler Take 2 Puffs by inhalation every four (4) hours as needed for Wheezing. Qty: 1 Inhaler, Refills: 0      rosuvastatin (CRESTOR) 10 mg tablet Take 10 mg by mouth nightly. Dexlansoprazole (DEXILANT) 60 mg CpDB Take 60 mg by mouth Daily (before breakfast). fosinopril (MONOPRIL) 20 mg tablet Take 20 mg by mouth daily.   Refills: 0 STOP taking these medications       ranolazine ER (RANEXA) 500 mg SR tablet Comments:   Reason for Stopping:                 NOTIFY YOUR PHYSICIAN FOR ANY OF THE FOLLOWING:   Fever over 101 degrees for 24 hours. Chest pain, shortness of breath, fever, chills, nausea, vomiting, diarrhea, change in mentation, falling, weakness, bleeding. Severe pain or pain not relieved by medications. Or, any other signs or symptoms that you may have questions about. DISPOSITION:   x Home With:   OT  PT  HH  RN       Long term SNF/Inpatient Rehab    Independent/assisted living    Hospice    Other:       PATIENT CONDITION AT DISCHARGE:     Functional status    Poor     Deconditioned     Independent      Cognition     Lucid     Forgetful     Dementia      Catheters/lines (plus indication)    Barlcay     PICC     PEG     None      Code status   x  Full code     DNR      PHYSICAL EXAMINATION AT DISCHARGE:  General:          Alert, cooperative, no distress, appears stated age. HEENT:           Atraumatic, anicteric sclerae, pink conjunctivae                          No oral ulcers, mucosa moist, throat clear, dentition fair  Neck:               Supple, symmetrical  Lungs:             Clear to auscultation bilaterally. No Wheezing or Rhonchi. No rales. Chest wall:      No tenderness  No Accessory muscle use. Heart:              Regular  rhythm,  No  murmur   No edema  Abdomen:        Soft, non-tender. Not distended. Bowel sounds normal  Extremities:     No cyanosis. No clubbing,                            Skin turgor normal, Capillary refill normal  Skin:                Not pale. Not Jaundiced  No rashes   Psych:             Not anxious or agitated.   Neurologic:      Alert, moves all extremities, answers questions appropriately and responds to commands       CHRONIC MEDICAL DIAGNOSES:  Problem List as of 4/27/2020 Date Reviewed: 7/5/2019          Codes Class Noted - Resolved    Shortness of breath ICD-10-CM: R06.02  ICD-9-CM: 786.05  4/25/2020 - Present        Suspected Covid-19 Virus Infection ICD-10-CM: R68.89  4/25/2020 - Present        Bilateral pneumonia ICD-10-CM: J18.9  ICD-9-CM: 010  9/24/2019 - Present        Pain due to knee joint prosthesis ([facility] ICD-10-CM: T84.84XA, Z96.659  ICD-9-CM: 996.77, 338.18, V43.65  11/14/2017 - Present        CAD (coronary artery disease) ICD-10-CM: I25.10  ICD-9-CM: 414.00  10/27/2017 - Present        Pancreatitis, acute ICD-10-CM: K85.90  ICD-9-CM: 603.4  9/8/2017 - Present        Epigastric abdominal pain ICD-10-CM: R10.13  ICD-9-CM: 789.06  9/8/2017 - Present        Painful total knee replacement ([facility] ICD-10-CM: T84.84XA, Z96.659  ICD-9-CM: 996.77, V43.65  8/9/2017 - Present        Status post right knee replacement ICD-10-CM: Z96.651  ICD-9-CM: V43.65  8/9/2017 - Present        Leukocytosis, unspecified ICD-10-CM: D72.829  ICD-9-CM: 288.60  4/6/2015 - Present        DM (diabetes mellitus) ([facility] ICD-10-CM: E11.9  ICD-9-CM: 250.00  4/6/2015 - Present        HTN (hypertension) ICD-10-CM: I10  ICD-9-CM: 401.9  4/6/2015 - Present        Arthritis ICD-10-CM: M19.90  ICD-9-CM: 716.90  4/6/2015 - Present        Chest pain, unspecified ICD-10-CM: R07.9  ICD-9-CM: 786.50  9/12/2013 - Present        RESOLVED: Chest pain ICD-10-CM: R07.9  ICD-9-CM: 786.50  7/5/2019 - 7/12/2019        RESOLVED: Abnormal ECG ICD-10-CM: R94.31  ICD-9-CM: 794.31  3/9/2019 - 3/11/2019        RESOLVED: Abnormal EKG ICD-10-CM: R94.31  ICD-9-CM: 794.31  3/8/2019 - 3/11/2019        RESOLVED: Gastric polyp ICD-10-CM: K31.7  ICD-9-CM: 211.1  9/13/2013 - 10/27/2017        RESOLVED: Hemorrhoids ICD-10-CM: K64.9  ICD-9-CM: 455.6  6/13/2013 - 10/27/2017        RESOLVED: Anal polyp ICD-10-CM: K62.0  ICD-9-CM: 569.0  6/13/2013 - 10/27/2017              Greater than 45minutes were spent with the patient on counseling and coordination of care    Signed:   Inder Dunn MD  4/27/2020  4:28 PM

## 2020-04-27 NOTE — PROGRESS NOTES
I have reviewed discharge instructions with the patient. The patient verbalized understanding. Patient discharging home via [facility].

## 2020-04-28 ENCOUNTER — PATIENT OUTREACH (OUTPATIENT)
Dept: CARDIOLOGY CLINIC | Age: 80
End: 2020-04-28

## 2020-04-29 NOTE — PROGRESS NOTES
Patient contacted regarding recent discharge and COVID-19 risk   Care Transition Nurse/ Ambulatory Care Manager contacted the patient by telephone to perform post discharge assessment. Verified name and  with patient as identifiers. Patient has following risk factors of: asthma. CTN/ACM reviewed discharge instructions, medical action plan and red flags related to discharge diagnosis. Reviewed and educated them on any new and changed medications related to discharge diagnosis. Advised obtaining a 90-day supply of all daily and as-needed medications. Education provided regarding infection prevention, and signs and symptoms of COVID-19 and when to seek medical attention with patient who verbalized understanding. Discussed exposure protocols and quarantine from [address] you at higher risk for severe illness  and given an opportunity for questions and concerns. The patient agrees to contact the COVID-19 hotline [phone] or PCP office for questions related to their healthcare. CTN/ACM provided contact information for future reference. From CDC: Are you at higher risk for severe illness?  Wash your hands often.  Avoid close contact (6 feet, which is about two arm lengths) with people who are sick.  Put distance between yourself and other people if COVID-19 is spreading in your community.  Clean and disinfect frequently touched surfaces.  Avoid all cruise travel and non-essential air travel.  Call your healthcare professional if you have concerns about COVID-19 and your underlying condition or if you are sick. For more information on steps you can take to protect yourself, see CDC's How to Protect Yourself      Patient/family/caregiver given information for [facility] and agrees to enroll no  Patient's preferred e-mail:  No computer or smart phone  . Plan for follow-up call in 7-14 days based on severity of symptoms and risk factors. <<-----Click on this checkbox to enter Pre-Op Dx

## 2020-05-01 LAB
BACTERIA SPEC CULT: NORMAL
BACTERIA SPEC CULT: NORMAL
SERVICE CMNT-IMP: NORMAL
SERVICE CMNT-IMP: NORMAL

## 2020-05-13 ENCOUNTER — PATIENT OUTREACH (OUTPATIENT)
Dept: CARDIOLOGY CLINIC | Age: 80
End: 2020-05-13

## 2020-05-13 NOTE — PROGRESS NOTES
Patient resolved from Transition of Care episode on 5/13/20  Patient/family has been provided the following resources and education related to COVID-19:                         Signs, symptoms and red flags related to COVID-19            CDC exposure and quarantine guidelines            [facility] exposure contact - [phone]            Contact for their local Department of Health                 Patient currently reports that the following symptoms have improved:  no worsening symptoms. No further outreach scheduled with this CTN/ACM. Episode of Care resolved. Patient has this CTN/ACM contact information if future needs arise.

## 2021-05-29 ENCOUNTER — HOSPITAL ENCOUNTER (INPATIENT)
Age: 81
LOS: 5 days | Discharge: NURSING, REHAB OR LONG-TERM CARE | DRG: 637 | End: 2021-06-03
Attending: EMERGENCY MEDICINE | Admitting: HOSPITALIST
Payer: MEDICARE

## 2021-05-29 ENCOUNTER — APPOINTMENT (OUTPATIENT)
Dept: CT IMAGING | Age: 81
DRG: 637 | End: 2021-05-29
Attending: EMERGENCY MEDICINE
Payer: MEDICARE

## 2021-05-29 DIAGNOSIS — R94.31 PROLONGED QT INTERVAL: ICD-10-CM

## 2021-05-29 DIAGNOSIS — R73.9 HYPERGLYCEMIA: Primary | ICD-10-CM

## 2021-05-29 DIAGNOSIS — N13.30 HYDRONEPHROSIS, UNSPECIFIED HYDRONEPHROSIS TYPE: ICD-10-CM

## 2021-05-29 DIAGNOSIS — R10.32 ABDOMINAL PAIN, LLQ (LEFT LOWER QUADRANT): ICD-10-CM

## 2021-05-29 DIAGNOSIS — E11.65 UNCONTROLLED TYPE 2 DIABETES MELLITUS WITH HYPERGLYCEMIA (HCC): ICD-10-CM

## 2021-05-29 DIAGNOSIS — K85.90 ACUTE PANCREATITIS, UNSPECIFIED COMPLICATION STATUS, UNSPECIFIED PANCREATITIS TYPE: ICD-10-CM

## 2021-05-29 LAB
ALBUMIN SERPL-MCNC: 3.2 G/DL (ref 3.5–5)
ALBUMIN/GLOB SERPL: 0.8 {RATIO} (ref 1.1–2.2)
ALP SERPL-CCNC: 152 U/L (ref 45–117)
ALT SERPL-CCNC: 19 U/L (ref 12–78)
ANION GAP SERPL CALC-SCNC: 7 MMOL/L (ref 5–15)
APPEARANCE UR: CLEAR
AST SERPL-CCNC: 8 U/L (ref 15–37)
BASOPHILS # BLD: 0.1 K/UL (ref 0–0.1)
BASOPHILS NFR BLD: 1 % (ref 0–1)
BILIRUB SERPL-MCNC: 0.4 MG/DL (ref 0.2–1)
BILIRUB UR QL: NEGATIVE
BUN SERPL-MCNC: 12 MG/DL (ref 6–20)
BUN/CREAT SERPL: 13 (ref 12–20)
CALCIUM SERPL-MCNC: 8.6 MG/DL (ref 8.5–10.1)
CHLORIDE SERPL-SCNC: 102 MMOL/L (ref 97–108)
CO2 SERPL-SCNC: 25 MMOL/L (ref 21–32)
COLOR UR: ABNORMAL
COMMENT, HOLDF: NORMAL
CREAT SERPL-MCNC: 0.93 MG/DL (ref 0.55–1.02)
DIFFERENTIAL METHOD BLD: ABNORMAL
EOSINOPHIL # BLD: 0.1 K/UL (ref 0–0.4)
EOSINOPHIL NFR BLD: 1 % (ref 0–7)
ERYTHROCYTE [DISTWIDTH] IN BLOOD BY AUTOMATED COUNT: 14.6 % (ref 11.5–14.5)
EST. AVERAGE GLUCOSE BLD GHB EST-MCNC: ABNORMAL MG/DL
GLOBULIN SER CALC-MCNC: 3.8 G/DL (ref 2–4)
GLUCOSE BLD STRIP.AUTO-MCNC: 268 MG/DL (ref 65–117)
GLUCOSE BLD STRIP.AUTO-MCNC: 276 MG/DL (ref 65–117)
GLUCOSE BLD STRIP.AUTO-MCNC: 326 MG/DL (ref 65–117)
GLUCOSE BLD STRIP.AUTO-MCNC: 334 MG/DL (ref 65–117)
GLUCOSE BLD STRIP.AUTO-MCNC: 394 MG/DL (ref 65–117)
GLUCOSE BLD STRIP.AUTO-MCNC: 444 MG/DL (ref 65–117)
GLUCOSE SERPL-MCNC: 424 MG/DL (ref 65–100)
GLUCOSE UR STRIP.AUTO-MCNC: >1000 MG/DL
HBA1C MFR BLD: >14 % (ref 4–5.6)
HCT VFR BLD AUTO: 40.1 % (ref 35–47)
HGB BLD-MCNC: 12.9 G/DL (ref 11.5–16)
HGB UR QL STRIP: NEGATIVE
IMM GRANULOCYTES # BLD AUTO: 0.1 K/UL (ref 0–0.04)
IMM GRANULOCYTES NFR BLD AUTO: 1 % (ref 0–0.5)
KETONES UR QL STRIP.AUTO: NEGATIVE MG/DL
LACTATE SERPL-SCNC: 1 MMOL/L (ref 0.4–2)
LEUKOCYTE ESTERASE UR QL STRIP.AUTO: NEGATIVE
LIPASE SERPL-CCNC: 929 U/L (ref 73–393)
LYMPHOCYTES # BLD: 3 K/UL (ref 0.8–3.5)
LYMPHOCYTES NFR BLD: 24 % (ref 12–49)
MAGNESIUM SERPL-MCNC: 2.2 MG/DL (ref 1.6–2.4)
MCH RBC QN AUTO: 28 PG (ref 26–34)
MCHC RBC AUTO-ENTMCNC: 32.2 G/DL (ref 30–36.5)
MCV RBC AUTO: 87.2 FL (ref 80–99)
MONOCYTES # BLD: 0.9 K/UL (ref 0–1)
MONOCYTES NFR BLD: 7 % (ref 5–13)
NEUTS SEG # BLD: 8.3 K/UL (ref 1.8–8)
NEUTS SEG NFR BLD: 66 % (ref 32–75)
NITRITE UR QL STRIP.AUTO: NEGATIVE
NRBC # BLD: 0 K/UL (ref 0–0.01)
NRBC BLD-RTO: 0 PER 100 WBC
PH UR STRIP: 6.5 [PH] (ref 5–8)
PLATELET # BLD AUTO: 271 K/UL (ref 150–400)
PMV BLD AUTO: 11.4 FL (ref 8.9–12.9)
POTASSIUM SERPL-SCNC: 4 MMOL/L (ref 3.5–5.1)
PROT SERPL-MCNC: 7 G/DL (ref 6.4–8.2)
PROT UR STRIP-MCNC: NEGATIVE MG/DL
RBC # BLD AUTO: 4.6 M/UL (ref 3.8–5.2)
SAMPLES BEING HELD,HOLD: NORMAL
SERVICE CMNT-IMP: ABNORMAL
SODIUM SERPL-SCNC: 134 MMOL/L (ref 136–145)
SP GR UR REFRACTOMETRY: 1.01
TROPONIN I SERPL-MCNC: <0.05 NG/ML
UR CULT HOLD, URHOLD: NORMAL
UROBILINOGEN UR QL STRIP.AUTO: 0.2 EU/DL (ref 0.2–1)
WBC # BLD AUTO: 12.5 K/UL (ref 3.6–11)

## 2021-05-29 PROCEDURE — 74176 CT ABD & PELVIS W/O CONTRAST: CPT

## 2021-05-29 PROCEDURE — 74011250636 HC RX REV CODE- 250/636: Performed by: HOSPITALIST

## 2021-05-29 PROCEDURE — 93005 ELECTROCARDIOGRAM TRACING: CPT

## 2021-05-29 PROCEDURE — 74011000250 HC RX REV CODE- 250: Performed by: HOSPITALIST

## 2021-05-29 PROCEDURE — 80053 COMPREHEN METABOLIC PANEL: CPT

## 2021-05-29 PROCEDURE — 94640 AIRWAY INHALATION TREATMENT: CPT

## 2021-05-29 PROCEDURE — 65660000000 HC RM CCU STEPDOWN

## 2021-05-29 PROCEDURE — 36415 COLL VENOUS BLD VENIPUNCTURE: CPT

## 2021-05-29 PROCEDURE — 82962 GLUCOSE BLOOD TEST: CPT

## 2021-05-29 PROCEDURE — 85025 COMPLETE CBC W/AUTO DIFF WBC: CPT

## 2021-05-29 PROCEDURE — 84484 ASSAY OF TROPONIN QUANT: CPT

## 2021-05-29 PROCEDURE — 83605 ASSAY OF LACTIC ACID: CPT

## 2021-05-29 PROCEDURE — 99285 EMERGENCY DEPT VISIT HI MDM: CPT

## 2021-05-29 PROCEDURE — 74011250636 HC RX REV CODE- 250/636: Performed by: EMERGENCY MEDICINE

## 2021-05-29 PROCEDURE — 74011250637 HC RX REV CODE- 250/637: Performed by: HOSPITALIST

## 2021-05-29 PROCEDURE — 74011636637 HC RX REV CODE- 636/637: Performed by: NURSE PRACTITIONER

## 2021-05-29 PROCEDURE — 74011636637 HC RX REV CODE- 636/637: Performed by: HOSPITALIST

## 2021-05-29 PROCEDURE — 83036 HEMOGLOBIN GLYCOSYLATED A1C: CPT

## 2021-05-29 PROCEDURE — 94664 DEMO&/EVAL PT USE INHALER: CPT

## 2021-05-29 PROCEDURE — 81003 URINALYSIS AUTO W/O SCOPE: CPT

## 2021-05-29 PROCEDURE — 83690 ASSAY OF LIPASE: CPT

## 2021-05-29 PROCEDURE — 83735 ASSAY OF MAGNESIUM: CPT

## 2021-05-29 RX ORDER — SODIUM CHLORIDE 0.9 % (FLUSH) 0.9 %
5-40 SYRINGE (ML) INJECTION EVERY 8 HOURS
Status: DISCONTINUED | OUTPATIENT
Start: 2021-05-29 | End: 2021-06-03 | Stop reason: HOSPADM

## 2021-05-29 RX ORDER — SODIUM CHLORIDE 0.9 % (FLUSH) 0.9 %
5-40 SYRINGE (ML) INJECTION AS NEEDED
Status: DISCONTINUED | OUTPATIENT
Start: 2021-05-29 | End: 2021-06-03 | Stop reason: HOSPADM

## 2021-05-29 RX ORDER — INSULIN LISPRO 100 [IU]/ML
5 INJECTION, SOLUTION INTRAVENOUS; SUBCUTANEOUS ONCE
Status: COMPLETED | OUTPATIENT
Start: 2021-05-29 | End: 2021-05-29

## 2021-05-29 RX ORDER — INSULIN GLARGINE 100 [IU]/ML
20 INJECTION, SOLUTION SUBCUTANEOUS 2 TIMES DAILY
Status: DISCONTINUED | OUTPATIENT
Start: 2021-05-29 | End: 2021-06-01

## 2021-05-29 RX ORDER — INSULIN GLARGINE 100 [IU]/ML
20 INJECTION, SOLUTION SUBCUTANEOUS 2 TIMES DAILY
Status: ON HOLD | COMMUNITY
End: 2021-06-03 | Stop reason: SDUPTHER

## 2021-05-29 RX ORDER — ONDANSETRON 2 MG/ML
4 INJECTION INTRAMUSCULAR; INTRAVENOUS
Status: DISCONTINUED | OUTPATIENT
Start: 2021-05-29 | End: 2021-06-03 | Stop reason: HOSPADM

## 2021-05-29 RX ORDER — GUAIFENESIN 100 MG/5ML
81 LIQUID (ML) ORAL DAILY
Status: DISCONTINUED | OUTPATIENT
Start: 2021-05-30 | End: 2021-06-03 | Stop reason: HOSPADM

## 2021-05-29 RX ORDER — BUSPIRONE HYDROCHLORIDE 10 MG/1
20 TABLET ORAL 2 TIMES DAILY
Status: DISCONTINUED | OUTPATIENT
Start: 2021-05-29 | End: 2021-06-03 | Stop reason: HOSPADM

## 2021-05-29 RX ORDER — POLYETHYLENE GLYCOL 3350 17 G/17G
17 POWDER, FOR SOLUTION ORAL DAILY PRN
Status: DISCONTINUED | OUTPATIENT
Start: 2021-05-29 | End: 2021-06-03 | Stop reason: HOSPADM

## 2021-05-29 RX ORDER — PANTOPRAZOLE SODIUM 40 MG/1
40 TABLET, DELAYED RELEASE ORAL
Status: DISCONTINUED | OUTPATIENT
Start: 2021-05-30 | End: 2021-05-29

## 2021-05-29 RX ORDER — PANTOPRAZOLE SODIUM 40 MG/1
40 TABLET, DELAYED RELEASE ORAL
Status: DISCONTINUED | OUTPATIENT
Start: 2021-05-30 | End: 2021-06-03 | Stop reason: HOSPADM

## 2021-05-29 RX ORDER — INSULIN GLARGINE 100 [IU]/ML
0.4 INJECTION, SOLUTION SUBCUTANEOUS
Status: DISCONTINUED | OUTPATIENT
Start: 2021-05-29 | End: 2021-05-29

## 2021-05-29 RX ORDER — SODIUM CHLORIDE 9 MG/ML
125 INJECTION, SOLUTION INTRAVENOUS CONTINUOUS
Status: DISCONTINUED | OUTPATIENT
Start: 2021-05-29 | End: 2021-05-30

## 2021-05-29 RX ORDER — ACETAMINOPHEN 650 MG/1
650 SUPPOSITORY RECTAL
Status: DISCONTINUED | OUTPATIENT
Start: 2021-05-29 | End: 2021-06-03 | Stop reason: HOSPADM

## 2021-05-29 RX ORDER — MORPHINE SULFATE 2 MG/ML
1 INJECTION, SOLUTION INTRAMUSCULAR; INTRAVENOUS
Status: DISCONTINUED | OUTPATIENT
Start: 2021-05-29 | End: 2021-06-03 | Stop reason: HOSPADM

## 2021-05-29 RX ORDER — DULOXETIN HYDROCHLORIDE 60 MG/1
60 CAPSULE, DELAYED RELEASE ORAL DAILY
Status: DISCONTINUED | OUTPATIENT
Start: 2021-05-30 | End: 2021-06-03 | Stop reason: HOSPADM

## 2021-05-29 RX ORDER — QUETIAPINE FUMARATE 25 MG/1
25 TABLET, FILM COATED ORAL
Status: DISCONTINUED | OUTPATIENT
Start: 2021-05-29 | End: 2021-06-03 | Stop reason: HOSPADM

## 2021-05-29 RX ORDER — BUDESONIDE 0.5 MG/2ML
500 INHALANT ORAL
Status: DISCONTINUED | OUTPATIENT
Start: 2021-05-29 | End: 2021-06-03 | Stop reason: HOSPADM

## 2021-05-29 RX ORDER — ROSUVASTATIN CALCIUM 10 MG/1
10 TABLET, COATED ORAL
Status: DISCONTINUED | OUTPATIENT
Start: 2021-05-29 | End: 2021-06-03 | Stop reason: HOSPADM

## 2021-05-29 RX ORDER — INSULIN LISPRO 100 [IU]/ML
INJECTION, SOLUTION INTRAVENOUS; SUBCUTANEOUS
Status: DISCONTINUED | OUTPATIENT
Start: 2021-05-29 | End: 2021-06-03 | Stop reason: HOSPADM

## 2021-05-29 RX ORDER — INSULIN GLARGINE 100 [IU]/ML
20 INJECTION, SOLUTION SUBCUTANEOUS
Status: DISCONTINUED | OUTPATIENT
Start: 2021-05-29 | End: 2021-05-29

## 2021-05-29 RX ORDER — MAGNESIUM SULFATE 100 %
4 CRYSTALS MISCELLANEOUS AS NEEDED
Status: DISCONTINUED | OUTPATIENT
Start: 2021-05-29 | End: 2021-06-03 | Stop reason: HOSPADM

## 2021-05-29 RX ORDER — PROMETHAZINE HYDROCHLORIDE 25 MG/1
12.5 TABLET ORAL
Status: DISCONTINUED | OUTPATIENT
Start: 2021-05-29 | End: 2021-06-03 | Stop reason: HOSPADM

## 2021-05-29 RX ORDER — BUDESONIDE 0.5 MG/2ML
500 INHALANT ORAL 2 TIMES DAILY
Status: DISCONTINUED | OUTPATIENT
Start: 2021-05-29 | End: 2021-05-29

## 2021-05-29 RX ORDER — ACETAMINOPHEN 325 MG/1
650 TABLET ORAL
Status: DISCONTINUED | OUTPATIENT
Start: 2021-05-29 | End: 2021-06-03 | Stop reason: HOSPADM

## 2021-05-29 RX ORDER — ENOXAPARIN SODIUM 100 MG/ML
40 INJECTION SUBCUTANEOUS DAILY
Status: DISCONTINUED | OUTPATIENT
Start: 2021-05-30 | End: 2021-06-03 | Stop reason: HOSPADM

## 2021-05-29 RX ORDER — DEXTROSE 50 % IN WATER (D50W) INTRAVENOUS SYRINGE
25-50 AS NEEDED
Status: DISCONTINUED | OUTPATIENT
Start: 2021-05-29 | End: 2021-06-03 | Stop reason: HOSPADM

## 2021-05-29 RX ORDER — ALBUTEROL SULFATE 0.83 MG/ML
2.5 SOLUTION RESPIRATORY (INHALATION)
Status: DISCONTINUED | OUTPATIENT
Start: 2021-05-29 | End: 2021-06-03 | Stop reason: HOSPADM

## 2021-05-29 RX ORDER — MIRTAZAPINE 15 MG/1
15 TABLET, FILM COATED ORAL
Status: DISCONTINUED | OUTPATIENT
Start: 2021-05-29 | End: 2021-06-03 | Stop reason: HOSPADM

## 2021-05-29 RX ADMIN — ROSUVASTATIN CALCIUM 10 MG: 10 TABLET, COATED ORAL at 21:42

## 2021-05-29 RX ADMIN — ACETAMINOPHEN 650 MG: 325 TABLET ORAL at 16:12

## 2021-05-29 RX ADMIN — ACETAMINOPHEN 650 MG: 325 TABLET ORAL at 21:42

## 2021-05-29 RX ADMIN — BUSPIRONE HYDROCHLORIDE 20 MG: 10 TABLET ORAL at 17:39

## 2021-05-29 RX ADMIN — MIRTAZAPINE 15 MG: 15 TABLET, FILM COATED ORAL at 21:42

## 2021-05-29 RX ADMIN — Medication 10 ML: at 14:00

## 2021-05-29 RX ADMIN — INSULIN LISPRO 5 UNITS: 100 INJECTION, SOLUTION INTRAVENOUS; SUBCUTANEOUS at 16:30

## 2021-05-29 RX ADMIN — BUDESONIDE 500 MCG: 0.5 INHALANT RESPIRATORY (INHALATION) at 19:56

## 2021-05-29 RX ADMIN — INSULIN LISPRO 5 UNITS: 100 INJECTION, SOLUTION INTRAVENOUS; SUBCUTANEOUS at 22:29

## 2021-05-29 RX ADMIN — SODIUM CHLORIDE 125 ML/HR: 900 INJECTION, SOLUTION INTRAVENOUS at 13:23

## 2021-05-29 RX ADMIN — SODIUM CHLORIDE 1000 ML: 9 INJECTION, SOLUTION INTRAVENOUS at 09:30

## 2021-05-29 RX ADMIN — Medication 10 ML: at 22:30

## 2021-05-29 RX ADMIN — QUETIAPINE FUMARATE 25 MG: 25 TABLET ORAL at 21:42

## 2021-05-29 RX ADMIN — INSULIN GLARGINE 20 UNITS: 100 INJECTION, SOLUTION SUBCUTANEOUS at 17:40

## 2021-05-29 NOTE — PROGRESS NOTES
Verbal shift change report given to Azul anaya (oncoming nurse) by Senait Olivo (offgoing nurse). Report included the following information SBAR, Kardex, ED Summary, Intake/Output, MAR, Recent Results and Med Rec Status.

## 2021-05-29 NOTE — ROUTINE PROCESS
TRANSFER - OUT REPORT: 
 
Verbal report given to José Luis Sepulveda on Virginia Mathis  being transferred to  for routine progression of care Report consisted of patients Situation, Background, Assessment and  
Recommendations(SBAR). Information from the following report(s) SBAR was reviewed with the receiving nurse. Lines:  
Peripheral IV 05/29/21 Left Antecubital (Active) Site Assessment Clean, dry, & intact 05/29/21 1130 Phlebitis Assessment 0 05/29/21 1130 Infiltration Assessment 0 05/29/21 1130 Dressing Status Clean, dry, & intact 05/29/21 1130 Dressing Type Transparent 05/29/21 1130 Hub Color/Line Status Pink 05/29/21 1130 Opportunity for questions and clarification was provided. Patient transported with: 
 [employer]

## 2021-05-29 NOTE — ED PROVIDER NOTES
Patient is a 80-year-old female with past medical history significant for coronary artery disease, chronic pain, insulin-dependent diabetes, GERD, kidney stones, hypertension, hyperlipidemia who is status post cholecystectomy in the past.  She presents to the ED via EMS whom she said she called due to just feeling generally unwell. She states that for the past 2 weeks she has been having some left lower quadrant abdominal pain. She also notes that her sugars have been running high in the 300s or so. When rescue squad about private tonight her sugars were per her report in the 500s. Had fever in the low 100s range few days ago but not since. Denies any vomiting but states that she is nauseated and does not feel like eating. Patient also notes some constipation but denies any blood in the stool. She states she does have a history of diverticulitis but denies history of congestive heart failure. She states she has chronic swelling in her right leg since the surgery on her knee but denies that this is any different.   Denies any dysuria or odor to the urine she does state that urinating seems to increase her abdominal pain somewhat           Past Medical History:   Diagnosis Date    Anal polyp 6/13/2013    Arthritis     Asthma     CAD (coronary artery disease) 10/27/2017    Cataract     Chronic pain     knee - right/back    Diabetes ([facility]     GERD (gastroesophageal reflux disease)     Hemorrhoids 6/13/2013    History of kidney stones     Hypertension     Ill-defined condition     abdominal pain and burning    Other ill-defined conditions(799.89)     high cholesterol       Past Surgical History:   Procedure Laterality Date    COLONOSCOPY  2/28/2013         EGD  2/28/2013         HX CATARACT REMOVAL Bilateral     HX CHOLECYSTECTOMY  6-2015    HX GI  6/27/13    anal polyps removed    HX HEENT      laser surgery for blood clot in right eye    HX KNEE REPLACEMENT      right    HX OTHER SURGICAL 4-2-14    lap gastrotomy and removal of polyp -  - not cancerous per pt    HX ALAN AND BSO      HX TONSILLECTOMY      HX UROLOGICAL      \"laser\" surgery for kidney stone    NEUROLOGICAL PROCEDURE UNLISTED  1990    tumor at back of neck, benign         Family History:   Problem Relation Age of Onset    Cancer Mother         colon    Diabetes Brother     Other Sister         GI BLEED    Heart Disease Brother     Heart Attack Brother     Heart Disease Brother     Heart Disease Brother     Schizophrenia Son     Anesth Problems Neg Hx        Social History     Socioeconomic History    Marital status:      Spouse name: Not on file    Number of children: Not on file    Years of education: Not on file    Highest education level: Not on file   Occupational History    Not on file   Tobacco Use    Smoking status: Never Smoker    Smokeless tobacco: Never Used    Tobacco comment: age 13   Substance and Sexual Activity    Alcohol use: No    Drug use: No    Sexual activity: Never   Other Topics Concern    Not on file   Social History Narrative    Not on file     Social Determinants of Health     Financial Resource Strain:     Difficulty of Paying Living Expenses:    Food Insecurity:     Worried About Running Out of Food in the Last Year:     [address] in the Last Year:    Transportation Needs:     Lack of Transportation (Medical):      Lack of Transportation (Non-Medical):    Physical Activity:     Days of Exercise per Week:     Minutes of Exercise per Session:    Stress:     Feeling of Stress :    Social Connections:     Frequency of Communication with Friends and Family:     Frequency of Social Gatherings with Friends and Family:     Attends [employer] Services:     Active Member of Clubs or Organizations:     Attends Club or Organization Meetings:     Marital Status:    Intimate Partner Violence:     Fear of Current or Ex-Partner:     Emotionally Abused:     Physically Abused:     Sexually Abused: ALLERGIES: Seafood [shellfish containing products]    Review of Systems   Constitutional: Positive for activity change, appetite change, fatigue and fever. Negative for diaphoresis. HENT: Negative for drooling. Eyes: Negative for photophobia. Respiratory: Negative for shortness of breath. Cardiovascular: Negative for chest pain. Gastrointestinal: Positive for abdominal pain, constipation and nausea. Negative for anal bleeding, blood in stool, diarrhea, rectal pain and vomiting. Genitourinary: Positive for frequency. Negative for dysuria and hematuria. Musculoskeletal: Negative for neck pain and neck stiffness. Skin: Negative for rash and wound. Neurological: Negative for seizures, syncope, facial asymmetry and headaches. Hematological: Does not bruise/bleed easily. Psychiatric/Behavioral: Negative. There were no vitals filed for this visit. Physical Exam  Vitals and nursing note reviewed. Constitutional:       Appearance: Normal appearance. She is not toxic-appearing. Comments: Elderly   HENT:      Head: Normocephalic. Right Ear: External ear normal.      Left Ear: External ear normal.      Nose:      Comments: Mask in place  Eyes:      General: No scleral icterus. Cardiovascular:      Rate and Rhythm: Normal rate. Pulses: Normal pulses. Pulmonary:      Effort: Pulmonary effort is normal.      Breath sounds: Normal breath sounds. Abdominal:      Palpations: Abdomen is soft. Tenderness: There is abdominal tenderness in the left upper quadrant and left lower quadrant. There is no guarding or rebound. Musculoskeletal:         General: No deformity. Cervical back: Neck supple. Right lower leg: Edema (Trace) present. Comments: Well-healed anterior scar right leg proximal to and anterior to the knee   Skin:     General: Skin is warm and dry.    Neurological:      Mental Status: She is alert and oriented to person, place, and time. Psychiatric:         Mood and Affect: Mood normal.         Behavior: Behavior normal.         Thought Content: Thought content normal.         Judgment: Judgment normal.          MDM  Number of Diagnoses or Management Options  Abdominal pain, LLQ (left lower quadrant): new and requires workup  Acute pancreatitis, unspecified complication status, unspecified pancreatitis type: new and requires workup  Hyperglycemia: established and worsening  Prolonged QT interval: new and requires workup     Amount and/or Complexity of Data Reviewed  Clinical lab tests: reviewed and ordered  Tests in the radiology section of CPT®: reviewed and ordered  Decide to obtain previous medical records or to obtain history from someone other than the patient: yes  Discuss the patient with other providers: yes    Risk of Complications, Morbidity, and/or Mortality  Presenting problems: moderate    Patient Progress  Patient progress: improved    ED Course as of May 29 1242   Sat May 29, 2021   0938 EKG obtained at 934 showing normal sinus rhythm, rate 84, prolonged QT, no other obvious changes. [MR]   1475 I do not suspect DKA as anion gap is 7    [JE]      ED Course User Index  [JE] Gabriela Mcguire MD       Procedures      Perfect Serve Consult for Admission  12:42 PM    ED Room Number: ER09/09  Patient Name and age:  Olvin Kern 79 y.o.  female  Working Diagnosis:   1. Hyperglycemia    2. Prolonged QT interval    3. Abdominal pain, LLQ (left lower quadrant)    4. Acute pancreatitis, unspecified complication status, unspecified pancreatitis type    5.  Hydronephrosis, unspecified hydronephrosis type        COVID-19 Suspicion:  no  Sepsis present:  no  Reassessment needed: no  Code Status:  Full Code  Readmission: no  Isolation Requirements:  no  Recommended Level of Care:  telemetry  Department:[facility] Adult ED - 21   Other: Patient is a 71-year-old female who presents the ER for evaluation of elevated sugar, and abdominal pain. She states she is had left-sided abdominal pain for about 2 weeks and found to have pancreatitis with lipase in the 900s. Patient also with some nonspecific hydronephrosis on the left but no evidence of stone or urinary tract infection. Patient not in DKA. No ketones in urine and gap not elevated.

## 2021-05-29 NOTE — H&P
History & Physical    Primary Care Provider: Colin Abdi MD  Source of Information: Patient     Chief complaint: \"LLQ pain\"    History of Presenting Illness:   Benoit Macias is a 79 y.o. female with past medical history of hypertension, diabetes mellitus, who presents to the ED with left lower quadrant abdominal pain. She tells me that she thought she was having an asthmatic attack. She is also complaining of left lower quadrant abdominal pain that has been ongoing for the past 2 weeks. She denies any fever or chills. She reports blood sugar has been uncontrolled for a while. She is on Lantus 20 units twice daily    She denies chest pain, cough palpitations or dysuria  In the ER she was found to have uncontrolled blood sugar which ranged in the mid 400s    CT scan of the abdomen without contrast performed showed mild left hydroutero nephrosis of uncertain etiology. She was bolused with 1 L of normal saline intravenous fluid and administered 8 units of insulin regular. The hospitalist service has been consulted for hospital admission and for evaluation       Review of Systems:  A comprehensive review of systems was negative except for that written in the History of Present Illness.      Past Medical History:   Diagnosis Date    Anal polyp 6/13/2013    Arthritis     Asthma     CAD (coronary artery disease) 10/27/2017    Cataract     Chronic pain     knee - right/back    Diabetes ([address]     GERD (gastroesophageal reflux disease)     Hemorrhoids 6/13/2013    History of kidney stones     Hypertension     Ill-defined condition     abdominal pain and burning    Other ill-defined conditions(799.89)     high cholesterol      Past Surgical History:   Procedure Laterality Date    COLONOSCOPY  2/28/2013         EGD  2/28/2013         HX CATARACT REMOVAL Bilateral     HX CHOLECYSTECTOMY  6-2015    HX GI  6/27/13    anal polyps removed    HX HEENT      laser surgery for blood clot in right eye    HX KNEE REPLACEMENT      right    HX OTHER SURGICAL  4-2-14    lap gastrotomy and removal of polyp -  - not cancerous per pt    HX ALAN AND BSO      HX TONSILLECTOMY      HX UROLOGICAL      \"laser\" surgery for kidney stone    NEUROLOGICAL PROCEDURE UNLISTED  1990    tumor at back of neck, benign     Prior to Admission medications    Medication Sig Start Date End Date Taking? Authorizing Provider   ascorbic acid, vitamin C, (VITAMIN C) 500 mg tablet Take 1 Tab by mouth two (2) times a day. 4/27/20   Paola Reece MD   amLODIPine (NORVASC) 5 mg tablet Take 1 Tab by mouth daily. 4/27/20   Paola Reece MD   azithromycin (ZITHROMAX) 500 mg tab Take 1 Tab by mouth daily. 4/27/20   Paola Reece MD   ibuprofen (MOTRIN) 600 mg tablet Take 1 Tab by mouth every six (6) hours as needed for Pain. 12/6/19   Kaveh Rogers MD   insulin glargine (LANTUS SOLOSTAR U-100 INSULIN) 100 unit/mL (3 mL) inpn 20 Units by SubCUTAneous route nightly. Indications: diabetes, type 2 diabetes mellitus 7/12/19   Rohit Loya MD   bisacodyl (DULCOLAX) 5 mg EC tablet Take 1 Tab by mouth daily as needed for Constipation. 7/12/19   Rohit Loya MD   albuterol (PROVENTIL VENTOLIN) 2.5 mg /3 mL (0.083 %) nebulizer solution 3 mL by Nebulization route every four (4) hours as needed for Wheezing. 3/11/19   Ly Johnson MD   arformoterol (BROVANA) 15 mcg/2 mL nebu neb solution 2 mL by Nebulization route two (2) times a day. 3/11/19   Ly Johnson MD   budesonide (PULMICORT) 0.5 mg/2 mL nbsp 2 mL by Nebulization route two (2) times a day. 3/11/19   Ly Johnson MD   aspirin 81 mg chewable tablet Take 1 Tab by mouth daily. 3/12/19   Ly Johnson MD   busPIRone (BUSPAR) 5 mg tablet Take 20 mg by mouth two (2) times a day. Provider, Historical   DULoxetine (CYMBALTA) 60 mg capsule Take 60 mg by mouth daily.     Provider, Historical   mirtazapine (REMERON) 15 mg tablet Take 15 mg by mouth nightly. Provider, Historical   QUEtiapine (SEROQUEL) 25 mg tablet Take 25 mg by mouth nightly. Provider, Historical   albuterol (PROVENTIL HFA, VENTOLIN HFA, PROAIR HFA) 90 mcg/actuation inhaler Take 2 Puffs by inhalation every four (4) hours as needed for Wheezing. 1/19/19   Niki Duckworth MD   rosuvastatin (CRESTOR) 10 mg tablet Take 10 mg by mouth nightly. Provider, Historical   Dexlansoprazole (DEXILANT) 60 mg CpDB Take 60 mg by mouth Daily (before breakfast). Provider, Historical   fosinopril (MONOPRIL) 20 mg tablet Take 20 mg by mouth daily. 2/12/15   Provider, Historical     Allergies   Allergen Reactions    Seafood [Shellfish Containing Products] Swelling      Family History   Problem Relation Age of Onset    Cancer Mother         colon    Diabetes Brother     Other Sister         GI BLEED    Heart Disease Brother     Heart Attack Brother     Heart Disease Brother     Heart Disease Brother     Schizophrenia Son     Anesth Problems Neg Hx         SOCIAL HISTORY:  Patient resides: She lives by herself. She has 3 children  Independently    Assisted Living    SNF    With family care       Smoking history:   None x   Former    Chronic      Alcohol history:   None x   Social    Chronic      Ambulates:   Independently x   w/cane    w/walker    w/wc    CODE STATUS:  DNR    Full x   Other      Objective:     Physical Exam:     Visit Vitals  /82 (BP 1 Location: Left upper arm)   Pulse 88   Temp 97.9 °F (36.6 °C)   Resp 18   Ht 5' (1.524 m)   Wt 78.9 kg (174 lb)   SpO2 95%   BMI 33.98 kg/m²      O2 Device: None (Room air)    General:  Alert, cooperative, no distress, appears stated age. Head:  Normocephalic, without obvious abnormality, atraumatic. Eyes:  Conjunctivae/corneas clear. PERRL, EOMs intact. Nose: Nares normal. Septum midline. Mucosa normal. No drainage or sinus tenderness.    Throat: Lips, mucosa, and tongue normal. Teeth and gums normal. Neck: Supple, symmetrical, trachea midline, no adenopathy, thyroid: no enlargement/tenderness/nodules, no carotid bruit and no JVD. Back:   Symmetric, no curvature. ROM normal. No CVA tenderness. Lungs:   Clear to auscultation bilaterally. Chest wall:  No tenderness or deformity. Heart:  Regular rate and rhythm, S1, S2 normal, no murmur, click, rub or gallop. Abdomen:   Soft, left lower quadrant abdominal tenderness with rebound. bowel sounds normal. No masses,  No organomegaly. Extremities: Extremities normal, atraumatic, no cyanosis or edema. Pulses: 2+ and symmetric all extremities. Skin: Skin color, texture, turgor normal. No rashes or lesions   Neurologic: CNII-XII intact. Data Review:     Recent Days:  Recent Labs     05/29/21  0853   WBC 12.5*   HGB 12.9   HCT 40.1        Recent Labs     05/29/21  0853   *   K 4.0      CO2 25   *   BUN 12   CREA 0.93   CA 8.6   MG 2.2   ALB 3.2*   ALT 19     No results for input(s): PH, PCO2, PO2, HCO3, FIO2 in the last 72 hours. 24 Hour Results:  Recent Results (from the past 24 hour(s))   GLUCOSE, POC    Collection Time: 05/29/21  8:26 AM   Result Value Ref Range    Glucose (POC) 444 (H) 65 - 117 mg/dL    Performed by Rojelio golden RN    CBC WITH AUTOMATED DIFF    Collection Time: 05/29/21  8:53 AM   Result Value Ref Range    WBC 12.5 (H) 3.6 - 11.0 K/uL    RBC 4.60 3.80 - 5.20 M/uL    HGB 12.9 11.5 - 16.0 g/dL    HCT 40.1 35.0 - 47.0 %    MCV 87.2 80.0 - 99.0 FL    MCH 28.0 26.0 - 34.0 PG    MCHC 32.2 30.0 - 36.5 g/dL    RDW 14.6 (H) 11.5 - 14.5 %    PLATELET 783 733 - 151 K/uL    MPV 11.4 8.9 - 12.9 FL    NRBC 0.0 0  WBC    ABSOLUTE NRBC 0.00 0.00 - 0.01 K/uL    NEUTROPHILS 66 32 - 75 %    LYMPHOCYTES 24 12 - 49 %    MONOCYTES 7 5 - 13 %    EOSINOPHILS 1 0 - 7 %    BASOPHILS 1 0 - 1 %    IMMATURE GRANULOCYTES 1 (H) 0.0 - 0.5 %    ABS. NEUTROPHILS 8.3 (H) 1.8 - 8.0 K/UL    ABS.  LYMPHOCYTES 3.0 0.8 - 3.5 K/UL    ABS. MONOCYTES 0.9 0.0 - 1.0 K/UL    ABS. EOSINOPHILS 0.1 0.0 - 0.4 K/UL    ABS. BASOPHILS 0.1 0.0 - 0.1 K/UL    ABS. IMM. GRANS. 0.1 (H) 0.00 - 0.04 K/UL    DF AUTOMATED     METABOLIC PANEL, COMPREHENSIVE    Collection Time: 05/29/21  8:53 AM   Result Value Ref Range    Sodium 134 (L) 136 - 145 mmol/L    Potassium 4.0 3.5 - 5.1 mmol/L    Chloride 102 97 - 108 mmol/L    CO2 25 21 - 32 mmol/L    Anion gap 7 5 - 15 mmol/L    Glucose 424 (H) 65 - 100 mg/dL    BUN 12 6 - 20 MG/DL    Creatinine 0.93 0.55 - 1.02 MG/DL    BUN/Creatinine ratio 13 12 - 20      GFR est AA >60 >60 ml/min/1.73m2    GFR est non-AA 58 (L) >60 ml/min/1.73m2    Calcium 8.6 8.5 - 10.1 MG/DL    Bilirubin, total 0.4 0.2 - 1.0 MG/DL    ALT (SGPT) 19 12 - 78 U/L    AST (SGOT) 8 (L) 15 - 37 U/L    Alk. phosphatase 152 (H) 45 - 117 U/L    Protein, total 7.0 6.4 - 8.2 g/dL    Albumin 3.2 (L) 3.5 - 5.0 g/dL    Globulin 3.8 2.0 - 4.0 g/dL    A-G Ratio 0.8 (L) 1.1 - 2.2     LIPASE    Collection Time: 05/29/21  8:53 AM   Result Value Ref Range    Lipase 929 (H) 73 - 393 U/L   MAGNESIUM    Collection Time: 05/29/21  8:53 AM   Result Value Ref Range    Magnesium 2.2 1.6 - 2.4 mg/dL   TROPONIN I    Collection Time: 05/29/21  8:53 AM   Result Value Ref Range    Troponin-I, Qt. <0.05 <0.05 ng/mL   LACTIC ACID    Collection Time: 05/29/21  9:00 AM   Result Value Ref Range    Lactic acid 1.0 0.4 - 2.0 MMOL/L   SAMPLES BEING HELD    Collection Time: 05/29/21  9:00 AM   Result Value Ref Range    SAMPLES BEING HELD 1RED     COMMENT        Add-on orders for these samples will be processed based on acceptable specimen integrity and analyte stability, which may vary by analyte.    EKG, 12 LEAD, INITIAL    Collection Time: 05/29/21  9:34 AM   Result Value Ref Range    Ventricular Rate 84 BPM    Atrial Rate 84 BPM    P-R Interval 138 ms    QRS Duration 66 ms    Q-T Interval 416 ms    QTC Calculation (Bezet) 491 ms    Calculated P Axis 65 degrees    Calculated R Axis 36 degrees    Calculated T Axis 48 degrees    Diagnosis       Normal sinus rhythm  Nonspecific T wave abnormality  Prolonged QT  When compared with ECG of 25-APR-2020 22:27,  No significant change was found     URINALYSIS W/ RFLX MICROSCOPIC    Collection Time: 05/29/21 11:06 AM   Result Value Ref Range    Color YELLOW/STRAW      Appearance CLEAR CLEAR      Specific gravity 1.010      pH (UA) 6.5 5.0 - 8.0      Protein Negative NEG mg/dL    Glucose >1,000 (A) NEG mg/dL    Ketone Negative NEG mg/dL    Bilirubin Negative NEG      Blood Negative NEG      Urobilinogen 0.2 0.2 - 1.0 EU/dL    Nitrites Negative NEG      Leukocyte Esterase Negative NEG     URINE CULTURE HOLD SAMPLE    Collection Time: 05/29/21 11:06 AM    Specimen: Serum   Result Value Ref Range    Urine culture hold        Urine on hold in Microbiology dept for 2 days. If unpreserved urine is submitted, it cannot be used for addtional testing after 24 hours, recollection will be required. GLUCOSE, POC    Collection Time: 05/29/21 11:27 AM   Result Value Ref Range    Glucose (POC) 334 (H) 65 - 117 mg/dL    Performed by Kylee golden RN    GLUCOSE, POC    Collection Time: 05/29/21  1:00 PM   Result Value Ref Range    Glucose (POC) 326 (H) 65 - 117 mg/dL    Performed by Kylee golden RN          Imaging:   CT scan of the abdomen without contrast performed showed mild left hydroutero nephrosis of uncertain etiology. Assessment:     Active Problems:    DM (diabetes mellitus), type 2, uncontrolled ([facility] [address] (5/29/2021)           Plan:     1. Diabetes mellitus type 2 with hyperglycemia :uncontrolled  There is no DKA or hyper osmolar state  We will resume home Lantus 20 units twice daily  Initiate insulin sliding scale as needed  Accu-Checks  Monitor    2. Leukocytosis; this is likely reactionary  No fever  Continue IV fluid    3. Hyponatremia; likely due to dehydration  Continue IV fluid    4.  Pancreatitis; probably due to hyperglycemia  We will check lipid panel in a.m. Supportive care with gentle IV fluid hydration  Repeat lipase in a.m. Clear liquid diet    5. Left lower abdominal quadrant pain  Unsure of etiology. CT scan of the abdomen without contrast showed left ureteric nephrosis of uncertain etiology  If abdominal pain persists recommend obtaining CT scan of the abdomen with contrast  Start morphine 1 mg IV every 4 hours for now    6. History of asthma; she is not bronchospastic  Resume home breathing treatments    7. Left hydroutero nephrosis of uncertain etiology. Monitor for now. Can obtain CT scan with contrast if LLQ pain persist      DVT prophylaxis;  Lovenox 40 mg subcu daily    Disposition; admit patient to telemetry for routine progression of care         Signed By: Cal Perez MD     May 29, 2021

## 2021-05-29 NOTE — ED TRIAGE NOTES
Pt having LLQ pain for for 2 weeks, states that she was feeling weak and not well so she called EMS, BS was 509 with EMS, LBM was Thursday, denies N/V, FSBS was 444 during triage

## 2021-05-29 NOTE — PROGRESS NOTES
Admission Medication Reconciliation:    Information obtained from:  Patient   RxQuery data available¹:  NO    Comments/Recommendations: Updated PTA meds/reviewed patient's allergies. 1)  Medication list reviewed with patient. 2)  Medication changes (since last review): Added  - None    Adjusted  - Lantus from daily to bid     Removed  - Vitamin C   - ASA - patient states she should take it but is not currently. Left on list for reference       ¹RxQuery pharmacy benefit data reflects medications filled and processed through the patient's insurance, however   this data does NOT capture whether the medication was picked up or is currently being taken by the patient. Allergies:  Seafood [shellfish containing products]    Significant PMH/Disease States:   Past Medical History:   Diagnosis Date    Anal polyp 6/13/2013    Arthritis     Asthma     CAD (coronary artery disease) 10/27/2017    Cataract     Chronic pain     knee - right/back    Diabetes ([address]     GERD (gastroesophageal reflux disease)     Hemorrhoids 6/13/2013    History of kidney stones     Hypertension     Ill-defined condition     abdominal pain and burning    Other ill-defined conditions(799.89)     high cholesterol     Chief Complaint for this Admission:    Chief Complaint   Patient presents with    Abdominal Pain    High Blood Sugar     Prior to Admission Medications:   Prior to Admission Medications   Prescriptions Last Dose Informant Taking? DULoxetine (CYMBALTA) 60 mg capsule 5/28/2021 at Unknown time  Yes   Sig: Take 60 mg by mouth daily. Dexlansoprazole (DEXILANT) 60 mg CpDB 5/28/2021 at Unknown time Self Yes   Sig: Take 60 mg by mouth Daily (before breakfast). QUEtiapine (SEROQUEL) 25 mg tablet 5/28/2021 at Unknown time  Yes   Sig: Take 25 mg by mouth nightly. albuterol (PROVENTIL HFA, VENTOLIN HFA, PROAIR HFA) 90 mcg/actuation inhaler   Yes   Sig: Take 2 Puffs by inhalation every four (4) hours as needed for Wheezing.    albuterol (PROVENTIL VENTOLIN) 2.5 mg /3 mL (0.083 %) nebulizer solution   Yes   Sig: 3 mL by Nebulization route every four (4) hours as needed for Wheezing. amLODIPine (NORVASC) 5 mg tablet 2021 at Unknown time  Yes   Sig: Take 1 Tab by mouth daily. arformoterol (BROVANA) 15 mcg/2 mL nebu neb solution 2021 at Unknown time  Yes   Si mL by Nebulization route two (2) times a day. aspirin 81 mg chewable tablet Not Taking at Unknown time  No   Sig: Take 1 Tab by mouth daily. Patient not taking: Reported on 2021   azithromycin (ZITHROMAX) 500 mg tab 2021 at Unknown time  Yes   Sig: Take 1 Tab by mouth daily. bisacodyl (DULCOLAX) 5 mg EC tablet   Yes   Sig: Take 1 Tab by mouth daily as needed for Constipation. budesonide (PULMICORT) 0.5 mg/2 mL nbsp   Yes   Si mL by Nebulization route two (2) times a day. busPIRone (BUSPAR) 5 mg tablet 2021 at Unknown time  Yes   Sig: Take 20 mg by mouth two (2) times a day. fosinopril (MONOPRIL) 20 mg tablet 2021 at Unknown time Self Yes   Sig: Take 20 mg by mouth daily. ibuprofen (MOTRIN) 600 mg tablet 2021 at Unknown time  Yes   Sig: Take 1 Tab by mouth every six (6) hours as needed for Pain. insulin glargine (Lantus U-100 Insulin) 100 unit/mL injection 2021 at Unknown time  Yes   Si Units by SubCUTAneous route two (2) times a day. mirtazapine (REMERON) 15 mg tablet 2021 at Unknown time  Yes   Sig: Take 15 mg by mouth nightly. rosuvastatin (CRESTOR) 10 mg tablet 2021 at Unknown time  Yes   Sig: Take 10 mg by mouth nightly. Facility-Administered Medications: None     Please contact the main inpatient pharmacy with any questions or concerns at [phone] and we will direct you to the clinical pharmacist covering this patient's care while in-house.    Shelbi Hernandez, ELIOTD

## 2021-05-30 ENCOUNTER — APPOINTMENT (OUTPATIENT)
Dept: GENERAL RADIOLOGY | Age: 81
DRG: 637 | End: 2021-05-30
Attending: HOSPITALIST
Payer: MEDICARE

## 2021-05-30 LAB
ALBUMIN SERPL-MCNC: 2.7 G/DL (ref 3.5–5)
ALBUMIN/GLOB SERPL: 0.8 {RATIO} (ref 1.1–2.2)
ALP SERPL-CCNC: 116 U/L (ref 45–117)
ALT SERPL-CCNC: 25 U/L (ref 12–78)
ANION GAP SERPL CALC-SCNC: 5 MMOL/L (ref 5–15)
AST SERPL-CCNC: 44 U/L (ref 15–37)
ATRIAL RATE: 84 BPM
BASOPHILS # BLD: 0 K/UL (ref 0–0.1)
BASOPHILS NFR BLD: 0 % (ref 0–1)
BILIRUB SERPL-MCNC: 0.4 MG/DL (ref 0.2–1)
BUN SERPL-MCNC: 6 MG/DL (ref 6–20)
BUN/CREAT SERPL: 13 (ref 12–20)
CALCIUM SERPL-MCNC: 7.9 MG/DL (ref 8.5–10.1)
CALCULATED P AXIS, ECG09: 65 DEGREES
CALCULATED R AXIS, ECG10: 36 DEGREES
CALCULATED T AXIS, ECG11: 48 DEGREES
CHLORIDE SERPL-SCNC: 110 MMOL/L (ref 97–108)
CHOLEST SERPL-MCNC: 138 MG/DL
CO2 SERPL-SCNC: 27 MMOL/L (ref 21–32)
CREAT SERPL-MCNC: 0.48 MG/DL (ref 0.55–1.02)
DIAGNOSIS, 93000: NORMAL
DIFFERENTIAL METHOD BLD: ABNORMAL
EOSINOPHIL # BLD: 0.1 K/UL (ref 0–0.4)
EOSINOPHIL NFR BLD: 1 % (ref 0–7)
ERYTHROCYTE [DISTWIDTH] IN BLOOD BY AUTOMATED COUNT: 14.3 % (ref 11.5–14.5)
GLOBULIN SER CALC-MCNC: 3.3 G/DL (ref 2–4)
GLUCOSE BLD STRIP.AUTO-MCNC: 174 MG/DL (ref 65–117)
GLUCOSE BLD STRIP.AUTO-MCNC: 201 MG/DL (ref 65–117)
GLUCOSE BLD STRIP.AUTO-MCNC: 368 MG/DL (ref 65–117)
GLUCOSE SERPL-MCNC: 169 MG/DL (ref 65–100)
HCT VFR BLD AUTO: 38 % (ref 35–47)
HDLC SERPL-MCNC: 77 MG/DL
HDLC SERPL: 1.8 {RATIO} (ref 0–5)
HGB BLD-MCNC: 12.4 G/DL (ref 11.5–16)
IMM GRANULOCYTES # BLD AUTO: 0.1 K/UL (ref 0–0.04)
IMM GRANULOCYTES NFR BLD AUTO: 1 % (ref 0–0.5)
LDLC SERPL CALC-MCNC: 39.4 MG/DL (ref 0–100)
LIPASE SERPL-CCNC: 1107 U/L (ref 73–393)
LYMPHOCYTES # BLD: 1.9 K/UL (ref 0.8–3.5)
LYMPHOCYTES NFR BLD: 18 % (ref 12–49)
MAGNESIUM SERPL-MCNC: 2 MG/DL (ref 1.6–2.4)
MCH RBC QN AUTO: 28.2 PG (ref 26–34)
MCHC RBC AUTO-ENTMCNC: 32.6 G/DL (ref 30–36.5)
MCV RBC AUTO: 86.4 FL (ref 80–99)
MONOCYTES # BLD: 0.8 K/UL (ref 0–1)
MONOCYTES NFR BLD: 8 % (ref 5–13)
NEUTS SEG # BLD: 7.7 K/UL (ref 1.8–8)
NEUTS SEG NFR BLD: 72 % (ref 32–75)
NRBC # BLD: 0 K/UL (ref 0–0.01)
NRBC BLD-RTO: 0 PER 100 WBC
P-R INTERVAL, ECG05: 138 MS
PLATELET # BLD AUTO: 258 K/UL (ref 150–400)
PMV BLD AUTO: 11.1 FL (ref 8.9–12.9)
POTASSIUM SERPL-SCNC: 3.5 MMOL/L (ref 3.5–5.1)
PROT SERPL-MCNC: 6 G/DL (ref 6.4–8.2)
Q-T INTERVAL, ECG07: 416 MS
QRS DURATION, ECG06: 66 MS
QTC CALCULATION (BEZET), ECG08: 491 MS
RBC # BLD AUTO: 4.4 M/UL (ref 3.8–5.2)
SERVICE CMNT-IMP: ABNORMAL
SODIUM SERPL-SCNC: 142 MMOL/L (ref 136–145)
TRIGL SERPL-MCNC: 108 MG/DL (ref ?–150)
VENTRICULAR RATE, ECG03: 84 BPM
VLDLC SERPL CALC-MCNC: 21.6 MG/DL
WBC # BLD AUTO: 10.7 K/UL (ref 3.6–11)

## 2021-05-30 PROCEDURE — 74011250637 HC RX REV CODE- 250/637: Performed by: HOSPITALIST

## 2021-05-30 PROCEDURE — 74011000250 HC RX REV CODE- 250: Performed by: HOSPITALIST

## 2021-05-30 PROCEDURE — 74011636637 HC RX REV CODE- 636/637: Performed by: NURSE PRACTITIONER

## 2021-05-30 PROCEDURE — 80053 COMPREHEN METABOLIC PANEL: CPT

## 2021-05-30 PROCEDURE — 83735 ASSAY OF MAGNESIUM: CPT

## 2021-05-30 PROCEDURE — 74011250636 HC RX REV CODE- 250/636: Performed by: HOSPITALIST

## 2021-05-30 PROCEDURE — 74011250637 HC RX REV CODE- 250/637: Performed by: NURSE PRACTITIONER

## 2021-05-30 PROCEDURE — 74018 RADEX ABDOMEN 1 VIEW: CPT

## 2021-05-30 PROCEDURE — 74011636637 HC RX REV CODE- 636/637: Performed by: HOSPITALIST

## 2021-05-30 PROCEDURE — 65660000000 HC RM CCU STEPDOWN

## 2021-05-30 PROCEDURE — 80061 LIPID PANEL: CPT

## 2021-05-30 PROCEDURE — 36415 COLL VENOUS BLD VENIPUNCTURE: CPT

## 2021-05-30 PROCEDURE — 83690 ASSAY OF LIPASE: CPT

## 2021-05-30 PROCEDURE — 85025 COMPLETE CBC W/AUTO DIFF WBC: CPT

## 2021-05-30 PROCEDURE — 82962 GLUCOSE BLOOD TEST: CPT

## 2021-05-30 PROCEDURE — 94640 AIRWAY INHALATION TREATMENT: CPT

## 2021-05-30 RX ORDER — OXYCODONE AND ACETAMINOPHEN 5; 325 MG/1; MG/1
1 TABLET ORAL
Status: DISCONTINUED | OUTPATIENT
Start: 2021-05-30 | End: 2021-06-03 | Stop reason: HOSPADM

## 2021-05-30 RX ORDER — INSULIN LISPRO 100 [IU]/ML
8 INJECTION, SOLUTION INTRAVENOUS; SUBCUTANEOUS ONCE
Status: COMPLETED | OUTPATIENT
Start: 2021-05-30 | End: 2021-05-30

## 2021-05-30 RX ORDER — SODIUM CHLORIDE, SODIUM LACTATE, POTASSIUM CHLORIDE, CALCIUM CHLORIDE 600; 310; 30; 20 MG/100ML; MG/100ML; MG/100ML; MG/100ML
50 INJECTION, SOLUTION INTRAVENOUS CONTINUOUS
Status: DISCONTINUED | OUTPATIENT
Start: 2021-05-30 | End: 2021-06-03 | Stop reason: HOSPADM

## 2021-05-30 RX ADMIN — Medication 10 ML: at 07:44

## 2021-05-30 RX ADMIN — MIRTAZAPINE 15 MG: 15 TABLET, FILM COATED ORAL at 23:13

## 2021-05-30 RX ADMIN — BUSPIRONE HYDROCHLORIDE 20 MG: 10 TABLET ORAL at 09:09

## 2021-05-30 RX ADMIN — BUSPIRONE HYDROCHLORIDE 20 MG: 10 TABLET ORAL at 19:26

## 2021-05-30 RX ADMIN — INSULIN GLARGINE 20 UNITS: 100 INJECTION, SOLUTION SUBCUTANEOUS at 09:09

## 2021-05-30 RX ADMIN — MORPHINE SULFATE 1 MG: 2 INJECTION, SOLUTION INTRAMUSCULAR; INTRAVENOUS at 00:30

## 2021-05-30 RX ADMIN — DULOXETINE HYDROCHLORIDE 60 MG: 60 CAPSULE, DELAYED RELEASE ORAL at 09:09

## 2021-05-30 RX ADMIN — BUDESONIDE 500 MCG: 0.5 INHALANT RESPIRATORY (INHALATION) at 07:47

## 2021-05-30 RX ADMIN — POLYETHYLENE GLYCOL 3350 17 G: 17 POWDER, FOR SOLUTION ORAL at 09:17

## 2021-05-30 RX ADMIN — INSULIN LISPRO 3 UNITS: 100 INJECTION, SOLUTION INTRAVENOUS; SUBCUTANEOUS at 06:35

## 2021-05-30 RX ADMIN — QUETIAPINE FUMARATE 25 MG: 25 TABLET ORAL at 23:13

## 2021-05-30 RX ADMIN — OXYCODONE HYDROCHLORIDE AND ACETAMINOPHEN 1 TABLET: 5; 325 TABLET ORAL at 23:13

## 2021-05-30 RX ADMIN — Medication 10 ML: at 19:27

## 2021-05-30 RX ADMIN — ASPIRIN 81 MG: 81 TABLET, CHEWABLE ORAL at 09:09

## 2021-05-30 RX ADMIN — ENOXAPARIN SODIUM 40 MG: 40 INJECTION SUBCUTANEOUS at 09:10

## 2021-05-30 RX ADMIN — ROSUVASTATIN CALCIUM 10 MG: 10 TABLET, COATED ORAL at 23:13

## 2021-05-30 RX ADMIN — SODIUM CHLORIDE, POTASSIUM CHLORIDE, SODIUM LACTATE AND CALCIUM CHLORIDE 100 ML/HR: 600; 310; 30; 20 INJECTION, SOLUTION INTRAVENOUS at 10:49

## 2021-05-30 RX ADMIN — INSULIN GLARGINE 20 UNITS: 100 INJECTION, SOLUTION SUBCUTANEOUS at 19:26

## 2021-05-30 RX ADMIN — PANTOPRAZOLE SODIUM 40 MG: 40 TABLET, DELAYED RELEASE ORAL at 07:44

## 2021-05-30 RX ADMIN — INSULIN LISPRO 8 UNITS: 100 INJECTION, SOLUTION INTRAVENOUS; SUBCUTANEOUS at 21:00

## 2021-05-30 RX ADMIN — ACETAMINOPHEN 650 MG: 325 TABLET ORAL at 09:13

## 2021-05-30 NOTE — PROGRESS NOTES
0730 Bedside shift change report given to Bhanu Lim (oncoming nurse) by Pamela Andrews (offgoing nurse). Report included the following information SBAR, Kardex, Procedure Summary, Intake/Output, MAR, Recent Results and Cardiac Rhythm NSR.    0800 Pt awake and alert, c/o tenderness in abdomen. Having breakfast.  0930 KUB complete. 1000 Brief changed. 1045 Fluid swapped to LR. Per daughter \"Blaire\" in room, patient has difficulty living alone at home with stairs. Given number for Fritz Tsai, pt's nephew, since no other family member can drive home at point of discharge.

## 2021-05-30 NOTE — PROGRESS NOTES
Bedside and Verbal shift change report given to Arvin (oncoming nurse) by El (offgoing nurse). Report included the following information SBAR, Kardex and MAR.

## 2021-05-30 NOTE — PROGRESS NOTES
Hospitalist Progress Note              Richard Erickson MD.                                                             Cell: [phone]                               NAME:  Hollie Sanchez  :  1940  MRN:  [id number]  Date of Service:  2021    Summary: 79 y.o. female with past medical history of DM type 2, HTN, who presented on 2021 with left lower quadrant abdominal pain. She was found to have uncontrolled blood glucose on admission and elevated lipase levels       Assessment/Plan:  Diabetes mellitus type 2 with hyperglycemia : uncontrolled  There is no DKA or hyper osmolar state  Continue SQ Lantus 20 units twice daily  Continue insulin sliding scale as needed  Consult Diabetes team for for med advise and outpatient glucose management  Accu-Checks    2. Leukocytosis; this is likely reactionary  No fever  Resolving. No need for abx  Continue IV fluid     3. Hyponatremia; likely due to dehydration  Resolved  Change IVF to Ringers lactate     4. Pancreatitis; Probably due to hyperglycemia  Lipid panel showed normal Triglycerides. No gallstones on CT scan  Supportive care with gentle IV fluid hydration- RL. Clear liquid diet advance as tolerated     5. Left lower abdominal quadrant pain  Likely due to left ureteric kidney stone   CT scan of the abdomen without contrast showed left ureteric nephrosis of uncertain etiology  Check KUB  Continue morphine 1 mg IV every 4 hours for now     6. History of asthma; she is not bronchospastic  Continue home breathing treatments     7. Left hydroutero nephrosis of uncertain etiology. Monitor for now. Obtain KUB r/o kidney stones     DVT prophylaxis;  Lovenox 40 mg subcu daily     Disposition; admit patient to telemetry for routine progression of care   Code status: full  DVT prophylaxsis: Lovenox  Dispo: tbd       Interval History/Subjective:  F/u for uncontrolled DM  No acute overnight events    Review of Systems:  A comprehensive review of systems was negative except for that written in the HPI. Objective:     VITALS:   Last 24hrs VS reviewed since prior progress note. Most recent are:  Visit Vitals  /79 (BP 1 Location: Right upper arm, BP Patient Position: At rest)   Pulse 93   Temp 97.4 °F (36.3 °C)   Resp 18   Ht 5' (1.524 m)   Wt 76.1 kg (167 lb 12.3 oz)   SpO2 93%   BMI 32.77 kg/m²       Intake/Output Summary (Last 24 hours) at 5/30/2021 0846  Last data filed at 5/30/2021 2987  Gross per 24 hour   Intake    Output 1325 ml   Net -1325 ml        PHYSICAL EXAM:  General: No acute distress, cooperative, pleasant   EENT: EOMI. Anicteric sclerae. Oral mucous moist, oropharynx benign  Resp: CTA bilaterally. No wheezing/rhonchi/rales. No accessory muscle use  CV: Regular rhythm, normal rate, no murmurs, gallops, rubs  GI: Soft, non distended, non tender. normoactive bowel sounds, no hepatosplenomegaly Extremities: No edema, warm, 2+ pulses throughout  Neurologic: Moves all extremities. AAOx3, CN II-XII grossly intact  Psych: Good insight. Not anxious nor agitated. Skin: Good Turgor, no rashes or ulcers    Lab Data Personally Reviewed: (see below)     Medications list Personally Reviewed:  x YES  NO     _______________________________________________________________________  Care Plan discussed with:  Patient/Family and Nurse    Total NON critical care TIME:  30 minutes    Maya Amador MD     Procedures: see electronic medical records for all procedures/Xrays and details which were not copied into this note but were reviewed prior to creation of Plan.       LABS:  Recent Labs     05/30/21  0231 05/29/21  0853   WBC 10.7 12.5*   HGB 12.4 12.9   HCT 38.0 40.1    271     Recent Labs     05/30/21  0231 05/29/21  0853    134*   K 3.5 4.0   * 102   CO2 27 25   BUN 6 12   CREA 0.48* 0.93   * 424*   CA 7.9* 8.6   MG 2.0 2.2     Recent Labs     05/30/21  0231 05/29/21  0853   ALT 25 19    152*   TBILI 0.4 0.4   TP 6.0* 7.0   ALB 2.7* 3.2*   GLOB 3.3 3.8   LPSE 1,107* 929*     No results for input(s): INR, PTP, APTT, INREXT in the last 72 hours. No results for input(s): FE, TIBC, PSAT, FERR in the last 72 hours. Lab Results   Component Value Date/Time    Folate 13.2 05/20/2009 03:00 AM      No results for input(s): PH, PCO2, PO2 in the last 72 hours.   Recent Labs     05/29/21  0853   TROIQ <0.05     Lab Results   Component Value Date/Time    Cholesterol, total 138 05/30/2021 02:31 AM    HDL Cholesterol 77 05/30/2021 02:31 AM    LDL, calculated 39.4 05/30/2021 02:31 AM    Triglyceride 108 05/30/2021 02:31 AM    CHOL/HDL Ratio 1.8 05/30/2021 02:31 AM     Lab Results   Component Value Date/Time    Glucose (POC) 201 (H) 05/30/2021 06:27 AM    Glucose (POC) 394 (H) 05/29/2021 09:53 PM    Glucose (POC) 268 (H) 05/29/2021 04:14 PM    Glucose (POC) 276 (H) 05/29/2021 02:10 PM    Glucose (POC) 326 (H) 05/29/2021 01:00 PM     Lab Results   Component Value Date/Time    Color YELLOW/STRAW 05/29/2021 11:06 AM    Appearance CLEAR 05/29/2021 11:06 AM    Specific gravity 1.010 05/29/2021 11:06 AM    Specific gravity 1.028 12/06/2019 03:22 AM    pH (UA) 6.5 05/29/2021 11:06 AM    Protein Negative 05/29/2021 11:06 AM    Glucose >1,000 (A) 05/29/2021 11:06 AM    Ketone Negative 05/29/2021 11:06 AM    Bilirubin Negative 05/29/2021 11:06 AM    Urobilinogen 0.2 05/29/2021 11:06 AM    Nitrites Negative 05/29/2021 11:06 AM    Leukocyte Esterase Negative 05/29/2021 11:06 AM    Epithelial cells FEW 04/25/2020 11:38 PM    Bacteria Negative 04/25/2020 11:38 PM    WBC 0-4 04/25/2020 11:38 PM    RBC 0-5 04/25/2020 11:38 PM

## 2021-05-31 LAB
GLUCOSE BLD STRIP.AUTO-MCNC: 102 MG/DL (ref 65–117)
GLUCOSE BLD STRIP.AUTO-MCNC: 112 MG/DL (ref 65–117)
GLUCOSE BLD STRIP.AUTO-MCNC: 216 MG/DL (ref 65–117)
GLUCOSE BLD STRIP.AUTO-MCNC: 282 MG/DL (ref 65–117)
GLUCOSE BLD STRIP.AUTO-MCNC: 299 MG/DL (ref 65–117)
SERVICE CMNT-IMP: ABNORMAL
SERVICE CMNT-IMP: NORMAL
SERVICE CMNT-IMP: NORMAL

## 2021-05-31 PROCEDURE — 74011636637 HC RX REV CODE- 636/637: Performed by: HOSPITALIST

## 2021-05-31 PROCEDURE — 82962 GLUCOSE BLOOD TEST: CPT

## 2021-05-31 PROCEDURE — 74011250636 HC RX REV CODE- 250/636: Performed by: HOSPITALIST

## 2021-05-31 PROCEDURE — 74011000250 HC RX REV CODE- 250: Performed by: HOSPITALIST

## 2021-05-31 PROCEDURE — 74011250637 HC RX REV CODE- 250/637: Performed by: HOSPITALIST

## 2021-05-31 PROCEDURE — 65660000000 HC RM CCU STEPDOWN

## 2021-05-31 PROCEDURE — 94640 AIRWAY INHALATION TREATMENT: CPT

## 2021-05-31 PROCEDURE — 74011250637 HC RX REV CODE- 250/637: Performed by: NURSE PRACTITIONER

## 2021-05-31 RX ADMIN — INSULIN LISPRO 3 UNITS: 100 INJECTION, SOLUTION INTRAVENOUS; SUBCUTANEOUS at 12:02

## 2021-05-31 RX ADMIN — INSULIN GLARGINE 20 UNITS: 100 INJECTION, SOLUTION SUBCUTANEOUS at 17:45

## 2021-05-31 RX ADMIN — QUETIAPINE FUMARATE 25 MG: 25 TABLET ORAL at 21:24

## 2021-05-31 RX ADMIN — PANTOPRAZOLE SODIUM 40 MG: 40 TABLET, DELAYED RELEASE ORAL at 06:41

## 2021-05-31 RX ADMIN — INSULIN LISPRO 3 UNITS: 100 INJECTION, SOLUTION INTRAVENOUS; SUBCUTANEOUS at 17:44

## 2021-05-31 RX ADMIN — BUDESONIDE 500 MCG: 0.5 INHALANT RESPIRATORY (INHALATION) at 08:02

## 2021-05-31 RX ADMIN — MIRTAZAPINE 15 MG: 15 TABLET, FILM COATED ORAL at 21:24

## 2021-05-31 RX ADMIN — ROSUVASTATIN CALCIUM 10 MG: 10 TABLET, COATED ORAL at 21:24

## 2021-05-31 RX ADMIN — OXYCODONE HYDROCHLORIDE AND ACETAMINOPHEN 1 TABLET: 5; 325 TABLET ORAL at 17:54

## 2021-05-31 RX ADMIN — ENOXAPARIN SODIUM 40 MG: 40 INJECTION SUBCUTANEOUS at 10:17

## 2021-05-31 RX ADMIN — BUDESONIDE 500 MCG: 0.5 INHALANT RESPIRATORY (INHALATION) at 19:50

## 2021-05-31 RX ADMIN — MORPHINE SULFATE 1 MG: 2 INJECTION, SOLUTION INTRAMUSCULAR; INTRAVENOUS at 10:47

## 2021-05-31 RX ADMIN — Medication 10 ML: at 15:10

## 2021-05-31 RX ADMIN — BUSPIRONE HYDROCHLORIDE 20 MG: 10 TABLET ORAL at 10:17

## 2021-05-31 RX ADMIN — Medication 10 ML: at 21:24

## 2021-05-31 RX ADMIN — DULOXETINE HYDROCHLORIDE 60 MG: 60 CAPSULE, DELAYED RELEASE ORAL at 10:18

## 2021-05-31 RX ADMIN — BUSPIRONE HYDROCHLORIDE 20 MG: 10 TABLET ORAL at 17:44

## 2021-05-31 RX ADMIN — INSULIN LISPRO 3 UNITS: 100 INJECTION, SOLUTION INTRAVENOUS; SUBCUTANEOUS at 21:24

## 2021-05-31 RX ADMIN — Medication 10 ML: at 00:24

## 2021-05-31 RX ADMIN — ASPIRIN 81 MG: 81 TABLET, CHEWABLE ORAL at 10:17

## 2021-05-31 RX ADMIN — INSULIN GLARGINE 20 UNITS: 100 INJECTION, SOLUTION SUBCUTANEOUS at 10:17

## 2021-05-31 RX ADMIN — ONDANSETRON 4 MG: 2 INJECTION INTRAMUSCULAR; INTRAVENOUS at 10:47

## 2021-05-31 NOTE — PROGRESS NOTES
Problem: Falls - Risk of  Goal: *Absence of Falls  Description: Document Lidia Barry Fall Risk and appropriate interventions in the flowsheet.   Outcome: Progressing Towards Goal  Note: Fall Risk Interventions:  Mobility Interventions: Communicate number of staff needed for ambulation/transfer         Medication Interventions: Teach patient to arise slowly    Elimination Interventions: Patient to call for help with toileting needs              Problem: Patient Education: Go to Patient Education Activity  Goal: Patient/Family Education  Outcome: Progressing Towards Goal

## 2021-05-31 NOTE — ROUTINE PROCESS
Bedside shift change report given to Germaine Bill (oncoming nurse) by Scott Steiner (offgoing nurse). Report included the following information SBAR, Kardex, Intake/Output, MAR and Recent Results.

## 2021-05-31 NOTE — PROGRESS NOTES
Bedside shift change report given to Crow Villagomez RN (oncoming nurse) by Aviva De Los Santos RN (offgoing nurse). Report included the following information SBAR, Kardex, ED Summary, Procedure Summary, Intake/Output, MAR, Accordion, Recent Results, Med Rec Status, Cardiac Rhythm NSR, Alarm Parameters  and Quality Measures.

## 2021-05-31 NOTE — PROGRESS NOTES
LUZ MARINA:  1. RUR-12%  2. Admitted from home, plans to return on discharge pending pt/ot eval.  3. BLS transport vs uber transport. Care Management Interventions  PCP Verified by CM: Yes  Palliative Care Criteria Met (RRAT>21 & CHF Dx)?: No  Mode of Transport at Discharge: Other (see comment) (BLS vs uber)  MyChart Signup: No  Discharge Durable Medical Equipment: No  Health Maintenance Reviewed: Yes  Physical Therapy Consult: Yes  Occupational Therapy Consult: Yes  Speech Therapy Consult: No  Current Support Network: Lives Alone  Confirm Follow Up Transport: Family  The Patient and/or Patient Representative was Provided with a Choice of Provider and Agrees with the Discharge Plan?: Yes   Resource Information Provided?: No  Discharge Location  Discharge Placement: Unable to determine at this time     Reason for Admission:  DM                     RUR Score:      12%               Plan for utilizing home health:      TBD pending pt/ot eval    PCP: First and Last name:  Lilibeth Isaacs MD     Name of Practice:    Are you a current patient: Yes/No: yes    Approximate date of last visit:    Can you participate in a virtual visit with your PCP:                     Current Advanced Directive/Advance Care Plan: Full Code      Healthcare Decision Maker:   Click here to complete [email or web address] including selection of the Healthcare Decision Maker Relationship (ie \"Primary\")                             Transition of Care Plan:                    Reviewed chart for transitions of care, and discussed in rounds. CM met with patient at bedside to explain role and offer support. Patient is alert and oriented x4,and confirmed demographics. The patient was here in 2020 and cm notes of personal caregivers 4x/week however patient confirms the agency (unknown name) has not seen her in a long time. Patient is living alone and her landlord assists with medical appointments and ADLS/IADLs at home.  She has used [facility] home health and had a short stay at [facility]. CM will continue to follow for discharge needs, patient confirmed she has been using a walker at home prior to this admission.     Natalie Harris [facility]

## 2021-05-31 NOTE — PROGRESS NOTES
Hospitalist Progress Note          Jerrod Dobbs MD  Please call  and page for questions. Call physician on-call through the  7pm-7am    Daily Progress Note: 5/31/2021    Primary care provider:Manjinder Donald MD    Date of admission: 5/29/2021  8:19 AM    Admission summery and hospital course:  79 y.o. female with past medical history of hypertension, diabetes mellitus, who presents to the ED on 05/29 with left lower quadrant abdominal pain for 2 weeks. In the ER she was found to have uncontrolled blood sugar which ranged in the mid 400s     CT scan of the abdomen without contrast performed showed mild left hydroutero nephrosis of uncertain etiology. Subjective:   Patient said she is feeling better and said he would like to eat regular food. She said she has been tolerating liquid diet without any nausea, vomiting or abdominal pain. Assessment/Plan:   Diabetes mellitus type 2 with hyperglycemia  Uncontrolled, improving. Continue Lantus 20 units and sliding scale. Her last hemoglobin A1c was 14 on May 29.     Leukocytosis:   Resolved, no fever     Hyponatremia  Likely due to dehydration, resolved.       Pancreatitis  Lipid panel did not show elevated triglyceride; it is 108. Probably due to hyperglycemia, will advance diet and follow.      Left lower abdominal quadrant pain  With left hydroutero nephrosis of uncertain etiology. Monitor for now. Can obtain CT scan with contrast if LLQ pain persist    Asthma;  without exacerbation. Continue Pulmicort nebulizer and albuterol inhaler.      See orders for other plans. VTE prophylaxis: Lovenox  Code status: Full code  Discussed plan of care with Patient/Family and Nurse. Pre-admission lived at home. Discharge planning: pending.         Review of Systems:     Review of Systems:  Symptom  Y/N  Comments   Symptom  Y/N  Comments    Fever/Chills  n    Chest Pain  n    Poor Appetite  n    Edema  n Cough  n   Abdominal Pain  n     Sputum  n   Joint Pain  n    SOB/HOWARD  n   Pruritis/Rash      Nausea/vomit  n   Tolerating PT/OT      Diarrhea  n   Tolerating Diet      Constipation     Other      Could not obtain due to:         Objective:   Physical Exam:     Visit Vitals  /69 (BP 1 Location: Left upper arm, BP Patient Position: At rest)   Pulse 73   Temp 98.1 °F (36.7 °C)   Resp 18   Ht 5' (1.524 m)   Wt 76.1 kg (167 lb 12.3 oz)   SpO2 91%   BMI 32.77 kg/m²      O2 Device: None (Room air)    Temp (24hrs), Av.5 °F (36.9 °C), Min:98.1 °F (36.7 °C), Max:98.7 °F (37.1 °C)    No intake/output data recorded.  1901 -  0700  In: 2466.7 [I.V.:2466.7]  Out: 4021 [Urine:2775]      General:  Alert, cooperative, no distress, appears stated age. Lungs:   Clear to auscultation bilaterally. Chest wall:  No tenderness or deformity. Heart:  Regular rate and rhythm, S1, S2 normal, no murmur, click, rub or gallop. Abdomen:   Soft, non-tender. Bowel sounds normal. No masses,  No organomegaly. Extremities: Extremities normal, atraumatic, no cyanosis or edema. Skin: Skin color, texture, turgor normal. No rashes or lesions   Neurologic: CNII-XII intact. Data Review:       Recent Days:  Recent Labs     21  0231 21  0853   WBC 10.7 12.5*   HGB 12.4 12.9   HCT 38.0 40.1    271     Recent Labs     21  0231 21  0853    134*   K 3.5 4.0   * 102   CO2 27 25   * 424*   BUN 6 12   CREA 0.48* 0.93   CA 7.9* 8.6   MG 2.0 2.2   ALB 2.7* 3.2*   ALT 25 19     No results for input(s): PH, PCO2, PO2, HCO3, FIO2 in the last 72 hours.     24 Hour Results:  Recent Results (from the past 24 hour(s))   GLUCOSE, POC    Collection Time: 21 12:47 PM   Result Value Ref Range    Glucose (POC) 174 (H) 65 - 117 mg/dL    Performed by Uli Montiel    GLUCOSE, POC    Collection Time: 21  7:23 PM   Result Value Ref Range    Glucose (POC) 368 (H) 65 - 117 mg/dL Performed by Vishal Castro POC    Collection Time: 05/31/21 12:23 AM   Result Value Ref Range    Glucose (POC) 112 65 - 117 mg/dL    Performed by Seema Maguire        Problem List:  Problem List as of 5/31/2021 Date Reviewed: 7/5/2019        Codes Class Noted - Resolved    DM (diabetes mellitus), type 2, uncontrolled ([facility] ICD-10-CM: E11.65  ICD-9-CM: 250.02  5/29/2021 - Present        Shortness of breath ICD-10-CM: R06.02  ICD-9-CM: 786.05  4/25/2020 - Present        Suspected COVID-19 virus infection ICD-10-CM: Z20.822  ICD-9-CM: V01.79  4/25/2020 - Present        Bilateral pneumonia ICD-10-CM: J18.9  ICD-9-CM: 486  9/24/2019 - Present        Pain due to knee joint prosthesis ([facility] ICD-10-CM: T84.84XA, Z96.659  ICD-9-CM: 996.77, 338.18, V43.65  11/14/2017 - Present        CAD (coronary artery disease) ICD-10-CM: I25.10  ICD-9-CM: 414.00  10/27/2017 - Present        Pancreatitis, acute ICD-10-CM: K85.90  ICD-9-CM: 534.1  9/8/2017 - Present        Epigastric abdominal pain ICD-10-CM: R10.13  ICD-9-CM: 789.06  9/8/2017 - Present        Painful total knee replacement ([facility] ICD-10-CM: T84.84XA, Z96.659  ICD-9-CM: 996.77, V43.65  8/9/2017 - Present        Status post right knee replacement ICD-10-CM: Z96.651  ICD-9-CM: V43.65  8/9/2017 - Present        Leukocytosis, unspecified ICD-10-CM: D72.829  ICD-9-CM: 288.60  4/6/2015 - Present        DM (diabetes mellitus) ([facility] ICD-10-CM: E11.9  ICD-9-CM: 250.00  4/6/2015 - Present        HTN (hypertension) ICD-10-CM: I10  ICD-9-CM: 401.9  4/6/2015 - Present        Arthritis ICD-10-CM: M19.90  ICD-9-CM: 716.90  4/6/2015 - Present        Chest pain, unspecified ICD-10-CM: R07.9  ICD-9-CM: 786.50  9/12/2013 - Present        RESOLVED: Chest pain ICD-10-CM: R07.9  ICD-9-CM: 786.50  7/5/2019 - 7/12/2019        RESOLVED: Abnormal ECG ICD-10-CM: R94.31  ICD-9-CM: 794.31  3/9/2019 - 3/11/2019        RESOLVED: Abnormal EKG ICD-10-CM: R94.31  ICD-9-CM: 794.31  3/8/2019 - 3/11/2019        RESOLVED: Gastric polyp ICD-10-CM: K31.7  ICD-9-CM: 211.1  9/13/2013 - 10/27/2017        RESOLVED: Hemorrhoids ICD-10-CM: K64.9  ICD-9-CM: 455.6  6/13/2013 - 10/27/2017        RESOLVED: Anal polyp ICD-10-CM: K62.0  ICD-9-CM: 569.0  6/13/2013 - 10/27/2017              Medications reviewed  Current Facility-Administered Medications   Medication Dose Route Frequency    lactated Ringers infusion  100 mL/hr IntraVENous CONTINUOUS    oxyCODONE-acetaminophen (PERCOCET) 5-325 mg per tablet 1 Tablet  1 Tablet Oral Q6H PRN    albuterol (PROVENTIL VENTOLIN) nebulizer solution 2.5 mg  2.5 mg Nebulization Q4H PRN    aspirin chewable tablet 81 mg  81 mg Oral DAILY    busPIRone (BUSPAR) tablet 20 mg  20 mg Oral BID    DULoxetine (CYMBALTA) capsule 60 mg  60 mg Oral DAILY    mirtazapine (REMERON) tablet 15 mg  15 mg Oral QHS    QUEtiapine (SEROquel) tablet 25 mg  25 mg Oral QHS    rosuvastatin (CRESTOR) tablet 10 mg  10 mg Oral QHS    sodium chloride (NS) flush 5-40 mL  5-40 mL IntraVENous Q8H    sodium chloride (NS) flush 5-40 mL  5-40 mL IntraVENous PRN    acetaminophen (TYLENOL) tablet 650 mg  650 mg Oral Q6H PRN    Or    acetaminophen (TYLENOL) suppository 650 mg  650 mg Rectal Q6H PRN    polyethylene glycol (MIRALAX) packet 17 g  17 g Oral DAILY PRN    promethazine (PHENERGAN) tablet 12.5 mg  12.5 mg Oral Q6H PRN    Or    ondansetron (ZOFRAN) injection 4 mg  4 mg IntraVENous Q6H PRN    enoxaparin (LOVENOX) injection 40 mg  40 mg SubCUTAneous DAILY    glucose chewable tablet 16 g  4 Tablet Oral PRN    dextrose (D50W) injection syrg 12.5-25 g  25-50 mL IntraVENous PRN    glucagon (GLUCAGEN) injection 1 mg  1 mg IntraMUSCular PRN    insulin lispro (HUMALOG) injection   SubCUTAneous AC&HS    insulin glargine (LANTUS) injection 20 Units  20 Units SubCUTAneous BID    morphine injection 1 mg  1 mg IntraVENous Q4H PRN    budesonide (PULMICORT) 500 mcg/2 ml nebulizer suspension  500 mcg Nebulization BID RT    pantoprazole (PROTONIX) tablet 40 mg  40 mg Oral ACB       Care Plan discussed with: Patient/Family and Nurse    Total time spent with patient: 40 minutes.     Yong Perez MD

## 2021-06-01 LAB
ALBUMIN SERPL-MCNC: 2.8 G/DL (ref 3.5–5)
ALBUMIN/GLOB SERPL: 0.8 {RATIO} (ref 1.1–2.2)
ALP SERPL-CCNC: 128 U/L (ref 45–117)
ALT SERPL-CCNC: 37 U/L (ref 12–78)
AMYLASE SERPL-CCNC: 143 U/L (ref 25–115)
ANION GAP SERPL CALC-SCNC: 6 MMOL/L (ref 5–15)
AST SERPL-CCNC: 21 U/L (ref 15–37)
BASOPHILS # BLD: 0 K/UL (ref 0–0.1)
BASOPHILS NFR BLD: 0 % (ref 0–1)
BILIRUB SERPL-MCNC: 0.3 MG/DL (ref 0.2–1)
BUN SERPL-MCNC: 9 MG/DL (ref 6–20)
BUN/CREAT SERPL: 14 (ref 12–20)
CALCIUM SERPL-MCNC: 8.6 MG/DL (ref 8.5–10.1)
CHLORIDE SERPL-SCNC: 110 MMOL/L (ref 97–108)
CO2 SERPL-SCNC: 27 MMOL/L (ref 21–32)
CREAT SERPL-MCNC: 0.63 MG/DL (ref 0.55–1.02)
DIFFERENTIAL METHOD BLD: ABNORMAL
EOSINOPHIL # BLD: 0.1 K/UL (ref 0–0.4)
EOSINOPHIL NFR BLD: 1 % (ref 0–7)
ERYTHROCYTE [DISTWIDTH] IN BLOOD BY AUTOMATED COUNT: 15.1 % (ref 11.5–14.5)
GLOBULIN SER CALC-MCNC: 3.6 G/DL (ref 2–4)
GLUCOSE BLD STRIP.AUTO-MCNC: 124 MG/DL (ref 65–117)
GLUCOSE BLD STRIP.AUTO-MCNC: 235 MG/DL (ref 65–117)
GLUCOSE BLD STRIP.AUTO-MCNC: 322 MG/DL (ref 65–117)
GLUCOSE BLD STRIP.AUTO-MCNC: 328 MG/DL (ref 65–117)
GLUCOSE SERPL-MCNC: 89 MG/DL (ref 65–100)
HCT VFR BLD AUTO: 42.9 % (ref 35–47)
HGB BLD-MCNC: 13.6 G/DL (ref 11.5–16)
IMM GRANULOCYTES # BLD AUTO: 0.1 K/UL (ref 0–0.04)
IMM GRANULOCYTES NFR BLD AUTO: 1 % (ref 0–0.5)
LIPASE SERPL-CCNC: 387 U/L (ref 73–393)
LYMPHOCYTES # BLD: 2.2 K/UL (ref 0.8–3.5)
LYMPHOCYTES NFR BLD: 20 % (ref 12–49)
MCH RBC QN AUTO: 28.2 PG (ref 26–34)
MCHC RBC AUTO-ENTMCNC: 31.7 G/DL (ref 30–36.5)
MCV RBC AUTO: 89 FL (ref 80–99)
MONOCYTES # BLD: 0.8 K/UL (ref 0–1)
MONOCYTES NFR BLD: 8 % (ref 5–13)
NEUTS SEG # BLD: 7.9 K/UL (ref 1.8–8)
NEUTS SEG NFR BLD: 70 % (ref 32–75)
NRBC # BLD: 0 K/UL (ref 0–0.01)
NRBC BLD-RTO: 0 PER 100 WBC
PLATELET # BLD AUTO: 294 K/UL (ref 150–400)
PMV BLD AUTO: 11.5 FL (ref 8.9–12.9)
POTASSIUM SERPL-SCNC: 3.6 MMOL/L (ref 3.5–5.1)
PROT SERPL-MCNC: 6.4 G/DL (ref 6.4–8.2)
RBC # BLD AUTO: 4.82 M/UL (ref 3.8–5.2)
SERVICE CMNT-IMP: ABNORMAL
SODIUM SERPL-SCNC: 143 MMOL/L (ref 136–145)
WBC # BLD AUTO: 11.2 K/UL (ref 3.6–11)

## 2021-06-01 PROCEDURE — 83690 ASSAY OF LIPASE: CPT

## 2021-06-01 PROCEDURE — 65660000000 HC RM CCU STEPDOWN

## 2021-06-01 PROCEDURE — 82962 GLUCOSE BLOOD TEST: CPT

## 2021-06-01 PROCEDURE — 74011250636 HC RX REV CODE- 250/636: Performed by: HOSPITALIST

## 2021-06-01 PROCEDURE — 36415 COLL VENOUS BLD VENIPUNCTURE: CPT

## 2021-06-01 PROCEDURE — 74011636637 HC RX REV CODE- 636/637: Performed by: HOSPITALIST

## 2021-06-01 PROCEDURE — 99233 SBSQ HOSP IP/OBS HIGH 50: CPT | Performed by: CLINICAL NURSE SPECIALIST

## 2021-06-01 PROCEDURE — 74011000250 HC RX REV CODE- 250: Performed by: HOSPITALIST

## 2021-06-01 PROCEDURE — 97161 PT EVAL LOW COMPLEX 20 MIN: CPT

## 2021-06-01 PROCEDURE — 80053 COMPREHEN METABOLIC PANEL: CPT

## 2021-06-01 PROCEDURE — 85025 COMPLETE CBC W/AUTO DIFF WBC: CPT

## 2021-06-01 PROCEDURE — 74011636637 HC RX REV CODE- 636/637: Performed by: FAMILY MEDICINE

## 2021-06-01 PROCEDURE — 82150 ASSAY OF AMYLASE: CPT

## 2021-06-01 PROCEDURE — 74011250637 HC RX REV CODE- 250/637: Performed by: HOSPITALIST

## 2021-06-01 PROCEDURE — 74011250637 HC RX REV CODE- 250/637: Performed by: NURSE PRACTITIONER

## 2021-06-01 PROCEDURE — 94640 AIRWAY INHALATION TREATMENT: CPT

## 2021-06-01 PROCEDURE — 74011250636 HC RX REV CODE- 250/636: Performed by: FAMILY MEDICINE

## 2021-06-01 PROCEDURE — 97530 THERAPEUTIC ACTIVITIES: CPT

## 2021-06-01 PROCEDURE — 93005 ELECTROCARDIOGRAM TRACING: CPT

## 2021-06-01 PROCEDURE — 97535 SELF CARE MNGMENT TRAINING: CPT

## 2021-06-01 PROCEDURE — 97165 OT EVAL LOW COMPLEX 30 MIN: CPT

## 2021-06-01 RX ADMIN — ROSUVASTATIN CALCIUM 10 MG: 10 TABLET, COATED ORAL at 21:28

## 2021-06-01 RX ADMIN — Medication 10 ML: at 13:48

## 2021-06-01 RX ADMIN — QUETIAPINE FUMARATE 25 MG: 25 TABLET ORAL at 21:28

## 2021-06-01 RX ADMIN — BUSPIRONE HYDROCHLORIDE 20 MG: 10 TABLET ORAL at 17:32

## 2021-06-01 RX ADMIN — BUDESONIDE 500 MCG: 0.5 INHALANT RESPIRATORY (INHALATION) at 08:18

## 2021-06-01 RX ADMIN — Medication 10 ML: at 06:46

## 2021-06-01 RX ADMIN — HUMAN INSULIN 16 UNITS: 100 INJECTION, SUSPENSION SUBCUTANEOUS at 17:49

## 2021-06-01 RX ADMIN — INSULIN LISPRO 3 UNITS: 100 INJECTION, SOLUTION INTRAVENOUS; SUBCUTANEOUS at 17:32

## 2021-06-01 RX ADMIN — BUSPIRONE HYDROCHLORIDE 20 MG: 10 TABLET ORAL at 09:43

## 2021-06-01 RX ADMIN — INSULIN LISPRO 4 UNITS: 100 INJECTION, SOLUTION INTRAVENOUS; SUBCUTANEOUS at 21:28

## 2021-06-01 RX ADMIN — SODIUM CHLORIDE, POTASSIUM CHLORIDE, SODIUM LACTATE AND CALCIUM CHLORIDE 50 ML/HR: 600; 310; 30; 20 INJECTION, SOLUTION INTRAVENOUS at 17:34

## 2021-06-01 RX ADMIN — DULOXETINE HYDROCHLORIDE 60 MG: 60 CAPSULE, DELAYED RELEASE ORAL at 09:43

## 2021-06-01 RX ADMIN — OXYCODONE HYDROCHLORIDE AND ACETAMINOPHEN 1 TABLET: 5; 325 TABLET ORAL at 10:42

## 2021-06-01 RX ADMIN — MORPHINE SULFATE 1 MG: 2 INJECTION, SOLUTION INTRAMUSCULAR; INTRAVENOUS at 15:48

## 2021-06-01 RX ADMIN — ASPIRIN 81 MG: 81 TABLET, CHEWABLE ORAL at 09:43

## 2021-06-01 RX ADMIN — INSULIN GLARGINE 20 UNITS: 100 INJECTION, SOLUTION SUBCUTANEOUS at 09:42

## 2021-06-01 RX ADMIN — ENOXAPARIN SODIUM 40 MG: 40 INJECTION SUBCUTANEOUS at 09:43

## 2021-06-01 RX ADMIN — PANTOPRAZOLE SODIUM 40 MG: 40 TABLET, DELAYED RELEASE ORAL at 06:48

## 2021-06-01 RX ADMIN — MIRTAZAPINE 15 MG: 15 TABLET, FILM COATED ORAL at 21:28

## 2021-06-01 RX ADMIN — INSULIN LISPRO 7 UNITS: 100 INJECTION, SOLUTION INTRAVENOUS; SUBCUTANEOUS at 11:50

## 2021-06-01 NOTE — PROGRESS NOTES
Hospitalist Progress Note          Rhonda Benson MD  Please call  and page for questions. Call physician on-call through the  7pm-7am    Daily Progress Note: 6/1/2021    Primary care provider:Minerva Donald MD    Date of admission: 5/29/2021  8:19 AM    Admission summery and hospital course:  79 y.o. female with past medical history of hypertension, diabetes mellitus, who presents to the ED on 05/29 with left lower quadrant abdominal pain for 2 weeks. In the ER she was found to have uncontrolled blood sugar which ranged in the mid 400s.    CT scan of the abdomen without contrast performed showed mild left hydroutero nephrosis of uncertain etiology.         Subjective:   Patient said she is feeling better and she would like to take regular diet. Denied any other acute issues today. Assessment/Plan:   Diabetes mellitus type 2 with hyperglycemia  Uncontrolled with hyperglycemia and HbA1c 14 in 05/29/21. Case discussed with diabetic nurse. I agreed with her plan to change her Lantus to NPH twice a day to accommodate rising mealtime glucose. Patient may not be compliant at home with Lantus, he may not be compliant with mealtime insulin as well. Continue sliding scale insulin and hypoglycemic protocol. Leukocytosis:   Mild, patient is afebrile and she is feeling better.       Hyponatremia  Likely due to dehydration, resolved.        Pancreatitis  Pain improving, lipase has normalized. Will advance her diet to diabetic. Probably due to hyperglycemia.       Left lower abdominal quadrant pain  CT scan shoed mild left hydroureteronephrosis of uncertain etiology and colonic diverticulosis.     Asthma;  without exacerbation. Continue Pulmicort nebulizer and albuterol inhaler.       See orders for other plans. VTE prophylaxis: Lovenox  Code status: Full code  Discussed plan of care with Patient/Family and Nurse. Pre-admission lived at home. Discharge planning: pending. Follow-up with PT and OT. Patient is agreeable to SNF if it is necessary. Review of Systems:     Review of Systems:  Symptom  Y/N  Comments   Symptom  Y/N  Comments    Fever/Chills  n    Chest Pain  n    Poor Appetite  n    Edema  n     Cough  n   Abdominal Pain  y   improving   Sputum  n   Joint Pain      SOB/HOWARD  n   Pruritis/Rash      Nausea/vomit  n   Tolerating PT/OT      Diarrhea  n   Tolerating Diet      Constipation     Other      Could not obtain due to:         Objective:   Physical Exam:     Visit Vitals  BP (!) 107/59 (BP 1 Location: Left upper arm, BP Patient Position: At rest;Lying right side)   Pulse (!) 105   Temp 98.7 °F (37.1 °C)   Resp 16   Ht 5' (1.524 m)   Wt 76.1 kg (167 lb 12.3 oz)   SpO2 93%   BMI 32.77 kg/m²      O2 Device: None (Room air)    Temp (24hrs), Av.5 °F (36.9 °C), Min:98.3 °F (36.8 °C), Max:98.7 °F (37.1 °C)    701 - 1900  In: 240 [P.O.:240]  Out: -    1901 -  0700  In: -   Out: 2100 [Urine:2100]      General:  Alert, cooperative, no distress, appears stated age. Lungs:   Clear to auscultation bilaterally. Chest wall:  No tenderness or deformity. Heart:  Regular rate and rhythm, S1, S2 normal, no murmur, click, rub or gallop. Abdomen:    Obese, soft, non-tender. Bowel sounds normal.    Extremities: Extremities normal, atraumatic, no cyanosis or edema. Skin: Skin color, texture, turgor normal. No rashes or lesions   Neurologic:  Patient has clear voice and she communicates appropriately. Patient is attentive.        Data Review:       Recent Days:  Recent Labs     21  0506 21  0231   WBC 11.2* 10.7   HGB 13.6 12.4   HCT 42.9 38.0    258     Recent Labs     21  0506 21  0231    142   K 3.6 3.5   * 110*   CO2 27 27   GLU 89 169*   BUN 9 6   CREA 0.63 0.48*   CA 8.6 7.9*   MG  --  2.0   ALB 2.8* 2.7*   ALT 37 25     No results for input(s): PH, PCO2, PO2, HCO3, FIO2 in the last 72 hours. 24 Hour Results:  Recent Results (from the past 24 hour(s))   GLUCOSE, POC    Collection Time: 05/31/21 11:39 AM   Result Value Ref Range    Glucose (POC) 216 (H) 65 - 117 mg/dL    Performed by Anneliese Galeana    GLUCOSE, POC    Collection Time: 05/31/21  4:49 PM   Result Value Ref Range    Glucose (POC) 299 (H) 65 - 117 mg/dL    Performed by Anca Mishra    GLUCOSE, POC    Collection Time: 05/31/21  9:13 PM   Result Value Ref Range    Glucose (POC) 282 (H) 65 - 117 mg/dL    Performed by Marcell Cordova    CBC WITH AUTOMATED DIFF    Collection Time: 06/01/21  5:06 AM   Result Value Ref Range    WBC 11.2 (H) 3.6 - 11.0 K/uL    RBC 4.82 3.80 - 5.20 M/uL    HGB 13.6 11.5 - 16.0 g/dL    HCT 42.9 35.0 - 47.0 %    MCV 89.0 80.0 - 99.0 FL    MCH 28.2 26.0 - 34.0 PG    MCHC 31.7 30.0 - 36.5 g/dL    RDW 15.1 (H) 11.5 - 14.5 %    PLATELET 000 708 - 935 K/uL    MPV 11.5 8.9 - 12.9 FL    NRBC 0.0 0  WBC    ABSOLUTE NRBC 0.00 0.00 - 0.01 K/uL    NEUTROPHILS 70 32 - 75 %    LYMPHOCYTES 20 12 - 49 %    MONOCYTES 8 5 - 13 %    EOSINOPHILS 1 0 - 7 %    BASOPHILS 0 0 - 1 %    IMMATURE GRANULOCYTES 1 (H) 0.0 - 0.5 %    ABS. NEUTROPHILS 7.9 1.8 - 8.0 K/UL    ABS. LYMPHOCYTES 2.2 0.8 - 3.5 K/UL    ABS. MONOCYTES 0.8 0.0 - 1.0 K/UL    ABS. EOSINOPHILS 0.1 0.0 - 0.4 K/UL    ABS. BASOPHILS 0.0 0.0 - 0.1 K/UL    ABS. IMM.  GRANS. 0.1 (H) 0.00 - 0.04 K/UL    DF AUTOMATED     METABOLIC PANEL, COMPREHENSIVE    Collection Time: 06/01/21  5:06 AM   Result Value Ref Range    Sodium 143 136 - 145 mmol/L    Potassium 3.6 3.5 - 5.1 mmol/L    Chloride 110 (H) 97 - 108 mmol/L    CO2 27 21 - 32 mmol/L    Anion gap 6 5 - 15 mmol/L    Glucose 89 65 - 100 mg/dL    BUN 9 6 - 20 MG/DL    Creatinine 0.63 0.55 - 1.02 MG/DL    BUN/Creatinine ratio 14 12 - 20      GFR est AA >60 >60 ml/min/1.73m2    GFR est non-AA >60 >60 ml/min/1.73m2    Calcium 8.6 8.5 - 10.1 MG/DL    Bilirubin, total 0.3 0.2 - 1.0 MG/DL    ALT (SGPT) 37 12 - 78 U/L    AST (SGOT) 21 15 - 37 U/L    Alk.  phosphatase 128 (H) 45 - 117 U/L    Protein, total 6.4 6.4 - 8.2 g/dL    Albumin 2.8 (L) 3.5 - 5.0 g/dL    Globulin 3.6 2.0 - 4.0 g/dL    A-G Ratio 0.8 (L) 1.1 - 2.2     AMYLASE    Collection Time: 06/01/21  5:06 AM   Result Value Ref Range    Amylase 143 (H) 25 - 115 U/L   LIPASE    Collection Time: 06/01/21  5:06 AM   Result Value Ref Range    Lipase 387 73 - 393 U/L   GLUCOSE, POC    Collection Time: 06/01/21  6:45 AM   Result Value Ref Range    Glucose (POC) 124 (H) 65 - 117 mg/dL    Performed by Parvez Diane        Problem List:  Problem List as of 6/1/2021 Date Reviewed: 7/5/2019        Codes Class Noted - Resolved    DM (diabetes mellitus), type 2, uncontrolled ([facility] ICD-10-CM: E11.65  ICD-9-CM: 250.02  5/29/2021 - Present        Shortness of breath ICD-10-CM: R06.02  ICD-9-CM: 786.05  4/25/2020 - Present        Suspected COVID-19 virus infection ICD-10-CM: Z20.822  ICD-9-CM: V01.79  4/25/2020 - Present        Bilateral pneumonia ICD-10-CM: J18.9  ICD-9-CM: 486  9/24/2019 - Present        Pain due to knee joint prosthesis ([facility] ICD-10-CM: T84.84XA, Z96.659  ICD-9-CM: 996.77, 338.18, V43.65  11/14/2017 - Present        CAD (coronary artery disease) ICD-10-CM: I25.10  ICD-9-CM: 414.00  10/27/2017 - Present        Pancreatitis, acute ICD-10-CM: K85.90  ICD-9-CM: 577.0  9/8/2017 - Present        Epigastric abdominal pain ICD-10-CM: R10.13  ICD-9-CM: 789.06  9/8/2017 - Present        Painful total knee replacement ([facility] ICD-10-CM: T84.84XA, Z96.659  ICD-9-CM: 996.77, V43.65  8/9/2017 - Present        Status post right knee replacement ICD-10-CM: M97.412  ICD-9-CM: V43.65  8/9/2017 - Present        Leukocytosis, unspecified ICD-10-CM: D72.829  ICD-9-CM: 288.60  4/6/2015 - Present        DM (diabetes mellitus) ([facility] ICD-10-CM: E11.9  ICD-9-CM: 250.00  4/6/2015 - Present        HTN (hypertension) ICD-10-CM: I10  ICD-9-CM: 401.9  4/6/2015 - Present        Arthritis ICD-10-CM: M19.90  ICD-9-CM: 716.90  4/6/2015 - Present        Chest pain, unspecified ICD-10-CM: R07.9  ICD-9-CM: 786.50  9/12/2013 - Present        RESOLVED: Chest pain ICD-10-CM: R07.9  ICD-9-CM: 786.50  7/5/2019 - 7/12/2019        RESOLVED: Abnormal ECG ICD-10-CM: R94.31  ICD-9-CM: 794.31  3/9/2019 - 3/11/2019        RESOLVED: Abnormal EKG ICD-10-CM: R94.31  ICD-9-CM: 794.31  3/8/2019 - 3/11/2019        RESOLVED: Gastric polyp ICD-10-CM: K31.7  ICD-9-CM: 211.1  9/13/2013 - 10/27/2017        RESOLVED: Hemorrhoids ICD-10-CM: K64.9  ICD-9-CM: 455.6  6/13/2013 - 10/27/2017        RESOLVED: Anal polyp ICD-10-CM: K62.0  ICD-9-CM: 569.0  6/13/2013 - 10/27/2017              Medications reviewed  Current Facility-Administered Medications   Medication Dose Route Frequency    insulin NPH (NOVOLIN N, HUMULIN N) injection 16 Units  16 Units SubCUTAneous ACB&D    lactated Ringers infusion  50 mL/hr IntraVENous CONTINUOUS    oxyCODONE-acetaminophen (PERCOCET) 5-325 mg per tablet 1 Tablet  1 Tablet Oral Q6H PRN    albuterol (PROVENTIL VENTOLIN) nebulizer solution 2.5 mg  2.5 mg Nebulization Q4H PRN    aspirin chewable tablet 81 mg  81 mg Oral DAILY    busPIRone (BUSPAR) tablet 20 mg  20 mg Oral BID    DULoxetine (CYMBALTA) capsule 60 mg  60 mg Oral DAILY    mirtazapine (REMERON) tablet 15 mg  15 mg Oral QHS    QUEtiapine (SEROquel) tablet 25 mg  25 mg Oral QHS    rosuvastatin (CRESTOR) tablet 10 mg  10 mg Oral QHS    sodium chloride (NS) flush 5-40 mL  5-40 mL IntraVENous Q8H    sodium chloride (NS) flush 5-40 mL  5-40 mL IntraVENous PRN    acetaminophen (TYLENOL) tablet 650 mg  650 mg Oral Q6H PRN    Or    acetaminophen (TYLENOL) suppository 650 mg  650 mg Rectal Q6H PRN    polyethylene glycol (MIRALAX) packet 17 g  17 g Oral DAILY PRN    promethazine (PHENERGAN) tablet 12.5 mg  12.5 mg Oral Q6H PRN    Or    ondansetron (ZOFRAN) injection 4 mg  4 mg IntraVENous Q6H PRN    enoxaparin (LOVENOX) injection 40 mg  40 mg SubCUTAneous DAILY    glucose chewable tablet 16 g  4 Tablet Oral PRN    dextrose (D50W) injection syrg 12.5-25 g  25-50 mL IntraVENous PRN    glucagon (GLUCAGEN) injection 1 mg  1 mg IntraMUSCular PRN    insulin lispro (HUMALOG) injection   SubCUTAneous AC&HS    morphine injection 1 mg  1 mg IntraVENous Q4H PRN    budesonide (PULMICORT) 500 mcg/2 ml nebulizer suspension  500 mcg Nebulization BID RT    pantoprazole (PROTONIX) tablet 40 mg  40 mg Oral ACB       Care Plan discussed with: Patient/Family, Nurse and     Total time spent with patient: 40 minutes.     Heena Arzola MD

## 2021-06-01 NOTE — PROGRESS NOTES
Problem: Self Care Deficits Care Plan (Adult)  Goal: *Acute Goals and Plan of Care (Insert Text)  Description:   FUNCTIONAL STATUS PRIOR TO ADMISSION: Patient lived alone with daily ADL and IADL assist from her friend/Landlord. Mod I ambulation with SPC in the home and RW in the community. ADL and IADL assist needed due to B UE and LE impaired sensation; Landlord managing her bills    HOME SUPPORT: Friend has been helping daily but now friend has recently returned to full time work and patient is very anxious about having no help in the home now    Occupational Therapy Goals  Initiated 6/1/2021  1. Patient will perform self-feeding AE PRN (eg scissors) and grooming AE PRNwith modified independence within 7 day(s). 2.  Patient will perform upper body dressing with minimal assistance/contact guard assist within 7 day(s). 3.  Patient will perform lower body dressing with moderate assistance  with AE within 7 day(s). 4.  Patient will perform toilet transfers with supervision/set-up within 7 day(s). 5.  Patient will perform all aspects of toileting with moderate assistance  within 7 day(s). 6.  Patient will participate in upper extremity therapeutic exercise/activities with minimal assistance/contact guard assist for 5 minutes within 7 day(s). 7.  Patient will utilize energy conservation, pain management, fall prevention techniques during functional activities with verbal cues within 7 day(s). Outcome: Progressing Towards Goal   OCCUPATIONAL THERAPY EVALUATION  Patient: Nando Marcum (16 y.o. female)  Date: 6/1/2021  Primary Diagnosis: DM (diabetes mellitus), type 2, uncontrolled ([facility] [address] [E11.65]        Precautions:   Fall, Bed Alarm (decreased sensation B UE and LE)    ASSESSMENT  Based on the objective data described below, the patient presents with general debility, arriving at ER  5-29 feeling unwell x 2 weeks & with L Lower abdominal pain; DMII uncontrolled with  with EMS and 444 in ER. Patient lives alone but has had assist daily for several months due to weakness and B incoordination, \"my hands and legs are numb- neuropathy I think. \" Friend has recently returned to full time work and patient is quite anxious about being alone in the home at her current functional level. F    Current Level of Function Impacting Discharge (ADLs/self-care): set up to mod A UE ADLs, max A-D LE ADLs: has used AE in the past from SNF stay but it is no longer available. Min A -S very brief functional mobility with bed rail and RW    Functional Outcome Measure: The patient scored Total: 35/100 on the Barthel Index outcome measure which is indicative of 65% impaired ability to care for basic self needs/dependency on others; inferred 95% dependency on others for instrumental ADLs. Other factors to consider for discharge: friend has returned to work so she will need to find other assistance; will benefit from rehab     Patient will benefit from skilled therapy intervention to address the above noted impairments. PLAN :  Recommendations and Planned Interventions: self care training, functional mobility training, therapeutic exercise, balance training, visual/perceptual training, therapeutic activities, cognitive retraining, endurance activities, neuromuscular re-education, patient education, home safety training, and family training/education    Frequency/Duration: Patient will be followed by occupational therapy 5 times a week to address goals.     Recommendation for discharge: (in order for the patient to meet his/her long term goals)  Therapy up to 5 days/week in SNF setting    This discharge recommendation:  A follow-up discussion with the attending provider and/or case management is planned    IF patient discharges home will need the following DME: AE: long handled bathing, AE: long handled dressing, bedside commode, and transfer bench       SUBJECTIVE:   Patient stated I am so stressed because I know I need help but just dont have any now. Monisha Karan    OBJECTIVE DATA SUMMARY:   HISTORY:   Past Medical History:   Diagnosis Date    Anal polyp 6/13/2013    Arthritis     Asthma     CAD (coronary artery disease) 10/27/2017    Cataract     Chronic pain     knee - right/back    Diabetes ([address]     GERD (gastroesophageal reflux disease)     Hemorrhoids 6/13/2013    History of kidney stones     Hypertension     Ill-defined condition     abdominal pain and burning    Other ill-defined conditions(799.89)     high cholesterol     Past Surgical History:   Procedure Laterality Date    COLONOSCOPY  2/28/2013         EGD  2/28/2013         HX CATARACT REMOVAL Bilateral     HX CHOLECYSTECTOMY  6-2015    HX GI  6/27/13    anal polyps removed    HX HEENT      laser surgery for blood clot in right eye    HX KNEE REPLACEMENT      right    HX OTHER SURGICAL  4-2-14    lap gastrotomy and removal of polyp -  - not cancerous per pt    HX ALAN AND BSO      HX TONSILLECTOMY      HX UROLOGICAL      \"laser\" surgery for kidney stone    NEUROLOGICAL PROCEDURE UNLISTED  1990    tumor at back of neck, benign       Expanded or extensive additional review of patient history:     Home Situation  Home Environment: Private residence  # Steps to Enter: 1  One/Two Story Residence: Two story  Interior Rails: Right  Living Alone: Yes  Support Systems: Friends \ neighbors (landlord doing bills, assisting with tub transfers)  Patient Expects to be Discharged to[de-identified] Private residence  Current DME Used/Available at Home: renan Lambert, [address] [address] chair, Cane, straight (uses RW out of the home)  Tub or Shower Type: Tub/Shower combination    Hand dominance: Right    EXAMINATION OF PERFORMANCE DEFICITS:  Cognitive/Behavioral Status:  Neurologic State: Alert; Appropriate for age  Orientation Level: Oriented X4  Cognition: Follows commands;Decreased attention/concentration (\"it's hard to remember things; so much information)  Perception: Appears intact  Perseveration: No perseveration noted  Safety/Judgement: Fall prevention; Awareness of environment    Skin: see nsg    Edema:  see nsg    Hearing: Auditory  Auditory Impairment: None    Vision/Perceptual:                           Acuity: Impaired far vision (WFL L eye; \"nearly blind R eye\")  \"blood vessel burst\"  Corrective Lenses: Reading glasses; Landlord friends assists w/bills    Range of Motion:    AROM: Generally decreased, functional (B UE with reported B shoulder pain)  PROM: Generally decreased, functional                      Strength:  B UE  Strength: Generally decreased, functional (but did need assist most ADLs PTA with general debility) (limited by endurance and sensation)                Coordination:  Coordination: Generally decreased, functional  Fine Motor Skills-Upper: Left Impaired;Right Impaired (limited by decreased sensation B UE)    Gross Motor Skills-Upper: Left Impaired;Right Impaired (WFL distally, mild deficits B shoulders with OA pain)    Tone & Sensation:    Tone: Normal  Sensation: Impaired (reports B UE and B LE with numbness)                      Balance: fair; limited by endurance, decreased sensation B feet/LE's up to thighs       Functional Mobility and Transfers for ADLs:  Bed Mobility:   S, with use of bedrail on L, did not cause increased pain in flank, still 8/10    Transfers:  Sit to Stand: Contact guard assistance  Stand to Sit: Contact guard assistance  Bed to Chair: Minimum assistance; Additional time; Adaptive equipment (RW)  Toilet Transfer : Minimum assistance; Adaptive equipment; Additional time (RW, recommend BSC for safety, limited by fatigue)    ADL Assessment:  Feeding: Setup (reports poor appetite; assist with containers)    Oral Facial Hygiene/Grooming: Setup;Modified Independent (able to perform PTA without assist, w/increased time)    Bathing: Maximum assistance (limited PTA as well with daily assist from friend/caregiver )    Upper Body Dressing: Minimum assistance; Moderate assistance (limited PTA as well recently, \"since I been feeling bad\")    Lower Body Dressing: Maximum assistance; Total assistance (was having difficulty and A PTA; help has returned to work)    Toileting: Maximum assistance (had assist PTA, pants up, down, hygiene, UE weakness/sensati)                ADL Intervention and task modifications:        Patient instructed and indicated understanding the benefits of maintaining activity tolerance, functional mobility, and independence with self care tasks during acute stay  to ensure safe return home and to baseline. Encouraged patient to increase frequency and duration OOB, be out of bed for all meals, perform daily ADLs (as approved by RN/MD regarding bathing etc), and performing functional mobility to/from [facility]    Cognitive Retraining  Safety/Judgement: Fall prevention; Awareness of environment      Functional Measure:  Barthel Index:    Bathin  Bladder: 5  Bowels: 5  Groomin  Dressin  Feedin  Mobility: 0  Stairs: 0  Toilet Use: 5  Transfer (Bed to Chair and Back): 10  Total: 35/100        The Barthel ADL Index: Guidelines  1. The index should be used as a record of what a patient does, not as a record of what a patient could do. 2. The main aim is to establish degree of independence from any help, physical or verbal, however minor and for whatever reason. 3. The need for supervision renders the patient not independent. 4. A patient's performance should be established using the best available evidence. Asking the patient, friends/relatives and nurses are the usual sources, but direct observation and common sense are also important. However direct testing is not needed. 5. Usually the patient's performance over the preceding 24-48 hours is important, but occasionally longer periods will be relevant. 6. Middle categories imply that the patient supplies over 50 per cent of the effort. 7. Use of aids to be independent is allowed.     Catherine Alegre., Barthel, DRosalindaW. (1965). Functional evaluation: the Barthel Index. [address] [facility] (14)2. Kristel Fair rogerio [place], J.J.M.F, Andra Walker.Radha.[place], [address] (). Measuring the change indisability after inpatient rehabilitation; comparison of the responsiveness of the Barthel Index and Functional [place] Measure. Journal of Neurology, Neurosurgery, and Psychiatry, 66(4), 984-236. MARIAH Huffman, BRANNON Trujillo, & Herminia Rivera M.A. (2004.) Assessment of post-stroke quality of life in cost-effectiveness studies: The usefulness of the Barthel Index and the EuroQoL-5D. Quality of Life Research, 15, 176-62         Occupational Therapy Evaluation Charge Determination   History Examination Decision-Making   LOW Complexity : Brief history review  HIGH Complexity : 5 or more performance deficits relating to physical, cognitive , or psychosocial skils that result in activity limitations and / or participation restrictions HIGH Complexity : Patient presents with comorbidities that affect occupational performance. Signifigant modification of tasks or assistance (eg, physical or verbal) with assessment (s) is necessary to enable patient to complete evaluation       Based on the above components, the patient evaluation is determined to be of the following complexity level: LOW   Pain Ratin/10 at rest; medicated during session due to this pain    Activity Tolerance:   Fair, requires rest breaks, and limited by pain, anxiety as well as general debility    After treatment patient left in no apparent distress:    Sitting in chair, Call bell within reach, Bed / chair alarm activated, and purwick in place, [facility] nearby as patient reports need to have BM; Nsg aware    COMMUNICATION/EDUCATION:   The patients plan of care was discussed with: Physical therapist and Registered nurse.      Home safety education was provided and the patient/caregiver indicated understanding., Patient/family have participated as able in goal setting and plan of care. , and Patient/family agree to work toward stated goals and plan of care. This patients plan of care is appropriate for delegation to HARISH.     Thank you for this referral.  Julián Steel OTR/L  Time Calculation: 25 mins

## 2021-06-01 NOTE — PROGRESS NOTES
Problem: Mobility Impaired (Adult and Pediatric)  Goal: *Acute Goals and Plan of Care (Insert Text)  Description: FUNCTIONAL STATUS PRIOR TO ADMISSION: Pt reports modified indep amb with use of RW outside of home. Landlord providing assist with ADLs/ IADLs, able to toilet independently. Pt notes that landlord will no longer be able to assist due to returning to work. HOME SUPPORT PRIOR TO ADMISSION: The patient lived alone in 2 level home with landlord to provide assistance. Physical Therapy Goals  Initiated 6/1/2021  1. Patient will move from supine to sit and sit to supine  in bed with independence within 7 day(s). 2.  Patient will transfer from bed to chair and chair to bed with supervision/set-up using the least restrictive device within 7 day(s). 3.  Patient will perform sit to stand with supervision/set-up within 7 day(s). 4.  Patient will ambulate with supervision/set-up for 50 feet with the least restrictive device within 7 day(s). 5.  Patient will ascend/descend 4 stairs with single handrail(s) with minimal assistance/contact guard assist within 7 day(s). Outcome: Not Met   PHYSICAL THERAPY EVALUATION  Patient: Virginia Mathis (23 y.o. female)  Date: 6/1/2021  Primary Diagnosis: DM (diabetes mellitus), type 2, uncontrolled ([facility] [address] [E11.65]        Precautions:   Fall, Bed Alarm (decreased sensation B UE and LE)    ASSESSMENT  Based on the objective data described below, the patient presents with general weakness, decreased sensation hands/ feet (baseline), impaired vision R eye (baseline), impaired balance, decreased activity tolerance, decreased indep with functional mobility, increased risk for fall. Pt admitted with LLQ pain and hyperglycemia; found to have acute pancreatitis and L hydroutero nephrosis. Mobility assessment limited by pt c/o abdominal pain and weakness this date. Pt tolerated transfer to chair with RW and assist for balance.  Pt tearful at times during session due to need for assist at home, current caregiver (landlord) soon to be unavailable. Pt will benefit from cont' PT for balance/ mobility progression. Recommend follow up SNF rehab at d/c to facilitate pt return to max level of functional indep. Current Level of Function Impacting Discharge (mobility/balance): bed mob supervision, SPT with RW min A    Functional Outcome Measure: The patient scored 18/28 on the Tinetti outcome measure which is indicative of high risk for fall. Other factors to consider for discharge: lives alone in 2 level home, landlord will no longer be able to provide assist     Patient will benefit from skilled therapy intervention to address the above noted impairments. PLAN :  Recommendations and Planned Interventions: bed mobility training, transfer training, gait training, therapeutic exercises, patient and family training/education, and therapeutic activities      Frequency/Duration: Patient will be followed by physical therapy:  5 times a week to address goals.     Recommendation for discharge: (in order for the patient to meet his/her long term goals)  Therapy up to 5 days/week in SNF setting    This discharge recommendation:  Has not yet been discussed the attending provider and/or case management    IF patient discharges home will need the following DME: to be determined (TBD)         SUBJECTIVE:   Patient stated I feel weak, and I'm just depressed    OBJECTIVE DATA SUMMARY:   HISTORY:    Past Medical History:   Diagnosis Date    Anal polyp 6/13/2013    Arthritis     Asthma     CAD (coronary artery disease) 10/27/2017    Cataract     Chronic pain     knee - right/back    Diabetes ([facility] [address]     GERD (gastroesophageal reflux disease)     Hemorrhoids 6/13/2013    History of kidney stones     Hypertension     Ill-defined condition     abdominal pain and burning    Other ill-defined conditions(799.89)     high cholesterol     Past Surgical History:   Procedure Laterality Date  COLONOSCOPY  2/28/2013         EGD  2/28/2013         HX CATARACT REMOVAL Bilateral     HX CHOLECYSTECTOMY  6-2015    HX GI  6/27/13    anal polyps removed    HX HEENT      laser surgery for blood clot in right eye    HX KNEE REPLACEMENT      right    HX OTHER SURGICAL  4-2-14    lap gastrotomy and removal of polyp -  - not cancerous per pt    HX ALAN AND BSO      HX TONSILLECTOMY      HX UROLOGICAL      \"laser\" surgery for kidney stone    NEUROLOGICAL PROCEDURE UNLISTED  1990    tumor at back of neck, benign       Personal factors and/or comorbidities impacting plan of care: neuropathy bilat hands/ feet, nearly blind R eye    Home Situation  Home Environment: Private residence  # Steps to Enter: 1  One/Two Story Residence: Two story  Interior Rails: Right  Living Alone: Yes  Support Systems: Friends \ neighbors (landlord doing bills, assisting with tub transfers)  Patient Expects to be Discharged to[de-identified] Private residence  Current DME Used/Available at Home: [place] [employer], rolling, Shower chair, Cane, straight (uses RW out of the home)  Tub or Shower Type: Tub/Shower combination    EXAMINATION/PRESENTATION/DECISION MAKING:   Critical Behavior:  Neurologic State: Alert, Appropriate for age  Orientation Level: Oriented X4  Cognition: Follows commands, Decreased attention/concentration (\"it's hard to remember things; so much information)  Safety/Judgement: Fall prevention, Awareness of environment  Hearing:   Auditory  Auditory Impairment: None    Range Of Motion:  AROM: Generally decreased, functional (LEs)           PROM: Generally decreased, functional           Strength:    Strength: Generally decreased, functional (LEs)                    Tone & Sensation:   Tone: Normal              Sensation: Impaired (light touch diminished bilat feet and hands)               Coordination:  Coordination: Generally decreased, functional  Vision:   Acuity: Impaired far vision (WFL L eye; \"nearly blind R eye\")  Corrective Lenses: Reading glasses  Functional Mobility:  Bed Mobility:     Supine to Sit: Supervision     Scooting: Supervision  Transfers:  Sit to Stand: Contact guard assistance  Stand to Sit: Contact guard assistance        Bed to Chair: Minimum assistance; Additional time; Adaptive equipment (RW)              Balance:   Sitting: Intact  Standing: Impaired; With support  Standing - Static: Fair;Constant support (RW)  Standing - Dynamic : Fair;Constant support               Functional Measure:  Tinetti test:    Sitting Balance: 1  Arises: 1  Attempts to Rise: 2  Immediate Standing Balance: 1  Standing Balance: 1  Nudged: 1  Eyes Closed: 1  Turn 360 Degrees - Continuous/Discontinuous: 1  Turn 360 Degrees - Steady/Unsteady: 0  Sitting Down: 1  Balance Score: 10 Balance total score  Indication of Gait: 1  R Step Length/Height: 1  L Step Length/Height: 1  R Foot Clearance: 1  L Foot Clearance: 1  Step Symmetry: 1  Step Continuity: 1  Path: 1  Trunk: 0  Walking Time: 0  Gait Score: 8 Gait total score  Total Score: 18/28 Overall total score         Tinetti Tool Score Risk of Falls  <19 = High Fall Risk  19-24 = Moderate Fall Risk  25-28 = Low Fall Risk  Tinetti ME. Performance-Oriented Assessment of Mobility Problems in Elderly Patients. [address]; [id number].  (Scoring Description: PT Bulletin Feb. 10, 1993)    Older adults: Tye Meckel et al, 2009; n = 1000 [facility] elderly evaluated with ABC, MIKAYLA, ADL, and IADL)  · Mean MIKAYLA score for males aged 69-68 years = 26.21(3.40)  · Mean MIKAYLA score for females age 69-68 years = 25.16(4.30)  · Mean MIKAYLA score for males over 80 years = 23.29(6.02)  · Mean MIKAYLA score for females over 80 years = 17.20(8.32)           Physical Therapy Evaluation Charge Determination   History Examination Presentation Decision-Making   HIGH Complexity :3+ comorbidities / personal factors will impact the outcome/ POC  LOW Complexity : 1-2 Standardized tests and measures addressing body structure, function, activity limitation and / or participation in recreation  LOW Complexity : Stable, uncomplicated  LOW Complexity : FOTO score of       Based on the above components, the patient evaluation is determined to be of the following complexity level: LOW     Pain Rating:  Pt reports 8/10 abdominal pain, med given during session    Activity Tolerance:   Fair    After treatment patient left in no apparent distress:   Sitting in chair, Call bell within reach, and Bed / chair alarm activated    COMMUNICATION/EDUCATION:   The patients plan of care was discussed with: Occupational therapist and Registered nurse. Fall prevention education was provided and the patient/caregiver indicated understanding., Patient/family have participated as able in goal setting and plan of care. , and Patient/family agree to work toward stated goals and plan of care.     Thank you for this referral.  Jojo Durand, PT   Time Calculation: 31 mins

## 2021-06-01 NOTE — PROGRESS NOTES
Bedside and Verbal shift change report given to Maria C Causey RN (oncoming nurse) by Amelia Calderon (offgoing nurse). Report included the following information SBAR, Kardex, Intake/Output and MAR.

## 2021-06-01 NOTE — ROUTINE PROCESS
Bedside shift change report given to Kyara Fowler (oncoming nurse) by Alix Baltazar (offgoing nurse). Report included the following information SBAR, Kardex, MAR and Recent Results.

## 2021-06-01 NOTE — PROGRESS NOTES
LUZ MARINA:  1. RUR-10%  2. Admitted from home, [facility] SNF accepted and started auth, 6/1/21. 3. BLS transport on discharge. 4. She will require insurance auth. CM met with patient at bedside, she would like referral sent to [facility] for short term rehab.  She has been there in the past.       Sandra Carrero, [facility]

## 2021-06-01 NOTE — PROGRESS NOTES
Problem: Falls - Risk of  Goal: *Absence of Falls  Description: Document Rudy Corado Fall Risk and appropriate interventions in the flowsheet. Outcome: Progressing Towards Goal  Note: Fall Risk Interventions:  Mobility Interventions: Communicate number of staff needed for ambulation/transfer         Medication Interventions: Teach patient to arise slowly    Elimination Interventions: Patient to call for help with toileting needs              Problem: Patient Education: Go to Patient Education Activity  Goal: Patient/Family Education  Outcome: Progressing Towards Goal     Problem: Diabetes Self-Management  Goal: *Disease process and treatment process  Description: Define diabetes and identify own type of diabetes; list 3 options for treating diabetes. Outcome: Progressing Towards Goal  Goal: *Incorporating nutritional management into lifestyle  Description: Describe effect of type, amount and timing of food on blood glucose; list 3 methods for planning meals. Outcome: Progressing Towards Goal  Goal: *Incorporating physical activity into lifestyle  Description: State effect of exercise on blood glucose levels. Outcome: Progressing Towards Goal  Goal: *Developing strategies to promote health/change behavior  Description: Define the ABC's of diabetes; identify appropriate screenings, schedule and personal plan for screenings. Outcome: Progressing Towards Goal  Goal: *Using medications safely  Description: State effect of diabetes medications on diabetes; name diabetes medication taking, action and side effects. Outcome: Progressing Towards Goal  Goal: *Monitoring blood glucose, interpreting and using results  Description: Identify recommended blood glucose targets  and personal targets.   Outcome: Progressing Towards Goal  Goal: *Prevention, detection, treatment of acute complications  Description: List symptoms of hyper- and hypoglycemia; describe how to treat low blood sugar and actions for lowering  high blood glucose level. Outcome: Progressing Towards Goal  Goal: *Prevention, detection and treatment of chronic complications  Description: Define the natural course of diabetes and describe the relationship of blood glucose levels to long term complications of diabetes.   Outcome: Progressing Towards Goal  Goal: *Developing strategies to address psychosocial issues  Description: Describe feelings about living with diabetes; identify support needed and support network  Outcome: Progressing Towards Goal  Goal: *Insulin pump training  Outcome: Progressing Towards Goal  Goal: *Sick day guidelines  Outcome: Progressing Towards Goal  Goal: *Patient Specific Goal (EDIT GOAL, INSERT TEXT)  Outcome: Progressing Towards Goal     Problem: Patient Education: Go to Patient Education Activity  Goal: Patient/Family Education  Outcome: Progressing Towards Goal

## 2021-06-01 NOTE — DIABETES MGMT
[address] NURSE SPECIALIST CONSULT     Initial Presentation   Fei Willson is a 79 y.o. female  Who presented to the ED 5/29/21 with a general 2 weel complaint of feeling unwell with hyperglycemia (BG 300s). On initial presentation, BG was 444. HX:   Past Medical History:   Diagnosis Date    Anal polyp 6/13/2013    Arthritis     Asthma     CAD (coronary artery disease) 10/27/2017    Cataract     Chronic pain     knee - right/back    Diabetes ([address]     GERD (gastroesophageal reflux disease)     Hemorrhoids 6/13/2013    History of kidney stones     Hypertension     Ill-defined condition     abdominal pain and burning    Other ill-defined conditions(799.89)     high cholesterol    Depression/Anxiety     DX: Hyperglycemia without acidosis, acute pancreatitis    Treatment plan     TX: IV fluids, basal/correctional insulin, clear liquid diet, pain control     Hospital course   Clinical progress has been uncomplicated. 5/30: KUB  Diabetes    Patient has known Type 2 diabetes, treated with Lantus PTA. Family history positive for diabetes. Brother with type 2 diabetes. Admission  and A1c over 14% indicate poor diabetes control. Ambulatory blood glucose management provided by primary care provider.     Consulted by Provider for advanced diabetes nursing assessment and care, specifically related to   [x] Home management assessment      Diabetes-related medical history  Acute complications; Diabetes induced pancreatitis   Neurological complications  Peripheral neuropathy  Microvascular disease: None  Macrovascular disease: None  Other associated conditions     Depression    Diabetes medication history  Drug class Currently in use Discontinued Never used   Biguanide      DDP-4 inhibitor       Sulfonylurea      Thiazolidinedione      GLP-1 RA      SGLT-2 inhibitors      Basal insulin Lantus 20 units BID     Bolus insulin      Fixed Dose Combinations        Subjective   I have had diabetes for a really long time.     Patient is retired and   Has an adult son has schizophrenia and lives in a group home  Lives independently    Patient reports the following home diabetes self-care practices:  Eating pattern  [x] Breakfast Toast with eggs and lerma or leftovers from previous dinner  [x] Lunch  Dinner leftovers  [x] Dinner  Chicken with marie greens and potatoes  [x] Bedtime Crackers or chips  [x] Snacks Cupcakes  [x] Beverages Regular soda  Physical activity pattern  [x] Aerobic exercise None  Monitoring pattern  [x] Breakfast \"200s\"  [x] Dinner  \"300-400s\"    Taking medications pattern  [x] In-donsistent administration  [x] Affordable    Social determinants of health impacting diabetes self-management practices   Struggling with anxiety and/or depression and Concerned that you need to know more about how to stay healthy with diabetes     Has a Management plan from [facility] with frequent admissions for pneumonia and hyperglycemia. Per plan:  Hx diabetes induced pancreatitis   Hx depression/panic attacks   Hx narcotic use    ED admit: UA: negative ketones, glucosuria. , AG 7, A1C over 14%  NOW:  Fasting 124. Pre-prandial B-216    Lantus: 20 units BID  9 units correctional humalog in the last 24 hrs      Objective   Physical exam  General   Neuro  Alert, oriented and in no acute distress/ill-appearing. Conversant and cooperative. Vital Signs   Visit Vitals  BP (!) 100/52 (BP 1 Location: Left arm, BP Patient Position: At rest)   Pulse 86   Temp 98.3 °F (36.8 °C)   Resp 16   Ht 5' (1.524 m)   Wt 76.1 kg (167 lb 12.3 oz)   SpO2 95%   BMI 32.77 kg/m²     Skin  Warm and dry. Acanthosis noted along neckline. No lipohypertrophy or lipoatrophy noted at injection sites   Heart   Regular rate and rhythm.  No murmurs, rubs or gallops  Lungs  Clear to auscultation without rales or rhonchi  Extremities No foot wounds       Laboratory CBC WITH AUTOMATED DIFF    Collection Time: 06/01/21  5:06 AM   Result Value Ref Range    WBC 11.2 (H) 3.6 - 11.0 K/uL    RBC 4.82 3.80 - 5.20 M/uL    HGB 13.6 11.5 - 16.0 g/dL    HCT 42.9 35.0 - 47.0 %    MCV 89.0 80.0 - 99.0 FL    MCH 28.2 26.0 - 34.0 PG    MCHC 31.7 30.0 - 36.5 g/dL    RDW 15.1 (H) 11.5 - 14.5 %    PLATELET 679 628 - 583 K/uL    MPV 11.5 8.9 - 12.9 FL    NRBC 0.0 0  WBC    ABSOLUTE NRBC 0.00 0.00 - 0.01 K/uL    NEUTROPHILS 70 32 - 75 %    LYMPHOCYTES 20 12 - 49 %    MONOCYTES 8 5 - 13 %    EOSINOPHILS 1 0 - 7 %    BASOPHILS 0 0 - 1 %    IMMATURE GRANULOCYTES 1 (H) 0.0 - 0.5 %    ABS. NEUTROPHILS 7.9 1.8 - 8.0 K/UL    ABS. LYMPHOCYTES 2.2 0.8 - 3.5 K/UL    ABS. MONOCYTES 0.8 0.0 - 1.0 K/UL    ABS. EOSINOPHILS 0.1 0.0 - 0.4 K/UL    ABS. BASOPHILS 0.0 0.0 - 0.1 K/UL    ABS. IMM. GRANS. 0.1 (H) 0.00 - 0.04 K/UL    DF AUTOMATED           METABOLIC PANEL, COMPREHENSIVE    Collection Time: 06/01/21  5:06 AM   Result Value Ref Range    Sodium 143 136 - 145 mmol/L    Potassium 3.6 3.5 - 5.1 mmol/L    Chloride 110 (H) 97 - 108 mmol/L    CO2 27 21 - 32 mmol/L    Anion gap 6 5 - 15 mmol/L    Glucose 89 65 - 100 mg/dL    BUN 9 6 - 20 MG/DL    Creatinine 0.63 0.55 - 1.02 MG/DL    BUN/Creatinine ratio 14 12 - 20      GFR est AA >60 >60 ml/min/1.73m2    GFR est non-AA >60 >60 ml/min/1.73m2    Calcium 8.6 8.5 - 10.1 MG/DL    Bilirubin, total 0.3 0.2 - 1.0 MG/DL    ALT (SGPT) 37 12 - 78 U/L    AST (SGOT) 21 15 - 37 U/L    Alk.  phosphatase 128 (H) 45 - 117 U/L    Protein, total 6.4 6.4 - 8.2 g/dL    Albumin 2.8 (L) 3.5 - 5.0 g/dL    Globulin 3.6 2.0 - 4.0 g/dL    A-G Ratio 0.8 (L) 1.1 - 2.2         Factors impacting BG management  Factor Dose Comments   Nutrition:  Carb-controlled meals     60 grams/meal    Pain/Acute pancreatitis Insulin resistance in the setting of acute pancreatitis        Blood glucose pattern        Assessment and Plan   Nursing Diagnosis Risk for unstable blood glucose pattern Nursing Intervention Domain 9663 Decision-making Support   Nursing Interventions Examined current inpatient diabetes control   Explored factors facilitating and impeding inpatient management  Identified self-management practices impeding diabetes control  Explored corrective strategies with patient and responsible inpatient provider   Informed patient of rational for insulin strategy while hospitalized  Instructed patient in A1C, diabetes associated complications     Evaluation   Cecil Aase is a 79year old female, with Type 2 diabetes, admitted with hyperglycemia induced acute pancreatis. A1C has steadily risen over the last several years, now greater than 14% with an admission BG over 444. Judgement is poor and I suspect some underlying memory loss that is impacting her ability to care for her diabetes. During this hospitalization, the patient has not achieved inpatient blood glucose target of 100-180mg/dl. Several factors have played a role in blood glucose management including:  [x] Critical nature of illness state  [x]  Changing nutritive sources & needs     The Subcutaneous Insulin Order set (3657) has been in use. Fasting glucose of 124 indicates appropriate Lantus dosing. Now that she is eating well, she does experience pre-prandial hyperglycemia. Hence, the next step in optimizing blood glucose control would be    [x]  Add mealtime insulin    As she nears discharge, she would benefit from diabetes related support for BG monitoring and medication administration. Without support, she is at high risk for diabetes related admission and continued diabetes associated complications.   Recommendations     [x] Use of Subcutaneous Insulin Order set (1119)  Insulin Dosing Specific recommendation   Adjust Basal                                  (Based on weight, BMI & GFR) Switch to 16 units NPH BID    Add Nutritional                                   Based on CHO/dextrose load) [x] Normal sensitivity: Start with 4 units Humalog/meal   Increase by 2 units daily for   continued pre-prandial   hyperglycemia   Continue Corrective                          (Useful in adjusting insulin dosing) [x] Normal sensitivity        1. POC glucose ACHS  2. Consistent carbohydrate, GI lite diet  Discharge Planning    1. Will need a FUV with PCP within 1-2 weeks after hospital discharge for ongoing diabetes management     2. Continue to check glucose before breakfast and dinner. Please notify PCP if glucose sustained over 200 for medication adjustment. 3. Basal insulin alone is not sufficient to control her blood sugar. She will need insulin to offset her carbohydrate load. She would also benefit from twice daily insulin injections/simple diabetes plan. Consider switching to 70/30 insulin PEN: 30 units at breakfast and 20 units at dinner    4. Basic diet recommendations: Please avoid concentrated sweets: cupcakes and regular soda. Billing Code(s)   [x] 54323    Before making these care recommendations, I personally reviewed the hosptialization record, including laboratory and diagnostic data, medications and examined the patient at bedside (circumstances permitting).   Total minutes: [id number] MARYCHUY Key  Diabetes Clinical Nurse Specialist  [facility]  Access via [employer]

## 2021-06-02 LAB
ATRIAL RATE: 98 BPM
CALCULATED P AXIS, ECG09: 58 DEGREES
CALCULATED R AXIS, ECG10: 35 DEGREES
CALCULATED T AXIS, ECG11: 60 DEGREES
DIAGNOSIS, 93000: NORMAL
GLUCOSE BLD STRIP.AUTO-MCNC: 100 MG/DL (ref 65–117)
GLUCOSE BLD STRIP.AUTO-MCNC: 265 MG/DL (ref 65–117)
GLUCOSE BLD STRIP.AUTO-MCNC: 265 MG/DL (ref 65–117)
GLUCOSE BLD STRIP.AUTO-MCNC: 321 MG/DL (ref 65–117)
GLUCOSE BLD STRIP.AUTO-MCNC: 346 MG/DL (ref 65–117)
GLUCOSE BLD STRIP.AUTO-MCNC: 97 MG/DL (ref 65–117)
P-R INTERVAL, ECG05: 118 MS
Q-T INTERVAL, ECG07: 360 MS
QRS DURATION, ECG06: 66 MS
QTC CALCULATION (BEZET), ECG08: 459 MS
SERVICE CMNT-IMP: ABNORMAL
SERVICE CMNT-IMP: NORMAL
SERVICE CMNT-IMP: NORMAL
VENTRICULAR RATE, ECG03: 98 BPM

## 2021-06-02 PROCEDURE — 74011250637 HC RX REV CODE- 250/637: Performed by: HOSPITALIST

## 2021-06-02 PROCEDURE — 74011636637 HC RX REV CODE- 636/637: Performed by: FAMILY MEDICINE

## 2021-06-02 PROCEDURE — 82962 GLUCOSE BLOOD TEST: CPT

## 2021-06-02 PROCEDURE — 97116 GAIT TRAINING THERAPY: CPT

## 2021-06-02 PROCEDURE — 99231 SBSQ HOSP IP/OBS SF/LOW 25: CPT | Performed by: CLINICAL NURSE SPECIALIST

## 2021-06-02 PROCEDURE — 97530 THERAPEUTIC ACTIVITIES: CPT

## 2021-06-02 PROCEDURE — 74011636637 HC RX REV CODE- 636/637: Performed by: HOSPITALIST

## 2021-06-02 PROCEDURE — 74011250637 HC RX REV CODE- 250/637: Performed by: NURSE PRACTITIONER

## 2021-06-02 PROCEDURE — 97535 SELF CARE MNGMENT TRAINING: CPT

## 2021-06-02 PROCEDURE — 65660000000 HC RM CCU STEPDOWN

## 2021-06-02 PROCEDURE — 74011250636 HC RX REV CODE- 250/636: Performed by: HOSPITALIST

## 2021-06-02 RX ORDER — ACETAMINOPHEN 325 MG/1
650 TABLET ORAL
Qty: 20 TABLET | Refills: 0 | Status: SHIPPED | OUTPATIENT
Start: 2021-06-02

## 2021-06-02 RX ORDER — INSULIN LISPRO 100 [IU]/ML
4 INJECTION, SOLUTION INTRAVENOUS; SUBCUTANEOUS
Status: DISCONTINUED | OUTPATIENT
Start: 2021-06-02 | End: 2021-06-03 | Stop reason: HOSPADM

## 2021-06-02 RX ADMIN — QUETIAPINE FUMARATE 25 MG: 25 TABLET ORAL at 21:13

## 2021-06-02 RX ADMIN — ASPIRIN 81 MG: 81 TABLET, CHEWABLE ORAL at 09:22

## 2021-06-02 RX ADMIN — INSULIN LISPRO 7 UNITS: 100 INJECTION, SOLUTION INTRAVENOUS; SUBCUTANEOUS at 17:23

## 2021-06-02 RX ADMIN — MIRTAZAPINE 15 MG: 15 TABLET, FILM COATED ORAL at 21:13

## 2021-06-02 RX ADMIN — INSULIN LISPRO 5 UNITS: 100 INJECTION, SOLUTION INTRAVENOUS; SUBCUTANEOUS at 12:46

## 2021-06-02 RX ADMIN — HUMAN INSULIN 16 UNITS: 100 INJECTION, SUSPENSION SUBCUTANEOUS at 17:23

## 2021-06-02 RX ADMIN — ROSUVASTATIN CALCIUM 10 MG: 10 TABLET, COATED ORAL at 21:13

## 2021-06-02 RX ADMIN — ENOXAPARIN SODIUM 40 MG: 40 INJECTION SUBCUTANEOUS at 09:21

## 2021-06-02 RX ADMIN — BUSPIRONE HYDROCHLORIDE 20 MG: 10 TABLET ORAL at 09:22

## 2021-06-02 RX ADMIN — OXYCODONE HYDROCHLORIDE AND ACETAMINOPHEN 1 TABLET: 5; 325 TABLET ORAL at 14:18

## 2021-06-02 RX ADMIN — INSULIN LISPRO 4 UNITS: 100 INJECTION, SOLUTION INTRAVENOUS; SUBCUTANEOUS at 12:46

## 2021-06-02 RX ADMIN — Medication 10 ML: at 07:02

## 2021-06-02 RX ADMIN — DULOXETINE HYDROCHLORIDE 60 MG: 60 CAPSULE, DELAYED RELEASE ORAL at 09:22

## 2021-06-02 RX ADMIN — INSULIN LISPRO 4 UNITS: 100 INJECTION, SOLUTION INTRAVENOUS; SUBCUTANEOUS at 09:21

## 2021-06-02 RX ADMIN — PANTOPRAZOLE SODIUM 40 MG: 40 TABLET, DELAYED RELEASE ORAL at 07:02

## 2021-06-02 RX ADMIN — INSULIN LISPRO 4 UNITS: 100 INJECTION, SOLUTION INTRAVENOUS; SUBCUTANEOUS at 17:24

## 2021-06-02 RX ADMIN — BUSPIRONE HYDROCHLORIDE 20 MG: 10 TABLET ORAL at 17:23

## 2021-06-02 RX ADMIN — INSULIN LISPRO 3 UNITS: 100 INJECTION, SOLUTION INTRAVENOUS; SUBCUTANEOUS at 22:12

## 2021-06-02 NOTE — PROGRESS NOTES
Problem: Self Care Deficits Care Plan (Adult)  Goal: *Acute Goals and Plan of Care (Insert Text)  Description:   FUNCTIONAL STATUS PRIOR TO ADMISSION: Patient lived alone with daily ADL and IADL assist from her friend/Landlord. Mod I ambulation with SPC in the home and RW in the community. ADL and IADL assist needed due to B UE and LE impaired sensation    HOME SUPPORT: Friend has been helping daily but now friend has recently returned to full time work and patient is very anxious about having no help in the home now    Occupational Therapy Goals  Initiated 6/1/2021  1. Patient will perform self-feeding AE PRN (eg scissors) and grooming AE PRNwith modified independence within 7 day(s). 2.  Patient will perform upper body dressing with minimal assistance/contact guard assist within 7 day(s). 3.  Patient will perform lower body dressing with moderate assistance  with AE within 7 day(s). 4.  Patient will perform toilet transfers with supervision/set-up within 7 day(s). 5.  Patient will perform all aspects of toileting with moderate assistance  within 7 day(s). 6.  Patient will participate in upper extremity therapeutic exercise/activities with minimal assistance/contact guard assist for 5 minutes within 7 day(s). 7.  Patient will utilize energy conservation, pain management, fall prevention techniques during functional activities with verbal cues within 7 day(s). Outcome: Progressing Towards Goal     OCCUPATIONAL THERAPY TREATMENT  Patient: Solomon Trevino (72 y.o. female)  Date: 6/2/2021  Diagnosis: DM (diabetes mellitus), type 2, uncontrolled ([facility] [E11.65] <principal problem not specified>       Precautions: Fall  Chart, occupational therapy assessment, plan of care, and goals were reviewed. ASSESSMENT  Patient continues with skilled OT services and is progressing towards goals. Patient is received completing mobility with PT, noted improvement in activity tolerance this session.  Pt able to complete serial ADLs in bathroom, both at sink and on commode, requiring overall SBA/CGA for standing portions and up to mod A for distal LB ADLs. Once in chair, pt completes full body bathing with gown change with good tolerance. Supportive family member present for support throughout session. Continue to recommend SNF to maximize return to highest level of function, however if that is not an option pt may be able to return home with follow up [facility] and increased support/assistance. Pt seated up in chair at end of session with all needs met. Current Level of Function Impacting Discharge (ADLs): CGA to min A for ADL transfers; SBA/CGA for standing grooming; mod A for LB dressing    Other factors to consider for discharge: lives alone during the day; fall risk         PLAN :  Patient continues to benefit from skilled intervention to address the above impairments. Continue treatment per established plan of care to address goals. Recommend with staff: OOB to chair for meals; mobility to bathroom for toileting; ADLs with assistance    Recommend next OT session: continue with ADLs as able; endurance/standing tolerance; UE strengthening    Recommendation for discharge: (in order for the patient to meet his/her long term goals)  Therapy up to 5 days/week in SNF setting or an intensive home health therapy program with increased assistance    This discharge recommendation:  A follow-up discussion with the attending provider and/or case management is planned    IF patient discharges home will need the following DME: TBD       SUBJECTIVE:   Patient stated I feel better today.     OBJECTIVE DATA SUMMARY:   Cognitive/Behavioral Status:  Neurologic State: Alert  Orientation Level: Oriented X4  Cognition: Follows commands; Appropriate for age attention/concentration  Perception: Appears intact  Perseveration: No perseveration noted  Safety/Judgement: Awareness of environment    Functional Mobility and Transfers for ADLs:  Bed Mobility:  Supine to Sit: Supervision  Scooting: Supervision    Transfers:  Sit to Stand: Contact guard assistance  Functional Transfers  Bathroom Mobility: Stand-by assistance;Contact guard assistance  Toilet Transfer : Contact guard assistance;Minimum assistance (heavy grab bar use due to low toilet height)  Adaptive Equipment: Grab bars; Walker (comment)  Bed to Chair: Contact guard assistance    Balance:  Sitting: Intact  Standing: Impaired  Standing - Static: Good  Standing - Dynamic : Fair;Constant support    ADL Intervention:  Grooming  Grooming Assistance: Stand-by assistance  Position Performed: Standing (at sink)  Washing Face: Stand-by assistance  Washing Hands: Stand-by assistance    Upper Body Bathing  Bathing Assistance: Set-up  Position Performed: Seated in chair  Cues: Verbal cues provided    Lower Body Bathing  Lower Body : Moderate assistance (A for distal portions)  Position Performed: Seated in chair  Cues: Verbal cues provided;Physical assistance    Upper [address]: Set-up; Minimum  assistance  Cues: Physical assistance; Tactile cues provided;Verbal cues provided;Visual cues provided    Lower Body Dressing Assistance  Protective Undergarmet: Moderate assistance (for distal threading)  Socks: Maximum assistance  Leg Crossed Method Used: No  Position Performed: Seated in chair  Cues: Don;Doff;Physical assistance; Tactile cues provided;Verbal cues provided;Visual cues provided  Adaptive Equipment Used: Walker    Cognitive Retraining  Safety/Judgement: Awareness of environment    Pain:  Pt reporting minimal pain    Activity Tolerance:   Fair and SpO2 stable on RA    After treatment patient left in no apparent distress:   Sitting in chair, Call bell within reach, Bed / chair alarm activated and Caregiver / family present    COMMUNICATION/COLLABORATION:   The patients plan of care was discussed with: Physical therapist and Registered nurse.      Nuzhat Coker, OT  Time Calculation: 23 mins

## 2021-06-02 NOTE — DIABETES MGMT
[address]    CLINICAL NURSE SPECIALIST CONSULT   FOLLOW UP NOTE    Initial Presentation   Wang Caldwell is a 79 y.o. female  Who presented to the ED 5/29/21 with a general 2 weel complaint of feeling unwell with hyperglycemia (BG 300s). On initial presentation, BG was 444. HX:   Past Medical History:   Diagnosis Date    Anal polyp 6/13/2013    Arthritis     Asthma     CAD (coronary artery disease) 10/27/2017    Cataract     Chronic pain     knee - right/back    Diabetes ([address]     GERD (gastroesophageal reflux disease)     Hemorrhoids 6/13/2013    History of kidney stones     Hypertension     Ill-defined condition     abdominal pain and burning    Other ill-defined conditions(799.89)     high cholesterol    Depression/Anxiety     DX: Hyperglycemia without acidosis, acute pancreatitis    Treatment plan     TX: IV fluids, basal/correctional insulin, clear liquid diet, pain control     Hospital course   Clinical progress has been uncomplicated. 5/30: KUB  Diabetes    Patient has known Type 2 diabetes, treated with Lantus PTA. Family history positive for diabetes. Brother with type 2 diabetes. Admission  and A1c over 14% indicate poor diabetes control. Ambulatory blood glucose management provided by primary care provider.     Consulted by Provider for advanced diabetes nursing assessment and care, specifically related to   [x] Home management assessment      Diabetes-related medical history  Acute complications; Diabetes induced pancreatitis   Neurological complications  Peripheral neuropathy  Microvascular disease: None  Macrovascular disease: None  Other associated conditions     Depression    Diabetes medication history  Drug class Currently in use Discontinued Never used   Biguanide      DDP-4 inhibitor       Sulfonylurea      Thiazolidinedione      GLP-1 RA      SGLT-2 inhibitors      Basal insulin Lantus 20 units BID     Bolus insulin      Fixed Dose  Combinations        Subjective   I have had diabetes for a really long time.     Patient is retired and   Has an adult son has schizophrenia and lives in a group home  Lives independently    Patient reports the following home diabetes self-care practices:  Eating pattern  [x] Breakfast Toast with eggs and lerma or leftovers from previous dinner  [x] Lunch  Dinner leftovers  [x] Dinner  Chicken with marie greens and potatoes  [x] Bedtime Crackers or chips  [x] Snacks Cupcakes  [x] Beverages Regular soda  Physical activity pattern  [x] Aerobic exercise None  Monitoring pattern  [x] Breakfast \"200s\"  [x] Dinner  \"300-400s\"    Taking medications pattern  [x] In-donsistent administration  [x] Affordable    Social determinants of health impacting diabetes self-management practices   Struggling with anxiety and/or depression and Concerned that you need to know more about how to stay healthy with diabetes     Has a Management plan from [facility] Team with frequent admissions for pneumonia and hyperglycemia. Per plan:  Hx diabetes induced pancreatitis   Hx depression/panic attacks   Hx narcotic use    ED admit: UA: negative ketones, glucosuria. , AG 7, A1C over 14%  NOW:  Fasting 97. Pre-prandial B-328    Lantus: 16 units BID- dose held this morning for a normal glucose, Glucose now 265 as a result   12 units correctional humalog in the last 24 hrs  Humalog 4 units with meals    Will discharge to [facility]   Objective   Physical exam  General   Neuro  Alert, oriented and in no acute distress/ill-appearing. Conversant and cooperative. Vital Signs   Visit Vitals  BP (!) 145/76 (BP 1 Location: Left upper arm, BP Patient Position: Sitting)   Pulse 99   Temp 98.6 °F (37 °C)   Resp 16   Ht 5' (1.524 m)   Wt 75.6 kg (166 lb 10.7 oz)   SpO2 99%   BMI 32.55 kg/m²     Skin  Warm and dry. Acanthosis noted along neckline.  No lipohypertrophy or lipoatrophy noted at injection sites   Heart   Regular rate and rhythm. No murmurs, rubs or gallops  Lungs  Clear to auscultation without rales or rhonchi  Extremities No foot wounds       Laboratory  No results found for this visit on 05/29/21 (from the past 12 hour(s)). No results found for this visit on 05/29/21 (from the past 12 hour(s)). All inpatient labs reviewed in full    Factors impacting BG management  Factor Dose Comments   Nutrition:  Carb-controlled meals     60 grams/meal    Pain/Acute pancreatitis Insulin resistance in the setting of acute pancreatitis        Blood glucose pattern        Assessment and Plan   Nursing Diagnosis Risk for unstable blood glucose pattern   Nursing Intervention Domain 5250 Decision-making Support   Nursing Interventions Examined current inpatient diabetes control   Explored factors facilitating and impeding inpatient management  Identified self-management practices impeding diabetes control  Explored corrective strategies with patient and responsible inpatient provider   Informed patient of rational for insulin strategy while hospitalized  Instructed patient in A1C, diabetes associated complications     Evaluation   Mally Dumont is a 79year old female, with Type 2 diabetes, admitted with hyperglycemia induced acute pancreatis. A1C has steadily risen over the last several years, now greater than 14% with an admission BG over 444. Judgement is poor and I suspect some underlying memory loss that is impacting her ability to care for her diabetes. During this hospitalization, the patient has not achieved inpatient blood glucose target of 100-180mg/dl. Several factors have played a role in blood glucose management including:  [x] Critical nature of illness state  [x]  Changing nutritive sources & needs     The Subcutaneous Insulin Order set (6730) has been in use. Fasting glucose of 97 indicates appropriate NPH dosing.   Now that she is eating well, she does experience pre-prandial hyperglycemia for which low dose bolus Humalog has been started. As she nears discharge, she would benefit from diabetes related support for BG monitoring and medication administration. Without support, she is at high risk for diabetes related admission and continued diabetes associated complications. Recommendations     [x] Use of Subcutaneous Insulin Order set (3949)  Insulin Dosing Specific recommendation   Continue Basal                                  (Based on weight, BMI & GFR) 16 units NPH BID    DO NOT HOLD. If glucose below   100, reduce by 2 units    Continue Nutritional                           Based on CHO/dextrose load) [x] Normal sensitivity: 4 units Humalog/meal   Increase by 2 units daily for   continued pre-prandial   hyperglycemia   Continue Corrective                          (Useful in adjusting insulin dosing) [x] Normal sensitivity        1. POC glucose ACHS  2. Consistent carbohydrate, GI lite diet  Discharge Planning    1. Will need a FUV with PCP within 1-2 weeks after hospital discharge for ongoing diabetes management     2. Continue to check glucose before breakfast and dinner. Please notify PCP if glucose sustained over 200 for medication adjustment. 3. Basal insulin alone is not sufficient to control her blood sugar. She will need insulin to offset her carbohydrate load. She would also benefit from twice daily insulin injections/simple diabetes plan. Consider switching to 70/30 insulin PEN: 24 units at breakfast and 12 units at dinner    4. Basic diet recommendations: Please avoid concentrated sweets: cupcakes and regular soda. Billing Code(s)   [x] 58900    Before making these care recommendations, I personally reviewed the hosptialization record, including laboratory and diagnostic data, medications and examined the patient at bedside (circumstances permitting).   Total minutes: 13    MARYCHUY Dixon  Diabetes Clinical Nurse Specialist  [facility]  Access via [employer]

## 2021-06-02 NOTE — PROGRESS NOTES
Problem: Mobility Impaired (Adult and Pediatric)  Goal: *Acute Goals and Plan of Care (Insert Text)  Description: FUNCTIONAL STATUS PRIOR TO ADMISSION: Pt reports modified indep amb with use of RW outside of home. Landlord providing assist with ADLs/ IADLs, able to toilet independently. Pt notes that landlord will no longer be able to assist due to returning to work. HOME SUPPORT PRIOR TO ADMISSION: The patient lived alone in 2 level home with landlord to provide assistance. Physical Therapy Goals  Initiated 6/1/2021  1. Patient will move from supine to sit and sit to supine  in bed with independence within 7 day(s). 2.  Patient will transfer from bed to chair and chair to bed with supervision/set-up using the least restrictive device within 7 day(s). 3.  Patient will perform sit to stand with supervision/set-up within 7 day(s). 4.  Patient will ambulate with supervision/set-up for 50 feet with the least restrictive device within 7 day(s). 5.  Patient will ascend/descend 4 stairs with single handrail(s) with minimal assistance/contact guard assist within 7 day(s). Outcome: Progressing Towards Goal   PHYSICAL THERAPY TREATMENT  Patient: Benoit Macias (89 y.o. female)  Date: 6/2/2021  Diagnosis: DM (diabetes mellitus), type 2, uncontrolled ([facility] [E11.65] <principal problem not specified>       Precautions: Fall  Chart, physical therapy assessment, plan of care and goals were reviewed. ASSESSMENT  Patient continues with skilled PT services and is progressing towards goals. Patient exhibiting good improvement today in activity tolerance and balance and control during transfers and gait today. Continues to need regular cues for safe RW technique with transfers, small spaces and turns (picks up walker completely) but exhibiting much improved dynamic balance during gait as well as toileting.   Cont to recommend SNF rehab to maximize potential to return to prior level of function but if this is not at option would recommend home with intense HHPT and 24/7 assistance with ADLs and mobility. Current Level of Function Impacting Discharge (mobility/balance): CGA for transfers and gait x 100'    Other factors to consider for discharge:          PLAN :  Patient continues to benefit from skilled intervention to address the above impairments. Continue treatment per established plan of care. to address goals. Recommendation for discharge: (in order for the patient to meet his/her long term goals)  Therapy up to 5 days/week in SNF setting or intensive home health therapy program with 24/7 assistance     This discharge recommendation:  Has been made in collaboration with the attending provider and/or case management    IF patient discharges home will need the following DME: to be determined (TBD)       SUBJECTIVE:   Patient stated I am feeling a bit better today.     OBJECTIVE DATA SUMMARY:   Critical Behavior:  Neurologic State: Alert  Orientation Level: Oriented X4  Cognition: Follows commands  Safety/Judgement: Fall prevention, Awareness of environment  Functional Mobility Training:  Bed Mobility:     Supine to Sit: Supervision     Scooting: Supervision        Transfers:  Sit to Stand: Contact guard assistance  Stand to Sit: Contact guard assistance        Bed to Chair: Contact guard assistance                    Balance:  Sitting: Intact  Standing: Impaired  Standing - Static: Good;Constant support  Standing - Dynamic : Fair;Constant support  Ambulation/Gait Training:  Distance (ft): 100 Feet (ft) (then 20 more feet after bathroom break)  Assistive Device: Gait belt;Walker, rolling  Ambulation - Level of Assistance: Contact guard assistance        Gait Abnormalities: Decreased step clearance              Speed/Teagan: Pace decreased (<100 feet/min)  Step Length: Right shortened;Left shortened                Pain Rating:  Reports pain in RLE at start of gait, 5/10, improved with distance    Activity Tolerance:   Fair and requires rest breaks    After treatment patient left in no apparent distress:   Sitting in chair, Call bell within reach, and Caregiver / family present    COMMUNICATION/COLLABORATION:   The patients plan of care was discussed with: Occupational therapist and Registered nurse.      Chay Michel, PT   Time Calculation: 26 mins

## 2021-06-02 NOTE — PROGRESS NOTES
1700: . 11 units of lispro given. 16 units of NPH given. 1830: BG rechecked. . MD notified. CM aware. Unsure if [facility] is able to manage BG at this range. They are aware and will call back with an answer. [facility] to arrive at [address]: [address]  Mitul Zheng, states that the patient is not clinically ready to be discharged. They would like to reevaluate tomorrow once her BG come down. Notified CM. Molina MCDOWELL made aware. [facility] canceled.

## 2021-06-02 NOTE — ROUTINE PROCESS
IV found to be infiltrated on morning assessment, Pt refused me attempting to place another IV. Will notify day time team, and continue to monitor.

## 2021-06-02 NOTE — ROUTINE PROCESS
Bedside shift change report given to [facility] (oncoming nurse) by Reddy Mckenzie (offgoing nurse). Report included the following information SBAR, Kardex, MAR and Recent Results.

## 2021-06-02 NOTE — DISCHARGE SUMMARY
Discharge Summary       PATIENT ID: Rolando Sanabria  MRN: [id number]   YOB: 1940    DATE OF ADMISSION: 5/29/2021  8:19 AM    DATE OF DISCHARGE:   PRIMARY CARE PROVIDER: Kamilla Koroma MD       DISCHARGING PHYSICIAN: Bhaskar Lawrence MD    To contact this individual call [phone] and ask the  to page. If unavailable ask to be transferred the Adult Hospitalist Department. CONSULTATIONS: None    PROCEDURES/SURGERIES: * No surgery found *    [address] COURSE:   79 y.o. female with past medical history of hypertension, diabetes mellitus, who presents to the ED on 05/29 with left lower quadrant abdominal pain for 2 weeks. In the ER she was found to have uncontrolled blood sugar which ranged in the mid 400s.    CT scan of the abdomen without contrast performed showed mild left hydroutero nephrosis of uncertain etiology.   94/35/67: Be seen seated seat he is doing fine. Patient has been tolerating diet. Patient denies any acute issues today. Patient is agreeable to go to a skilled nursing facility. DISCHARGE DIAGNOSES / PLAN:      Diabetes mellitus type 2 with hyperglycemia  Uncontrolled with hyperglycemia and HbA1c 14 in 05/29/21. Case discussed with diabetic nurse. Continue to monitor with the NPH and she can follow-up with her primary care provider. Patient may not be compliant at home with Lantus. Continued sliding scale insulin and hypoglycemic protocol.     Leukocytosis:   Mild, patient is afebrile and she is feeling better.       Hyponatremia  Likely due to dehydration, resolved.        Pancreatitis  Managed conservatively. Patient has been tolerating diet, pain controlled, lipase has normalized.      Probably due to hyperglycemia.       Left lower abdominal quadrant pain  CT scan shoed mild left hydroureteronephrosis of uncertain etiology and colonic diverticulosis.   Patient needs follow-up with PCP and urologist if necessary as an outpatient.     Asthma;  without exacerbation.    Continue Pulmicort nebulizer and albuterol inhaler.       See orders for other plans. VTE prophylaxis: Lovenox  Code status: Full code  Discussed plan of care with Patient/Family and Nurse. Pre-admission lived at home. Discharge planning: To a skilled nursing facility today. Case discussed with . PENDING TEST RESULTS:   At the time of discharge the following test results are still pending: NA    FOLLOW UP APPOINTMENTS:    Follow-up Information     Follow up With Specialties Details Why Contact Info    Alysha Chaudhary MD Internal Medicine   [address] [place] [address]  [phone]                   DISCHARGE MEDICATIONS:  Current Discharge Medication List      START taking these medications    Details   acetaminophen (TYLENOL) 325 mg tablet Take 2 Tablets by mouth every six (6) hours as needed for Pain or Fever. Qty: 20 Tablet, Refills: 0  Start date: 6/2/2021      insulin NPH (NOVOLIN N, HUMULIN N) 100 unit/mL injection 16 units Twice daily with meal subcutaneously. Adjust dose according to your glucose. Qty: 1 Vial, Refills: 0  Start date: 6/2/2021         CONTINUE these medications which have NOT CHANGED    Details   bisacodyl (DULCOLAX) 5 mg EC tablet Take 1 Tab by mouth daily as needed for Constipation. Qty: 10 Tab, Refills: 0      albuterol (PROVENTIL VENTOLIN) 2.5 mg /3 mL (0.083 %) nebulizer solution 3 mL by Nebulization route every four (4) hours as needed for Wheezing. Qty: 60 Each, Refills: 0      budesonide (PULMICORT) 0.5 mg/2 mL nbsp 2 mL by Nebulization route two (2) times a day. Qty: 60 Each, Refills: 1      busPIRone (BUSPAR) 5 mg tablet Take 20 mg by mouth two (2) times a day. DULoxetine (CYMBALTA) 60 mg capsule Take 60 mg by mouth daily.       albuterol (PROVENTIL HFA, VENTOLIN HFA, PROAIR HFA) 90 mcg/actuation inhaler Take 2 Puffs by inhalation every four (4) hours as needed for Wheezing. Qty: 1 Inhaler, Refills: 0      rosuvastatin (CRESTOR) 10 mg tablet Take 10 mg by mouth nightly. Dexlansoprazole (DEXILANT) 60 mg CpDB Take 60 mg by mouth Daily (before breakfast). aspirin 81 mg chewable tablet Take 1 Tab by mouth daily. Qty: 30 Tab, Refills: 0         STOP taking these medications       insulin glargine (Lantus U-100 Insulin) 100 unit/mL injection Comments:   Reason for Stopping:         amLODIPine (NORVASC) 5 mg tablet Comments:   Reason for Stopping:         azithromycin (ZITHROMAX) 500 mg tab Comments:   Reason for Stopping:         ibuprofen (MOTRIN) 600 mg tablet Comments:   Reason for Stopping:         arformoterol (BROVANA) 15 mcg/2 mL nebu neb solution Comments:   Reason for Stopping:         mirtazapine (REMERON) 15 mg tablet Comments:   Reason for Stopping:         QUEtiapine (SEROQUEL) 25 mg tablet Comments:   Reason for Stopping:         fosinopril (MONOPRIL) 20 mg tablet Comments:   Reason for Stopping:                 NOTIFY YOUR PHYSICIAN FOR ANY OF THE FOLLOWING:   Fever over 101 degrees for 24 hours. Chest pain, shortness of breath, fever, chills, nausea, vomiting, diarrhea, change in mentation, falling, weakness, bleeding. Severe pain or pain not relieved by medications. Or, any other signs or symptoms that you may have questions about. DISPOSITION:    Home With:   OT  PT  HH  RN      X Long term SNF/Inpatient Rehab    Independent/assisted living    Hospice    Other:       PATIENT CONDITION AT DISCHARGE:     Functional status    Poor    X Deconditioned     Independent      Cognition    X Lucid     Forgetful     Dementia      Catheters/lines (plus indication)    Barclay     PICC     PEG    X None      Code status    X Full code     DNR      PHYSICAL EXAMINATION AT DISCHARGE:    General:  Alert, cooperative, no distress, appears stated age. Lungs:   Clear to auscultation bilaterally. Chest wall:  No tenderness or deformity.    Heart:  Regular rate and rhythm, S1, S2 normal, no murmur, click, rub or gallop. Abdomen:    Obese, soft, non-tender. Bowel sounds normal.    Extremities: Extremities normal, atraumatic, no cyanosis or edema. Skin: Skin color, texture, turgor normal. No rashes or lesions   Neurologic:  Patient has clear voice and she communicates appropriately. Patient is attentive.        CHRONIC MEDICAL DIAGNOSES:  Problem List as of 6/2/2021 Date Reviewed: 7/5/2019        Codes Class Noted - Resolved    DM (diabetes mellitus), type 2, uncontrolled ([facility] ICD-10-CM: E11.65  ICD-9-CM: 250.02  5/29/2021 - Present        Shortness of breath ICD-10-CM: R06.02  ICD-9-CM: 786.05  4/25/2020 - Present        Suspected COVID-19 virus infection ICD-10-CM: Z20.822  ICD-9-CM: V01.79  4/25/2020 - Present        Bilateral pneumonia ICD-10-CM: J18.9  ICD-9-CM: 923  9/24/2019 - Present        Pain due to knee joint prosthesis ([facility] ICD-10-CM: T84.84XA, Z96.659  ICD-9-CM: 996.77, 338.18, V43.65  11/14/2017 - Present        CAD (coronary artery disease) ICD-10-CM: I25.10  ICD-9-CM: 414.00  10/27/2017 - Present        Pancreatitis, acute ICD-10-CM: K85.90  ICD-9-CM: 577.0  9/8/2017 - Present        Epigastric abdominal pain ICD-10-CM: R10.13  ICD-9-CM: 789.06  9/8/2017 - Present        Painful total knee replacement ([facility] ICD-10-CM: T84.84XA, Z96.659  ICD-9-CM: 996.77, V43.65  8/9/2017 - Present        Status post right knee replacement ICD-10-CM: M64.969  ICD-9-CM: V43.65  8/9/2017 - Present        Leukocytosis, unspecified ICD-10-CM: D72.829  ICD-9-CM: 288.60  4/6/2015 - Present        DM (diabetes mellitus) ([facility] ICD-10-CM: E11.9  ICD-9-CM: 250.00  4/6/2015 - Present        HTN (hypertension) ICD-10-CM: I10  ICD-9-CM: 401.9  4/6/2015 - Present        Arthritis ICD-10-CM: M19.90  ICD-9-CM: 716.90  4/6/2015 - Present        Chest pain, unspecified ICD-10-CM: R07.9  ICD-9-CM: 786.50  9/12/2013 - Present        RESOLVED: Chest pain ICD-10-CM: R07.9  ICD-9-CM: 786.50 7/5/2019 - 7/12/2019        RESOLVED: Abnormal ECG ICD-10-CM: R94.31  ICD-9-CM: 794.31  3/9/2019 - 3/11/2019        RESOLVED: Abnormal EKG ICD-10-CM: R94.31  ICD-9-CM: 794.31  3/8/2019 - 3/11/2019        RESOLVED: Gastric polyp ICD-10-CM: K31.7  ICD-9-CM: 211.1  9/13/2013 - 10/27/2017        RESOLVED: Hemorrhoids ICD-10-CM: K64.9  ICD-9-CM: 455.6  6/13/2013 - 10/27/2017        RESOLVED: Anal polyp ICD-10-CM: K62.0  ICD-9-CM: 569.0  6/13/2013 - 10/27/2017              Greater than 35 minutes were spent with the patient on counseling and coordination of care    Signed:   Ella Soriano MD  6/2/2021  3:55 PM

## 2021-06-02 NOTE — PROGRESS NOTES
Bedside and Verbal shift change report given to Devi Feliz RN (oncoming nurse) by Madhav Nair RN (offgoing nurse). Report included the following information SBAR, Kardex, Intake/Output, MAR, Recent Results and Med Rec Status.

## 2021-06-02 NOTE — PROGRESS NOTES
Judycassidy received Azalea Mao. Transition of Care Plan to SNF/Rehab    SNF/Rehab Transition:  Patient has been accepted to [facility] and meets criteria for admission. Patient will transported by [facility] and expected to leave at 1930 p.m. Communication to Patient/Family:  Met with patient and her daughter at bedside (identified care giver) and they are agreeable to the transition plan. Communication to SNF/Rehab:  Bedside RN, David Gill, has been notified to update the transition plan to the facility and call report (phone number [phone] Room 115). Discharge information has been updated on the AVS.             Nursing Please include all hard scripts for controlled substances, med rec and dc summary, and AVS in packet. Reviewed and confirmed with facility, [facility], can manage the patient care needs for the following:     SNF/Rehab Transition:  Patient to follow-up with Home Health:facility will determine  Reviewed and confirmed with facility, radha they can manage the patient care needs for the following:     Cresenciano Rene with (X) only those applicable:    Medication:  []  Medications will be available at the facility  []  IV Antibiotics   []  Controlled Substance - hard copy to be sent with patient   []  Weekly Labs   Documents:  [x] Hard RX  [x] MAR  [x] Kardex  [x] AVS  [x]Transfer Summary  [x]Discharge   Equipment:  []  CPAP/BiPAP  []  Wound Vacuum  []  Barclay or Urinary Device  []  PICC/Central Line  []  Nebulizer  []  Ventilator   Treatment:  []Isolation (for MRSA, VRE, etc.)  []Surgical Drain Management  []Tracheostomy Care  []Dressing Changes  []Dialysis with transportation and chair time   []PEG Care  []Oxygen  []Daily Weights for Heart Failure   Dietary:  []Any diet limitations  []Tube Feedings   []Total Parenteral Management (TPN)   Eligible for Medicaid Long Term Services and Supports  Yes:  [x] Eligible for medical assistance or will become eligible within 180 days and UAI completed.    [] Provider/Patient and/or support system has requested screening. [] UAI copy provided to patient or responsible party,   [] UAI unavailable at discharge will send once processed to SNF provider. [] UAI unavailable at discharged mailed to patient  No:   [] Private pay and is not financially eligible for Medicaid within the next 180 days. [] Reside out-of-state.   [] A residents of a state owned/operated facility that is licensed  by [address] [employer] [facility] or [facility]  [] Enrollment in [facility] services  [] [address] [place] [address] [facility]  [] Patient /Family declines to have screening completed or provide financial information for screening     Financial Resources:  Medicaid    [x] Initiated and application pending   [] Full coverage     Advanced Care Plan:  []Surrogate Decision Maker of Care  []POA  []Communicated Code Status Full (DDNR\", \"Full\")    Other     Financial Resources:  Medicaid    [] Initiated and application pending   [x] Full coverage     Advanced Care Plan:  []Surrogate Decision Maker of Care  []POA  []Communicated Code Status Full (DDNR\", \"Full\")    Other     Miri Faye [facility]

## 2021-06-02 NOTE — PROGRESS NOTES
7:25 PM    CM was informed patient was to discharge today to [facility] for skilled services. Patient currently with elevated BS. RN spoke with facility. Facility declines to receive patient at this time until patient's BS's have been addressed and clinically stable. RN notified Attending. Transport cancelled at this time. CM will continue to follow and assist with LUZ MARINA needs as they arise.     Ayana Coburn, MSW/CRM  Care Management

## 2021-06-02 NOTE — PROGRESS NOTES
Problem: Falls - Risk of  Goal: *Absence of Falls  Description: Document Nettie López Fall Risk and appropriate interventions in the flowsheet. Outcome: Progressing Towards Goal  Note: Fall Risk Interventions:  Mobility Interventions: Assess mobility with egress test         Medication Interventions: Assess postural VS orthostatic hypotension    Elimination Interventions: Bed/chair exit alarm              Problem: Patient Education: Go to Patient Education Activity  Goal: Patient/Family Education  Outcome: Progressing Towards Goal     Problem: Diabetes Self-Management  Goal: *Disease process and treatment process  Description: Define diabetes and identify own type of diabetes; list 3 options for treating diabetes. Outcome: Progressing Towards Goal  Goal: *Incorporating nutritional management into lifestyle  Description: Describe effect of type, amount and timing of food on blood glucose; list 3 methods for planning meals. Outcome: Progressing Towards Goal  Goal: *Incorporating physical activity into lifestyle  Description: State effect of exercise on blood glucose levels. Outcome: Progressing Towards Goal  Goal: *Developing strategies to promote health/change behavior  Description: Define the ABC's of diabetes; identify appropriate screenings, schedule and personal plan for screenings. Outcome: Progressing Towards Goal  Goal: *Using medications safely  Description: State effect of diabetes medications on diabetes; name diabetes medication taking, action and side effects. Outcome: Progressing Towards Goal  Goal: *Monitoring blood glucose, interpreting and using results  Description: Identify recommended blood glucose targets  and personal targets. Outcome: Progressing Towards Goal  Goal: *Prevention, detection, treatment of acute complications  Description: List symptoms of hyper- and hypoglycemia; describe how to treat low blood sugar and actions for lowering  high blood glucose level.   Outcome: Progressing Towards Goal  Goal: *Prevention, detection and treatment of chronic complications  Description: Define the natural course of diabetes and describe the relationship of blood glucose levels to long term complications of diabetes.   Outcome: Progressing Towards Goal  Goal: *Developing strategies to address psychosocial issues  Description: Describe feelings about living with diabetes; identify support needed and support network  Outcome: Progressing Towards Goal  Goal: *Insulin pump training  Outcome: Progressing Towards Goal  Goal: *Sick day guidelines  Outcome: Progressing Towards Goal  Goal: *Patient Specific Goal (EDIT GOAL, INSERT TEXT)  Outcome: Progressing Towards Goal     Problem: Patient Education: Go to Patient Education Activity  Goal: Patient/Family Education  Outcome: Progressing Towards Goal

## 2021-06-03 VITALS
HEIGHT: 60 IN | HEART RATE: 99 BPM | RESPIRATION RATE: 17 BRPM | SYSTOLIC BLOOD PRESSURE: 112 MMHG | WEIGHT: 166.67 LBS | BODY MASS INDEX: 32.72 KG/M2 | DIASTOLIC BLOOD PRESSURE: 62 MMHG | OXYGEN SATURATION: 98 % | TEMPERATURE: 98.2 F

## 2021-06-03 LAB
GLUCOSE BLD STRIP.AUTO-MCNC: 168 MG/DL (ref 65–117)
GLUCOSE BLD STRIP.AUTO-MCNC: 258 MG/DL (ref 65–117)
SERVICE CMNT-IMP: ABNORMAL
SERVICE CMNT-IMP: ABNORMAL

## 2021-06-03 PROCEDURE — 74011636637 HC RX REV CODE- 636/637: Performed by: FAMILY MEDICINE

## 2021-06-03 PROCEDURE — 74011636637 HC RX REV CODE- 636/637: Performed by: HOSPITALIST

## 2021-06-03 PROCEDURE — 74011250637 HC RX REV CODE- 250/637: Performed by: HOSPITALIST

## 2021-06-03 PROCEDURE — 82962 GLUCOSE BLOOD TEST: CPT

## 2021-06-03 PROCEDURE — 74011000250 HC RX REV CODE- 250: Performed by: HOSPITALIST

## 2021-06-03 PROCEDURE — 94640 AIRWAY INHALATION TREATMENT: CPT

## 2021-06-03 PROCEDURE — 74011250636 HC RX REV CODE- 250/636: Performed by: HOSPITALIST

## 2021-06-03 RX ORDER — INSULIN GLARGINE 100 [IU]/ML
22 INJECTION, SOLUTION SUBCUTANEOUS 2 TIMES DAILY
Qty: 1 VIAL | Refills: 0 | Status: SHIPPED | OUTPATIENT
Start: 2021-06-03

## 2021-06-03 RX ORDER — INSULIN LISPRO 100 [IU]/ML
4 INJECTION, SOLUTION INTRAVENOUS; SUBCUTANEOUS
Qty: 1 VIAL | Refills: 0 | Status: SHIPPED
Start: 2021-06-03

## 2021-06-03 RX ADMIN — BUSPIRONE HYDROCHLORIDE 20 MG: 10 TABLET ORAL at 09:13

## 2021-06-03 RX ADMIN — ASPIRIN 81 MG: 81 TABLET, CHEWABLE ORAL at 09:13

## 2021-06-03 RX ADMIN — INSULIN LISPRO 4 UNITS: 100 INJECTION, SOLUTION INTRAVENOUS; SUBCUTANEOUS at 12:10

## 2021-06-03 RX ADMIN — INSULIN LISPRO 5 UNITS: 100 INJECTION, SOLUTION INTRAVENOUS; SUBCUTANEOUS at 12:10

## 2021-06-03 RX ADMIN — INSULIN LISPRO 4 UNITS: 100 INJECTION, SOLUTION INTRAVENOUS; SUBCUTANEOUS at 07:19

## 2021-06-03 RX ADMIN — INSULIN LISPRO 2 UNITS: 100 INJECTION, SOLUTION INTRAVENOUS; SUBCUTANEOUS at 07:19

## 2021-06-03 RX ADMIN — ENOXAPARIN SODIUM 40 MG: 40 INJECTION SUBCUTANEOUS at 09:13

## 2021-06-03 RX ADMIN — BUDESONIDE 500 MCG: 0.5 INHALANT RESPIRATORY (INHALATION) at 09:42

## 2021-06-03 RX ADMIN — DULOXETINE HYDROCHLORIDE 60 MG: 60 CAPSULE, DELAYED RELEASE ORAL at 09:13

## 2021-06-03 RX ADMIN — PANTOPRAZOLE SODIUM 40 MG: 40 TABLET, DELAYED RELEASE ORAL at 07:20

## 2021-06-03 RX ADMIN — HUMAN INSULIN 16 UNITS: 100 INJECTION, SUSPENSION SUBCUTANEOUS at 09:13

## 2021-06-03 NOTE — PROGRESS NOTES
Spiritual Care Assessment/Progress Note  [facility]      NAME: Jael Griffin      MRN: [id number]  AGE: 79 y.o.  SEX: female  Adventist Affiliation: Latter day   Language: English     6/3/2021     Total Time (in minutes): 5     Spiritual Assessment begun in [facility] SUR[facility] through conversation with:         [x]Patient        [] Family    [] Friend(s)        Reason for Consult: Initial/Spiritual assessment, patient floor     Spiritual beliefs: (Please include comment if needed)     [x] Identifies with a yadira tradition:         [x] Supported by a yadira community:            [] Claims no spiritual orientation:           [] Seeking spiritual identity:                [] Adheres to an individual form of spirituality:           [] Not able to assess:                           Identified resources for coping:      [x] Prayer                               [] Music                  [] Guided Imagery     [x] Family/friends                 [] Pet visits     [] Devotional reading                         [] Unknown     [] Other:                                            Interventions offered during this visit: (See comments for more details)    Patient Interventions: Affirmation of emotions/emotional suffering, Affirmation of yadira, Initial/Spiritual assessment, patient floor, Normalization of emotional/spiritual concerns, Prayer (assurance of), Adventist beliefs/image of God discussed           Plan of Care:     [] Support spiritual and/or cultural needs    [] Support AMD and/or advance care planning process      [] Support grieving process   [] Coordinate Rites and/or Rituals    [] Coordination with community clergy   [] No spiritual needs identified at this time   [] Detailed Plan of Care below (See Comments)  [] Make referral to Music Therapy  [] Make referral to Pet Therapy     [] Make referral to Addiction services  [] Make referral to [facility]  [] Make referral to Spiritual Care Partner  [] No future visits requested        [x] Follow up upon further referrals     Comments: Patient welcomed visit from [facility] and expressed she is doing good. Expressed that she is anxious to go home. Expressed she has the support of family at home and also strong yadira community; and thanked  for the visit.      Chaplain Lasha Fuller M.Div.  Zee Le ([phone])

## 2021-06-03 NOTE — PROGRESS NOTES
Transition of Care Plan to SNF/Rehab    SNF/Rehab Transition:  Patient has been accepted to [facility] and meets criteria for admission. Patient will transported by [facility] and expected to leave at 1200. Communication to Patient/Family:  Met with patient and  (identified care giver) and they are agreeable to the transition plan. Communication to SNF/Rehab:  Bedside RN, Rose Mary Gerard, has been notified to update the transition plan to the facility and call report (phone number [phone]). Discharge information has been updated on the AVS.            Nursing Please include all hard scripts for controlled substances, med rec and dc summary, and AVS in packet. Reviewed and confirmed with facility, [facility], can manage the patient care needs for the following:     SNF/Rehab Transition:  Patient to follow-up with Home Health: facility to determine    Reviewed and confirmed with facility[facility] they can manage the patient care needs for the following:     Gonzalez Dobbs with (X) only those applicable:    Medication:  []  Medications will be available at the facility  []  IV Antibiotics   []  Controlled Substance - hard copy to be sent with patient   []  Weekly Labs   Documents:  [x] Hard RX  [x] MAR  [x] Kardex  [x] AVS  [x]Transfer Summary  [x]Discharge   Equipment:  []  CPAP/BiPAP  []  Wound Vacuum  []  Barclay or Urinary Device  []  PICC/Central Line  []  Nebulizer  []  Ventilator   Treatment:  []Isolation (for MRSA, VRE, etc.)  []Surgical Drain Management  []Tracheostomy Care  []Dressing Changes  []Dialysis with transportation and chair time. []PEG Care  []Oxygen  []Daily Weights for Heart Failure   Dietary:  []Any diet limitations  []Tube Feedings   []Total Parenteral Management (TPN)   Eligible for Medicaid Long Term Services and Supports  Yes:  [] Eligible for medical assistance or will become eligible within 180 days and UAI completed. [] Provider/Patient and/or support system has requested screening.   [] UAI copy provided to patient or responsible party,  [] UAI unavailable at discharge will send once processed to SNF provider. [] UAI unavailable at discharged mailed to patient  No:   [] Private pay and is not financially eligible for Medicaid within the next 180 days. [] Reside out-of-state.   [] A residents of a state owned/operated facility that is licensed  by [place] [address] [employer] or [facility]  [] Enrollment in [facility] [place] services  [] [address] [place] [address] [address]  [] Patient /Family declines to have screening completed or provide financial information for screening     Financial Resources:  Medicaid    [x] Initiated and application pending   [] Full coverage     Advanced Care Plan:  []Surrogate Decision Maker of Care  []POA  []Communicated Code Status Full (DDNR\", \"Full\")    Other     Financial Resources:  Medicaid    [] Initiated and application pending   [] Full coverage     Advanced Care Plan:  []Surrogate Decision Maker of Care  []POA  []Communicated Code Status Full (DDNR\", \"Full\")    Other   SUE Ortega [facility]

## 2021-06-03 NOTE — PROGRESS NOTES
0430- pts BP was 98/42, 85/46, 83/42 when tried 3x. mews of 3. Rusty Duffy. Informed him she had no IV access. He asked me to try. 0500- I could not get IV access. Contacted emergency transport. Their nurse could not get IV access either. 2497- Pts BP is now 121/71. Mews of 1.

## 2021-06-03 NOTE — DISCHARGE INSTRUCTIONS
Discharge SNF/Rehab Instructions/LTAC       PATIENT ID: Hollie Sanchez  MRN: [id number]   YOB: 1940    DATE OF ADMISSION: 5/29/2021  8:19 AM    DATE OF DISCHARGE: 6/3/2021    PRIMARY CARE PROVIDER: Elizabeth Alvarado MD       ATTENDING PHYSICIAN: Jose M Soto MD  DISCHARGING PROVIDER: Quinn Rios MD     To contact this individual call [phone] and ask the  to page. If unavailable ask to be transferred the Adult Hospitalist Department. CONSULTATIONS: None    PROCEDURES/SURGERIES: * No surgery found *    ADMITTING DIAGNOSES & HOSPITAL COURSE:   Diabetes mellitus type 2 with hyperglycemia. Uncontrolled with hyperglycemia and HbA1c 14 in 05/29/21. ? complaince. Patient is on Lantus 20 units BID preadmission. She received NPH and humalog scheduled plus sliding scale. Patient preferred to resume her own home lantus on discharge. Humalog 4 units before each meal,AC TID,at least when rehab. Her llantus increased to 22 units bid and may need further adjustment. Her regimen has to be as simple as possible to ensure compliance,so if need be increase lantus to be able to take her off the Humalog before she goes home.       Leukocytosis:Resolved.       Hyponatremia due dehydration and hyperglycemia. Resolved    History of hypertension. BP has been low since admission, at time below 90/60 and her BP medications are held for now. Please reassess and if her BP is persistently >140/90,resume medications preferably one at a time        Pancreatitis,based on lipase. Probably due to hyperglycemia.  Resolved  Managed conservatively. Patient has been tolerating diet, pain controlled, lipase has normalized.        Left lower abdominal quadrant pain  CT scan shoed mild left hydroureteronephrosis of uncertain etiology and colonic diverticulosis. Normal kidney funciton  Patient needs follow-up with PCP or urologist with follow up imaging with ultrasound or CT scan.     Asthma;  without exacerbation.    Continue Pulmicort nebulizer and albuterol inhaler.      Check CBC and BMP next week               PENDING TEST RESULTS:   At the time of discharge the following test results are still pending: none    FOLLOW UP APPOINTMENTS:    Follow-up Information     Follow up With Specialties Details Why Contact Info    Ugo Pollock MD Internal Medicine   [address] Jeremias Carrillot  [phone]      [address] [facility]   [address]  [phone]           ADDITIONAL CARE RECOMMENDATIONS:     DIET: Regular Diet, Cardiac Diet and Diabetic Diet      ACTIVITY: Activity as tolerated and PT/OT Eval and Treat    WOUND CARE: NA    EQUIPMENT needed: TBD after rehab      DISCHARGE MEDICATIONS:   See Medication Reconciliation Form      NOTIFY YOUR PHYSICIAN FOR ANY OF THE FOLLOWING:   Fever over 101 degrees for 24 hours. Chest pain, shortness of breath, fever, chills, nausea, vomiting, diarrhea, change in mentation, falling, weakness, bleeding. Severe pain or pain not relieved by medications. Or, any other signs or symptoms that you may have questions about. DISPOSITION:    Home With:   OT  PT  HH  RN      x SNF/Inpatient Rehab/LTAC    Independent/assisted living    Hospice    Other:       PATIENT CONDITION AT DISCHARGE:     Functional status    Poor    x Deconditioned     Independent      Cognition   x  Lucid     Forgetful     Dementia      Catheters/lines (plus indication)    Barclay     PICC     PEG    x None      Code status     Full code     DNR      PHYSICAL EXAMINATION AT DISCHARGE:  GENERAL:  Alert, oriented, cooperative, no apparent distress  HEENT:  Normocephalic, atraumatic, non icteric sclerae, non pallor conjuctivae, EOMs intact, PERRLA. NECK: Supple, trachea midline, no adenopathy, no thyromegally or tenderness, no carotid bruit and no JVD. LUNGS:   Vesicular breath sounds bilaterally, no added sounds.   HEART: S1 and S2 well heard,RRR,  no murmur, click, rub or gallop. ABDOMEB:   Soft, non-tender. Normoactive bowel sounds. No masses,  No organomegaly. EXTREMETIES:  Atraumatic, acyanotic, no edema  PULSES: 2+ and symmetric all extremities. SKIN:  No rashes or lesions  NEUROLOGY: Alert and oriented to PPT, CNII-XII intact. Motor and sensory exam grossly intact.           CHRONIC MEDICAL DIAGNOSES:  Problem List as of 6/3/2021 Date Reviewed: 7/5/2019        Codes Class Noted - Resolved    DM (diabetes mellitus), type 2, uncontrolled ([facility] ICD-10-CM: E11.65  ICD-9-CM: 250.02  5/29/2021 - Present        Shortness of breath ICD-10-CM: R06.02  ICD-9-CM: 786.05  4/25/2020 - Present        Suspected COVID-19 virus infection ICD-10-CM: Z20.822  ICD-9-CM: V01.79  4/25/2020 - Present        Bilateral pneumonia ICD-10-CM: J18.9  ICD-9-CM: 303  9/24/2019 - Present        Pain due to knee joint prosthesis ([facility] ICD-10-CM: T84.84XA, Z96.659  ICD-9-CM: 996.77, 338.18, V43.65  11/14/2017 - Present        CAD (coronary artery disease) ICD-10-CM: I25.10  ICD-9-CM: 414.00  10/27/2017 - Present        Pancreatitis, acute ICD-10-CM: K85.90  ICD-9-CM: 577.0  9/8/2017 - Present        Epigastric abdominal pain ICD-10-CM: R10.13  ICD-9-CM: 789.06  9/8/2017 - Present        Painful total knee replacement ([facility] ICD-10-CM: T84.84XA, Z96.659  ICD-9-CM: 996.77, V43.65  8/9/2017 - Present        Status post right knee replacement ICD-10-CM: Z96.651  ICD-9-CM: V43.65  8/9/2017 - Present        Leukocytosis, unspecified ICD-10-CM: D72.829  ICD-9-CM: 288.60  4/6/2015 - Present        DM (diabetes mellitus) ([facility] ICD-10-CM: E11.9  ICD-9-CM: 250.00  4/6/2015 - Present        HTN (hypertension) ICD-10-CM: I10  ICD-9-CM: 401.9  4/6/2015 - Present        Arthritis ICD-10-CM: M19.90  ICD-9-CM: 716.90  4/6/2015 - Present        Chest pain, unspecified ICD-10-CM: R07.9  ICD-9-CM: 786.50  9/12/2013 - Present        RESOLVED: Chest pain ICD-10-CM: R07.9  ICD-9-CM: 786.50  7/5/2019 - 7/12/2019        RESOLVED: Abnormal ECG ICD-10-CM: R94.31  ICD-9-CM: 794.31  3/9/2019 - 3/11/2019        RESOLVED: Abnormal EKG ICD-10-CM: R94.31  ICD-9-CM: 794.31  3/8/2019 - 3/11/2019        RESOLVED: Gastric polyp ICD-10-CM: K31.7  ICD-9-CM: 211.1  9/13/2013 - 10/27/2017        RESOLVED: Hemorrhoids ICD-10-CM: K64.9  ICD-9-CM: 455.6  6/13/2013 - 10/27/2017        RESOLVED: Anal polyp ICD-10-CM: K62.0  ICD-9-CM: 569.0  6/13/2013 - 10/27/2017                        Signed:    Courtney Burnette MD  6/3/2021  11:36 AM

## 2021-06-03 NOTE — PROGRESS NOTES
Bedside and Verbal shift change report given to Mickey Melendez (oncoming nurse) by Tiffanie Thomas RN (offgoing nurse). Report included the following information SBAR, Kardex, Intake/Output, MAR, Recent Results and Med Rec Status.

## 2021-06-03 NOTE — PROGRESS NOTES
Bedside shift change report given to [address] (oncoming nurse) by [facility] (offgoing nurse). Report included the following information SBAR, Kardex, MAR and Recent Results.

## 2021-06-03 NOTE — PROGRESS NOTES
Bedside and Verbal shift change report given to [facility] (oncoming nurse) by Latasha Kohler RN (offgoing nurse). Report included the following information SBAR, OR Summary, Procedure Summary, Recent Results and Quality Measures.

## 2021-06-03 NOTE — PROGRESS NOTES
Patient resting in chair, appetite is good, no complaints of pain. Problem: Falls - Risk of  Goal: *Absence of Falls  Description: Document Onalee Gum Fall Risk and appropriate interventions in the flowsheet. Outcome: Progressing Towards Goal  Note: Fall Risk Interventions:  Mobility Interventions: Communicate number of staff needed for ambulation/transfer, Bed/chair exit alarm         Medication Interventions: Bed/chair exit alarm, Evaluate medications/consider consulting pharmacy, Patient to call before getting OOB, Teach patient to arise slowly    Elimination Interventions: Patient to call for help with toileting needs, Toileting schedule/hourly rounds              Problem: Patient Education: Go to Patient Education Activity  Goal: Patient/Family Education  Outcome: Progressing Towards Goal     Problem: Diabetes Self-Management  Goal: *Disease process and treatment process  Description: Define diabetes and identify own type of diabetes; list 3 options for treating diabetes. Outcome: Progressing Towards Goal  Goal: *Incorporating nutritional management into lifestyle  Description: Describe effect of type, amount and timing of food on blood glucose; list 3 methods for planning meals. Outcome: Progressing Towards Goal  Goal: *Incorporating physical activity into lifestyle  Description: State effect of exercise on blood glucose levels. Outcome: Progressing Towards Goal  Goal: *Developing strategies to promote health/change behavior  Description: Define the ABC's of diabetes; identify appropriate screenings, schedule and personal plan for screenings. Outcome: Progressing Towards Goal  Goal: *Using medications safely  Description: State effect of diabetes medications on diabetes; name diabetes medication taking, action and side effects.   Outcome: Progressing Towards Goal  Goal: *Monitoring blood glucose, interpreting and using results  Description: Identify recommended blood glucose targets  and personal targets. Outcome: Progressing Towards Goal  Goal: *Prevention, detection, treatment of acute complications  Description: List symptoms of hyper- and hypoglycemia; describe how to treat low blood sugar and actions for lowering  high blood glucose level. Outcome: Progressing Towards Goal  Goal: *Prevention, detection and treatment of chronic complications  Description: Define the natural course of diabetes and describe the relationship of blood glucose levels to long term complications of diabetes.   Outcome: Progressing Towards Goal  Goal: *Developing strategies to address psychosocial issues  Description: Describe feelings about living with diabetes; identify support needed and support network  Outcome: Progressing Towards Goal  Goal: *Insulin pump training  Outcome: Progressing Towards Goal  Goal: *Sick day guidelines  Outcome: Progressing Towards Goal  Goal: *Patient Specific Goal (EDIT GOAL, INSERT TEXT)  Outcome: Progressing Towards Goal     Problem: Patient Education: Go to Patient Education Activity  Goal: Patient/Family Education  Outcome: Progressing Towards Goal     Problem: Patient Education: Go to Patient Education Activity  Goal: Patient/Family Education  Outcome: Progressing Towards Goal     Problem: Patient Education: Go to Patient Education Activity  Goal: Patient/Family Education  Outcome: Progressing Towards Goal

## 2021-06-03 NOTE — PROGRESS NOTES
Problem: Falls - Risk of  Goal: *Absence of Falls  Description: Document Marcello Hmamond Fall Risk and appropriate interventions in the flowsheet. Outcome: Progressing Towards Goal  Note: Fall Risk Interventions:  Mobility Interventions: Communicate number of staff needed for ambulation/transfer         Medication Interventions: Bed/chair exit alarm    Elimination Interventions: Patient to call for help with toileting needs              Problem: Diabetes Self-Management  Goal: *Disease process and treatment process  Description: Define diabetes and identify own type of diabetes; list 3 options for treating diabetes. Outcome: Progressing Towards Goal  Goal: *Incorporating nutritional management into lifestyle  Description: Describe effect of type, amount and timing of food on blood glucose; list 3 methods for planning meals. Outcome: Progressing Towards Goal  Goal: *Using medications safely  Description: State effect of diabetes medications on diabetes; name diabetes medication taking, action and side effects.   Outcome: Progressing Towards Goal     Problem: Patient Education: Go to Patient Education Activity  Goal: Patient/Family Education  Outcome: Progressing Towards Goal

## 2021-06-03 NOTE — PROGRESS NOTES
1130: [facility] being [facility]. Report called to Lucio Roberts, JACK expected to be on unit at 1200.     1235: [facility] on unit to receive patient.

## 2021-06-03 NOTE — DISCHARGE SUMMARY
Discharge Summary         PATIENT ID: Wang Caldwell  MRN: [id number]   YOB: 1940    DATE OF ADMISSION: 5/29/2021  8:19 AM    DATE OF DISCHARGE: 6/3/2021    PRIMARY CARE PROVIDER: Chicho Castillo MD       ATTENDING PHYSICIAN: Marvel Mane MD  DISCHARGING PROVIDER: Elle Troncoso MD     To contact this individual call [phone] and ask the  to page. If unavailable ask to be transferred the Adult Hospitalist Department. CONSULTATIONS: None    PROCEDURES/SURGERIES: * No surgery found *    ADMITTING DIAGNOSES & HOSPITAL COURSE:   Diabetes mellitus type 2 with hyperglycemia. Uncontrolled with hyperglycemia and HbA1c 14 in 05/29/21. ? complaince. Patient is on Lantus 20 units BID preadmission. She received NPH and humalog scheduled plus sliding scale. Patient preferred to resume her own home lantus on discharge. Humalog 4 units before each meal,AC TID,at least when rehab. Her llantus increased to 22 units bid and may need further adjustment. Her regimen has to be as simple as possible to ensure compliance,so if need be increase lantus to be able to take her off the Humalog before she goes home. Patient gave me permission to call and discuss with her daughter to discuss my concerns about patient's medication compliance and need for assistance with medications. Patient does not know her daughter's number. She asked me that I may call one of her sister-in-law's and asked for her daughter's number. I called Jony Cantu one of her sister-in-law's who gave me the number for Mary Watts, patient's niece. Patient connected me via conference call with patient's daughter. I have updated her of patient's condition, readiness for discharge and the fact that she is being discharged to [facility] today. I specifically expressed my concern regarding patient's diabetes control that there is question about patient's complaints.   Patient's daughter expressed to me that she agrees her mother is not using the insulin and she finds a start of insulin syringes in the fluid all the time when she goes to check her and top of that she eats [place] lot of junk food\"  I told her that she needs help with her medications. She said she will speak with the  at the rehab facility prior to discharge.       History of hypertension. BP has been low since admission, at time below 90/60 and her BP medications are held for now. Please reassess and if her BP is persistently >140/90,resume medications preferably one at a time   Leukocytosis:Resolved.       Hyponatremia due dehydration and hyperglycemia. Resolved       Pancreatitis,based on lipase. Probably due to hyperglycemia.  Resolved  Managed conservatively. Patient has been tolerating diet, pain controlled, lipase has normalized.        Left lower abdominal quadrant pain  CT scan shoed mild left hydroureteronephrosis of uncertain etiology and colonic diverticulosis. Normal kidney funciton  Patient needs follow-up with PCP or urologist with follow up imaging with ultrasound or CT scan.     Asthma;  without exacerbation.    Continue Pulmicort nebulizer and albuterol inhaler.      Check CBC and BMP next week               PENDING TEST RESULTS:   At the time of discharge the following test results are still pending: none    FOLLOW UP APPOINTMENTS:    Follow-up Information     Follow up With Specialties Details Why Contact Info    Monik Carballo MD Internal Medicine   [address]  [address]  [place] [id number]  [phone]      [address] [place] [address]   [address]  [phone]           ADDITIONAL CARE RECOMMENDATIONS:     DIET: Regular Diet, Cardiac Diet and Diabetic Diet      ACTIVITY: Activity as tolerated and PT/OT Eval and Treat    WOUND CARE: NA    EQUIPMENT needed: TBD after rehab      DISCHARGE MEDICATIONS:   See Medication Reconciliation Form      NOTIFY [facility] FOLLOWING:   Fever over 101 degrees for 24 hours. Chest pain, shortness of breath, fever, chills, nausea, vomiting, diarrhea, change in mentation, falling, weakness, bleeding. Severe pain or pain not relieved by medications. Or, any other signs or symptoms that you may have questions about. DISPOSITION:    Home With:   OT  PT  HH  RN      x SNF/Inpatient Rehab/LTAC    Independent/assisted living    Hospice    Other:       PATIENT CONDITION AT DISCHARGE:     Functional status    Poor    x Deconditioned     Independent      Cognition   x  Lucid     Forgetful     Dementia      Catheters/lines (plus indication)    [place]     PICC     PEG    x None      Code status     Full code     DNR      PHYSICAL EXAMINATION AT DISCHARGE:  GENERAL:  Alert, oriented, cooperative, no apparent distress  HEENT:  Normocephalic, atraumatic, non icteric sclerae, non pallor conjuctivae, EOMs intact, PERRLA. NECK: Supple, trachea midline, no adenopathy, no thyromegally or tenderness, no carotid bruit and no JVD. LUNGS:   Vesicular breath sounds bilaterally, no added sounds. HEART:   S1 and S2 well heard,RRR,  no murmur, click, rub or gallop. ABDOMEB:   Soft, non-tender. Normoactive bowel sounds. No masses,  No organomegaly. EXTREMETIES:  Atraumatic, acyanotic, no edema  PULSES: 2+ and symmetric all extremities. SKIN:  No rashes or lesions  NEUROLOGY: Alert and oriented to PPT, CNII-XII intact. Motor and sensory exam grossly intact.           CHRONIC MEDICAL DIAGNOSES:  Problem List as of 6/3/2021 Date Reviewed: 7/5/2019        Codes Class Noted - Resolved    DM (diabetes mellitus), type 2, uncontrolled ([facility] ICD-10-CM: E11.65  ICD-9-CM: 250.02  5/29/2021 - Present        Shortness of breath ICD-10-CM: R06.02  ICD-9-CM: 786.05  4/25/2020 - Present        Suspected COVID-19 virus infection ICD-10-CM: Z20.822  ICD-9-CM: V01.79  4/25/2020 - Present        Bilateral pneumonia ICD-10-CM: J18.9  ICD-9-CM: 589  9/24/2019 - Present Pain due to knee joint prosthesis [facility]) ICD-10-CM: T84.84XA, Z96.659  ICD-9-CM: 996.77, 338.18, V43.65  11/14/2017 - Present        CAD (coronary artery disease) ICD-10-CM: I25.10  ICD-9-CM: 414.00  10/27/2017 - Present        Pancreatitis, acute ICD-10-CM: K85.90  ICD-9-CM: 577.0  9/8/2017 - Present        Epigastric abdominal pain ICD-10-CM: R10.13  ICD-9-CM: 789.06  9/8/2017 - Present        Painful total knee replacement ([facility] ICD-10-CM: T84.84XA, Z96.659  ICD-9-CM: 996.77, V43.65  8/9/2017 - Present        Status post right knee replacement ICD-10-CM: Z96.651  ICD-9-CM: V43.65  8/9/2017 - Present        Leukocytosis, unspecified ICD-10-CM: D72.829  ICD-9-CM: 288.60  4/6/2015 - Present        DM (diabetes mellitus) ([facility] ICD-10-CM: E11.9  ICD-9-CM: 250.00  4/6/2015 - Present        HTN (hypertension) ICD-10-CM: I10  ICD-9-CM: 401.9  4/6/2015 - Present        Arthritis ICD-10-CM: M19.90  ICD-9-CM: 716.90  4/6/2015 - Present        Chest pain, unspecified ICD-10-CM: R07.9  ICD-9-CM: 786.50  9/12/2013 - Present        RESOLVED: Chest pain ICD-10-CM: R07.9  ICD-9-CM: 786.50  7/5/2019 - 7/12/2019        RESOLVED: Abnormal ECG ICD-10-CM: R94.31  ICD-9-CM: 794.31  3/9/2019 - 3/11/2019        RESOLVED: Abnormal EKG ICD-10-CM: R94.31  ICD-9-CM: 794.31  3/8/2019 - 3/11/2019        RESOLVED: Gastric polyp ICD-10-CM: K31.7  ICD-9-CM: 211.1  9/13/2013 - 10/27/2017        RESOLVED: Hemorrhoids ICD-10-CM: K64.9  ICD-9-CM: 455.6  6/13/2013 - 10/27/2017        RESOLVED: Anal polyp ICD-10-CM: K62.0  ICD-9-CM: 569.0  6/13/2013 - 10/27/2017                        Signed: Yvette Alejandra MD  6/3/2021  11:36 AM       DISCHARGE MEDICATIONS:  Current Discharge Medication List      START taking these medications    Details   insulin lispro (HUMALOG) 100 unit/mL injection 4 Units by SubCUTAneous route Before breakfast, lunch, and dinner.   Qty: 1 Vial, Refills: 0  Start date: 6/3/2021      acetaminophen (TYLENOL) 325 mg tablet Take 2 Tablets by mouth every six (6) hours as needed for Pain or Fever. Qty: 20 Tablet, Refills: 0  Start date: 6/2/2021         CONTINUE these medications which have CHANGED    Details   insulin glargine (Lantus U-100 Insulin) 100 unit/mL injection 22 Units by SubCUTAneous route two (2) times a day. Qty: 1 Vial, Refills: 0  Start date: 6/3/2021         CONTINUE these medications which have NOT CHANGED    Details   bisacodyl (DULCOLAX) 5 mg EC tablet Take 1 Tab by mouth daily as needed for Constipation. Qty: 10 Tab, Refills: 0      albuterol (PROVENTIL VENTOLIN) 2.5 mg /3 mL (0.083 %) nebulizer solution 3 mL by Nebulization route every four (4) hours as needed for Wheezing. Qty: 60 Each, Refills: 0      arformoterol (BROVANA) 15 mcg/2 mL nebu neb solution 2 mL by Nebulization route two (2) times a day. Qty: 60 Vial, Refills: 0      budesonide (PULMICORT) 0.5 mg/2 mL nbsp 2 mL by Nebulization route two (2) times a day. Qty: 60 Each, Refills: 1      busPIRone (BUSPAR) 5 mg tablet Take 20 mg by mouth two (2) times a day. DULoxetine (CYMBALTA) 60 mg capsule Take 60 mg by mouth daily. mirtazapine (REMERON) 15 mg tablet Take 15 mg by mouth nightly. QUEtiapine (SEROQUEL) 25 mg tablet Take 25 mg by mouth nightly. albuterol (PROVENTIL HFA, VENTOLIN HFA, PROAIR HFA) 90 mcg/actuation inhaler Take 2 Puffs by inhalation every four (4) hours as needed for Wheezing. Qty: 1 Inhaler, Refills: 0      rosuvastatin (CRESTOR) 10 mg tablet Take 10 mg by mouth nightly. Dexlansoprazole (DEXILANT) 60 mg CpDB Take 60 mg by mouth Daily (before breakfast). aspirin 81 mg chewable tablet Take 1 Tab by mouth daily.   Qty: 30 Tab, Refills: 0         STOP taking these medications       amLODIPine (NORVASC) 5 mg tablet Comments:   Reason for Stopping:         azithromycin (ZITHROMAX) 500 mg tab Comments:   Reason for Stopping:         ibuprofen (MOTRIN) 600 mg tablet Comments:   Reason for Stopping:         fosinopril (MONOPRIL) 20 mg tablet Comments:   Reason for Stopping:               Greater than 30 minutes were spent with the patient on counseling and coordination of care    Signed:    Thomas Gatica MD  6/3/2021  11:45 AM

## 2021-12-11 ENCOUNTER — APPOINTMENT (OUTPATIENT)
Dept: GENERAL RADIOLOGY | Age: 81
End: 2021-12-11
Attending: STUDENT IN AN ORGANIZED HEALTH CARE EDUCATION/TRAINING PROGRAM
Payer: MEDICARE

## 2021-12-11 ENCOUNTER — APPOINTMENT (OUTPATIENT)
Dept: CT IMAGING | Age: 81
End: 2021-12-11
Attending: STUDENT IN AN ORGANIZED HEALTH CARE EDUCATION/TRAINING PROGRAM
Payer: MEDICARE

## 2021-12-11 ENCOUNTER — HOSPITAL ENCOUNTER (EMERGENCY)
Age: 81
Discharge: HOME | End: 2021-12-11
Attending: STUDENT IN AN ORGANIZED HEALTH CARE EDUCATION/TRAINING PROGRAM
Payer: MEDICARE

## 2021-12-11 VITALS
TEMPERATURE: 97.6 F | SYSTOLIC BLOOD PRESSURE: 130 MMHG | OXYGEN SATURATION: 99 % | RESPIRATION RATE: 16 BRPM | HEART RATE: 100 BPM | DIASTOLIC BLOOD PRESSURE: 85 MMHG

## 2021-12-11 DIAGNOSIS — R41.0 CONFUSION: Primary | ICD-10-CM

## 2021-12-11 DIAGNOSIS — R73.9 HYPERGLYCEMIA: ICD-10-CM

## 2021-12-11 DIAGNOSIS — R07.9 CHEST PAIN, UNSPECIFIED TYPE: ICD-10-CM

## 2021-12-11 DIAGNOSIS — R10.31 RLQ ABDOMINAL PAIN: ICD-10-CM

## 2021-12-11 LAB
ALBUMIN SERPL-MCNC: 3 G/DL (ref 3.5–5)
ALBUMIN/GLOB SERPL: 0.7 {RATIO} (ref 1.1–2.2)
ALP SERPL-CCNC: 136 U/L (ref 45–117)
ALT SERPL-CCNC: 23 U/L (ref 12–78)
ANION GAP SERPL CALC-SCNC: 8 MMOL/L (ref 5–15)
APPEARANCE UR: CLEAR
AST SERPL-CCNC: 14 U/L (ref 15–37)
BACTERIA URNS QL MICRO: NEGATIVE /HPF
BASOPHILS # BLD: 0.1 K/UL (ref 0–0.1)
BASOPHILS NFR BLD: 0 % (ref 0–1)
BILIRUB SERPL-MCNC: 0.4 MG/DL (ref 0.2–1)
BILIRUB UR QL: NEGATIVE
BUN SERPL-MCNC: 9 MG/DL (ref 6–20)
BUN/CREAT SERPL: 13 (ref 12–20)
CALCIUM SERPL-MCNC: 9.5 MG/DL (ref 8.5–10.1)
CHLORIDE SERPL-SCNC: 100 MMOL/L (ref 97–108)
CO2 SERPL-SCNC: 27 MMOL/L (ref 21–32)
COLOR UR: ABNORMAL
COMMENT, HOLDF: NORMAL
CREAT SERPL-MCNC: 0.72 MG/DL (ref 0.55–1.02)
D DIMER PPP FEU-MCNC: 0.54 MG/L FEU (ref 0–0.65)
DIFFERENTIAL METHOD BLD: ABNORMAL
EOSINOPHIL # BLD: 0.2 K/UL (ref 0–0.4)
EOSINOPHIL NFR BLD: 2 % (ref 0–7)
EPITH CASTS URNS QL MICRO: ABNORMAL /LPF
ERYTHROCYTE [DISTWIDTH] IN BLOOD BY AUTOMATED COUNT: 15.4 % (ref 11.5–14.5)
GLOBULIN SER CALC-MCNC: 4.2 G/DL (ref 2–4)
GLUCOSE BLD STRIP.AUTO-MCNC: 172 MG/DL (ref 65–117)
GLUCOSE SERPL-MCNC: 409 MG/DL (ref 65–100)
GLUCOSE UR STRIP.AUTO-MCNC: >1000 MG/DL
HCT VFR BLD AUTO: 41.7 % (ref 35–47)
HGB BLD-MCNC: 13.4 G/DL (ref 11.5–16)
HGB UR QL STRIP: NEGATIVE
IMM GRANULOCYTES # BLD AUTO: 0.1 K/UL (ref 0–0.04)
IMM GRANULOCYTES NFR BLD AUTO: 1 % (ref 0–0.5)
KETONES UR QL STRIP.AUTO: 15 MG/DL
LEUKOCYTE ESTERASE UR QL STRIP.AUTO: NEGATIVE
LIPASE SERPL-CCNC: 224 U/L (ref 73–393)
LYMPHOCYTES # BLD: 2.1 K/UL (ref 0.8–3.5)
LYMPHOCYTES NFR BLD: 15 % (ref 12–49)
MCH RBC QN AUTO: 27 PG (ref 26–34)
MCHC RBC AUTO-ENTMCNC: 32.1 G/DL (ref 30–36.5)
MCV RBC AUTO: 84.1 FL (ref 80–99)
MONOCYTES # BLD: 1 K/UL (ref 0–1)
MONOCYTES NFR BLD: 7 % (ref 5–13)
NEUTS SEG # BLD: 10.5 K/UL (ref 1.8–8)
NEUTS SEG NFR BLD: 75 % (ref 32–75)
NITRITE UR QL STRIP.AUTO: NEGATIVE
NRBC # BLD: 0 K/UL (ref 0–0.01)
NRBC BLD-RTO: 0 PER 100 WBC
PH UR STRIP: 5.5 [PH] (ref 5–8)
PLATELET # BLD AUTO: 331 K/UL (ref 150–400)
PMV BLD AUTO: 11.6 FL (ref 8.9–12.9)
POTASSIUM SERPL-SCNC: 3.5 MMOL/L (ref 3.5–5.1)
PROT SERPL-MCNC: 7.2 G/DL (ref 6.4–8.2)
PROT UR STRIP-MCNC: NEGATIVE MG/DL
RBC # BLD AUTO: 4.96 M/UL (ref 3.8–5.2)
RBC #/AREA URNS HPF: ABNORMAL /HPF (ref 0–5)
SAMPLES BEING HELD,HOLD: NORMAL
SERVICE CMNT-IMP: ABNORMAL
SODIUM SERPL-SCNC: 135 MMOL/L (ref 136–145)
SP GR UR REFRACTOMETRY: 1.01
TROPONIN-HIGH SENSITIVITY: 11 NG/L (ref 0–51)
TROPONIN-HIGH SENSITIVITY: 12 NG/L (ref 0–51)
UROBILINOGEN UR QL STRIP.AUTO: 0.2 EU/DL (ref 0.2–1)
WBC # BLD AUTO: 13.8 K/UL (ref 3.6–11)
WBC URNS QL MICRO: ABNORMAL /HPF (ref 0–4)
YEAST BUDDING URNS QL: PRESENT
YEAST URNS QL MICRO: PRESENT

## 2021-12-11 PROCEDURE — 84484 ASSAY OF TROPONIN QUANT: CPT

## 2021-12-11 PROCEDURE — 99285 EMERGENCY DEPT VISIT HI MDM: CPT

## 2021-12-11 PROCEDURE — 82962 GLUCOSE BLOOD TEST: CPT

## 2021-12-11 PROCEDURE — 74011636637 HC RX REV CODE- 636/637: Performed by: STUDENT IN AN ORGANIZED HEALTH CARE EDUCATION/TRAINING PROGRAM

## 2021-12-11 PROCEDURE — 96360 HYDRATION IV INFUSION INIT: CPT

## 2021-12-11 PROCEDURE — 85025 COMPLETE CBC W/AUTO DIFF WBC: CPT

## 2021-12-11 PROCEDURE — 74011250636 HC RX REV CODE- 250/636: Performed by: STUDENT IN AN ORGANIZED HEALTH CARE EDUCATION/TRAINING PROGRAM

## 2021-12-11 PROCEDURE — 74011250637 HC RX REV CODE- 250/637: Performed by: STUDENT IN AN ORGANIZED HEALTH CARE EDUCATION/TRAINING PROGRAM

## 2021-12-11 PROCEDURE — 93005 ELECTROCARDIOGRAM TRACING: CPT

## 2021-12-11 PROCEDURE — 83690 ASSAY OF LIPASE: CPT

## 2021-12-11 PROCEDURE — 71046 X-RAY EXAM CHEST 2 VIEWS: CPT

## 2021-12-11 PROCEDURE — 80053 COMPREHEN METABOLIC PANEL: CPT

## 2021-12-11 PROCEDURE — 36415 COLL VENOUS BLD VENIPUNCTURE: CPT

## 2021-12-11 PROCEDURE — 74176 CT ABD & PELVIS W/O CONTRAST: CPT

## 2021-12-11 PROCEDURE — 81001 URINALYSIS AUTO W/SCOPE: CPT

## 2021-12-11 PROCEDURE — 96361 HYDRATE IV INFUSION ADD-ON: CPT

## 2021-12-11 PROCEDURE — 85379 FIBRIN DEGRADATION QUANT: CPT

## 2021-12-11 RX ORDER — HYDROXYZINE 25 MG/1
25 TABLET, FILM COATED ORAL
Status: COMPLETED | OUTPATIENT
Start: 2021-12-11 | End: 2021-12-11

## 2021-12-11 RX ORDER — INSULIN LISPRO 100 [IU]/ML
4 INJECTION, SOLUTION INTRAVENOUS; SUBCUTANEOUS ONCE
Status: COMPLETED | OUTPATIENT
Start: 2021-12-11 | End: 2021-12-11

## 2021-12-11 RX ORDER — TRAMADOL HYDROCHLORIDE 50 MG/1
50 TABLET ORAL ONCE
Status: COMPLETED | OUTPATIENT
Start: 2021-12-11 | End: 2021-12-11

## 2021-12-11 RX ORDER — SODIUM CHLORIDE 9 MG/ML
1000 INJECTION, SOLUTION INTRAVENOUS ONCE
Status: COMPLETED | OUTPATIENT
Start: 2021-12-11 | End: 2021-12-11

## 2021-12-11 RX ORDER — KETOROLAC TROMETHAMINE 30 MG/ML
15 INJECTION, SOLUTION INTRAMUSCULAR; INTRAVENOUS
Status: DISCONTINUED | OUTPATIENT
Start: 2021-12-11 | End: 2021-12-11

## 2021-12-11 RX ADMIN — HYDROXYZINE HYDROCHLORIDE 25 MG: 25 TABLET, FILM COATED ORAL at 16:53

## 2021-12-11 RX ADMIN — SODIUM CHLORIDE 1000 ML: 9 INJECTION, SOLUTION INTRAVENOUS at 17:54

## 2021-12-11 RX ADMIN — INSULIN LISPRO 4 UNITS: 100 INJECTION, SOLUTION INTRAVENOUS; SUBCUTANEOUS at 17:54

## 2021-12-11 RX ADMIN — TRAMADOL HYDROCHLORIDE 50 MG: 50 TABLET, COATED ORAL at 15:24

## 2021-12-11 NOTE — ED NOTES
Pt wheeled back to room 26 and assisted into stretcher. Pt c/o CP as main complaint. Pt family reports steady decline of pt's mental status. Pt A&Ox2. Pt also reports chronic pain in her right leg.

## 2021-12-11 NOTE — ED PROVIDER NOTES
The patient is an 43-year-old female history of coronary disease, asthma, diabetes, GERD, hypertension, kidney stones presenting today secondary to multiple complaints. She is here with her nephew who helps provide history. He says that when he went to visit her today she was complaining of pain in her right leg which is chronic, chest pain and right \"side\" pain. Patient says that her right leg pain is not new and is unchanged. Her chest pain is mid chest, nonradiating and sharp. It is worse with exertion. She has some mild shortness of breath. Pain does not radiate from her chest.  She also reports right side pain and points to her right upper quadrant and right lower quadrant. This has been present for several days as well. No fevers. No vomiting or diarrhea. She does have some confusion, alert and oriented x2, tells me is 2002. Family says she has had some of this in the past but getting worse. Patient also complaining of weeks to months of generalized itching. No rash. No known allergen.              Past Medical History:   Diagnosis Date    Anal polyp 6/13/2013    Arthritis     Asthma     CAD (coronary artery disease) 10/27/2017    Cataract     Chronic pain     knee - right/back    Diabetes ([facility]     GERD (gastroesophageal reflux disease)     Hemorrhoids 6/13/2013    History of kidney stones     Hypertension     Ill-defined condition     abdominal pain and burning    Other ill-defined conditions(799.89)     high cholesterol       Past Surgical History:   Procedure Laterality Date    COLONOSCOPY  2/28/2013         EGD  2/28/2013         HX CATARACT REMOVAL Bilateral     HX CHOLECYSTECTOMY  6-2015    HX GI  6/27/13    anal polyps removed    HX HEENT      laser surgery for blood clot in right eye    HX KNEE REPLACEMENT      right    HX OTHER SURGICAL  4-2-14    lap gastrotomy and removal of polyp -  - not cancerous per pt    HX ALAN AND BSO      HX TONSILLECTOMY      HX UROLOGICAL      \"laser\" surgery for kidney stone    NEUROLOGICAL PROCEDURE UNLISTED  1990    tumor at back of neck, benign         Family History:   Problem Relation Age of Onset    Cancer Mother         colon    Diabetes Brother     Other Sister         GI BLEED    Heart Disease Brother     Heart Attack Brother     Heart Disease Brother     Heart Disease Brother     Schizophrenia Son     Anesth Problems Neg Hx        Social History     Socioeconomic History    Marital status:      Spouse name: Not on file    Number of children: Not on file    Years of education: Not on file    Highest education level: Not on file   Occupational History    Not on file   Tobacco Use    Smoking status: Never Smoker    Smokeless tobacco: Never Used    Tobacco comment: age 13   Substance and Sexual Activity    Alcohol use: No    Drug use: No    Sexual activity: Never   Other Topics Concern    Not on file   Social History Narrative    Not on file     Social Determinants of Health     Financial Resource Strain:     Difficulty of Paying Living Expenses: Not on file   Food Insecurity:     Worried About Running Out of Food in the Last Year: Not on file    Mark of Food in the Last Year: Not on file   Transportation Needs:     Lack of Transportation (Medical): Not on file    Lack of Transportation (Non-Medical):  Not on file   Physical Activity:     Days of Exercise per Week: Not on file    Minutes of Exercise per Session: Not on file   Stress:     Feeling of Stress : Not on file   Social Connections:     Frequency of Communication with Friends and Family: Not on file    Frequency of Social Gatherings with Friends and Family: Not on file    Attends Synagogue Services: Not on file    Active Member of Clubs or Organizations: Not on file    Attends Club or Organization Meetings: Not on file    Marital Status: Not on file   Intimate Partner Violence:     Fear of Current or Ex-Partner: Not on file   [facility] Emotionally Abused: Not on file    Physically Abused: Not on file    Sexually Abused: Not on file   Housing Stability:     Unable to Pay for Housing in the Last Year: Not on file    Number of Places Lived in the Last Year: Not on file    Unstable Housing in the Last Year: Not on file         ALLERGIES: Seafood [shellfish containing products]    Review of Systems   Constitutional: Negative for chills and fever. HENT: Negative for congestion and rhinorrhea. Eyes: Negative for redness and visual disturbance. Respiratory: Positive for shortness of breath. Negative for cough. Cardiovascular: Positive for chest pain. Negative for leg swelling. Gastrointestinal: Positive for abdominal pain. Negative for diarrhea, nausea and vomiting. Genitourinary: Positive for flank pain. Negative for dysuria, frequency, hematuria and urgency. Musculoskeletal: Positive for arthralgias. Negative for back pain, myalgias and neck pain. Skin: Negative for rash and wound. Allergic/Immunologic: Negative for immunocompromised state. Neurological: Negative for dizziness and headaches. Psychiatric/Behavioral: Positive for confusion. Vitals:    12/11/21 1353   BP: 132/76   Pulse: 100   Resp: 20   Temp: (!) 96.7 °F (35.9 °C)   SpO2: 99%            Physical Exam  Vitals and nursing note reviewed. Constitutional:       General: She is not in acute distress. Appearance: She is well-developed. She is not diaphoretic. HENT:      Head: Normocephalic. Mouth/Throat:      Pharynx: No oropharyngeal exudate. Eyes:      General:         Right eye: No discharge. Left eye: No discharge. Pupils: Pupils are equal, round, and reactive to light. Cardiovascular:      Rate and Rhythm: Normal rate and regular rhythm. Heart sounds: Normal heart sounds. No murmur heard. No friction rub. No gallop.        Comments: Equal DP and PT pulses  Pulmonary:      Effort: Pulmonary effort is normal. No respiratory distress. Breath sounds: Normal breath sounds. No stridor. No wheezing or rales. Abdominal:      General: Bowel sounds are normal. There is no distension. Palpations: Abdomen is soft. Tenderness: There is abdominal tenderness in the right upper quadrant and right lower quadrant. There is no guarding or rebound. Musculoskeletal:         General: No deformity. Normal range of motion. Cervical back: Normal range of motion and neck supple. Right lower leg: Edema present. Left lower leg: Edema present. Skin:     General: Skin is warm and dry. Capillary Refill: Capillary refill takes less than 2 seconds. Findings: No rash. Neurological:      Mental Status: She is alert. Comments: Alert and oriented to self and place  Believes it is 2002  No focal neurologic deficits   Psychiatric:         Behavior: Behavior normal.          MDM       Procedures    EKG Interpretation:   ED Physician interpretation  Sinus tachycardia rate of 101, normal axis and intervals, nonspecific ST-T wave changes without elevation or depression      Labs Reviewed:   Mild leukocytosis  No anemia  Hyperglycemic at 409 with normal renal function  No evidence of DKA  Troponin 12-->11  D-dimer not elevated  UA not consistent with UTI      Imaging Reviewed:   Chest x-ray negative  CT abdomen pelvis negative      Course:  Tramadol given to patient with improvement in pain, (reviewed dictating that she has tolerated this well in the past  Atarax given for itching with improvement        MDM:  The patient is an 78-year-old female presenting today with multiple complaints including abdominal/flank pain, chest pain, itching, chronic right leg pain and confusion. The confusion seems to be an ongoing process times several months noted by family. She is ANO x2 for me but answers questions otherwise appropriately. Today she has no evidence of infection. EKG and troponin not consistent with ACS.   D-dimer not indicative of PE and I doubt dissection. She has equal DP and PT pulses in her legs. She had some tenderness to the right side of her abdomen and this was evaluated with CT and negative. Urinalysis not consistent with UTI. She was hyperglycemic so she was given fluids and subcutaneous insulin with improvement in blood sugar. At this time I have no reason to admit the patient to the hospital.  Her work-up was essentially benign other than hyperglycemia which was treated. She was discharged home with her nephew in stable condition. Clinical Impression:     ICD-10-CM ICD-9-CM    1. Confusion  R41.0 298.9    2. Chest pain, unspecified type  R07.9 786.50    3. Hyperglycemia  R73.9 790.29    4.  RLQ abdominal pain  R10.31 789.03            Disposition: JUSTIN Burk DO

## 2021-12-11 NOTE — ED TRIAGE NOTES
Pt arrives c/o heavy chest pain that started yesterday. C/o \"sick on stomach\" some SOB. Denies taking any medications to help with chest pain. Denies any cardiac history. Pt is disoriented in triage.

## 2021-12-12 LAB
ATRIAL RATE: 101 BPM
CALCULATED P AXIS, ECG09: 80 DEGREES
CALCULATED R AXIS, ECG10: 63 DEGREES
CALCULATED T AXIS, ECG11: 73 DEGREES
DIAGNOSIS, 93000: NORMAL
P-R INTERVAL, ECG05: 128 MS
Q-T INTERVAL, ECG07: 392 MS
QRS DURATION, ECG06: 68 MS
QTC CALCULATION (BEZET), ECG08: 508 MS
VENTRICULAR RATE, ECG03: 101 BPM

## 2021-12-12 NOTE — ED NOTES
Pt found in hallway with IV pulled out and blood on the floor. Pt walked back to stretcher and new PIV established.

## 2022-03-18 PROBLEM — K85.90 PANCREATITIS, ACUTE: Status: ACTIVE | Noted: 2017-09-08

## 2022-03-18 PROBLEM — R06.02 SHORTNESS OF BREATH: Status: ACTIVE | Noted: 2020-04-25

## 2022-03-18 PROBLEM — Z96.659 PAINFUL TOTAL KNEE REPLACEMENT (HCC): Status: ACTIVE | Noted: 2017-08-09

## 2022-03-18 PROBLEM — T84.84XA PAINFUL TOTAL KNEE REPLACEMENT (HCC): Status: ACTIVE | Noted: 2017-08-09

## 2022-03-19 PROBLEM — Z96.659 PAIN DUE TO KNEE JOINT PROSTHESIS (HCC): Status: ACTIVE | Noted: 2017-11-14

## 2022-03-19 PROBLEM — Z96.651 STATUS POST RIGHT KNEE REPLACEMENT: Status: ACTIVE | Noted: 2017-08-09

## 2022-03-19 PROBLEM — T84.84XA PAIN DUE TO KNEE JOINT PROSTHESIS (HCC): Status: ACTIVE | Noted: 2017-11-14

## 2022-03-19 PROBLEM — Z20.822 SUSPECTED COVID-19 VIRUS INFECTION: Status: ACTIVE | Noted: 2020-04-25

## 2022-03-19 PROBLEM — J18.9 BILATERAL PNEUMONIA: Status: ACTIVE | Noted: 2019-09-24

## 2022-03-20 PROBLEM — R10.13 EPIGASTRIC ABDOMINAL PAIN: Status: ACTIVE | Noted: 2017-09-08

## 2022-03-20 PROBLEM — I25.10 CAD (CORONARY ARTERY DISEASE): Status: ACTIVE | Noted: 2017-10-27

## 2023-04-11 ENCOUNTER — APPOINTMENT (OUTPATIENT)
Dept: GENERAL RADIOLOGY | Age: 83
DRG: 871 | End: 2023-04-11
Attending: EMERGENCY MEDICINE
Payer: MEDICARE

## 2023-04-11 ENCOUNTER — APPOINTMENT (OUTPATIENT)
Dept: CT IMAGING | Age: 83
DRG: 871 | End: 2023-04-11
Attending: EMERGENCY MEDICINE
Payer: MEDICARE

## 2023-04-11 ENCOUNTER — HOSPITAL ENCOUNTER (INPATIENT)
Age: 83
LOS: 6 days | Discharge: NURSING, REHAB OR LONG-TERM CARE | DRG: 871 | End: 2023-04-18
Attending: EMERGENCY MEDICINE | Admitting: STUDENT IN AN ORGANIZED HEALTH CARE EDUCATION/TRAINING PROGRAM
Payer: MEDICARE

## 2023-04-11 DIAGNOSIS — L03.115 CELLULITIS OF RIGHT LOWER LEG: ICD-10-CM

## 2023-04-11 DIAGNOSIS — R41.0 CONFUSION: Primary | ICD-10-CM

## 2023-04-11 DIAGNOSIS — R00.0 TACHYCARDIA: ICD-10-CM
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
## 2023-04-11 LAB
ALBUMIN SERPL-MCNC: 2.5 G/DL (ref 3.5–5)
ALBUMIN/GLOB SERPL: 0.6 (ref 1.1–2.2)
ALP SERPL-CCNC: 115 U/L (ref 45–117)
ALT SERPL-CCNC: 20 U/L (ref 12–78)
ANION GAP SERPL CALC-SCNC: 4 MMOL/L (ref 5–15)
AST SERPL-CCNC: 22 U/L (ref 15–37)
BASOPHILS # BLD: 0.2 K/UL (ref 0–0.1)
BASOPHILS NFR BLD: 1 % (ref 0–1)
BILIRUB SERPL-MCNC: 0.4 MG/DL (ref 0.2–1)
BUN SERPL-MCNC: 16 MG/DL (ref 6–20)
BUN/CREAT SERPL: 17 (ref 12–20)
CALCIUM SERPL-MCNC: 8.7 MG/DL (ref 8.5–10.1)
CHLORIDE SERPL-SCNC: 104 MMOL/L (ref 97–108)
CO2 SERPL-SCNC: 28 MMOL/L (ref 21–32)
COMMENT, HOLDF: NORMAL
CREAT SERPL-MCNC: 0.92 MG/DL (ref 0.55–1.02)
DIFFERENTIAL METHOD BLD: ABNORMAL
EOSINOPHIL # BLD: 0 K/UL (ref 0–0.4)
EOSINOPHIL NFR BLD: 0 % (ref 0–7)
ERYTHROCYTE [DISTWIDTH] IN BLOOD BY AUTOMATED COUNT: 17.1 % (ref 11.5–14.5)
GLOBULIN SER CALC-MCNC: 4.4 G/DL (ref 2–4)
GLUCOSE BLD STRIP.AUTO-MCNC: 149 MG/DL (ref 65–117)
GLUCOSE SERPL-MCNC: 67 MG/DL (ref 65–100)
HCT VFR BLD AUTO: 27.8 % (ref 35–47)
HGB BLD-MCNC: 8.4 G/DL (ref 11.5–16)
IMM GRANULOCYTES # BLD AUTO: 0 K/UL
IMM GRANULOCYTES NFR BLD AUTO: 0 %
LACTATE SERPL-SCNC: 1.5 MMOL/L (ref 0.4–2)
LYMPHOCYTES # BLD: 1.4 K/UL (ref 0.8–3.5)
LYMPHOCYTES NFR BLD: 6 % (ref 12–49)
MCH RBC QN AUTO: 23.2 PG (ref 26–34)
MCHC RBC AUTO-ENTMCNC: 30.2 G/DL (ref 30–36.5)
MCV RBC AUTO: 76.8 FL (ref 80–99)
MONOCYTES # BLD: 1 K/UL (ref 0–1)
MONOCYTES NFR BLD: 4 % (ref 5–13)
NEUTS BAND NFR BLD MANUAL: 3 % (ref 0–6)
NEUTS SEG # BLD: 21.5 K/UL (ref 1.8–8)
NEUTS SEG NFR BLD: 86 % (ref 32–75)
NRBC # BLD: 0 K/UL (ref 0–0.01)
NRBC BLD-RTO: 0 PER 100 WBC
PLATELET # BLD AUTO: 349 K/UL (ref 150–400)
PMV BLD AUTO: 10.3 FL (ref 8.9–12.9)
POTASSIUM SERPL-SCNC: 3.6 MMOL/L (ref 3.5–5.1)
PROT SERPL-MCNC: 6.9 G/DL (ref 6.4–8.2)
RBC # BLD AUTO: 3.62 M/UL (ref 3.8–5.2)
RBC MORPH BLD: ABNORMAL
SAMPLES BEING HELD,HOLD: NORMAL
SERVICE CMNT-IMP: ABNORMAL
SODIUM SERPL-SCNC: 136 MMOL/L (ref 136–145)
WBC # BLD AUTO: 24.1 K/UL (ref 3.6–11)
WBC MORPH BLD: ABNORMAL

## 2023-04-11 PROCEDURE — 74011000250 HC RX REV CODE- 250: Performed by: STUDENT IN AN ORGANIZED HEALTH CARE EDUCATION/TRAINING PROGRAM

## 2023-04-11 PROCEDURE — 87077 CULTURE AEROBIC IDENTIFY: CPT

## 2023-04-11 PROCEDURE — 99285 EMERGENCY DEPT VISIT HI MDM: CPT

## 2023-04-11 PROCEDURE — 85025 COMPLETE CBC W/AUTO DIFF WBC: CPT

## 2023-04-11 PROCEDURE — 70450 CT HEAD/BRAIN W/O DYE: CPT

## 2023-04-11 PROCEDURE — 36415 COLL VENOUS BLD VENIPUNCTURE: CPT

## 2023-04-11 PROCEDURE — 73590 X-RAY EXAM OF LOWER LEG: CPT

## 2023-04-11 PROCEDURE — 87150 DNA/RNA AMPLIFIED PROBE: CPT

## 2023-04-11 PROCEDURE — 96375 TX/PRO/DX INJ NEW DRUG ADDON: CPT

## 2023-04-11 PROCEDURE — 83605 ASSAY OF LACTIC ACID: CPT

## 2023-04-11 PROCEDURE — 73552 X-RAY EXAM OF FEMUR 2/>: CPT

## 2023-04-11 PROCEDURE — 93005 ELECTROCARDIOGRAM TRACING: CPT

## 2023-04-11 PROCEDURE — 87147 CULTURE TYPE IMMUNOLOGIC: CPT

## 2023-04-11 PROCEDURE — 82962 GLUCOSE BLOOD TEST: CPT

## 2023-04-11 PROCEDURE — 87186 SC STD MICRODIL/AGAR DIL: CPT

## 2023-04-11 PROCEDURE — 74011250636 HC RX REV CODE- 250/636: Performed by: EMERGENCY MEDICINE

## 2023-04-11 PROCEDURE — 96365 THER/PROPH/DIAG IV INF INIT: CPT

## 2023-04-11 PROCEDURE — 74011250636 HC RX REV CODE- 250/636: Performed by: STUDENT IN AN ORGANIZED HEALTH CARE EDUCATION/TRAINING PROGRAM

## 2023-04-11 PROCEDURE — 80053 COMPREHEN METABOLIC PANEL: CPT

## 2023-04-11 PROCEDURE — 87040 BLOOD CULTURE FOR BACTERIA: CPT

## 2023-04-11 PROCEDURE — 74011000250 HC RX REV CODE- 250: Performed by: EMERGENCY MEDICINE

## 2023-04-11 PROCEDURE — 71045 X-RAY EXAM CHEST 1 VIEW: CPT

## 2023-04-11 RX ORDER — VANCOMYCIN/0.9 % SOD CHLORIDE 1.5G/250ML
1500 PLASTIC BAG, INJECTION (ML) INTRAVENOUS
Status: DISCONTINUED | OUTPATIENT
Start: 2023-04-11 | End: 2023-04-11 | Stop reason: DRUGHIGH

## 2023-04-11 RX ORDER — VANCOMYCIN 2 GRAM/500 ML IN 0.9 % SODIUM CHLORIDE INTRAVENOUS
2000
Status: COMPLETED | OUTPATIENT
Start: 2023-04-11 | End: 2023-04-12

## 2023-04-11 RX ADMIN — SODIUM CHLORIDE, PRESERVATIVE FREE 2 G: 5 INJECTION INTRAVENOUS at 23:07

## 2023-04-11 RX ADMIN — SODIUM CHLORIDE 1000 ML: 900 INJECTION, SOLUTION INTRAVENOUS at 20:22

## 2023-04-11 RX ADMIN — DEXTROSE MONOHYDRATE 250 ML: 10 INJECTION, SOLUTION INTRAVENOUS at 21:54

## 2023-04-11 RX ADMIN — VANCOMYCIN HYDROCHLORIDE 2000 MG: 10 INJECTION, POWDER, LYOPHILIZED, FOR SOLUTION INTRAVENOUS at 23:52

## 2023-04-12 ENCOUNTER — APPOINTMENT (OUTPATIENT)
Dept: VASCULAR SURGERY | Age: 83
DRG: 871 | End: 2023-04-12
Attending: STUDENT IN AN ORGANIZED HEALTH CARE EDUCATION/TRAINING PROGRAM
Payer: MEDICARE

## 2023-04-12 PROBLEM — R41.82 AMS (ALTERED MENTAL STATUS): Status: ACTIVE | Noted: 2023-04-12

## 2023-04-12 LAB
ACC. NO. FROM MICRO ORDER, ACCP: ABNORMAL
ACINETOBACTER CALCOACETICUS-BAUMANII COMPLEX, ACBCX: NOT DETECTED
ANION GAP BLD CALC-SCNC: 12 (ref 10–20)
ANION GAP SERPL CALC-SCNC: 4 MMOL/L (ref 5–15)
APPEARANCE UR: ABNORMAL
ATRIAL RATE: 108 BPM
B FRAGILIS DNA BLD POS QL NAA+NON-PROBE: NOT DETECTED
BACTERIA URNS QL MICRO: NEGATIVE /HPF
BASOPHILS # BLD: 0 K/UL (ref 0–0.1)
BASOPHILS NFR BLD: 0 % (ref 0–1)
BILIRUB UR QL: NEGATIVE
BIOFIRE COMMENT, BCIDPF: ABNORMAL
BUN SERPL-MCNC: 14 MG/DL (ref 6–20)
BUN/CREAT SERPL: 19 (ref 12–20)
C ALBICANS DNA BLD POS QL NAA+NON-PROBE: NOT DETECTED
C AURIS DNA BLD POS QL NAA+NON-PROBE: NOT DETECTED
C GATTII+NEOFOR DNA BLD POS QL NAA+N-PRB: NOT DETECTED
C GLABRATA DNA BLD POS QL NAA+NON-PROBE: NOT DETECTED
C KRUSEI DNA BLD POS QL NAA+NON-PROBE: NOT DETECTED
C PARAP DNA BLD POS QL NAA+NON-PROBE: NOT DETECTED
C TROPICLS DNA BLD POS QL NAA+NON-PROBE: NOT DETECTED
CA-I BLD-MCNC: 1.17 MMOL/L (ref 1.12–1.32)
CALCIUM SERPL-MCNC: 8.2 MG/DL (ref 8.5–10.1)
CALCULATED P AXIS, ECG09: 74 DEGREES
CALCULATED R AXIS, ECG10: 62 DEGREES
CALCULATED T AXIS, ECG11: 76 DEGREES
CHLORIDE BLD-SCNC: 105 MMOL/L (ref 100–108)
CHLORIDE SERPL-SCNC: 106 MMOL/L (ref 97–108)
CO2 BLD-SCNC: 23 MMOL/L (ref 19–24)
CO2 SERPL-SCNC: 25 MMOL/L (ref 21–32)
COLOR UR: ABNORMAL
COMMENT, HOLDF: NORMAL
CREAT SERPL-MCNC: 0.72 MG/DL (ref 0.55–1.02)
CREAT UR-MCNC: 0.8 MG/DL (ref 0.6–1.3)
DIAGNOSIS, 93000: NORMAL
DIFFERENTIAL METHOD BLD: ABNORMAL
E CLOAC COMP DNA BLD POS NAA+NON-PROBE: NOT DETECTED
E COLI DNA BLD POS QL NAA+NON-PROBE: NOT DETECTED
E FAECALIS DNA BLD POS QL NAA+NON-PROBE: NOT DETECTED
E FAECIUM DNA BLD POS QL NAA+NON-PROBE: NOT DETECTED
ENTEROBACTERALES DNA BLD POS NAA+N-PRB: NOT DETECTED
EOSINOPHIL # BLD: 0 K/UL (ref 0–0.4)
EOSINOPHIL NFR BLD: 0 % (ref 0–7)
EPITH CASTS URNS QL MICRO: ABNORMAL /LPF
ERYTHROCYTE [DISTWIDTH] IN BLOOD BY AUTOMATED COUNT: 17.2 % (ref 11.5–14.5)
EST. AVERAGE GLUCOSE BLD GHB EST-MCNC: 200 MG/DL
FERRITIN SERPL-MCNC: 62 NG/ML (ref 26–388)
GLUCOSE BLD STRIP.AUTO-MCNC: 108 MG/DL (ref 65–117)
GLUCOSE BLD STRIP.AUTO-MCNC: 113 MG/DL (ref 65–117)
GLUCOSE BLD STRIP.AUTO-MCNC: 84 MG/DL (ref 65–117)
GLUCOSE BLD STRIP.AUTO-MCNC: 92 MG/DL (ref 65–117)
GLUCOSE BLD STRIP.AUTO-MCNC: 98 MG/DL (ref 74–106)
GLUCOSE SERPL-MCNC: 112 MG/DL (ref 65–100)
GLUCOSE UR STRIP.AUTO-MCNC: NEGATIVE MG/DL
GP B STREP DNA BLD POS QL NAA+NON-PROBE: NOT DETECTED
HAEM INFLU DNA BLD POS QL NAA+NON-PROBE: NOT DETECTED
HBA1C MFR BLD: 8.6 % (ref 4–5.6)
HCT VFR BLD AUTO: 24.8 % (ref 35–47)
HGB BLD-MCNC: 7.6 G/DL (ref 11.5–16)
HGB UR QL STRIP: NEGATIVE
HYALINE CASTS URNS QL MICRO: ABNORMAL /LPF (ref 0–5)
IMM GRANULOCYTES # BLD AUTO: 0 K/UL
IMM GRANULOCYTES NFR BLD AUTO: 0 %
IRON SATN MFR SERPL: 3 % (ref 20–50)
IRON SERPL-MCNC: 7 UG/DL (ref 35–150)
K OXYTOCA DNA BLD POS QL NAA+NON-PROBE: NOT DETECTED
KETONES UR QL STRIP.AUTO: NEGATIVE MG/DL
KLEBSIELLA SP DNA BLD POS QL NAA+NON-PRB: NOT DETECTED
KLEBSIELLA SP DNA BLD POS QL NAA+NON-PRB: NOT DETECTED
L MONOCYTOG DNA BLD POS QL NAA+NON-PROBE: NOT DETECTED
LACTATE BLD-SCNC: 0.86 MMOL/L (ref 0.4–2)
LACTATE SERPL-SCNC: 1.2 MMOL/L (ref 0.4–2)
LEUKOCYTE ESTERASE UR QL STRIP.AUTO: NEGATIVE
LYMPHOCYTES # BLD: 0.8 K/UL (ref 0.8–3.5)
LYMPHOCYTES NFR BLD: 4 % (ref 12–49)
MCH RBC QN AUTO: 23.5 PG (ref 26–34)
MCHC RBC AUTO-ENTMCNC: 30.6 G/DL (ref 30–36.5)
MCV RBC AUTO: 76.8 FL (ref 80–99)
MONOCYTES # BLD: 0.4 K/UL (ref 0–1)
MONOCYTES NFR BLD: 2 % (ref 5–13)
N MEN DNA BLD POS QL NAA+NON-PROBE: NOT DETECTED
NEUTS BAND NFR BLD MANUAL: 1 % (ref 0–6)
NEUTS SEG # BLD: 18 K/UL (ref 1.8–8)
NEUTS SEG NFR BLD: 93 % (ref 32–75)
NITRITE UR QL STRIP.AUTO: NEGATIVE
NRBC # BLD: 0 K/UL (ref 0–0.01)
NRBC BLD-RTO: 0 PER 100 WBC
P AERUGINOSA DNA BLD POS NAA+NON-PROBE: NOT DETECTED
P-R INTERVAL, ECG05: 132 MS
PH UR STRIP: 5.5 (ref 5–8)
PLATELET # BLD AUTO: 290 K/UL (ref 150–400)
PMV BLD AUTO: 10.3 FL (ref 8.9–12.9)
POTASSIUM BLD-SCNC: 3.9 MMOL/L (ref 3.5–5.5)
POTASSIUM SERPL-SCNC: 4 MMOL/L (ref 3.5–5.1)
PROT UR STRIP-MCNC: ABNORMAL MG/DL
PROTEUS SP DNA BLD POS QL NAA+NON-PROBE: NOT DETECTED
Q-T INTERVAL, ECG07: 364 MS
QRS DURATION, ECG06: 62 MS
QTC CALCULATION (BEZET), ECG08: 487 MS
RBC # BLD AUTO: 3.23 M/UL (ref 3.8–5.2)
RBC #/AREA URNS HPF: ABNORMAL /HPF (ref 0–5)
RBC MORPH BLD: ABNORMAL
RESISTANT GENE SPACE, REGENE: ABNORMAL
S AUREUS DNA BLD POS QL NAA+NON-PROBE: NOT DETECTED
S AUREUS+CONS DNA BLD POS NAA+NON-PROBE: NOT DETECTED
S EPIDERMIDIS DNA BLD POS QL NAA+NON-PRB: NOT DETECTED
S LUGDUNENSIS DNA BLD POS QL NAA+NON-PRB: NOT DETECTED
S MALTOPHILIA DNA BLD POS QL NAA+NON-PRB: NOT DETECTED
S MARCESCENS DNA BLD POS NAA+NON-PROBE: NOT DETECTED
S PNEUM DNA BLD POS QL NAA+NON-PROBE: NOT DETECTED
S PYO DNA BLD POS QL NAA+NON-PROBE: NOT DETECTED
SALMONELLA DNA BLD POS QL NAA+NON-PROBE: NOT DETECTED
SAMPLES BEING HELD,HOLD: NORMAL
SERVICE CMNT-IMP: NORMAL
SODIUM BLD-SCNC: 140 MMOL/L (ref 136–145)
SODIUM SERPL-SCNC: 135 MMOL/L (ref 136–145)
SP GR UR REFRACTOMETRY: 1.02 (ref 1–1.03)
STREPTOCOCCUS DNA BLD POS NAA+NON-PROBE: DETECTED
TIBC SERPL-MCNC: 249 UG/DL (ref 250–450)
UROBILINOGEN UR QL STRIP.AUTO: 1 EU/DL (ref 0.2–1)
VENTRICULAR RATE, ECG03: 108 BPM
WBC # BLD AUTO: 19.2 K/UL (ref 3.6–11)
WBC MORPH BLD: ABNORMAL
WBC URNS QL MICRO: ABNORMAL /HPF (ref 0–4)

## 2023-04-12 PROCEDURE — 96375 TX/PRO/DX INJ NEW DRUG ADDON: CPT

## 2023-04-12 PROCEDURE — P9047 ALBUMIN (HUMAN), 25%, 50ML: HCPCS | Performed by: FAMILY MEDICINE

## 2023-04-12 PROCEDURE — 74011000250 HC RX REV CODE- 250: Performed by: FAMILY MEDICINE

## 2023-04-12 PROCEDURE — 82728 ASSAY OF FERRITIN: CPT

## 2023-04-12 PROCEDURE — 74011250636 HC RX REV CODE- 250/636: Performed by: FAMILY MEDICINE

## 2023-04-12 PROCEDURE — 87086 URINE CULTURE/COLONY COUNT: CPT

## 2023-04-12 PROCEDURE — 85025 COMPLETE CBC W/AUTO DIFF WBC: CPT

## 2023-04-12 PROCEDURE — 83605 ASSAY OF LACTIC ACID: CPT

## 2023-04-12 PROCEDURE — P9047 ALBUMIN (HUMAN), 25%, 50ML: HCPCS

## 2023-04-12 PROCEDURE — 2709999900 HC NON-CHARGEABLE SUPPLY

## 2023-04-12 PROCEDURE — 80047 BASIC METABLC PNL IONIZED CA: CPT

## 2023-04-12 PROCEDURE — 80048 BASIC METABOLIC PNL TOTAL CA: CPT

## 2023-04-12 PROCEDURE — 93971 EXTREMITY STUDY: CPT

## 2023-04-12 PROCEDURE — 74011250636 HC RX REV CODE- 250/636

## 2023-04-12 PROCEDURE — 81001 URINALYSIS AUTO W/SCOPE: CPT

## 2023-04-12 PROCEDURE — 36415 COLL VENOUS BLD VENIPUNCTURE: CPT

## 2023-04-12 PROCEDURE — 87040 BLOOD CULTURE FOR BACTERIA: CPT

## 2023-04-12 PROCEDURE — 83036 HEMOGLOBIN GLYCOSYLATED A1C: CPT

## 2023-04-12 PROCEDURE — 65270000046 HC RM TELEMETRY

## 2023-04-12 PROCEDURE — 74011250637 HC RX REV CODE- 250/637: Performed by: STUDENT IN AN ORGANIZED HEALTH CARE EDUCATION/TRAINING PROGRAM

## 2023-04-12 PROCEDURE — 96374 THER/PROPH/DIAG INJ IV PUSH: CPT

## 2023-04-12 PROCEDURE — 74011250636 HC RX REV CODE- 250/636: Performed by: STUDENT IN AN ORGANIZED HEALTH CARE EDUCATION/TRAINING PROGRAM

## 2023-04-12 PROCEDURE — 74011000250 HC RX REV CODE- 250: Performed by: STUDENT IN AN ORGANIZED HEALTH CARE EDUCATION/TRAINING PROGRAM

## 2023-04-12 PROCEDURE — 74011000258 HC RX REV CODE- 258: Performed by: STUDENT IN AN ORGANIZED HEALTH CARE EDUCATION/TRAINING PROGRAM

## 2023-04-12 PROCEDURE — 83550 IRON BINDING TEST: CPT

## 2023-04-12 PROCEDURE — 82962 GLUCOSE BLOOD TEST: CPT

## 2023-04-12 RX ORDER — ONDANSETRON 2 MG/ML
4 INJECTION INTRAMUSCULAR; INTRAVENOUS
Status: DISCONTINUED | OUTPATIENT
Start: 2023-04-12 | End: 2023-04-18 | Stop reason: HOSPADM

## 2023-04-12 RX ORDER — ACETAMINOPHEN 650 MG/1
650 SUPPOSITORY RECTAL
Status: DISCONTINUED | OUTPATIENT
Start: 2023-04-12 | End: 2023-04-18 | Stop reason: HOSPADM

## 2023-04-12 RX ORDER — ONDANSETRON 2 MG/ML
4 INJECTION INTRAMUSCULAR; INTRAVENOUS
Status: COMPLETED | OUTPATIENT
Start: 2023-04-12 | End: 2023-04-12

## 2023-04-12 RX ORDER — PANTOPRAZOLE SODIUM 40 MG/1
40 TABLET, DELAYED RELEASE ORAL
Status: DISCONTINUED | OUTPATIENT
Start: 2023-04-12 | End: 2023-04-18 | Stop reason: HOSPADM

## 2023-04-12 RX ORDER — CLOPIDOGREL BISULFATE 75 MG/1
75 TABLET ORAL DAILY
Status: DISCONTINUED | OUTPATIENT
Start: 2023-04-12 | End: 2023-04-18 | Stop reason: HOSPADM

## 2023-04-12 RX ORDER — INSULIN LISPRO 100 [IU]/ML
INJECTION, SOLUTION INTRAVENOUS; SUBCUTANEOUS
Status: DISCONTINUED | OUTPATIENT
Start: 2023-04-12 | End: 2023-04-18 | Stop reason: HOSPADM

## 2023-04-12 RX ORDER — POLYETHYLENE GLYCOL 3350 17 G/17G
17 POWDER, FOR SOLUTION ORAL DAILY PRN
Status: DISCONTINUED | OUTPATIENT
Start: 2023-04-12 | End: 2023-04-18 | Stop reason: HOSPADM

## 2023-04-12 RX ORDER — OLANZAPINE 2.5 MG/1
2.5 TABLET ORAL EVERY EVENING
Status: DISCONTINUED | OUTPATIENT
Start: 2023-04-12 | End: 2023-04-18 | Stop reason: HOSPADM

## 2023-04-12 RX ORDER — ATORVASTATIN CALCIUM 40 MG/1
80 TABLET, FILM COATED ORAL DAILY
Status: DISCONTINUED | OUTPATIENT
Start: 2023-04-12 | End: 2023-04-18 | Stop reason: HOSPADM

## 2023-04-12 RX ORDER — SODIUM CHLORIDE 9 MG/ML
75 INJECTION, SOLUTION INTRAVENOUS CONTINUOUS
Status: DISCONTINUED | OUTPATIENT
Start: 2023-04-12 | End: 2023-04-14

## 2023-04-12 RX ORDER — IBUPROFEN 200 MG
4 TABLET ORAL AS NEEDED
Status: DISCONTINUED | OUTPATIENT
Start: 2023-04-12 | End: 2023-04-18 | Stop reason: HOSPADM

## 2023-04-12 RX ORDER — DULOXETIN HYDROCHLORIDE 60 MG/1
60 CAPSULE, DELAYED RELEASE ORAL DAILY
Status: DISCONTINUED | OUTPATIENT
Start: 2023-04-12 | End: 2023-04-18 | Stop reason: HOSPADM

## 2023-04-12 RX ORDER — MIRTAZAPINE 15 MG/1
15 TABLET, FILM COATED ORAL
Status: DISCONTINUED | OUTPATIENT
Start: 2023-04-12 | End: 2023-04-18 | Stop reason: HOSPADM

## 2023-04-12 RX ORDER — BUSPIRONE HYDROCHLORIDE 10 MG/1
20 TABLET ORAL 2 TIMES DAILY
Status: DISCONTINUED | OUTPATIENT
Start: 2023-04-12 | End: 2023-04-18 | Stop reason: HOSPADM

## 2023-04-12 RX ORDER — AMLODIPINE BESYLATE 5 MG/1
TABLET ORAL
COMMUNITY
Start: 2023-03-24

## 2023-04-12 RX ORDER — MORPHINE SULFATE 4 MG/ML
4 INJECTION INTRAVENOUS
Status: COMPLETED | OUTPATIENT
Start: 2023-04-12 | End: 2023-04-12

## 2023-04-12 RX ORDER — CLONAZEPAM 0.12 MG/1
TABLET, ORALLY DISINTEGRATING ORAL
COMMUNITY
Start: 2023-01-17

## 2023-04-12 RX ORDER — CLOPIDOGREL BISULFATE 75 MG/1
TABLET ORAL
COMMUNITY
Start: 2023-03-29

## 2023-04-12 RX ORDER — AMLODIPINE BESYLATE 5 MG/1
5 TABLET ORAL DAILY
Status: DISCONTINUED | OUTPATIENT
Start: 2023-04-12 | End: 2023-04-12

## 2023-04-12 RX ORDER — ROSUVASTATIN CALCIUM 10 MG/1
1 TABLET, COATED ORAL
COMMUNITY
Start: 2020-10-14 | End: 2023-04-18

## 2023-04-12 RX ORDER — ATORVASTATIN CALCIUM 80 MG/1
TABLET, FILM COATED ORAL
COMMUNITY
Start: 2023-04-06

## 2023-04-12 RX ORDER — SODIUM CHLORIDE 0.9 % (FLUSH) 0.9 %
5-40 SYRINGE (ML) INJECTION EVERY 8 HOURS
Status: DISCONTINUED | OUTPATIENT
Start: 2023-04-12 | End: 2023-04-18 | Stop reason: HOSPADM

## 2023-04-12 RX ORDER — MIRTAZAPINE 7.5 MG/1
TABLET, FILM COATED ORAL
COMMUNITY
Start: 2023-03-17

## 2023-04-12 RX ORDER — PANTOPRAZOLE SODIUM 40 MG/1
TABLET, DELAYED RELEASE ORAL
COMMUNITY
Start: 2023-02-04

## 2023-04-12 RX ORDER — IPRATROPIUM BROMIDE AND ALBUTEROL SULFATE 2.5; .5 MG/3ML; MG/3ML
3 SOLUTION RESPIRATORY (INHALATION)
Status: DISCONTINUED | OUTPATIENT
Start: 2023-04-12 | End: 2023-04-18 | Stop reason: HOSPADM

## 2023-04-12 RX ORDER — TRAZODONE HYDROCHLORIDE 50 MG/1
50 TABLET ORAL
Status: DISCONTINUED | OUTPATIENT
Start: 2023-04-12 | End: 2023-04-18 | Stop reason: HOSPADM

## 2023-04-12 RX ORDER — ALBUMIN HUMAN 250 G/1000ML
25 SOLUTION INTRAVENOUS ONCE
Status: COMPLETED | OUTPATIENT
Start: 2023-04-12 | End: 2023-04-12

## 2023-04-12 RX ORDER — ROSUVASTATIN CALCIUM 10 MG/1
10 TABLET, COATED ORAL
Status: DISCONTINUED | OUTPATIENT
Start: 2023-04-12 | End: 2023-04-18 | Stop reason: HOSPADM

## 2023-04-12 RX ORDER — INSULIN GLARGINE 100 [IU]/ML
INJECTION, SOLUTION SUBCUTANEOUS
COMMUNITY
Start: 2023-02-20 | End: 2023-04-18

## 2023-04-12 RX ORDER — METFORMIN HYDROCHLORIDE 500 MG/1
1 TABLET ORAL
COMMUNITY
Start: 2020-11-19 | End: 2023-04-18

## 2023-04-12 RX ORDER — QUETIAPINE FUMARATE 25 MG/1
25 TABLET, FILM COATED ORAL
Status: DISCONTINUED | OUTPATIENT
Start: 2023-04-12 | End: 2023-04-18 | Stop reason: HOSPADM

## 2023-04-12 RX ORDER — ACETAMINOPHEN 325 MG/1
650 TABLET ORAL
Status: DISCONTINUED | OUTPATIENT
Start: 2023-04-12 | End: 2023-04-18 | Stop reason: HOSPADM

## 2023-04-12 RX ORDER — TRAZODONE HYDROCHLORIDE 50 MG/1
TABLET ORAL
COMMUNITY
Start: 2023-04-11

## 2023-04-12 RX ORDER — OLANZAPINE 2.5 MG/1
TABLET ORAL
COMMUNITY
Start: 2023-03-12

## 2023-04-12 RX ORDER — CLONAZEPAM 1 MG/1
0.5 TABLET ORAL
Status: DISCONTINUED | OUTPATIENT
Start: 2023-04-12 | End: 2023-04-15

## 2023-04-12 RX ORDER — SODIUM CHLORIDE 0.9 % (FLUSH) 0.9 %
5-40 SYRINGE (ML) INJECTION AS NEEDED
Status: DISCONTINUED | OUTPATIENT
Start: 2023-04-12 | End: 2023-04-18 | Stop reason: HOSPADM

## 2023-04-12 RX ORDER — FUROSEMIDE 20 MG/1
TABLET ORAL
COMMUNITY
Start: 2023-04-02 | End: 2023-04-18

## 2023-04-12 RX ORDER — ONDANSETRON 4 MG/1
4 TABLET, ORALLY DISINTEGRATING ORAL
Status: DISCONTINUED | OUTPATIENT
Start: 2023-04-12 | End: 2023-04-18 | Stop reason: HOSPADM

## 2023-04-12 RX ORDER — ALBUMIN HUMAN 250 G/1000ML
25 SOLUTION INTRAVENOUS EVERY 6 HOURS
Status: COMPLETED | OUTPATIENT
Start: 2023-04-12 | End: 2023-04-13

## 2023-04-12 RX ADMIN — MIRTAZAPINE 15 MG: 15 TABLET, FILM COATED ORAL at 04:04

## 2023-04-12 RX ADMIN — ACETAMINOPHEN 650 MG: 325 TABLET, FILM COATED ORAL at 04:04

## 2023-04-12 RX ADMIN — TRAZODONE HYDROCHLORIDE 50 MG: 50 TABLET ORAL at 21:01

## 2023-04-12 RX ADMIN — SODIUM CHLORIDE 1 G: 9 INJECTION INTRAMUSCULAR; INTRAVENOUS; SUBCUTANEOUS at 20:46

## 2023-04-12 RX ADMIN — DULOXETINE HYDROCHLORIDE 60 MG: 60 CAPSULE, DELAYED RELEASE ORAL at 09:00

## 2023-04-12 RX ADMIN — MORPHINE SULFATE 4 MG: 4 INJECTION INTRAVENOUS at 01:11

## 2023-04-12 RX ADMIN — ROSUVASTATIN 10 MG: 10 TABLET, FILM COATED ORAL at 21:01

## 2023-04-12 RX ADMIN — CLONAZEPAM 0.5 MG: 1 TABLET ORAL at 21:01

## 2023-04-12 RX ADMIN — SODIUM CHLORIDE 500 ML: 9 INJECTION, SOLUTION INTRAVENOUS at 05:40

## 2023-04-12 RX ADMIN — ROSUVASTATIN 10 MG: 10 TABLET, FILM COATED ORAL at 04:04

## 2023-04-12 RX ADMIN — OLANZAPINE 2.5 MG: 2.5 TABLET, FILM COATED ORAL at 18:00

## 2023-04-12 RX ADMIN — ALBUMIN (HUMAN) 25 G: 0.25 INJECTION, SOLUTION INTRAVENOUS at 10:07

## 2023-04-12 RX ADMIN — CLONAZEPAM 0.5 MG: 1 TABLET ORAL at 04:04

## 2023-04-12 RX ADMIN — TRAZODONE HYDROCHLORIDE 50 MG: 50 TABLET ORAL at 04:04

## 2023-04-12 RX ADMIN — ACETAMINOPHEN 650 MG: 650 SUPPOSITORY RECTAL at 12:37

## 2023-04-12 RX ADMIN — ALBUMIN (HUMAN) 25 G: 0.25 INJECTION, SOLUTION INTRAVENOUS at 05:48

## 2023-04-12 RX ADMIN — SODIUM CHLORIDE, PRESERVATIVE FREE 10 ML: 5 INJECTION INTRAVENOUS at 16:28

## 2023-04-12 RX ADMIN — SODIUM CHLORIDE 125 ML/HR: 900 INJECTION, SOLUTION INTRAVENOUS at 04:08

## 2023-04-12 RX ADMIN — QUETIAPINE FUMARATE 25 MG: 25 TABLET ORAL at 21:01

## 2023-04-12 RX ADMIN — IRON SUCROSE 200 MG: 20 INJECTION, SOLUTION INTRAVENOUS at 10:08

## 2023-04-12 RX ADMIN — ALBUMIN (HUMAN) 25 G: 0.25 INJECTION, SOLUTION INTRAVENOUS at 17:31

## 2023-04-12 RX ADMIN — CEFEPIME 2 G: 2 INJECTION, POWDER, FOR SOLUTION INTRAVENOUS at 12:15

## 2023-04-12 RX ADMIN — SODIUM CHLORIDE 150 ML/HR: 900 INJECTION, SOLUTION INTRAVENOUS at 16:27

## 2023-04-12 RX ADMIN — SODIUM CHLORIDE, PRESERVATIVE FREE 10 ML: 5 INJECTION INTRAVENOUS at 05:40

## 2023-04-12 RX ADMIN — MIRTAZAPINE 15 MG: 15 TABLET, FILM COATED ORAL at 21:01

## 2023-04-12 RX ADMIN — SODIUM CHLORIDE 1000 ML: 9 INJECTION, SOLUTION INTRAVENOUS at 04:08

## 2023-04-12 RX ADMIN — ONDANSETRON 4 MG: 2 INJECTION INTRAMUSCULAR; INTRAVENOUS at 01:10

## 2023-04-12 RX ADMIN — QUETIAPINE FUMARATE 25 MG: 25 TABLET ORAL at 04:05

## 2023-04-12 NOTE — CONSULTS
Cardiology Note dictated #[id number]  Impressions;  1. Uncontrolled DM: suspect compliance is an issue. 2. Acute asthmatic bronchitis  3. Anterior T wave inversion. Do not suspect ACS here. Has known CAD. 4. Seems agitated  Recommendations:  Treat her bronchospasm  Better diabetes control  Trend troponin levels. If they remain negative the patient can go home and come to the office for follow up.   Thank you for this referral.  Kym Sandhu MD Occupational Therapy Progress Note     Patient Name: Korina Pruett  UXGLJ'I Date: 4/12/2023  Problem List  Principal Problem:    S/P BKA (below knee amputation) unilateral, left ([facility])  Active Problems:    Essential hypertension    Type 2 diabetes mellitus with diabetic polyneuropathy, with long-term current use of insulin ([facility])    Nicotine dependence    Bipolar disorder ([facility])    PAD (peripheral artery disease) ([facility])    BMI 45 0-49 9, adult ([facility] [address] )    Acute respiratory distress    Anemia    Leukocytosis            04/12/23 1045   OT Last Visit   OT Visit Date 04/12/23   Note Type   Note Type Treatment   Pain Assessment   Pain Assessment Tool 0-10   Pain Score 10 - Worst Possible Pain   Pain Location/Orientation Orientation: Left; Location: Leg  (stump)   Hospital Pain Intervention(s) Repositioned; Ambulation/increased activity   Restrictions/Precautions   Weight Bearing Precautions Per Order Yes   LLE Weight Bearing Per Order NWB  (s/p AKA)   Other Precautions Fall Risk;Pain   Lifestyle   Autonomy I with ADLS, shares IALD with wife Does not drive   Reciprocal Relationships supportive wife   Service to Others not working   Intrinsic Gratification Shopping, spending time with her wife   ADL   Where Assessed Commode   LB Dressing Assistance 4  Minimal Assistance   LB Dressing Deficit Thread RLE into pants; Thread LLE into pants;Pull up over hips   Toileting Assistance  4  Minimal Assistance   Toileting Deficit Perineal hygiene   Toileting Comments A for thoroughness   Bed Mobility   Supine to Sit 5  Supervision   Additional items HOB elevated; Bedrails; Increased time required   Sit to Supine Unable to assess   Transfers   Sit to Stand 5  Supervision   Additional items Increased time required   Stand to Sit 5  Supervision   Additional items Increased time required   Sit pivot   (CGA)   Additional items Increased time required   Toilet transfer 5  Supervision   Additional items Increased time required;Commode Additional Comments STS with RW, sit pivot from bed to commode w/o AD   Functional Mobility   Functional Mobility   (CGA)   Additional Comments CGA hops/SPT with RW   Cognition   Overall Cognitive Status WFL   Arousal/Participation Responsive; Cooperative   Attention Within functional limits   Orientation Level Oriented X4   Memory Within functional limits   Following Commands Follows one step commands without difficulty   Comments Pt pleasant and cooperative t/o session   Activity Tolerance   Activity Tolerance Patient limited by pain   Medical Staff Made Aware JEN Regan   Assessment   Assessment Patient participated in Skilled OT session this date with interventions consisting of ADL re training with the use of correct body mechnaics, Energy Conservation techniques, safety awareness and fall prevention techniques,  therapeutic activities to: increase activity tolerance, increase dynamic sit/ stand balance during functional activity  and increase OOB/ sitting tolerance   Upon arrival patient was found supine in bed  Pt demonstrated the following tasks: S sup to sit, STS with RW, BSC transfer  Pt performs hops, SPT, and sit pivot with CGA  MIN A for LBD and toileting  Patient continues to be functioning below baseline level, occupational performance remains limited secondary to factors listed above and increased risk for falls and injury  From OT standpoint, recommendation at time of d/c would be home with skilled therapy  Patient to benefit from continued Occupational Therapy treatment while in the hospital to address deficits as defined above and maximize level of functional independence with ADLs and functional mobility  Pt was left after session with all current needs met  The patient's raw score on the AM-PAC Daily Activity Inpatient Short Form is 21  A raw score of greater than or equal to 19 suggests the patient may benefit from discharge to home   Please refer to the recommendation of the Occupational Therapist for safe discharge planning  Plan   Treatment Interventions ADL retraining;Functional transfer training;UE strengthening/ROM; Endurance training;Patient/family training;Equipment evaluation/education; Compensatory technique education;Continued evaluation; Energy conservation; Activityengagement   Goal Expiration Date 04/26/23  (extend goals x 14 days)   OT Treatment Day 3   OT Frequency 3-5x/wk   Recommendation   OT Discharge Recommendation Home with home health rehabilitation   AM-PAC Daily Activity Inpatient   Lower Body Dressing 3   Bathing 3   Toileting 3   Upper Body Dressing 4   Grooming 4   Eating 4   Daily Activity Raw Score 21   Daily Activity Standardized Score (Calc for Raw Score >=11) 44 27   AM-PAC Applied Cognition Inpatient   Following a Speech/Presentation 4   Understanding Ordinary Conversation 4   Taking Medications 4   Remembering Where Things Are Placed or Put Away 4   Remembering List of 4-5 Errands 4   Taking Care of Complicated Tasks 3   Applied Cognition Raw Score 23   Applied Cognition Standardized Score 53 08       Franco Arzola MS, OTR/L

## 2023-04-13 LAB
ANION GAP SERPL CALC-SCNC: 6 MMOL/L (ref 5–15)
BACTERIA SPEC CULT: NORMAL
BUN SERPL-MCNC: 16 MG/DL (ref 6–20)
BUN/CREAT SERPL: 25 (ref 12–20)
CALCIUM SERPL-MCNC: 8.5 MG/DL (ref 8.5–10.1)
CHLORIDE SERPL-SCNC: 112 MMOL/L (ref 97–108)
CO2 SERPL-SCNC: 20 MMOL/L (ref 21–32)
CREAT SERPL-MCNC: 0.63 MG/DL (ref 0.55–1.02)
ERYTHROCYTE [DISTWIDTH] IN BLOOD BY AUTOMATED COUNT: 17.7 % (ref 11.5–14.5)
GLUCOSE BLD STRIP.AUTO-MCNC: 102 MG/DL (ref 65–117)
GLUCOSE BLD STRIP.AUTO-MCNC: 106 MG/DL (ref 65–117)
GLUCOSE BLD STRIP.AUTO-MCNC: 109 MG/DL (ref 65–117)
GLUCOSE BLD STRIP.AUTO-MCNC: 75 MG/DL (ref 65–117)
GLUCOSE BLD STRIP.AUTO-MCNC: 96 MG/DL (ref 65–117)
GLUCOSE SERPL-MCNC: 76 MG/DL (ref 65–100)
HCT VFR BLD AUTO: 23 % (ref 35–47)
HGB BLD-MCNC: 6.9 G/DL (ref 11.5–16)
HISTORY CHECKED?,CKHIST: NORMAL
MCH RBC QN AUTO: 23.5 PG (ref 26–34)
MCHC RBC AUTO-ENTMCNC: 30 G/DL (ref 30–36.5)
MCV RBC AUTO: 78.2 FL (ref 80–99)
NRBC # BLD: 0 K/UL (ref 0–0.01)
NRBC BLD-RTO: 0 PER 100 WBC
PLATELET # BLD AUTO: 242 K/UL (ref 150–400)
PMV BLD AUTO: 10.5 FL (ref 8.9–12.9)
POTASSIUM SERPL-SCNC: 3.5 MMOL/L (ref 3.5–5.1)
RBC # BLD AUTO: 2.94 M/UL (ref 3.8–5.2)
SERVICE CMNT-IMP: NORMAL
SODIUM SERPL-SCNC: 138 MMOL/L (ref 136–145)
WBC # BLD AUTO: 19.3 K/UL (ref 3.6–11)

## 2023-04-13 PROCEDURE — 97530 THERAPEUTIC ACTIVITIES: CPT

## 2023-04-13 PROCEDURE — 74011250636 HC RX REV CODE- 250/636: Performed by: FAMILY MEDICINE

## 2023-04-13 PROCEDURE — 94760 N-INVAS EAR/PLS OXIMETRY 1: CPT

## 2023-04-13 PROCEDURE — 85027 COMPLETE CBC AUTOMATED: CPT

## 2023-04-13 PROCEDURE — 86901 BLOOD TYPING SEROLOGIC RH(D): CPT

## 2023-04-13 PROCEDURE — 77030020365 HC SOL INJ SOD CL 0.9% 50ML

## 2023-04-13 PROCEDURE — 86923 COMPATIBILITY TEST ELECTRIC: CPT

## 2023-04-13 PROCEDURE — C1751 CATH, INF, PER/CENT/MIDLINE: HCPCS

## 2023-04-13 PROCEDURE — 76937 US GUIDE VASCULAR ACCESS: CPT

## 2023-04-13 PROCEDURE — 65270000046 HC RM TELEMETRY

## 2023-04-13 PROCEDURE — P9047 ALBUMIN (HUMAN), 25%, 50ML: HCPCS | Performed by: HOSPITALIST

## 2023-04-13 PROCEDURE — P9016 RBC LEUKOCYTES REDUCED: HCPCS

## 2023-04-13 PROCEDURE — 36415 COLL VENOUS BLD VENIPUNCTURE: CPT

## 2023-04-13 PROCEDURE — 97166 OT EVAL MOD COMPLEX 45 MIN: CPT

## 2023-04-13 PROCEDURE — 74011000258 HC RX REV CODE- 258: Performed by: HOSPITALIST

## 2023-04-13 PROCEDURE — 74011000250 HC RX REV CODE- 250: Performed by: STUDENT IN AN ORGANIZED HEALTH CARE EDUCATION/TRAINING PROGRAM

## 2023-04-13 PROCEDURE — 74011250637 HC RX REV CODE- 250/637: Performed by: STUDENT IN AN ORGANIZED HEALTH CARE EDUCATION/TRAINING PROGRAM

## 2023-04-13 PROCEDURE — 80048 BASIC METABOLIC PNL TOTAL CA: CPT

## 2023-04-13 PROCEDURE — 82962 GLUCOSE BLOOD TEST: CPT

## 2023-04-13 PROCEDURE — 94640 AIRWAY INHALATION TREATMENT: CPT

## 2023-04-13 PROCEDURE — P9047 ALBUMIN (HUMAN), 25%, 50ML: HCPCS | Performed by: FAMILY MEDICINE

## 2023-04-13 PROCEDURE — 74011250636 HC RX REV CODE- 250/636: Performed by: HOSPITALIST

## 2023-04-13 PROCEDURE — 94664 DEMO&/EVAL PT USE INHALER: CPT

## 2023-04-13 PROCEDURE — 97161 PT EVAL LOW COMPLEX 20 MIN: CPT

## 2023-04-13 PROCEDURE — 05HB33Z INSERTION OF INFUSION DEVICE INTO RIGHT BASILIC VEIN, PERCUTANEOUS APPROACH: ICD-10-PCS | Performed by: STUDENT IN AN ORGANIZED HEALTH CARE EDUCATION/TRAINING PROGRAM

## 2023-04-13 PROCEDURE — 36430 TRANSFUSION BLD/BLD COMPNT: CPT

## 2023-04-13 PROCEDURE — 77010033678 HC OXYGEN DAILY

## 2023-04-13 PROCEDURE — 74011000250 HC RX REV CODE- 250: Performed by: HOSPITALIST

## 2023-04-13 RX ORDER — IPRATROPIUM BROMIDE AND ALBUTEROL SULFATE 2.5; .5 MG/3ML; MG/3ML
3 SOLUTION RESPIRATORY (INHALATION)
Status: COMPLETED | OUTPATIENT
Start: 2023-04-13 | End: 2023-04-13

## 2023-04-13 RX ORDER — ALBUMIN HUMAN 250 G/1000ML
25 SOLUTION INTRAVENOUS ONCE
Status: COMPLETED | OUTPATIENT
Start: 2023-04-13 | End: 2023-04-13

## 2023-04-13 RX ORDER — SODIUM CHLORIDE 9 MG/ML
250 INJECTION, SOLUTION INTRAVENOUS AS NEEDED
Status: DISCONTINUED | OUTPATIENT
Start: 2023-04-13 | End: 2023-04-18 | Stop reason: HOSPADM

## 2023-04-13 RX ADMIN — IPRATROPIUM BROMIDE AND ALBUTEROL SULFATE 3 ML: 2.5; .5 SOLUTION RESPIRATORY (INHALATION) at 20:53

## 2023-04-13 RX ADMIN — DOXYCYCLINE 100 MG: 100 INJECTION, POWDER, LYOPHILIZED, FOR SOLUTION INTRAVENOUS at 21:19

## 2023-04-13 RX ADMIN — ALBUMIN (HUMAN) 25 G: 0.25 INJECTION, SOLUTION INTRAVENOUS at 07:06

## 2023-04-13 RX ADMIN — CLONAZEPAM 0.5 MG: 1 TABLET ORAL at 21:21

## 2023-04-13 RX ADMIN — SODIUM CHLORIDE, PRESERVATIVE FREE 10 ML: 5 INJECTION INTRAVENOUS at 21:22

## 2023-04-13 RX ADMIN — ROSUVASTATIN 10 MG: 10 TABLET, FILM COATED ORAL at 21:22

## 2023-04-13 RX ADMIN — BUSPIRONE HYDROCHLORIDE 20 MG: 10 TABLET ORAL at 09:41

## 2023-04-13 RX ADMIN — IRON SUCROSE 200 MG: 20 INJECTION, SOLUTION INTRAVENOUS at 10:27

## 2023-04-13 RX ADMIN — IPRATROPIUM BROMIDE AND ALBUTEROL SULFATE 3 ML: .5; 3 SOLUTION RESPIRATORY (INHALATION) at 11:49

## 2023-04-13 RX ADMIN — QUETIAPINE FUMARATE 25 MG: 25 TABLET ORAL at 21:21

## 2023-04-13 RX ADMIN — ALBUMIN (HUMAN) 25 G: 0.25 INJECTION, SOLUTION INTRAVENOUS at 00:06

## 2023-04-13 RX ADMIN — MIRTAZAPINE 15 MG: 15 TABLET, FILM COATED ORAL at 21:21

## 2023-04-13 RX ADMIN — SODIUM CHLORIDE 2 G: 9 INJECTION INTRAMUSCULAR; INTRAVENOUS; SUBCUTANEOUS at 21:19

## 2023-04-13 RX ADMIN — DOXYCYCLINE 100 MG: 100 INJECTION, POWDER, LYOPHILIZED, FOR SOLUTION INTRAVENOUS at 11:16

## 2023-04-13 RX ADMIN — DULOXETINE HYDROCHLORIDE 60 MG: 60 CAPSULE, DELAYED RELEASE ORAL at 09:41

## 2023-04-13 RX ADMIN — ALBUMIN (HUMAN) 25 G: 0.25 INJECTION, SOLUTION INTRAVENOUS at 13:50

## 2023-04-13 RX ADMIN — TRAZODONE HYDROCHLORIDE 50 MG: 50 TABLET ORAL at 21:22

## 2023-04-13 RX ADMIN — ACETAMINOPHEN 650 MG: 650 SUPPOSITORY RECTAL at 21:04

## 2023-04-13 RX ADMIN — ACETAMINOPHEN 650 MG: 325 TABLET, FILM COATED ORAL at 09:41

## 2023-04-13 RX ADMIN — CLOPIDOGREL BISULFATE 75 MG: 75 TABLET ORAL at 09:41

## 2023-04-13 RX ADMIN — ATORVASTATIN CALCIUM 80 MG: 40 TABLET, FILM COATED ORAL at 09:41

## 2023-04-13 RX ADMIN — SODIUM CHLORIDE 125 ML/HR: 900 INJECTION, SOLUTION INTRAVENOUS at 00:07

## 2023-04-14 ENCOUNTER — APPOINTMENT (OUTPATIENT)
Dept: NON INVASIVE DIAGNOSTICS | Age: 83
DRG: 871 | End: 2023-04-14
Attending: HOSPITALIST
Payer: MEDICARE

## 2023-04-14 LAB
ABO + RH BLD: NORMAL
ANION GAP SERPL CALC-SCNC: 10 MMOL/L (ref 5–15)
BACTERIA SPEC CULT: ABNORMAL
BASOPHILS # BLD: 0 K/UL (ref 0–0.1)
BASOPHILS NFR BLD: 0 % (ref 0–1)
BLD PROD TYP BPU: NORMAL
BLOOD GROUP ANTIBODIES SERPL: NORMAL
BPU ID: NORMAL
BUN SERPL-MCNC: 22 MG/DL (ref 6–20)
BUN/CREAT SERPL: 30 (ref 12–20)
CALCIUM SERPL-MCNC: 8.8 MG/DL (ref 8.5–10.1)
CHLORIDE SERPL-SCNC: 116 MMOL/L (ref 97–108)
CO2 SERPL-SCNC: 15 MMOL/L (ref 21–32)
CREAT SERPL-MCNC: 0.74 MG/DL (ref 0.55–1.02)
CROSSMATCH RESULT,%XM: NORMAL
DIFFERENTIAL METHOD BLD: ABNORMAL
EOSINOPHIL # BLD: 0 K/UL (ref 0–0.4)
EOSINOPHIL NFR BLD: 0 % (ref 0–7)
ERYTHROCYTE [DISTWIDTH] IN BLOOD BY AUTOMATED COUNT: 17.7 % (ref 11.5–14.5)
GLUCOSE BLD STRIP.AUTO-MCNC: 129 MG/DL (ref 65–117)
GLUCOSE BLD STRIP.AUTO-MCNC: 133 MG/DL (ref 65–117)
GLUCOSE BLD STRIP.AUTO-MCNC: 145 MG/DL (ref 65–117)
GLUCOSE BLD STRIP.AUTO-MCNC: 153 MG/DL (ref 65–117)
GLUCOSE BLD STRIP.AUTO-MCNC: 155 MG/DL (ref 65–117)
GLUCOSE BLD STRIP.AUTO-MCNC: 170 MG/DL (ref 65–117)
GLUCOSE SERPL-MCNC: 149 MG/DL (ref 65–100)
HCT VFR BLD AUTO: 25.5 % (ref 35–47)
HGB BLD-MCNC: 7.4 G/DL (ref 11.5–16)
IMM GRANULOCYTES # BLD AUTO: 0.2 K/UL (ref 0–0.04)
IMM GRANULOCYTES NFR BLD AUTO: 1 % (ref 0–0.5)
LYMPHOCYTES # BLD: 0.5 K/UL (ref 0.8–3.5)
LYMPHOCYTES NFR BLD: 3 % (ref 12–49)
MAGNESIUM SERPL-MCNC: 2.3 MG/DL (ref 1.6–2.4)
MCH RBC QN AUTO: 23.8 PG (ref 26–34)
MCHC RBC AUTO-ENTMCNC: 29 G/DL (ref 30–36.5)
MCV RBC AUTO: 82 FL (ref 80–99)
MONOCYTES # BLD: 1.3 K/UL (ref 0–1)
MONOCYTES NFR BLD: 7 % (ref 5–13)
NEUTS SEG # BLD: 16.1 K/UL (ref 1.8–8)
NEUTS SEG NFR BLD: 89 % (ref 32–75)
NRBC # BLD: 0.05 K/UL (ref 0–0.01)
NRBC BLD-RTO: 0.3 PER 100 WBC
PLATELET # BLD AUTO: 222 K/UL (ref 150–400)
PLATELET COMMENTS,PCOM: ABNORMAL
PMV BLD AUTO: 10.3 FL (ref 8.9–12.9)
POTASSIUM SERPL-SCNC: 3.6 MMOL/L (ref 3.5–5.1)
RBC # BLD AUTO: 3.11 M/UL (ref 3.8–5.2)
RBC MORPH BLD: ABNORMAL
RBC MORPH BLD: ABNORMAL
SERVICE CMNT-IMP: ABNORMAL
SODIUM SERPL-SCNC: 141 MMOL/L (ref 136–145)
SPECIMEN EXP DATE BLD: NORMAL
STATUS OF UNIT,%ST: NORMAL
UNIT DIVISION, %UDIV: 0
WBC # BLD AUTO: 18.1 K/UL (ref 3.6–11)

## 2023-04-14 PROCEDURE — 82962 GLUCOSE BLOOD TEST: CPT

## 2023-04-14 PROCEDURE — 74011250636 HC RX REV CODE- 250/636: Performed by: HOSPITALIST

## 2023-04-14 PROCEDURE — 74011250636 HC RX REV CODE- 250/636: Performed by: FAMILY MEDICINE

## 2023-04-14 PROCEDURE — 65270000046 HC RM TELEMETRY

## 2023-04-14 PROCEDURE — 85025 COMPLETE CBC W/AUTO DIFF WBC: CPT

## 2023-04-14 PROCEDURE — 77010033678 HC OXYGEN DAILY

## 2023-04-14 PROCEDURE — 36415 COLL VENOUS BLD VENIPUNCTURE: CPT

## 2023-04-14 PROCEDURE — 74011250637 HC RX REV CODE- 250/637: Performed by: STUDENT IN AN ORGANIZED HEALTH CARE EDUCATION/TRAINING PROGRAM

## 2023-04-14 PROCEDURE — 94664 DEMO&/EVAL PT USE INHALER: CPT

## 2023-04-14 PROCEDURE — 94762 N-INVAS EAR/PLS OXIMTRY CONT: CPT

## 2023-04-14 PROCEDURE — 83735 ASSAY OF MAGNESIUM: CPT

## 2023-04-14 PROCEDURE — 80048 BASIC METABOLIC PNL TOTAL CA: CPT

## 2023-04-14 PROCEDURE — 74011000250 HC RX REV CODE- 250: Performed by: HOSPITALIST

## 2023-04-14 PROCEDURE — 74011000250 HC RX REV CODE- 250: Performed by: STUDENT IN AN ORGANIZED HEALTH CARE EDUCATION/TRAINING PROGRAM

## 2023-04-14 PROCEDURE — 94660 CPAP INITIATION&MGMT: CPT

## 2023-04-14 PROCEDURE — 74011000258 HC RX REV CODE- 258: Performed by: HOSPITALIST

## 2023-04-14 PROCEDURE — 94640 AIRWAY INHALATION TREATMENT: CPT

## 2023-04-14 RX ADMIN — PANTOPRAZOLE SODIUM 40 MG: 40 TABLET, DELAYED RELEASE ORAL at 06:37

## 2023-04-14 RX ADMIN — IPRATROPIUM BROMIDE AND ALBUTEROL SULFATE 3 ML: 2.5; .5 SOLUTION RESPIRATORY (INHALATION) at 17:43

## 2023-04-14 RX ADMIN — CLONAZEPAM 0.5 MG: 1 TABLET ORAL at 21:06

## 2023-04-14 RX ADMIN — BUSPIRONE HYDROCHLORIDE 20 MG: 10 TABLET ORAL at 08:52

## 2023-04-14 RX ADMIN — ROSUVASTATIN 10 MG: 10 TABLET, FILM COATED ORAL at 21:07

## 2023-04-14 RX ADMIN — SODIUM CHLORIDE 2 G: 9 INJECTION INTRAMUSCULAR; INTRAVENOUS; SUBCUTANEOUS at 21:06

## 2023-04-14 RX ADMIN — SODIUM CHLORIDE, PRESERVATIVE FREE 10 ML: 5 INJECTION INTRAVENOUS at 21:07

## 2023-04-14 RX ADMIN — SODIUM CHLORIDE, PRESERVATIVE FREE 10 ML: 5 INJECTION INTRAVENOUS at 15:37

## 2023-04-14 RX ADMIN — DULOXETINE HYDROCHLORIDE 60 MG: 60 CAPSULE, DELAYED RELEASE ORAL at 08:52

## 2023-04-14 RX ADMIN — DOXYCYCLINE 100 MG: 100 INJECTION, POWDER, LYOPHILIZED, FOR SOLUTION INTRAVENOUS at 08:52

## 2023-04-14 RX ADMIN — MIRTAZAPINE 15 MG: 15 TABLET, FILM COATED ORAL at 21:07

## 2023-04-14 RX ADMIN — ATORVASTATIN CALCIUM 80 MG: 40 TABLET, FILM COATED ORAL at 08:52

## 2023-04-14 RX ADMIN — SODIUM CHLORIDE 75 ML/HR: 900 INJECTION, SOLUTION INTRAVENOUS at 06:43

## 2023-04-14 RX ADMIN — IRON SUCROSE 200 MG: 20 INJECTION, SOLUTION INTRAVENOUS at 08:52

## 2023-04-14 RX ADMIN — OLANZAPINE 2.5 MG: 2.5 TABLET, FILM COATED ORAL at 17:01

## 2023-04-14 RX ADMIN — IPRATROPIUM BROMIDE AND ALBUTEROL SULFATE 3 ML: 2.5; .5 SOLUTION RESPIRATORY (INHALATION) at 23:00

## 2023-04-14 RX ADMIN — TRAZODONE HYDROCHLORIDE 50 MG: 50 TABLET ORAL at 21:07

## 2023-04-14 RX ADMIN — SODIUM CHLORIDE, PRESERVATIVE FREE 10 ML: 5 INJECTION INTRAVENOUS at 05:31

## 2023-04-14 RX ADMIN — BUSPIRONE HYDROCHLORIDE 20 MG: 10 TABLET ORAL at 17:01

## 2023-04-14 RX ADMIN — ACETAMINOPHEN 650 MG: 325 TABLET, FILM COATED ORAL at 17:02

## 2023-04-14 RX ADMIN — ACETAMINOPHEN 650 MG: 325 TABLET, FILM COATED ORAL at 08:58

## 2023-04-15 ENCOUNTER — APPOINTMENT (OUTPATIENT)
Dept: NON INVASIVE DIAGNOSTICS | Age: 83
DRG: 871 | End: 2023-04-15
Attending: HOSPITALIST
Payer: MEDICARE

## 2023-04-15 LAB
ANION GAP SERPL CALC-SCNC: 7 MMOL/L (ref 5–15)
BASOPHILS # BLD: 0.1 K/UL (ref 0–0.1)
BASOPHILS NFR BLD: 0 % (ref 0–1)
BUN SERPL-MCNC: 16 MG/DL (ref 6–20)
BUN/CREAT SERPL: 27 (ref 12–20)
CALCIUM SERPL-MCNC: 8.8 MG/DL (ref 8.5–10.1)
CHLORIDE SERPL-SCNC: 119 MMOL/L (ref 97–108)
CO2 SERPL-SCNC: 18 MMOL/L (ref 21–32)
CREAT SERPL-MCNC: 0.59 MG/DL (ref 0.55–1.02)
DIFFERENTIAL METHOD BLD: ABNORMAL
EOSINOPHIL # BLD: 0 K/UL (ref 0–0.4)
EOSINOPHIL NFR BLD: 0 % (ref 0–7)
ERYTHROCYTE [DISTWIDTH] IN BLOOD BY AUTOMATED COUNT: 18.3 % (ref 11.5–14.5)
GLUCOSE BLD STRIP.AUTO-MCNC: 108 MG/DL (ref 65–117)
GLUCOSE BLD STRIP.AUTO-MCNC: 123 MG/DL (ref 65–117)
GLUCOSE BLD STRIP.AUTO-MCNC: 151 MG/DL (ref 65–117)
GLUCOSE BLD STRIP.AUTO-MCNC: 161 MG/DL (ref 65–117)
GLUCOSE BLD STRIP.AUTO-MCNC: 171 MG/DL (ref 65–117)
GLUCOSE SERPL-MCNC: 144 MG/DL (ref 65–100)
HCT VFR BLD AUTO: 26.6 % (ref 35–47)
HGB BLD-MCNC: 7.9 G/DL (ref 11.5–16)
IMM GRANULOCYTES # BLD AUTO: 0.3 K/UL (ref 0–0.04)
IMM GRANULOCYTES NFR BLD AUTO: 2 % (ref 0–0.5)
LYMPHOCYTES # BLD: 0.9 K/UL (ref 0.8–3.5)
LYMPHOCYTES NFR BLD: 5 % (ref 12–49)
MAGNESIUM SERPL-MCNC: 2.4 MG/DL (ref 1.6–2.4)
MCH RBC QN AUTO: 24.1 PG (ref 26–34)
MCHC RBC AUTO-ENTMCNC: 29.7 G/DL (ref 30–36.5)
MCV RBC AUTO: 81.1 FL (ref 80–99)
MONOCYTES # BLD: 1.6 K/UL (ref 0–1)
MONOCYTES NFR BLD: 9 % (ref 5–13)
NEUTS SEG # BLD: 15.6 K/UL (ref 1.8–8)
NEUTS SEG NFR BLD: 84 % (ref 32–75)
NRBC # BLD: 0.25 K/UL (ref 0–0.01)
NRBC BLD-RTO: 1.4 PER 100 WBC
PHOSPHATE SERPL-MCNC: 1.9 MG/DL (ref 2.6–4.7)
PLATELET # BLD AUTO: 266 K/UL (ref 150–400)
PMV BLD AUTO: 9.9 FL (ref 8.9–12.9)
POTASSIUM SERPL-SCNC: 3.6 MMOL/L (ref 3.5–5.1)
RBC # BLD AUTO: 3.28 M/UL (ref 3.8–5.2)
SERVICE CMNT-IMP: ABNORMAL
SERVICE CMNT-IMP: NORMAL
SODIUM SERPL-SCNC: 144 MMOL/L (ref 136–145)
WBC # BLD AUTO: 18.5 K/UL (ref 3.6–11)

## 2023-04-15 PROCEDURE — 74011250637 HC RX REV CODE- 250/637: Performed by: STUDENT IN AN ORGANIZED HEALTH CARE EDUCATION/TRAINING PROGRAM

## 2023-04-15 PROCEDURE — 36415 COLL VENOUS BLD VENIPUNCTURE: CPT

## 2023-04-15 PROCEDURE — 83735 ASSAY OF MAGNESIUM: CPT

## 2023-04-15 PROCEDURE — 82962 GLUCOSE BLOOD TEST: CPT

## 2023-04-15 PROCEDURE — 74011000250 HC RX REV CODE- 250: Performed by: HOSPITALIST

## 2023-04-15 PROCEDURE — 74011250636 HC RX REV CODE- 250/636: Performed by: FAMILY MEDICINE

## 2023-04-15 PROCEDURE — 74011250636 HC RX REV CODE- 250/636: Performed by: HOSPITALIST

## 2023-04-15 PROCEDURE — 93306 TTE W/DOPPLER COMPLETE: CPT

## 2023-04-15 PROCEDURE — 80048 BASIC METABOLIC PNL TOTAL CA: CPT

## 2023-04-15 PROCEDURE — 74011250637 HC RX REV CODE- 250/637: Performed by: HOSPITALIST

## 2023-04-15 PROCEDURE — 85025 COMPLETE CBC W/AUTO DIFF WBC: CPT

## 2023-04-15 PROCEDURE — 65270000046 HC RM TELEMETRY

## 2023-04-15 PROCEDURE — 74011000250 HC RX REV CODE- 250: Performed by: STUDENT IN AN ORGANIZED HEALTH CARE EDUCATION/TRAINING PROGRAM

## 2023-04-15 PROCEDURE — 84100 ASSAY OF PHOSPHORUS: CPT

## 2023-04-15 PROCEDURE — 74011636637 HC RX REV CODE- 636/637: Performed by: STUDENT IN AN ORGANIZED HEALTH CARE EDUCATION/TRAINING PROGRAM

## 2023-04-15 RX ORDER — POLYETHYLENE GLYCOL 3350 17 G/17G
17 POWDER, FOR SOLUTION ORAL ONCE
Status: COMPLETED | OUTPATIENT
Start: 2023-04-15 | End: 2023-04-15

## 2023-04-15 RX ORDER — SODIUM,POTASSIUM PHOSPHATES 280-250MG
2 POWDER IN PACKET (EA) ORAL
Status: COMPLETED | OUTPATIENT
Start: 2023-04-15 | End: 2023-04-15

## 2023-04-15 RX ORDER — CLONAZEPAM 1 MG/1
0.25 TABLET ORAL
Status: DISCONTINUED | OUTPATIENT
Start: 2023-04-15 | End: 2023-04-18 | Stop reason: HOSPADM

## 2023-04-15 RX ORDER — IPRATROPIUM BROMIDE AND ALBUTEROL SULFATE 2.5; .5 MG/3ML; MG/3ML
3 SOLUTION RESPIRATORY (INHALATION)
Status: ACTIVE | OUTPATIENT
Start: 2023-04-15 | End: 2023-04-15

## 2023-04-15 RX ORDER — AMOXICILLIN 250 MG
1 CAPSULE ORAL DAILY
Status: DISCONTINUED | OUTPATIENT
Start: 2023-04-15 | End: 2023-04-18 | Stop reason: HOSPADM

## 2023-04-15 RX ADMIN — TRAZODONE HYDROCHLORIDE 50 MG: 50 TABLET ORAL at 21:47

## 2023-04-15 RX ADMIN — SODIUM CHLORIDE, PRESERVATIVE FREE 10 ML: 5 INJECTION INTRAVENOUS at 05:08

## 2023-04-15 RX ADMIN — IPRATROPIUM BROMIDE AND ALBUTEROL SULFATE 3 ML: 2.5; .5 SOLUTION RESPIRATORY (INHALATION) at 18:00

## 2023-04-15 RX ADMIN — ACETAMINOPHEN 650 MG: 325 TABLET, FILM COATED ORAL at 13:06

## 2023-04-15 RX ADMIN — ACETAMINOPHEN 650 MG: 325 TABLET, FILM COATED ORAL at 19:13

## 2023-04-15 RX ADMIN — POTASSIUM & SODIUM PHOSPHATES POWDER PACK 280-160-250 MG 2 PACKET: 280-160-250 PACK at 10:27

## 2023-04-15 RX ADMIN — DULOXETINE HYDROCHLORIDE 60 MG: 60 CAPSULE, DELAYED RELEASE ORAL at 09:14

## 2023-04-15 RX ADMIN — POLYETHYLENE GLYCOL 3350 17 G: 17 POWDER, FOR SOLUTION ORAL at 13:06

## 2023-04-15 RX ADMIN — SODIUM CHLORIDE, PRESERVATIVE FREE 10 ML: 5 INJECTION INTRAVENOUS at 13:06

## 2023-04-15 RX ADMIN — Medication 2 UNITS: at 09:14

## 2023-04-15 RX ADMIN — SODIUM CHLORIDE 2 G: 9 INJECTION INTRAMUSCULAR; INTRAVENOUS; SUBCUTANEOUS at 21:36

## 2023-04-15 RX ADMIN — BUSPIRONE HYDROCHLORIDE 20 MG: 10 TABLET ORAL at 09:14

## 2023-04-15 RX ADMIN — MIRTAZAPINE 15 MG: 15 TABLET, FILM COATED ORAL at 21:47

## 2023-04-15 RX ADMIN — SODIUM CHLORIDE, PRESERVATIVE FREE 10 ML: 5 INJECTION INTRAVENOUS at 22:15

## 2023-04-15 RX ADMIN — ACETAMINOPHEN 650 MG: 325 TABLET, FILM COATED ORAL at 00:54

## 2023-04-15 RX ADMIN — CLONAZEPAM 0.25 MG: 1 TABLET ORAL at 21:47

## 2023-04-15 RX ADMIN — IRON SUCROSE 200 MG: 20 INJECTION, SOLUTION INTRAVENOUS at 09:14

## 2023-04-15 RX ADMIN — SENNOSIDES AND DOCUSATE SODIUM 1 TABLET: 8.6; 5 TABLET ORAL at 13:06

## 2023-04-15 RX ADMIN — Medication 2 UNITS: at 13:05

## 2023-04-15 RX ADMIN — ROSUVASTATIN 10 MG: 10 TABLET, FILM COATED ORAL at 21:47

## 2023-04-15 RX ADMIN — IPRATROPIUM BROMIDE AND ALBUTEROL SULFATE 3 ML: 2.5; .5 SOLUTION RESPIRATORY (INHALATION) at 09:25

## 2023-04-15 RX ADMIN — PANTOPRAZOLE SODIUM 40 MG: 40 TABLET, DELAYED RELEASE ORAL at 06:48

## 2023-04-15 RX ADMIN — ATORVASTATIN CALCIUM 80 MG: 40 TABLET, FILM COATED ORAL at 09:14

## 2023-04-16 LAB
ANION GAP SERPL CALC-SCNC: 3 MMOL/L (ref 5–15)
BUN SERPL-MCNC: 9 MG/DL (ref 6–20)
BUN/CREAT SERPL: 20 (ref 12–20)
CALCIUM SERPL-MCNC: 8.8 MG/DL (ref 8.5–10.1)
CHLORIDE SERPL-SCNC: 117 MMOL/L (ref 97–108)
CO2 SERPL-SCNC: 26 MMOL/L (ref 21–32)
CREAT SERPL-MCNC: 0.44 MG/DL (ref 0.55–1.02)
ECHO AO ROOT DIAM: 2.7 CM
ECHO AO ROOT INDEX: 1.55 CM/M2
ECHO AV AREA PEAK VELOCITY: 2 CM2
ECHO AV AREA VTI: 1.9 CM2
ECHO AV AREA/BSA PEAK VELOCITY: 1.1 CM2/M2
ECHO AV AREA/BSA VTI: 1.1 CM2/M2
ECHO AV MEAN GRADIENT: 6 MMHG
ECHO AV MEAN VELOCITY: 1.1 M/S
ECHO AV PEAK GRADIENT: 11 MMHG
ECHO AV PEAK VELOCITY: 1.7 M/S
ECHO AV VELOCITY RATIO: 0.76
ECHO AV VTI: 30.2 CM
ECHO LA DIAMETER INDEX: 2.24 CM/M2
ECHO LA DIAMETER: 3.9 CM
ECHO LA TO AORTIC ROOT RATIO: 1.44
ECHO LA VOL 2C: 74 ML (ref 22–52)
ECHO LA VOL 4C: 67 ML (ref 22–52)
ECHO LA VOLUME AREA LENGTH: 77 ML
ECHO LA VOLUME INDEX A2C: 43 ML/M2 (ref 16–34)
ECHO LA VOLUME INDEX A4C: 39 ML/M2 (ref 16–34)
ECHO LA VOLUME INDEX AREA LENGTH: 44 ML/M2 (ref 16–34)
ECHO LV FRACTIONAL SHORTENING: 45 % (ref 28–44)
ECHO LV INTERNAL DIMENSION DIASTOLE INDEX: 2.41 CM/M2
ECHO LV INTERNAL DIMENSION DIASTOLIC: 4.2 CM (ref 3.9–5.3)
ECHO LV INTERNAL DIMENSION SYSTOLIC INDEX: 1.32 CM/M2
ECHO LV INTERNAL DIMENSION SYSTOLIC: 2.3 CM
ECHO LV IVSD: 0.7 CM (ref 0.6–0.9)
ECHO LV MASS 2D: 85.1 G (ref 67–162)
ECHO LV MASS INDEX 2D: 48.9 G/M2 (ref 43–95)
ECHO LV POSTERIOR WALL DIASTOLIC: 0.7 CM (ref 0.6–0.9)
ECHO LV RELATIVE WALL THICKNESS RATIO: 0.33
ECHO LVOT AREA: 2.5 CM2
ECHO LVOT AV VTI INDEX: 0.74
ECHO LVOT DIAM: 1.8 CM
ECHO LVOT MEAN GRADIENT: 3 MMHG
ECHO LVOT PEAK GRADIENT: 6 MMHG
ECHO LVOT PEAK VELOCITY: 1.3 M/S
ECHO LVOT STROKE VOLUME INDEX: 32.7 ML/M2
ECHO LVOT SV: 57 ML
ECHO LVOT VTI: 22.4 CM
ECHO MV A VELOCITY: 1.54 M/S
ECHO MV AREA VTI: 1.6 CM2
ECHO MV E DECELERATION TIME (DT): 276.8 MS
ECHO MV E VELOCITY: 1.33 M/S
ECHO MV E/A RATIO: 0.86
ECHO MV LVOT VTI INDEX: 1.58
ECHO MV MAX VELOCITY: 1.7 M/S
ECHO MV MEAN GRADIENT: 5 MMHG
ECHO MV MEAN VELOCITY: 1.1 M/S
ECHO MV PEAK GRADIENT: 12 MMHG
ECHO MV PRESSURE HALF TIME (PHT): 66.2 MS
ECHO MV PRESSURE HALF TIME (PHT): 80.3 MS
ECHO MV VTI: 35.3 CM
ECHO RV TAPSE: 1.7 CM (ref 1.7–?)
ECHO TV REGURGITANT MAX VELOCITY: 3.73 M/S
ECHO TV REGURGITANT PEAK GRADIENT: 56 MMHG
ERYTHROCYTE [DISTWIDTH] IN BLOOD BY AUTOMATED COUNT: 18.5 % (ref 11.5–14.5)
GLUCOSE BLD STRIP.AUTO-MCNC: 133 MG/DL (ref 65–117)
GLUCOSE BLD STRIP.AUTO-MCNC: 140 MG/DL (ref 65–117)
GLUCOSE BLD STRIP.AUTO-MCNC: 145 MG/DL (ref 65–117)
GLUCOSE BLD STRIP.AUTO-MCNC: 152 MG/DL (ref 65–117)
GLUCOSE SERPL-MCNC: 141 MG/DL (ref 65–100)
HCT VFR BLD AUTO: 27.4 % (ref 35–47)
HGB BLD-MCNC: 8.4 G/DL (ref 11.5–16)
MAGNESIUM SERPL-MCNC: 2.2 MG/DL (ref 1.6–2.4)
MCH RBC QN AUTO: 24.3 PG (ref 26–34)
MCHC RBC AUTO-ENTMCNC: 30.7 G/DL (ref 30–36.5)
MCV RBC AUTO: 79.2 FL (ref 80–99)
NRBC # BLD: 0.43 K/UL (ref 0–0.01)
NRBC BLD-RTO: 2.4 PER 100 WBC
PLATELET # BLD AUTO: 289 K/UL (ref 150–400)
PMV BLD AUTO: 9.9 FL (ref 8.9–12.9)
POTASSIUM SERPL-SCNC: 3.2 MMOL/L (ref 3.5–5.1)
RBC # BLD AUTO: 3.46 M/UL (ref 3.8–5.2)
SERVICE CMNT-IMP: ABNORMAL
SODIUM SERPL-SCNC: 146 MMOL/L (ref 136–145)
WBC # BLD AUTO: 17.6 K/UL (ref 3.6–11)

## 2023-04-16 PROCEDURE — 74011636637 HC RX REV CODE- 636/637: Performed by: STUDENT IN AN ORGANIZED HEALTH CARE EDUCATION/TRAINING PROGRAM

## 2023-04-16 PROCEDURE — 74011250636 HC RX REV CODE- 250/636: Performed by: HOSPITALIST

## 2023-04-16 PROCEDURE — 80048 BASIC METABOLIC PNL TOTAL CA: CPT

## 2023-04-16 PROCEDURE — 74011250637 HC RX REV CODE- 250/637: Performed by: NURSE PRACTITIONER

## 2023-04-16 PROCEDURE — 74011250636 HC RX REV CODE- 250/636: Performed by: FAMILY MEDICINE

## 2023-04-16 PROCEDURE — 02HV33Z INSERTION OF INFUSION DEVICE INTO SUPERIOR VENA CAVA, PERCUTANEOUS APPROACH: ICD-10-PCS | Performed by: STUDENT IN AN ORGANIZED HEALTH CARE EDUCATION/TRAINING PROGRAM

## 2023-04-16 PROCEDURE — 82962 GLUCOSE BLOOD TEST: CPT

## 2023-04-16 PROCEDURE — 74011250637 HC RX REV CODE- 250/637: Performed by: HOSPITALIST

## 2023-04-16 PROCEDURE — 74011000250 HC RX REV CODE- 250: Performed by: HOSPITALIST

## 2023-04-16 PROCEDURE — C1751 CATH, INF, PER/CENT/MIDLINE: HCPCS

## 2023-04-16 PROCEDURE — 76937 US GUIDE VASCULAR ACCESS: CPT

## 2023-04-16 PROCEDURE — 83735 ASSAY OF MAGNESIUM: CPT

## 2023-04-16 PROCEDURE — 36415 COLL VENOUS BLD VENIPUNCTURE: CPT

## 2023-04-16 PROCEDURE — 65270000046 HC RM TELEMETRY

## 2023-04-16 PROCEDURE — 74011250637 HC RX REV CODE- 250/637: Performed by: STUDENT IN AN ORGANIZED HEALTH CARE EDUCATION/TRAINING PROGRAM

## 2023-04-16 PROCEDURE — 85027 COMPLETE CBC AUTOMATED: CPT

## 2023-04-16 PROCEDURE — 94640 AIRWAY INHALATION TREATMENT: CPT

## 2023-04-16 PROCEDURE — 74011000250 HC RX REV CODE- 250: Performed by: STUDENT IN AN ORGANIZED HEALTH CARE EDUCATION/TRAINING PROGRAM

## 2023-04-16 PROCEDURE — 36573 INSJ PICC RS&I 5 YR+: CPT | Performed by: HOSPITALIST

## 2023-04-16 RX ORDER — POTASSIUM CHLORIDE 750 MG/1
40 TABLET, FILM COATED, EXTENDED RELEASE ORAL
Status: COMPLETED | OUTPATIENT
Start: 2023-04-16 | End: 2023-04-16

## 2023-04-16 RX ORDER — TRAMADOL HYDROCHLORIDE 50 MG/1
50 TABLET ORAL
Status: DISCONTINUED | OUTPATIENT
Start: 2023-04-16 | End: 2023-04-18 | Stop reason: HOSPADM

## 2023-04-16 RX ADMIN — IPRATROPIUM BROMIDE AND ALBUTEROL SULFATE 3 ML: 2.5; .5 SOLUTION RESPIRATORY (INHALATION) at 14:55

## 2023-04-16 RX ADMIN — POTASSIUM CHLORIDE 40 MEQ: 750 TABLET, FILM COATED, EXTENDED RELEASE ORAL at 10:20

## 2023-04-16 RX ADMIN — ROSUVASTATIN 10 MG: 10 TABLET, FILM COATED ORAL at 21:01

## 2023-04-16 RX ADMIN — SODIUM CHLORIDE, PRESERVATIVE FREE 10 ML: 5 INJECTION INTRAVENOUS at 21:01

## 2023-04-16 RX ADMIN — SENNOSIDES AND DOCUSATE SODIUM 1 TABLET: 8.6; 5 TABLET ORAL at 10:20

## 2023-04-16 RX ADMIN — IPRATROPIUM BROMIDE AND ALBUTEROL SULFATE 3 ML: 2.5; .5 SOLUTION RESPIRATORY (INHALATION) at 10:41

## 2023-04-16 RX ADMIN — BUSPIRONE HYDROCHLORIDE 20 MG: 10 TABLET ORAL at 18:51

## 2023-04-16 RX ADMIN — SODIUM CHLORIDE, PRESERVATIVE FREE 10 ML: 5 INJECTION INTRAVENOUS at 15:26

## 2023-04-16 RX ADMIN — BUSPIRONE HYDROCHLORIDE 20 MG: 10 TABLET ORAL at 10:20

## 2023-04-16 RX ADMIN — ACETAMINOPHEN 650 MG: 325 TABLET, FILM COATED ORAL at 10:20

## 2023-04-16 RX ADMIN — DULOXETINE HYDROCHLORIDE 60 MG: 60 CAPSULE, DELAYED RELEASE ORAL at 10:20

## 2023-04-16 RX ADMIN — TRAMADOL HYDROCHLORIDE 50 MG: 50 TABLET, COATED ORAL at 15:26

## 2023-04-16 RX ADMIN — Medication 2 UNITS: at 06:49

## 2023-04-16 RX ADMIN — TRAZODONE HYDROCHLORIDE 50 MG: 50 TABLET ORAL at 21:01

## 2023-04-16 RX ADMIN — TRAMADOL HYDROCHLORIDE 50 MG: 50 TABLET, COATED ORAL at 22:47

## 2023-04-16 RX ADMIN — SODIUM CHLORIDE 2 G: 9 INJECTION INTRAMUSCULAR; INTRAVENOUS; SUBCUTANEOUS at 20:56

## 2023-04-16 RX ADMIN — SODIUM CHLORIDE, PRESERVATIVE FREE 10 ML: 5 INJECTION INTRAVENOUS at 06:49

## 2023-04-16 RX ADMIN — MIRTAZAPINE 15 MG: 15 TABLET, FILM COATED ORAL at 21:01

## 2023-04-16 RX ADMIN — IPRATROPIUM BROMIDE AND ALBUTEROL SULFATE 3 ML: 2.5; .5 SOLUTION RESPIRATORY (INHALATION) at 07:17

## 2023-04-16 RX ADMIN — Medication 2 UNITS: at 18:51

## 2023-04-16 RX ADMIN — ATORVASTATIN CALCIUM 80 MG: 40 TABLET, FILM COATED ORAL at 10:20

## 2023-04-16 RX ADMIN — CLONAZEPAM 0.25 MG: 1 TABLET ORAL at 21:01

## 2023-04-16 RX ADMIN — OLANZAPINE 2.5 MG: 2.5 TABLET, FILM COATED ORAL at 18:51

## 2023-04-16 RX ADMIN — POTASSIUM CHLORIDE 40 MEQ: 750 TABLET, FILM COATED, EXTENDED RELEASE ORAL at 06:49

## 2023-04-16 RX ADMIN — IRON SUCROSE 200 MG: 20 INJECTION, SOLUTION INTRAVENOUS at 10:40

## 2023-04-16 RX ADMIN — PANTOPRAZOLE SODIUM 40 MG: 40 TABLET, DELAYED RELEASE ORAL at 06:49

## 2023-04-16 RX ADMIN — ACETAMINOPHEN 650 MG: 325 TABLET, FILM COATED ORAL at 03:28

## 2023-04-17 ENCOUNTER — APPOINTMENT (OUTPATIENT)
Dept: CT IMAGING | Age: 83
DRG: 871 | End: 2023-04-17
Attending: HOSPITALIST
Payer: MEDICARE

## 2023-04-17 LAB
ANION GAP SERPL CALC-SCNC: 4 MMOL/L (ref 5–15)
BACTERIA SPEC CULT: NORMAL
BUN SERPL-MCNC: 6 MG/DL (ref 6–20)
BUN/CREAT SERPL: 14 (ref 12–20)
CALCIUM SERPL-MCNC: 8.4 MG/DL (ref 8.5–10.1)
CHLORIDE SERPL-SCNC: 116 MMOL/L (ref 97–108)
CO2 SERPL-SCNC: 24 MMOL/L (ref 21–32)
CREAT SERPL-MCNC: 0.44 MG/DL (ref 0.55–1.02)
ERYTHROCYTE [DISTWIDTH] IN BLOOD BY AUTOMATED COUNT: 19 % (ref 11.5–14.5)
GLUCOSE BLD STRIP.AUTO-MCNC: 119 MG/DL (ref 65–117)
GLUCOSE BLD STRIP.AUTO-MCNC: 140 MG/DL (ref 65–117)
GLUCOSE BLD STRIP.AUTO-MCNC: 88 MG/DL (ref 65–117)
GLUCOSE BLD STRIP.AUTO-MCNC: 90 MG/DL (ref 65–117)
GLUCOSE SERPL-MCNC: 146 MG/DL (ref 65–100)
HCT VFR BLD AUTO: 26.3 % (ref 35–47)
HGB BLD-MCNC: 8 G/DL (ref 11.5–16)
MCH RBC QN AUTO: 24.6 PG (ref 26–34)
MCHC RBC AUTO-ENTMCNC: 30.4 G/DL (ref 30–36.5)
MCV RBC AUTO: 80.9 FL (ref 80–99)
NRBC # BLD: 0.6 K/UL (ref 0–0.01)
NRBC BLD-RTO: 3.8 PER 100 WBC
PLATELET # BLD AUTO: 281 K/UL (ref 150–400)
PMV BLD AUTO: 9.9 FL (ref 8.9–12.9)
POTASSIUM SERPL-SCNC: 4 MMOL/L (ref 3.5–5.1)
RBC # BLD AUTO: 3.25 M/UL (ref 3.8–5.2)
SERVICE CMNT-IMP: ABNORMAL
SERVICE CMNT-IMP: ABNORMAL
SERVICE CMNT-IMP: NORMAL
SODIUM SERPL-SCNC: 144 MMOL/L (ref 136–145)
WBC # BLD AUTO: 15.7 K/UL (ref 3.6–11)

## 2023-04-17 PROCEDURE — 85027 COMPLETE CBC AUTOMATED: CPT

## 2023-04-17 PROCEDURE — 73701 CT LOWER EXTREMITY W/DYE: CPT

## 2023-04-17 PROCEDURE — 74011000250 HC RX REV CODE- 250: Performed by: HOSPITALIST

## 2023-04-17 PROCEDURE — 74011000250 HC RX REV CODE- 250: Performed by: STUDENT IN AN ORGANIZED HEALTH CARE EDUCATION/TRAINING PROGRAM

## 2023-04-17 PROCEDURE — 74011636637 HC RX REV CODE- 636/637: Performed by: STUDENT IN AN ORGANIZED HEALTH CARE EDUCATION/TRAINING PROGRAM

## 2023-04-17 PROCEDURE — 65270000046 HC RM TELEMETRY

## 2023-04-17 PROCEDURE — 74011250636 HC RX REV CODE- 250/636: Performed by: HOSPITALIST

## 2023-04-17 PROCEDURE — 74011250637 HC RX REV CODE- 250/637: Performed by: HOSPITALIST

## 2023-04-17 PROCEDURE — 97530 THERAPEUTIC ACTIVITIES: CPT

## 2023-04-17 PROCEDURE — 97535 SELF CARE MNGMENT TRAINING: CPT

## 2023-04-17 PROCEDURE — 36415 COLL VENOUS BLD VENIPUNCTURE: CPT

## 2023-04-17 PROCEDURE — 80048 BASIC METABOLIC PNL TOTAL CA: CPT

## 2023-04-17 PROCEDURE — 82962 GLUCOSE BLOOD TEST: CPT

## 2023-04-17 PROCEDURE — 74011000636 HC RX REV CODE- 636: Performed by: RADIOLOGY

## 2023-04-17 PROCEDURE — 74011250637 HC RX REV CODE- 250/637: Performed by: STUDENT IN AN ORGANIZED HEALTH CARE EDUCATION/TRAINING PROGRAM

## 2023-04-17 RX ADMIN — ATORVASTATIN CALCIUM 80 MG: 40 TABLET, FILM COATED ORAL at 09:20

## 2023-04-17 RX ADMIN — SENNOSIDES AND DOCUSATE SODIUM 1 TABLET: 8.6; 5 TABLET ORAL at 09:20

## 2023-04-17 RX ADMIN — SODIUM CHLORIDE 2 G: 9 INJECTION INTRAMUSCULAR; INTRAVENOUS; SUBCUTANEOUS at 21:21

## 2023-04-17 RX ADMIN — ROSUVASTATIN 10 MG: 10 TABLET, FILM COATED ORAL at 21:22

## 2023-04-17 RX ADMIN — TRAMADOL HYDROCHLORIDE 50 MG: 50 TABLET, COATED ORAL at 20:01

## 2023-04-17 RX ADMIN — TRAMADOL HYDROCHLORIDE 50 MG: 50 TABLET, COATED ORAL at 09:40

## 2023-04-17 RX ADMIN — TRAZODONE HYDROCHLORIDE 50 MG: 50 TABLET ORAL at 21:22

## 2023-04-17 RX ADMIN — BUSPIRONE HYDROCHLORIDE 20 MG: 10 TABLET ORAL at 09:20

## 2023-04-17 RX ADMIN — SODIUM CHLORIDE, PRESERVATIVE FREE 10 ML: 5 INJECTION INTRAVENOUS at 05:41

## 2023-04-17 RX ADMIN — PANTOPRAZOLE SODIUM 40 MG: 40 TABLET, DELAYED RELEASE ORAL at 06:59

## 2023-04-17 RX ADMIN — MIRTAZAPINE 15 MG: 15 TABLET, FILM COATED ORAL at 21:22

## 2023-04-17 RX ADMIN — CLONAZEPAM 0.25 MG: 1 TABLET ORAL at 21:21

## 2023-04-17 RX ADMIN — DULOXETINE HYDROCHLORIDE 60 MG: 60 CAPSULE, DELAYED RELEASE ORAL at 09:20

## 2023-04-17 RX ADMIN — SODIUM CHLORIDE, PRESERVATIVE FREE 10 ML: 5 INJECTION INTRAVENOUS at 18:59

## 2023-04-17 RX ADMIN — Medication 2 UNITS: at 09:19

## 2023-04-17 RX ADMIN — IOHEXOL 100 ML: 350 INJECTION, SOLUTION INTRAVENOUS at 20:31

## 2023-04-17 RX ADMIN — OLANZAPINE 2.5 MG: 2.5 TABLET, FILM COATED ORAL at 17:57

## 2023-04-17 RX ADMIN — BUSPIRONE HYDROCHLORIDE 20 MG: 10 TABLET ORAL at 17:57

## 2023-04-17 RX ADMIN — SODIUM CHLORIDE, PRESERVATIVE FREE 10 ML: 5 INJECTION INTRAVENOUS at 21:22

## 2023-04-17 NOTE — PROGRESS NOTES
Bedside and Verbal shift change report given to [facility] (oncoming nurse) by Red NGUYEN (offgoing nurse). Report included the following information SBAR, Kardex, Intake/Output, MAR, Accordion, and Cardiac Rhythm NSR .

## 2023-04-17 NOTE — PROGRESS NOTES
Problem: Diabetes Self-Management  Goal: *Disease process and treatment process  Description: Define diabetes and identify own type of diabetes; list 3 options for treating diabetes. 4/17/2023 1416 by Kp Carcamo RN  Outcome: Resolved/Met  4/17/2023 1404 by Kp Carcamo RN  Outcome: Progressing Towards Goal  Goal: *Incorporating nutritional management into lifestyle  Description: Describe effect of type, amount and timing of food on blood glucose; list 3 methods for planning meals. 4/17/2023 1416 by Kp Carcamo RN  Outcome: Resolved/Met  4/17/2023 1404 by Kp Carcamo RN  Outcome: Progressing Towards Goal  Goal: *Incorporating physical activity into lifestyle  Description: State effect of exercise on blood glucose levels. 4/17/2023 1416 by pK Carcamo RN  Outcome: Resolved/Met  4/17/2023 1404 by Kp Carcamo RN  Outcome: Progressing Towards Goal  Goal: *Developing strategies to promote health/change behavior  Description: Define the ABC's of diabetes; identify appropriate screenings, schedule and personal plan for screenings. 4/17/2023 1416 by Kp Carcamo RN  Outcome: Resolved/Met  4/17/2023 1404 by Kp Carcamo RN  Outcome: Progressing Towards Goal  Goal: *Using medications safely  Description: State effect of diabetes medications on diabetes; name diabetes medication taking, action and side effects. 4/17/2023 1416 by Kp Carcamo RN  Outcome: Resolved/Met  4/17/2023 1404 by Kp Carcamo RN  Outcome: Progressing Towards Goal  Goal: *Monitoring blood glucose, interpreting and using results  Description: Identify recommended blood glucose targets  and personal targets.   4/17/2023 1416 by Kp Carcamo RN  Outcome: Resolved/Met  4/17/2023 1404 by Kp Carcamo RN  Outcome: Progressing Towards Goal  Goal: *Prevention, detection, treatment of acute complications  Description: List symptoms of hyper- and hypoglycemia; describe how to treat low blood sugar and actions for lowering  high blood glucose level. 4/17/2023 1416 by Meri Steele RN  Outcome: Resolved/Met  4/17/2023 1404 by Meri Steele RN  Outcome: Progressing Towards Goal  Goal: *Prevention, detection and treatment of chronic complications  Description: Define the natural course of diabetes and describe the relationship of blood glucose levels to long term complications of diabetes. 4/17/2023 1416 by Meri Steele RN  Outcome: Resolved/Met  4/17/2023 1404 by Meri Steele RN  Outcome: Progressing Towards Goal  Goal: *Developing strategies to address psychosocial issues  Description: Describe feelings about living with diabetes; identify support needed and support network  4/17/2023 1416 by Meri Steele RN  Outcome: Resolved/Met  4/17/2023 1404 by Meri Steele RN  Outcome: Progressing Towards Goal  Goal: *Insulin pump training  4/17/2023 1416 by Meri Steele RN  Outcome: Resolved/Met  4/17/2023 1404 by Meri Steele RN  Outcome: Progressing Towards Goal  Goal: *Sick day guidelines  4/17/2023 1416 by Meri Steele RN  Outcome: Resolved/Met  4/17/2023 1404 by Meri Steele RN  Outcome: Progressing Towards Goal  Goal: *Patient Specific Goal (EDIT GOAL, INSERT TEXT)  4/17/2023 1416 by Meri Steele RN  Outcome: Resolved/Met  4/17/2023 1404 by Meri Steele RN  Outcome: Progressing Towards Goal     Problem: Patient Education: Go to Patient Education Activity  Goal: Patient/Family Education  4/17/2023 1416 by Meri Steele RN  Outcome: Resolved/Met  4/17/2023 1404 by Meri Steele RN  Outcome: Progressing Towards Goal     Problem: Pressure Injury - Risk of  Goal: *Prevention of pressure injury  Description: Document Yovnai Scale and appropriate interventions in the flowsheet.   4/17/2023 1416 by Meri Steele RN  Outcome: Resolved/Met  Note: Pressure Injury Interventions:  Sensory Interventions: Assess changes in LOC, Check visual cues for pain, Float heels, Keep linens dry and wrinkle-free, Maintain/enhance activity level, Minimize linen layers    Moisture Interventions: Absorbent underpads, Internal/External urinary devices, Check for incontinence Q2 hours and as needed, Minimize layers    Activity Interventions: Increase time out of bed, Pressure redistribution bed/mattress(bed type), PT/OT evaluation    Mobility Interventions: HOB 30 degrees or less, Pressure redistribution bed/mattress (bed type), PT/OT evaluation    Nutrition Interventions: Document food/fluid/supplement intake    Friction and Shear Interventions: Apply protective barrier, creams and emollients, HOB 30 degrees or less, Minimize layers             4/17/2023 1404 by Red Jaime RN  Outcome: Progressing Towards Goal  Note: Pressure Injury Interventions:  Sensory Interventions: Assess changes in LOC, Check visual cues for pain, Float heels, Keep linens dry and wrinkle-free, Maintain/enhance activity level, Minimize linen layers    Moisture Interventions: Absorbent underpads, Internal/External urinary devices, Check for incontinence Q2 hours and as needed, Minimize layers    Activity Interventions: Increase time out of bed, Pressure redistribution bed/mattress(bed type), PT/OT evaluation    Mobility Interventions: HOB 30 degrees or less, Pressure redistribution bed/mattress (bed type), PT/OT evaluation    Nutrition Interventions: Document food/fluid/supplement intake    Friction and Shear Interventions: Apply protective barrier, creams and emollients, HOB 30 degrees or less, Minimize layers                Problem: Patient Education: Go to Patient Education Activity  Goal: Patient/Family Education  4/17/2023 1416 by Pavel Chun RN  Outcome: Resolved/Met  4/17/2023 1404 by Pavel Chun RN  Outcome: Progressing Towards Goal     Problem: Falls - Risk of  Goal: *Absence of Falls  Description: Document Glo Fall Risk and appropriate interventions in the flowsheet.   4/17/2023 1416 by Pavel Chun RN  Outcome: Resolved/Met  Note: Fall Risk Interventions: Medication Interventions: Bed/chair exit alarm, Teach patient to arise slowly    Elimination Interventions: Call light in reach, Bed/chair exit alarm    History of Falls Interventions: Bed/chair exit alarm, Door open when patient unattended      4/17/2023 1404 by Red Jaime RN  Outcome: Progressing Towards Goal  Note: Fall Risk Interventions:            Medication Interventions: Bed/chair exit alarm, Teach patient to arise slowly    Elimination Interventions: Call light in reach, Bed/chair exit alarm    History of Falls Interventions: Bed/chair exit alarm, Door open when patient unattended         Problem: Patient Education: Go to Patient Education Activity  Goal: Patient/Family Education  4/17/2023 1416 by Cindi Last RN  Outcome: Resolved/Met  4/17/2023 1404 by Cindi Last RN  Outcome: Progressing Towards Goal     Problem: Falls - Risk of  Goal: *Absence of Falls  Description: Document Lonnie Lincoln Fall Risk and appropriate interventions in the flowsheet.   4/17/2023 1416 by Cindi Last RN  Outcome: Resolved/Met  Note: Fall Risk Interventions:            Medication Interventions: Bed/chair exit alarm, Teach patient to arise slowly    Elimination Interventions: Call light in reach, Bed/chair exit alarm    History of Falls Interventions: Bed/chair exit alarm, Door open when patient unattended      4/17/2023 1404 by Cindi Last RN  Outcome: Progressing Towards Goal  Note: Fall Risk Interventions:            Medication Interventions: Bed/chair exit alarm, Teach patient to arise slowly    Elimination Interventions: Call light in reach, Bed/chair exit alarm    History of Falls Interventions: Bed/chair exit alarm, Door open when patient unattended         Problem: Patient Education: Go to Patient Education Activity  Goal: Patient/Family Education  4/17/2023 1416 by Cindi Last RN  Outcome: Resolved/Met  4/17/2023 1404 by Cindi Last RN  Outcome: Progressing Towards Goal     Problem: Patient Education: Go to Patient Education Activity  Goal: Patient/Family Education  4/17/2023 1416 by Estrellita Aburto RN  Outcome: Resolved/Met  4/17/2023 1404 by Estrellita Aburto RN  Outcome: Progressing Towards Goal     Problem: Patient Education: Go to Patient Education Activity  Goal: Patient/Family Education  4/17/2023 1416 by Estrellita Aburto RN  Outcome: Resolved/Met  4/17/2023 1404 by Estrellita Aburto RN  Outcome: Progressing Towards Goal

## 2023-04-17 NOTE — PROGRESS NOTES
1335: This RN was notified by CM still waiting on orders for iv abx. Messaged ID via [employer], also messaged hospitalist to notify and was told they had messaged ID as well.

## 2023-04-17 NOTE — PROGRESS NOTES
Problem: Mobility Impaired (Adult and Pediatric)  Goal: *Acute Goals and Plan of Care (Insert Text)  Description: FUNCTIONAL STATUS PRIOR TO ADMISSION: Patient admitted from [facility] SNF following a fall. Patient oriented x 3 at time of evaluation, however reporting that she lives alone in a two story home and was mod I with rollator. Unclear how much assistance patient required or if patient was ambulatory at SNF. HOME SUPPORT PRIOR TO ADMISSION: The patient lived in a SNF with unknown level of assistance required for ADLs. Physical Therapy Goals  Initiated 4/13/2023  1. Patient will move from supine to sit and sit to supine  in bed with minimal assistance/contact guard assist within 7 day(s). 2.  Patient will transfer from bed to chair and chair to bed with minimal assistance/contact guard assist using the least restrictive device within 7 day(s). 3.  Patient will perform sit to stand with minimal assistance/contact guard assist within 7 day(s). 4.  Patient will ambulate with minimal assistance/contact guard assist for 10 feet with the least restrictive device within 7 day(s). Outcome: Progressing Towards Goal   PHYSICAL THERAPY TREATMENT  Patient: Katelyn Salazar (87 y.o. female)  Date: 4/17/2023  Diagnosis: AMS (altered mental status) [R41.82] <principal problem not specified>      Precautions: Fall  Chart, physical therapy assessment, plan of care and goals were reviewed. ASSESSMENT  Patient continues with skilled PT services and is progressing towards goals. She was alert and overall cooperative today, but still decreased insight into her situation and distracted by pain in most of her extremities. She needed max assist to roll and get to sitting EOB. Once sitting, she needed intermittent sitting balance assistance, tending to lean posteriorly in sitting. Though she was tachypneic throughout session, SPO2 remained 90-93% throughout.   She was ultimately able to stand and clear her buttocks from the bed, but needed max assist to get there. In standing, she bears weight on the LLE, but not much on the RLE secondary to RLE pain. Continue to recommend SNF level care when she returns to [facility]. Current Level of Function Impacting Discharge (mobility/balance): max assist for bed mobility and assist for sitting balance    Other factors to consider for discharge: expect to return to LTC         PLAN :  Patient continues to benefit from skilled intervention to address the above impairments. Continue treatment per established plan of care. to address goals. Recommendation for discharge: (in order for the patient to meet his/her long term goals)  Therapy up to 5 days/week in SNF setting    This discharge recommendation:  Has not yet been discussed the attending provider and/or case management    IF patient discharges home will need the following DME: patient owns DME required for discharge       SUBJECTIVE:   Patient stated My leg hurts.     OBJECTIVE DATA SUMMARY:   Critical Behavior:  Neurologic State: Alert, Confused  Orientation Level: Oriented to person, Oriented to place  Cognition: Decreased attention/concentration, Follows commands  Safety/Judgement: Fall prevention, Home safety, Decreased insight into deficits, Decreased awareness of need for safety  Functional Mobility Training:  Bed Mobility:  Rolling: Moderate assistance;Assist x2; Additional time; Adaptive equipment  Supine to Sit: Moderate assistance;Assist x2; Additional time;Bed Modified  Sit to Supine: Maximum assistance           Transfers:  Sit to Stand: Maximum assistance  Stand to Sit: Moderate assistance                             Balance:  Sitting: Impaired  Sitting - Static: Good (unsupported)  Sitting - Dynamic: Fair (occasional); Unsupported  Standing: Impaired  Standing - Static: Constant support; Fair  Standing - Dynamic : Poor;Constant support  Ambulation/Gait Training: Stairs: Therapeutic Exercises:   AAROM of LEs in supine prior to sitting  Pain Rating:  Pain R knee, buttocks, L arm    Activity Tolerance:   Fair, SpO2 stable on RA, requires frequent rest breaks, and observed SOB with activity    After treatment patient left in no apparent distress:   Heels elevated for pressure relief, Patient positioned in left sidelying for pressure relief, Call bell within reach, Bed / chair alarm activated, and Side rails x 3    COMMUNICATION/COLLABORATION:   The patients plan of care was discussed with: Occupational therapist and Registered nurse.      Marybeth Lai, PT   Time Calculation: 34 mins

## 2023-04-17 NOTE — PROGRESS NOTES
Infectious Disease Progress Note       Subjective:     Patient was personally evaluated and examined by me at bedside today. The patient is more awake today than she was when I saw her late last week. She is complaining today of significant pain at the base of her spine which upon examination shows no suggestion of external defect or decubitus ulceration. The patient denies headache, fevers, sweats or chills. She denies shortness of breath, cough or sputum, the patient denies nausea, vomiting, diarrhea or constipation. She had loose bowel movement this morning without diarrhea.         Objective:    Vitals:   Patient Vitals for the past 24 hrs:   Temp Pulse Resp BP SpO2   04/17/23 1211 -- 93 -- -- --   04/17/23 1140 98.2 °F (36.8 °C) 93 25 (!) 141/63 94 %   04/17/23 1019 -- 93 -- -- --   04/17/23 0849 98.1 °F (36.7 °C) 93 17 (!) 141/56 91 %   04/17/23 0752 98.9 °F (37.2 °C) 94 24 (!) 143/56 93 %   04/17/23 0549 -- 92 -- -- --   04/17/23 0359 -- 87 -- -- --   04/17/23 0315 99 °F (37.2 °C) 85 22 (!) 127/53 94 %   04/17/23 0152 -- 84 -- -- --   04/16/23 2342 98.5 °F (36.9 °C) 88 21 (!) 132/46 96 %   04/16/23 2147 -- 86 -- -- --   04/16/23 2006 -- 91 -- -- --   04/16/23 1946 97.6 °F (36.4 °C) 91 21 137/63 96 %   04/16/23 1808 -- 94 -- -- --   04/16/23 1602 -- 94 28 -- --   04/16/23 1513 98.3 °F (36.8 °C) (!) 102 (!) 34 (!) 149/70 100 %   04/16/23 1455 -- -- -- -- 95 %   04/16/23 1440 -- 96 -- -- --       Physical Exam:  Gen: No apparent distress other than complaints of discomfort at base of spine with intact skin  HEENT:  Normocephalic, atraumatic, no scleral icterus, no thrush, no cervical lymphadenopathy  CV: S1,2 heard regularly, mild lower extremity edema    Lungs: Clear to auscultation bilaterally, no wheezing  Abdomen: soft, non tender, non distended, no organomegaly appreciated, no CVA tenderness   Genitourinary:  yan-wick in place   Skin: no rash   Psych: good affect, good eye contact, confused, will answer questions but is not conversant  Neuro: alert, oriented to time,  place, and situation, moves all extremities to commands,   Musculoskeletal: Well-healed surgical scar right knee with swelling and joint edema, no erythema posterior tenderness noted ,  Lines:     Medications:    Current Facility-Administered Medications:     traMADoL (ULTRAM) tablet 50 mg, 50 mg, Oral, Q6H PRN, Barry Husain MD, 50 mg at 04/17/23 0940    clonazePAM (KlonoPIN) tablet 0.25 mg, 0.25 mg, Oral, QHS, Barry Husain MD, 0.25 mg at 04/16/23 2101    senna-docusate (PERICOLACE) 8.6-50 mg per tablet 1 Tablet, 1 Tablet, Oral, DAILY, Barry Husain MD, 1 Tablet at 04/17/23 0920    cefTRIAXone (ROCEPHIN) 2 g in 0.9% sodium chloride 20 mL IV syringe, 2 g, IntraVENous, Q24H, Barry Husain MD, 2 g at 04/16/23 2056    0.9% sodium chloride infusion 250 mL, 250 mL, IntraVENous, PRN, Barry Husain MD    albuterol-ipratropium (DUO-NEB) 2.5 MG-0.5 MG/3 ML, 3 mL, Nebulization, Q4H PRN, Simeon Carrasquillo MD, 3 mL at 04/16/23 1455    atorvastatin (LIPITOR) tablet 80 mg, 80 mg, Oral, DAILY, Simeon Carrasquillo MD, 80 mg at 04/17/23 0920    busPIRone (BUSPAR) tablet 20 mg, 20 mg, Oral, BID, Simeon Carrasquillo MD, 20 mg at 04/17/23 0920    [Held by provider] clopidogreL (PLAVIX) tablet 75 mg, 75 mg, Oral, DAILY, Simeon Carrasquillo MD, 75 mg at 04/13/23 0941    pantoprazole (PROTONIX) tablet 40 mg, 40 mg, Oral, ACB, Simeon Carrasquillo MD, 40 mg at 04/17/23 0659    DULoxetine (CYMBALTA) capsule 60 mg, 60 mg, Oral, DAILY, Simeon Carrasquillo MD, 60 mg at 04/17/23 0920    mirtazapine (REMERON) tablet 15 mg, 15 mg, Oral, QHS, Simeon Carrasquillo MD, 15 mg at 04/16/23 2101    OLANZapine (ZyPREXA) tablet 2.5 mg, 2.5 mg, Oral, QPM, Simeon Carrasquillo MD, 2.5 mg at 04/16/23 1851    [Held by provider] QUEtiapine (SEROquel) tablet 25 mg, 25 mg, Oral, QHS, Simeon Carrasquillo MD, 25 mg at 04/13/23 2121    rosuvastatin (CRESTOR) tablet 10 mg, 10 mg, Oral, QHS, Simeon Carrasquillo MD, 10 mg at 04/16/23 2101    traZODone (DESYREL) tablet 50 mg, 50 mg, Oral, QHS, Simeon Carrasquillo MD, 50 mg at 04/16/23 2101    glucose chewable tablet 16 g, 4 Tablet, Oral, PRN, Simeon Carrasquillo MD    glucagon (GLUCAGEN) injection 1 mg, 1 mg, IntraMUSCular, PRN, Simeon Carrasquillo MD    sodium chloride (NS) flush 5-40 mL, 5-40 mL, IntraVENous, Q8H, Simeon Carrasquillo MD, 10 mL at 04/17/23 0541    sodium chloride (NS) flush 5-40 mL, 5-40 mL, IntraVENous, PRN, Simeon Carrasquillo MD    acetaminophen (TYLENOL) tablet 650 mg, 650 mg, Oral, Q6H PRN, 650 mg at 04/16/23 1020 **OR** acetaminophen (TYLENOL) suppository 650 mg, 650 mg, Rectal, Q6H PRN, Simeon Carrasquillo MD, 650 mg at 04/13/23 2104    polyethylene glycol (MIRALAX) packet 17 g, 17 g, Oral, DAILY PRN, Simeon Carrasquillo MD    ondansetron (ZOFRAN ODT) tablet 4 mg, 4 mg, Oral, Q8H PRN **OR** ondansetron (ZOFRAN) injection 4 mg, 4 mg, IntraVENous, Q6H PRN, Simeon Carrasquillo MD    dextrose 10 % infusion 0-250 mL, 0-250 mL, IntraVENous, PRN, Simeon Carrasquillo MD    insulin lispro (HUMALOG) injection, , SubCUTAneous, AC&HS, Simeon Carrasquillo MD, 2 Units at 04/17/23 0919      Labs:  Recent Results (from the past 24 hour(s))   GLUCOSE, POC    Collection Time: 04/16/23  3:28 PM   Result Value Ref Range    Glucose (POC) 152 (H) 65 - 117 mg/dL    Performed by Andres Gonsales    GLUCOSE, POC    Collection Time: 04/16/23  9:37 PM   Result Value Ref Range    Glucose (POC) 133 (H) 65 - 117 mg/dL    Performed by ROX Olvera    METABOLIC PANEL, BASIC    Collection Time: 04/17/23 12:40 AM   Result Value Ref Range    Sodium 144 136 - 145 mmol/L    Potassium 4.0 3.5 - 5.1 mmol/L    Chloride 116 (H) 97 - 108 mmol/L    CO2 24 21 - 32 mmol/L    Anion gap 4 (L) 5 - 15 mmol/L    Glucose 146 (H) 65 - 100 mg/dL    BUN 6 6 - 20 MG/DL    Creatinine 0.44 (L) 0.55 - 1.02 MG/DL    BUN/Creatinine ratio 14 12 - 20      eGFR >60 >60 ml/min/1.73m2    Calcium 8.4 (L) 8.5 - 10.1 MG/DL   CBC W/O DIFF    Collection Time: 04/17/23 12:40 AM   Result Value Ref Range    WBC 15.7 (H) 3.6 - 11.0 K/uL    RBC 3.25 (L) 3.80 - 5.20 M/uL    HGB 8.0 (L) 11.5 - 16.0 g/dL    HCT 26.3 (L) 35.0 - 47.0 %    MCV 80.9 80.0 - 99.0 FL    MCH 24.6 (L) 26.0 - 34.0 PG    MCHC 30.4 30.0 - 36.5 g/dL    RDW 19.0 (H) 11.5 - 14.5 %    PLATELET 296 515 - 609 K/uL    MPV 9.9 8.9 - 12.9 FL    NRBC 3.8 (H) 0  WBC    ABSOLUTE NRBC 0.60 (H) 0.00 - 0.01 K/uL   GLUCOSE, POC    Collection Time: 04/17/23  7:01 AM   Result Value Ref Range    Glucose (POC) 140 (H) 65 - 117 mg/dL    Performed by ROX Olvera    GLUCOSE, POC    Collection Time: 04/17/23 11:59 AM   Result Value Ref Range    Glucose (POC) 88 65 - 117 mg/dL    Performed by [address]            Micro:     Blood: Beta-hemolytic strep G and 1/2 bottles with repeat NGTD      Urine:     Synovial/Joint fluid:     Sputum/BAL/Pleural:     Peritoneal:      Pathology:      Imaging:      Assessment / Plan    Bacteremia with beta-hemolytic strep group G1/2 bottles with follow-up cultures NGTD and with urine NGTD    2. Ceftriaxone 2 g every 24 hours for 10 days with last dose to be given 04/22/23    3. Patient afebrile with improving leukocytosis    4.   Significant  for severe right knee and leg pain at site of past knee surgery with some distress upon touch or movement would advise imaging to assess for septic arthritis/osteomyelitis        Problem List as of 4/17/2023 Date Reviewed: 7/5/2019            Codes Class Noted - Resolved    AMS (altered mental status) ICD-10-CM: R41.82  ICD-9-CM: 780.97  4/12/2023 - Present        DM (diabetes mellitus), type 2, uncontrolled ICD-10-CM: [id number]  ICD-9-CM: 250.02  5/29/2021 - Present        Shortness of breath ICD-10-CM: R06.02  ICD-9-CM: 786.05  4/25/2020 - Present        Suspected COVID-19 virus infection ICD-10-CM: I76.088  ICD-9-CM: V01.79  4/25/2020 - Present Bilateral pneumonia ICD-10-CM: J18.9  ICD-9-CM: 486  9/24/2019 - Present        Pain due to knee joint prosthesis ([facility] ICD-10-CM: T84.84XA, Z96.659  ICD-9-CM: 996.77, 338.18, V43.65  11/14/2017 - Present        CAD (coronary artery disease) ICD-10-CM: I25.10  ICD-9-CM: 414.00  10/27/2017 - Present        Pancreatitis, acute ICD-10-CM: K85.90  ICD-9-CM: 577.0  9/8/2017 - Present        Epigastric abdominal pain ICD-10-CM: R10.13  ICD-9-CM: 789.06  9/8/2017 - Present        Painful total knee replacement ([facility] ICD-10-CM: T84.84XA, Z96.659  ICD-9-CM: 996.77, V43.65  8/9/2017 - Present        Status post right knee replacement ICD-10-CM: Z96.651  ICD-9-CM: V43.65  8/9/2017 - Present        Leukocytosis, unspecified ICD-10-CM: D72.829  ICD-9-CM: 288.60  4/6/2015 - Present        DM (diabetes mellitus) ([facility] ICD-10-CM: E11.9  ICD-9-CM: 250.00  4/6/2015 - Present        HTN (hypertension) ICD-10-CM: I10  ICD-9-CM: 401.9  4/6/2015 - Present        Arthritis ICD-10-CM: M19.90  ICD-9-CM: 716.90  4/6/2015 - Present        Chest pain, unspecified ICD-10-CM: R07.9  ICD-9-CM: 786.50  9/12/2013 - Present        RESOLVED: Chest pain ICD-10-CM: R07.9  ICD-9-CM: 786.50  7/5/2019 - 7/12/2019        RESOLVED: Abnormal ECG ICD-10-CM: R94.31  ICD-9-CM: 794.31  3/9/2019 - 3/11/2019        RESOLVED: Abnormal EKG ICD-10-CM: R94.31  ICD-9-CM: 794.31  3/8/2019 - 3/11/2019        RESOLVED: Gastric polyp ICD-10-CM: K31.7  ICD-9-CM: 211.1  9/13/2013 - 10/27/2017        RESOLVED: Hemorrhoids ICD-10-CM: K64.9  ICD-9-CM: 455.6  6/13/2013 - 10/27/2017        RESOLVED: Anal polyp ICD-10-CM: K62.0  ICD-9-CM: 569.0  6/13/2013 - 10/27/2017                  Thank you for the opportunity to participate in the care of this patient. Please contact with questions or concerns.       Nancie Wick MD

## 2023-04-17 NOTE — PROGRESS NOTES
Problem: Self Care Deficits Care Plan (Adult)  Goal: *Acute Goals and Plan of Care (Insert Text)  Description: FUNCTIONAL STATUS PRIOR TO ADMISSION: Patient was modified independent using a rollator for functional mobility. Patient required minimal assistance for basic and instrumental ADLs. Patient is a poor historian and per chart review, patient admitted from SNF. HOME SUPPORT: The patient lived alone with a nephew to provide assistance. Poor historian, patient admitted here from SNF. Occupational Therapy Goals  Initiated 4/13/2023  1. Patient will perform grooming in unsupported sitting with supervision/set-up within 7 day(s). 2.  Patient will perform lower body dressing with moderate assistance  within 7 day(s). 3.  Patient will perform bathing with minimal assistance/contact guard assist within 7 day(s). 4.  Patient will perform toilet transfers to [facility] with moderate assistance  within 7 day(s). 5.  Patient will perform all aspects of toileting with moderate assistance  within 7 day(s). 6.  Patient will participate in upper extremity therapeutic exercise/activities with minimal assistance/contact guard assist for 10 minutes within 7 day(s). 7.  Patient will utilize energy conservation techniques during functional activities with verbal and visual cues within 7 day(s). Outcome: Progressing Towards Goal   OCCUPATIONAL THERAPY TREATMENT  Patient: Maru Victoria (23 y.o. female)  Date: 4/17/2023  Diagnosis: AMS (altered mental status) [R41.82] <principal problem not specified>      Precautions: Fall  Chart, occupational therapy assessment, plan of care, and goals were reviewed. ASSESSMENT  Patient continues with skilled OT services and is progressing towards goals. Patient's activity tolerance impacted greatly by R knee pain (edema and redness noted) though patient sat EOB for several minutes and completed 2 sit to stand trials.  Patient additionally impacted by general weakness, decreased sitting balance, decreased sitting/standing tolerance, and confusion. Ongoing therapy services are recommended in SNF level rehab at discharge. Current Level of Function Impacting Discharge (ADLs): max to total assist for bathing, dressing, toileting. Max assist for sit to stand stand    Other factors to consider for discharge:          PLAN :  Patient continues to benefit from skilled intervention to address the above impairments. Continue treatment per established plan of care to address goals. Recommend with staff: bed in chair position. Patient may need meal assist    Recommend next OT session: grooming at EOB, transfer training (2 person needed)    Recommendation for discharge: (in order for the patient to meet his/her long term goals)  Therapy up to 5 days/week in SNF setting    This discharge recommendation:  Has been made in collaboration with the attending provider and/or case management    IF patient discharges home will need the following DME: TBD       SUBJECTIVE:   Patient coopeative    OBJECTIVE DATA SUMMARY:   Cognitive/Behavioral Status:  Neurologic State: Alert;Confused  Orientation Level: Oriented to person;Oriented to place  Cognition: Decreased attention/concentration; Follows commands             Functional Mobility and Transfers for ADLs:  Bed Mobility:  Rolling: Moderate assistance;Assist x2; Additional time; Adaptive equipment  Supine to Sit: Moderate assistance;Assist x2; Additional time;Bed Modified  Sit to Supine: Maximum assistance    Transfers:  Sit to Stand: Maximum assistance 2 attempts. Able to stand fully with max support           Balance:  Sitting: Impaired  Sitting - Static: Good (unsupported)  Sitting - Dynamic: Fair (occasional); Unsupported  Standing: Impaired  Standing - Static: Constant support; Fair  Standing - Dynamic : Poor;Constant support    ADL Intervention:      EOB sitting balance activity- duration 5-8 minutes    Upper [address]:  Total assistance (dependent)    Lower Body Dressing Assistance  Socks: Total assistance (dependent)    Toileting  Toileting Assistance: Total assistance(dependent)  Bowel Hygiene: Total assistance (dependent)  Bowel incontinence- hygiene completed at bed level    Instructed in bed level mobility techniques during hygiene tasks     Pain:  R knee pain - edema and warmth noted     Activity Tolerance:   Fair and requires frequent rest breaks    After treatment patient left in no apparent distress:   Supine in bed, Heels elevated for pressure relief, Patient positioned in left sidelying for pressure relief, Call bell within reach, and Side rails x 3    COMMUNICATION/COLLABORATION:   The patients plan of care was discussed with: Physical therapist and Registered nurse.      Dominic Levy OT  Time Calculation: 33 mins

## 2023-04-17 NOTE — DISCHARGE SUMMARY
Discharge Summary     Patient: Pamela Hernandez MRN: [id number]  SSN: [id number]    YOB: 1940  Age: 81 y.o.   Sex: female       Admit Date: 4/11/2023    Discharge Date: 4/18/2023      Admission Diagnoses: AMS (altered mental status) [R41.82]    Discharge Diagnoses:   Severe sepsis due to strep bacteremia    Problem List as of 4/18/2023 Date Reviewed: 7/5/2019            Codes Class Noted - Resolved    AMS (altered mental status) ICD-10-CM: R41.82  ICD-9-CM: 780.97  4/12/2023 - Present        DM (diabetes mellitus), type 2, uncontrolled ICD-10-CM: [id number]  ICD-9-CM: 250.02  5/29/2021 - Present        Shortness of breath ICD-10-CM: R06.02  ICD-9-CM: 786.05  4/25/2020 - Present        Suspected COVID-19 virus infection ICD-10-CM: Z20.822  ICD-9-CM: V01.79  4/25/2020 - Present        Bilateral pneumonia ICD-10-CM: J18.9  ICD-9-CM: 794  9/24/2019 - Present        Pain due to knee joint prosthesis ([facility] ICD-10-CM: T84.84XA, Z96.659  ICD-9-CM: 996.77, 338.18, V43.65  11/14/2017 - Present        CAD (coronary artery disease) ICD-10-CM: I25.10  ICD-9-CM: 414.00  10/27/2017 - Present        Pancreatitis, acute ICD-10-CM: K85.90  ICD-9-CM: 577.0  9/8/2017 - Present        Epigastric abdominal pain ICD-10-CM: R10.13  ICD-9-CM: 789.06  9/8/2017 - Present        Painful total knee replacement ([facility] ICD-10-CM: T84.84XA, Z96.659  ICD-9-CM: 996.77, V43.65  8/9/2017 - Present        Status post right knee replacement ICD-10-CM: Z96.651  ICD-9-CM: V43.65  8/9/2017 - Present        Leukocytosis, unspecified ICD-10-CM: D72.829  ICD-9-CM: 288.60  4/6/2015 - Present        DM (diabetes mellitus) ([facility] ICD-10-CM: E11.9  ICD-9-CM: 250.00  4/6/2015 - Present        HTN (hypertension) ICD-10-CM: I10  ICD-9-CM: 401.9  4/6/2015 - Present        Arthritis ICD-10-CM: M19.90  ICD-9-CM: 716.90  4/6/2015 - Present        Chest pain, unspecified ICD-10-CM: R07.9  ICD-9-CM: 786.50  9/12/2013 - Present        RESOLVED: Chest pain ICD-10-CM: R07.9  ICD-9-CM: 786.50  7/5/2019 - 7/12/2019        RESOLVED: Abnormal ECG ICD-10-CM: R94.31  ICD-9-CM: 794.31  3/9/2019 - 3/11/2019        RESOLVED: Abnormal EKG ICD-10-CM: R94.31  ICD-9-CM: 794.31  3/8/2019 - 3/11/2019        RESOLVED: Gastric polyp ICD-10-CM: K31.7  ICD-9-CM: 211.1  9/13/2013 - 10/27/2017        RESOLVED: Hemorrhoids ICD-10-CM: K64.9  ICD-9-CM: 455.6  6/13/2013 - 10/27/2017        RESOLVED: Anal polyp ICD-10-CM: K62.0  ICD-9-CM: 569.0  6/13/2013 - 10/27/2017            Discharge Condition: Stable    Hospital Course: 81 y.o. female with hx of asthma, cad, iddm ii, htn, gerd, dementia with mood disturbances who is brought to ed from [facility] for evaluation after fall. Was reportedly noted to be excessively confused during the day today also for ground-level fall. Unsure if patient suffered a head injury. She was diagnosed with sepsis due to RLE cellulitis. Severe sepsis secondary to right lower extremity cellulitis  -blood 4/11 w/ strep 1/4 beta hemolytic strep, repeat 4/12 NGTD  -IV ceftriaxone, PICC placed 4/16, complete course per ID recs - Ceftriaxone 2 g every 24 hours for 10 days with last dose to be given 04/22/23  -echo - no concern for vegetation  -Right lower extremity Doppler to rule out DVT - negative  -wound care  -a CT of the RLE was done due to knee pain/swelling, not consistent w/ osteo or septic joint. She has been afebrile and leukocytosis is chronic. D/w ID. AMS   -? Metabolic Encephalopathy in setting of sepsis  -improved; baseline dementia  -stopped quetiapine and reduced clonazepam dose     Dementia with Behavioral Disturbance     Anemia  -consistent w/ LIZA  -received IV iron   -no bleeding noted  -drop since admission is likely dilutional, s/p 1U PRBC on 4/13. H/H has been stable.      Metabolic acidosis: normal gap, hyperchloremic, due to normal saline     HTN, now stable    IDDM II  -basal insulin and metformin stopped due to poor PO intake and euglycemia. Furosemide also stopped for the same reason. Asthma    Consults: Infectious Disease    Significant Diagnostic Studies:   XR FEMUR LT 2 V    Result Date: 4/11/2023  No acute abnormality. XR TIB/FIB LT    Result Date: 4/11/2023  No acute bone abnormality. CT HEAD WO CONT    Result Date: 4/11/2023  No acute findings. CT LOW EXT RT W CONT    Result Date: 4/18/2023  1. No evidence of osteomyelitis or septic arthritis. 2. Nonspecific trace joint effusion. 3. No fracture or hardware complication. 4. CT evidence of peripheral arterial vascular disease. XR CHEST PORT    Result Date: 4/11/2023  No acute infiltrate. Disposition: long term care facility    S: c/o neck, back pain, both knees hurt with movement, R>L, afebrile, hungry and wants to eat this AM    Visit Vitals  /61 (BP 1 Location: Left upper arm, BP Patient Position: At rest)   Pulse 90   Temp 98.3 °F (36.8 °C)   Resp 21   Ht 5' (1.524 m)   Wt 76.2 kg (167 lb 14.4 oz)   SpO2 95%   BMI 32.79 kg/m²     NAD  RRR  Lungs CTAB        Discharge Medications:   Current Discharge Medication List        CONTINUE these medications which have NOT CHANGED    Details   amLODIPine (NORVASC) 5 mg tablet       atorvastatin (LIPITOR) 80 mg tablet       clonazePAM (KlonoPIN) 0.125 mg disintegrating tablet       clopidogreL (PLAVIX) 75 mg tab       OLANZapine (ZyPREXA) 2.5 mg tablet       pantoprazole (PROTONIX) 40 mg tablet       traZODone (DESYREL) 50 mg tablet       !! mirtazapine (REMERON) 7.5 mg tablet       insulin lispro (HUMALOG) 100 unit/mL injection 4 Units by SubCUTAneous route Before breakfast, lunch, and dinner. Qty: 1 Vial, Refills: 0      acetaminophen (TYLENOL) 325 mg tablet Take 2 Tablets by mouth every six (6) hours as needed for Pain or Fever. Qty: 20 Tablet, Refills: 0      bisacodyl (DULCOLAX) 5 mg EC tablet Take 1 Tab by mouth daily as needed for Constipation.   Qty: 10 Tab, Refills: 0      albuterol (PROVENTIL VENTOLIN) 2.5 mg /3 mL (0.083 %) nebulizer solution 3 mL by Nebulization route every four (4) hours as needed for Wheezing. Qty: 60 Each, Refills: 0      arformoterol (BROVANA) 15 mcg/2 mL nebu neb solution 2 mL by Nebulization route two (2) times a day. Qty: 60 Vial, Refills: 0      budesonide (PULMICORT) 0.5 mg/2 mL nbsp 2 mL by Nebulization route two (2) times a day. Qty: 60 Each, Refills: 1      busPIRone (BUSPAR) 5 mg tablet Take 20 mg by mouth two (2) times a day. DULoxetine (CYMBALTA) 60 mg capsule Take 60 mg by mouth daily. !! mirtazapine (REMERON) 15 mg tablet Take 15 mg by mouth nightly. albuterol (PROVENTIL HFA, VENTOLIN HFA, PROAIR HFA) 90 mcg/actuation inhaler Take 2 Puffs by inhalation every four (4) hours as needed for Wheezing. Qty: 1 Inhaler, Refills: 0      rosuvastatin (CRESTOR) 10 mg tablet Take 10 mg by mouth nightly. Dexlansoprazole (DEXILANT) 60 mg CpDB Take 60 mg by mouth Daily (before breakfast). !! - Potential duplicate medications found. Please discuss with provider. STOP taking these medications       metFORMIN (GLUCOPHAGE) 500 mg tablet Comments:   Reason for Stopping:         furosemide (LASIX) 20 mg tablet Comments:   Reason for Stopping:         Lantus Solostar U-100 Insulin 100 unit/mL (3 mL) inpn Comments:   Reason for Stopping:         insulin glargine (Lantus U-100 Insulin) 100 unit/mL injection Comments:   Reason for Stopping:         aspirin 81 mg chewable tablet Comments:   Reason for Stopping:         QUEtiapine (SEROQUEL) 25 mg tablet Comments:   Reason for Stopping:                Follow-up Information       Follow up With Specialties Details Why Contact Info    [address] [facility] [address] [place] [address] [place] [address] Xena Sibley MD Internal Medicine Physician   [address] [place] [address]  [phone]            Signed By: Reza Smith MD April 18, 2023      Greater than 30 minutes spent on discharge management.

## 2023-04-17 NOTE — PROGRESS NOTES
Bedside and Verbal shift change report given to [address] [place] [address] (oncoming nurse) by Tierra Freeman RN (offgoing nurse). Report included the following information SBAR, Kardex, Procedure Summary, Intake/Output, MAR, and Cardiac Rhythm NSR .

## 2023-04-17 NOTE — PROGRESS NOTES
LUZ MARINA:  RUR: 15%    Disposition:  Return to [facility] (later today after ID  and IV abx orders entered. CM noted PICC Line placed 4/16/23. CM spoke with Dr. Arik Monge and will plan for d/c transport around 3 pm today. CM notifying Isaiah Bullock Tiana [phone]. Message left on vm re plans for discharge later today. CM awaiting return call. YUDITH Franz, CRM    [facility] ([phone] will be here at 5pm to transport patient to [facility].    2:57pm  CM informed  by WENDY Moon and per her conversation with Dr. García Talavera, that d/c cancelled for today per ID as patient needs a CT scan of knee. [facility] to request 3pm transport for Tuesday 4/18/23. [facility]. CM explained that medicare is primary and Medicaid is secondary. CM calling [facility] (Cielo Fry) to advise of d/c being cancelled. Message left  om voicemail. 3:15p  CM called [address] [place] [address] (now [facility]) [phone]) to verify medicaid ins auth required. Call forward to [facility] and placed on hold at 3:22pm.   CM remained on hold until 3:45p with no connection with a live rep. Call ended.

## 2023-04-17 NOTE — PROGRESS NOTES
[address] [facility]  Hospitalist Group                                                                                          Hospitalist Progress Note  Gladys Eugene MD  Answering service: [phone] OR [phone] from in house phone        Date of Service:  2023  NAME:  Edith Petersen  :  1940  MRN:  [id number]       Admission Summary:   81 y.o. female with hx of asthma, cad, iddm ii, htn, gerd, dementia with mood disturbances who is brought to ed from [facility] for evaluation after fall. Was reportedly noted to be excessively confused during the day today also for ground-level fall. Unsure if patient suffered a head injury. She was diagnosed with sepsis due to RLE cellulitis. Interval history / Subjective:   More alert, mental state improved, states she feels ok. Assessment & Plan:     Severe sepsis secondary to right lower extremity cellulitis  -fever, leukocytosis and tachycardia on ed presentation, end organ damage AMS  -c/w IV ceftriaxone - increased dose to 2g , d/c doxycycline   -follow blood and urine cultures - blood  w/ strep  beta hemolytic strep, repeat  NGTD  -echo - no concern for vegetation  -Right lower extremity Doppler to rule out DVT - negative  -d/w ID, concerned for R knee involvement and recommending CT  -wound care recs appreciated  -PICC placed      AMS   -? Metabolic Encephalopathy in setting of sepsis  -improved with treatment of infection  -continue holding seroquel, reduced clonazepam dose    Dementia with Behavioral Disturbance     Anemia  -consistent w/ LIZA  -completed IV iron  -no evidence of bleeding here  -drop since admission is likely dilutional, s/p 1U PRBC on . H/H has been stable.      Metabolic acidosis: normal gap, hyperchloremic  -improving with holding NS    HTN, now stable  -resume home meds when appropriate     IDDM II  -SSI +Hypoglycemic protocols     Asthma  -duonebs prn        Code status: full  Prophylaxis: SCDs  Care Plan discussed with: RN, CM, ID consultant  Anticipated Disposition: LTC  Inpatient  Cardiac monitoring: Telemetry     Hospital Problems  Date Reviewed: 7/5/2019            Codes Class Noted POA    AMS (altered mental status) ICD-10-CM: R41.82  ICD-9-CM: 780.97  4/12/2023 Unknown         Social Determinants of Health     Tobacco Use: Not on file   Alcohol Use: Not on file   Financial Resource Strain: Not on file   Food Insecurity: Not on file   Transportation Needs: Not on file   Physical Activity: Not on file   Stress: Not on file   Social Connections: Not on file   Intimate Partner Violence: Not on file   Depression: Not on file   Housing Stability: Not on file       Review of Systems:   A comprehensive review of systems was negative except for that written in the HPI. Vital Signs:    Last 24hrs VS reviewed since prior progress note.  Most recent are:  Visit Vitals  BP (!) 141/63 (BP 1 Location: Left upper arm, BP Patient Position: Lying)   Pulse 93   Temp 98.2 °F (36.8 °C)   Resp 25   Ht 5' (1.524 m)   Wt 75.5 kg (166 lb 6.4 oz)   SpO2 94%   BMI 32.50 kg/m²         Intake/Output Summary (Last 24 hours) at 4/17/2023 1445  Last data filed at 4/17/2023 1200  Gross per 24 hour   Intake --   Output 1400 ml   Net -1400 ml          Physical Examination:     I had a face to face encounter with this patient and independently examined them on 4/17/2023 as outlined below:          General : NAD, chronically ill appearing  HEENT: anicteric sclerae normal conjunctiva  Neck: supple, no JVD, no meningeal signs  Chest: expiratory wheeze b/l normal work of breathing   CVS: RRR  Abd: soft/ non tender, non distended, BS physiological,   Ext: RLE erythema and edema, wounds wrapped  Neuro/Psych: alert, confused, answers simple questions  Skin: warm     Data Review:    Review and/or order of clinical lab test  Review and/or order of tests in the radiology section of CPT      I have personally and independently reviewed all pertinent labs, diagnostic studies, imaging, and have provided independent interpretation of the same. Labs:     Recent Labs     04/17/23  0040 04/16/23  0332   WBC 15.7* 17.6*   HGB 8.0* 8.4*   HCT 26.3* 27.4*    289       Recent Labs     04/17/23  0040 04/16/23  0332 04/15/23  0425    146* 144   K 4.0 3.2* 3.6   * 117* 119*   CO2 24 26 18*   BUN 6 9 16   CREA 0.44* 0.44* 0.59   * 141* 144*   CA 8.4* 8.8 8.8   MG  --  2.2 2.4   PHOS  --   --  1.9*       No results for input(s): ALT, AP, TBIL, TBILI, TP, ALB, GLOB, GGT, AML, LPSE in the last 72 hours. No lab exists for component: SGOT, GPT, AMYP, HLPSE    No results for input(s): INR, PTP, APTT, INREXT, INREXT in the last 72 hours. No results for input(s): FE, TIBC, PSAT, FERR in the last 72 hours. Lab Results   Component Value Date/Time    Folate 13.2 05/20/2009 03:00 AM        No results for input(s): PH, PCO2, PO2 in the last 72 hours. No results for input(s): CPK, CKNDX, TROIQ in the last 72 hours.     No lab exists for component: CPKMB  Lab Results   Component Value Date/Time    Cholesterol, total 138 05/30/2021 02:31 AM    HDL Cholesterol 77 05/30/2021 02:31 AM    LDL, calculated 39.4 05/30/2021 02:31 AM    Triglyceride 108 05/30/2021 02:31 AM    CHOL/HDL Ratio 1.8 05/30/2021 02:31 AM     Lab Results   Component Value Date/Time    Glucose (POC) 88 04/17/2023 11:59 AM    Glucose (POC) 140 (H) 04/17/2023 07:01 AM    Glucose (POC) 133 (H) 04/16/2023 09:37 PM    Glucose (POC) 152 (H) 04/16/2023 03:28 PM    Glucose (POC) 145 (H) 04/16/2023 11:07 AM     Lab Results   Component Value Date/Time    Color YELLOW/STRAW 04/12/2023 12:14 AM    Appearance CLOUDY (A) 04/12/2023 12:14 AM    Specific gravity 1.022 04/12/2023 12:14 AM    Specific gravity 1.010 12/11/2021 03:47 PM    pH (UA) 5.5 04/12/2023 12:14 AM    Protein TRACE (A) 04/12/2023 12:14 AM    Glucose Negative 04/12/2023 12:14 AM    Ketone Negative 04/12/2023 12:14 AM    Bilirubin Negative 04/12/2023 12:14 AM    Urobilinogen 1.0 04/12/2023 12:14 AM    Nitrites Negative 04/12/2023 12:14 AM    Leukocyte Esterase Negative 04/12/2023 12:14 AM    Epithelial cells MODERATE (A) 04/12/2023 12:14 AM    Bacteria Negative 04/12/2023 12:14 AM    WBC 0-4 04/12/2023 12:14 AM    RBC 0-5 04/12/2023 12:14 AM       Notes reviewed from all clinical/nonclinical/nursing services involved in patient's clinical care. Care coordination discussions were held with appropriate clinical/nonclinical/ nursing providers based on care coordination needs. Patients current active Medications were reviewed, considered, added and adjusted based on the clinical condition today. Home Medications were reconciled to the best of my ability given all available resources at the time of admission. Route is PO if not otherwise noted.       Admission Status:[id number]:::1}      Medications Reviewed:     Current Facility-Administered Medications   Medication Dose Route Frequency    traMADoL (ULTRAM) tablet 50 mg  50 mg Oral Q6H PRN    clonazePAM (KlonoPIN) tablet 0.25 mg  0.25 mg Oral QHS    senna-docusate (PERICOLACE) 8.6-50 mg per tablet 1 Tablet  1 Tablet Oral DAILY    cefTRIAXone (ROCEPHIN) 2 g in 0.9% sodium chloride 20 mL IV syringe  2 g IntraVENous Q24H    0.9% sodium chloride infusion 250 mL  250 mL IntraVENous PRN    albuterol-ipratropium (DUO-NEB) 2.5 MG-0.5 MG/3 ML  3 mL Nebulization Q4H PRN    atorvastatin (LIPITOR) tablet 80 mg  80 mg Oral DAILY    busPIRone (BUSPAR) tablet 20 mg  20 mg Oral BID    [Held by provider] clopidogreL (PLAVIX) tablet 75 mg  75 mg Oral DAILY    pantoprazole (PROTONIX) tablet 40 mg  40 mg Oral ACB    DULoxetine (CYMBALTA) capsule 60 mg  60 mg Oral DAILY    mirtazapine (REMERON) tablet 15 mg  15 mg Oral QHS    OLANZapine (ZyPREXA) tablet 2.5 mg  2.5 mg Oral QPM    [Held by provider] QUEtiapine (SEROquel) tablet 25 mg  25 mg Oral QHS    rosuvastatin (CRESTOR) tablet 10 mg  10 mg Oral QHS    traZODone (DESYREL) tablet 50 mg  50 mg Oral QHS    glucose chewable tablet 16 g  4 Tablet Oral PRN    glucagon (GLUCAGEN) injection 1 mg  1 mg IntraMUSCular PRN    sodium chloride (NS) flush 5-40 mL  5-40 mL IntraVENous Q8H    sodium chloride (NS) flush 5-40 mL  5-40 mL IntraVENous PRN    acetaminophen (TYLENOL) tablet 650 mg  650 mg Oral Q6H PRN    Or    acetaminophen (TYLENOL) suppository 650 mg  650 mg Rectal Q6H PRN    polyethylene glycol (MIRALAX) packet 17 g  17 g Oral DAILY PRN    ondansetron (ZOFRAN ODT) tablet 4 mg  4 mg Oral Q8H PRN    Or    ondansetron (ZOFRAN) injection 4 mg  4 mg IntraVENous Q6H PRN    dextrose 10 % infusion 0-250 mL  0-250 mL IntraVENous PRN    insulin lispro (HUMALOG) injection   SubCUTAneous AC&HS     ______________________________________________________________________  EXPECTED LENGTH OF STAY: 5d 0h  ACTUAL LENGTH OF STAY:          5                 Saira Hopper MD

## 2023-04-18 VITALS
SYSTOLIC BLOOD PRESSURE: 139 MMHG | OXYGEN SATURATION: 91 % | WEIGHT: 167.9 LBS | DIASTOLIC BLOOD PRESSURE: 49 MMHG | RESPIRATION RATE: 18 BRPM | HEART RATE: 95 BPM | HEIGHT: 60 IN | TEMPERATURE: 98.5 F | BODY MASS INDEX: 32.96 KG/M2

## 2023-04-18 LAB
ANION GAP SERPL CALC-SCNC: 7 MMOL/L (ref 5–15)
BUN SERPL-MCNC: 5 MG/DL (ref 6–20)
BUN/CREAT SERPL: 11 (ref 12–20)
CALCIUM SERPL-MCNC: 7.9 MG/DL (ref 8.5–10.1)
CHLORIDE SERPL-SCNC: 111 MMOL/L (ref 97–108)
CO2 SERPL-SCNC: 23 MMOL/L (ref 21–32)
CREAT SERPL-MCNC: 0.47 MG/DL (ref 0.55–1.02)
ERYTHROCYTE [DISTWIDTH] IN BLOOD BY AUTOMATED COUNT: 19.7 % (ref 11.5–14.5)
GLUCOSE BLD STRIP.AUTO-MCNC: 165 MG/DL (ref 65–117)
GLUCOSE BLD STRIP.AUTO-MCNC: 93 MG/DL (ref 65–117)
GLUCOSE SERPL-MCNC: 94 MG/DL (ref 65–100)
HCT VFR BLD AUTO: 27.9 % (ref 35–47)
HGB BLD-MCNC: 8.4 G/DL (ref 11.5–16)
MCH RBC QN AUTO: 24.3 PG (ref 26–34)
MCHC RBC AUTO-ENTMCNC: 30.1 G/DL (ref 30–36.5)
MCV RBC AUTO: 80.6 FL (ref 80–99)
NRBC # BLD: 0.34 K/UL (ref 0–0.01)
NRBC BLD-RTO: 2.2 PER 100 WBC
PLATELET # BLD AUTO: 322 K/UL (ref 150–400)
PMV BLD AUTO: 9.7 FL (ref 8.9–12.9)
POTASSIUM SERPL-SCNC: 3.6 MMOL/L (ref 3.5–5.1)
RBC # BLD AUTO: 3.46 M/UL (ref 3.8–5.2)
SERVICE CMNT-IMP: ABNORMAL
SERVICE CMNT-IMP: NORMAL
SODIUM SERPL-SCNC: 141 MMOL/L (ref 136–145)
WBC # BLD AUTO: 15.2 K/UL (ref 3.6–11)

## 2023-04-18 PROCEDURE — 82962 GLUCOSE BLOOD TEST: CPT

## 2023-04-18 PROCEDURE — 36415 COLL VENOUS BLD VENIPUNCTURE: CPT

## 2023-04-18 PROCEDURE — 74011250637 HC RX REV CODE- 250/637: Performed by: STUDENT IN AN ORGANIZED HEALTH CARE EDUCATION/TRAINING PROGRAM

## 2023-04-18 PROCEDURE — 74011250637 HC RX REV CODE- 250/637: Performed by: HOSPITALIST

## 2023-04-18 PROCEDURE — 85027 COMPLETE CBC AUTOMATED: CPT

## 2023-04-18 PROCEDURE — 80048 BASIC METABOLIC PNL TOTAL CA: CPT

## 2023-04-18 PROCEDURE — 74011000250 HC RX REV CODE- 250: Performed by: STUDENT IN AN ORGANIZED HEALTH CARE EDUCATION/TRAINING PROGRAM

## 2023-04-18 PROCEDURE — 74011636637 HC RX REV CODE- 636/637: Performed by: STUDENT IN AN ORGANIZED HEALTH CARE EDUCATION/TRAINING PROGRAM

## 2023-04-18 RX ADMIN — ATORVASTATIN CALCIUM 80 MG: 40 TABLET, FILM COATED ORAL at 08:24

## 2023-04-18 RX ADMIN — SENNOSIDES AND DOCUSATE SODIUM 1 TABLET: 8.6; 5 TABLET ORAL at 08:24

## 2023-04-18 RX ADMIN — SODIUM CHLORIDE, PRESERVATIVE FREE 10 ML: 5 INJECTION INTRAVENOUS at 05:01

## 2023-04-18 RX ADMIN — DULOXETINE HYDROCHLORIDE 60 MG: 60 CAPSULE, DELAYED RELEASE ORAL at 08:24

## 2023-04-18 RX ADMIN — Medication 2 UNITS: at 12:43

## 2023-04-18 RX ADMIN — PANTOPRAZOLE SODIUM 40 MG: 40 TABLET, DELAYED RELEASE ORAL at 06:43

## 2023-04-18 RX ADMIN — TRAMADOL HYDROCHLORIDE 50 MG: 50 TABLET, COATED ORAL at 13:26

## 2023-04-18 RX ADMIN — BUSPIRONE HYDROCHLORIDE 20 MG: 10 TABLET ORAL at 08:24

## 2023-04-18 RX ADMIN — TRAMADOL HYDROCHLORIDE 50 MG: 50 TABLET, COATED ORAL at 05:31

## 2023-04-18 NOTE — PROGRESS NOTES
1351-Attempted report to [facility],  transferred me to the nurses station and the phone rang continually without answer, will attempt again. Patient to be picked up at 1500.    1409-Attempted report to [facility],  transferred me to the nurses station and the phone continued to ring without answer. 1432-TRANSFER - OUT REPORT:    Verbal report given to WENDY Smith(name) on Emiliano Mode  being transferred to [facility] (unit) for routine progression of care       Report consisted of patients Situation, Background, Assessment and   Recommendations(SBAR). Information from the following report(s) SBAR, Procedure Summary, Intake/Output, Accordion, Recent Results, and Cardiac Rhythm NSR  was reviewed with the receiving nurse. Lines:   PICC Double Lumen 48/37/84 Right;Basilic (Active)   Central Line Being Utilized Yes 04/18/23 0823   Criteria for Appropriate Use Long term IV/antibiotic administration 04/18/23 0823   Site Assessment Clean, dry, & intact 04/18/23 0823   Phlebitis Assessment 0 04/18/23 0823   Infiltration Assessment 0 04/18/23 0823   Arm Circumference (cm) 32 cm 04/17/23 1954   Date of Last Dressing Change 04/16/23 04/18/23 0823   Dressing Status Clean, dry, & intact 04/18/23 0823   Action Taken Open ports on tubing capped 04/18/23 0823   External Catheter Length (cm) 0 centimeters 04/17/23 1954   Dressing Type Disk with Chlorhexadine gluconate (CHG) 04/18/23 0823   Hub Color/Line Status Red;Flushed;Capped 04/18/23 0823   Positive Blood Return (Site #1) Yes 04/18/23 0823   Hub Color/Line Status Purple;Flushed;Capped 04/18/23 0823   Positive Blood Return (Site #2) Yes 04/18/23 0823   Alcohol Cap Used No 04/18/23 3959        Opportunity for questions and clarification was provided.       Patient transported with:   [facility] to home

## 2023-04-18 NOTE — PROGRESS NOTES
LUZ MARINA:  RUR: 15%    Disposition:  Return to [facility] [place]  CM spoke with Dr. Gary Moore and was informed of patient d/c today. Barrier:  Awaiting IV abx orders. 10a. CM called facility [facility] [phone]). Cielo informed that CM still awaiting IV abx orders and will forward along with PICC line report upon receipt. CM advised of transport being scheduled for 3pm today. CM continuing to follow. Sharla Landau, [address], [address]    12:01pm  Update from Dr. Gary Moore. He has spoken with ID (Dr. Jessa Mederos) and home IV abx orders will be placed within the hour. Upon receipt of orders, CM will fax to facility along with PICC Line report. 1:47p  CM faxed PICC Line report and Home IV abx  to Trina Ch at [phone]. [facility] to Transport patient to [facility] [place] today at [address] $225.35    Assigned nurse to call report to [phone]. Patient to admit to room 201 A.

## 2023-04-18 NOTE — PROGRESS NOTES
Spiritual Care Partner Volunteer visited patient at [facility] in [address] [facility] on 4/18/2023   Documented by:  Woody Hameed MDIV, [facility]

## 2023-04-18 NOTE — ANCILLARY DISCHARGE INSTRUCTIONS
Infectious disease discharge note:     1. Bacteremia with beta-hemolytic strep group G with 1/2 bottles positive       Follow-up cultures NGTD, NGTD    2. Antibiotic treatment with ceftriaxone 2 g every 24 hours with last dose to be given 04/22/2023    3. No follow-up labs needed for this issue    4.   PICC line may be removed at completion of antibiotic

## 2023-04-18 NOTE — PROGRESS NOTES
Bedside and Verbal shift change report given to April RN (oncoming nurse) by Mabel Luther RN (offgoing nurse). Report included the following information SBAR, Kardex, Procedure Summary, Intake/Output, MAR, and Cardiac Rhythm NSR .

## 2023-04-19 NOTE — ADT AUTH CERT NOTES
Cellulitis - Care Day 6 (4/17/2023) by Deepali Patton       Review Status Review Entered   Completed 4/18/2023 1129       Created By   Deepali Patton      Criteria Review      Care Day: 6 Care Date: 4/17/2023 Level of Care: Telemetry    Guideline Day 2    Level Of Care    (X) Floor    4/18/2023 11:29:07 EDT by Deepali Patton      telemetry    Clinical Status    (X) * Dehydration absent    (X) * Mental status at baseline    4/18/2023 11:29:07 EDT by Deepali Patton      Neuro: alert, oriented to time,  place, and situation, moves all extremities to commands    (X) * Hemodynamic stability    4/18/2023 11:29:08 EDT by Deepali Patton      VS: 99, 90, 98, 162/62, 122/52, 141/63, 127/53, 27, 25, 24, 91% 93% RA    (X) * Afebrile or fever improved    4/18/2023 11:29:08 EDT by Sheyla Carmen: 99.4 °F, 98.2 °F, 99 °F    Activity    (X) Activity as tolerated    4/18/2023 11:29:08 EDT by Deepali Patton      AAT w/ assist    Routes    (X) * Oral hydration    (X) * Oral medications    4/18/2023 11:29:08 EDT by Deepali Patton      klonopin 0.25mg po qhs, protonix 40mg po qd, pericolace 8.6-50mg po qd, ultram 50mg po q6 prn x 2    (X) Diet as tolerated    4/18/2023 11:29:08 EDT by Deepali Patton      regular diet    Interventions    (X) WBC    4/18/2023 11:29:08 EDT by Deepali Patton      WBC: 15.7 (H)    Medications    (X) IV antibiotics    4/18/2023 11:29:08 EDT by Deepali Patton      rocephin 2g IV q24    * Milestone   Additional Notes   4/17/23       Pertinent Updates:   -complaining today of significant pain at the base of her spine    -pain scale: 1-6/10      Abnl/Pertinent Labs/Radiology/Diagnostic Studies:   GLUCOSE,FAST - POC: 119-140 (H)   NRBC: 3.8 (H)   RBC: 3.25 (L)   HGB: 8.0 (L)   HCT: 26.3 (L)   MCH: 24.6 (L)   RDW: 19.0 (H)   ABSOLUTE NRBC: 0.60 (H)   Chloride: 116 (H)   Anion gap: 4 (L)   Glucose: 146 (H)   Creatinine: 0.44 (L)   Calcium: 8.4 (L)      CT LOW EXT:   IMPRESSION   1.  No evidence of osteomyelitis or septic arthritis. 2. Nonspecific trace joint effusion. 3. No fracture or hardware complication. 4. CT evidence of peripheral arterial vascular disease. Physical Exam:   Musculoskeletal: Well-healed surgical scar right knee with swelling and joint edema, no erythema posterior tenderness noted      MD Consults/Assessments & Plans:   Infectious disease:   Assessment / Plan:   1. Bacteremia with beta-hemolytic strep group G1/2 bottles with follow-up cultures NGTD and with urine NGTD       2. Ceftriaxone 2 g every 24 hours for 10 days with last dose to be given 04/22/23       3. Patient afebrile with improving leukocytosis       4. Significant  for severe right knee and leg pain at site of past knee surgery with some distress upon touch or movement would advise imaging to assess for septic arthritis/osteomyelitis      Internal medicine:   Assessment & Plan:   Severe sepsis secondary to right lower extremity cellulitis   -d/w ID, concerned for R knee involvement and recommending CT   -PICC placed 4/16       AMS    -Metabolic Encephalopathy in setting of sepsis   -improved with treatment of infection   -continue holding seroquel, reduced clonazepam dose       Anemia   -completed IV iron   -no evidence of bleeding here   -drop since admission is likely dilutional      Medications:   Humalog SC QID ssi - 2 units x 1      Orders:   SCD   Assess skin per unit guidelines   Dressing changes as needed   Maintain heels off of bed at all times      CM:   Disposition:  Return to [facility] (later today after ID  and IV abx orders entered      OT:   A/P:   sat EOB for several minutes and completed 2 sit to stand trials. Current Level of Function Impacting Discharge (ADLs): max to total assist for bathing, dressing, toileting.  Max assist for sit to stand stand      Recommendation for discharge:   Therapy up to 5 days/week in SNF setting      PT:   A/P:   She needed max assist to roll and get to sitting EOB. she needed intermittent sitting balance assistance, tending to lean posteriorly in sitting.  able to stand and clear her buttocks from the bed, but needed max assist. Tachypneic throughout session      Current Level of Function Impacting Discharge (mobility/balance): max assist for bed mobility and assist for sitting balance      Recommendation for discharge:   Therapy up to 5 days/week in SNF setting               Cellulitis - Care Day 5 (4/16/2023) by Camilo Eddy       Review Status Review Entered   Completed 4/18/2023 1112       Created By   Camilo Eddy      Criteria Review      Care Day: 5 Care Date: 4/16/2023 Level of Care: Telemetry    Guideline Day 2    Level Of Care    (X) Floor    4/18/2023 11:12:27 EDT by Camilo Eddy      telemetry    Clinical Status    (X) * Dehydration absent    ( ) * Mental status at baseline    4/18/2023 11:12:27 EDT by Camilo Eddy      alert, confused, answers simple questions    (X) * Hemodynamic stability    4/18/2023 11:12:27 EDT by Camilo Eddy      VS: 102, 101, 88, 92, 94, 149/70, 132/46, 153/60, 34, 28, 21, 95% RA    (X) * Afebrile or fever improved    4/18/2023 11:12:27 EDT by Chiki Thao: 98.3 °F, 98.5 °F, 98.1 °F    Activity    (X) Activity as tolerated    4/18/2023 11:12:27 EDT by Camilo Eddy      AAT w/ assist    Routes    (X) * Oral hydration    (X) * Oral medications    4/18/2023 11:12:27 EDT by Camilo Eddy      tylenol 650mg po q6 prn x 2, klonopin 0.25mg po qhs, protonix 40mg po qd, pericolace 8.6-50mg po qd, ultram 50mg po q6 prn x 2, klor-con 10 40meq po x 2    (X) Diet as tolerated    4/18/2023 11:12:27 EDT by Camilo Eddy      regular diet    Interventions    (X) WBC    4/18/2023 11:12:27 EDT by Camilo Eddy      WBC: 17.6 (H)    Medications    (X) IV antibiotics    4/18/2023 11:12:27 EDT by Camilo haq 2g IV q24    * Milestone   Additional Notes   4/16/23       Pertinent Updates:   -still confused -s/p PICC placement   -pain scale: 2-8/10      Abnl/Pertinent Labs/Radiology/Diagnostic Studies:   GLUCOSE,FAST - POC: 133-152 (H)   NRBC: 2.4 (H)   RBC: 3.46 (L)   HGB: 8.4 (L)   HCT: 27.4 (L)   MCV: 79.2 (L)   MCH: 24.3 (L)   RDW: 18.5 (H)   ABSOLUTE NRBC: 0.43 (H)   Sodium: 146 (H)   Potassium: 3.2 (L)   Chloride: 117 (H)   Anion gap: 3 (L)   Glucose: 141 (H)   Creatinine: 0.44 (L)      Physical Exam:   See romero MCDOWELL Consults/Assessments & Plans:   Internal medicine:   Assessment & Plan:   Severe sepsis secondary to right lower extremity cellulitis   -echo - no concern for vegetation   -order PICC       AMS    -Metabolic Encephalopathy in setting of sepsis   -reduced clonazepam dose       Dementia with Behavioral Disturbance   -hold Zyprexa as above       Anemia   -H/H has been stable      Medications:   Humalog SC QID ssi - 2 units x 2   Venofer 200mg IV daily   Duo-neb 3ml neb Q4 PRN x 3      Orders:   SCD   Assess skin per unit guidelines   Dressing changes as needed   Maintain heels off of bed at all times                  Cellulitis - Care Day 4 (4/15/2023) by [employer]       Review Status Review Entered   Completed 4/18/2023 1055       Created By   [employer]      Criteria Review      Care Day: 4 Care Date: 4/15/2023 Level of Care: Telemetry    Guideline Day 2    Level Of Care    (X) Floor    4/18/2023 10:55:14 EDT by [employer]      telemetry    Clinical Status    (X) * Dehydration absent    ( ) * Mental status at baseline    4/18/2023 10:55:14 EDT by [employer]      remains lethargic but improved compared to yesterday    (X) * Hemodynamic stability    4/18/2023 10:55:14 EDT by [employer]      VS: 102, 96, 86, 99, 144/61, 143/48, 128/54, 24, 20, 37, 30, 19, 93% 94% RA    (X) * Afebrile or fever improved    4/18/2023 10:55:14 EDT by Marietta Cleveland:  98.6 F, 98.5 F, 98.7 F    Activity    (X) Activity as tolerated    4/18/2023 10:55:14 EDT by Karel CLEMENT w/ assist    Routes    (X) * Oral hydration    (X) * Oral medications    4/18/2023 10:55:14 EDT by Matthew Tejada      tylenol 650mg po q6 prn x 3, protonix 40mg po qd, pericolace 8.6-50mg po qd, miralax 17g po x 1, neutra-phos 2 packet po x 1    (X) Diet as tolerated    4/18/2023 10:55:14 EDT by Matthew Tejada      regular diet    Interventions    (X) WBC    4/18/2023 10:55:14 EDT by Matthew Tejada      WBC: 18.5 (H)    Medications    (X) IV antibiotics    4/18/2023 10:55:14 EDT by Matthew Tejada      rocephin 2g IV q24    * Milestone   Additional Notes   4/15/23       Pertinent Updates:   Pain scale: 3-7/10   Still w/ IV antibiotics      Abnl/Pertinent Labs/Radiology/Diagnostic Studies:   GLUCOSE,FAST - POC: 123-171 (H)   NRBC: 1.4 (H)   RBC: 3.28 (L)   HGB: 7.9 (L)   HCT: 26.6 (L)   MCH: 24.1 (L)   MCHC: 29.7 (L)   RDW: 18.3 (H)   NEUTROPHILS: 84 (H)   LYMPHOCYTES: 5 (L)   IMMATURE GRANULOCYTES: 2 (H)   ABSOLUTE NRBC: 0.25 (H)   ABS. NEUTROPHILS: 15.6 (H)   ABS. IMM. GRANS.: 0.3 (H)   ABS. MONOCYTES: 1.6 (H)   Chloride: 119 (H)   CO2: 18 (L)   Glucose: 144 (H)   BUN/Creatinine ratio: 27 (H)   Phosphorus: 1.9 (L)      ECHO:   ·  Left Ventricle: Normal left ventricular systolic function with a visually estimated EF of 50 - 55%. Left ventricle size is normal. Normal wall thickness. Normal wall motion. ·  Right Ventricle: Right ventricle size is normal. Mildly increased wall thickness. ·  Mitral Valve: Mild stenosis noted. ·  Tricuspid Valve: Moderately elevated RVSP. ·  Left Atrium: Left atrium is mildly dilated. ·  Right Atrium: Right atrium is moderately dilated. Physical Exam:   See indicia.       MD Consults/Assessments & Plans:   Internal medicine:   Assessment & Plan:   Severe sepsis secondary to right lower extremity cellulitis   -consult ID - recs appreciated       AMS    -Metabolic Encephalopathy in setting of sepsis   -improving with treatment of infection   -continue holding Zyprexa, will hold wean off night-time clonazepam      Anemia   -check stool blood - pending - start bowel regimen      Metabolic acidosis: normal gap, hyperchloremic   -improving with holding NS      Medications:   Humalog SC QID ssi - 2 units x 2   Venofer 200mg IV daily   Duoneb 3ml neb Q4 PRN  x 2      Orders:   SCD   Assess skin per unit guidelines   Dressing changes as needed   Maintain heels off of bed at all times              Cellulitis - Care Day 3 (4/14/2023) by Jd Hall       Review Status Review Entered   Completed 4/18/2023 1043       Created By   Jd Gamma      Criteria Review      Care Day: 3 Care Date: 4/14/2023 Level of Care: Telemetry    Guideline Day 2    Level Of Care    (X) Floor    4/18/2023 10:43:02 EDT by Jd Gamma      telemetry    Clinical Status    (X) * Dehydration absent    ( ) * Mental status at baseline    4/18/2023 10:43:02 EDT by Jd Gamma      remains confused, lethargic    (X) * Hemodynamic stability    4/18/2023 10:43:02 EDT by Jd Gamma      VS: 101, 99, 96, 89, 153/63, 133/49, 120/41, 23, 20, 24, 22, 93% 94% RA    (X) * Afebrile or fever improved    4/18/2023 10:43:02 EDT by Janneth Burch: 99.4 F, 99.5 F, 99 F    Activity    (X) Activity as tolerated    4/18/2023 10:43:02 EDT by Jd Gamma      AAT w/ assist    Routes    (X) * Oral hydration    (X) * Oral medications    4/18/2023 10:43:02 EDT by Jd Gamma      tylenol 650mg po q6 prn x 2, protonix 40mg po qd    (X) Diet as tolerated    4/18/2023 10:43:02 EDT by Jd Gamma      regular diet    Interventions    (X) WBC    4/18/2023 10:43:02 EDT by Jd Gamma      WBC: 18.1 (H)    Medications    (X) IV antibiotics    4/18/2023 10:43:02 EDT by Jd Gamma      rocephin 2g IV q24, vibramycin 100mg IV q12    * Milestone   Additional Notes   4/14/23       Pertinent Updates:   Pain scale 3/10   Still w/ IV medications      Abnl/Pertinent Labs/Radiology/Diagnostic Studies:   GLUCOSE,FAST - POC: 129-170 (H)   NRBC: 0.3 (H)   RBC: 3.11 (L)   HGB: 7.4 (L)   HCT: 25.5 (L)   MCH: 23.8 (L)   MCHC: 29.0 (L)   RDW: 17.7 (H)   NEUTROPHILS: 89 (H)   LYMPHOCYTES: 3 (L)   IMMATURE GRANULOCYTES: 1 (H)   ABSOLUTE NRBC: 0.05 (H)   ABS. NEUTROPHILS: 16.1 (H)   ABS. IMM. GRANS.: 0.2 (H)   ABS. LYMPHOCYTES: 0.5 (L)   ABS. MONOCYTES: 1.3 (H)   Chloride: 116 (H)   CO2: 15 (LL)   Glucose: 149 (H)   BUN: 22 (H)   BUN/Creatinine ratio: 30 (H)      Physical Exam:   No changes noted. MD Consults/Assessments & Plans:   Internal medicine:   Assessment & Plan:   Severe sepsis secondary to right lower extremity cellulitis   -c/w IV ceftriaxone - increased dose to 2g 4/13, d/c doxycycline today   -follow blood and urine cultures - blood 4/11 w/ strep 1/4 beta hemolytic strep, repeat 4/12 NGTD   -echo - pending   -consult ID   -wound care recs appreciated       AMS    -Metabolic Encephalopathy in setting of sepsis   -she is more lethargic early AM, suspect this is related to her psychotropic PM meds including clonazepam. Would like to wean the Klonopin but it is already very low dose. Will begin by holding Zyprexa. Dementia with Behavioral Disturbance   -resume home klonipin, mirtazepine, Seroquel   -hold Zyprexa as above       Anemia   -check stool blood - pending   -drop since admission is likely dilutional, s/p 1U PRBC on 9/88       Metabolic acidosis: normal gap, hyperchloremic   -hold IVNS, BPs improved       HTN, now stable   -resume home meds when appropriate       Infectious disease:   Assessment / Plan:        1. Bacteremia beta-hemolytic group G pansensitive 1/2 bottles       2. Ceftriaxone 2 g every 24 hours        3.   Repeat blood cultures drawn and pending with NGTD/urine culture negative      Medications:   IV Venofer 200mg daily   IV NS 75ml/hr continuous   Duo-neb 3ml neb Q4 PRN x 2      Orders:   SCD   Assess skin per unit guidelines   Dressing changes as needed   Maintain heels off of bed at all times CM:   Disposition:  Return to  [address] [place] [address]

## 2023-05-29 RX ORDER — BISACODYL 5 MG/1
TABLET, DELAYED RELEASE ORAL DAILY PRN
COMMUNITY
Start: 2019-07-12

## 2023-05-29 RX ORDER — ALBUTEROL SULFATE 2.5 MG/3ML
SOLUTION RESPIRATORY (INHALATION) EVERY 4 HOURS PRN
COMMUNITY
Start: 2019-03-11

## 2023-05-29 RX ORDER — DEXLANSOPRAZOLE 60 MG/1
CAPSULE, DELAYED RELEASE ORAL
COMMUNITY

## 2023-05-29 RX ORDER — BUDESONIDE 0.5 MG/2ML
INHALANT ORAL 2 TIMES DAILY
COMMUNITY
Start: 2019-03-11

## 2023-05-29 RX ORDER — CLOPIDOGREL BISULFATE 75 MG/1
TABLET ORAL
COMMUNITY
Start: 2023-03-29

## 2023-05-29 RX ORDER — DULOXETIN HYDROCHLORIDE 60 MG/1
60 CAPSULE, DELAYED RELEASE ORAL DAILY
COMMUNITY

## 2023-05-29 RX ORDER — BUSPIRONE HYDROCHLORIDE 5 MG/1
TABLET ORAL 2 TIMES DAILY
COMMUNITY

## 2023-05-29 RX ORDER — ALBUTEROL SULFATE 90 UG/1
2 AEROSOL, METERED RESPIRATORY (INHALATION) EVERY 4 HOURS PRN
COMMUNITY
Start: 2019-01-19

## 2023-05-29 RX ORDER — ATORVASTATIN CALCIUM 80 MG/1
TABLET, FILM COATED ORAL
COMMUNITY
Start: 2023-04-06

## 2023-05-29 RX ORDER — MIRTAZAPINE 15 MG/1
15 TABLET, FILM COATED ORAL NIGHTLY
COMMUNITY

## 2023-05-29 RX ORDER — OLANZAPINE 2.5 MG/1
TABLET ORAL
COMMUNITY
Start: 2023-03-12

## 2023-05-29 RX ORDER — AMLODIPINE BESYLATE 5 MG/1
TABLET ORAL
COMMUNITY
Start: 2023-03-24

## 2023-05-29 RX ORDER — PANTOPRAZOLE SODIUM 40 MG/1
TABLET, DELAYED RELEASE ORAL
COMMUNITY
Start: 2023-02-04

## 2023-05-29 RX ORDER — ARFORMOTEROL TARTRATE 15 UG/2ML
SOLUTION RESPIRATORY (INHALATION) 2 TIMES DAILY
COMMUNITY
Start: 2019-03-11

## 2023-05-29 RX ORDER — MIRTAZAPINE 7.5 MG/1
TABLET, FILM COATED ORAL
COMMUNITY
Start: 2023-03-17

## 2023-05-29 RX ORDER — ACETAMINOPHEN 325 MG/1
TABLET ORAL EVERY 6 HOURS PRN
COMMUNITY
Start: 2021-06-02

## 2023-05-29 RX ORDER — CLONAZEPAM 0.12 MG/1
TABLET, ORALLY DISINTEGRATING ORAL
COMMUNITY
Start: 2023-01-17

## 2023-05-29 RX ORDER — TRAZODONE HYDROCHLORIDE 50 MG/1
TABLET ORAL
COMMUNITY
Start: 2023-04-11

## 2023-05-29 RX ORDER — ROSUVASTATIN CALCIUM 10 MG/1
10 TABLET, COATED ORAL NIGHTLY
COMMUNITY

## 2023-05-29 RX ORDER — INSULIN LISPRO 100 [IU]/ML
INJECTION, SOLUTION INTRAVENOUS; SUBCUTANEOUS
COMMUNITY
Start: 2021-06-03

## 2023-07-19 ENCOUNTER — HOSPITAL ENCOUNTER (EMERGENCY)
Facility: HOSPITAL | Age: 83
Discharge: NURSING, REHAB OR LONG-TERM CARE | End: 2023-07-19
Attending: EMERGENCY MEDICINE
Payer: COMMERCIAL

## 2023-07-19 ENCOUNTER — HOSPITAL ENCOUNTER (EMERGENCY)
Facility: HOSPITAL | Age: 83
Discharge: HOME | End: 2023-07-22
Payer: COMMERCIAL

## 2023-07-19 VITALS
RESPIRATION RATE: 16 BRPM | SYSTOLIC BLOOD PRESSURE: 124 MMHG | OXYGEN SATURATION: 97 % | HEART RATE: 100 BPM | TEMPERATURE: 99 F | DIASTOLIC BLOOD PRESSURE: 66 MMHG

## 2023-07-19 DIAGNOSIS — S09.90XA INJURY OF HEAD, INITIAL ENCOUNTER: Primary | ICD-10-CM

## 2023-07-19 DIAGNOSIS — S00.03XA HEMATOMA OF SCALP, INITIAL ENCOUNTER: ICD-10-CM

## 2023-07-19 DIAGNOSIS — L97.909 CHRONIC SKIN ULCER OF LOWER LEG (HCC): ICD-10-CM

## 2023-07-19 LAB
ALBUMIN SERPL-MCNC: 2.8 G/DL (ref 3.5–5)
ALBUMIN/GLOB SERPL: 0.5 (ref 1.1–2.2)
ALP SERPL-CCNC: 152 U/L (ref 45–117)
ALT SERPL-CCNC: 15 U/L (ref 12–78)
ANION GAP SERPL CALC-SCNC: 5 MMOL/L (ref 5–15)
AST SERPL-CCNC: 14 U/L (ref 15–37)
BASOPHILS # BLD: 0.1 K/UL (ref 0–0.1)
BASOPHILS NFR BLD: 1 % (ref 0–1)
BILIRUB SERPL-MCNC: 0.2 MG/DL (ref 0.2–1)
BUN SERPL-MCNC: 11 MG/DL (ref 6–20)
BUN/CREAT SERPL: 20 (ref 12–20)
CALCIUM SERPL-MCNC: 8.7 MG/DL (ref 8.5–10.1)
CHLORIDE SERPL-SCNC: 108 MMOL/L (ref 97–108)
CO2 SERPL-SCNC: 25 MMOL/L (ref 21–32)
COMMENT:: NORMAL
CREAT SERPL-MCNC: 0.54 MG/DL (ref 0.55–1.02)
DIFFERENTIAL METHOD BLD: ABNORMAL
EOSINOPHIL # BLD: 0.1 K/UL (ref 0–0.4)
EOSINOPHIL NFR BLD: 1 % (ref 0–7)
ERYTHROCYTE [DISTWIDTH] IN BLOOD BY AUTOMATED COUNT: 19.5 % (ref 11.5–14.5)
GLOBULIN SER CALC-MCNC: 5.5 G/DL (ref 2–4)
GLUCOSE SERPL-MCNC: 128 MG/DL (ref 65–100)
HCT VFR BLD AUTO: 38.8 % (ref 35–47)
HGB BLD-MCNC: 12.1 G/DL (ref 11.5–16)
IMM GRANULOCYTES # BLD AUTO: 0 K/UL (ref 0–0.04)
IMM GRANULOCYTES NFR BLD AUTO: 0 % (ref 0–0.5)
LYMPHOCYTES # BLD: 1.8 K/UL (ref 0.8–3.5)
LYMPHOCYTES NFR BLD: 17 % (ref 12–49)
MCH RBC QN AUTO: 26 PG (ref 26–34)
MCHC RBC AUTO-ENTMCNC: 31.2 G/DL (ref 30–36.5)
MCV RBC AUTO: 83.3 FL (ref 80–99)
MONOCYTES # BLD: 0.9 K/UL (ref 0–1)
MONOCYTES NFR BLD: 9 % (ref 5–13)
NEUTS SEG # BLD: 7.5 K/UL (ref 1.8–8)
NEUTS SEG NFR BLD: 72 % (ref 32–75)
NRBC # BLD: 0 K/UL (ref 0–0.01)
NRBC BLD-RTO: 0 PER 100 WBC
PLATELET # BLD AUTO: 340 K/UL (ref 150–400)
PMV BLD AUTO: 10.6 FL (ref 8.9–12.9)
POTASSIUM SERPL-SCNC: 4.3 MMOL/L (ref 3.5–5.1)
PROT SERPL-MCNC: 8.3 G/DL (ref 6.4–8.2)
RBC # BLD AUTO: 4.66 M/UL (ref 3.8–5.2)
SODIUM SERPL-SCNC: 138 MMOL/L (ref 136–145)
SPECIMEN HOLD: NORMAL
WBC # BLD AUTO: 10.4 K/UL (ref 3.6–11)

## 2023-07-19 PROCEDURE — 2580000003 HC RX 258: Performed by: EMERGENCY MEDICINE

## 2023-07-19 PROCEDURE — 99284 EMERGENCY DEPT VISIT MOD MDM: CPT

## 2023-07-19 PROCEDURE — 6360000002 HC RX W HCPCS: Performed by: EMERGENCY MEDICINE

## 2023-07-19 PROCEDURE — 80053 COMPREHEN METABOLIC PANEL: CPT

## 2023-07-19 PROCEDURE — 85025 COMPLETE CBC W/AUTO DIFF WBC: CPT

## 2023-07-19 PROCEDURE — 6370000000 HC RX 637 (ALT 250 FOR IP): Performed by: STUDENT IN AN ORGANIZED HEALTH CARE EDUCATION/TRAINING PROGRAM

## 2023-07-19 PROCEDURE — 96374 THER/PROPH/DIAG INJ IV PUSH: CPT

## 2023-07-19 PROCEDURE — 72125 CT NECK SPINE W/O DYE: CPT

## 2023-07-19 PROCEDURE — 70450 CT HEAD/BRAIN W/O DYE: CPT

## 2023-07-19 RX ORDER — KETOROLAC TROMETHAMINE 30 MG/ML
15 INJECTION, SOLUTION INTRAMUSCULAR; INTRAVENOUS ONCE
Status: COMPLETED | OUTPATIENT
Start: 2023-07-19 | End: 2023-07-19

## 2023-07-19 RX ORDER — ACETAMINOPHEN 500 MG
1000 TABLET ORAL ONCE
Status: COMPLETED | OUTPATIENT
Start: 2023-07-19 | End: 2023-07-19

## 2023-07-19 RX ORDER — 0.9 % SODIUM CHLORIDE 0.9 %
1000 INTRAVENOUS SOLUTION INTRAVENOUS ONCE
Status: COMPLETED | OUTPATIENT
Start: 2023-07-19 | End: 2023-07-19

## 2023-07-19 RX ADMIN — ACETAMINOPHEN 1000 MG: 500 TABLET ORAL at 10:11

## 2023-07-19 RX ADMIN — KETOROLAC TROMETHAMINE 15 MG: 30 INJECTION, SOLUTION INTRAMUSCULAR; INTRAVENOUS at 06:52

## 2023-07-19 RX ADMIN — SODIUM CHLORIDE 1000 ML: 9 INJECTION, SOLUTION INTRAVENOUS at 06:39

## 2023-07-19 ASSESSMENT — PAIN DESCRIPTION - ORIENTATION: ORIENTATION: RIGHT;LEFT

## 2023-07-19 ASSESSMENT — PAIN DESCRIPTION - LOCATION: LOCATION: LEG

## 2023-07-19 ASSESSMENT — PAIN SCALES - GENERAL: PAINLEVEL_OUTOF10: 9

## 2023-07-19 ASSESSMENT — PAIN DESCRIPTION - DESCRIPTORS: DESCRIPTORS: DULL

## 2023-07-19 NOTE — DISCHARGE INSTRUCTIONS
Your CT scans did not show any acute abnormalities. Please keep your wounds clean and change your dressings regularly. Thank you.

## 2023-07-19 NOTE — CARE COORDINATION
Chart reviewed. CM notified pt medically stable and able to return to [facility]. Wound care consulted; plan for BLE dressing changes every other day. Pt alert and oriented x1. CM contacted [facility] to arrange transportation for return; transport scheduled for 1045. [facility] admissions liaison notified of plan for return and transport time. CM notified RN of transportation time; RN to call report to facility. Sumi Jefferson, RN- Care Management    4188: CM received return call from [facility] admissions liaison requesting later transport for pt. CM requested later transport time; transport now scheduled for 1130. RN notified. [facility] admissions liaison notified.  Sumi Jefferson, RN- Care Management

## 2023-07-19 NOTE — ED TRIAGE NOTES
Pt BIBA from [facility] for fall and AMS>  Pt baseline A&O x1. Pt rolled out of bed that is on the floor has hematoma to right forehead. No LOC noted.

## 2023-07-19 NOTE — WOUND CARE
WOCN Note:   New consult for bilateral leg wounds with plan to be sent back to SNF after wound care     Samuel Zimmerman is a 72-year-old female history of anal polyp, arthritis, asthma, coronary disease, cataracts, diabetes, GERD, nephrolithiasis, hypertension, hyperlipidemia who presented to ED after head trauma at nursing home. Seen in ED today    Assessment:   Somnolent and communicative. Wearing briefs   Surface: ED stretcher mattress  Bilateral heels intact and without erythema. 1. POA Partial Thickness Wounds to Bilateral Lower Legs    Multiple areas of epidermal loss depicted above with one ~ 1 x 1 cm areas on each posterior lower leg   Wounds 100% red  Scant serous exudate  Periwounds with minor maceration   Noted some pinhole areas of weeping and one 1.5 x 1.5 cm serous bulla to medial right lower leg  Tx: lifted away dry devitalized tissue, cleansed with wound cleanser, applied petrolatum gauze and covered with foam dressing secured with roll gauze      Wound Recommendations:    BLE: Every Other Day - cleanse with wound cleanser, apply petrolatum gauze and cover with foam dressing secured with roll gauze     PI Prevention:  Turn/reposition approximately every 2 hours  Offload heels with heels hanging off end of pillow at all times while in bed. Sacral Foam dressing: lift to assess regularly; change as needed. Discontinue if incontinence is frequently soiling dressing. Discussed assessment and plan with RN.   No changes in status to relay to physician    Transition of Care: Plan to follow weekly and as needed if admitted to hospital.     Eliane Virgen  MSN, RN  [facility] Inpatient [address] [facility]   Available through [address] [place] [address]

## 2023-07-19 NOTE — ED PROVIDER NOTES
Patient signed out to me by Dr. Timothy Peck at shift change, I reviewed her labs which are within normal limits. After she has been seen by wound care will discharge home as per the plan communicated to me by the previous physician.      Sonali Serna MD  07/19/23 3729
GASTROINTESTINAL ENDOSCOPY  2/28/2013         UROLOGICAL SURGERY      \"laser\" surgery for kidney stone       CURRENT MEDICATIONS       Previous Medications    ACETAMINOPHEN (TYLENOL) 325 MG TABLET    Take by mouth every 6 hours as needed    ALBUTEROL (PROVENTIL) (2.5 MG/3ML) 0.083% NEBULIZER SOLUTION    Inhale into the lungs every 4 hours as needed    ALBUTEROL SULFATE HFA (PROVENTIL;VENTOLIN;PROAIR) 108 (90 BASE) MCG/ACT INHALER    Inhale 2 puffs into the lungs every 4 hours as needed    AMLODIPINE (NORVASC) 5 MG TABLET    ceived the following from [facility]: Outside name: amLODIPine (NORVASC) 5 mg tablet    ARFORMOTEROL TARTRATE (BROVANA) 15 MCG/2ML NEBU    Inhale into the lungs 2 times daily    ATORVASTATIN (LIPITOR) 80 MG TABLET    ceived the following from [facility]: Outside name: atorvastatin (LIPITOR) 80 mg tablet    BISACODYL (DULCOLAX) 5 MG EC TABLET    Take by mouth daily as needed    BUDESONIDE (PULMICORT) 0.5 MG/2ML NEBULIZER SUSPENSION    Inhale into the lungs 2 times daily    BUSPIRONE (BUSPAR) 5 MG TABLET    Take by mouth 2 times daily    CLONAZEPAM (KLONOPIN) 0.125 MG DISINTEGRATING TABLET    ceived the following from [facility]: Outside name: clonazePAM (KlonoPIN) 0.125 mg disintegrating tablet    CLOPIDOGREL (PLAVIX) 75 MG TABLET    ceived the following from [facility]: Outside name: clopidogreL (PLAVIX) 75 mg tab    DEXLANSOPRAZOLE (DEXILANT) 60 MG CPDR DELAYED RELEASE CAPSULE    Take by mouth every morning (before breakfast)    DULOXETINE (CYMBALTA) 60 MG EXTENDED RELEASE CAPSULE    Take 1 capsule by mouth daily    INSULIN LISPRO (HUMALOG) 100 UNIT/ML SOLN INJECTION VIAL    Inject into the skin 3 times daily (before meals)    MIRTAZAPINE (REMERON) 15 MG TABLET    Take 1 tablet by mouth nightly    MIRTAZAPINE (REMERON) 7.5 MG TABLET    ceived the following from [facility]: Outside name: mirtazapine (REMERON) 7.5

## 2023-08-03 ENCOUNTER — APPOINTMENT (OUTPATIENT)
Facility: HOSPITAL | Age: 83
DRG: 086 | End: 2023-08-03
Payer: MEDICARE

## 2023-08-03 ENCOUNTER — HOSPITAL ENCOUNTER (INPATIENT)
Facility: HOSPITAL | Age: 83
LOS: 4 days | Discharge: NURSING, REHAB OR LONG-TERM CARE | DRG: 086 | End: 2023-08-07
Attending: STUDENT IN AN ORGANIZED HEALTH CARE EDUCATION/TRAINING PROGRAM | Admitting: INTERNAL MEDICINE
Payer: MEDICARE

## 2023-08-03 DIAGNOSIS — S43.015A ANTERIOR DISLOCATION OF LEFT SHOULDER, INITIAL ENCOUNTER: ICD-10-CM

## 2023-08-03 DIAGNOSIS — I60.9 SUBARACHNOID HEMORRHAGE (HCC): ICD-10-CM

## 2023-08-03 DIAGNOSIS — I61.9 INTRAPARENCHYMAL HEMORRHAGE OF BRAIN (HCC): Primary | ICD-10-CM

## 2023-08-03 PROBLEM — I62.9 INTRACRANIAL HEMORRHAGE (HCC): Status: ACTIVE | Noted: 2023-08-03

## 2023-08-03 LAB
ALBUMIN SERPL-MCNC: 2.5 G/DL (ref 3.5–5)
ALBUMIN/GLOB SERPL: 0.5 (ref 1.1–2.2)
ALP SERPL-CCNC: 138 U/L (ref 45–117)
ALT SERPL-CCNC: 14 U/L (ref 12–78)
ANION GAP SERPL CALC-SCNC: 5 MMOL/L (ref 5–15)
AST SERPL-CCNC: 15 U/L (ref 15–37)
BASOPHILS # BLD: 0 K/UL (ref 0–0.1)
BASOPHILS NFR BLD: 0 % (ref 0–1)
BILIRUB SERPL-MCNC: 0.3 MG/DL (ref 0.2–1)
BUN SERPL-MCNC: 8 MG/DL (ref 6–20)
BUN/CREAT SERPL: 15 (ref 12–20)
CALCIUM SERPL-MCNC: 8.6 MG/DL (ref 8.5–10.1)
CHLORIDE SERPL-SCNC: 107 MMOL/L (ref 97–108)
CO2 SERPL-SCNC: 29 MMOL/L (ref 21–32)
COMMENT:: NORMAL
CREAT SERPL-MCNC: 0.55 MG/DL (ref 0.55–1.02)
DIFFERENTIAL METHOD BLD: ABNORMAL
EOSINOPHIL # BLD: 0.1 K/UL (ref 0–0.4)
EOSINOPHIL NFR BLD: 1 % (ref 0–7)
ERYTHROCYTE [DISTWIDTH] IN BLOOD BY AUTOMATED COUNT: 18 % (ref 11.5–14.5)
GLOBULIN SER CALC-MCNC: 4.9 G/DL (ref 2–4)
GLUCOSE SERPL-MCNC: 112 MG/DL (ref 65–100)
HCT VFR BLD AUTO: 34.7 % (ref 35–47)
HGB BLD-MCNC: 11 G/DL (ref 11.5–16)
IMM GRANULOCYTES # BLD AUTO: 0 K/UL (ref 0–0.04)
IMM GRANULOCYTES NFR BLD AUTO: 0 % (ref 0–0.5)
LACTATE SERPL-SCNC: 0.8 MMOL/L (ref 0.4–2)
LYMPHOCYTES # BLD: 1.8 K/UL (ref 0.8–3.5)
LYMPHOCYTES NFR BLD: 19 % (ref 12–49)
MCH RBC QN AUTO: 26 PG (ref 26–34)
MCHC RBC AUTO-ENTMCNC: 31.7 G/DL (ref 30–36.5)
MCV RBC AUTO: 82 FL (ref 80–99)
MONOCYTES # BLD: 0.6 K/UL (ref 0–1)
MONOCYTES NFR BLD: 6 % (ref 5–13)
NEUTS SEG # BLD: 7 K/UL (ref 1.8–8)
NEUTS SEG NFR BLD: 74 % (ref 32–75)
NRBC # BLD: 0 K/UL (ref 0–0.01)
NRBC BLD-RTO: 0 PER 100 WBC
P2Y12 PLT RESPONSE: 223 PRU (ref 194–418)
PLATELET # BLD AUTO: 424 K/UL (ref 150–400)
PMV BLD AUTO: 9.7 FL (ref 8.9–12.9)
POTASSIUM SERPL-SCNC: 3.4 MMOL/L (ref 3.5–5.1)
PROCALCITONIN SERPL-MCNC: 0.08 NG/ML
PROT SERPL-MCNC: 7.4 G/DL (ref 6.4–8.2)
RBC # BLD AUTO: 4.23 M/UL (ref 3.8–5.2)
SODIUM SERPL-SCNC: 141 MMOL/L (ref 136–145)
SPECIMEN HOLD: NORMAL
WBC # BLD AUTO: 9.5 K/UL (ref 3.6–11)

## 2023-08-03 PROCEDURE — 2000000000 HC ICU R&B

## 2023-08-03 PROCEDURE — 36415 COLL VENOUS BLD VENIPUNCTURE: CPT

## 2023-08-03 PROCEDURE — 85025 COMPLETE CBC W/AUTO DIFF WBC: CPT

## 2023-08-03 PROCEDURE — 73562 X-RAY EXAM OF KNEE 3: CPT

## 2023-08-03 PROCEDURE — 87040 BLOOD CULTURE FOR BACTERIA: CPT

## 2023-08-03 PROCEDURE — 73200 CT UPPER EXTREMITY W/O DYE: CPT

## 2023-08-03 PROCEDURE — 84145 PROCALCITONIN (PCT): CPT

## 2023-08-03 PROCEDURE — 73020 X-RAY EXAM OF SHOULDER: CPT

## 2023-08-03 PROCEDURE — 6370000000 HC RX 637 (ALT 250 FOR IP): Performed by: INTERNAL MEDICINE

## 2023-08-03 PROCEDURE — 0RSKXZZ REPOSITION LEFT SHOULDER JOINT, EXTERNAL APPROACH: ICD-10-PCS | Performed by: PHYSICIAN ASSISTANT

## 2023-08-03 PROCEDURE — 2580000003 HC RX 258: Performed by: INTERNAL MEDICINE

## 2023-08-03 PROCEDURE — 73590 X-RAY EXAM OF LOWER LEG: CPT

## 2023-08-03 PROCEDURE — 73030 X-RAY EXAM OF SHOULDER: CPT

## 2023-08-03 PROCEDURE — 6360000002 HC RX W HCPCS: Performed by: INTERNAL MEDICINE

## 2023-08-03 PROCEDURE — 85576 BLOOD PLATELET AGGREGATION: CPT

## 2023-08-03 PROCEDURE — 72170 X-RAY EXAM OF PELVIS: CPT

## 2023-08-03 PROCEDURE — 70450 CT HEAD/BRAIN W/O DYE: CPT

## 2023-08-03 PROCEDURE — 2580000003 HC RX 258: Performed by: STUDENT IN AN ORGANIZED HEALTH CARE EDUCATION/TRAINING PROGRAM

## 2023-08-03 PROCEDURE — 83605 ASSAY OF LACTIC ACID: CPT

## 2023-08-03 PROCEDURE — 96375 TX/PRO/DX INJ NEW DRUG ADDON: CPT

## 2023-08-03 PROCEDURE — 72125 CT NECK SPINE W/O DYE: CPT

## 2023-08-03 PROCEDURE — 96374 THER/PROPH/DIAG INJ IV PUSH: CPT

## 2023-08-03 PROCEDURE — 71045 X-RAY EXAM CHEST 1 VIEW: CPT

## 2023-08-03 PROCEDURE — 99285 EMERGENCY DEPT VISIT HI MDM: CPT

## 2023-08-03 PROCEDURE — 6360000002 HC RX W HCPCS: Performed by: STUDENT IN AN ORGANIZED HEALTH CARE EDUCATION/TRAINING PROGRAM

## 2023-08-03 PROCEDURE — 6360000002 HC RX W HCPCS

## 2023-08-03 PROCEDURE — 80053 COMPREHEN METABOLIC PANEL: CPT

## 2023-08-03 RX ORDER — HYDRALAZINE HYDROCHLORIDE 20 MG/ML
10 INJECTION INTRAMUSCULAR; INTRAVENOUS EVERY 4 HOURS PRN
Status: DISCONTINUED | OUTPATIENT
Start: 2023-08-03 | End: 2023-08-06

## 2023-08-03 RX ORDER — MIDAZOLAM HYDROCHLORIDE 1 MG/ML
INJECTION INTRAMUSCULAR; INTRAVENOUS
Status: COMPLETED
Start: 2023-08-03 | End: 2023-08-03

## 2023-08-03 RX ORDER — PHENYLEPHRINE HCL IN 0.9% NACL 0.4MG/10ML
200 SYRINGE (ML) INTRAVENOUS ONCE
Status: DISCONTINUED | OUTPATIENT
Start: 2023-08-03 | End: 2023-08-04

## 2023-08-03 RX ORDER — MIDAZOLAM HYDROCHLORIDE 2 MG/2ML
1 INJECTION, SOLUTION INTRAMUSCULAR; INTRAVENOUS ONCE
Status: COMPLETED | OUTPATIENT
Start: 2023-08-03 | End: 2023-08-03

## 2023-08-03 RX ORDER — PHENYLEPHRINE HCL IN 0.9% NACL 0.4MG/10ML
SYRINGE (ML) INTRAVENOUS
Status: COMPLETED
Start: 2023-08-03 | End: 2023-08-03

## 2023-08-03 RX ORDER — PROPOFOL 10 MG/ML
INJECTION, EMULSION INTRAVENOUS
Status: COMPLETED
Start: 2023-08-03 | End: 2023-08-03

## 2023-08-03 RX ORDER — BUSPIRONE HYDROCHLORIDE 10 MG/1
20 TABLET ORAL 2 TIMES DAILY
Status: DISCONTINUED | OUTPATIENT
Start: 2023-08-03 | End: 2023-08-07 | Stop reason: HOSPADM

## 2023-08-03 RX ORDER — FENTANYL CITRATE 50 UG/ML
25 INJECTION, SOLUTION INTRAMUSCULAR; INTRAVENOUS ONCE
Status: DISCONTINUED | OUTPATIENT
Start: 2023-08-03 | End: 2023-08-04

## 2023-08-03 RX ORDER — PHENYLEPHRINE HCL IN 0.9% NACL 0.4MG/10ML
400 SYRINGE (ML) INTRAVENOUS ONCE
Status: COMPLETED | OUTPATIENT
Start: 2023-08-03 | End: 2023-08-03

## 2023-08-03 RX ORDER — LABETALOL HYDROCHLORIDE 5 MG/ML
10 INJECTION, SOLUTION INTRAVENOUS EVERY 4 HOURS PRN
Status: DISCONTINUED | OUTPATIENT
Start: 2023-08-03 | End: 2023-08-06

## 2023-08-03 RX ORDER — FENTANYL CITRATE 50 UG/ML
25 INJECTION, SOLUTION INTRAMUSCULAR; INTRAVENOUS ONCE
Status: DISCONTINUED | OUTPATIENT
Start: 2023-08-03 | End: 2023-08-03

## 2023-08-03 RX ORDER — SODIUM CHLORIDE 0.9 % (FLUSH) 0.9 %
5-40 SYRINGE (ML) INJECTION EVERY 12 HOURS SCHEDULED
Status: DISCONTINUED | OUTPATIENT
Start: 2023-08-03 | End: 2023-08-06

## 2023-08-03 RX ORDER — SODIUM CHLORIDE, SODIUM LACTATE, POTASSIUM CHLORIDE, AND CALCIUM CHLORIDE .6; .31; .03; .02 G/100ML; G/100ML; G/100ML; G/100ML
30 INJECTION, SOLUTION INTRAVENOUS ONCE
Status: COMPLETED | OUTPATIENT
Start: 2023-08-03 | End: 2023-08-03

## 2023-08-03 RX ORDER — OXYCODONE HYDROCHLORIDE 5 MG/1
2.5 TABLET ORAL EVERY 4 HOURS PRN
Status: DISCONTINUED | OUTPATIENT
Start: 2023-08-03 | End: 2023-08-03

## 2023-08-03 RX ORDER — FENTANYL CITRATE 50 UG/ML
INJECTION, SOLUTION INTRAMUSCULAR; INTRAVENOUS
Status: COMPLETED
Start: 2023-08-03 | End: 2023-08-03

## 2023-08-03 RX ORDER — SODIUM CHLORIDE 9 MG/ML
INJECTION, SOLUTION INTRAVENOUS PRN
Status: DISCONTINUED | OUTPATIENT
Start: 2023-08-03 | End: 2023-08-07

## 2023-08-03 RX ORDER — TRAZODONE HYDROCHLORIDE 50 MG/1
50 TABLET ORAL NIGHTLY
Status: DISCONTINUED | OUTPATIENT
Start: 2023-08-03 | End: 2023-08-07 | Stop reason: HOSPADM

## 2023-08-03 RX ORDER — SODIUM CHLORIDE, SODIUM LACTATE, POTASSIUM CHLORIDE, CALCIUM CHLORIDE 600; 310; 30; 20 MG/100ML; MG/100ML; MG/100ML; MG/100ML
INJECTION, SOLUTION INTRAVENOUS CONTINUOUS
Status: DISPENSED | OUTPATIENT
Start: 2023-08-03 | End: 2023-08-03

## 2023-08-03 RX ORDER — AMLODIPINE BESYLATE 5 MG/1
5 TABLET ORAL DAILY
Status: DISCONTINUED | OUTPATIENT
Start: 2023-08-03 | End: 2023-08-07 | Stop reason: HOSPADM

## 2023-08-03 RX ORDER — SODIUM CHLORIDE 0.9 % (FLUSH) 0.9 %
5-40 SYRINGE (ML) INJECTION PRN
Status: DISCONTINUED | OUTPATIENT
Start: 2023-08-03 | End: 2023-08-07

## 2023-08-03 RX ORDER — OXYCODONE HYDROCHLORIDE 5 MG/1
5 TABLET ORAL EVERY 4 HOURS PRN
Status: DISCONTINUED | OUTPATIENT
Start: 2023-08-03 | End: 2023-08-04

## 2023-08-03 RX ORDER — FENTANYL CITRATE 50 UG/ML
25 INJECTION, SOLUTION INTRAMUSCULAR; INTRAVENOUS
Status: DISCONTINUED | OUTPATIENT
Start: 2023-08-03 | End: 2023-08-03

## 2023-08-03 RX ADMIN — SODIUM CHLORIDE, POTASSIUM CHLORIDE, SODIUM LACTATE AND CALCIUM CHLORIDE: 600; 310; 30; 20 INJECTION, SOLUTION INTRAVENOUS at 14:55

## 2023-08-03 RX ADMIN — FENTANYL CITRATE 25 MCG: 50 INJECTION INTRAMUSCULAR; INTRAVENOUS at 18:40

## 2023-08-03 RX ADMIN — SODIUM CHLORIDE, PRESERVATIVE FREE 10 ML: 5 INJECTION INTRAVENOUS at 21:40

## 2023-08-03 RX ADMIN — CEFEPIME 2000 MG: 2 INJECTION, POWDER, FOR SOLUTION INTRAVENOUS at 12:02

## 2023-08-03 RX ADMIN — OXYCODONE HYDROCHLORIDE 5 MG: 5 TABLET ORAL at 16:38

## 2023-08-03 RX ADMIN — LABETALOL HYDROCHLORIDE 10 MG: 5 INJECTION INTRAVENOUS at 21:38

## 2023-08-03 RX ADMIN — PROPOFOL 100 MG: 10 INJECTION, EMULSION INTRAVENOUS at 19:02

## 2023-08-03 RX ADMIN — MIDAZOLAM 1 MG: 1 INJECTION INTRAMUSCULAR; INTRAVENOUS at 19:02

## 2023-08-03 RX ADMIN — SODIUM CHLORIDE, POTASSIUM CHLORIDE, SODIUM LACTATE AND CALCIUM CHLORIDE 1365 ML: 600; 310; 30; 20 INJECTION, SOLUTION INTRAVENOUS at 12:02

## 2023-08-03 RX ADMIN — Medication 400 MCG: at 19:07

## 2023-08-03 RX ADMIN — MIDAZOLAM HYDROCHLORIDE 1 MG: 2 INJECTION, SOLUTION INTRAMUSCULAR; INTRAVENOUS at 19:02

## 2023-08-03 RX ADMIN — OXYCODONE HYDROCHLORIDE 2.5 MG: 5 TABLET ORAL at 14:54

## 2023-08-03 RX ADMIN — FENTANYL CITRATE 25 MCG: 50 INJECTION INTRAMUSCULAR; INTRAVENOUS at 11:52

## 2023-08-03 ASSESSMENT — PAIN SCALES - GENERAL
PAINLEVEL_OUTOF10: 10
PAINLEVEL_OUTOF10: 0
PAINLEVEL_OUTOF10: 10
PAINLEVEL_OUTOF10: 10

## 2023-08-03 ASSESSMENT — PAIN DESCRIPTION - PAIN TYPE: TYPE: ACUTE PAIN

## 2023-08-03 ASSESSMENT — PAIN DESCRIPTION - LOCATION
LOCATION: LEG;SHOULDER
LOCATION: LEG;SHOULDER
LOCATION: SHOULDER

## 2023-08-03 ASSESSMENT — PAIN - FUNCTIONAL ASSESSMENT: PAIN_FUNCTIONAL_ASSESSMENT: 0-10

## 2023-08-03 ASSESSMENT — PAIN DESCRIPTION - ONSET: ONSET: ON-GOING

## 2023-08-03 ASSESSMENT — PAIN DESCRIPTION - DESCRIPTORS: DESCRIPTORS: SHARP

## 2023-08-03 ASSESSMENT — PAIN DESCRIPTION - FREQUENCY: FREQUENCY: CONTINUOUS

## 2023-08-03 ASSESSMENT — PAIN DESCRIPTION - ORIENTATION: ORIENTATION: LEFT

## 2023-08-03 NOTE — CARE COORDINATION
CM consult received and appreciated. EMR reviewed. Patient presents to the ED for possible left shoulder dislocation after mechanical fall 23. Patient is a resident at [facility] [address] (Admission dates 2015, 2021, and 2023). Case discussed w/ Dr. Mary Waddell regarding current medical status and concern regarding post ED follow up/ interventions. Met w/patient introduced to role of CM.  able name///place/president. Patient grimacing and crying states she is at hospital for her arm and hurt it 3 weeks ago. Patient verified emergency contacts Flori Whitlock (nephew) and [id number] alberto [facility]. [facility] is primary contact/ decision maker. Nursing informed patient requesting pain relief. CM will follow. Jeramy Ye w/ Dr. Frances Lazo informed of CT scan results and need for admission. 1125 VM left for LAVELL Gr/ [facility] [address] [phone].

## 2023-08-03 NOTE — ED TRIAGE NOTES
Pt to er via ems from [address] [place] [address] for possible left shoulder dislocation after mechanical fall on 6/29/23.

## 2023-08-03 NOTE — ED NOTES
TRANSFER - OUT REPORT:    Verbal report given to Estelita Garcia on Michael Carey  being transferred to ICU for routine progression of patient care       Report consisted of patient's Situation, Background, Assessment and   Recommendations(SBAR). Information from the following report(s) Nurse Handoff Report, Index, ED Encounter Summary, ED SBAR, and Adult Overview was reviewed with the receiving nurse. Criders Fall Assessment:    Presents to emergency department  because of falls (Syncope, seizure, or loss of consciousness): Yes  Age > 79: Yes  Altered Mental Status, Intoxication with alcohol or substance confusion (Disorientation, impaired judgment, poor safety awaremess, or inability to follow instructions): No  Impaired Mobility: Ambulates or transfers with assistive devices or assistance; Unable to ambulate or transer.: Yes  Nursing Judgement: Yes          Lines:   Peripheral IV 08/03/23 Right Antecubital (Active)        Opportunity for questions and clarification was provided.       Patient transported with:  Monitor and Registered Nurse          Jim Sheffield RN  08/03/23 1983

## 2023-08-03 NOTE — PROGRESS NOTES
1715: Patient arrived to ICU. Stable VSS upon admission. 1840: Ortho surgery here to attempt to reduce shoulder dislocation. Medications given by Dr. Darcy Arrington for procedure. See MAR.

## 2023-08-03 NOTE — H&P
Automatic Reconciliation   atorvastatin (LIPITOR) 80 MG tablet ceived the following from [facility]: Outside name: atorvastatin (LIPITOR) 80 mg tablet 4/6/23   Ar Automatic Reconciliation   bisacodyl (DULCOLAX) 5 MG EC tablet Take by mouth daily as needed 7/12/19   Ar Automatic Reconciliation   budesonide (PULMICORT) 0.5 MG/2ML nebulizer suspension Inhale into the lungs 2 times daily 3/11/19   Ar Automatic Reconciliation   busPIRone (BUSPAR) 5 MG tablet Take by mouth 2 times daily    Ar Automatic Reconciliation   clonazePAM (KLONOPIN) 0.125 MG disintegrating tablet ceived the following from [address] [facility] [address] [facility]: Outside name: clonazePAM (KlonoPIN) 0.125 mg disintegrating tablet 1/17/23   Ar Automatic Reconciliation   clopidogrel (PLAVIX) 75 MG tablet ceived the following from [facility]: Outside name: clopidogreL (PLAVIX) 75 mg tab 3/29/23   Ar Automatic Reconciliation   dexlansoprazole (DEXILANT) 60 MG CPDR delayed release capsule Take by mouth every morning (before breakfast)    Ar Automatic Reconciliation   DULoxetine (CYMBALTA) 60 MG extended release capsule Take 1 capsule by mouth daily    Ar Automatic Reconciliation   insulin lispro (HUMALOG) 100 UNIT/ML SOLN injection vial Inject into the skin 3 times daily (before meals) 6/3/21   Ar Automatic Reconciliation   mirtazapine (REMERON) 15 MG tablet Take 1 tablet by mouth nightly    Ar Automatic Reconciliation   mirtazapine (REMERON) 7.5 MG tablet ceived the following from [facility]: Outside name: mirtazapine (REMERON) 7.5 mg tablet 3/17/23   Ar Automatic Reconciliation   OLANZapine (ZYPREXA) 2.5 MG tablet ceived the following from [facility]: Outside name: OLANZapine (ZyPREXA) 2.5 mg tablet 3/12/23   Ar Automatic Reconciliation   pantoprazole (PROTONIX) 40 MG tablet ceived the following from [facility]: Outside name: pantoprazole (PROTONIX) 40 mg tablet 2/4/23   Ar

## 2023-08-03 NOTE — ED PROVIDER NOTES
[facility] EMERGENCY [address] ENCOUNTER      Pt Name: Shannan Servin  MRN: [id number]  [address] [facility] [place] 1940  Date of evaluation: 8/3/2023  Provider: Melquiades Yuen MD    CHIEF COMPLAINT       Chief Complaint   Patient presents with    Fall    Dislocation         HISTORY OF PRESENT ILLNESS   (Location/Symptom, Timing/Onset, Context/Setting, Quality, Duration, Modifying Factors, Severity)  Note limiting factors. Shannan Servin is an 81 y.o. F who presents to the emergency department with chief complaint of fall and left shoulder pain. The patient is presenting from [address] [place] [address]. The patient is brought in by EMS services. The patient was told to be evaluated in the emergency department by the nursing home personnel. Patient is not here with any other historian. Patient states that she had a fall approximately 3 weeks ago, and is now experiencing left shoulder pain along with right tib-fib pain and right knee pain. Patient states that she is unsure what exactly caused the fall, she is unsure if it was a mechanical fall in nature. She states that she was recently seen here in this department approximately 3 weeks prior with another fall, where she received CT imaging at that time which did have some chronic changes but nothing acute. The history is provided by the patient. The history is limited by the absence of a caregiver and the condition of the patient. No  was used. Review of External Medical Records:     Nursing Notes were reviewed. REVIEW OF SYSTEMS    (2-9 systems for level 4, 10 or more for level 5)     Review of Systems   Constitutional:  Positive for fatigue. Negative for activity change. Musculoskeletal:  Positive for arthralgias and myalgias. Except as noted above the remainder of the review of systems was reviewed and negative.        PAST MEDICAL HISTORY     Past Medical History:   Diagnosis Date    Anal polyp 6/13/2013

## 2023-08-04 ENCOUNTER — APPOINTMENT (OUTPATIENT)
Facility: HOSPITAL | Age: 83
DRG: 086 | End: 2023-08-04
Payer: MEDICARE

## 2023-08-04 PROBLEM — S06.360A TRAUMATIC INTRACEREBRAL HEMORRHAGE WITHOUT LOSS OF CONSCIOUSNESS (HCC): Status: ACTIVE | Noted: 2023-08-03

## 2023-08-04 LAB
ANION GAP SERPL CALC-SCNC: 5 MMOL/L (ref 5–15)
BASOPHILS # BLD: 0.1 K/UL (ref 0–0.1)
BASOPHILS NFR BLD: 1 % (ref 0–1)
BUN SERPL-MCNC: 6 MG/DL (ref 6–20)
BUN/CREAT SERPL: 12 (ref 12–20)
CALCIUM SERPL-MCNC: 8.3 MG/DL (ref 8.5–10.1)
CHLORIDE SERPL-SCNC: 107 MMOL/L (ref 97–108)
CO2 SERPL-SCNC: 25 MMOL/L (ref 21–32)
CREAT SERPL-MCNC: 0.52 MG/DL (ref 0.55–1.02)
DIFFERENTIAL METHOD BLD: ABNORMAL
EOSINOPHIL # BLD: 0.1 K/UL (ref 0–0.4)
EOSINOPHIL NFR BLD: 1 % (ref 0–7)
ERYTHROCYTE [DISTWIDTH] IN BLOOD BY AUTOMATED COUNT: 17.9 % (ref 11.5–14.5)
GLUCOSE SERPL-MCNC: 96 MG/DL (ref 65–100)
HCT VFR BLD AUTO: 35.7 % (ref 35–47)
HGB BLD-MCNC: 11.1 G/DL (ref 11.5–16)
IMM GRANULOCYTES # BLD AUTO: 0 K/UL (ref 0–0.04)
IMM GRANULOCYTES NFR BLD AUTO: 0 % (ref 0–0.5)
LYMPHOCYTES # BLD: 2.1 K/UL (ref 0.8–3.5)
LYMPHOCYTES NFR BLD: 20 % (ref 12–49)
MAGNESIUM SERPL-MCNC: 1.9 MG/DL (ref 1.6–2.4)
MAGNESIUM SERPL-MCNC: NORMAL MG/DL (ref 1.6–2.4)
MCH RBC QN AUTO: 25.4 PG (ref 26–34)
MCHC RBC AUTO-ENTMCNC: 31.1 G/DL (ref 30–36.5)
MCV RBC AUTO: 81.7 FL (ref 80–99)
MONOCYTES # BLD: 0.8 K/UL (ref 0–1)
MONOCYTES NFR BLD: 8 % (ref 5–13)
NEUTS SEG # BLD: 7.2 K/UL (ref 1.8–8)
NEUTS SEG NFR BLD: 70 % (ref 32–75)
NRBC # BLD: 0 K/UL (ref 0–0.01)
NRBC BLD-RTO: 0 PER 100 WBC
PHOSPHATE SERPL-MCNC: 4.4 MG/DL (ref 2.6–4.7)
PLATELET # BLD AUTO: 357 K/UL (ref 150–400)
PMV BLD AUTO: 9.5 FL (ref 8.9–12.9)
POTASSIUM SERPL-SCNC: 3.8 MMOL/L (ref 3.5–5.1)
POTASSIUM SERPL-SCNC: ABNORMAL MMOL/L (ref 3.5–5.1)
RBC # BLD AUTO: 4.37 M/UL (ref 3.8–5.2)
SODIUM SERPL-SCNC: 137 MMOL/L (ref 136–145)
WBC # BLD AUTO: 10.3 K/UL (ref 3.6–11)

## 2023-08-04 PROCEDURE — 6370000000 HC RX 637 (ALT 250 FOR IP): Performed by: NURSE PRACTITIONER

## 2023-08-04 PROCEDURE — 2060000000 HC ICU INTERMEDIATE R&B

## 2023-08-04 PROCEDURE — 85025 COMPLETE CBC W/AUTO DIFF WBC: CPT

## 2023-08-04 PROCEDURE — 84132 ASSAY OF SERUM POTASSIUM: CPT

## 2023-08-04 PROCEDURE — 83735 ASSAY OF MAGNESIUM: CPT

## 2023-08-04 PROCEDURE — 6370000000 HC RX 637 (ALT 250 FOR IP): Performed by: INTERNAL MEDICINE

## 2023-08-04 PROCEDURE — 70450 CT HEAD/BRAIN W/O DYE: CPT

## 2023-08-04 PROCEDURE — 2580000003 HC RX 258: Performed by: STUDENT IN AN ORGANIZED HEALTH CARE EDUCATION/TRAINING PROGRAM

## 2023-08-04 PROCEDURE — 84100 ASSAY OF PHOSPHORUS: CPT

## 2023-08-04 PROCEDURE — 6360000002 HC RX W HCPCS

## 2023-08-04 PROCEDURE — 2580000003 HC RX 258: Performed by: NURSE PRACTITIONER

## 2023-08-04 PROCEDURE — 80048 BASIC METABOLIC PNL TOTAL CA: CPT

## 2023-08-04 PROCEDURE — 6360000002 HC RX W HCPCS: Performed by: INTERNAL MEDICINE

## 2023-08-04 PROCEDURE — 99231 SBSQ HOSP IP/OBS SF/LOW 25: CPT | Performed by: NURSE PRACTITIONER

## 2023-08-04 PROCEDURE — 36415 COLL VENOUS BLD VENIPUNCTURE: CPT

## 2023-08-04 PROCEDURE — 2580000003 HC RX 258

## 2023-08-04 PROCEDURE — 6370000000 HC RX 637 (ALT 250 FOR IP)

## 2023-08-04 RX ORDER — ACETAMINOPHEN 500 MG
1000 TABLET ORAL EVERY 8 HOURS
Status: DISCONTINUED | OUTPATIENT
Start: 2023-08-04 | End: 2023-08-07 | Stop reason: HOSPADM

## 2023-08-04 RX ORDER — OXYCODONE HYDROCHLORIDE 5 MG/1
7.5 TABLET ORAL EVERY 4 HOURS PRN
Status: DISCONTINUED | OUTPATIENT
Start: 2023-08-04 | End: 2023-08-05

## 2023-08-04 RX ORDER — PANTOPRAZOLE SODIUM 20 MG/1
20 TABLET, DELAYED RELEASE ORAL
Status: DISCONTINUED | OUTPATIENT
Start: 2023-08-04 | End: 2023-08-07 | Stop reason: HOSPADM

## 2023-08-04 RX ORDER — SODIUM CHLORIDE 9 MG/ML
INJECTION, SOLUTION INTRAVENOUS CONTINUOUS
Status: DISCONTINUED | OUTPATIENT
Start: 2023-08-04 | End: 2023-08-06

## 2023-08-04 RX ORDER — DIPHENHYDRAMINE HYDROCHLORIDE 50 MG/ML
25 INJECTION INTRAMUSCULAR; INTRAVENOUS ONCE
Status: COMPLETED | OUTPATIENT
Start: 2023-08-04 | End: 2023-08-04

## 2023-08-04 RX ORDER — ONDANSETRON 2 MG/ML
4 INJECTION INTRAMUSCULAR; INTRAVENOUS EVERY 6 HOURS PRN
Status: DISCONTINUED | OUTPATIENT
Start: 2023-08-04 | End: 2023-08-06

## 2023-08-04 RX ORDER — DIPHENHYDRAMINE HYDROCHLORIDE, ZINC ACETATE 2; .1 G/100G; G/100G
CREAM TOPICAL 3 TIMES DAILY PRN
Status: DISCONTINUED | OUTPATIENT
Start: 2023-08-04 | End: 2023-08-07 | Stop reason: HOSPADM

## 2023-08-04 RX ORDER — OXYCODONE HYDROCHLORIDE 5 MG/1
7.5 TABLET ORAL ONCE
Status: COMPLETED | OUTPATIENT
Start: 2023-08-04 | End: 2023-08-04

## 2023-08-04 RX ORDER — HYDROXYZINE HYDROCHLORIDE 10 MG/1
10 TABLET, FILM COATED ORAL ONCE
Status: COMPLETED | OUTPATIENT
Start: 2023-08-04 | End: 2023-08-04

## 2023-08-04 RX ORDER — HYDROMORPHONE HYDROCHLORIDE 1 MG/ML
0.5 INJECTION, SOLUTION INTRAMUSCULAR; INTRAVENOUS; SUBCUTANEOUS ONCE
Status: COMPLETED | OUTPATIENT
Start: 2023-08-04 | End: 2023-08-04

## 2023-08-04 RX ADMIN — HYDROMORPHONE HYDROCHLORIDE 0.5 MG: 1 INJECTION, SOLUTION INTRAMUSCULAR; INTRAVENOUS; SUBCUTANEOUS at 14:53

## 2023-08-04 RX ADMIN — BUSPIRONE HYDROCHLORIDE 20 MG: 10 TABLET ORAL at 20:01

## 2023-08-04 RX ADMIN — HYDROXYZINE HYDROCHLORIDE 10 MG: 10 TABLET ORAL at 23:20

## 2023-08-04 RX ADMIN — SODIUM CHLORIDE, PRESERVATIVE FREE 10 ML: 5 INJECTION INTRAVENOUS at 08:42

## 2023-08-04 RX ADMIN — ACETAMINOPHEN 1000 MG: 325 TABLET ORAL at 20:01

## 2023-08-04 RX ADMIN — OXYCODONE HYDROCHLORIDE 5 MG: 5 TABLET ORAL at 04:47

## 2023-08-04 RX ADMIN — AMLODIPINE BESYLATE 5 MG: 5 TABLET ORAL at 08:42

## 2023-08-04 RX ADMIN — SODIUM CHLORIDE: 900 INJECTION, SOLUTION INTRAVENOUS at 00:51

## 2023-08-04 RX ADMIN — SODIUM CHLORIDE: 900 INJECTION, SOLUTION INTRAVENOUS at 14:39

## 2023-08-04 RX ADMIN — OXYCODONE HYDROCHLORIDE 5 MG: 5 TABLET ORAL at 08:42

## 2023-08-04 RX ADMIN — LABETALOL HYDROCHLORIDE 10 MG: 5 INJECTION INTRAVENOUS at 08:50

## 2023-08-04 RX ADMIN — OXYCODONE HYDROCHLORIDE 5 MG: 5 TABLET ORAL at 00:47

## 2023-08-04 RX ADMIN — VANCOMYCIN HYDROCHLORIDE 1500 MG: 1.5 INJECTION, POWDER, LYOPHILIZED, FOR SOLUTION INTRAVENOUS at 22:11

## 2023-08-04 RX ADMIN — OXYCODONE HYDROCHLORIDE 7.5 MG: 5 TABLET ORAL at 09:56

## 2023-08-04 RX ADMIN — OXYCODONE HYDROCHLORIDE 7.5 MG: 5 TABLET ORAL at 20:02

## 2023-08-04 RX ADMIN — DIPHENHYDRAMINE HYDROCHLORIDE, ZINC ACETATE: 2; .1 CREAM TOPICAL at 13:49

## 2023-08-04 RX ADMIN — BUSPIRONE HYDROCHLORIDE 20 MG: 10 TABLET ORAL at 08:42

## 2023-08-04 RX ADMIN — ACETAMINOPHEN 1000 MG: 325 TABLET ORAL at 13:52

## 2023-08-04 RX ADMIN — DIPHENHYDRAMINE HYDROCHLORIDE 25 MG: 50 INJECTION, SOLUTION INTRAMUSCULAR; INTRAVENOUS at 14:53

## 2023-08-04 RX ADMIN — DIPHENHYDRAMINE HYDROCHLORIDE, ZINC ACETATE: 2; .1 CREAM TOPICAL at 22:15

## 2023-08-04 ASSESSMENT — PAIN DESCRIPTION - DESCRIPTORS
DESCRIPTORS: CRAMPING
DESCRIPTORS: SORE;THROBBING
DESCRIPTORS: ACHING
DESCRIPTORS: CRAMPING

## 2023-08-04 ASSESSMENT — PAIN SCALES - GENERAL
PAINLEVEL_OUTOF10: 0
PAINLEVEL_OUTOF10: 10
PAINLEVEL_OUTOF10: 6
PAINLEVEL_OUTOF10: 4
PAINLEVEL_OUTOF10: 7
PAINLEVEL_OUTOF10: 8
PAINLEVEL_OUTOF10: 7

## 2023-08-04 ASSESSMENT — PAIN DESCRIPTION - LOCATION
LOCATION: SHOULDER
LOCATION: LEG
LOCATION: SHOULDER
LOCATION: LEG
LOCATION: SHOULDER;LEG

## 2023-08-04 ASSESSMENT — PAIN DESCRIPTION - ORIENTATION
ORIENTATION: RIGHT
ORIENTATION: RIGHT
ORIENTATION: LEFT;RIGHT
ORIENTATION: LEFT
ORIENTATION: LEFT

## 2023-08-04 ASSESSMENT — PAIN DESCRIPTION - FREQUENCY
FREQUENCY: CONTINUOUS
FREQUENCY: INTERMITTENT

## 2023-08-04 ASSESSMENT — PAIN DESCRIPTION - PAIN TYPE
TYPE: ACUTE PAIN
TYPE: ACUTE PAIN

## 2023-08-04 ASSESSMENT — PAIN - FUNCTIONAL ASSESSMENT: PAIN_FUNCTIONAL_ASSESSMENT: PREVENTS OR INTERFERES WITH ALL ACTIVE AND SOME PASSIVE ACTIVITIES

## 2023-08-04 ASSESSMENT — PAIN DESCRIPTION - ONSET: ONSET: AWAKENED FROM SLEEP

## 2023-08-04 NOTE — WOUND CARE
WOCN Note:     New consult for lower legs. Seen in 8663    Chart shows:  Admitted on 8/3/23 from [facility]. Admitted for [address] & closed reduction of left shoulder post fall ~ 2-3 weeks ago. History of DM. WBC = 10.3    Assessment:   Communicative and favoring right side. RN, Kalli Campbell, at beside to help turn onto left and clean her of urinary incontinence. Reports pain in lower legs with cleaning and then dozes off. External urinary device to suction. Surface: MIKAELA mattress    Bilateral heels intact and without erythema. Buttocks and sacrum intact without erythema    1. POA bilateral lower legs with partial thickness tissue loss to anterior with each leg having a full thickness area = ~1x1x0.1 cm   Red bases; large amount of flaky dry skin sheets  No purulence  Cleaned with Vashe and applied silver foam dressing; dry skin moisturized with lotion      Wound Recommendations:    Lower legs: clean with Vashe, apply lotion to all intact areas; cover open areas with silver foam and secure with STRETCH roll gauze. Change every 3 days. PI Prevention:  Turn/reposition approximately every 2 hours  Offload heels with heels hanging off end of pillow at all times while in bed. Hydraguard cream to buttocks and sacrum daily and as needed with incontinence care  Low Air Loss mattress: Use only flat sheet and one incontinence pad.      Transition of Care: Plan to follow weekly and as needed while admitted to hospital.     MARTITA DamonN, RN, [facility]  Certified Wound, Ostomy, Continence Nurse  office [phone]  Available via [facility]

## 2023-08-04 NOTE — CARE COORDINATION
JOHN - Case discussed w/ Rudy Ramírez - SBAR provided. This writer re-attempted to contact Emely Estevez / Iggy Mancera. Unable to leave phone message on line. CM placed call back and provided  name/number requesting a call from [address] [place] [address] (outcome pending).

## 2023-08-04 NOTE — PLAN OF CARE
Problem: Discharge Planning  Goal: Discharge to home or other facility with appropriate resources  Outcome: Progressing  Flowsheets (Taken 8/3/2023 2000)  Discharge to home or other facility with appropriate resources:   Identify barriers to discharge with patient and caregiver   Arrange for needed discharge resources and transportation as appropriate   Identify discharge learning needs (meds, wound care, etc)     Problem: Safety - Adult  Goal: Free from fall injury  Outcome: Progressing     Problem: Pain  Goal: Verbalizes/displays adequate comfort level or baseline comfort level  Outcome: Progressing     Problem: Skin/Tissue Integrity  Goal: Absence of new skin breakdown  Description: 1. Monitor for areas of redness and/or skin breakdown  2. Assess vascular access sites hourly  3. Every 4-6 hours minimum:  Change oxygen saturation probe site  4. Every 4-6 hours:  If on nasal continuous positive airway pressure, respiratory therapy assess nares and determine need for appliance change or resting period.   Outcome: Progressing     Problem: ABCDS Injury Assessment  Goal: Absence of physical injury  Outcome: Progressing  Flowsheets (Taken 8/3/2023 2000)  Absence of Physical Injury: Implement safety measures based on patient assessment

## 2023-08-04 NOTE — PROGRESS NOTES
Spiritual Care Assessment/Progress Note  [facility]    Name: Shannan Servin MRN: [id number]    Age: 81 y.o. Sex: female   Language: English     Date: 8/4/2023            Total Time Calculated: 24 min              Spiritual Assessment begun in [facility] 7S2 INTENSIVE CARE     Referral/Consult From[de-identified] Rounding  Encounter Overview/Reason : Initial Encounter    Spiritual beliefs:      [x] Involved in a darek tradition/spiritual practice: Mosque p/ chart review     [] Supported by a darek community:      [] Claims no spiritual orientation:      [] Seeking spiritual identity:           [] Adheres to an individual form of spirituality:      [] Not able to assess:                Identified resources for coping and support system:   Support System: Family members       [] Prayer                  [] Devotional reading               [] Music                  [] Guided Imagery     [] Pet visits                                        [] Other: (COMMENT)     Specific area/focus of visit   Encounter: Type: Initial Screen/Assessment  Crisis:    Spiritual/Emotional needs: Type: Spiritual Support  Ritual, Rites and Sacraments:    Grief, Loss, and Adjustments: Type: Life Adjustments, Adjustment to illness  Ethics/Mediation:    Behavioral Health:    Palliative Care: Advance Care Planning:      Plan/Referrals: Other (Comment) (Please contact spiritual care for future services.)    Narrative:   Referral source:   Shannan Servin at [facility] in [facility]. I reviewed the medical record prior to this encounter. Spiritual Assessment:     We discussed Shannan Servin current feelings/concerns and spiritual perspectives. Shannan Servin shared that she was hungry and desired something to eat. Per conversation with Ms Fozia Self - she is schedule for surgery today. Ms Bernie Dupree shared about family - no spiritual needs noted.   Explored spiritual, relational, and emotional needs; facilitated life review/storytelling;

## 2023-08-04 NOTE — PROCEDURES
PROCEDURE NOTE - FRACTURE REDUCTION:     The patient was induced with propofol for procedural sedation via the ICU MD. After it was confirmed that appropriate sedation had been reached, Dr. Jackie Saenz performed a longitudinal traction in conjunction with anterior pressure to the humeral head, while I provided counter traction. After it was felt the humeral head was in a better position bedside imaging was taken, which showed persistent dislocation. One additional attempt was made in a similar manner, but was also unsuccessful. The patient was aroused from anesthesia, a sling was placed. The extremity was neurovascularly intact post reduction attempts.      Signed By: Chip Sever, PA     August 3, 2023

## 2023-08-05 LAB
ANION GAP SERPL CALC-SCNC: 5 MMOL/L (ref 5–15)
BACTERIA SPEC CULT: ABNORMAL
BACTERIA SPEC CULT: ABNORMAL
BASOPHILS # BLD: 0.1 K/UL (ref 0–0.1)
BASOPHILS NFR BLD: 1 % (ref 0–1)
BUN SERPL-MCNC: 5 MG/DL (ref 6–20)
BUN/CREAT SERPL: 11 (ref 12–20)
CALCIUM SERPL-MCNC: 7.9 MG/DL (ref 8.5–10.1)
CHLORIDE SERPL-SCNC: 107 MMOL/L (ref 97–108)
CO2 SERPL-SCNC: 27 MMOL/L (ref 21–32)
CREAT SERPL-MCNC: 0.47 MG/DL (ref 0.55–1.02)
DIFFERENTIAL METHOD BLD: ABNORMAL
EOSINOPHIL # BLD: 0.2 K/UL (ref 0–0.4)
EOSINOPHIL NFR BLD: 2 % (ref 0–7)
ERYTHROCYTE [DISTWIDTH] IN BLOOD BY AUTOMATED COUNT: 17.8 % (ref 11.5–14.5)
GLUCOSE SERPL-MCNC: 95 MG/DL (ref 65–100)
HCT VFR BLD AUTO: 32.9 % (ref 35–47)
HGB BLD-MCNC: 10.3 G/DL (ref 11.5–16)
IMM GRANULOCYTES # BLD AUTO: 0 K/UL (ref 0–0.04)
IMM GRANULOCYTES NFR BLD AUTO: 0 % (ref 0–0.5)
INR PPP: 1.3 (ref 0.9–1.1)
LYMPHOCYTES # BLD: 2 K/UL (ref 0.8–3.5)
LYMPHOCYTES NFR BLD: 21 % (ref 12–49)
MAGNESIUM SERPL-MCNC: 1.8 MG/DL (ref 1.6–2.4)
MCH RBC QN AUTO: 25.7 PG (ref 26–34)
MCHC RBC AUTO-ENTMCNC: 31.3 G/DL (ref 30–36.5)
MCV RBC AUTO: 82 FL (ref 80–99)
MONOCYTES # BLD: 0.9 K/UL (ref 0–1)
MONOCYTES NFR BLD: 10 % (ref 5–13)
NEUTS SEG # BLD: 6.1 K/UL (ref 1.8–8)
NEUTS SEG NFR BLD: 66 % (ref 32–75)
NRBC # BLD: 0 K/UL (ref 0–0.01)
NRBC BLD-RTO: 0 PER 100 WBC
PHOSPHATE SERPL-MCNC: 2.9 MG/DL (ref 2.6–4.7)
PLATELET # BLD AUTO: 351 K/UL (ref 150–400)
PMV BLD AUTO: 9.6 FL (ref 8.9–12.9)
POTASSIUM SERPL-SCNC: 3.3 MMOL/L (ref 3.5–5.1)
PROTHROMBIN TIME: 13 SEC (ref 9–11.1)
RBC # BLD AUTO: 4.01 M/UL (ref 3.8–5.2)
SERVICE CMNT-IMP: ABNORMAL
SODIUM SERPL-SCNC: 139 MMOL/L (ref 136–145)
WBC # BLD AUTO: 9.3 K/UL (ref 3.6–11)

## 2023-08-05 PROCEDURE — 2580000003 HC RX 258: Performed by: STUDENT IN AN ORGANIZED HEALTH CARE EDUCATION/TRAINING PROGRAM

## 2023-08-05 PROCEDURE — 6360000002 HC RX W HCPCS: Performed by: NURSE PRACTITIONER

## 2023-08-05 PROCEDURE — 85610 PROTHROMBIN TIME: CPT

## 2023-08-05 PROCEDURE — 2060000000 HC ICU INTERMEDIATE R&B

## 2023-08-05 PROCEDURE — 6370000000 HC RX 637 (ALT 250 FOR IP): Performed by: NURSE PRACTITIONER

## 2023-08-05 PROCEDURE — 2580000003 HC RX 258: Performed by: NURSE PRACTITIONER

## 2023-08-05 PROCEDURE — 6370000000 HC RX 637 (ALT 250 FOR IP): Performed by: INTERNAL MEDICINE

## 2023-08-05 PROCEDURE — 80048 BASIC METABOLIC PNL TOTAL CA: CPT

## 2023-08-05 PROCEDURE — 2580000003 HC RX 258

## 2023-08-05 PROCEDURE — 6360000002 HC RX W HCPCS

## 2023-08-05 PROCEDURE — 85025 COMPLETE CBC W/AUTO DIFF WBC: CPT

## 2023-08-05 PROCEDURE — 36415 COLL VENOUS BLD VENIPUNCTURE: CPT

## 2023-08-05 PROCEDURE — 83735 ASSAY OF MAGNESIUM: CPT

## 2023-08-05 PROCEDURE — 84100 ASSAY OF PHOSPHORUS: CPT

## 2023-08-05 PROCEDURE — 87040 BLOOD CULTURE FOR BACTERIA: CPT

## 2023-08-05 RX ORDER — TRAMADOL HYDROCHLORIDE 50 MG/1
50 TABLET ORAL EVERY 6 HOURS PRN
Status: DISCONTINUED | OUTPATIENT
Start: 2023-08-05 | End: 2023-08-07 | Stop reason: HOSPADM

## 2023-08-05 RX ORDER — KETOROLAC TROMETHAMINE 30 MG/ML
15 INJECTION, SOLUTION INTRAMUSCULAR; INTRAVENOUS EVERY 6 HOURS
Status: DISCONTINUED | OUTPATIENT
Start: 2023-08-05 | End: 2023-08-06

## 2023-08-05 RX ORDER — KETOROLAC TROMETHAMINE 30 MG/ML
30 INJECTION, SOLUTION INTRAMUSCULAR; INTRAVENOUS EVERY 6 HOURS
Status: DISCONTINUED | OUTPATIENT
Start: 2023-08-05 | End: 2023-08-05

## 2023-08-05 RX ADMIN — SODIUM CHLORIDE: 900 INJECTION, SOLUTION INTRAVENOUS at 18:53

## 2023-08-05 RX ADMIN — VANCOMYCIN HYDROCHLORIDE 1250 MG: 1.25 INJECTION, POWDER, LYOPHILIZED, FOR SOLUTION INTRAVENOUS at 08:30

## 2023-08-05 RX ADMIN — SODIUM CHLORIDE: 900 INJECTION, SOLUTION INTRAVENOUS at 04:18

## 2023-08-05 RX ADMIN — KETOROLAC TROMETHAMINE 15 MG: 30 INJECTION, SOLUTION INTRAMUSCULAR; INTRAVENOUS at 15:48

## 2023-08-05 RX ADMIN — BUSPIRONE HYDROCHLORIDE 20 MG: 10 TABLET ORAL at 21:28

## 2023-08-05 RX ADMIN — POTASSIUM BICARBONATE 40 MEQ: 782 TABLET, EFFERVESCENT ORAL at 08:27

## 2023-08-05 RX ADMIN — ACETAMINOPHEN 1000 MG: 325 TABLET ORAL at 21:28

## 2023-08-05 RX ADMIN — AMLODIPINE BESYLATE 5 MG: 5 TABLET ORAL at 08:27

## 2023-08-05 RX ADMIN — PANTOPRAZOLE SODIUM 20 MG: 20 TABLET, DELAYED RELEASE ORAL at 08:28

## 2023-08-05 RX ADMIN — KETOROLAC TROMETHAMINE 15 MG: 30 INJECTION, SOLUTION INTRAMUSCULAR; INTRAVENOUS at 21:28

## 2023-08-05 RX ADMIN — TRAMADOL HYDROCHLORIDE 50 MG: 50 TABLET ORAL at 18:09

## 2023-08-05 RX ADMIN — SODIUM CHLORIDE, PRESERVATIVE FREE 10 ML: 5 INJECTION INTRAVENOUS at 08:28

## 2023-08-05 RX ADMIN — SODIUM CHLORIDE, PRESERVATIVE FREE 10 ML: 5 INJECTION INTRAVENOUS at 21:28

## 2023-08-05 RX ADMIN — ACETAMINOPHEN 1000 MG: 325 TABLET ORAL at 13:34

## 2023-08-05 RX ADMIN — TRAMADOL HYDROCHLORIDE 50 MG: 50 TABLET ORAL at 22:51

## 2023-08-05 RX ADMIN — BUSPIRONE HYDROCHLORIDE 20 MG: 10 TABLET ORAL at 08:28

## 2023-08-05 ASSESSMENT — PAIN SCALES - GENERAL
PAINLEVEL_OUTOF10: 5
PAINLEVEL_OUTOF10: 5
PAINLEVEL_OUTOF10: 8
PAINLEVEL_OUTOF10: 10
PAINLEVEL_OUTOF10: 5

## 2023-08-05 ASSESSMENT — PAIN DESCRIPTION - DESCRIPTORS
DESCRIPTORS: ACHING
DESCRIPTORS: ACHING

## 2023-08-05 ASSESSMENT — PAIN DESCRIPTION - LOCATION
LOCATION: SHOULDER
LOCATION: ARM
LOCATION: LEG

## 2023-08-05 ASSESSMENT — PAIN DESCRIPTION - PAIN TYPE
TYPE: ACUTE PAIN
TYPE: ACUTE PAIN;CHRONIC PAIN

## 2023-08-05 ASSESSMENT — PAIN DESCRIPTION - ORIENTATION
ORIENTATION: LEFT
ORIENTATION: RIGHT
ORIENTATION: LEFT

## 2023-08-05 NOTE — PROGRESS NOTES
Hospitalist Progress Note  ISELA Sarah - NP  Answering service: [phone]        Date of Service:  2023  NAME:  Shirin De La Torre  :  1940  MRN:  [id number]      Admission Summary:     Ms. Milady Hendrickson is a 81 y.o. female who presented to the ED 8/3 from [facility] (has lived there for 3 years per pt) with chief complaint of fall and left shoulder pain. Pt reported a fall ~ 3 weeks PTA and is now experiencing left shoulder pain along with right tib-fib and right knee pain. She was unsure what exactly caused the fall. CT of the head showed a questionable small intraparanchymal hemorrhage in the deep R frontal lobe and suggestion of subtle subarachnoid hemorhage in the posterioir R frontal lobe. L shoulder Xray showed an anterioir dislocation. Nsgy was consulted (Dr. Neha Boone) and recommended admit to ICU for monitoring. Attempt at reduction of left shoulder (under anesthesia and without success) on 8/3 - Ortho considering timing of possible open shoulder reduction. For now, pt is NWB to [facility]. Hospitalists consulted for medical downgrade from ICU . Patient was seen and examined, she was lying in bed in no acute distress. - cooperative and interactive with assessment. She reports she is having some pain in her left shoulder -she is receiving oxycodone for this. She denies any headaches, dizziness, chest pain, chest pressure, shortness of breath or cough. Denies any GI complaints such as nausea, vomiting, diarrhea or constipation. She indicates she has no appetite, notably she is n.p.o. - there is no plans for immediate Ortho surgery - will restart diet. Patient appears to be medically stable for transfer to neuroscience floor. Interval history / Subjective:        Patient was seen and examined this morning, she was lying in bed in no acute distress though reports pruritus.

## 2023-08-05 NOTE — PROGRESS NOTES
1200: Attempted to draw blood cultures, patient yelling and saying no. Labs not collected at this time. 1530: Attempted to draw cultures again. Patient crying, yelling, and pulling arm away. Labs not collected at this time. 1805: TRANSFER - OUT REPORT:    Verbal report given to Jeramy Devries RN on Patience Payne  being transferred to [facility] for routine progression of patient care       Report consisted of patient's Situation, Background, Assessment and   Recommendations(SBAR). Information from the following report(s) Nurse Handoff Report, Intake/Output, MAR, Recent Results, Cardiac Rhythm NSR, Alarm Parameters, and Neuro Assessment was reviewed with the receiving nurse. Lines:   Peripheral IV 08/03/23 Right; Anterior Cephalic (Active)   Site Assessment Clean, dry & intact 08/05/23 0800   Line Status Infusing 08/05/23 0800   Line Care Connections checked and tightened 08/05/23 0800   Phlebitis Assessment No symptoms 08/05/23 0800   Infiltration Assessment 0 08/05/23 0800   Alcohol Cap Used Yes 08/05/23 0800   Dressing Status Clean, dry & intact 08/05/23 0800   Dressing Type Transparent 08/05/23 0800        Opportunity for questions and clarification was provided.       Patient transported with:  Monitor, Registered Nurse, and [phone]: Patient off unit

## 2023-08-06 LAB
ANION GAP SERPL CALC-SCNC: 6 MMOL/L (ref 5–15)
BUN SERPL-MCNC: 5 MG/DL (ref 6–20)
BUN/CREAT SERPL: 11 (ref 12–20)
CALCIUM SERPL-MCNC: 7.9 MG/DL (ref 8.5–10.1)
CHLORIDE SERPL-SCNC: 110 MMOL/L (ref 97–108)
CO2 SERPL-SCNC: 27 MMOL/L (ref 21–32)
CREAT SERPL-MCNC: 0.44 MG/DL (ref 0.55–1.02)
GLUCOSE SERPL-MCNC: 157 MG/DL (ref 65–100)
MAGNESIUM SERPL-MCNC: 1.9 MG/DL (ref 1.6–2.4)
PHOSPHATE SERPL-MCNC: 2.6 MG/DL (ref 2.6–4.7)
POTASSIUM SERPL-SCNC: 3.7 MMOL/L (ref 3.5–5.1)
SODIUM SERPL-SCNC: 143 MMOL/L (ref 136–145)

## 2023-08-06 PROCEDURE — 6370000000 HC RX 637 (ALT 250 FOR IP): Performed by: NURSE PRACTITIONER

## 2023-08-06 PROCEDURE — 83735 ASSAY OF MAGNESIUM: CPT

## 2023-08-06 PROCEDURE — 2580000003 HC RX 258

## 2023-08-06 PROCEDURE — 6360000002 HC RX W HCPCS: Performed by: NURSE PRACTITIONER

## 2023-08-06 PROCEDURE — 36415 COLL VENOUS BLD VENIPUNCTURE: CPT

## 2023-08-06 PROCEDURE — 6370000000 HC RX 637 (ALT 250 FOR IP): Performed by: INTERNAL MEDICINE

## 2023-08-06 PROCEDURE — 84100 ASSAY OF PHOSPHORUS: CPT

## 2023-08-06 PROCEDURE — 2060000000 HC ICU INTERMEDIATE R&B

## 2023-08-06 PROCEDURE — 6360000002 HC RX W HCPCS

## 2023-08-06 PROCEDURE — 80048 BASIC METABOLIC PNL TOTAL CA: CPT

## 2023-08-06 RX ADMIN — KETOROLAC TROMETHAMINE 15 MG: 30 INJECTION, SOLUTION INTRAMUSCULAR; INTRAVENOUS at 03:53

## 2023-08-06 RX ADMIN — AMLODIPINE BESYLATE 5 MG: 5 TABLET ORAL at 14:08

## 2023-08-06 RX ADMIN — TRAMADOL HYDROCHLORIDE 50 MG: 50 TABLET ORAL at 14:24

## 2023-08-06 RX ADMIN — ACETAMINOPHEN 1000 MG: 325 TABLET ORAL at 14:11

## 2023-08-06 RX ADMIN — VANCOMYCIN HYDROCHLORIDE 1250 MG: 1.25 INJECTION, POWDER, LYOPHILIZED, FOR SOLUTION INTRAVENOUS at 03:53

## 2023-08-06 RX ADMIN — ACETAMINOPHEN 1000 MG: 325 TABLET ORAL at 05:51

## 2023-08-06 RX ADMIN — BUSPIRONE HYDROCHLORIDE 20 MG: 10 TABLET ORAL at 14:13

## 2023-08-06 RX ADMIN — ACETAMINOPHEN 1000 MG: 325 TABLET ORAL at 21:35

## 2023-08-06 RX ADMIN — BUSPIRONE HYDROCHLORIDE 20 MG: 10 TABLET ORAL at 21:35

## 2023-08-06 RX ADMIN — PANTOPRAZOLE SODIUM 20 MG: 20 TABLET, DELAYED RELEASE ORAL at 06:00

## 2023-08-06 ASSESSMENT — PAIN DESCRIPTION - LOCATION
LOCATION: LEG
LOCATION: ARM
LOCATION: LEG

## 2023-08-06 ASSESSMENT — PAIN DESCRIPTION - ORIENTATION
ORIENTATION: RIGHT
ORIENTATION: LEFT

## 2023-08-06 ASSESSMENT — PAIN - FUNCTIONAL ASSESSMENT: PAIN_FUNCTIONAL_ASSESSMENT: ACTIVITIES ARE NOT PREVENTED

## 2023-08-06 ASSESSMENT — PAIN SCALES - GENERAL
PAINLEVEL_OUTOF10: 5
PAINLEVEL_OUTOF10: 9
PAINLEVEL_OUTOF10: 8
PAINLEVEL_OUTOF10: 8
PAINLEVEL_OUTOF10: 9

## 2023-08-06 ASSESSMENT — PAIN DESCRIPTION - DESCRIPTORS
DESCRIPTORS: ACHING
DESCRIPTORS: ACHING

## 2023-08-06 NOTE — PROGRESS NOTES
Hospitalist Progress Note  ISELA Lazcano NP  Answering service:         Date of Service:  2023  NAME:  Nando Kwon  :  1940  MRN:  [id number]    Admission Summary:     Ms. Ildefonso Martin is a 81 y.o. female who presented to the ED 8/3 from [facility] (has lived there for 3 years per pt) with chief complaint of fall and left shoulder pain. Pt reported a fall ~ 3 weeks PTA and is now experiencing left shoulder pain along with right tib-fib and right knee pain. She was unsure what exactly caused the fall. CT of the head showed a questionable small intraparanchymal hemorrhage in the deep R frontal lobe and suggestion of subtle subarachnoid hemorhage in the posterioir R frontal lobe. L shoulder Xray showed an anterioir dislocation. Nsgy was consulted (Dr. Mansoor Harris) and recommended admit to ICU for monitoring. Attempt at reduction of left shoulder (under anesthesia and without success) on 8/3 - Ortho considering timing of possible open shoulder reduction. For now, pt is NWB to [facility]. Hospitalists consulted for medical downgrade from ICU . Patient was seen and examined, she was lying in bed in no acute distress. - cooperative and interactive with assessment. She reports she is having some pain in her left shoulder -she is receiving oxycodone for this. She denies any headaches, dizziness, chest pain, chest pressure, shortness of breath or cough. Denies any GI complaints such as nausea, vomiting, diarrhea or constipation. She indicates she has no appetite, notably she is n.p.o. - there is no plans for immediate Ortho surgery - will restart diet. Patient appears to be medically stable for transfer to neuroscience floor. Interval history / Subjective:        Patient was sitting up in bed in no acute distress though verbalized she was not very comfortable.   She was

## 2023-08-06 NOTE — CARE COORDINATION
Care Management Initial Assessment       RUR: 17%  Readmission? No  1st IM letter given? Yes    JOHN: LTC - Return to [facility]  Transport: BLS    CM met with patient at bedside to introduce self and role. ADLs: Requires assist  DME: Hospital bed at [address]: None  Previous [address]: [facility]: None  Insurance verified: [place] and [facility] Medicaid  Pharmacy: [facility] to fill  Emergency Contact: [facility] [phone]    Pt in agreement to return to [address] [place] [address] when stable. Referral sent in [address]. CM consulted for APS call - not clear as to why APS call requested. CM met with Pt, she prefers to return to [facility], no concerns of neglect expressed by Pt. Will complete consult. CM will follow patient progress and assist as needed with JOHN plan. 08/06/23 1246   Service Assessment   Patient Orientation Alert and Oriented;Self;Person   Cognition Other (see comment)   History Provided By Patient   Primary Caregiver Other (Comment)  (LTC)   Support Systems Family Members   Patient's [facility] is: Legal Next of Kin  (Needs AMD)   PCP Verified by CM Yes   Prior Functional Level Assistance with the following:;Bathing;Dressing; Toileting;Cooking;Housework; Shopping;Mobility   Current Functional Level Assistance with the following:;Bathing; Toileting;Dressing;Cooking;Housework; Mobility; Shopping   Can patient return to prior living arrangement Yes   Ability to make needs known: Fair   Family able to assist with home care needs: No   Would you like for me to discuss the discharge plan with any other family members/significant others, and if so, who?  No   Financial Resources Medicaid   Social/Functional History   Lives With Other (comment)  (LTC)   Type of Home   (LTC)   Receives Help From Other (comment)   ADL Assistance Needs assistance   Toileting Needs assistance   [facility] Needs assistance   Homemaking

## 2023-08-07 VITALS
RESPIRATION RATE: 11 BRPM | TEMPERATURE: 97.8 F | HEART RATE: 86 BPM | BODY MASS INDEX: 27.44 KG/M2 | HEIGHT: 60 IN | WEIGHT: 139.77 LBS | SYSTOLIC BLOOD PRESSURE: 142 MMHG | OXYGEN SATURATION: 96 % | DIASTOLIC BLOOD PRESSURE: 55 MMHG

## 2023-08-07 PROCEDURE — 6370000000 HC RX 637 (ALT 250 FOR IP): Performed by: NURSE PRACTITIONER

## 2023-08-07 PROCEDURE — 97165 OT EVAL LOW COMPLEX 30 MIN: CPT

## 2023-08-07 PROCEDURE — 97530 THERAPEUTIC ACTIVITIES: CPT | Performed by: PHYSICAL THERAPIST

## 2023-08-07 PROCEDURE — 97535 SELF CARE MNGMENT TRAINING: CPT

## 2023-08-07 PROCEDURE — 6370000000 HC RX 637 (ALT 250 FOR IP): Performed by: INTERNAL MEDICINE

## 2023-08-07 PROCEDURE — 97161 PT EVAL LOW COMPLEX 20 MIN: CPT | Performed by: PHYSICAL THERAPIST

## 2023-08-07 RX ORDER — ACETAMINOPHEN 500 MG
1000 TABLET ORAL EVERY 8 HOURS
Qty: 120 TABLET | Refills: 0 | Status: SHIPPED | OUTPATIENT
Start: 2023-08-07

## 2023-08-07 RX ORDER — TRAMADOL HYDROCHLORIDE 50 MG/1
50 TABLET ORAL EVERY 6 HOURS PRN
Qty: 12 TABLET | Refills: 0 | Status: SHIPPED | OUTPATIENT
Start: 2023-08-07 | End: 2023-08-10

## 2023-08-07 RX ORDER — BUSPIRONE HYDROCHLORIDE 10 MG/1
20 TABLET ORAL 2 TIMES DAILY
Qty: 120 TABLET | Refills: 0 | Status: SHIPPED
Start: 2023-08-07 | End: 2023-09-06

## 2023-08-07 RX ADMIN — AMLODIPINE BESYLATE 5 MG: 5 TABLET ORAL at 08:57

## 2023-08-07 RX ADMIN — DIPHENHYDRAMINE HYDROCHLORIDE, ZINC ACETATE: 2; .1 CREAM TOPICAL at 08:57

## 2023-08-07 RX ADMIN — BUSPIRONE HYDROCHLORIDE 20 MG: 10 TABLET ORAL at 08:57

## 2023-08-07 RX ADMIN — ACETAMINOPHEN 1000 MG: 325 TABLET ORAL at 05:04

## 2023-08-07 RX ADMIN — ACETAMINOPHEN 1000 MG: 325 TABLET ORAL at 12:51

## 2023-08-07 RX ADMIN — PANTOPRAZOLE SODIUM 20 MG: 20 TABLET, DELAYED RELEASE ORAL at 06:34

## 2023-08-07 RX ADMIN — TRAMADOL HYDROCHLORIDE 50 MG: 50 TABLET ORAL at 01:22

## 2023-08-07 RX ADMIN — TRAMADOL HYDROCHLORIDE 50 MG: 50 TABLET ORAL at 12:51

## 2023-08-07 ASSESSMENT — PAIN DESCRIPTION - DESCRIPTORS: DESCRIPTORS: ACHING;PRESSURE

## 2023-08-07 ASSESSMENT — PAIN SCALES - GENERAL
PAINLEVEL_OUTOF10: 5
PAINLEVEL_OUTOF10: 0
PAINLEVEL_OUTOF10: 0
PAINLEVEL_OUTOF10: 9
PAINLEVEL_OUTOF10: 0

## 2023-08-07 ASSESSMENT — PAIN DESCRIPTION - ORIENTATION: ORIENTATION: LEFT

## 2023-08-07 ASSESSMENT — PAIN DESCRIPTION - LOCATION: LOCATION: SHOULDER

## 2023-08-07 NOTE — PROGRESS NOTES
1800- patient being discharged to [facility]. Report called to [facility]  TRANSFER - OUT REPORT:    Verbal report given to [facility] on Dearkaylen Velazco  being transferred to [facility] for routine progression of patient care       Report consisted of patient's Situation, Background, Assessment and   Recommendations(SBAR). Information from the following report(s) Nurse Handoff Report, Intake/Output, MAR, Recent Results, and Cardiac Rhythm NSR  was reviewed with the receiving nurse. Lines:       Opportunity for questions and clarification was provided.       Patient transported with:  EMT

## 2023-08-07 NOTE — DISCHARGE INSTRUCTIONS
and affect, sensory grossly within normal limit, limited range of motion to left upper extremity, arm supported by pillows. Has generalized weakness, previously reported she does not ambulate and propels herself in a wheelchair. She is alert and oriented to person, place and year  Skin: BLE dressing clean dry and intact. No rash present to R back or hip -complains of pruritus today. BLE are dry and scaly     CHRONIC MEDICAL DIAGNOSES:  Principal Problem:    Traumatic intracerebral hemorrhage without loss of consciousness (HCC)  Resolved Problems:    * No resolved hospital problems.  *     Greater than 31 minutes were spent with the patient on counseling and coordination of care     Signed:   Hezzie Osgood, APRN - NP  8/7/2023  2:05 PM

## 2023-08-07 NOTE — CARE COORDINATION
Transition of Care Plan:    SNF/LTC - Return to [facility] today  RN to call report to [phone]; Room #201A    Transport: S - [facility] - 1730    RUR: 17%  Prior Level of Functioning: WC bound  Disposition: LTC  If SNF or IPR: Date FOC offered: 8/6  Date FOC received: 8/6  Accepting facility: [address]   Date authorization started with reference number:   Date authorization received and expires: Follow up appointments: PC  DME needed: none  Transportation at discharge: BLS  IM/IMM Medicare/ letter given:   Caregiver Contact: Virgilio Andrews [phone]  Discharge Caregiver contacted prior to discharge? Care Conference needed? No  Barriers to discharge: - Medical    Pt may be stable for discharge today back to [address] CM spoke with [facility], they would like to submit for SNF auth for Pt. PT/OT consults placed. They will still accept Pt back today after 1600 with auth pending. [facility] transport scheduled for 1730.    1500: PAULETTE received call from Ashvin Clemens, [facility] liaison regarding Pt's discharge this afternoon. [facility] is requesting that Pt receive insurance auth for SNF prior to returning to [address] CM explained that therapy had evaluated and recommended LTC, Pt not currently skilled appropriate. [facility] [address] still wishes to submit for SNF auth although Pt was admitted from [address]. CM has escalated Pt case to [facility] for delay in DC. Hospitalist informed. 1619: CM Manager informed CM to keep transport time, on call CM to follow and push transport if needed. Plan to proceed with discharge today. [facility]) TRE[employer]URE [employer]

## 2023-08-07 NOTE — DISCHARGE SUMMARY
arm supported by pillows. Has generalized weakness, previously reported she does not ambulate and propels herself in a wheelchair. She is alert and oriented to person, place and year  Skin: BLE dressing clean dry and intact. No rash present to R back or hip -complains of pruritus today. BLE are dry and scaly    CHRONIC MEDICAL DIAGNOSES:  Principal Problem:    Traumatic intracerebral hemorrhage without loss of consciousness (HCC)  Resolved Problems:    * No resolved hospital problems.  *    Greater than 31 minutes were spent with the patient on counseling and coordination of care    Signed:   ISELA Wharton NP  8/7/2023  2:05 PM

## 2023-08-07 NOTE — PLAN OF CARE
Problem: Discharge Planning  Goal: Discharge to home or other facility with appropriate resources  Recent Flowsheet Documentation  Taken 8/6/2023 2000 by Claude Mckinley RN  Discharge to home or other facility with appropriate resources: Identify barriers to discharge with patient and caregiver     Problem: Safety - Adult  Goal: Free from fall injury  Recent Flowsheet Documentation  Taken 8/6/2023 2000 by Claude Mckinley RN  Free From Fall Injury: Instruct family/caregiver on patient safety     Problem: Chronic Conditions and Co-morbidities  Goal: Patient's chronic conditions and co-morbidity symptoms are monitored and maintained or improved  Recent Flowsheet Documentation  Taken 8/6/2023 2000 by Claude Mckniley RN  Care Plan - Patient's Chronic Conditions and Co-Morbidity Symptoms are Monitored and Maintained or Improved: Monitor and assess patient's chronic conditions and comorbid symptoms for stability, deterioration, or improvement

## 2023-08-07 NOTE — PLAN OF CARE
Problem: Physical Therapy - Adult  Goal: By Discharge: Performs mobility at highest level of function for planned discharge setting. See evaluation for individualized goals. Description: FUNCTIONAL STATUS PRIOR TO ADMISSION: Patient was wheelchair bound and living at [address] [place] [address]. Has not been ambulatory per patient. States that she has assistance in/out of the wheelchair. HOME SUPPORT PRIOR TO ADMISSION: Patient lived in [address] [place] [address]    Physical Therapy Goals  Initiated 8/7/2023  1. Patient will move from supine to sit and sit to supine in bed with moderate assistance within 7 day(s). 2.  Patient will perform sit to stand with maximal assistance within 7 day(s). 3.  Patient will transfer from bed to chair and chair to bed with maximal assistance using the least restrictive device within 7 day(s). Outcome: Progressing   PHYSICAL THERAPY EVALUATION    Patient: Eliseo Marion (50 y.o. female)  Date: 8/7/2023  Primary Diagnosis: Subarachnoid hemorrhage (HCC) [I60.9]  Intracranial hemorrhage ([address]) [I62.9]  Intraparenchymal hemorrhage of brain ([address]) [I61.9]  Anterior dislocation of left shoulder, initial encounter [Z24.429Z]       Precautions: Fall Risk              , L shld NWB and sling when up      ASSESSMENT :   DEFICITS/IMPAIRMENTS:   The patient is limited by confusion, emotionally labile, impaired balance, generalized weakness and decreased activity tolerance. At baseline patient is w/c bound and lives in [address] [place] [address]. Today is needing maxA to come to EOB and has fair sitting balance overall. Sling placed in sitting to support L UE but continues to have pain in shld. Reports dizziness in sitting and returns to supine. Anticipate she is not too far from her baseline. Recommend return to LTC. Patient will benefit from skilled intervention to address the above impairments.          PLAN :  Recommendations and Planned Interventions:   bed mobility training, transfer training, gait

## 2023-08-07 NOTE — PLAN OF CARE
Problem: Occupational Therapy - Adult  Goal: By Discharge: Performs self-care activities at highest level of function for planned discharge setting. See evaluation for individualized goals. Description: FUNCTIONAL STATUS PRIOR TO ADMISSION:  Patient was admitted from [address] [place] [address]. She reports assist with all ADL tasks and assist with lateral transfers to . She was using BUE to roll wc. Receives Help From: Other (comment), ADL Assistance: Needs assistance,  ,  ,  ,  , Toileting: Needs assistance, Homemaking Assistance: Needs assistance, Ambulation Assistance: Non-ambulatory (wc), Transfer Assistance: Needs assistance, Active : No     Occupational Therapy Goals:  Initiated 8/7/2023  1. Patient will perform two grooming tasks with Set-up and Supervision within 7 day(s). 2.  Patient will perform upper body dressing with Minimal Assist within 7 day(s). 3.  Patient will perform rolling to participate with LB care and toileting tasks with Moderate Assist within 7 day(s). 4.  Patient will don sling with max A within 7 day(s).       8/7/2023 1500 by Shirley Scott OT  Outcome: Not Progressing

## 2023-08-07 NOTE — BSMART NOTE
[facility]     Section I - Integrated Summary    Chief Complaint is psych consult. LOS:  4 days      Presenting problem/Summary:  Patient is an 81year old female that was seen on the medical floor at [facility]. This writer met with patient face to face with psychiatric provider present at bedside. Pt presented with irritable mood. Pt was alert and oriented to place and herself, but did not answer additional orientation questions. Pt was tearful and reports being in a lot of pain. Pt was requesting pain medication. She reports passive SI due to the pain she is in, but denied plan/intent or hx of suicide attempts. She denied current SI. Pt denied HI. Denied [facility]. Pt became more agitated with questions and asked the psychiatry team to stop asking her questions and stated \"dont ask me a whole lot of questions. \" When pt was asked if she ate her breakfast pt stated \"I dont want it. \" Reports difficulty sleeping due to pain. Pt asked to be left alone. Pt's wishes respected. Liaison to continue to follow. Please defer to psychiatric provider's note for disposition and recommendation. Precipitant Factors are shoulder pain. The information is given by the patient. Current Psychiatrist and/or  is unable to assess. Previous Hospitalizations/Treatment: unable to assess    Lethality Assessment:  The potential for suicide is pt denied SI  The potential for homicide is not noted. The patient has not been a perpetrator of sexual or physical abuse. There are not pending charges. The patient is not felt to be at risk for self-harm or harm to others. Section II - Psychosocial  The patient's overall mood and attitude is agitated. Feelings of helplessness and hopelessness are RILEY. Generalized anxiety is observed by verbal statements. Panic is not observed. Phobias are not observed. Obsessive compulsive tendencies are not observed.       Section III - Mental Status Exam  The

## 2023-08-07 NOTE — CONSULTS
Consult for Medical Downgrade from ICU  History and Physical    Date of Service:  8/4/2023  Primary Care Provider: Segundo Ye MD  Source of information: Chart review    Chief Complaint: Fall and Dislocation    History of Presenting Illness:   Nando Kwon is a 81 y.o. female who presented to the ED 8/3 from [facility] (has lived there for 3 years per pt) with chief complaint of fall and left shoulder pain. Pt reported a fall ~ 3 weeks PTA and is now experiencing left shoulder pain along with right tib-fib and right knee pain. She was unsure what exactly caused the fall. CT of the head showed a questionable small intraparanchymal hemorrhage in the deep R frontal lobe and suggestion of subtle subarachnoid hemorhage in the posterioir R frontal lobe. L shoulder Xray showed an anterioir dislocation. Nsgy was consulted (Dr. Mansoor Harris) and recommended admit to ICU for monitoring. Attempt at reduction of left shoulder (under anesthesia and without success) on 8/3 - Ortho considering timing of possible open shoulder reduction. For now, pt is NWB to [facility]. Hospitalists consulted for medical downgrade from ICU. Patient was seen and examined, she was lying in bed in no acute distress. - cooperative and interactive with assessment. She reports she is having some pain in her left shoulder -she is receiving oxycodone for this. She denies any headaches, dizziness, chest pain, chest pressure, shortness of breath or cough. Denies any GI complaints such as nausea, vomiting, diarrhea or constipation. She indicates she has no appetite, notably she is n.p.o. - there is no plans for immediate Ortho surgery - will restart diet. Patient appears to be medically stable for transfer to neuroscience floor.        REVIEW OF SYSTEMS:  As mentioned above in the HPI    Past Medical History:   Diagnosis Date    Anal polyp 6/13/2013    Arthritis     Asthma     CAD (coronary artery disease) 10/27/2017    Cataract
ORTHO CONSULT NOTE    Date of Consultation:  August 3, 2023  Referring Physician:  Karis Mcgraw MD  CC: L shoulder pain    HPI:  Laura Musa is a 81 y.o. female who c/o L shoulder pain after fall 2-3 weeks ago. She states prior to the fall she had no shoulder pain or functional deficit. After the fall the shoulder has been severely painful, and immobile. She states it was x-rayed previously and they told her she had \"arthritis\". This story was confirmed by her nephew. Pain is localized, sharp, achy, worse with movement. Denies numbness, focal finger weakness, elbow/wrist/hand pain, neck pain, cp, sob.      Past Medical History:   Diagnosis Date    Anal polyp 6/13/2013    Arthritis     Asthma     CAD (coronary artery disease) 10/27/2017    Cataract     Chronic pain     knee - right/back    Diabetes (HCC)     GERD (gastroesophageal reflux disease)     Hemorrhoids 6/13/2013    History of kidney stones     Hypertension     Ill-defined condition     abdominal pain and burning    Other ill-defined conditions(799.89)     high cholesterol      Past Surgical History:   Procedure Laterality Date    CATARACT REMOVAL Bilateral     CHOLECYSTECTOMY  6-2015    COLONOSCOPY  2/28/2013         GI  6/27/13    anal polyps removed    HEENT      laser surgery for blood clot in right eye    [address] [place]    tumor at back of neck, benign    OTHER SURGICAL HISTORY  4-2-14    lap gastrotomy and removal of polyp -  - not cancerous per pt    NIKKY AND BSO (CERVIX REMOVED)      TONSILLECTOMY      TOTAL KNEE ARTHROPLASTY      right    UPPER GASTROINTESTINAL ENDOSCOPY  2/28/2013         UROLOGICAL SURGERY      \"laser\" surgery for kidney stone      Family History   Problem Relation Age of Onset    Cancer Mother         colon    Heart Disease Brother     Heart Disease Brother     Schizophrenia Son     Heart Attack Brother     Heart Disease Brother     Anesth Problems Neg Hx     Diabetes Brother     Other Sister         GI BLEED
mirtazapine (REMERON) 7.5 mg tablet 3/17/23   Ar Automatic Reconciliation   OLANZapine (ZYPREXA) 2.5 MG tablet ceived the following from [facility]: Outside name: OLANZapine (ZyPREXA) 2.5 mg tablet 3/12/23   Ar Automatic Reconciliation   pantoprazole (PROTONIX) 40 MG tablet ceived the following from [facility]: Outside name: pantoprazole (PROTONIX) 40 mg tablet 2/4/23   Ar Automatic Reconciliation   rosuvastatin (CRESTOR) 10 MG tablet Take 1 tablet by mouth nightly    Ar Automatic Reconciliation   traZODone (DESYREL) 50 MG tablet ceived the following from [facility]: Outside name: traZODone (DESYREL) 50 mg tablet 4/11/23   Ar Automatic Reconciliation     [unfilled]  Lab Results   Component Value Date    WBC 9.3 08/05/2023    HGB 10.3 (L) 08/05/2023    HCT 32.9 (L) 08/05/2023    MCV 82.0 08/05/2023     08/05/2023     Lab Results   Component Value Date     08/06/2023    K 3.7 08/06/2023     (H) 08/06/2023    CO2 27 08/06/2023    BUN 5 (L) 08/06/2023    CREATININE 0.44 (L) 08/06/2023    GLUCOSE 157 (H) 08/06/2023    CALCIUM 7.9 (L) 08/06/2023    PROT 7.4 08/03/2023    LABALBU 2.5 (L) 08/03/2023    BILITOT 0.3 08/03/2023    ALKPHOS 138 (H) 08/03/2023    AST 15 08/03/2023    ALT 14 08/03/2023    LABGLOM >60 08/06/2023    GFRAA >60 12/11/2021    AGRATIO 0.6 (L) 04/11/2023    GLOB 4.9 (H) 08/03/2023         No results found for: VALAC, VALP, CARB2  No results found for: LITHM  RADIOLOGY REPORTS:(reviewed/updated 8/7/2023)  @[email or web address]@  @[email or web address]@    PSYCHOSOCIAL HISTORY: Patient is a resident of [facility] re[facility]. MENTAL STATUS EXAM:    General appearance:  poorly  groomed, psychomotor activity is agitated  Eye contact: Avoids eye contact  Speech: Spontaneous, soft, decreased output. Affect : agitated  Mood: \"ok \"  Thought Process: Logical, goal directed  Perception: Denies AH or VH. Thought Content: + passive SI, no plan and intent.

## 2023-08-08 LAB
BACTERIA SPEC CULT: NORMAL
SERVICE CMNT-IMP: NORMAL

## 2023-08-11 LAB
BACTERIA SPEC CULT: NORMAL
SERVICE CMNT-IMP: NORMAL

## 2024-01-18 ENCOUNTER — HOSPITAL ENCOUNTER (OUTPATIENT)
Facility: HOSPITAL | Age: 84
Discharge: HOME | End: 2024-01-18
Attending: ORTHOPAEDIC SURGERY
Payer: MEDICARE

## 2024-01-18 DIAGNOSIS — M24.412 CHRONIC DISLOCATION OF LEFT SHOULDER: ICD-10-CM

## 2024-01-18 PROCEDURE — 73200 CT UPPER EXTREMITY W/O DYE: CPT

## 2024-04-27 NOTE — ED NOTES
Assumed care of pt resting quietly in bed, no complaints. Pt states that she feels much better, anticipating discharge. Monitored X 3, side rails up X 2, will continue to monitor. Lab called to add on D-dimer in lab. Will continue to monitor.
Bedside and Verbal shift change report given to [facility] (oncoming nurse) by Lesa Pollard (offgoing nurse). Report included the following information SBAR.
Patient (s) has been given copy of dc instructions and two paper script(s) and zero electronic scripts. Patient (s) has verbalized understanding of instructions and script (s). Patient given a current medication reconciliation form and verbalized understanding of their medications. Patient (s)has verbalized understanding of the importance of discussing medications with  his or her physician or clinic they will be following up with. Patient alert and oriented and in no acute distress. Patient offered wheelchair from treatment area to hospital entrance, patient declined wheelchair.
Patient ambulatory to bathroom with urine specimen cup.
Patient requesting pain medication. Dr. Aranda made aware.
Pt arrived via EMS for intermittent, sharp, left sided chest pain that radiates to left arm, +sob, movement and lying down makes the pain worse. Aspirin 325 mg given by EMS. Pt also reports bilateral upper and lower extremity numbness/tingling. Bilateral lower extremity +1 pitting edema noted.
25

## 2024-09-12 ENCOUNTER — APPOINTMENT (OUTPATIENT)
Facility: HOSPITAL | Age: 84
End: 2024-09-12
Payer: MEDICARE

## 2024-09-12 ENCOUNTER — HOSPITAL ENCOUNTER (EMERGENCY)
Facility: HOSPITAL | Age: 84
Discharge: HOME | End: 2024-09-12
Attending: EMERGENCY MEDICINE
Payer: MEDICARE

## 2024-09-12 VITALS
TEMPERATURE: 98.3 F | WEIGHT: 126.98 LBS | RESPIRATION RATE: 18 BRPM | SYSTOLIC BLOOD PRESSURE: 95 MMHG | BODY MASS INDEX: 24.8 KG/M2 | DIASTOLIC BLOOD PRESSURE: 51 MMHG | HEART RATE: 94 BPM | OXYGEN SATURATION: 100 %
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
## 2024-09-12 PROCEDURE — 71045 X-RAY EXAM CHEST 1 VIEW: CPT

## 2024-09-12 PROCEDURE — 72170 X-RAY EXAM OF PELVIS: CPT

## 2024-09-12 PROCEDURE — 99284 EMERGENCY DEPT VISIT MOD MDM: CPT

## 2024-09-12 PROCEDURE — 70450 CT HEAD/BRAIN W/O DYE: CPT

## 2024-09-12 PROCEDURE — 72125 CT NECK SPINE W/O DYE: CPT

## 2024-09-12 ASSESSMENT — PAIN SCALES - PAIN ASSESSMENT IN ADVANCED DEMENTIA (PAINAD)
BREATHING: NORMAL
TOTALSCORE: 3
FACIALEXPRESSION: SMILING OR INEXPRESSIVE
CONSOLABILITY: DISTRACTED OR REASSURED BY VOICE/TOUCH
NEGVOCALIZATION: OCCASIONAL MOAN/GROAN, LOW SPEECH, NEGATIVE/DISAPPROVING QUALITY
BODYLANGUAGE: TENSE, DISTRESSED PACING, FIDGETING

## 2024-09-12 ASSESSMENT — PAIN - FUNCTIONAL ASSESSMENT: PAIN_FUNCTIONAL_ASSESSMENT: PAIN ASSESSMENT IN ADVANCED DEMENTIA (PAINAD)

## 2024-09-16 NOTE — ED NOTES
Bedside shift change report given to Liban Arreola (oncoming nurse) by Rudell Goldmann, RN 
 (offgoing nurse). Report included the following information SBAR.
Patient  given copy of dc instructions and 0 paper script(s) and 0 electronic scripts. Patient  verbalized understanding of instructions and script (s). Patient given a current medication reconciliation form and verbalized understanding of their medications. Patient  verbalized understanding of the importance of discussing medications with  his or her physician or clinic they will be following up with. Patient alert and oriented and in no acute distress. Patient offered wheelchair from treatment area to hospital entrance, patient declined wheelchair.
Pt is resting comfortably in bed. Pt is alert and oriented. Pt skin is warm and dry. Pt is ambulatory independently. Pt denies needing anything at this time.
Pt presents to ED ambulatory complaining of chest pain, hyperglycemia. Pt is alert and oriented x 4, RR even and unlabored, skin is warm and dry. Assessment completed and pt updated on plan of care. Pt is very anxious and tearful at times. Emergency Department Nursing Plan of Care The Nursing Plan of Care is developed from the Nursing assessment and Emergency Department Attending provider initial evaluation. The plan of care may be reviewed in the ED Provider note. The Plan of Care was developed with the following considerations:  
Patient / Family readiness to learn indicated by:verbalized understanding Persons(s) to be included in education: patient Barriers to Learning/Limitations:No 
 
Signed Caren De La Rosa RN   
4/4/2019   7:08 AM 
 
 

PAST SURGICAL HISTORY:  H/O knee surgery R nee pain, arthroscopic procedure, 2010    H/O thyroidectomy 2012, 'no cancer "    H/O total knee replacement, right

## (undated) DEVICE — NEEDLE HYPO 22GA L1.5IN BLK S STL HUB POLYPR SHLD REG BVL

## (undated) DEVICE — SUTURE VCRL SZ 2-0 L36IN ABSRB UD L40MM CT 1/2 CIR J957H

## (undated) DEVICE — INFECTION CONTROL KIT SYS

## (undated) DEVICE — STERILE POLYISOPRENE POWDER-FREE SURGICAL GLOVES WITH EMOLLIENT COATING: Brand: PROTEXIS

## (undated) DEVICE — (D)PREP SKN CHLRAPRP APPL 26ML -- CONVERT TO ITEM 371833

## (undated) DEVICE — REM POLYHESIVE ADULT PATIENT RETURN ELECTRODE: Brand: VALLEYLAB

## (undated) DEVICE — Z DISCONTINUED USE 2744636  DRESSING AQUACEL 14 IN ALG W3.5XL14IN POLYUR FLM CVR W/ HYDRCOLL

## (undated) DEVICE — (D)SYR 10ML 1/5ML GRAD NSAF -- PKGING CHANGE USE ITEM 338027

## (undated) DEVICE — 3M™ IOBAN™ 2 ANTIMICROBIAL INCISE DRAPE 6651EZ: Brand: IOBAN™ 2

## (undated) DEVICE — BAG SPEC BIOHZRD 10 X 10 IN --

## (undated) DEVICE — STRYKER PERFORMANCE SERIES SAGITTAL BLADE: Brand: STRYKER PERFORMANCE SERIES

## (undated) DEVICE — SOLUTION IRRIG 3000ML 0.9% SOD CHL FLX CONT 0797208] ICU MEDICAL INC]

## (undated) DEVICE — GOWN,PREVENTION PLUS,XLN/2XL,ST,22/CS: Brand: MEDLINE

## (undated) DEVICE — X-RAY SPONGES,16 PLY: Brand: DERMACEA

## (undated) DEVICE — SUTURE VCRL 1 L27IN ABSRB CT BRAID COAT UD J281H

## (undated) DEVICE — PADDING CAST SPEC 6INX4YD COT --

## (undated) DEVICE — GAUZE SPONGES,12 PLY: Brand: CURITY

## (undated) DEVICE — SLIM BODY SKIN STAPLER: Brand: APPOSE ULC

## (undated) DEVICE — Device

## (undated) DEVICE — SCRUB DRY SURG EZ SCRUB BRUSH PREOPERATIVE GRN

## (undated) DEVICE — DRAPE,EXTREMITY,89X128,STERILE: Brand: MEDLINE

## (undated) DEVICE — CARTRIDGE BNE CEM MIX UNIV TWR VAC ROTOR BRK OFF NOZ W/O

## (undated) DEVICE — PADDING CST 6IN STERILE --

## (undated) DEVICE — FORCEPS BX L160CM DIA8MM GRSP DISECT CUP TIP NONLOCKING ROT

## (undated) DEVICE — ZIMMER® STERILE DISPOSABLE TOURNIQUET CUFF WITH PLC, DUAL PORT, SINGLE BLADDER, 34 IN. (86 CM)

## (undated) DEVICE — SOLIDIFIER MEDC 1200ML -- CONVERT TO 356117

## (undated) DEVICE — 1200 GUARD II KIT W/5MM TUBE W/O VAC TUBE: Brand: GUARDIAN

## (undated) DEVICE — T5 HOOD WITH PEEL AWAY FACE SHIELD

## (undated) DEVICE — 4-PORT MANIFOLD: Brand: NEPTUNE 2

## (undated) DEVICE — SOLUTION IRRIG 1000ML H2O STRL BLT

## (undated) DEVICE — SUTURE ETHBND EXCEL SZ 2 L30IN NONABSORBABLE GRN L40MM V-37 MX69G

## (undated) DEVICE — PREP SKN PREVAIL 40ML APPL --

## (undated) DEVICE — KENDALL RADIOLUCENT FOAM MONITORING ELECTRODE RECTANGULAR SHAPE: Brand: KENDALL

## (undated) DEVICE — MEDI-VAC YANK SUCT HNDL W/TPRD BULBOUS TIP: Brand: CARDINAL HEALTH

## (undated) DEVICE — STERILE POLYISOPRENE POWDER-FREE SURGICAL GLOVES: Brand: PROTEXIS

## (undated) DEVICE — Z DISCONTINUED PER MEDLINE LINE GAS SAMPLING O2/CO2 LNG AD 13 FT NSL W/ TBNG FILTERLINE

## (undated) DEVICE — HANDPIECE SET WITH BONE CLEANING TIP AND SUCTION TUBE: Brand: INTERPULSE

## (undated) DEVICE — TRAY CATH 16F URIN MTR LTX -- CONVERT TO ITEM 363111

## (undated) DEVICE — TOWEL 4 PLY TISS 19X30 SUE WHT

## (undated) DEVICE — BLOCK BITE ENDOSCP AD 21 MM W/ DIL BLU LF DISP

## (undated) DEVICE — CONTAINER,SPECIMEN,STRL PATH,4OZ: Brand: MEDLINE

## (undated) DEVICE — SUTURE VCRL SZ 0 L27IN ABSRB UD L36MM CT-1 1/2 CIR J260H

## (undated) DEVICE — HOOK LOCK LATEX FREE ELASTIC BANDAGE D/L 6INX10YD

## (undated) DEVICE — SET ADMIN 16ML TBNG L100IN 2 Y INJ SITE IV PIGGY BK DISP

## (undated) DEVICE — CATH IV AUTOGRD BC BLU 22GA 25 -- INSYTE

## (undated) DEVICE — INTENDED FOR TISSUE SEPARATION, AND OTHER PROCEDURES THAT REQUIRE A SHARP SURGICAL BLADE TO PUNCTURE OR CUT.: Brand: BARD-PARKER ® CARBON RIB-BACK BLADES

## (undated) DEVICE — CONTAINER SPEC 20 ML LID NEUT BUFF FORMALIN 10 % POLYPR STS

## (undated) DEVICE — Device: Brand: MEDEX

## (undated) DEVICE — NEEDLE HYPO 18GA L1.5IN PNK S STL HUB POLYPR SHLD REG BVL

## (undated) DEVICE — DEVON™ KNEE AND BODY STRAP 60" X 3" (1.5 M X 7.6 CM): Brand: DEVON

## (undated) DEVICE — SYRINGE MED 20ML STD CLR PLAS LUERLOCK TIP N CTRL DISP

## (undated) DEVICE — SYR 3ML LL TIP 1/10ML GRAD --

## (undated) DEVICE — BASIN EMESIS 500CC ROSE 250/CS 60/PLT: Brand: MEDEGEN MEDICAL PRODUCTS, LLC

## (undated) DEVICE — 2T11 #2 PDO 36 X 36: Brand: 2T11 #2 PDO 36 X 36

## (undated) DEVICE — DERMABOND SKIN ADH 0.7ML -- DERMABOND ADVANCED 12/BX